# Patient Record
Sex: FEMALE | Race: WHITE | NOT HISPANIC OR LATINO | Employment: FULL TIME | URBAN - METROPOLITAN AREA
[De-identification: names, ages, dates, MRNs, and addresses within clinical notes are randomized per-mention and may not be internally consistent; named-entity substitution may affect disease eponyms.]

---

## 2017-01-03 ENCOUNTER — PATIENT MESSAGE (OUTPATIENT)
Dept: FAMILY MEDICINE | Facility: CLINIC | Age: 32
End: 2017-01-03

## 2017-01-03 DIAGNOSIS — G89.29 CHRONIC NECK PAIN: Primary | ICD-10-CM

## 2017-01-03 DIAGNOSIS — M54.2 CHRONIC NECK PAIN: Primary | ICD-10-CM

## 2017-01-10 ENCOUNTER — OFFICE VISIT (OUTPATIENT)
Dept: PAIN MEDICINE | Facility: CLINIC | Age: 32
End: 2017-01-10
Payer: COMMERCIAL

## 2017-01-10 VITALS
WEIGHT: 154.75 LBS | DIASTOLIC BLOOD PRESSURE: 78 MMHG | HEART RATE: 62 BPM | SYSTOLIC BLOOD PRESSURE: 104 MMHG | BODY MASS INDEX: 23.53 KG/M2

## 2017-01-10 DIAGNOSIS — M54.12 CERVICAL RADICULOPATHY: Primary | ICD-10-CM

## 2017-01-10 DIAGNOSIS — M79.18 MYOFASCIAL MUSCLE PAIN: ICD-10-CM

## 2017-01-10 PROCEDURE — 99999 PR PBB SHADOW E&M-EST. PATIENT-LVL III: CPT | Mod: PBBFAC,,, | Performed by: ANESTHESIOLOGY

## 2017-01-10 PROCEDURE — 1159F MED LIST DOCD IN RCRD: CPT | Mod: S$GLB,,, | Performed by: ANESTHESIOLOGY

## 2017-01-10 PROCEDURE — 99204 OFFICE O/P NEW MOD 45 MIN: CPT | Mod: S$GLB,,, | Performed by: ANESTHESIOLOGY

## 2017-01-10 RX ORDER — IBUPROFEN 200 MG
200 TABLET ORAL EVERY 6 HOURS PRN
COMMUNITY
End: 2017-04-07

## 2017-01-10 NOTE — LETTER
January 10, 2017      Aristeo Perez MD  [address]  [address]  [facility] [address]           [facility]  [address]  [facility] [address]  Phone: [phone]          Patient: April Wendt Schamberger   MR Number: [id number]   YOB: 1985   Date of Visit: 1/10/2017       Dear Dr. Aristeo Perez:    Thank you for referring April Schamberger to me for evaluation. Attached you will find relevant portions of my assessment and plan of care.    If you have questions, please do not hesitate to call me. I look forward to following April Schamberger along with you.    Sincerely,    Hunter Jimenez MD    Enclosure  CC:  No Recipients    If you would like to receive this communication electronically, please contact [email or web address] or [phone] to request more information on [employer] Link access.    For providers and/or their staff who would like to refer a patient to [facility], please contact us through our one-stop-shop provider referral line, [facility], at [phone].    If you feel you have received this communication in error or would no longer like to receive these types of communications, please e-mail [email or web address]

## 2017-01-10 NOTE — PROGRESS NOTES
Chronic Pain - New Consult    Referring Physician: Aristeo Perez MD    Chief Complaint:   Chief Complaint   Patient presents with    Neck Pain     referred by Dr. Perez    Shoulder Pain    Hand Pain     finger numbness        SUBJECTIVE:    April Wendt Schamberger presents to the clinic for the evaluation of neck pain. The pain started a few years ago and symptoms have been worsening.The pain is located in the neck area and radiates to the shoulder and right hand.  The pain is described as aching, burning, numbing, pulsating, shooting, stabbing, throbbing and tingling and is rated as 5/10. The pain is rated with a score of  0/10 on the BEST day and a score of 8/10 on the WORST day.  Symptoms interfere with daily activity, sleeping and work. The pain is exacerbated by Bending and Flexing.  The pain is mitigated by medications. She reports spending 4 hours per day reclining. The patient reports 3 hours of uninterrupted sleep per night.    Patient states onset of neck pain was 6 months, pain started in neck with radicular symptoms to shoulders, now radicular symptoms are in the right arm. Radicular pain can be shooting in the am, then turns to numbness, tingling in the day.  Patient taking Motrin every 4 hours that seems to help, activity and ADLs tend to increase her pain. Timing of pain is constant, patient states pain will take an Ambien to help sleep.Severity of pain is 7/10.    Patient denies night fever/night sweats, urinary incontinence, bowel incontinence, significant weight loss, significant motor weakness and loss of sensations.    Physical Therapy/Home Exercise: no      Pain Disability Index Review:  Last 3 PDI Scores 1/10/2017   Pain Disability Index (PDI) 0       Pain Medications:    - Opioids: None  - Adjuvant Medications: Advil,Motrin ( Ibuprofen)  - Anti-Coagulants: None  - Others: see medications list     report:  Reviewed and consistent with medication use as prescribed.    Pain Procedures:  none    Imaging: X-Ray Cervical Spine AP Lat with Flexion  Extension 12/15/16  Narrative     Cervical spine radiographs     comparison: None    Results: AP, lateral, flexion and extension views  .  The alignment is normal, vertebral body height and disk spaces are well-maintained.    Flexion and extension views demonstrate no translational abnormalities.  Very small anterior osteophyte noted at C5-C6.  No fracture or osseous lesion seen.Prevertebral soft tissues appear normal.   Impression    No significant abnormality seen      Electronically signed by: JOSE A JENKINS MD  Date: 12/15/16  Time: 10:52          Past Medical History   Diagnosis Date    IBS (irritable bowel syndrome)      Past Surgical History   Procedure Laterality Date    Tonsillectomy, adenoidectomy      Refractive surgery  2005     Social History     Social History    Marital status:      Spouse name: N/A    Number of children: 1    Years of education: N/A     Occupational History    Not on file.     Social History Main Topics    Smoking status: Former Smoker    Smokeless tobacco: Not on file    Alcohol use 0.0 oz/week     0 Standard drinks or equivalent per week      Comment: rarely    Drug use: No    Sexual activity: Yes     Partners: Male     Other Topics Concern    Not on file     Social History Narrative     Family History   Problem Relation Age of Onset    Breast cancer Mother     Prostate cancer Father     Pancreatic cancer Maternal Grandfather     Breast cancer Paternal Grandmother     Diabetes type II Paternal Grandfather        Review of patient's allergies indicates:   Allergen Reactions    Azithromycin Nausea And Vomiting       Current Outpatient Prescriptions   Medication Sig    ibuprofen (ADVIL,MOTRIN) 200 MG tablet Take 200 mg by mouth every 6 (six) hours as needed for Pain.    LOPERAMIDE HCL (IMODIUM A-D ORAL) Take 6 tablets by mouth every morning.     zolpidem (AMBIEN) 10 mg Tab Take 1 tablet (10 mg  total) by mouth nightly as needed.     No current facility-administered medications for this visit.        REVIEW OF SYSTEMS:    GENERAL:  No weight loss, malaise or fevers.  HEENT:  Negative for frequent or significant headaches. + HAs 3-4 days out of the week.  NECK:  Negative for lumps, goiter, pain and significant neck swelling.  RESPIRATORY:  Negative for cough, wheezing or shortness of breath.  CARDIOVASCULAR:  Negative for chest pain, leg swelling or palpitations.  GI:  Negative for abdominal discomfort, blood in stools or black stools or change in bowel habits. +IBS  MUSCULOSKELETAL:  See HPI.  SKIN:  Negative for lesions, rash, and itching.  PSYCH:  + for sleep disturbance, mood disorder and recent psychosocial stressors.  HEMATOLOGY/LYMPHOLOGY:  Negative for prolonged bleeding, bruising easily or swollen nodes.   NEURO:   No history of headaches, syncope, paralysis, seizures or tremors.  All other reviewed and negative other than HPI.    OBJECTIVE:    Visit Vitals    /78    Pulse 62    Wt 70.2 kg (154 lb 12.2 oz)    BMI 23.53 kg/m2       PHYSICAL EXAMINATION:    General appearance: Well appearing, in no acute distress, alert and oriented x3.  Psych:  Mood and affect appropriate.  Skin: Skin color, texture, turgor normal, no rashes or lesions, in both upper and lower body.  Head/face:  Normocephalic, atraumatic. No palpable lymph nodes.  Neck: Mild TTP over the thoracic paraspinal muscles   . Spurling Negative. No pain with neck flexion, extension, or lateral flexion.   Cor: RRR  Pulm: CTA  Back: Straight leg raising in the sitting and supine positions is negative to radicular pain. No pain to palpation over the spine or costovertebral angles. Normal range of motion without pain reproduction.  Extremities: Peripheral joint ROM is full and pain free without obvious instability or laxity in all four extremities. No deformities, edema, or skin discoloration. Good capillary refill.  Musculoskeletal:  Shoulder, hip, sacroiliac and knee provocative maneuvers are negative. Bilateral upper and lower extremity strength is normal and symmetric.  No atrophy or tone abnormalities are noted.  Neuro: Bilateral upper and lower extremity coordination and muscle stretch reflexes are physiologic and symmetric.  Plantar response are downgoing. No loss of sensation is noted.  Gait: normal.    ASSESSMENT: 31 y.o. year old female with burning neck pain radiating to the shoulder blade  with occasional numbness and tingling of the fingers consistent with      1. Cervical radiculopathy    2. Myofascial muscle pain            PLAN:     - I have stressed the importance of physical activity and a home exercise plan to help with pain and improve health.  -Rx Compounding cream to apply to affected areas  -Refer  to PT  -She can continue Motrin for now  -Order MRI of the C spine   - RTC in 4 weeks   - Counseled patient regarding the importance of physical therapy.    The above plan and management options were discussed at length with patient. Patient is in agreement with the above and verbalized understanding. It will be communicated with the referring physician via electronic record, fax, or mail.    Hunter Jimenez  01/10/2017

## 2017-01-20 ENCOUNTER — TELEPHONE (OUTPATIENT)
Dept: PAIN MEDICINE | Facility: CLINIC | Age: 32
End: 2017-01-20

## 2017-01-20 ENCOUNTER — PATIENT MESSAGE (OUTPATIENT)
Dept: PAIN MEDICINE | Facility: CLINIC | Age: 32
End: 2017-01-20

## 2017-01-20 DIAGNOSIS — M54.12 CERVICAL RADICULOPATHY: Primary | ICD-10-CM

## 2017-01-20 DIAGNOSIS — M79.18 MYOFASCIAL MUSCLE PAIN: ICD-10-CM

## 2017-01-20 DIAGNOSIS — M47.812 FACET ARTHROPATHY, CERVICAL: ICD-10-CM

## 2017-01-20 NOTE — TELEPHONE ENCOUNTER
Per JOCY Marie, after speaking to pt, scheduled cervical facet MBB, C3,4,5, pending MRI review.  He has made pt aware of this as well.  Case entered and pended to Emelyn HARTMAN.

## 2017-01-23 ENCOUNTER — PATIENT MESSAGE (OUTPATIENT)
Dept: PAIN MEDICINE | Facility: CLINIC | Age: 32
End: 2017-01-23

## 2017-01-23 NOTE — DISCHARGE INSTRUCTIONS
Home Care Instructions Pain Management:    1.  DIET:    You may resume your normal diet today.    2.  BATHING:    You may shower with luke warm water.    3.  DRESSING:    You may remove your bandage today.    4.  ACTIVITY LEVEL:      You may resume your normal activities 24 hours after your procedure.    5.  MEDICATIONS:    You may resume your normal medications today.    6.  SPECIAL INSTRUCTIONS:    No heat to the injection site for 24 hours including bath or shower, heating pad, moist heat or hot tubs.    Use an ice pack to the injection site for any pain or discomfort.  Apply ice packs for 20 minute intervals as needed.    If you have received any sedatives by mouth today, you can not drive for 12 hours.    If you have received sedation through an IV, you can not drive for 24 hours.    PLEASE CALL YOUR DOCTOR FOR THE FOLLOWIN.  Redness or swelling around the injection site.  2.  Fever of 101 degrees.  3.  Drainage (pus) from the injection site.  4.  For any continuous bleeding (some dried blood over the incision is normal.)    FOR EMERGENCIES:    If any unusual problems or difficulties occur during clinic hours, call [phone] or dial [phone].    Follow up with with your physician in 2-3 weeks.

## 2017-01-24 ENCOUNTER — TELEPHONE (OUTPATIENT)
Dept: PAIN MEDICINE | Facility: CLINIC | Age: 32
End: 2017-01-24

## 2017-01-24 NOTE — TELEPHONE ENCOUNTER
Spoke with patient regarding time of arrival for procedure that is scheduled on 1/25/17. Patient verbalized understanding and confirmed procedure date and time of arrival on 1/25/17 at 1230 PM.

## 2017-01-25 ENCOUNTER — SURGERY (OUTPATIENT)
Age: 32
End: 2017-01-25

## 2017-01-25 ENCOUNTER — HOSPITAL ENCOUNTER (OUTPATIENT)
Facility: HOSPITAL | Age: 32
Discharge: HOME | End: 2017-01-25
Attending: ANESTHESIOLOGY | Admitting: ANESTHESIOLOGY
Payer: COMMERCIAL

## 2017-01-25 VITALS
OXYGEN SATURATION: 100 % | DIASTOLIC BLOOD PRESSURE: 83 MMHG | HEIGHT: 68 IN | SYSTOLIC BLOOD PRESSURE: 131 MMHG | BODY MASS INDEX: 23.19 KG/M2 | HEART RATE: 78 BPM | RESPIRATION RATE: 15 BRPM | WEIGHT: 153 LBS | TEMPERATURE: 99 F

## 2017-01-25 PROCEDURE — 25000003 PHARM REV CODE 250: Performed by: ANESTHESIOLOGY

## 2017-01-25 PROCEDURE — 63600175 PHARM REV CODE 636 W HCPCS: Performed by: ANESTHESIOLOGY

## 2017-01-25 PROCEDURE — 64492 INJ PARAVERT F JNT C/T 3 LEV: CPT | Mod: 50,,, | Performed by: ANESTHESIOLOGY

## 2017-01-25 PROCEDURE — 64492 INJ PARAVERT F JNT C/T 3 LEV: CPT | Mod: 50 | Performed by: ANESTHESIOLOGY

## 2017-01-25 PROCEDURE — 64490 INJ PARAVERT F JNT C/T 1 LEV: CPT | Mod: 50,,, | Performed by: ANESTHESIOLOGY

## 2017-01-25 PROCEDURE — 64491 INJ PARAVERT F JNT C/T 2 LEV: CPT | Mod: 50 | Performed by: ANESTHESIOLOGY

## 2017-01-25 PROCEDURE — 99152 MOD SED SAME PHYS/QHP 5/>YRS: CPT | Mod: ,,, | Performed by: ANESTHESIOLOGY

## 2017-01-25 PROCEDURE — 64490 INJ PARAVERT F JNT C/T 1 LEV: CPT | Mod: 50 | Performed by: ANESTHESIOLOGY

## 2017-01-25 PROCEDURE — 64491 INJ PARAVERT F JNT C/T 2 LEV: CPT | Mod: 50,,, | Performed by: ANESTHESIOLOGY

## 2017-01-25 RX ORDER — MIDAZOLAM HYDROCHLORIDE 1 MG/ML
INJECTION, SOLUTION INTRAMUSCULAR; INTRAVENOUS
Status: DISCONTINUED | OUTPATIENT
Start: 2017-01-25 | End: 2017-01-25 | Stop reason: HOSPADM

## 2017-01-25 RX ORDER — SODIUM CHLORIDE, SODIUM LACTATE, POTASSIUM CHLORIDE, CALCIUM CHLORIDE 600; 310; 30; 20 MG/100ML; MG/100ML; MG/100ML; MG/100ML
INJECTION, SOLUTION INTRAVENOUS CONTINUOUS
Status: DISCONTINUED | OUTPATIENT
Start: 2017-01-25 | End: 2017-01-25 | Stop reason: HOSPADM

## 2017-01-25 RX ORDER — BUPIVACAINE HYDROCHLORIDE 2.5 MG/ML
INJECTION, SOLUTION INFILTRATION; PERINEURAL
Status: DISCONTINUED | OUTPATIENT
Start: 2017-01-25 | End: 2017-01-25 | Stop reason: HOSPADM

## 2017-01-25 RX ORDER — FENTANYL CITRATE 50 UG/ML
INJECTION, SOLUTION INTRAMUSCULAR; INTRAVENOUS
Status: DISCONTINUED | OUTPATIENT
Start: 2017-01-25 | End: 2017-01-25 | Stop reason: HOSPADM

## 2017-01-25 RX ORDER — LIDOCAINE HYDROCHLORIDE 10 MG/ML
INJECTION INFILTRATION; PERINEURAL
Status: DISCONTINUED | OUTPATIENT
Start: 2017-01-25 | End: 2017-01-25 | Stop reason: HOSPADM

## 2017-01-25 RX ORDER — TRIAMCINOLONE ACETONIDE 40 MG/ML
INJECTION, SUSPENSION INTRA-ARTICULAR; INTRAMUSCULAR
Status: DISCONTINUED | OUTPATIENT
Start: 2017-01-25 | End: 2017-01-25 | Stop reason: HOSPADM

## 2017-01-25 RX ADMIN — FENTANYL CITRATE 50 MCG: 50 INJECTION, SOLUTION INTRAMUSCULAR; INTRAVENOUS at 01:01

## 2017-01-25 RX ADMIN — LIDOCAINE HYDROCHLORIDE 5 ML: 10 INJECTION, SOLUTION INFILTRATION; PERINEURAL at 01:01

## 2017-01-25 RX ADMIN — MIDAZOLAM 2 MG: 1 INJECTION INTRAMUSCULAR; INTRAVENOUS at 01:01

## 2017-01-25 RX ADMIN — MIDAZOLAM 1 MG: 1 INJECTION INTRAMUSCULAR; INTRAVENOUS at 01:01

## 2017-01-25 RX ADMIN — BUPIVACAINE HYDROCHLORIDE 5 ML: 2.5 INJECTION, SOLUTION INFILTRATION; PERINEURAL at 01:01

## 2017-01-25 RX ADMIN — TRIAMCINOLONE ACETONIDE 40 MG: 40 INJECTION, SUSPENSION INTRA-ARTICULAR; INTRAMUSCULAR at 01:01

## 2017-01-25 NOTE — IP AVS SNAPSHOT
[facility]  [address]  [place] [address]  Phone: [phone]           Patient Discharge Instructions     Our goal is to set you up for success. This packet includes information on your condition, medications, and your home care. It will help you to care for yourself so you don't get sicker and need to go back to the hospital.     Please ask your nurse if you have any questions.        There are many details to remember when preparing to leave the hospital. Here is what you will need to do:    1. Take your medicine. If you are prescribed medications, review your Medication List in the following pages. You may have new medications to  at the pharmacy and others that you'll need to stop taking. Review the instructions for how and when to take your medications. Talk with your doctor or nurses if you are unsure of what to do.     2. Go to your follow-up appointments. Specific follow-up information is listed in the following pages. Your may be contacted by a transition nurse or clinical provider about future appointments. Be sure we have all of the phone numbers to reach you, if needed. Please contact your provider's office if you are unable to make an appointment.     3. Watch for warning signs. Your doctor or nurse will give you detailed warning signs to watch for and when to call for assistance. These instructions may also include educational information about your condition. If you experience any of warning signs to your health, call your doctor.               [facility]  Unless otherwise directed by your provider, please contact [facility], our nurse care line that is available for 24/7 assistance.     [phone] (toll-free)    Registered nurses in the [facility] provide clinical advisement, health education, appointment booking, and other advisory services.                    ** Verify the list of medication(s) below is accurate and up to date. Carry this  with you in case of emergency. If your medications have changed, please notify your healthcare provider.             Medication List      Notice     Cannot display discharge medications because the patient has not yet been admitted.               Please bring to all follow up appointments:    1. A copy of your discharge instructions.  2. All medicines you are currently taking in their original bottles.  3. Identification and insurance card.    Please arrive 15 minutes ahead of scheduled appointment time.    Please call 24 hours in advance if you must reschedule your appointment and/or time.        Your Scheduled Appointments     Jan 26, 2017 11:30 AM CST   Mri C Spine Non Cont with [facility] MRI1   [facility]    [address]  [place] [address]   [phone]            Feb 08, 2017  3:00 PM CST   New Physical Therapy Patient with Rosario Aguilar, SHAR   [facility]    [address]  [place] [address]   [phone]            Feb 09, 2017  1:15 PM CST   Established Patient Visit with [facility] ([place]    [address]  [place] [address]   [phone]              Your Future Surgeries/Procedures     Jan 25, 2017   Surgery with Hunter Jimenez MD   [facility]    [address]  [place] [address]   [phone]                  Discharge Instructions       Home Care Instructions Pain Management:    1.  DIET:    You may resume your normal diet today.    2.  BATHING:    You may shower with luke warm water.    3.  DRESSING:    You may remove your bandage today.    4.  ACTIVITY LEVEL:      You may resume your normal activities 24 hours after your procedure.    5.  MEDICATIONS:    You may resume your normal medications today.    6.  SPECIAL INSTRUCTIONS:    No heat to the injection site for 24 hours including bath or shower, heating  pad, moist heat or hot tubs.    Use an ice pack to the injection site for any pain or discomfort.  Apply ice packs for 20 minute intervals as needed.    If you have received any sedatives by mouth today, you can not drive for 12 hours.    If you have received sedation through an IV, you can not drive for 24 hours.    PLEASE CALL YOUR DOCTOR FOR THE FOLLOWIN.  Redness or swelling around the injection site.  2.  Fever of 101 degrees.  3.  Drainage (pus) from the injection site.  4.  For any continuous bleeding (some dried blood over the incision is normal.)    FOR EMERGENCIES:    If any unusual problems or difficulties occur during clinic hours, call [phone] or dial 911.    Follow up with with your physician in 2-3 weeks.         Admission Information     Date & Time Provider Department CSN    2017  3:30 PM Hunter Jimenez MD [facility] [id number]      Care Providers     Provider Role Specialty Primary office phone    Hunter Jimenez MD Attending Provider Pain Medicine [phone]      Recent Lab Values     No lab values to display.      Allergies as of 2017        Reactions    Azithromycin Nausea And Vomiting      Advance Directives     An advance directive is a document which, in the event you are no longer able to make decisions for yourself, tells your healthcare team what kind of treatment you do or do not want to receive, or who you would like to make those decisions for you.  If you do not currently have an advance directive, [facility] encourages you to create one.  For more information call:  [phone]), [phone] ([phone]),  or log on to [email or web address].        Smoking Cessation     If you would like to quit smoking:   You may be eligible for free services if you are a [place] resident and started smoking cigarettes before 1988.  Call the [employer]oking [employer]essation [employer] ([employer]) toll free at (507) 599-2132 or (589) 611-7680.   Call  1-800-QUIT-NOW if you do not meet the above criteria.            Language Assistance Services     ATTENTION: Language assistance services are available, free of charge. Please call [phone].      ATENCIÓN: Si habgordo austin, tiene a hightower disposición servicios gratuitos de asistencia lingüística. Llame al [phone].     CHÚ Ý: N?u b?n nói Ti?ng Vi?t, có các d?ch v? h? tr? ngôn ng? mi?n phí dành cho b?n. G?i s? [phone].         [facility] complies with applicable Federal civil rights laws and does not discriminate on the basis of race, color, national origin, age, disability, or sex.

## 2017-01-25 NOTE — INTERVAL H&P NOTE
The patient has been examined and the H&P has been reviewed:    I concur with the findings and no changes have occurred since H&P was written.     Will proceed with bilateral C3,4,5 facet MBB    Anesthesia/Surgery risks, benefits and alternative options discussed and understood by patient/family.          There are no hospital problems to display for this patient.

## 2017-01-25 NOTE — OP NOTE
"Patient Name: April Wendt Schamberger  MRN: [id number]    INFORMED CONSENT: The procedure, risks, benefits and options were discussed with patient. There are no contraindications to the procedure. The patient expressed understanding and agreed to proceed. The personnel performing the procedure was discussed. I verify that I personally obtained April's consent prior to the start of the procedure and the signed consent can be found on the patient's chart.    PROCEDURE: bilateral C4,5,6  CERVICAL FACET MEDIAL BRANCH NERVE BLOCK (Prone) ( after xray correlation with pain level, decsion was made to proceed at C4,5,6 levels, patient agrees to proceed    Procedure Date: 1/25/2017  Anesthesia:  systemic  Pre Procedure diagnosis: cervical facet arthropathy  Post-Procedure diagnosis: Same    Sedation: Yes - Fentanyl 100 mcg and Midazolam 3 mg    DESCRIPTION OF PROCEDURE: The patient was brought to the procedure room.  IV access was obtained prior to the procedure.  The patient was positioned prone on the fluoroscopy table.  Continuous hemodynamic monitoring was initiated including blood pressure, EKG, and pulse oximetry.  The skin overlying the cervical spine was prepped with chlorhexidine three times and draped into a sterile field.  Fluoroscopy was used to identify the location of the   C4 to C6 medial branch nerves.  Skin anesthesia was achieved using 5 cc of Lidocaine 1% over the injection sites. A 22 gauge, 3 1/2" spinal needle was slowly inserted at each level using AP, lateral and oblique fluoroscopic imaging. Final needle position was at the midpoint of the waist of the articular pillars. Negative aspiration for blood or CSF was confirmed. A combination of 5ml bupivacaine 0.25%and 40 mg of Kenalog was injected.  The needles were removed and bleeding was nil. A sterile dressing was applied.No specimens collected. The patient was brought to recovery in stable condition. April was taken back to the recovery room for further " observation.     Blood Loss: Nill  Specimen: None    Pre Procedure Pain Level: 2/10  Post-Procedure Pain Level: 0/10        Discharge Diagnosis: Same as Pre and Post Procedure  Condition on Discharge: Stable.  Diet on Discharge: Same as before.  Activity: as per instruction sheet.  Discharge to: Home with a responsible adult.  Follow up: as per Discharge instructions

## 2017-01-26 ENCOUNTER — HOSPITAL ENCOUNTER (OUTPATIENT)
Dept: RADIOLOGY | Facility: HOSPITAL | Age: 32
Discharge: HOME | End: 2017-01-26
Attending: NURSE PRACTITIONER
Payer: COMMERCIAL

## 2017-01-26 DIAGNOSIS — M54.12 CERVICAL RADICULOPATHY: ICD-10-CM

## 2017-01-26 PROCEDURE — 72141 MRI NECK SPINE W/O DYE: CPT | Mod: TC

## 2017-01-26 PROCEDURE — 72141 MRI NECK SPINE W/O DYE: CPT | Mod: 26,,, | Performed by: RADIOLOGY

## 2017-01-30 ENCOUNTER — PATIENT MESSAGE (OUTPATIENT)
Dept: PAIN MEDICINE | Facility: CLINIC | Age: 32
End: 2017-01-30

## 2017-02-08 ENCOUNTER — CLINICAL SUPPORT (OUTPATIENT)
Dept: SPEECH THERAPY | Facility: HOSPITAL | Age: 32
End: 2017-02-08
Attending: NURSE PRACTITIONER
Payer: COMMERCIAL

## 2017-02-08 DIAGNOSIS — R26.89 DECREASED FUNCTIONAL MOBILITY: ICD-10-CM

## 2017-02-08 DIAGNOSIS — M25.60 DECREASED RANGE OF MOTION: ICD-10-CM

## 2017-02-08 DIAGNOSIS — R53.1 DECREASED STRENGTH: ICD-10-CM

## 2017-02-08 DIAGNOSIS — M54.2 NECK PAIN: ICD-10-CM

## 2017-02-08 PROCEDURE — 97110 THERAPEUTIC EXERCISES: CPT

## 2017-02-08 PROCEDURE — 97161 PT EVAL LOW COMPLEX 20 MIN: CPT

## 2017-02-08 NOTE — PLAN OF CARE
"  TIME RECORD    Date: 02/08/2017    Start Time:  1300  Stop Time:  1345    PROCEDURES:    TIMED  Procedure Min.   TE 8   MT 2                 UNTIMED  Procedure Min.   Initial evaluation 35         Total Timed Minutes:  10  Total Timed Units:  1  Total Untimed Units:  1  Charges Billed/# of units:  2 (LCE-1, TE-1)    OUTPATIENT PHYSICAL THERAPY   PATIENT EVALUATION  Onset Date: 6 mos - 1 year  Primary Diagnosis:   1. Decreased range of motion     2. Neck pain     3. Decreased functional mobility     4. Decreased strength       Treatment Diagnosis: neck pain, decreased cervical ROM, decreases cervical and UE strength   Past Medical History   Diagnosis Date    IBS (irritable bowel syndrome)      Precautions: Standard  Prior Therapy: No  Medications: April Wendt Schamberger has a current medication list which includes the following prescription(s): ibuprofen, ibuprofen-famotidine, loperamide hcl, and zolpidem.  Nutrition:  Normal  History of Present Illness: Chronic neck pain > 6 mos  Prior Level of Function: Independent  Social History: research nurse  Functional Deficits Leading to Referral/Nature of Injury: difficulty moving head, lifting  Patient Therapy Goals: decrease pain, "less tense muscles"    Subjective     April Wendt Schamberger states her neck "has been hurting for a while." Saw an accupuncturist. Had an xray and was told that she had a "straight neck." Has had massages to help with pain, but they have not helped. Her R 3rd-5th digits "go numb and tingle." Denies trouble swallowing. Unable to carry bag on shoulder because of pain. Radiating pain into R UE.    Pain:  Location: neck   Description: Aching, Tingling, Numb, Sharp and Hot  Activities Which Increase Pain: unable to pin point what makes her hurt  Activities Which Decrease Pain: ibuprofen  Pain Scale: 3/10 at best 3/10 now  6/10 at worst    Objective     Posture: sitting: slumped with forward head and rounded shoulders, decreased cervical " "lordosis  Palpation: +TTP R thoracic and cervical paraspinals, R suboccipitals; pain and hypomobility with R&L sidegliding of C5&C6; pain and hypomobility with R& L upgliding C3-C6; increased tissue tension B UT and cervical paraspinals  Sensation: B UE intact to light touch    Range of Motion/Strength:     Cervical AROM Pain/Dysfunction with movement   Flexion 40    Extension 48    R Side Bending 40 Pain in L neck   L Side Bending 40 Pain in R neck   R rotation 67    L rotation 60      Shoulder  Right   Left  Pain/Dysfunction with Movement    AROM PROM MMT AROM PROM MMT    flexion WFL NT 5/5 WFL NT 4+/5    abduction WFL NT 5/5 WFL NT 5/5 3rd-5th digits tingling   Internal rotation WFL NT 4+/5 WFL NT 4+/5    ER  WFL NT 4+/5 WFL NT 4+/5      Elbow  Right   Left  Pain/Dysfunction with Movement    AROM PROM MMT AROM PROM MMT    flexion WFL NT 4+/5 WFL NT 4+/5    extension WFL NT 5/5 WFL NT 5/5      Special Tests: Max Compression: neg B; Spurling's: neg B    Treatment: Treatment to consist of manual therapy including STM/MFR to B thoracic/cervical paraspinals, UT, suboccipitals, joint mobs of cervical spine; therapeutic exercise including cervical/UE strengthening/stretching, postural education, and modalities prn. Gave pt HEP including supine chin tucks, UT and levator scap stretches. Pt demo understanding by performing exercises x 8'. Pt was instructed to stop HEP if she experiences pain. Pt verbalized understanding. Kinesiotapin-"Y" strip applied to cervical paraspinals for postural awareness and pain relief.Kinesiotapin-"I" strips applied to B UT for inhibition. Patient instructed on purpose, wear, care, precautions to monitor and removal of KT. Patient verbalized understanding of all instructions provided x 2'.      Assessment     History  Co-morbidities and personal factors that may impact the plan of care Examination  Body Structures and Functions, activity limitations and participation restrictions that " "may impact the plan of care Clinical Presentation   Decision Making/ Complexity Score   Co-morbidities:         Personal Factors:       **LOW** Body Regions: head, neck, trunk    Body Systems: musculoskeletal - posture, ROM, strength; neuromuscular - sensation    Activity limitations: head movement       Participation Restrictions:     **HIGH**     Stable and uncomplicated: pt pain has remained constant, all head movement causes pain    **LOW**   FOTO Neck Survey: 37% limitation  -pt reported "Moderate difficulty" with Carrying objects on your shoulders, like a small  child, light backpack  -pt reported "A little bit of difficulty" with Placing a 25 lb. box on a shelf overhead; Turning to look behind you; Performing recreational activities in which you take  some force or impact (e.g., golf, hammering, tennis,  Etc.); Looking up to see a bird    **LOW**        Initial Assessment (Pertinent finding, problem list and factors affecting outcome): Pt is a 30 yo female presenting to PT with pain, decreased cervical ROM and strength, decreased UE strength, poor posture, and functional deficits with moving head and lifting. Pt would benefit from continued skilled PT consisting of manual therapy including STM/MFR to B thoracic/cervical paraspinals, UT, suboccipitals, joint mobs of cervical spine; therapeutic exercise including cervical/UE strengthening/stretching, postural education, and modalities prn to address limitations and increase functional mobility.  Rehab Potiential: good    Short Term Goals = Long Term Goals (8 Weeks):   1. Pt will be independent with HEP.  2. Pt will improve UE strength to grossly 5/5  3. Pt will improve cervical ROM to 75% WNL to increase functional mobility.  4. Pt will perform cervical AROM with </= 2/10 pain  5. Pt will carry a 15# backpack with </= 2/10 pain as evidence of improved function.  6. Pt will report 28% on the FOTO Neck Survey placing the patient in the 20%<40% impaired, limited, " or restricted category indicating increased functional mobility.    Plan     Certification Period: 2/8/17 to 4/8/17  Recommended Treatment Plan: 1-2 times per week for 8 weeks: Electrical Stimulation IFC, Group Therapy, Manual Therapy, Moist Heat/ Ice, Neuromuscular Re-ed, Patient Education, Therapeutic Activites and Therapeutic Exercise  Other Recommendations: modalities prn, ASTYM prn, kinesiotape prn, Functional Dry Needling prn       Therapist: Rosario Aguilar, PT    I CERTIFY THE NEED FOR THESE SERVICES FURNISHED UNDER THIS PLAN OF TREATMENT AND WHILE UNDER MY CARE    Physician's comments: ________________________________________________________________________________________________________________________________________________      Physician's Name: ___________________________________

## 2017-02-09 ENCOUNTER — OFFICE VISIT (OUTPATIENT)
Dept: PAIN MEDICINE | Facility: CLINIC | Age: 32
End: 2017-02-09
Payer: COMMERCIAL

## 2017-02-09 VITALS
HEART RATE: 87 BPM | SYSTOLIC BLOOD PRESSURE: 144 MMHG | BODY MASS INDEX: 23.36 KG/M2 | WEIGHT: 153.69 LBS | DIASTOLIC BLOOD PRESSURE: 100 MMHG

## 2017-02-09 DIAGNOSIS — M54.12 CERVICAL RADICULOPATHY: Primary | ICD-10-CM

## 2017-02-09 DIAGNOSIS — M47.812 FACET ARTHROPATHY, CERVICAL: ICD-10-CM

## 2017-02-09 DIAGNOSIS — M62.838 MUSCLE SPASM: ICD-10-CM

## 2017-02-09 DIAGNOSIS — M54.2 CERVICAL PAIN (NECK): Primary | ICD-10-CM

## 2017-02-09 DIAGNOSIS — M54.12 CERVICAL RADICULOPATHY: ICD-10-CM

## 2017-02-09 DIAGNOSIS — M79.18 MYOFASCIAL MUSCLE PAIN: ICD-10-CM

## 2017-02-09 DIAGNOSIS — M54.2 NECK PAIN: Primary | ICD-10-CM

## 2017-02-09 PROBLEM — R53.1 DECREASED STRENGTH: Status: ACTIVE | Noted: 2017-02-09

## 2017-02-09 PROBLEM — M25.60 DECREASED RANGE OF MOTION: Status: ACTIVE | Noted: 2017-02-09

## 2017-02-09 PROBLEM — R26.89 DECREASED FUNCTIONAL MOBILITY: Status: ACTIVE | Noted: 2017-02-09

## 2017-02-09 PROCEDURE — 20553 NJX 1/MLT TRIGGER POINTS 3/>: CPT | Mod: S$GLB,,, | Performed by: NURSE PRACTITIONER

## 2017-02-09 PROCEDURE — 99999 PR PBB SHADOW E&M-EST. PATIENT-LVL III: CPT | Mod: PBBFAC,,, | Performed by: NURSE PRACTITIONER

## 2017-02-09 PROCEDURE — 99214 OFFICE O/P EST MOD 30 MIN: CPT | Mod: 25,S$GLB,, | Performed by: NURSE PRACTITIONER

## 2017-02-09 RX ORDER — TRIAMCINOLONE ACETONIDE 40 MG/ML
40 INJECTION, SUSPENSION INTRA-ARTICULAR; INTRAMUSCULAR
Status: COMPLETED | OUTPATIENT
Start: 2017-02-09 | End: 2017-02-09

## 2017-02-09 RX ORDER — TIZANIDINE 4 MG/1
4 TABLET ORAL NIGHTLY PRN
Qty: 30 TABLET | Refills: 2 | Status: SHIPPED | OUTPATIENT
Start: 2017-02-09 | End: 2020-01-21 | Stop reason: SDUPTHER

## 2017-02-09 RX ORDER — FAMOTIDINE 20 MG/1
20 TABLET, FILM COATED ORAL 3 TIMES DAILY
Status: DISCONTINUED | OUTPATIENT
Start: 2017-02-09 | End: 2017-05-03

## 2017-02-09 RX ORDER — IBUPROFEN AND FAMOTIDINE 26.6; 8 MG/1; MG/1
1 TABLET ORAL 3 TIMES DAILY
Qty: 90 TABLET | Refills: 1 | Status: SHIPPED | OUTPATIENT
Start: 2017-02-09 | End: 2017-02-09

## 2017-02-09 RX ORDER — IBUPROFEN 800 MG/1
800 TABLET ORAL 3 TIMES DAILY
Qty: 90 TABLET | Refills: 2 | Status: SHIPPED | OUTPATIENT
Start: 2017-02-09 | End: 2017-04-07

## 2017-02-09 RX ADMIN — TRIAMCINOLONE ACETONIDE 40 MG: 40 INJECTION, SUSPENSION INTRA-ARTICULAR; INTRAMUSCULAR at 10:02

## 2017-02-09 NOTE — PROGRESS NOTES
Chronic patient Established Note (Follow up visit)      SUBJECTIVE:    April Wendt Schamberger presents to the clinic for a 2 wk follow-up appointment for neck pain. She is S/P  bilateral C4,5,6 CERVICAL FACET MEDIAL BRANCH NERVE BLOCK (Prone) on 17 with 0% relief. Patient state she continues to have   Pain that is located  is located in the neck area and radiates to the shoulder and right hand. The pain is described as aching, burning, numbing, pulsating, shooting, stabbing, throbbing and tingling and is rated as 5/10. Discussed that we will perform Cervical TPs today, we also s/f a C7-T1 LAZ with cath, patient agreed that if she felt better over the weekend then she would cx LAZ.  Discussed that I would Rx ibuprofen and famotidine TID to help with neck pain, patient agreed and understood.  Since the last visit, April Wendt Schamberger states the pain has been persistant. Current pain intensity is 4/10.    Pain Disability Index Review:  Last 3 PDI Scores 2017 1/10/2017   Pain Disability Index (PDI) 0 0       Pain Medications:     - Opioids: None  - Adjuvant Medications: Advil,Motrin ( Ibuprofen)  - Anti-Coagulants: None  - Others: see medications list    Opioid Contract: no     report:  Reviewed and consistent with medication use as prescribed.    Pain Procedures: 17 bilateral C4,5,6 CERVICAL FACET MEDIAL BRANCH NERVE BLOCK (Prone) ( after xray correlation with pain level, decsion was made to proceed at C4,5,6 levels, patient agrees to proceed     Physical Therapy/Home Exercise: yes    Imagin17 MRI Cervical Spine Without Contrast  Narrative   Technique: Multiplanar, multisequence MR imaging of the cervical spine obtained without the use of IV contrast.     Comparison: Cervical spine radiographs 12/15/2016.     Results:    There is straightening with mild reversal of the normal cervical lordosis. Vertebral body heights are maintained with no evidence of fracture. Mild intervertebral disc  space narrowing and desiccation are visualized at C5-6 and C6-7. No marrow signal abnormality suspicious for an infiltrative process.      The cervical cord is normal in caliber and signal characteristics.  The craniocervical junction and visualized intracranial contents are unremarkable.  The adjacent soft tissue structures show no significant abnormalities.      C2-C3:  No significant spinal canal or neural foraminal narrowing.    C3-C4: No significant spinal canal or neural foraminal narrowing.    C4-C5:  No significant spinal canal or neural foraminal narrowing.    C5-C6:  Disc bulge with right uncovertebral spurring. Findings result in mild spinal canal stenosis and mild right neural foraminal narrowing.    C6-C7:  Mild disc bulge with thickening of the ligamentum flavum results in mild spinal canal stenosis.No significant neuroforaminal narrowing.    C7-T1:   No significant central spinal or neural foraminal narrowing.   Impression      Mild disc bulge and spinal canal stenosis at the C5-6 and C6-7 levels.      Electronically signed by: UMER MARTIN MD  Date: 01/26/17  Time: 12:56     Encounter   View Encounter      Reviewed By   Hunter Jimenez MD on 1/27/2017  2:18 PM   Exam Details   Performed Procedure Technologist Supporting Staff Performing Physician   MRI Cervical Spine Without Contrast Andre Hinds, RT     Appointment Date/Status Modality Department      1/26/2017     Completed [facility] MRI1 [facility] MRI    Begin Exam End Exam Begin Exam Questionnaires End Exam Questionnaires   1/26/2017 11:22 AM 1/26/2017 11:59 AM MRI TECH NAVIGATOR QUESTIONS IMAGING END ALL     RIS PREGNANCY TECH NAVIGATOR      X-Ray Cervical Spine AP Lat with Flexion  Extension 12/15/16  Narrative     Cervical spine radiographs     comparison: None    Results: AP, lateral, flexion and extension views  .  The alignment is normal, vertebral body height and disk spaces are well-maintained.    Flexion and extension views demonstrate no  translational abnormalities.  Very small anterior osteophyte noted at C5-C6.  No fracture or osseous lesion seen.Prevertebral soft tissues appear normal.   Impression    No significant abnormality seen      Electronically signed by: JOSE A JENKINS MD  Date: 12/15/16  Time: 10:52            Allergies:   Review of patient's allergies indicates:   Allergen Reactions    Azithromycin Nausea And Vomiting       Current Medications:   Current Outpatient Prescriptions   Medication Sig Dispense Refill    ibuprofen (ADVIL,MOTRIN) 200 MG tablet Take 200 mg by mouth every 6 (six) hours as needed for Pain.      LOPERAMIDE HCL (IMODIUM A-D ORAL) Take 6 tablets by mouth every morning.       zolpidem (AMBIEN) 10 mg Tab Take 1 tablet (10 mg total) by mouth nightly as needed. 30 tablet 2     No current facility-administered medications for this visit.        REVIEW OF SYSTEMS:    GENERAL: No weight loss, malaise or fevers.  HEENT: Negative for frequent or significant headaches. + HAs 3-4 days out of the week.  NECK: Negative for lumps, goiter, pain and significant neck swelling.  RESPIRATORY: Negative for cough, wheezing or shortness of breath.  CARDIOVASCULAR: Negative for chest pain, leg swelling or palpitations.  GI: Negative for abdominal discomfort, blood in stools or black stools or change in bowel habits. +IBS  MUSCULOSKELETAL: See HPI.  SKIN: Negative for lesions, rash, and itching.  PSYCH: + for sleep disturbance, mood disorder and recent psychosocial stressors.  HEMATOLOGY/LYMPHOLOGY: Negative for prolonged bleeding, bruising easily or swollen nodes.   NEURO: No history of headaches, syncope, paralysis, seizures or tremors.  All other reviewed and negative other than HPI.    Past Medical History:  Past Medical History   Diagnosis Date    IBS (irritable bowel syndrome)        Past Surgical History:  Past Surgical History   Procedure Laterality Date    Tonsillectomy, adenoidectomy      Refractive surgery  2005        Family History:  Family History   Problem Relation Age of Onset    Breast cancer Mother     Prostate cancer Father     Pancreatic cancer Maternal Grandfather     Breast cancer Paternal Grandmother     Diabetes type II Paternal Grandfather        Social History:  Social History     Social History    Marital status:      Spouse name: N/A    Number of children: 1    Years of education: N/A     Social History Main Topics    Smoking status: Former Smoker    Smokeless tobacco: None    Alcohol use 0.0 oz/week     0 Standard drinks or equivalent per week      Comment: rarely    Drug use: No    Sexual activity: Yes     Partners: Male     Other Topics Concern    None     Social History Narrative       OBJECTIVE:    Visit Vitals    BP (!) 144/100    Pulse 87    Wt 69.7 kg (153 lb 10.6 oz)    BMI 23.36 kg/m2       PHYSICAL EXAMINATION:    General appearance: Well appearing, in no acute distress, alert and oriented x3.  Psych: Mood and affect appropriate.  Skin: Skin color, texture, turgor normal, no rashes or lesions, in both upper and lower body.  Head/face: Normocephalic, atraumatic. No palpable lymph nodes.  Neck: Mild TTP over the thoracic paraspinal muscles  . Spurling Negative. No pain with neck flexion, extension, or lateral flexion.   Cor: RRR  Pulm: CTA  Back: Straight leg raising in the sitting and supine positions is negative to radicular pain. pain to palpation over the  spine or costovertebral angles. Normal range of motion without pain reproduction.  Extremities: Peripheral joint ROM is full and pain free without obvious instability or laxity in all four extremities. No deformities, edema, or skin discoloration. Good capillary refill.  Musculoskeletal: Shoulder, hip, sacroiliac and knee provocative maneuvers are negative. Bilateral upper and lower extremity strength is normal and symmetric. No atrophy or tone abnormalities are noted.  Neuro: Bilateral upper and lower extremity  coordination and muscle stretch reflexes are physiologic and symmetric. Plantar response are downgoing. No loss of sensation is noted.  Gait: normal.      ASSESSMENT: 31 y.o. year old female with burning neck pain radiating to the shoulder blade with occasional numbness and tingling of the fingers consistent with      Diagnosis:    1. Cervical pain (neck)  ibuprofen (ADVIL,MOTRIN) 800 MG tablet    famotidine tablet 20 mg    DISCONTINUED: ibuprofen-famotidine (DUEXIS) 800-26.6 mg Tab   2. Muscle spasm  tizanidine (ZANAFLEX) 4 MG tablet   3. Cervical radiculopathy     4. Facet arthropathy, cervical     5. Myofascial muscle pain           PLAN:     - I have stressed the importance of physical activity and a home exercise plan to help with pain and improve health.  - Patient can continue with medications for now since they are providing benefits, using them appropriately, and without side effects.  - Counseled patient regarding the importance of activity modification, constant sleeping habits and physical therapy.  -Rx of Ibuprofen 800 mg and Famotidine 20 mg to help with neck pain TID  -TP injections today for cervical and muscle pain.  - Schedule for a Cervical Epidural Injection with catheter to C7-T1 to help with pain and progress with a Home exercise program.  - I have explained the risks, benefits, and alternatives of the procedure in detail. The patient voices understanding and all questions have been answered. The patient agrees to proceed as planned. Written Consent obtained.   -RTC as needed for returning or new pain  -The above plan and management options were discussed at length with patient. Patient is in agreement with the above and verbalized understanding. The physician  was consulted on this patient  and agrees with this plan.     JOCY Marie    02/09/2017     Procedures Patient Name: Schamberger, April Wendt  MRN: [id number]     INFORMED CONSENT: The procedure, risks, benefits and options were  discussed with patient. There are no contraindications to the procedure. The patient expressed understanding and agreed to proceed. The personnel performing the procedure was discussed. I verify that I personally obtained Mrs Schamberger's consent prior to the start of the procedure and the signed consent can be found on the patient's chart.     Procedure Date: 02/09/17     Anesthesia: Topical     Pre Procedure diagnosis:     1. Myositis, unspecified myositis type, unspecified site    2. Myalgia          Post-Procedure diagnosis: same        Sedation: None     PROCEDURE: TRIGGER POINT INJECTION  The patient was placed in a seated position. The site of pain and procedure were confirmed with the patient prior to starting the procedure. After performing time out. The patient's trigger points were identified and marked. The skin was prepped with chlorhexidine three times. After performing time out A 25-gauge 1.5 inch needle was advanced through the skin and subcutaneous tissues. Aspiration for blood, air and CSF was negative. A total of 10 ml of Bupivacaine 0.25% and 40 mg Kenalog was injected at all trigger point. No complications were evident. No specimens collected.     Blood Loss: Nill  Specimen: None

## 2017-02-09 NOTE — MR AVS SNAPSHOT
[facility]  [address]  [place] [address]  Phone: [phone]                  April Wendt Schamberger   2017 1:15 PM   Office Visit    Description:  Female : 1985   Provider:  LIDIA Miller   Department:  [facility]           Reason for Visit     Neck Pain           Diagnoses this Visit        Comments    Cervical pain (neck)    -  Primary            To Do List           Future Appointments        Provider Department Dept Phone    2017 1[facility] [phone]    2/15/2017 3:00 PM Zahira Kelly, PTA [facility] [phone]    2017 3:00 PM Yeison Leroy, PTA [facility] [phone]    2017 3:30 PM Rosario Aguilar, PT [facility] [phone]    2017 3:00 PM Thien Cole, PTA [facility] [phone]      Goals (5 Years of Data)     None       These Medications        Disp Refills Start End    ibuprofen-famotidine (DUEXIS) 800-26.6 mg Tab 90 tablet 1 2017 3/11/2017    Take 1 tablet by mouth 3 (three) times daily. - Oral    Pharmacy: [facility] - [address] [place] Ph #: [phone]         [facility]     [facility] Nurse Care [facility] -  Assistance  Registered nurses in the [facility] provide clinical advisement, health education, appointment booking, and other advisory services.  Call for this free service at [phone].             Medications           Message regarding Medications     Verify the changes and/or additions to your medication regime listed below are the same as discussed with your clinician today.  If any of these changes or additions are incorrect, please notify your healthcare provider.        START taking these NEW medications        Refills    ibuprofen-famotidine (DUEXIS) 800-26.6 mg Tab 1    Sig: Take 1  tablet by mouth 3 (three) times daily.    Class: Normal    Route: Oral           Verify that the below list of medications is an accurate representation of the medications you are currently taking.  If none reported, the list may be blank. If incorrect, please contact your healthcare provider. Carry this list with you in case of emergency.           Current Medications     ibuprofen (ADVIL,MOTRIN) 200 MG tablet Take 200 mg by mouth every 6 (six) hours as needed for Pain.    LOPERAMIDE HCL (IMODIUM A-D ORAL) Take 6 tablets by mouth every morning.     zolpidem (AMBIEN) 10 mg Tab Take 1 tablet (10 mg total) by mouth nightly as needed.    ibuprofen-famotidine (DUEXIS) 800-26.6 mg Tab Take 1 tablet by mouth 3 (three) times daily.           Clinical Reference Information           Your Vitals Were     BP Pulse Weight BMI       144/100 87 69.7 kg (153 lb 10.6 oz) 23.36 kg/m2       Blood Pressure          Most Recent Value    BP  (!)  144/100      Allergies as of 2/9/2017     Azithromycin      Immunizations Administered on Date of Encounter - 2/9/2017     None      Language Assistance Services     ATTENTION: Language assistance services are available, free of charge. Please call [phone].      ATENCIÓN: Si habla norman, tiene a hightower disposición servicios gratuitos de asistencia lingüística. Llame al [phone].     GE Ý: N?u b?n nói Ti?ng Vi?t, có các d?ch v? h? tr? ngôn ng? mi?n phí dành cho b?n. G?i s? [phone].         [facility] [facility] complies with applicable Federal civil rights laws and does not discriminate on the basis of race, color, national origin, age, disability, or sex.

## 2017-02-13 ENCOUNTER — PATIENT MESSAGE (OUTPATIENT)
Dept: PAIN MEDICINE | Facility: CLINIC | Age: 32
End: 2017-02-13

## 2017-02-15 ENCOUNTER — TELEPHONE (OUTPATIENT)
Dept: PAIN MEDICINE | Facility: CLINIC | Age: 32
End: 2017-02-15

## 2017-02-15 ENCOUNTER — CLINICAL SUPPORT (OUTPATIENT)
Dept: REHABILITATION | Facility: HOSPITAL | Age: 32
End: 2017-02-15
Attending: NURSE PRACTITIONER
Payer: COMMERCIAL

## 2017-02-15 DIAGNOSIS — M25.60 DECREASED RANGE OF MOTION: ICD-10-CM

## 2017-02-15 DIAGNOSIS — M54.2 NECK PAIN: ICD-10-CM

## 2017-02-15 DIAGNOSIS — R26.89 DECREASED FUNCTIONAL MOBILITY: ICD-10-CM

## 2017-02-15 DIAGNOSIS — R53.1 DECREASED STRENGTH: ICD-10-CM

## 2017-02-15 PROCEDURE — 97140 MANUAL THERAPY 1/> REGIONS: CPT | Mod: PN

## 2017-02-15 PROCEDURE — 97110 THERAPEUTIC EXERCISES: CPT | Mod: PN

## 2017-02-15 NOTE — PROGRESS NOTES
"TIME RECORD    Date:  02/15/2017    Start Time:  300  Stop Time:  345    PROCEDURES:    TIMED  Procedure Min.   Manual therapy 15   Therex 25                 U  Total Timed Minutes:  40  Total Timed Units:  3  Total Untimed Units:  0  Charges Billed/# of units:  3  MTx1, TEx2      Progress/Current Status    Subjective:     Patient ID: April Wendt Schamberger is a 31 y.o. female.  Diagnosis:   1. Decreased range of motion     2. Neck pain     3. Decreased functional mobility     4. Decreased strength       Pain: 3 /10  Patient reports "neck/shoulder especially the right tight/tense."  Also less frequent tingling into right fingers, but aches area from prox and distal to elbow.  Reports she has had some relief since having injections last week.      Objective:     Manual therapy provided x 15 including STM with elongation to B Cervical paraspinals, gentle sub occipital release, manual stretch to B UT with STM/MFR to same, B upper thoracic release. Patient instructed in and performed therex as per log with 1:1 by PTA x 25 minutes total time, including Kiniesiotaping to B UT with "I" strip and "Y" strip to B cervical paraspinals for inhibition.  Modified self stretching to perform without UE assist for increased stretch, but to assist return to neutral.      Date 2/15/17   Visit 2   MT 15'   UT Str. MT/self   LV Str. MT/self   Pec Str.    Chin Tuck 5"x10   DNF --   Cervical Trevon 5"x5 sb/rot   scap retract 5"x10   Lats --   Rows --   Horizontal Abd --   Scaption --   Wall Slides --   Initials DH 1/6         Assessment:     Significant muscle tension noted B cervical paraspinals and right UT areas with manual therapy.  Able to tolerate same and performed all therex as per log without complaint of pain or difficulty.      Patient Education/Response:     Reviewed log roll technique for Supine<>sit, and all stretching for best technique.  Performed same after and demonstrated understanding.    Plans and Goals:     Continue " PT per POC, progress as able.  Trial FDN as available.    Short Term Goals = Long Term Goals (8 Weeks):   1. Pt will be independent with HEP.  2. Pt will improve UE strength to grossly 5/5  3. Pt will improve cervical ROM to 75% WNL to increase functional mobility.  4. Pt will perform cervical AROM with </= 2/10 pain  5. Pt will carry a 15# backpack with </= 2/10 pain as evidence of improved function.  6. Pt will report 28% on the FOTO Neck Survey placing the patient in the 20%<40% impaired, limited, or restricted category indicating increased functional mobility.

## 2017-02-15 NOTE — TELEPHONE ENCOUNTER
Spoke with patient regarding time of arrival for procedure that is scheduled on 2/16/17.  Patient verbalized that she would like to cancel because she had the office injections and she messaged the doctor to see what he wants to do as far as rescheduling goes. Patient was also informed that her f/u appt would be canceled as well. Patient verbalized understanding.

## 2017-02-17 ENCOUNTER — CLINICAL SUPPORT (OUTPATIENT)
Dept: REHABILITATION | Facility: HOSPITAL | Age: 32
End: 2017-02-17
Attending: NURSE PRACTITIONER
Payer: COMMERCIAL

## 2017-02-17 DIAGNOSIS — R53.1 DECREASED STRENGTH: ICD-10-CM

## 2017-02-17 DIAGNOSIS — R26.89 DECREASED FUNCTIONAL MOBILITY: ICD-10-CM

## 2017-02-17 DIAGNOSIS — M54.2 NECK PAIN: ICD-10-CM

## 2017-02-17 DIAGNOSIS — M25.60 DECREASED RANGE OF MOTION: ICD-10-CM

## 2017-02-17 PROCEDURE — 97140 MANUAL THERAPY 1/> REGIONS: CPT | Mod: PN

## 2017-02-17 PROCEDURE — 97110 THERAPEUTIC EXERCISES: CPT | Mod: PN

## 2017-02-17 NOTE — PROGRESS NOTES
"TIME RECORD    Date:  02/17/2017    Start Time:  8:00  Stop Time:  9:00    PROCEDURES:    TIMED  Procedure Min.   MT 25   TE 25                 UNTIMED  Procedure Min.             Total Timed Minutes:  50  Total Timed Units:  3  Total Untimed Units:  0  Charges Billed/# of units:  2MT, 1 TE      Progress/Current Status    Subjective:     Patient ID: April Wendt Schamberger is a 31 y.o. female.  Diagnosis:   1. Decreased range of motion     2. Neck pain     3. Decreased functional mobility     4. Decreased strength       Pain: Pt reports has B UT/ neck pain today described as "soreness". She states use of kenisiotape has helped with pain. She is agreeable to PT session.     Objective:     Pt intiated session Manual therapy in supine provided x 25 including STM with elongation to B Cervical paraspinals, gentle sub occipital release, manual stretch to B UT with STM/MFR to same, B upper thoracic release. Patient instructed in and performed therex as per log with 1:1 by PTA x 25 minutes total time, including Kiniesiotaping performed by Zahira Kelly PTA to B UT with "I" strip and "Y" strip to B cervical paraspinals for inhibition.      Date 2/17/17 2/15/17   Visit 3 2   MT 20 15'   UT Str. MT/self MT/self   LV Str. MT/self MT/self   Pec Str. 30"x3    Chin Tuck 5"x10 5"x10   DNF - --   Cervical Trevon 5"x5 5"x5 sb/rot   scap retract 5"x10 5"x10   Lats - --   Rows - --   Horizontal Abd - --   Scaption - --   Wall Slides - --   Initials JA 2/6 DH 1/6           Assessment:   Pt completed today's session with no reports of increased pain in B UT/ cervical spine. She reports that taping has been assisting her with pain control     Patient Education/Response:     Cont HEP    Plans and Goals:     Cont to progress PT as per POC, will attempt to schedule FDN as available  Short Term Goals = Long Term Goals (8 Weeks):   1. Pt will be independent with HEP.  2. Pt will improve UE strength to grossly 5/5  3. Pt will improve cervical ROM " to 75% WNL to increase functional mobility.  4. Pt will perform cervical AROM with </= 2/10 pain  5. Pt will carry a 15# backpack with </= 2/10 pain as evidence of improved function.  6. Pt will report 28% on the FOTO Neck Survey placing the patient in the 20%<40% impaired, limited, or restricted category indicating increased functional mobility.

## 2017-02-20 ENCOUNTER — CLINICAL SUPPORT (OUTPATIENT)
Dept: REHABILITATION | Facility: HOSPITAL | Age: 32
End: 2017-02-20
Attending: NURSE PRACTITIONER
Payer: COMMERCIAL

## 2017-02-20 DIAGNOSIS — R26.89 DECREASED FUNCTIONAL MOBILITY: ICD-10-CM

## 2017-02-20 DIAGNOSIS — M25.60 DECREASED RANGE OF MOTION: ICD-10-CM

## 2017-02-20 DIAGNOSIS — M54.2 NECK PAIN: ICD-10-CM

## 2017-02-20 DIAGNOSIS — R53.1 DECREASED STRENGTH: ICD-10-CM

## 2017-02-20 PROCEDURE — 97110 THERAPEUTIC EXERCISES: CPT | Mod: PN

## 2017-02-20 PROCEDURE — 97140 MANUAL THERAPY 1/> REGIONS: CPT | Mod: PN

## 2017-02-20 NOTE — PROGRESS NOTES
"TIME RECORD    Date:  02/20/2017    Start Time:  2:00  Stop Time:  2:55    PROCEDURES:    TIMED  Procedure Min.   MT 25   TE 15                 UNTIMED  Procedure Min.             Total Timed Minutes:  40  Total Timed Units:  3  Total Untimed Units:  0  Charges Billed/# of units:  2MT, 1 TE      Progress/Current Status    Subjective:     Patient ID: April Wendt Schamberger is a 31 y.o. female.  Diagnosis:   1. Decreased range of motion     2. Neck pain     3. Decreased functional mobility     4. Decreased strength       Pain: 5 /10  Pt reports she was at the [employer] over weekend and spent some time holding her 2 year old child. "It's sore today", pt referring to there B UT cervical spine.    Objective:     Pt intiated session on moist heat x 5 min. Pt then seen by Mata Oneal PT, DPT for FDN 2:20-2:30. Pt then received Manual therapy in supine provided x 25 including STM with elongation to B Cervical paraspinals, gentle sub occipital release, manual stretch to B UT with STM/MFR to same, B upper thoracic release. Patient instructed in and performed therex as per log with 1:1 by PTA x 15 minutes total time.    Date 2/20/17 2/17/17 2/15/17   Visit 4 3 2   MT 25 20 15'   UT Str. MT/self MT/self MT/self   LV Str. MT/self MT/self MT/self   Pec Str. 30"x3 30"x3    Chin Tuck 5"x10 5"x10 5"x10   DNF - - --   Cervical Trevon 5"x5 5"x5 5"x5 sb/rot   scap retract 5"x10 5"x10 5"x10   Lats - - --   Rows - - --   Horizontal Abd - - --   Scaption - - --   Wall Slides - - --   Initials JA 3/6 JA 2/6 DH 1/6         Assessment:     Pt able to complete todays session with no reports of increased reports of pain in B UT / cervical spine. She tolerated treatment  with no adverse reactions.    Patient Education/Response:     Cont HEP    Plans and Goals:     Cont to progress PT as per POC,   Short Term Goals = Long Term Goals (8 Weeks):   1. Pt will be independent with HEP.  2. Pt will improve UE strength to grossly 5/5  3. Pt will " improve cervical ROM to 75% WNL to increase functional mobility.  4. Pt will perform cervical AROM with </= 2/10 pain  5. Pt will carry a 15# backpack with </= 2/10 pain as evidence of improved function.  6. Pt will report 28% on the FOTO Neck Survey placing the patient in the 20%<40% impaired, limited, or restricted category indicating increased functional mobility.

## 2017-02-20 NOTE — PROGRESS NOTES
TIME RECORD    Date:  02/20/2017    Start Time:  1420  Stop Time:  1430    PROCEDURES:    TIMED  Procedure Min.   MT 10                     UNTIMED  Procedure Min.             Total Timed Minutes:  10  Total Timed Units:  1  Total Untimed Units:  0  Charges Billed/# of units:  1 MT       Progress/Current Status    Subjective:     Patient ID: April Wendt Schamberger is a 31 y.o. female.  Diagnosis:   1. Decreased range of motion     2. Neck pain     3. Decreased functional mobility     4. Decreased strength       Reports B upper trap pain. Agreeable to FDN    Objective:     Patient educated on FDN. patient provided written and verbal consent to FDN. MT x 10 consisting of FDN to B upper traps with estim in prone    Assessment:     Patient demonstrated appropriate response to FDN.     Patient Education/Response:     Issued written handout on response to FDN. Patient verbalized understanding.     Plans and Goals:     Monitor response to FDN. Continue with FDN in POC as tolerated.

## 2017-02-23 ENCOUNTER — CLINICAL SUPPORT (OUTPATIENT)
Dept: REHABILITATION | Facility: HOSPITAL | Age: 32
End: 2017-02-23
Attending: NURSE PRACTITIONER
Payer: COMMERCIAL

## 2017-02-23 DIAGNOSIS — M54.2 NECK PAIN: ICD-10-CM

## 2017-02-23 DIAGNOSIS — R26.89 DECREASED FUNCTIONAL MOBILITY: ICD-10-CM

## 2017-02-23 DIAGNOSIS — M25.60 DECREASED RANGE OF MOTION: ICD-10-CM

## 2017-02-23 DIAGNOSIS — R53.1 DECREASED STRENGTH: ICD-10-CM

## 2017-02-23 PROCEDURE — 97110 THERAPEUTIC EXERCISES: CPT | Mod: PN

## 2017-02-23 PROCEDURE — 97140 MANUAL THERAPY 1/> REGIONS: CPT | Mod: PN

## 2017-02-23 NOTE — PROGRESS NOTES
"TIME RECORD    Date:  02/23/2017    Start Time:  2: 00  Stop Time:  3:00    PROCEDURES:    TIMED  Procedure Min.   MT 30   TE 15   MH 10 NC             UNTIMED  Procedure Min.             Total Timed Minutes:  45  Total Timed Units:  3  Total Untimed Units:  0  Charges Billed/# of units:  2MT, 1 TE      Progress/Current Status    Subjective:     Patient ID: April Wendt Schamberger is a 31 y.o. female.  Diagnosis:   1. Decreased range of motion     2. Neck pain     3. Decreased functional mobility     4. Decreased strength       Pain: 3 /10; 1/10 following interventions  Pt reports is feeling a little better and that the pain is centered high in the cervical spine near the occiput.     Objective:     Pt intiated session on moist heat x 10 min Pt then received Manual therapy in supine provided x 30 including  inhibitive distraction B Cervical paraspinals, STM/MFR to  B UT, SCM, Scalene, Levator, Pec major and pec minor in Supine and SL positions  Patient  performed therex as per log with 1:1 by PTA x 15 minutes total time.    Date 2/23/17 2/20/17 2/17/17 2/15/17   Visit 5 4 3 2   MT 30 25 20 15'   UT Str. Self  MT/self MT/self MT/self   LV Str. Self MT/self MT/self MT/self   Pec Str. Self 30"x3 30"x3    Chin Tuck 5" x10 5"x10 5"x10 5"x10   DNF  - - --   Cervical Trevon 5"x5 5"x5 5"x5 5"x5 sb/rot   scap retract 2 x 10 GTB 5"x10 5"x10 5"x10   Lats  - - --   Rows  - - --   Horizontal Abd  - - --   Scaption  - - --   Wall Slides  - - --   Initials MB 4/6 JA 3/6 JA 2/6 DH 1/6         Assessment:     Pt tolerated today's treatment well with good pain relief. Progressing toward goals.     Patient Education/Response:     Cont HEP    Plans and Goals:     Cont to progress PT as per POC,   Short Term Goals = Long Term Goals (8 Weeks):   1. Pt will be independent with HEP.  2. Pt will improve UE strength to grossly 5/5  3. Pt will improve cervical ROM to 75% WNL to increase functional mobility.  4. Pt will perform cervical AROM " with </= 2/10 pain  5. Pt will carry a 15# backpack with </= 2/10 pain as evidence of improved function.  6. Pt will report 28% on the FOTO Neck Survey placing the patient in the 20%<40% impaired, limited, or restricted category indicating increased functional mobility.

## 2017-02-27 ENCOUNTER — CLINICAL SUPPORT (OUTPATIENT)
Dept: REHABILITATION | Facility: HOSPITAL | Age: 32
End: 2017-02-27
Attending: NURSE PRACTITIONER
Payer: COMMERCIAL

## 2017-02-27 DIAGNOSIS — M25.60 DECREASED RANGE OF MOTION: ICD-10-CM

## 2017-02-27 DIAGNOSIS — R26.89 DECREASED FUNCTIONAL MOBILITY: ICD-10-CM

## 2017-02-27 DIAGNOSIS — R53.1 DECREASED STRENGTH: ICD-10-CM

## 2017-02-27 DIAGNOSIS — M54.2 NECK PAIN: ICD-10-CM

## 2017-02-27 PROCEDURE — 97140 MANUAL THERAPY 1/> REGIONS: CPT | Mod: PN

## 2017-02-27 NOTE — PROGRESS NOTES
"TIME RECORD    Date:  02/27/2017    Start Time:  1:00  Stop Time:  1:50    PROCEDURES:    TIMED  Procedure Min.   MT 25                     UNTIMED  Procedure Min.             Total Timed Minutes:  25  Total Timed Units:  2  Total Untimed Units:  0  Charges Billed/# of units:  2MT      Progress/Current Status    Subjective:     Patient ID: April Wendt Schamberger is a 31 y.o. female.  Diagnosis:   1. Decreased range of motion     2. Neck pain     3. Decreased functional mobility     4. Decreased strength       Pain: pt reports she attended [employer] last night and carried her  18 mo child for a long period of time. She arrived today with B UT / neck pain and headache. She is agreeable to PT session.     Objective:     Pt intiated session with Manual therapy in supine provided x 25 including STM to B Cervical paraspinals, STM/MFR to B UT, SCM, Scalene, Levator mm.  Patient then performed supervised therex as per log x 15 minutes total time.    Date 2/27/17 2/23/17 2/20/17 2/17/17 2/15/17   Visit 6 5 4 3 2   MT 25 30 25 20 15'   UT Str. self Self  MT/self MT/self MT/self   LV Str. self Self MT/self MT/self MT/self   Pec Str. self Self 30"x3 30"x3    Chin Tuck 5"x10 5" x10 5"x10 5"x10 5"x10   DNF   - - --   Cervical Trevon 5"x5 5"x5 5"x5 5"x5 5"x5 sb/rot   scap retract 2x10 GTB 2 x 10 GTB 5"x10 5"x10 5"x10   Lats RTB 2x10   - - --   Rows RTB 2x10  - - --   Horizontal Abd   - - --   Scaption   - - --   Wall Slides   - - --   Initials JA 5/6 MB 4/6 JA 3/6 JA 2/6 DH 1/6       Assessment:     Pt completed today's session with no reports of increased pain in B ut and neck musculature.    Patient Education/Response:     Cont HEP    Plans and Goals:     Short Term Goals = Long Term Goals (8 Weeks):   1. Pt will be independent with HEP.  2. Pt will improve UE strength to grossly 5/5  3. Pt will improve cervical ROM to 75% WNL to increase functional mobility.  4. Pt will perform cervical AROM with </= 2/10 pain  5. Pt will carry a " 15# backpack with </= 2/10 pain as evidence of improved function.  6. Pt will report 28% on the FOTO Neck Survey placing the patient in the 20%<40% impaired, limited, or restricted category indicating increased functional mobility.

## 2017-03-02 ENCOUNTER — CLINICAL SUPPORT (OUTPATIENT)
Dept: REHABILITATION | Facility: HOSPITAL | Age: 32
End: 2017-03-02
Attending: NURSE PRACTITIONER
Payer: COMMERCIAL

## 2017-03-02 DIAGNOSIS — R53.1 DECREASED STRENGTH: ICD-10-CM

## 2017-03-02 DIAGNOSIS — R26.89 DECREASED FUNCTIONAL MOBILITY: ICD-10-CM

## 2017-03-02 DIAGNOSIS — G47.00 INSOMNIA, UNSPECIFIED TYPE: ICD-10-CM

## 2017-03-02 DIAGNOSIS — M54.2 NECK PAIN: ICD-10-CM

## 2017-03-02 DIAGNOSIS — M25.60 DECREASED RANGE OF MOTION: ICD-10-CM

## 2017-03-02 PROCEDURE — 97140 MANUAL THERAPY 1/> REGIONS: CPT | Mod: PN

## 2017-03-02 PROCEDURE — 97110 THERAPEUTIC EXERCISES: CPT | Mod: PN

## 2017-03-02 RX ORDER — ZOLPIDEM TARTRATE 10 MG/1
10 TABLET ORAL NIGHTLY PRN
Qty: 30 TABLET | Refills: 2 | Status: SHIPPED | OUTPATIENT
Start: 2017-03-02 | End: 2017-05-25 | Stop reason: SDUPTHER

## 2017-03-02 NOTE — PROGRESS NOTES
"TIME RECORD    Date:  03/02/2017    Start Time:  1505  Stop Time:  1553    PROCEDURES:    TIMED  Procedure Min.   MT 10   TE 13   FDN 15 RS             UNTIMED  Procedure Min.             Total Timed Minutes:  23  Total Timed Units:  2  Total Untimed Units:  0  Charges Billed/# of units:  2 (MT-1, TE-1)      Progress/Current Status    Subjective:     Patient ID: April Wendt Schamberger is a 31 y.o. female.  Diagnosis:   1. Decreased range of motion     2. Neck pain     3. Decreased functional mobility     4. Decreased strength       Pain: Pt reports she has a headache and is having neck pain.     Objective:     Pt intiated session with FDN x 15' by Reginald Oneal (see note) f/b TE 1:1 with PT per log x 13' f/b MT in supine provided x 10' including STM to B Cervical paraspinals, STM/MFR to B UT, SCM, Scalene, Levator mm.     Date 3/3/2017 2/27/17 2/23/17 2/20/17 2/17/17 2/15/17   Visit 7 6 5 4 3 2   MT 10' 25 30 25 20 15'   UT Str. 3x30" B self Self  MT/self MT/self MT/self   LV Str. 3x30" B self Self MT/self MT/self MT/self   Pec Str. 3x30" self Self 30"x3 30"x3    Chin Tuck 10x5" 5"x10 5" x10 5"x10 5"x10 5"x10   DNF --   - - --   Cervical Trevon 5"x5 5"x5 5"x5 5"x5 5"x5 5"x5 sb/rot   scap retract 10x5" 2x10 GTB 2 x 10 GTB 5"x10 5"x10 5"x10   Lats RTB 2x10  RTB 2x10   - - --   Rows RTB 2x10  RTB 2x10  - - --   Horizontal Abd --   - - --   Scaption --   - - --   Wall Slides --   - - --   Initials KV JA 5/6 MB 4/6 JA 3/6 JA 2/6 DH 1/6       Assessment:     Pt tolerated treatment fairly well with reports of pain with suboccipital release that eased with cessation of suboccipital release. Tissue tension noted in B UT R>L and B cervical paraspinals and suboccipitals.    Patient Education/Response:     Cont HEP    Plans and Goals:     Short Term Goals = Long Term Goals (8 Weeks):   1. Pt will be independent with HEP.  2. Pt will improve UE strength to grossly 5/5  3. Pt will improve cervical ROM to 75% WNL to increase " functional mobility.  4. Pt will perform cervical AROM with </= 2/10 pain  5. Pt will carry a 15# backpack with </= 2/10 pain as evidence of improved function.  6. Pt will report 28% on the FOTO Neck Survey placing the patient in the 20%<40% impaired, limited, or restricted category indicating increased functional mobility.

## 2017-03-02 NOTE — TELEPHONE ENCOUNTER
April Wendt Schamberger would like a refill of the following medications:   zolpidem (AMBIEN) 10 mg Tab [Aristeo Perez MD]     Preferred pharmacy: [facility] [place]  [place] - [address]     Comment:   Would you please refill this medication and send it to the [facility].     Thanks

## 2017-03-03 NOTE — PROGRESS NOTES
TIME RECORD    Date:  03/03/2017    Start Time:  1505  Stop Time:  1520    PROCEDURES:    TIMED  Procedure Min.   MT 15                     UNTIMED  Procedure Min.             Total Timed Minutes:  15  Total Timed Units:  1  Total Untimed Units:  0  Charges Billed/# of units:  1 MT       Progress/Current Status    Subjective:     Patient ID: April Wendt Schamberger is a 31 y.o. female.  Diagnosis:   1. Decreased range of motion     2. Neck pain     3. Decreased functional mobility     4. Decreased strength       Reports B upper trap and shoulder blade pain, along with suboccipital pain. The needling helped last time. . Agreeable to FDN    Objective:     patient provided verbal consent to FDN. MT x 15 consisting of FDN to B upper traps with estim in prone, B levator scaps with estim in prone    Assessment:     Patient demonstrated appropriate response to FDN.     Patient Education/Response:   .     Plans and Goals:     Monitor response to FDN. Continue with FDN in POC as tolerated.

## 2017-03-09 ENCOUNTER — CLINICAL SUPPORT (OUTPATIENT)
Dept: REHABILITATION | Facility: HOSPITAL | Age: 32
End: 2017-03-09
Attending: NURSE PRACTITIONER
Payer: COMMERCIAL

## 2017-03-09 DIAGNOSIS — R26.89 DECREASED FUNCTIONAL MOBILITY: ICD-10-CM

## 2017-03-09 DIAGNOSIS — R53.1 DECREASED STRENGTH: ICD-10-CM

## 2017-03-09 DIAGNOSIS — M54.2 NECK PAIN: ICD-10-CM

## 2017-03-09 DIAGNOSIS — M25.60 DECREASED RANGE OF MOTION: ICD-10-CM

## 2017-03-09 PROCEDURE — 97110 THERAPEUTIC EXERCISES: CPT | Mod: PN

## 2017-03-09 PROCEDURE — 97140 MANUAL THERAPY 1/> REGIONS: CPT | Mod: PN

## 2017-03-09 NOTE — PROGRESS NOTES
"TIME RECORD    Date:  03/09/2017    Start Time:  1500  Stop Time:  1600    PROCEDURES:    TIMED  Procedure Min.   MT 40   TE 20                 UNTIMED  Procedure Min.             Total Timed Minutes:  60  Total Timed Units:  4  Total Untimed Units:  0  Charges Billed/# of units:  2 (MT-3, TE-1)      Progress/Current Status    Subjective:     Patient ID: April Wendt Schamberger is a 31 y.o. female.  Diagnosis:   1. Decreased range of motion     2. Neck pain     3. Decreased functional mobility     4. Decreased strength       Pain: Pt reports pain at base of occiput. No change in pain following other than feeling much more flexible.    Objective:     Pt intiated session with TE 1:1 with PTA STM/MFR in supine provided x 10' including  B Cervical paraspinals,  B UT, SCM, Scalene, Levator,x MT in supine and SL positions.  Date 3/9/17 3/3/2017 2/27/17 2/23/17 2/20/17 2/17/17 2/15/17   Visit 8 7 6 5 4 3 2   MT 40 10' 25 30 25 20 15'   UT Str. 3x30" B 3x30" B self Self  MT/self MT/self MT/self   LV Str. 3 x 30" B 3x30" B self Self MT/self MT/self MT/self   Pec Str. 3x30" B 3x30" self Self 30"x3 30"x3    Chin Tuck 10x 5" 10x5" 5"x10 5" x10 5"x10 5"x10 5"x10   DNF  --   - - --   Cervical Trevon -- 5"x5 5"x5 5"x5 5"x5 5"x5 5"x5 sb/rot   scap retract -- 10x5" 2x10 GTB 2 x 10 GTB 5"x10 5"x10 5"x10   Lats GTB 2x10 RTB 2x10  RTB 2x10   - - --   Rows BTB 2x10 RTB 2x10  RTB 2x10  - - --   Horizontal Abd  --   - - --   Scaption  --   - - --   Wall Slides  --   - - --   Initials MB 1/6 KV JA 5/6 MB 4/6 JA 3/6 JA 2/6 DH 1/6       Assessment:     Pt tolerated treatment well.Tissue tension noted in B UT R>L and B  C 2-3 suboccipitals.    Patient Education/Response:     Cont HEP    Plans and Goals:     Short Term Goals = Long Term Goals (8 Weeks):   1. Pt will be independent with HEP.  2. Pt will improve UE strength to grossly 5/5  3. Pt will improve cervical ROM to 75% WNL to increase functional mobility.  4. Pt will perform cervical AROM " with </= 2/10 pain  5. Pt will carry a 15# backpack with </= 2/10 pain as evidence of improved function.  6. Pt will report 28% on the FOTO Neck Survey placing the patient in the 20%<40% impaired, limited, or restricted category indicating increased functional mobility.

## 2017-03-16 ENCOUNTER — CLINICAL SUPPORT (OUTPATIENT)
Dept: REHABILITATION | Facility: HOSPITAL | Age: 32
End: 2017-03-16
Attending: NURSE PRACTITIONER
Payer: COMMERCIAL

## 2017-03-16 DIAGNOSIS — M25.60 DECREASED RANGE OF MOTION: ICD-10-CM

## 2017-03-16 DIAGNOSIS — M54.2 NECK PAIN: ICD-10-CM

## 2017-03-16 DIAGNOSIS — R53.1 DECREASED STRENGTH: ICD-10-CM

## 2017-03-16 DIAGNOSIS — R26.89 DECREASED FUNCTIONAL MOBILITY: ICD-10-CM

## 2017-03-16 PROCEDURE — 97140 MANUAL THERAPY 1/> REGIONS: CPT | Mod: PN

## 2017-03-16 PROCEDURE — 97110 THERAPEUTIC EXERCISES: CPT | Mod: PN

## 2017-03-16 NOTE — PROGRESS NOTES
"TIME RECORD    Date:  03/16/2017    Start Time:  1500  Stop Time:  1600    PROCEDURES:    TIMED  Procedure Min.   MT 30   TE 30                 UNTIMED  Procedure Min.             Total Timed Minutes:  60  Total Timed Units:  4  Total Untimed Units:  0  Charges Billed/# of units:  4 (MT-2, TE-2)      Progress/Current Status    Subjective:     Patient ID: April Wendt Schamberger is a 31 y.o. female.  Diagnosis:   1. Decreased range of motion     2. Neck pain     3. Decreased functional mobility     4. Decreased strength       Pain: Pt reports pain at base of occiput. No change in pain following other than feeling much more flexible.    Objective:     Pt intiated session with UBE warm up 5 minutes then There ex TE 1:1 with PTA per log.  STM/MFR in supine provided x 10' including  B Cervical paraspinals,  B UT, SCM, Scalene, Levator,x MT in supine and SL positions.  Date 3/16/17 3/9/17 3/3/2017 2/27/17 2/23/17 2/20/17 2/17/17 2/15/17   Visit 9 8 7 6 5 4 3 2   UBE 2.5fwd/2.5bkwd          MT 30' 40 10' 25 30 25 20 15'   UT Str. 3x30" B 3x30" B 3x30" B self Self  MT/self MT/self MT/self   LV Str. 3x30" B 3 x 30" B 3x30" B self Self MT/self MT/self MT/self   Pec Str. 3x30" B 3x30" B 3x30" self Self 30"x3 30"x3    Chin Tuck 10x5" 10x 5" 10x5" 5"x10 5" x10 5"x10 5"x10 5"x10   DNF   --   - - --   Cervical Trevon  -- 5"x5 5"x5 5"x5 5"x5 5"x5 5"x5 sb/rot   Side Laying:           ER 3# 2x10          Abd 2# 2x10          Horiz abd 2# 2x10          Prone:           Rows 2# 2x10          Sh Ext 2# 2x10          scap retract  -- 10x5" 2x10 GTB 2 x 10 GTB 5"x10 5"x10 5"x10   Lats GTB 2x10 GTB 2x10 RTB 2x10  RTB 2x10   - - --   Rows BTB 2x10 BTB 2x10 RTB 2x10  RTB 2x10  - - --   Horizontal Abd [id number]  --   - - --   Scaption   --   - - --   Wall Slides   --   - - --   Initials MB 2/6 MB 1/6 KV JA 5/6 MB 4/6 JA 3/6 JA 2/6 DH 1/6       Assessment:     Pt tolerated treatment well. Continued UT tightness L>R. Progressed with UE " strengthening and tolerated well.     Patient Education/Response:     Cont HEP    Plans and Goals:     Short Term Goals = Long Term Goals (8 Weeks):   1. Pt will be independent with HEP.  2. Pt will improve UE strength to grossly 5/5  3. Pt will improve cervical ROM to 75% WNL to increase functional mobility.  4. Pt will perform cervical AROM with </= 2/10 pain  5. Pt will carry a 15# backpack with </= 2/10 pain as evidence of improved function.  6. Pt will report 28% on the FOTO Neck Survey placing the patient in the 20%<40% impaired, limited, or restricted category indicating increased functional mobility.

## 2017-04-07 ENCOUNTER — OFFICE VISIT (OUTPATIENT)
Dept: GASTROENTEROLOGY | Facility: CLINIC | Age: 32
End: 2017-04-07
Payer: COMMERCIAL

## 2017-04-07 ENCOUNTER — LAB VISIT (OUTPATIENT)
Dept: LAB | Facility: HOSPITAL | Age: 32
End: 2017-04-07
Attending: NURSE PRACTITIONER
Payer: COMMERCIAL

## 2017-04-07 VITALS
DIASTOLIC BLOOD PRESSURE: 94 MMHG | HEIGHT: 68 IN | BODY MASS INDEX: 22.45 KG/M2 | SYSTOLIC BLOOD PRESSURE: 142 MMHG | WEIGHT: 148.13 LBS | HEART RATE: 73 BPM

## 2017-04-07 DIAGNOSIS — R10.32 ABDOMINAL PAIN, BILATERAL LOWER QUADRANT: ICD-10-CM

## 2017-04-07 DIAGNOSIS — R10.13 EPIGASTRIC BURNING SENSATION: ICD-10-CM

## 2017-04-07 DIAGNOSIS — K58.0 IRRITABLE BOWEL SYNDROME WITH DIARRHEA: ICD-10-CM

## 2017-04-07 DIAGNOSIS — R19.7 DIARRHEA, UNSPECIFIED TYPE: ICD-10-CM

## 2017-04-07 DIAGNOSIS — R19.7 DIARRHEA, UNSPECIFIED TYPE: Primary | ICD-10-CM

## 2017-04-07 DIAGNOSIS — R11.0 NAUSEA: ICD-10-CM

## 2017-04-07 DIAGNOSIS — R10.31 ABDOMINAL PAIN, BILATERAL LOWER QUADRANT: ICD-10-CM

## 2017-04-07 LAB
ALBUMIN SERPL BCP-MCNC: 4.4 G/DL
ALP SERPL-CCNC: 38 U/L
ALT SERPL W/O P-5'-P-CCNC: 8 U/L
ANION GAP SERPL CALC-SCNC: 7 MMOL/L
AST SERPL-CCNC: 11 U/L
BASOPHILS # BLD AUTO: 0.03 K/UL
BASOPHILS NFR BLD: 0.4 %
BILIRUB SERPL-MCNC: 0.5 MG/DL
BUN SERPL-MCNC: 8 MG/DL
CALCIUM SERPL-MCNC: 9.4 MG/DL
CHLORIDE SERPL-SCNC: 105 MMOL/L
CO2 SERPL-SCNC: 27 MMOL/L
CREAT SERPL-MCNC: 0.7 MG/DL
CRP SERPL-MCNC: 0.9 MG/L
DIFFERENTIAL METHOD: NORMAL
EOSINOPHIL # BLD AUTO: 0 K/UL
EOSINOPHIL NFR BLD: 0.5 %
ERYTHROCYTE [DISTWIDTH] IN BLOOD BY AUTOMATED COUNT: 13.1 %
ERYTHROCYTE [SEDIMENTATION RATE] IN BLOOD BY WESTERGREN METHOD: 3 MM/HR
EST. GFR  (AFRICAN AMERICAN): >60 ML/MIN/1.73 M^2
EST. GFR  (NON AFRICAN AMERICAN): >60 ML/MIN/1.73 M^2
GLUCOSE SERPL-MCNC: 90 MG/DL
HCT VFR BLD AUTO: 39 %
HGB BLD-MCNC: 12.7 G/DL
LIPASE SERPL-CCNC: 15 U/L
LYMPHOCYTES # BLD AUTO: 2.6 K/UL
LYMPHOCYTES NFR BLD: 32.3 %
MCH RBC QN AUTO: 30 PG
MCHC RBC AUTO-ENTMCNC: 32.6 %
MCV RBC AUTO: 92 FL
MONOCYTES # BLD AUTO: 0.4 K/UL
MONOCYTES NFR BLD: 4.5 %
NEUTROPHILS # BLD AUTO: 5 K/UL
NEUTROPHILS NFR BLD: 62.2 %
PLATELET # BLD AUTO: 239 K/UL
PMV BLD AUTO: 11.3 FL
POTASSIUM SERPL-SCNC: 4.2 MMOL/L
PROT SERPL-MCNC: 7.2 G/DL
RBC # BLD AUTO: 4.24 M/UL
SODIUM SERPL-SCNC: 139 MMOL/L
WBC # BLD AUTO: 8.05 K/UL

## 2017-04-07 PROCEDURE — 85652 RBC SED RATE AUTOMATED: CPT

## 2017-04-07 PROCEDURE — 86140 C-REACTIVE PROTEIN: CPT

## 2017-04-07 PROCEDURE — 83690 ASSAY OF LIPASE: CPT

## 2017-04-07 PROCEDURE — 99203 OFFICE O/P NEW LOW 30 MIN: CPT | Mod: S$GLB,,, | Performed by: NURSE PRACTITIONER

## 2017-04-07 PROCEDURE — 99999 PR PBB SHADOW E&M-EST. PATIENT-LVL II: CPT | Mod: PBBFAC,,, | Performed by: NURSE PRACTITIONER

## 2017-04-07 PROCEDURE — 80053 COMPREHEN METABOLIC PANEL: CPT

## 2017-04-07 PROCEDURE — 85025 COMPLETE CBC W/AUTO DIFF WBC: CPT

## 2017-04-07 PROCEDURE — 36415 COLL VENOUS BLD VENIPUNCTURE: CPT

## 2017-04-07 RX ORDER — FAMOTIDINE 20 MG/1
20 TABLET, FILM COATED ORAL 2 TIMES DAILY
COMMUNITY
End: 2017-05-03

## 2017-04-07 NOTE — PROGRESS NOTES
"Subjective:       Patient ID: April Wendt Schamberger is a 31 y.o. female.    Chief Complaint: Abdominal Pain and Bloated    HPI  Reports diagnosis of IBS-D at age 15.  She has had multiple colonoscopies with concern in the past for IBD, but reports no endoscopi findings to suggest IBD.  She has had elevated inflammatory markers in the past and abnormal CTs suggesting inflammation in the ileum/cecum.  She reports that she has used 6-8 imodium daily chronically.  She reports over the last 2-3 weeks she is having to use more imodium through the day.  In the past, she would have 7-10 days of "normal" bowel movements which are still multiple bowel movements but soft and not watery and then a bad day with diarrhea.  She reports now, that her complaints are more persistent.  She has episodic RLQ abdominal pain.  She has hemorrhoids and reports bleeding during her flare ups.  She has known lactose intolerance.  She has history of GI bleeding (2008) related to NSAID use.  She has recently had return of epigastric burning and burning into the chest.  She has chronic nausea.  She is currently using PPI, zantac and pepcid with continued complaints.  She has used bentyl and lomotil in the past.    Review of Systems   Constitutional: Negative.  Negative for activity change, appetite change, fatigue, fever and unexpected weight change.   HENT: Negative.    Respiratory: Negative.  Negative for cough, choking and shortness of breath.    Cardiovascular: Negative.  Negative for chest pain.   Gastrointestinal: Positive for abdominal distention, abdominal pain, anal bleeding, diarrhea and nausea.   Genitourinary: Negative.  Negative for difficulty urinating, dysuria and hematuria.   Musculoskeletal: Negative.  Negative for neck pain and neck stiffness.   Skin: Negative.    Neurological: Negative.  Negative for dizziness, syncope, weakness and light-headedness.   Psychiatric/Behavioral: Negative.        Objective:      Physical Exam "   Constitutional: She is oriented to person, place, and time. She appears well-developed and well-nourished. No distress.   HENT:   Head: Normocephalic.   Eyes: No scleral icterus.   Cardiovascular: Normal rate.    Pulmonary/Chest: Effort normal. No respiratory distress.   Abdominal: Soft. Bowel sounds are normal. She exhibits no distension and no mass. There is tenderness in the right lower quadrant and left lower quadrant.   Neurological: She is alert and oriented to person, place, and time.   Skin: Skin is warm and dry. She is not diaphoretic.   Psychiatric: She has a normal mood and affect. Her behavior is normal. Judgment and thought content normal.   Vitals reviewed.      Assessment:       1. Diarrhea, unspecified type    2. Epigastric burning sensation    3. Nausea    4. Abdominal pain, bilateral lower quadrant    5. Irritable bowel syndrome with diarrhea        Plan:         April was seen today for abdominal pain and bloated.    Diagnoses and all orders for this visit:    Diarrhea, unspecified type  -     CBC auto differential; Future  -     Comprehensive metabolic panel; Future  -     Lipase; Future  -     Sedimentation rate, manual; Future  -     C-reactive protein; Future  -     Case request GI: ESOPHAGOGASTRODUODENOSCOPY (EGD), COLONOSCOPY    Epigastric burning sensation  -     Case request GI: ESOPHAGOGASTRODUODENOSCOPY (EGD), COLONOSCOPY    Nausea  -     Case request GI: ESOPHAGOGASTRODUODENOSCOPY (EGD), COLONOSCOPY    Abdominal pain, bilateral lower quadrant  -     Case request GI: ESOPHAGOGASTRODUODENOSCOPY (EGD), COLONOSCOPY    Irritable bowel syndrome with diarrhea    Other orders  -     rifAXIMin (XIFAXAN) 550 mg Tab; Take 1 tablet (550 mg total) by mouth 3 (three) times daily.    Labs today.    EGD and colonoscopy.    Trial of xifaxan.  Could consider IBgard.  May be a candidate for viberzi.    Avoid NSAIDs.  PPI BID.  Can add carafate if needed.

## 2017-04-10 ENCOUNTER — TELEPHONE (OUTPATIENT)
Dept: GASTROENTEROLOGY | Facility: CLINIC | Age: 32
End: 2017-04-10

## 2017-04-10 NOTE — TELEPHONE ENCOUNTER
Spoke with Pt to let her know that her Rx is ready for pickup. Verbal Understanding.      ----- Message from Naveed Aguirre sent at 4/10/2017 10:38 AM CDT -----  The patient's prior authorization has been approved! The prescription is no charge, and she's picking it up today.

## 2017-04-27 ENCOUNTER — ANESTHESIA EVENT (OUTPATIENT)
Dept: ENDOSCOPY | Facility: HOSPITAL | Age: 32
End: 2017-04-27
Payer: COMMERCIAL

## 2017-04-27 NOTE — ANESTHESIA PREPROCEDURE EVALUATION
04/27/2017 April Wendt Schamberger is a 32 y.o., female w epigastric & abd pain for EGD & colonoscopy.    PRIOR ANES   none in Epic 2017-04-27    ANES-RELATED MED/SURG  Cervical radiculopathy  Myofascial pain    Past Surgical History:   Procedure Laterality Date    REFRACTIVE SURGERY  2005    TONSILLECTOMY, ADENOIDECTOMY       ALLERGIES 2017-04-07  Review of patient's allergies indicates:   Allergen Reactions    Azithromycin Nausea And Vomiting     ANES-RELATED HOME Rx  famotidine    Anesthesia Evaluation         Review of Systems  Anesthesia Hx:  History of prior surgery of interest to airway management or planning: Denies Family Hx of Anesthesia complications. Personal Hx of Anesthesia complications (fights anesthesia; versed not enough)   Social:  Non-Smoker, Social Alcohol Use    Hematology/Oncology:  Hematology Normal   Oncology Normal     EENT/Dental:  EENT/Dental Normal Posterior crowns  No nosebleeds   Cardiovascular:  Cardiovascular Normal     Pulmonary:  Pulmonary Normal    Renal/:  Renal/ Normal     Hepatic/GI:   GERD Irritable bowel   Musculoskeletal:  Spine Disorders: cervical    OB/GYN/PEDS:  2017-04-28   - Not pregnant   Neurological:  Neurology Normal    Endocrine:  Endocrine Normal      Wt Readings from Last 3 Encounters:   04/07/17 67.2 kg (148 lb 2.4 oz)   02/09/17 69.7 kg (153 lb 10.6 oz)   01/25/17 69.4 kg (153 lb)     Temp Readings from Last 3 Encounters:   01/25/17 37 °C (98.6 °F) (Oral)   10/11/16 37.2 °C (99 °F)   06/24/16 36.8 °C (98.3 °F) (Oral)     BP Readings from Last 3 Encounters:   04/07/17 (!) 142/94   02/09/17 (!) 144/100   01/25/17 131/83     Pulse Readings from Last 3 Encounters:   04/07/17 73   02/09/17 87   01/25/17 78       Physical Exam   Airway/Jaw/Neck:  AIRWAY FINDINGS: Normal Jaw/Neck Findings:    Eyes/Ears/Nose:  EYES/EARS/NOSE FINDINGS: Normal    Dental:  Dental Findings: molar caps   Chest/Lungs:  Chest/Lungs Clear    Heart/Vascular:  Heart Findings: Normal Heart murmur: negative       Mental Status:  Mental Status Findings: Normal       Lab Results   Component Value Date    WBC 8.05 04/07/2017    HGB 12.7 04/07/2017    HCT 39.0 04/07/2017    MCV 92 04/07/2017     04/07/2017       Chemistry        Component Value Date/Time     04/07/2017 1046    K 4.2 04/07/2017 1046     04/07/2017 1046    CO2 27 04/07/2017 1046    BUN 8 04/07/2017 1046    CREATININE 0.7 04/07/2017 1046    GLU 90 04/07/2017 1046        Component Value Date/Time    CALCIUM 9.4 04/07/2017 1046    ALKPHOS 38 (L) 04/07/2017 1046    AST 11 04/07/2017 1046    ALT 8 (L) 04/07/2017 1046    BILITOT 0.5 04/07/2017 1046        Lab Results   Component Value Date    ALBUMIN 4.4 04/07/2017     Lab Results   Component Value Date    TSH 1.251 06/30/2016       Anesthesia Plan  Type of Anesthesia, risks & benefits discussed:  Anesthesia Type:  MAC  Patient's Preference:   Intra-op Monitoring Plan:   Intra-op Monitoring Plan Comments:   Post Op Pain Control Plan:   Post Op Pain Control Plan Comments:   Induction:    Beta Blocker:  Patient is not currently on a Beta-Blocker (No further documentation required).       Informed Consent: Patient understands risks and agrees with Anesthesia plan.  Questions answered. Anesthesia consent signed with patient.  ASA Score: 2     Day of Surgery Review of History & Physical:        Anesthesia Plan Notes: 2017-04-28   - resistant to versed   - UPT neg        Ready For Surgery From Anesthesia Perspective.

## 2017-04-28 ENCOUNTER — HOSPITAL ENCOUNTER (OUTPATIENT)
Facility: HOSPITAL | Age: 32
Discharge: HOME | End: 2017-04-28
Attending: INTERNAL MEDICINE | Admitting: INTERNAL MEDICINE
Payer: COMMERCIAL

## 2017-04-28 ENCOUNTER — ANESTHESIA (OUTPATIENT)
Dept: ENDOSCOPY | Facility: HOSPITAL | Age: 32
End: 2017-04-28
Payer: COMMERCIAL

## 2017-04-28 ENCOUNTER — SURGERY (OUTPATIENT)
Age: 32
End: 2017-04-28

## 2017-04-28 VITALS
HEART RATE: 83 BPM | SYSTOLIC BLOOD PRESSURE: 121 MMHG | TEMPERATURE: 98 F | DIASTOLIC BLOOD PRESSURE: 76 MMHG | WEIGHT: 152 LBS | RESPIRATION RATE: 16 BRPM | HEIGHT: 68 IN | OXYGEN SATURATION: 98 % | BODY MASS INDEX: 23.04 KG/M2

## 2017-04-28 DIAGNOSIS — K58.0 IRRITABLE BOWEL SYNDROME WITH DIARRHEA: ICD-10-CM

## 2017-04-28 DIAGNOSIS — R19.4 CHANGE IN BOWEL HABIT: Primary | ICD-10-CM

## 2017-04-28 LAB
B-HCG UR QL: NEGATIVE
CTP QC/QA: YES

## 2017-04-28 PROCEDURE — 81025 URINE PREGNANCY TEST: CPT | Performed by: INTERNAL MEDICINE

## 2017-04-28 PROCEDURE — 88305 TISSUE EXAM BY PATHOLOGIST: CPT | Performed by: PATHOLOGY

## 2017-04-28 PROCEDURE — 43239 EGD BIOPSY SINGLE/MULTIPLE: CPT | Mod: 51,,, | Performed by: INTERNAL MEDICINE

## 2017-04-28 PROCEDURE — 45380 COLONOSCOPY AND BIOPSY: CPT | Mod: ,,, | Performed by: INTERNAL MEDICINE

## 2017-04-28 PROCEDURE — 88305 TISSUE EXAM BY PATHOLOGIST: CPT | Mod: 26,,, | Performed by: PATHOLOGY

## 2017-04-28 PROCEDURE — 63600175 PHARM REV CODE 636 W HCPCS: Performed by: NURSE ANESTHETIST, CERTIFIED REGISTERED

## 2017-04-28 PROCEDURE — 27201012 HC FORCEPS, HOT/COLD, DISP: Performed by: INTERNAL MEDICINE

## 2017-04-28 PROCEDURE — 43239 EGD BIOPSY SINGLE/MULTIPLE: CPT | Performed by: INTERNAL MEDICINE

## 2017-04-28 PROCEDURE — 25000003 PHARM REV CODE 250: Performed by: NURSE ANESTHETIST, CERTIFIED REGISTERED

## 2017-04-28 PROCEDURE — 25000003 PHARM REV CODE 250: Performed by: INTERNAL MEDICINE

## 2017-04-28 PROCEDURE — 45380 COLONOSCOPY AND BIOPSY: CPT | Performed by: INTERNAL MEDICINE

## 2017-04-28 PROCEDURE — 37000009 HC ANESTHESIA EA ADD 15 MINS: Performed by: INTERNAL MEDICINE

## 2017-04-28 PROCEDURE — 37000008 HC ANESTHESIA 1ST 15 MINUTES: Performed by: INTERNAL MEDICINE

## 2017-04-28 RX ORDER — PROPOFOL 10 MG/ML
VIAL (ML) INTRAVENOUS CONTINUOUS PRN
Status: DISCONTINUED | OUTPATIENT
Start: 2017-04-28 | End: 2017-04-28

## 2017-04-28 RX ORDER — LIDOCAINE HCL/PF 100 MG/5ML
SYRINGE (ML) INTRAVENOUS
Status: DISCONTINUED | OUTPATIENT
Start: 2017-04-28 | End: 2017-04-28

## 2017-04-28 RX ORDER — PROPOFOL 10 MG/ML
VIAL (ML) INTRAVENOUS
Status: DISCONTINUED | OUTPATIENT
Start: 2017-04-28 | End: 2017-04-28

## 2017-04-28 RX ORDER — SODIUM CHLORIDE 9 MG/ML
INJECTION, SOLUTION INTRAVENOUS CONTINUOUS
Status: DISCONTINUED | OUTPATIENT
Start: 2017-04-28 | End: 2017-04-28 | Stop reason: HOSPADM

## 2017-04-28 RX ADMIN — PROPOFOL 90 MG: 10 INJECTION, EMULSION INTRAVENOUS at 08:04

## 2017-04-28 RX ADMIN — PROPOFOL 200 MCG/KG/MIN: 10 INJECTION, EMULSION INTRAVENOUS at 08:04

## 2017-04-28 RX ADMIN — SODIUM CHLORIDE: 0.9 INJECTION, SOLUTION INTRAVENOUS at 07:04

## 2017-04-28 RX ADMIN — LIDOCAINE HYDROCHLORIDE 75 MG: 20 INJECTION, SOLUTION INTRAVENOUS at 08:04

## 2017-04-28 NOTE — ANESTHESIA POSTPROCEDURE EVALUATION
"Anesthesia Post Evaluation    Patient: April Wendt Schamberger    Procedure(s) Performed: Procedure(s) (LRB):  ESOPHAGOGASTRODUODENOSCOPY (EGD) (N/A)  COLONOSCOPY (N/A)    Final Anesthesia Type: MAC  Patient location during evaluation: GI PACU  Patient participation: Yes- Able to Participate  Level of consciousness: awake and alert  Post-procedure vital signs: reviewed and stable  Pain management: adequate  Airway patency: patent  PONV status at discharge: No PONV  Anesthetic complications: no      Cardiovascular status: blood pressure returned to baseline  Respiratory status: unassisted, spontaneous ventilation and room air  Hydration status: euvolemic  Follow-up not needed.        Visit Vitals    BP (!) 129/56 (BP Location: Right arm, Patient Position: Lying, BP Method: Automatic)    Pulse 90    Temp 36.6 °C (97.8 °F)    Resp 16    Ht 5' 8" (1.727 m)    Wt 68.9 kg (152 lb)    LMP 03/21/2017 (Exact Date)    SpO2 97%    Breastfeeding No    BMI 23.11 kg/m2       Pain/Valeria Score: Pain Assessment Performed: Yes (4/28/2017  8:35 AM)  Presence of Pain: denies (4/28/2017  8:35 AM)  Valeria Score: 10 (4/28/2017  8:35 AM)      "

## 2017-04-28 NOTE — PLAN OF CARE
Past Medical History:   Diagnosis Date    IBS (irritable bowel syndrome)     Upper GI bleed      Accompanied by spouse, ok for MD to speak to him re results of exam.

## 2017-04-28 NOTE — IP AVS SNAPSHOT
[facility]  [address]  [place] [address]  Phone: [phone]           Patient Discharge Instructions   Our goal is to set you up for success. This packet includes information on your condition, medications, and your home care.  It will help you care for yourself to prevent having to return to the hospital.     Please ask your nurse if you have any questions.      There are many details to remember when preparing to leave the hospital. Here is what you will need to do:    1. Take your medicine. If you are prescribed medications, review your Medication List on the following pages. You may have new medications to  at the pharmacy and others that you'll need to stop taking. Review the instructions for how and when to take your medications. Talk with your doctor or nurses if you are unsure of what to do.     2. Go to your follow-up appointments. Specific follow-up information is listed in the following pages. Your may be contacted by a nurse or clinical provider about future appointments. Be sure we have all of the phone numbers to reach you. Please contact your provider's office if you are unable to make an appointment.     3. Watch for warning signs. Your doctor or nurse will give you detailed warning signs to watch for and when to call for assistance. These instructions may also include educational information about your condition. If you experience any of warning signs to your health, call your doctor.               ** Verify the list of medication(s) below is accurate and up to date. Carry this with you in case of emergency. If your medications have changed, please notify your healthcare provider.             Medication List      CONTINUE taking these medications        Additional Info                      famotidine 20 MG tablet   Commonly known as:  PEPCID   Refills:  0   Dose:  20 mg    Instructions:  Take 20 mg by mouth 2 (two) times daily.     Begin Date    AM    Noon    PM     Bedtime       IMODIUM A-D ORAL   Refills:  0   Dose:  6 tablet    Instructions:  Take 6 tablets by mouth every morning.     Begin Date    AM    Noon    PM    Bedtime       zolpidem 10 mg Tab   Commonly known as:  AMBIEN   Quantity:  30 tablet   Refills:  2   Dose:  10 mg    Instructions:  Take 1 tablet (10 mg total) by mouth nightly as needed.     Begin Date    AM    Noon    PM    Bedtime                  Please bring to all follow up appointments:    1. A copy of your discharge instructions.  2. All medicines you are currently taking in their original bottles.  3. Identification and insurance card.    Please arrive 15 minutes ahead of scheduled appointment time.    Please call 24 hours in advance if you must reschedule your appointment and/or time.        Your Scheduled Appointments     May 03, 2017  9:15 AM CDT   NP with Risa Alvarez MD   [facility] ([facility])    [address] [facility] [address]   [phone]                Discharge Instructions     Future Orders    Activity as tolerated     Diet general     Questions:    Total calories:      Fat restriction, if any:      Protein restriction, if any:      Na restriction, if any:      Fluid restriction:      Additional restrictions:          Discharge Instructions       Discharge Summary/Instructions for EGD and Colonoscopy  April Wendt Schamberger  4/28/2017  Bren Larkin MD    Restrictions on Activity:    - Do not drive car or operate machinery until the day after the procedure.  - The following day: return to full activity including work.  - For 3 days: No heavy lifting, straining or running.  - Diet: Eat and drink normally unless instructed otherwise.    Treatment for Common Side Effects:  - Mild abdominal pain and bloating or excessive gas: rest, eat lightly and use a heating pad.   -Sore Throat - treat with throat lozenges, gargle with warm salt water.    Symptoms to watch for and report to  "your physician:  1. Severe abdominal pain.  2. Fever within 24 hours after a procedure.  3. A large amount of rectal bleeding. (A small amount of blood from the rectum is not serious, especially if hemorrhoids are present.)  4. Because air was put into your colon during the procedure, expelling large amount of air from your rectum is normal.  5. You may not have a bowel movement for 1-3 days because of the colonoscopy prep. This is normal.  6. Go directly to the emergency room if you notice any of the following:     Chills and/orfever over 101   Persistent vomiting   Severe abdominal pain, other than gas cramps   Severe chest pain   Black, tarry stools   Any bleeding - exceeding one tablespoon    If you have any questions or problems, please call your Physician:    Bren Larkin MD    Lab Results: Contact Physician's Office    If a complication or emergency situation arises and you are unable to reach your Physician - GO TO THE EMERGENCY ROOM.          Admission Information     Date & Time Provider Department CSN    4/28/2017  6:57 AM Bren Larkin MD [facility] [id number]      Care Providers     Provider Role Specialty Primary office phone    Bren Larkin MD Attending Provider Gastroenterology [phone]    Bren Larkin MD Surgeon  Gastroenterology [phone]      Your Vitals Were     BP Pulse Temp Resp Height Weight    121/76 (BP Location: Left arm, Patient Position: Lying, BP Method: Automatic) 83 97.8 °F (36.6 °C) 16 5' 8" (1.727 m) 68.9 kg (152 lb)    Last Period SpO2 BMI          03/21/2017 (Exact Date) 98% 23.11 kg/m2        Recent Lab Values     No lab values to display.      Pending Labs     Order Current Status    Specimen to Pathology - Surgery Collected (04/28/17 0831)      Allergies as of 4/28/2017        Reactions    Azithromycin Nausea And Vomiting      [facility]     [facility] Nurse Care Line - 24/7 Assistance  Unless otherwise directed by your provider, " please contact [facility], our nurse care line that is available for 24/7 assistance.     Registered nurses in the [facility] provide clinical advisement, health education, appointment booking, and other advisory services.  Call for this free service at [phone].        Advance Directives     An advance directive is a document which, in the event you are no longer able to make decisions for yourself, tells your healthcare team what kind of treatment you do or do not want to receive, or who you would like to make those decisions for you.  If you do not currently have an advance directive, [facility] encourages you to create one.  For more information call:  [phone]), [phone] ([phone]),  or log on to [email or web address].        Smoking Cessation     If you would like to quit smoking:   You may be eligible for free services if you are a [place] resident and started smoking cigarettes before September 1, 1988.  Call the Smoking Cessation Trust (SCT) toll free at (757) 222-6819 or (761) 438-6579.   Call 6-685-QUIT-NOW if you do not meet the above criteria.   Contact us via email: [email or web address]   View our website for more information: [email or web address]        Language Assistance Services     ATTENTION: Language assistance services are available, free of charge. Please call [phone].      ATENCIÓN: Si habla español, tiene a hightower disposición servicios gratuitos de asistencia lingüística. Llame al [phone].     [facility] Ý: N?u b?n nói Ti?ng Vi?t, có các d?ch v? h? tr? ngôn ng? mi?n phí dành cho b?n. G?i s? [phone].         [facility] complies with applicable Federal civil rights laws and does not discriminate on the basis of race, color, national origin, age, disability, or sex.

## 2017-04-28 NOTE — DISCHARGE INSTRUCTIONS
Discharge Summary/Instructions for EGD and Colonoscopy  April Wendt Schamberger  4/28/2017  Bren Larkin MD    Restrictions on Activity:    - Do not drive car or operate machinery until the day after the procedure.  - The following day: return to full activity including work.  - For 3 days: No heavy lifting, straining or running.  - Diet: Eat and drink normally unless instructed otherwise.    Treatment for Common Side Effects:  - Mild abdominal pain and bloating or excessive gas: rest, eat lightly and use a heating pad.   -Sore Throat - treat with throat lozenges, gargle with warm salt water.    Symptoms to watch for and report to your physician:  1. Severe abdominal pain.  2. Fever within 24 hours after a procedure.  3. A large amount of rectal bleeding. (A small amount of blood from the rectum is not serious, especially if hemorrhoids are present.)  4. Because air was put into your colon during the procedure, expelling large amount of air from your rectum is normal.  5. You may not have a bowel movement for 1-3 days because of the colonoscopy prep. This is normal.  6. Go directly to the emergency room if you notice any of the following:     Chills and/orfever over 101   Persistent vomiting   Severe abdominal pain, other than gas cramps   Severe chest pain   Black, tarry stools   Any bleeding - exceeding one tablespoon    If you have any questions or problems, please call your Physician:    Bren Larkin MD    Lab Results: Contact Physician's Office    If a complication or emergency situation arises and you are unable to reach your Physician - GO TO THE EMERGENCY ROOM.

## 2017-04-28 NOTE — TRANSFER OF CARE
"Anesthesia Transfer of Care Note    Patient: April Wendt Schamberger    Procedure(s) Performed: Procedure(s) (LRB):  ESOPHAGOGASTRODUODENOSCOPY (EGD) (N/A)  COLONOSCOPY (N/A)    Patient location: GI    Anesthesia Type: MAC    Transport from OR: Transported from OR on room air with adequate spontaneous ventilation    Post pain: adequate analgesia    Post assessment: no apparent anesthetic complications    Post vital signs: stable    Level of consciousness: awake    Nausea/Vomiting: no nausea/vomiting    Complications: none          Last vitals:   Visit Vitals    BP (!) 129/56 (BP Location: Right arm, Patient Position: Lying, BP Method: Automatic)    Pulse 90    Temp 36.6 °C (97.8 °F)    Resp 16    Ht 5' 8" (1.727 m)    Wt 68.9 kg (152 lb)    LMP 03/21/2017 (Exact Date)    SpO2 97%    Breastfeeding No    BMI 23.11 kg/m2     "

## 2017-05-01 ENCOUNTER — TELEPHONE (OUTPATIENT)
Dept: ENDOSCOPY | Facility: HOSPITAL | Age: 32
End: 2017-05-01

## 2017-05-01 ENCOUNTER — PATIENT MESSAGE (OUTPATIENT)
Dept: GASTROENTEROLOGY | Facility: CLINIC | Age: 32
End: 2017-05-01

## 2017-05-02 ENCOUNTER — LAB VISIT (OUTPATIENT)
Dept: LAB | Facility: HOSPITAL | Age: 32
End: 2017-05-02
Attending: NURSE PRACTITIONER
Payer: COMMERCIAL

## 2017-05-02 DIAGNOSIS — K63.3 ULCER OF ILEUM: ICD-10-CM

## 2017-05-02 DIAGNOSIS — K63.3 ULCER OF ILEUM: Primary | ICD-10-CM

## 2017-05-02 PROCEDURE — 86140 C-REACTIVE PROTEIN: CPT

## 2017-05-02 PROCEDURE — 81374 HLA I TYPING 1 ANTIGEN LR: CPT

## 2017-05-03 ENCOUNTER — OFFICE VISIT (OUTPATIENT)
Dept: OBSTETRICS AND GYNECOLOGY | Facility: CLINIC | Age: 32
End: 2017-05-03
Payer: COMMERCIAL

## 2017-05-03 VITALS
BODY MASS INDEX: 28.45 KG/M2 | WEIGHT: 150.69 LBS | HEIGHT: 61 IN | DIASTOLIC BLOOD PRESSURE: 76 MMHG | SYSTOLIC BLOOD PRESSURE: 108 MMHG

## 2017-05-03 DIAGNOSIS — Z12.4 SCREENING FOR CERVICAL CANCER: ICD-10-CM

## 2017-05-03 DIAGNOSIS — Z11.51 SCREENING FOR HUMAN PAPILLOMAVIRUS (HPV): ICD-10-CM

## 2017-05-03 DIAGNOSIS — Z01.419 ENCOUNTER FOR GYNECOLOGICAL EXAMINATION: ICD-10-CM

## 2017-05-03 DIAGNOSIS — Z12.4 SCREENING FOR MALIGNANT NEOPLASM OF THE CERVIX: Primary | ICD-10-CM

## 2017-05-03 DIAGNOSIS — N89.8 VAGINAL DISCHARGE: ICD-10-CM

## 2017-05-03 DIAGNOSIS — Z11.51 SCREENING FOR HPV (HUMAN PAPILLOMAVIRUS): ICD-10-CM

## 2017-05-03 PROCEDURE — 87624 HPV HI-RISK TYP POOLED RSLT: CPT

## 2017-05-03 PROCEDURE — 88141 CYTOPATH C/V INTERPRET: CPT | Mod: ,,, | Performed by: PATHOLOGY

## 2017-05-03 PROCEDURE — 99999 PR PBB SHADOW E&M-EST. PATIENT-LVL III: CPT | Mod: PBBFAC,,, | Performed by: OBSTETRICS & GYNECOLOGY

## 2017-05-03 PROCEDURE — 87480 CANDIDA DNA DIR PROBE: CPT

## 2017-05-03 PROCEDURE — 99395 PREV VISIT EST AGE 18-39: CPT | Mod: S$GLB,,, | Performed by: OBSTETRICS & GYNECOLOGY

## 2017-05-03 PROCEDURE — 88175 CYTOPATH C/V AUTO FLUID REDO: CPT | Performed by: PATHOLOGY

## 2017-05-03 RX ORDER — OMEPRAZOLE 20 MG/1
40 CAPSULE, DELAYED RELEASE ORAL DAILY
COMMUNITY
End: 2017-11-14

## 2017-05-03 NOTE — PROGRESS NOTES
Subjective:       Patient ID: April Wendt Schamberger is a 32 y.o. female.    Chief Complaint:  Well Woman (Annual Exam, c/o Vaginal discharge, odor and itching  --  Last pap 14, Negative, Hpv Neg   --  MMG 16, Normal (Epic))      History of Present Illness.  April Wendt Schamberger is a 32 y.o. female.  She has no breast or urinary symptoms.  She has no menorrhagia, oligomenorrhea, postcoital bleeding, pelvic pain, dyspareunia, vaginal dryness, or sexual complaints.  She complains of a vaginal discharge and has been treated multiple times in the past for BV and yeast.  Had EGD and colonoscopy that showed ulcers in the stomach and ileum, so they are still evaluating and she may have Crohn's.  She has periods every 28 days and had spotting once between period last month.  She has mild dysmenorrhea that responds to Advil.  She has not been on contraception x 9 years.    GYN & OB History  Patient's last menstrual period was 2017 (exact date).   Date of Last Pap:  Normal HPV negative    OB History    Para Term  AB SAB TAB Ectopic Multiple Living   1 1 1       1      # Outcome Date GA Lbr Eldon/2nd Weight Sex Delivery Anes PTL Lv   1 Term 14 37w0d  3.657 kg (8 lb 1 oz) M Vag-Spont EPI  Y          Past Medical History:   Diagnosis Date    Abnormal Pap smear of cervix     Cryo Done    ADD (attention deficit disorder)     Breast disorder     Breast Cysts    Esophageal reflux     Fibroids     IBS (irritable bowel syndrome)     Insomnia     Kidney stones     Mastocytosis     Upper GI bleed      Past Surgical History:   Procedure Laterality Date    COLONOSCOPY N/A 2017    Procedure: COLONOSCOPY;  Surgeon: Bren Larkin MD;  [facility]: [facility];  Service: Endoscopy;  Laterality: N/A;    ESOPHAGOGASTRODUODENOSCOPY  2012    LASIK Bilateral 2005    TONSILLECTOMY, ADENOIDECTOMY       Family History   Problem Relation Age of Onset    Breast cancer Mother      with  Metastasis    Cancer Mother      Breast    Hypertension Mother     Prostate cancer Father     Hypertension Father     Cancer Father      Prostate Ca    Diabetes Father     Deep vein thrombosis Father     Pancreatic cancer Maternal Grandfather     Breast cancer Paternal Grandmother     Cancer Paternal Grandmother     Diabetes type II Paternal Grandfather     Heart attack Maternal Grandmother     Colon cancer Cousin     Cancer Cousin 31    Ovarian cancer Neg Hx      Social History   Substance Use Topics    Smoking status: Former Smoker    Smokeless tobacco: None    Alcohol use 0.0 oz/week     0 Standard drinks or equivalent per week      Comment: Social       Current Outpatient Prescriptions:     LOPERAMIDE HCL (IMODIUM A-D ORAL), Take 6 tablets by mouth every morning. , Disp: , Rfl:     omeprazole (PRILOSEC) 20 MG capsule, Take 20 mg by mouth once daily., Disp: , Rfl:     zolpidem (AMBIEN) 10 mg Tab, Take 1 tablet (10 mg total) by mouth nightly as needed., Disp: 30 tablet, Rfl: 2  No current facility-administered medications for this visit.     Review of patient's allergies indicates:   Allergen Reactions    Azithromycin Nausea And Vomiting       Review of Systems  Review of Systems   Constitutional: Negative for fatigue.   HENT: Negative for trouble swallowing.    Eyes: Negative for visual disturbance.   Respiratory: Negative for cough and shortness of breath.    Cardiovascular: Negative for chest pain.   Gastrointestinal: Negative for abdominal distention, abdominal pain, blood in stool, nausea and vomiting.   Genitourinary: Negative for difficulty urinating, dyspareunia, dysuria, flank pain, frequency, hematuria, pelvic pain, urgency, vaginal bleeding, vaginal discharge and vaginal pain.   Musculoskeletal: Negative for arthralgias.   Skin: Negative for rash.   Neurological: Negative for dizziness and headaches.   Psychiatric/Behavioral: Negative for sleep disturbance. The patient is not  "nervous/anxious.         Objective:     Vitals:    05/03/17 0851   BP: 108/76   Weight: 68.3 kg (150 lb 11 oz)   Height: 5' 0.75" (1.543 m)   PainSc: 0-No pain     Body mass index is 28.71 kg/(m^2).      Physical Exam:   Constitutional: She is oriented to person, place, and time. Vital signs are normal. She appears well-developed and well-nourished.    HENT:   Head: Normocephalic.     Neck: Normal range of motion. No thyromegaly present.     Pulmonary/Chest: Right breast exhibits no mass, no nipple discharge, no skin change, no tenderness and no swelling. Left breast exhibits no mass, no nipple discharge, no skin change, no tenderness and no swelling. Breasts are symmetrical.        Abdominal: Soft. Normal appearance and bowel sounds are normal. She exhibits no distension. There is no tenderness.     Genitourinary: Vagina normal and uterus normal. Pelvic exam was performed with patient supine. There is no rash, tenderness, lesion or injury on the right labia. There is no rash, tenderness, lesion or injury on the left labia. Cervix is normal. Right adnexum displays no mass, no tenderness and no fullness. Left adnexum displays no mass, no tenderness and no fullness. No erythema in the vagina. No vaginal discharge found. Cervix exhibits no motion tenderness and no discharge.           Musculoskeletal: Normal range of motion.      Lymphadenopathy:        Right: No inguinal and no supraclavicular adenopathy present.        Left: No inguinal and no supraclavicular adenopathy present.    Neurological: She is alert and oriented to person, place, and time.    Skin: Skin is warm and dry.    Psychiatric: She has a normal mood and affect.        Assessment/ Plan:     Screening for malignant neoplasm of the cervix  -     Liquid-based pap smear, screening    Screening for human papillomavirus (HPV)  -     HPV High Risk Genotypes, PCR    Vaginal discharge  -     Vaginosis Screen by DNA Probe    Encounter for gynecological " examination    Screening for HPV (human papillomavirus)  -     HPV DNA probe, amplified    Screening for cervical cancer  -     Liquid-based pap smear, screening        Routine pap smears.  Self breast exam  Contraception reviewed/discussed.  Follow-up with me in 1 year

## 2017-05-03 NOTE — MR AVS SNAPSHOT
[address]  [place] [address]  Phone: [phone]  Fax: [phone]                  April Wendt Schamberger   5/3/2017 9:15 AM   Office Visit    Description:  Female : 1985   Provider:  Risa Alvarez MD   Department:  [facility]           Reason for Visit     Well Woman           Diagnoses this Visit        Comments    Screening for malignant neoplasm of the cervix    -  Primary     Screening for human papillomavirus (HPV)         Vaginal discharge         Encounter for gynecological examination         Screening for HPV (human papillomavirus)         Screening for cervical cancer                To Do List           Goals (5 Years of Data)     None      Follow-Up and Disposition     Return in about 1 year (around 5/3/2018).    Follow-up and Disposition History      [facility]     [facility] On Call Nurse Care Line -  Assistance  Unless otherwise directed by your provider, please contact [facility], our nurse care line that is available for  assistance.     Registered nurses in the [facility] On Call Center provide: appointment scheduling, clinical advisement, health education, and other advisory services.  Call: [phone] (toll free)               Medications           Message regarding Medications     Verify the changes and/or additions to your medication regime listed below are the same as discussed with your clinician today.  If any of these changes or additions are incorrect, please notify your healthcare provider.        STOP taking these medications     famotidine (PEPCID) 20 MG tablet Take 20 mg by mouth 2 (two) times daily.    famotidine tablet 20 mg            Verify that the below list of medications is an accurate representation of the medications you are currently taking.  If none reported, the list may be blank. If incorrect, please contact your healthcare provider. Carry this list with you in case of  "emergency.           Current Medications     LOPERAMIDE HCL (IMODIUM A-D ORAL) Take 6 tablets by mouth every morning.     omeprazole (PRILOSEC) 20 MG capsule Take 20 mg by mouth once daily.    zolpidem (AMBIEN) 10 mg Tab Take 1 tablet (10 mg total) by mouth nightly as needed.           Clinical Reference Information           Your Vitals Were     BP Height Weight Last Period BMI    108/76 5' 0.75" (1.543 m) 68.3 kg (150 lb 11 oz) 04/18/2017 (Exact Date) 28.71 kg/m2      Blood Pressure          Most Recent Value    BP  108/76      Allergies as of 5/3/2017     Azithromycin      Immunizations Administered on Date of Encounter - 5/3/2017     None      Orders Placed During Today's Visit      Normal Orders This Visit    HPV High Risk Genotypes, PCR     Liquid-based pap smear, screening     Vaginosis Screen by DNA Probe       Language Assistance Services     ATTENTION: Language assistance services are available, free of charge. Please call [phone].      ATENCIÓN: Si habla norman, tiene a hightower disposición servicios gratuitos de asistencia lingüística. Llame al [phone].     [facility] Ý: N?u b?n nói Ti?ng Vi?t, có các d?ch v? h? tr? ngôn ng? mi?n phí juliannah cho b?n. G?i s? [phone].         [facility] complies with applicable Federal civil rights laws and does not discriminate on the basis of race, color, national origin, age, disability, or sex.        "

## 2017-05-04 ENCOUNTER — PATIENT MESSAGE (OUTPATIENT)
Dept: PAIN MEDICINE | Facility: CLINIC | Age: 32
End: 2017-05-04

## 2017-05-04 ENCOUNTER — TELEPHONE (OUTPATIENT)
Dept: PAIN MEDICINE | Facility: CLINIC | Age: 32
End: 2017-05-04

## 2017-05-04 LAB
CANDIDA RRNA VAG QL PROBE: NEGATIVE
G VAGINALIS RRNA GENITAL QL PROBE: NEGATIVE
T VAGINALIS RRNA GENITAL QL PROBE: NEGATIVE

## 2017-05-04 NOTE — TELEPHONE ENCOUNTER
Spoke with patient regarding scheduling an appt. Patient would like to have injections. Patient was informed that she will need to come in to be evaluated. Patient verbalized and confirmed appt date and time of arrival on 5/8/17 at 130 PM.

## 2017-05-08 ENCOUNTER — OFFICE VISIT (OUTPATIENT)
Dept: PAIN MEDICINE | Facility: CLINIC | Age: 32
End: 2017-05-08
Payer: COMMERCIAL

## 2017-05-08 VITALS
WEIGHT: 151.25 LBS | BODY MASS INDEX: 28.81 KG/M2 | SYSTOLIC BLOOD PRESSURE: 136 MMHG | DIASTOLIC BLOOD PRESSURE: 87 MMHG | HEART RATE: 86 BPM

## 2017-05-08 DIAGNOSIS — M54.2 NECK PAIN: Primary | ICD-10-CM

## 2017-05-08 DIAGNOSIS — M54.2 NECK PAIN: ICD-10-CM

## 2017-05-08 DIAGNOSIS — M62.838 MUSCLE SPASM: ICD-10-CM

## 2017-05-08 DIAGNOSIS — M79.18 MYOFASCIAL MUSCLE PAIN: Primary | ICD-10-CM

## 2017-05-08 PROCEDURE — 99999 PR PBB SHADOW E&M-EST. PATIENT-LVL III: CPT | Mod: PBBFAC,,, | Performed by: NURSE PRACTITIONER

## 2017-05-08 PROCEDURE — 99214 OFFICE O/P EST MOD 30 MIN: CPT | Mod: 25,S$GLB,, | Performed by: NURSE PRACTITIONER

## 2017-05-08 PROCEDURE — 20553 NJX 1/MLT TRIGGER POINTS 3/>: CPT | Mod: S$GLB,,, | Performed by: NURSE PRACTITIONER

## 2017-05-08 RX ORDER — TIZANIDINE 4 MG/1
4 TABLET ORAL 3 TIMES DAILY PRN
Qty: 90 TABLET | Refills: 3 | Status: SHIPPED | OUTPATIENT
Start: 2017-05-08 | End: 2017-11-14

## 2017-05-08 RX ORDER — TIZANIDINE 4 MG/1
4 TABLET ORAL EVERY 6 HOURS PRN
COMMUNITY
End: 2017-05-08 | Stop reason: SDUPTHER

## 2017-05-08 RX ORDER — TRIAMCINOLONE ACETONIDE 40 MG/ML
40 INJECTION, SUSPENSION INTRA-ARTICULAR; INTRAMUSCULAR
Status: COMPLETED | OUTPATIENT
Start: 2017-05-08 | End: 2017-05-08

## 2017-05-08 RX ADMIN — TRIAMCINOLONE ACETONIDE 40 MG: 40 INJECTION, SUSPENSION INTRA-ARTICULAR; INTRAMUSCULAR at 02:05

## 2017-05-08 NOTE — PROGRESS NOTES
Chronic patient Established Note (Follow up visit)      SUBJECTIVE:    April Wendt Schamberger presents to the clinic for a follow-up appointment for neck pain. Discussed that we will repeat cervical TP injections to help with myofacial pain. Will also refill Zanaflex 4 mg TID PRN # 90.  Since the last visit, April Wendt Schamberger states the pain has been improving. Current pain intensity is 2/10.    Pain Disability Index Review:  Last 3 PDI Scores 2017 2017 1/10/2017   Pain Disability Index (PDI) 0 0 0       Pain Medications:      - Opioids: None  - Adjuvant Medications: Advil,Motrin ( Ibuprofen)  - Anti-Coagulants: None  - Others: see medications list    Opioid Contract: no     report:  Reviewed and consistent with medication use as prescribed.    Pain Procedures: 17 TRIGGER POINT INJECTION  17 bilateral C4,5,6 CERVICAL FACET MEDIAL BRANCH NERVE BLOCK (Prone) ( after xray correlation with pain level, decsion was made to proceed at C4,5,6 levels, patient agrees to proceed     Physical Therapy/Home Exercise: yes    Imagin17 MRI Cervical Spine Without Contrast  Narrative   Technique: Multiplanar, multisequence MR imaging of the cervical spine obtained without the use of IV contrast.     Comparison: Cervical spine radiographs 12/15/2016.     Results:    There is straightening with mild reversal of the normal cervical lordosis. Vertebral body heights are maintained with no evidence of fracture. Mild intervertebral disc space narrowing and desiccation are visualized at C5-6 and C6-7. No marrow signal abnormality suspicious for an infiltrative process.      The cervical cord is normal in caliber and signal characteristics.  The craniocervical junction and visualized intracranial contents are unremarkable.  The adjacent soft tissue structures show no significant abnormalities.      C2-C3:  No significant spinal canal or neural foraminal narrowing.    C3-C4: No significant spinal canal or  neural foraminal narrowing.    C4-C5:  No significant spinal canal or neural foraminal narrowing.    C5-C6:  Disc bulge with right uncovertebral spurring. Findings result in mild spinal canal stenosis and mild right neural foraminal narrowing.    C6-C7:  Mild disc bulge with thickening of the ligamentum flavum results in mild spinal canal stenosis.No significant neuroforaminal narrowing.    C7-T1:   No significant central spinal or neural foraminal narrowing.   Impression      Mild disc bulge and spinal canal stenosis at the C5-6 and C6-7 levels.      Electronically signed by: UMER MARTIN MD  Date: 01/26/17  Time: 12:56     Encounter   View Encounter      Reviewed By   Hunter Jimenez MD on 1/27/2017  2:18 PM   Exam Details   Performed Procedure Technologist Supporting Staff Performing Physician   MRI Cervical Spine Without Contrast Andre Hinds, RT       Appointment Date/Status Modality Department      1/26/2017     Completed [facility] MRI1 [facility] MRI     Begin Exam End Exam Begin Exam Questionnaires End Exam Questionnaires   1/26/2017 11:22 AM 1/26/2017 11:59 AM MRI TECH NAVIGATOR QUESTIONS IMAGING END ALL       RIS PREGNANCY TECH NAVIGATOR        X-Ray Cervical Spine AP Lat with Flexion  Extension 12/15/16  Narrative     Cervical spine radiographs     comparison: None    Results: AP, lateral, flexion and extension views  .  The alignment is normal, vertebral body height and disk spaces are well-maintained.    Flexion and extension views demonstrate no translational abnormalities.  Very small anterior osteophyte noted at C5-C6.  No fracture or osseous lesion seen.Prevertebral soft tissues appear normal.   Impression    No significant abnormality seen      Electronically signed by: JOSE A JENKINS MD  Date: 12/15/16  Time: 10:52               Allergies:   Review of patient's allergies indicates:   Allergen Reactions    Azithromycin Nausea And Vomiting       Current Medications:   Current Outpatient  Prescriptions   Medication Sig Dispense Refill    LOPERAMIDE HCL (IMODIUM A-D ORAL) Take 6 tablets by mouth every morning.       omeprazole (PRILOSEC) 20 MG capsule Take 20 mg by mouth once daily.      tizanidine (ZANAFLEX) 4 MG tablet Take 4 mg by mouth every 6 (six) hours as needed.      zolpidem (AMBIEN) 10 mg Tab Take 1 tablet (10 mg total) by mouth nightly as needed. 30 tablet 2     No current facility-administered medications for this visit.        REVIEW OF SYSTEMS:    GENERAL: No weight loss, malaise or fevers.  HEENT: Negative for frequent or significant headaches. + HAs 3-4 days out of the week.  NECK: Negative for lumps, goiter, pain and significant neck swelling.  RESPIRATORY: Negative for cough, wheezing or shortness of breath.  CARDIOVASCULAR: Negative for chest pain, leg swelling or palpitations.  GI: Negative for abdominal discomfort, blood in stools or black stools or change in bowel habits. +IBS  MUSCULOSKELETAL: See HPI.  SKIN: Negative for lesions, rash, and itching.  PSYCH: + for sleep disturbance, mood disorder and recent psychosocial stressors.  HEMATOLOGY/LYMPHOLOGY: Negative for prolonged bleeding, bruising easily or swollen nodes.   NEURO: No history of headaches, syncope, paralysis, seizures or tremors.  All other reviewed and negative other than HPI.      Past Medical History:  Past Medical History:   Diagnosis Date    Abnormal Pap smear of cervix 2000    Cryo Done    ADD (attention deficit disorder)     Breast disorder     Breast Cysts    Esophageal reflux     Fibroids     IBS (irritable bowel syndrome)     Insomnia     Kidney stones     Mastocytosis     Upper GI bleed        Past Surgical History:  Past Surgical History:   Procedure Laterality Date    COLONOSCOPY N/A 4/28/2017    Procedure: COLONOSCOPY;  Surgeon: Bren Larkin MD;  Location: [facility];  Service: Endoscopy;  Laterality: N/A;    ESOPHAGOGASTRODUODENOSCOPY  2012    LASIK Bilateral 2005     TONSILLECTOMY, ADENOIDECTOMY  1988       Family History:  Family History   Problem Relation Age of Onset    Breast cancer Mother      with Metastasis    Cancer Mother      Breast    Hypertension Mother     Prostate cancer Father     Hypertension Father     Cancer Father      Prostate Ca    Diabetes Father     Deep vein thrombosis Father     Pancreatic cancer Maternal Grandfather     Breast cancer Paternal Grandmother     Cancer Paternal Grandmother     Diabetes type II Paternal Grandfather     Heart attack Maternal Grandmother     Colon cancer Cousin     Cancer Cousin 31    Ovarian cancer Neg Hx        Social History:  Social History     Social History    Marital status:      Spouse name: N/A    Number of children: 1    Years of education: N/A     Social History Main Topics    Smoking status: Former Smoker    Smokeless tobacco: None    Alcohol use 0.0 oz/week     0 Standard drinks or equivalent per week      Comment: Social    Drug use: No    Sexual activity: Yes     Partners: Male     Birth control/ protection: Coitus interruptus, Rhythm      Comment: : Boris     Other Topics Concern    None     Social History Narrative       OBJECTIVE:    /87  Pulse 86  Wt 68.6 kg (151 lb 3.8 oz)  LMP 04/18/2017 (Exact Date)  BMI 28.81 kg/m2    PHYSICAL EXAMINATION:    General appearance: Well appearing, in no acute distress, alert and oriented x3.  Psych:  Mood and affect appropriate.  Skin: Skin color, texture, turgor normal, no rashes or lesions, in both upper and lower body.  Head/face:  Atraumatic, normocephalic. No palpable lymph nodes  Neck: + pain to palpation over the cervical paraspinous muscles. Spurling Negative. + pain with neck flexion, extension, or lateral flexion. .  Cor: RRR  Pulm: CTA  GI: Abdomen soft and non-tender.  Back: Straight leg raising in the sitting and supine positions is negative to radicular pain. No pain to palpation over the spine or  costovertebral angles. Normal range of motion without pain reproduction.  Extremities: Peripheral joint ROM is full and pain free without obvious instability or laxity in all four extremities. No deformities, edema, or skin discoloration. Good capillary refill.  Musculoskeletal: Shoulder, hip, sacroiliac and knee provocative maneuvers are negative. Bilateral upper and lower extremity strength is normal and symmetric.  No atrophy or tone abnormalities are noted.  Neuro: Bilateral upper and lower extremity coordination and muscle stretch reflexes are physiologic and symmetric.  Plantar response are downgoing. No loss of sensation is noted.  Gait: Normal.    ASSESSMENT: 32 y.o. year old female with neck  pain, consistent with         Diagnosis:    1. Myofascial muscle pain  tizanidine (ZANAFLEX) 4 MG tablet   2. Neck pain  tizanidine (ZANAFLEX) 4 MG tablet   3. Muscle spasm  tizanidine (ZANAFLEX) 4 MG tablet         PLAN:     - I have stressed the importance of physical activity and a home exercise plan to help with pain and improve health.  - Patient can continue with medications for now since they are providing benefits, using them appropriately, and without side effects.  - Counseled patient regarding the importance of activity modification, constant sleeping habits and physical therapy.  -Trigger Point injections  -I have explained the risks, benefits, and alternatives of the procedure in detail. The patient voices understanding and all questions have been answered. The patient agrees to proceed as planned. Written Consent obtained.   RTC as needed for returning or new pain  -The above plan and management options were discussed at length with patient. Patient is in agreement with the above and verbalized understanding. The physician  was consulted on this patient  and agrees with this plan.    JOCY Marie    05/08/2017               Procedures Patient Name: Schamberger, April Wendt  MRN: [id number]     INFORMED  CONSENT: The procedure, risks, benefits and options were discussed with patient. There are no contraindications to the procedure. The patient expressed understanding and agreed to proceed. The personnel performing the procedure was discussed. I verify that I personally obtained  consent prior to the start of the procedure and the signed consent can be found on the patient's chart.     Procedure Date: 05/08/2017     Anesthesia: Topical     Pre Procedure diagnosis:   1. Myositis, unspecified myositis type, unspecified site    2. Myalgia          Post-Procedure diagnosis: same        Sedation: None     PROCEDURE: TRIGGER POINT INJECTION  The patient was placed in a seated position. The site of pain and procedure were confirmed with the patient prior to starting the procedure. After performing time out. The patient's trigger points were identified and marked. The skin was prepped with chlorhexidine three times. After performing time out A 25-gauge 1.5 inch needle was advanced through the skin and subcutaneous tissues. Aspiration for blood, air and CSF was negative. A total of 10 ml of Bupivacaine 0.25% and 40 mg Kenalog was injected at all trigger point. No complications were evident. No specimens collected.     Blood Loss: Nill  Specimen: None

## 2017-05-09 LAB
HPV16 DNA SPEC QL NAA+PROBE: NEGATIVE
HPV16+18+H RISK 12 DNA CVX-IMP: NEGATIVE
HPV18 DNA SPEC QL NAA+PROBE: NEGATIVE

## 2017-05-10 ENCOUNTER — TELEPHONE (OUTPATIENT)
Dept: GASTROENTEROLOGY | Facility: CLINIC | Age: 32
End: 2017-05-10

## 2017-05-10 LAB — IBD SEROLOGY 7: NORMAL

## 2017-05-10 RX ORDER — BUDESONIDE 3 MG/1
9 CAPSULE, COATED PELLETS ORAL DAILY
Qty: 90 CAPSULE | Refills: 6 | Status: SHIPPED | OUTPATIENT
Start: 2017-05-10 | End: 2017-06-20

## 2017-05-10 NOTE — TELEPHONE ENCOUNTER
Reviewed results of IBD [facility] labs with Dr. Larkin.  ( Consistent with Crohn's)    Rec entocort.    Have discussed with patient and rx sent to pharmacy.

## 2017-05-15 LAB
HLA-B27 RELATED AG QL: NEGATIVE
HLA-B27 RELATED AG QL: NORMAL

## 2017-05-25 ENCOUNTER — PATIENT MESSAGE (OUTPATIENT)
Dept: FAMILY MEDICINE | Facility: CLINIC | Age: 32
End: 2017-05-25

## 2017-05-25 DIAGNOSIS — G47.00 INSOMNIA, UNSPECIFIED TYPE: ICD-10-CM

## 2017-05-25 RX ORDER — ZOLPIDEM TARTRATE 10 MG/1
10 TABLET ORAL NIGHTLY PRN
Qty: 30 TABLET | Refills: 2 | Status: SHIPPED | OUTPATIENT
Start: 2017-05-25 | End: 2017-08-28 | Stop reason: SDUPTHER

## 2017-05-31 ENCOUNTER — HOSPITAL ENCOUNTER (OUTPATIENT)
Dept: RADIOLOGY | Facility: HOSPITAL | Age: 32
Discharge: HOME | End: 2017-05-31
Attending: NURSE PRACTITIONER
Payer: COMMERCIAL

## 2017-05-31 ENCOUNTER — OFFICE VISIT (OUTPATIENT)
Dept: GASTROENTEROLOGY | Facility: CLINIC | Age: 32
End: 2017-05-31
Payer: COMMERCIAL

## 2017-05-31 VITALS
WEIGHT: 146.38 LBS | HEART RATE: 80 BPM | HEIGHT: 61 IN | BODY MASS INDEX: 27.64 KG/M2 | DIASTOLIC BLOOD PRESSURE: 91 MMHG | SYSTOLIC BLOOD PRESSURE: 131 MMHG

## 2017-05-31 DIAGNOSIS — R11.0 NAUSEA: Primary | ICD-10-CM

## 2017-05-31 DIAGNOSIS — K50.919 CROHN'S DISEASE WITH COMPLICATION, UNSPECIFIED GASTROINTESTINAL TRACT LOCATION: ICD-10-CM

## 2017-05-31 DIAGNOSIS — R11.0 NAUSEA: ICD-10-CM

## 2017-05-31 DIAGNOSIS — R19.7 DIARRHEA, UNSPECIFIED TYPE: ICD-10-CM

## 2017-05-31 DIAGNOSIS — R10.84 ABDOMINAL PAIN, GENERALIZED: ICD-10-CM

## 2017-05-31 PROCEDURE — 99214 OFFICE O/P EST MOD 30 MIN: CPT | Mod: S$GLB,,, | Performed by: NURSE PRACTITIONER

## 2017-05-31 PROCEDURE — 76700 US EXAM ABDOM COMPLETE: CPT | Mod: 26,,, | Performed by: RADIOLOGY

## 2017-05-31 PROCEDURE — 99999 PR PBB SHADOW E&M-EST. PATIENT-LVL III: CPT | Mod: PBBFAC,,, | Performed by: NURSE PRACTITIONER

## 2017-05-31 PROCEDURE — 76700 US EXAM ABDOM COMPLETE: CPT | Mod: TC

## 2017-05-31 RX ORDER — ONDANSETRON 4 MG/1
4 TABLET, ORALLY DISINTEGRATING ORAL EVERY 6 HOURS PRN
Qty: 40 TABLET | Refills: 1 | Status: SHIPPED | OUTPATIENT
Start: 2017-05-31 | End: 2017-08-28

## 2017-05-31 NOTE — PROGRESS NOTES
Subjective:       Patient ID: April Wendt Schamberger is a 32 y.o. female.    Chief Complaint: Nausea    HPI  Reports continued complaints of diarrhea, abdominal pain and nausea.  Has had no improvement with entocort added after recent endoscopic workup.  EGD:    - Normal esophagus.                        - Non-bleeding erosive gastropathy. Biopsied.                        - Erythematous duodenopathy. Biopsied.  Colonoscopy:  - The entire examined colon is normal. Biopsied.                        - Multiple ulcers in the terminal ileum. Biopsied.  Pathology:  FINAL PATHOLOGIC DIAGNOSIS  1. Small bowel (biopsy):  -Duodenal mucosa with no significant histopathologic change  -Normal villus architecture  -No increase in intraepithelial lymphocytes or expansion of villi with plasma cells  -Negative for dysplasia or malignancy  2. Gastric biopsy:  -Mild chronic inflammation in background of reactive change  -No Helicobacter organisms identified on H&E  -Negative for intestinal metaplasia, dysplasia or malignancy  3. Biopsy terminal ileum:  -Ileitis with detached ulcer debris  -Prominent lymphoid aggregates  -Negative for dysplasia or malignancy  -See note  4. Random colon biopsies:  -Benign colonic mucosa with patchy lymphoid aggregates  -No acute or microscopic colitis  -Negative for dysplasia or malignancy    [facility] IBD panel consistent with Crohn's    She continues to use 8 imodium and 9mg entocort with no improvement.  She uses phenergan for nausea but this makes her sleepy.  She is hesitant to eat due to complaints.  She denies fever.    Long history and previous workup well documented in previous note.    Review of Systems   Constitutional: Positive for appetite change and fatigue.   Respiratory: Negative for shortness of breath.    Cardiovascular: Negative for chest pain.   Gastrointestinal: Positive for abdominal distention, abdominal pain, diarrhea and nausea. Negative for vomiting.   Genitourinary: Negative.     Neurological: Negative.    Psychiatric/Behavioral: Negative.        Objective:      Physical Exam   Constitutional: She is oriented to person, place, and time. She appears well-developed and well-nourished. No distress.   HENT:   Head: Normocephalic.   Eyes: No scleral icterus.   Cardiovascular: Normal rate.    Pulmonary/Chest: Effort normal. No respiratory distress.   Abdominal: Soft.   Musculoskeletal: Normal range of motion.   Neurological: She is alert and oriented to person, place, and time.   Skin: Skin is warm and dry. She is not diaphoretic.   Psychiatric: She has a normal mood and affect. Her behavior is normal. Judgment and thought content normal.   Vitals reviewed.      Assessment:       1. Nausea    2. Abdominal pain, generalized    3. Crohn's disease with complication, unspecified gastrointestinal tract location    4. Diarrhea, unspecified type        Plan:         April was seen today for nausea.    Diagnoses and all orders for this visit:    Nausea  -     US Abdomen Complete; Future  -     MRI Abdomen W WO Contrast; Future  -     MRI Pelvis W WO Contrast; Future  -     CBC auto differential; Future  -     C-reactive protein; Future  -     Sedimentation rate, manual; Future  -     Pregnancy, urine rapid; Future    Abdominal pain, generalized  -     US Abdomen Complete; Future  -     MRI Abdomen W WO Contrast; Future  -     MRI Pelvis W WO Contrast; Future  -     CBC auto differential; Future  -     C-reactive protein; Future  -     Sedimentation rate, manual; Future  -     Pregnancy, urine rapid; Future    Crohn's disease with complication, unspecified gastrointestinal tract location  -     MRI Abdomen W WO Contrast; Future  -     MRI Pelvis W WO Contrast; Future  -     CBC auto differential; Future  -     C-reactive protein; Future  -     Sedimentation rate, manual; Future  -     Pregnancy, urine rapid; Future    Diarrhea, unspecified type  -     MRI Abdomen W WO Contrast; Future  -     MRI Pelvis  W WO Contrast; Future  -     CBC auto differential; Future  -     C-reactive protein; Future  -     Sedimentation rate, manual; Future  -     Pregnancy, urine rapid; Future    Other orders  -     ondansetron (ZOFRAN-ODT) 4 MG TbDL; Take 1 tablet (4 mg total) by mouth every 6 (six) hours as needed.    Have discussed with Dr. Larkin.  May need to consider prednsone taper depending upon findings.  She wants to avoid this if possible.

## 2017-06-01 ENCOUNTER — TELEPHONE (OUTPATIENT)
Dept: GASTROENTEROLOGY | Facility: CLINIC | Age: 32
End: 2017-06-01

## 2017-06-01 NOTE — TELEPHONE ENCOUNTER
----- Message from Gricel Valdivia MA sent at 6/1/2017  7:41 AM CDT -----  Hello,  Can we get this pt scheduled for an OV. 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 Pt has continued symptoms. Referred by LIDIA Dick.       ----- Message -----  From: LIDIA Ko  Sent: 5/31/2017   4:16 PM  To: Callum JENNINGS Staff    Reviewed labs and symptoms with Dr. Larkin.  Rec appt with Dr. Yousif at [facility] for Crohn's disease not responding to treatment with continued symptoms.

## 2017-06-01 NOTE — TELEPHONE ENCOUNTER
Called and spoke with pt  The way she made it sound she is recently DX. They think she has Crohn's  I did a new pt screen and explained our process  I told her if she needed anything before we can get her in to call NP Terri Nolen.  She expressed understanding

## 2017-06-01 NOTE — TELEPHONE ENCOUNTER
Please schedule patient for appt with me.  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  I would expect that the majority of the records are in [facility].  Let me know if only in epic and we can help out to get records together.

## 2017-06-05 ENCOUNTER — HOSPITAL ENCOUNTER (OUTPATIENT)
Dept: RADIOLOGY | Facility: HOSPITAL | Age: 32
Discharge: HOME | End: 2017-06-05
Attending: INTERNAL MEDICINE
Payer: COMMERCIAL

## 2017-06-05 ENCOUNTER — OFFICE VISIT (OUTPATIENT)
Dept: GASTROENTEROLOGY | Facility: CLINIC | Age: 32
End: 2017-06-05
Payer: COMMERCIAL

## 2017-06-05 VITALS
WEIGHT: 135.38 LBS | SYSTOLIC BLOOD PRESSURE: 126 MMHG | DIASTOLIC BLOOD PRESSURE: 92 MMHG | HEART RATE: 110 BPM | BODY MASS INDEX: 26.58 KG/M2 | HEIGHT: 60 IN

## 2017-06-05 DIAGNOSIS — K50.019: ICD-10-CM

## 2017-06-05 DIAGNOSIS — R19.4 CHANGE IN BOWEL HABIT: ICD-10-CM

## 2017-06-05 DIAGNOSIS — K50.019: Primary | ICD-10-CM

## 2017-06-05 PROCEDURE — A9585 GADOBUTROL INJECTION: HCPCS | Performed by: INTERNAL MEDICINE

## 2017-06-05 PROCEDURE — 74183 MRI ABD W/O CNTR FLWD CNTR: CPT | Mod: TC

## 2017-06-05 PROCEDURE — 72197 MRI PELVIS W/O & W/DYE: CPT | Mod: TC

## 2017-06-05 PROCEDURE — 99999 PR PBB SHADOW E&M-EST. PATIENT-LVL III: CPT | Mod: PBBFAC,,, | Performed by: INTERNAL MEDICINE

## 2017-06-05 PROCEDURE — 72197 MRI PELVIS W/O & W/DYE: CPT | Mod: 26,,, | Performed by: RADIOLOGY

## 2017-06-05 PROCEDURE — 25500020 PHARM REV CODE 255: Performed by: INTERNAL MEDICINE

## 2017-06-05 PROCEDURE — 74183 MRI ABD W/O CNTR FLWD CNTR: CPT | Mod: 26,,, | Performed by: RADIOLOGY

## 2017-06-05 PROCEDURE — 99214 OFFICE O/P EST MOD 30 MIN: CPT | Mod: S$GLB,,, | Performed by: INTERNAL MEDICINE

## 2017-06-05 RX ORDER — GADOBUTROL 604.72 MG/ML
10 INJECTION INTRAVENOUS
Status: COMPLETED | OUTPATIENT
Start: 2017-06-05 | End: 2017-06-05

## 2017-06-05 RX ADMIN — GADOBUTROL 10 ML: 604.72 INJECTION INTRAVENOUS at 10:06

## 2017-06-05 NOTE — PROGRESS NOTES
Subjective:       Patient ID: April Wendt Schamberger is a 32 y.o. female.    Chief Complaint: Abdominal pain    This is a 32-year-old female who presents for a follow-up visit.  She has had chronic diarrhea and abdominal discomfort since about the age of 15.  Initially she was seen by outside gastroenterologist and underwent numerous endoscopic workups after imaging showed possible ileitis.  Over the years she has attempted to manage her symptoms mostly with Imodium, however, her symptoms have worsened more recently.  He performed an upper endoscopy which noted erosions and superficial ulcerations in the stomach without evidence of gastric Crohn's and with normal duodenal biopsies.  Colonoscopy to the terminal ileum revealed normal colonic mucosa with normal random colon biopsies, scattered ulcerations in the terminal ileum.  Biopsies did not show granulomas.  She is reluctant to use systemic steroids and was given a trial of budesonide.  [facility] panel was consistent with Crohn's disease.  CRP is normal.  Her symptoms have worsened more recently with 10 pounds of unintentional weight loss over the past week, unchanged daily diarrhea despite 8 tablets of Imodium.  Her stools liquid, mostly in the morning occurring during the day.  No nocturnal symptoms or particular dietary relation.  No overt GI bleeding.  It is, good by arthralgias mostly in her neck.  Some vomiting occurred on Wednesday which is mostly bilious with mild amounts of food debris occurring about 10 hours after eating.  No fevers.  Plus fatigue and lethargy.  Urine pregnancy was negative.    The following portions of the patient's history were reviewed and updated as appropriate: allergies, current medications, past family history, past medical history, past social history, past surgical history and problem list.    (Portions of this note were dictated using voice recognition software and may contain dictation related errors in  spelling/grammar/syntax not found on text review)    HPI  Review of Systems   Constitutional: Positive for appetite change and unexpected weight change. Negative for chills and fever.   HENT: Negative for postnasal drip and trouble swallowing.    Eyes: Negative for pain and redness.   Respiratory: Negative for chest tightness and shortness of breath.    Cardiovascular: Negative for chest pain and leg swelling.   Gastrointestinal: Positive for abdominal distention, abdominal pain, diarrhea, nausea and vomiting. Negative for anal bleeding, blood in stool and constipation.   Endocrine: Negative for cold intolerance and heat intolerance.   Genitourinary: Negative for difficulty urinating and hematuria.   Musculoskeletal: Positive for arthralgias and neck pain.   Skin: Negative for color change and pallor.   Allergic/Immunologic: Negative for environmental allergies and food allergies.   Neurological: Negative for dizziness and light-headedness.   Hematological: Negative for adenopathy. Does not bruise/bleed easily.   Psychiatric/Behavioral: Negative for agitation and behavioral problems.       Objective:      Physical Exam   Constitutional: She is oriented to person, place, and time. She appears well-developed and well-nourished. No distress.   HENT:   Head: Normocephalic and atraumatic.   Eyes: Conjunctivae are normal. No scleral icterus.   Neck: Normal range of motion. Neck supple. No tracheal deviation present.   Cardiovascular: Normal rate and regular rhythm.  Exam reveals no gallop and no friction rub.    No murmur heard.  Pulmonary/Chest: Effort normal and breath sounds normal. No respiratory distress. She has no wheezes.   Abdominal: Soft. There is tenderness.   Hyperactive bs   Musculoskeletal: Normal range of motion. She exhibits no edema.        Right wrist: She exhibits normal range of motion and no tenderness.        Left wrist: She exhibits normal range of motion and no tenderness.   Lymphadenopathy:         Head (right side): No submental and no submandibular adenopathy present.        Head (left side): No submental and no submandibular adenopathy present.   Neurological: She is alert and oriented to person, place, and time.   Skin: Skin is warm and dry. No rash noted. She is not diaphoretic. No erythema.   Psychiatric: She has a normal mood and affect. Her behavior is normal.   Nursing note and vitals reviewed.      Labs; reviewed, crp normal  Assessment:       1. Ileitis, terminal, unspecified complication    2. Change in bowel habit        Plan:   1. MR enterography  2. Pt reluctant for oral steroids or consideration of biologics  3. No response to budesonide  4. CRP normal

## 2017-06-06 DIAGNOSIS — R19.7 DIARRHEA, UNSPECIFIED TYPE: Primary | ICD-10-CM

## 2017-06-06 RX ORDER — PREDNISONE 10 MG/1
10 TABLET ORAL SEE ADMIN INSTRUCTIONS
Qty: 70 TABLET | Refills: 0 | Status: SHIPPED | OUTPATIENT
Start: 2017-06-06 | End: 2017-06-16

## 2017-06-07 ENCOUNTER — TELEPHONE (OUTPATIENT)
Dept: GASTROENTEROLOGY | Facility: CLINIC | Age: 32
End: 2017-06-07

## 2017-06-07 ENCOUNTER — LAB VISIT (OUTPATIENT)
Dept: LAB | Facility: HOSPITAL | Age: 32
End: 2017-06-07
Attending: NURSE PRACTITIONER
Payer: COMMERCIAL

## 2017-06-07 DIAGNOSIS — R19.7 DIARRHEA, UNSPECIFIED TYPE: Primary | ICD-10-CM

## 2017-06-07 DIAGNOSIS — R19.7 DIARRHEA, UNSPECIFIED TYPE: ICD-10-CM

## 2017-06-07 LAB
C DIFF GDH STL QL: NEGATIVE
C DIFF TOX A+B STL QL IA: NEGATIVE
CRYPTOSP AG STL QL IA: NEGATIVE
G LAMBLIA AG STL QL IA: NEGATIVE
WBC #/AREA STL HPF: NORMAL /[HPF]

## 2017-06-07 PROCEDURE — 87045 FECES CULTURE AEROBIC BACT: CPT

## 2017-06-07 PROCEDURE — 87046 STOOL CULTR AEROBIC BACT EA: CPT

## 2017-06-07 PROCEDURE — 87209 SMEAR COMPLEX STAIN: CPT

## 2017-06-07 PROCEDURE — 89125 SPECIMEN FAT STAIN: CPT

## 2017-06-07 PROCEDURE — 89055 LEUKOCYTE ASSESSMENT FECAL: CPT

## 2017-06-07 PROCEDURE — 87329 GIARDIA AG IA: CPT

## 2017-06-07 PROCEDURE — 87427 SHIGA-LIKE TOXIN AG IA: CPT

## 2017-06-07 PROCEDURE — 87449 NOS EACH ORGANISM AG IA: CPT

## 2017-06-07 NOTE — TELEPHONE ENCOUNTER
Called Lab to link orders    ----- Message from LIDIA Ko sent at 6/7/2017 12:48 PM CDT -----  Regarding: stool test  I ordered another stool test.  She dropped off stool this morning.  Please call lab and let them know that I have added a test to be completed on the specimen that she dropped off this morning that is currently in process.

## 2017-06-07 NOTE — PROGRESS NOTES
I added another stool test.  She dropped off stool this morning.  Please call lab and alert them to this additional test to be completed on specimen that they have that was dropped off this morning.

## 2017-06-08 ENCOUNTER — TELEPHONE (OUTPATIENT)
Dept: GASTROENTEROLOGY | Facility: CLINIC | Age: 32
End: 2017-06-08

## 2017-06-08 DIAGNOSIS — R19.7 DIARRHEA, UNSPECIFIED TYPE: Primary | ICD-10-CM

## 2017-06-08 LAB
E COLI SXT1 STL QL IA: NEGATIVE
E COLI SXT2 STL QL IA: NEGATIVE
FAT STL SUDAN IV STN: NORMAL
O+P STL TRI STN: NORMAL

## 2017-06-08 NOTE — TELEPHONE ENCOUNTER
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 How close are we to getting her schedule. I see you did purple sheet on her on 6/1.      Please update me, thanks  SS

## 2017-06-10 LAB — BACTERIA STL CULT: NORMAL

## 2017-06-13 ENCOUNTER — TELEPHONE (OUTPATIENT)
Dept: GASTROENTEROLOGY | Facility: CLINIC | Age: 32
End: 2017-06-13

## 2017-06-13 NOTE — TELEPHONE ENCOUNTER
----- Message from Edi Yousif MD sent at 6/12/2017  2:07 PM CDT -----  Katie  Any updates on this one?    SS

## 2017-06-14 NOTE — TELEPHONE ENCOUNTER
Called and spoke with pt  I got her scheduled for 06/20 @ 2:20  Check In 1:50  No appoint reminder mailed

## 2017-06-20 ENCOUNTER — OFFICE VISIT (OUTPATIENT)
Dept: GASTROENTEROLOGY | Facility: CLINIC | Age: 32
End: 2017-06-20
Payer: COMMERCIAL

## 2017-06-20 ENCOUNTER — TELEPHONE (OUTPATIENT)
Dept: ENDOSCOPY | Facility: HOSPITAL | Age: 32
End: 2017-06-20

## 2017-06-20 VITALS
BODY MASS INDEX: 21.68 KG/M2 | WEIGHT: 143.06 LBS | DIASTOLIC BLOOD PRESSURE: 91 MMHG | HEIGHT: 68 IN | TEMPERATURE: 98 F | SYSTOLIC BLOOD PRESSURE: 132 MMHG | HEART RATE: 69 BPM | RESPIRATION RATE: 18 BRPM

## 2017-06-20 DIAGNOSIS — K52.9 ILEITIS: Primary | ICD-10-CM

## 2017-06-20 DIAGNOSIS — K58.0 IRRITABLE BOWEL SYNDROME WITH DIARRHEA: ICD-10-CM

## 2017-06-20 DIAGNOSIS — M54.12 CERVICAL RADICULOPATHY: ICD-10-CM

## 2017-06-20 DIAGNOSIS — F51.01 PRIMARY INSOMNIA: ICD-10-CM

## 2017-06-20 DIAGNOSIS — K21.9 GASTROESOPHAGEAL REFLUX DISEASE, ESOPHAGITIS PRESENCE NOT SPECIFIED: ICD-10-CM

## 2017-06-20 DIAGNOSIS — R11.0 NAUSEA: ICD-10-CM

## 2017-06-20 DIAGNOSIS — R10.9 ABDOMINAL CRAMPING: ICD-10-CM

## 2017-06-20 PROCEDURE — 99215 OFFICE O/P EST HI 40 MIN: CPT | Mod: S$GLB,,, | Performed by: NURSE PRACTITIONER

## 2017-06-20 PROCEDURE — 99999 PR PBB SHADOW E&M-EST. PATIENT-LVL IV: CPT | Mod: PBBFAC,,, | Performed by: NURSE PRACTITIONER

## 2017-06-20 RX ORDER — PROMETHAZINE HYDROCHLORIDE 25 MG/1
25 TABLET ORAL NIGHTLY PRN
COMMUNITY
End: 2017-11-14

## 2017-06-20 NOTE — PROGRESS NOTES
"     [facility]        Inflammatory Bowel Disease New Patient Consultation Note            Edi Yousif MD & JOCY Christine     TODAY'S VISIT DATE:  6/20/2017    Reason for Consult:    Chief Complaint   Patient presents with    Inflammatory Bowel Disease     PCP: Aristeo Perez  [address] / [place] [id number]    Referring MD:   Terri Nolen    History of Present Illness:    Dear. Terri Nolen    Thank you for requesting a consultation on your patient April Wendt Schamberger who is a 32 y.o. female seen today at the [facility] on 06/20/2017 for inflammatory bowel disease- nonspecific ileitis.  Pertinent past medical history includes GERD, ADD, kidney stones, and insomnia.      As you know, April Wendt Schamberger was doing well until 2000 when she became lactose intolerant.  In 2002 she began with severe RLQ abdominal pain.  She reports being seen in the ED and had a CT scan that showed a "slit in her colon" with possible Crohn's disease but patient reports a negative colonoscopy (no records of this).  After this ED visit, she continued with diarrhea and abdominal cramping/bloating and was diagnosed with IBS-D.  She reports having cycles of these symptoms every 2 weeks where her colon would "empty" itself.  She was initially treated with daily Imodium at varying doses.  We have an EGD from 7/19/2007 that showed H. Pylori negative "hemorrhagic gastritis" with a normal esophagus and duodenum.  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  A clinic note from 5/7/2012 reports that patient has had continued nausea, vomiting, and diarrhea that was being treated with Imodium and phenergan PRN for functional diarrhea/IBS-D.   There was also mention of possible ABX treatment for SIBO but patient reports never taking this.  She had a CT on 3/18/2013 that showed no significant findings in the small intestine, small " "hemangioma in the right lobe of the liver, uterine fibroids, and dense right breast tissue.  Patient reports colonoscopies during these years that were non-specific and inconclusive for IBD but we do not have these records.  She delivered a healthy term baby boy via vaginal delivery in 12/31/2015 and continued with diarrhea and nausea during pregnancy and postpartum.  She had a negative fructose breath test in 10/2015 and refused the lactose breath test so she was instructed to follow a low FODMAP diet and lactose free diet.  She established care with [facility] on 4/7/2017 due to ongoing worsening symptoms.  She was having diarrhea, abdominal cramping/bloating, nausea with "colon emptying" every 3-4 days instead of 2 weeks.  At that time she reported 7-10 watery-loose BMs/day and was using 8-10 Imodium tablets/day.  She continued with GERD and chronic nausea that was not relieved with a PPI, zantac, or pepcid though she does not take any of these regularly.  She continued phenergan PRN which helped her nausea.  She had a colonoscopy on 4/28/2017 that showed a normal colon and multiple 5 mm ulcers in the TI with biopsies consistent with ileitis, prominent lymphoid aggregates, and nonspecific changes.  EGD on 4/28/2017 showed non-bleeding erosive gastropathy that was H. Pylori negative with normal esophagus and erythematous duodenopathy.  [facility] IBD Diagnostic testing on 5/4/2017 was consistent with Crohns disease.  She also had an US on 5/31/2017 that showed mild hepatomegaly.  MRE on 6/5/2017 showed no active inflammatory Crohn's disease noting mild degradation related to peristaltic motion and incomplete distention of bowel loops, no definite wall thickening.  Incidental findings of renal, hepatic, and ovarian cysts.  She was started on entocort 9 mg PO daily and instructed to continue Imodium until she was seen by Dr. Yousif in clinic for a second opinion.  Patient reports increase in bloating " and feeling worse so she stopped entocort on 2017 (took for approximately 3 weeks), though she reports an improvement in symptoms since the entocort and is unsure if the entocort worked.      Currently patient is having 3 loose-formed BMs/day with no blood and 1 nocturnal BM.  She is taking 4 Imodium tablets every am and if she does not take imodium, her stools are watery.  She has neck and shoulder pain from a bulging disc that is relieved with advil 2 tabs 5 days/week.  Continued nausea and her abdomen is tender to touch 1-2/10 and is described as bloated/pressure feeling but no pain.  She had a 10 lb weight loss in the past month though it is now stable.  She has some chronic insomnia.  Patient has no other gastrointestinal/constitutional complaints including no fevers/chills, dysphagia.  Also patient does not have any extraintestinal manifestations of their inflammatory bowel disease including no eye pain/redness, skin lesions/rashes, oral ulcers, dysuria/hematuria.    Pertinent Endoscopy/Imagin2007 EGD: normal esophagus, friability and erythema of the mucosa with contact bleeding in the antrum and incisura of the stomach hemorrhagic gastritis (path: reactive gastropathic changes in a background of chronic h. pylori negative gastritis), normal duodenum  3/18/2013 CT abd/pelvis: no significant findings involving the small intestine, small hemangioma involving the right lobe of the liver, fibroids of the uterine fundus, dense appearance of the right breast  probably related to parenchymal tissue rather than mass  2017 Colonoscopy: normal colon (path: normal); multiple 5 mm ulcers in the TI (path: ileitis with detached ulcer debris, prominent lymphoid aggregates, nonspecific changes)  2017 EGD: normal esophagus, non-bleeding erosive gastropathy (path: mild chronic H. pylori negative inflammation), erythematous duodenopathy (path: normal)  2017 US: mild hepatomegaly  2017 MRE: no  Active Inflammatory Crohn's Disease, noting mild degradation related to peristaltic motion and incomplete distention of bowel loops; incidental renal, hepatic, and ovarian cysts; No definite bowel wall thickening, noting mildly suboptimal distention of bowel loops with oral contrast; no abnormal signal or enhancement    Pertinent Labs:  10/26/2015: Fructose Breath Test  10/26/2015: Gastrin level < 15, WBC 6.3, RBC 4.45, Hgb 13.4, Hct 40.1, MCV 90.2, platelet 220, BUN 7, creatinine 0.6, ALT 11, AST 13, Alk phos 58, Total bili 0.5, albumin 4.3, TSH 1.17, CRP (cardio) 3.8 (0.0-1.0)  5/4/2017: [facility] IBD Diagnostic--Pattern consistent with Crohns disease, ASCA IgA < 3.1, ASCA IgG 18.7, AutoAntibody KATHLEEN 14.0, IFA Perinuclear Pattern Not Detected, DNAse Sensitivity Not Detected  Lab Results   Component Value Date    STOOLCULTURE  06/07/2017     No Salmonella,Shigella,Vibrio,Campylobacter,Yersinia isolated.    [id number] Negative 06/07/2017    IRVBKMSCAW5B Negative 06/07/2017    CDIFFICILEAN Negative 06/07/2017    CDIFFTOX Negative 06/07/2017     Lab Results   Component Value Date    CRP 0.9 05/31/2017     Lab Results   Component Value Date    [id number] 35 06/30/2016     No results found for: HEPBIGM, HEPBCAB, HEPBSURFABQU  No results found for: VARICELLAZOS, VARICELLAINT  No results found for: NIL, TBAG, TBAGNIL, MITOGENNIL, TBGOLD  No results found for: TPMTRESULT  No results found for: ANSADAINIT, INFLIXIMAB, INFLIXINTERP    Prior IBD Therapies:  Entocort 9 mg PO daily, stopped 6/4/2017    Current IBD Therapies:  Immodium 4 tabs every morning    Vaccinations:  Flu shot: 10/2016  Chickenpox status/Varicella vaccine: had chickenpox as a child, positive titers  No results found for: VARICELLAZOS, VARICELLAINT  Tetanus: up to date  Pneumonia 13 (PCV13) vaccine: never had  Pneumonia 23 (PPSV23) vaccine: never had  Hepatitis B: had  No results found for: HEPBSAB  Hepatitis A: NA  HPV: 5/1/2009  Meningococcal: NA      NSAID use/indication:  2 Advil tabs 5 days/week    Narcotic use:  No    Alternative/Complementary Meds for IBD:  No    Review of Systems   Constitutional: Positive for weight loss. Negative for chills and fever.   HENT:        No oral ulcers, dysphagia, oral thrush   Eyes: Negative for blurred vision, pain and redness.   Respiratory: Negative for cough and shortness of breath.    Cardiovascular: Negative for chest pain.   Gastrointestinal: Positive for abdominal pain, heartburn and nausea. Negative for vomiting.   Genitourinary: Negative for dysuria and hematuria.   Musculoskeletal: Positive for joint pain. Negative for back pain.   Skin: Negative for rash.   Psychiatric/Behavioral: Negative for depression. The patient has insomnia. The patient is not nervous/anxious.      Medical/Surgical History:    has a past medical history of Abnormal Pap smear of cervix (2000); ADD (attention deficit disorder); Breast disorder; Esophageal reflux; Fibroids; IBS (irritable bowel syndrome); Insomnia; Kidney stones; Mastocytosis; and Upper GI bleed.   has a past surgical history that includes TONSILLECTOMY, ADENOIDECTOMY (1988); Esophagogastroduodenoscopy (2012); Colonoscopy (N/A, 4/28/2017); and LASIK (Bilateral, 2005).    Family History: family history includes Breast cancer in her mother and paternal grandmother; Cancer in her father, mother, and paternal grandmother; Cancer (age of onset: 31) in her cousin; Colon cancer in her cousin; Deep vein thrombosis in her father; Diabetes in her father; Diabetes type II in her paternal grandfather; Heart attack in her maternal grandmother; Hypertension in her father and mother; Pancreatic cancer in her maternal grandfather; Prostate cancer in her father..     Social History:  reports that she has quit smoking. She does not have any smokeless tobacco history on file. She reports that she drinks alcohol. She reports that she does not use drugs.    Review of patient's allergies  "indicates:   Allergen Reactions    Azithromycin Nausea And Vomiting     Outpatient Prescriptions Marked as Taking for the 6/20/17 encounter (Office Visit) with LIDIA Romero   Medication Sig Dispense Refill    LOPERAMIDE HCL (IMODIUM A-D ORAL) Take 4 tablets by mouth every morning.       omeprazole (PRILOSEC) 20 MG capsule Take 40 mg by mouth once daily.       promethazine (PHENERGAN) 25 MG tablet Take 25 mg by mouth nightly as needed for Nausea.      tizanidine (ZANAFLEX) 4 MG tablet Take 1 tablet (4 mg total) by mouth 3 (three) times daily as needed. 90 tablet 3    zolpidem (AMBIEN) 10 mg Tab Take 1 tablet (10 mg total) by mouth nightly as needed. 30 tablet 2     Vital Signs:  Vitals:    06/20/17 1427   BP: (!) 132/91   Pulse: 69   Resp: 18   Temp: 98.3 °F (36.8 °C)   Weight: 64.9 kg (143 lb 1.3 oz)   Height: 5' 8" (1.727 m)   PainSc: 0-No pain     Physical Exam   Constitutional: She is oriented to person, place, and time. She appears well-developed.   HENT:   Mouth/Throat: Oropharynx is clear and moist. No oral lesions.   No oral ulcers or thrush   Eyes: Conjunctivae are normal. Pupils are equal, round, and reactive to light.   Cardiovascular: Normal rate and regular rhythm.    Pulmonary/Chest: Effort normal and breath sounds normal.   Abdominal: Soft. Bowel sounds are normal. There is generalized tenderness. There is no rebound and no guarding.   Musculoskeletal:        Right lower leg: She exhibits no swelling.        Left lower leg: She exhibits no swelling.   Neurological: She is alert and oriented to person, place, and time.   Skin: No rash noted.   Psychiatric: She has a normal mood and affect.   Nursing note and vitals reviewed.    Labs:  Lab Results   Component Value Date    STOOLCULTURE  06/07/2017     No Salmonella,Shigella,Vibrio,Campylobacter,Yersinia isolated.    [id number] Negative 06/07/2017    MXGIWIRPEO1M Negative 06/07/2017    CDIFFICILEAN Negative 06/07/2017    CDIFFTOX Negative " 06/07/2017     Lab Results   Component Value Date    WBC 9.28 05/31/2017    HGB 13.0 05/31/2017    HCT 40.1 05/31/2017    MCV 93 05/31/2017     05/31/2017    ALT 11 05/31/2017    AST 13 05/31/2017    ALKPHOS 44 (L) 05/31/2017    BILITOT 0.5 05/31/2017    TSH 1.251 06/30/2016    SEDRATE 3 05/31/2017    CRP 0.9 05/31/2017    [id number] 35 06/30/2016     No results found for: HEPBIGM, HEPBCAB, HEPBSAB, HEPBSURFABQU  No results found for: VARICELLAZOS, VARICELLAINT  No results found for: NIL, TBAG, TBAGNIL, MITOGENNIL, TBGOLD  No results found for: TPMTRESULT  No results found for: ANSADAINIT, INFLIXIMAB, INFLIXINTERP    Assessment/Plan:  April Wendt Schamberger is a 32 y.o. female with nonspecific ileitis.  She has a past medical history of GERD, ADD, kidney stones, and insomnia.  She initially became lactose intolerant in 2000 and reports a sudden onset of severe RLQ abdominal pain in 2002.  She was initially worked up in the ED and had a CT that showed possible Crohn's disease but a normal colonoscopy (we do not have these records).  She continued with abdominal cramping/bloating with diarrhea and was diagnosed with IBS-D in 2002 that was treated with Imodium.  She has suffered from these symptoms for years and has always been treated with Imodium daily at varying doses for functional diarrhea/IBS-D.  She reports multiple colonoscopies during this time that were not consistent with IBD but we do not have these records.  She recently established care with [facility] department in 4/2017 due to worsening symptoms.  She underwent an EGD on 4/28/2017 that showed non-bleeding erosive gastropathy that was H. Pylori negative with normal esophagus and erythematous duodenopathy and a colonoscopy that showed normal colon and multiple 5 mm ulcers in the TI with biopsies consistent with ileitis, prominent lymphoid aggregates, and nonspecific changes.  [facility] IBD Diagnostic testing on 5/4/2017 was consistent with  "Crohns disease.  She also had an US that showed mild hepatomegaly and an MRE that showed no active inflammatory Crohn's disease noting mild degradation related to peristaltic motion and incomplete distention of bowel loops, no definite wall thickening with incidental findings of renal, hepatic, and ovarian cysts.  She was told to continue Imodium and was started on Entocort 9 mg PO daily.  Her BMs improved and she was able to taper down on the Imodium but she discontinued the Entocort on 6/4/2017 (took for approximately 3 weeks) due to increased bloating and "feeling worse".  Currently patient is having 3 loose-formed BMs/day with no blood and 1 nocturnal BM.  She is taking 4 Imodium tablets every am and if she does not take imodium, her stools are watery.      Overall I have a high suspicion for Crohn's disease given ileitis positive IBD serology for Crohn's disease. Though patient reports she had worse symptoms with entocort she took it for 3 weeks and I wonder if this did improve her symptoms or if they improved spontaneously. Patient is still reluctant in accepting this diagnosis and given she uses advil regularly and there is no chronicity in the ileal biopsies I do think its reasonable to repeat colonoscopy and also will do VCE (MRE normal) after she discontinued all NSAIDs for 6 weeks to assess and determine future treatments.      # Ileitis: Crohn's (high suspicion) vs NSAIDs  - stop Advil and any other NSAIDs immediately  - 5/4/2017 [facility] IBD Diagnostic testing consistent with CD  - basic labs: reviewed from 5/31/2017  - repeat colonoscopy 6 weeks after stopping Advil  - VCE same day as colonoscopy--[facility]: MRE on 6/5/2017, no active small bowel CD    # Diarrhea, abdominal cramping/bloating with nausea  - now some symptoms resolved though possible component of IBS though needs to be dx of exclusion once pt assessed and treated for possible Crohn's disease  - uses prn phenergan  - past stool " studies neg for infection  - on immodium 4 tabs daily     # GERD  - EGD 4/27/2017: non-bleeding erosive gastropathy that was H. Pylori negative  - prilosec 40 mg PO daily, does not take regularly    # Neck pain/Shoulder pain  - H/O bulging disk  - Zanaflex 4 mg PO TID PRN  - takes Advil 2 tabs 5 days/week  - discontinue advil, Tylenol    # IBD specific health maintenance:  Colorectal cancer risk:    Risks factors: none-average risk  - colonoscopy:  per age, may change if IBD diagnosis    Pap smear recommended yearly   Opthamologic exam recommended yearly    Dermatologic exam recommended yearly     Bone health:  Risk of osteopenia/osteoporosis:  Risks factors: none  Vitamin D:   Lab Results   Component Value Date    [id number] 35 06/30/2016     Vaccine counseling:  - Tetanus every 10 years recommended  - Flu shot yearly recommended, due fall 2017  - will revisit vaccinations if starting immunosuppressive medications    Follow up: with Dr. Yousif 3 weeks after colonoscopy and VCE    Thank you again for sending April Wendt Schamberger to see Dr. Edi Yousif & JOCY Christine today at the [facility]. Please don't hesitate to contact Dr. Yousif if there are any questions regarding this evaluation, or if you have any other patients with inflammatory bowel disease for whom you would like a consultation. You can reach Dr. Yousif at [phone] or by email at [email or web address]    JOCY Romero  Inflammatory Bowel Disease Program  Department of Gastroenterology    I have personally performed a face to face diagnostic evaluation on this patient. I have reviewed and agree with today's findings and the care plan outlined by BARBARA Parham MD   Department of Gastroenterology  Medical Director, Inflammatory Bowel Disease

## 2017-06-20 NOTE — LETTER
June 22, 2017      LIDIA Ko  [address] [place] [address]           [facility] - Gastroenterology  [address]  [place] [address]  Phone: [phone]  Fax: [phone]          Patient: April Wendt Schamberger   MR Number: [id number]   YOB: 1985   Date of Visit: 6/20/2017       Dear Terri Nolen:    Thank you for referring April Schamberger to me for evaluation. Attached you will find relevant portions of my assessment and plan of care.    If you have questions, please do not hesitate to call me. I look forward to following April Schamberger along with you.    Sincerely,    Machelle Lopez, LIDIA    Enclosure  CC:  No Recipients    If you would like to receive this communication electronically, please contact [email or web address] or [phone] to request more information on [employer] Link access.    For providers and/or their staff who would like to refer a patient to [facility], please contact us through our one-stop-shop provider referral line, [facility], at [phone].    If you feel you have received this communication in error or would no longer like to receive these types of communications, please e-mail [email or web address]

## 2017-06-20 NOTE — PATIENT INSTRUCTIONS
Instructions:  - stop Advil for 6 weeks prior to colonoscopy--miralax prep  - colonoscopy in the next 6 weeks-8 AM  - Video Capsule Endoscopy on same day as colonoscopy  - Avoid all NSAIDs (Advil, Ibuprofen, Motrin, Aspirin, Naprosyn, Aleve)  - Follow up with Dr. Yousif 3 weeks after colonoscopy

## 2017-06-20 NOTE — LETTER
June 22, 2017        Terri Nolen, [facility]  [address] [place] [address]             [facility] - Gastroenterology  [address]  [place] [address]  Phone: [phone]  Fax: [phone]   Patient: April Wendt Schamberger   MR Number: [id number]   YOB: 1985   Date of Visit: 6/20/2017       Dear Dr. Nolen:    Thank you for referring April Schamberger to me for evaluation. Attached you will find relevant portions of my assessment and plan of care.    If you have questions, please do not hesitate to call me. I look forward to following April Schamberger along with you.    Sincerely,      Edi Yousif MD            CC  Bren Larkin MD    Enclosure

## 2017-07-12 ENCOUNTER — TELEPHONE (OUTPATIENT)
Dept: GASTROENTEROLOGY | Facility: CLINIC | Age: 32
End: 2017-07-12

## 2017-07-12 ENCOUNTER — PATIENT MESSAGE (OUTPATIENT)
Dept: ENDOSCOPY | Facility: HOSPITAL | Age: 32
End: 2017-07-12

## 2017-07-12 DIAGNOSIS — M54.12 CERVICAL RADICULOPATHY: ICD-10-CM

## 2017-07-12 DIAGNOSIS — M79.18 MYOFASCIAL MUSCLE PAIN: Primary | ICD-10-CM

## 2017-07-12 RX ORDER — TRAMADOL HYDROCHLORIDE 50 MG/1
50 TABLET ORAL EVERY 6 HOURS PRN
Qty: 60 TABLET | Refills: 0 | Status: SHIPPED | OUTPATIENT
Start: 2017-07-12 | End: 2017-08-28

## 2017-07-12 NOTE — TELEPHONE ENCOUNTER
Spoke to patient. Informed her that she is to abstain from NSAIDS for 6 weeks prior to Colonoscopy and Capsule study, to take Tramadol for pain.    Patient's Colon & Capsule study currently scheduled for 8/10/2017. Patient states that she will call the Endo schedulers tomorrow, and reschedule these procedures for 6 weeks in the future.

## 2017-07-19 ENCOUNTER — PATIENT MESSAGE (OUTPATIENT)
Dept: ENDOSCOPY | Facility: HOSPITAL | Age: 32
End: 2017-07-19

## 2017-07-20 ENCOUNTER — OFFICE VISIT (OUTPATIENT)
Dept: PAIN MEDICINE | Facility: CLINIC | Age: 32
End: 2017-07-20
Payer: COMMERCIAL

## 2017-07-20 VITALS
SYSTOLIC BLOOD PRESSURE: 129 MMHG | DIASTOLIC BLOOD PRESSURE: 90 MMHG | WEIGHT: 144.19 LBS | HEART RATE: 89 BPM | BODY MASS INDEX: 21.92 KG/M2

## 2017-07-20 DIAGNOSIS — M54.12 CERVICAL RADICULOPATHY: Primary | ICD-10-CM

## 2017-07-20 DIAGNOSIS — M79.18 MYOFASCIAL MUSCLE PAIN: ICD-10-CM

## 2017-07-20 DIAGNOSIS — M54.2 CERVICAL PAIN (NECK): ICD-10-CM

## 2017-07-20 DIAGNOSIS — M47.812 FACET ARTHROPATHY, CERVICAL: ICD-10-CM

## 2017-07-20 PROCEDURE — 99213 OFFICE O/P EST LOW 20 MIN: CPT | Mod: S$GLB,,, | Performed by: NURSE PRACTITIONER

## 2017-07-20 PROCEDURE — 99999 PR PBB SHADOW E&M-EST. PATIENT-LVL III: CPT | Mod: PBBFAC,,, | Performed by: NURSE PRACTITIONER

## 2017-07-20 NOTE — PROGRESS NOTES
Chronic patient Established Note (Follow up visit)      SUBJECTIVE:    April Wendt Schamberger presents to the clinic for a follow-up appointment for neck pain.  Pt present with continued neck and back pain, she explains that her GI physician Edi Yousif MD would like her to stay off of all NSAIDS for pain so that she can perform a colonoscopy and video capsule endoscopy on 8/10. She states Dr Yousif would like to determine if she has Crohn's vs NSAID ulcers. Previous note by Dr. Yousif was noted and reviewed, discussed that we will prescribe Nucynta ER  50 mg BID # 60 for 1 month for neck and back pain.    Since the last visit, April Wendt Schamberger states the pain has been worsening. Current pain intensity is 5/10.    Pain Disability Index Review:  Last 3 PDI Scores 2017   Pain Disability Index (PDI) 10 0 0       Pain Medications:      - Opioids: None  - Adjuvant Medications: Advil,Motrin ( Ibuprofen)  - Anti-Coagulants: None  - Others: see medications list    Opioid Contract: no     report:  Reviewed and consistent with medication use as prescribed.    Pain Procedures: 17 TRIGGER POINT INJECTION  17 TRIGGER POINT INJECTION  17 bilateral C4,5,6 CERVICAL FACET MEDIAL BRANCH NERVE BLOCK (Prone) ( after xray correlation with pain level, decsion was made to proceed at C4,5,6 levels, patient agrees to proceed     Physical Therapy/Home Exercise: yes    Imagin17 MRI Cervical Spine Without Contrast  Narrative   Technique: Multiplanar, multisequence MR imaging of the cervical spine obtained without the use of IV contrast.     Comparison: Cervical spine radiographs 12/15/2016.     Results:    There is straightening with mild reversal of the normal cervical lordosis. Vertebral body heights are maintained with no evidence of fracture. Mild intervertebral disc space narrowing and desiccation are visualized at C5-6 and C6-7. No marrow signal abnormality suspicious for an  infiltrative process.      The cervical cord is normal in caliber and signal characteristics.  The craniocervical junction and visualized intracranial contents are unremarkable.  The adjacent soft tissue structures show no significant abnormalities.      C2-C3:  No significant spinal canal or neural foraminal narrowing.    C3-C4: No significant spinal canal or neural foraminal narrowing.    C4-C5:  No significant spinal canal or neural foraminal narrowing.    C5-C6:  Disc bulge with right uncovertebral spurring. Findings result in mild spinal canal stenosis and mild right neural foraminal narrowing.    C6-C7:  Mild disc bulge with thickening of the ligamentum flavum results in mild spinal canal stenosis.No significant neuroforaminal narrowing.    C7-T1:   No significant central spinal or neural foraminal narrowing.   Impression      Mild disc bulge and spinal canal stenosis at the C5-6 and C6-7 levels.      Electronically signed by: UMER MARTIN MD  Date: 01/26/17  Time: 12:56     Encounter   View Encounter      Reviewed By   Hunter Jimenez MD on 1/27/2017  2:18 PM   Exam Details   Performed Procedure Technologist Supporting Staff Performing Physician   MRI Cervical Spine Without Contrast Andre Hinds, RT       Appointment Date/Status Modality Department      1/26/2017     Completed [facility] MRI1 [facility] MRI     Begin Exam End Exam Begin Exam Questionnaires End Exam Questionnaires   1/26/2017 11:22 AM 1/26/2017 11:59 AM MRI TECH NAVIGATOR QUESTIONS IMAGING END ALL       RIS PREGNANCY TECH NAVIGATOR        X-Ray Cervical Spine AP Lat with Flexion  Extension 12/15/16  Narrative     Cervical spine radiographs     comparison: None    Results: AP, lateral, flexion and extension views  .  The alignment is normal, vertebral body height and disk spaces are well-maintained.    Flexion and extension views demonstrate no translational abnormalities.  Very small anterior osteophyte noted at C5-C6.  No fracture or osseous  lesion seen.Prevertebral soft tissues appear normal.   Impression    No significant abnormality seen      Electronically signed by: JOSE A JENKINS MD  Date: 12/15/16  Time: 10:52            Allergies:   Review of patient's allergies indicates:   Allergen Reactions    Azithromycin Nausea And Vomiting       Current Medications:   Current Outpatient Prescriptions   Medication Sig Dispense Refill    LOPERAMIDE HCL (IMODIUM A-D ORAL) Take 4 tablets by mouth every morning.       omeprazole (PRILOSEC) 20 MG capsule Take 40 mg by mouth once daily.       promethazine (PHENERGAN) 25 MG tablet Take 25 mg by mouth nightly as needed for Nausea.      tizanidine (ZANAFLEX) 4 MG tablet Take 1 tablet (4 mg total) by mouth 3 (three) times daily as needed. 90 tablet 3    tramadol (ULTRAM) 50 mg tablet Take 1 tablet (50 mg total) by mouth every 6 (six) hours as needed for Pain. Take 25 to 50 mg daily every 6 hours prn pain 60 tablet 0    zolpidem (AMBIEN) 10 mg Tab Take 1 tablet (10 mg total) by mouth nightly as needed. 30 tablet 2    ondansetron (ZOFRAN-ODT) 4 MG TbDL Take 1 tablet (4 mg total) by mouth every 6 (six) hours as needed. 40 tablet 1     No current facility-administered medications for this visit.        REVIEW OF SYSTEMS:    GENERAL: No weight loss, malaise or fevers.  HEENT: Negative for frequent or significant headaches. + HAs 3-4 days out of the week.  NECK: Negative for lumps, goiter, pain and significant neck swelling.  RESPIRATORY: Negative for cough, wheezing or shortness of breath.  CARDIOVASCULAR: Negative for chest pain, leg swelling or palpitations.  GI: Negative for abdominal discomfort, blood in stools or black stools or change in bowel habits. +IBS  MUSCULOSKELETAL: See HPI.  SKIN: Negative for lesions, rash, and itching.  PSYCH: + for sleep disturbance, mood disorder and recent psychosocial stressors.  HEMATOLOGY/LYMPHOLOGY: Negative for prolonged bleeding, bruising easily or swollen nodes.    NEURO: No history of headaches, syncope, paralysis, seizures or tremors.  All other reviewed and negative other than HPI.    Past Medical History:  Past Medical History:   Diagnosis Date    Abnormal Pap smear of cervix 2000    Cryo Done    ADD (attention deficit disorder)     Breast disorder     Breast Cysts    Esophageal reflux     Fibroids     IBS (irritable bowel syndrome)     Insomnia     Kidney stones     Mastocytosis     Upper GI bleed        Past Surgical History:  Past Surgical History:   Procedure Laterality Date    COLONOSCOPY N/A 4/28/2017    Procedure: COLONOSCOPY;  Surgeon: Bren Larkin MD;  Location: [facility];  Service: Endoscopy;  Laterality: N/A;    ESOPHAGOGASTRODUODENOSCOPY  2012    LASIK Bilateral 2005    TONSILLECTOMY, ADENOIDECTOMY  1988       Family History:  Family History   Problem Relation Age of Onset    Breast cancer Mother      with Metastasis    Cancer Mother      Breast    Hypertension Mother     Prostate cancer Father     Hypertension Father     Cancer Father      Prostate Ca    Diabetes Father     Deep vein thrombosis Father     Pancreatic cancer Maternal Grandfather     Breast cancer Paternal Grandmother     Cancer Paternal Grandmother     Diabetes type II Paternal Grandfather     Heart attack Maternal Grandmother     Colon cancer Cousin     Cancer Cousin 31    Ovarian cancer Neg Hx     Lymphoma Neg Hx     Tuberculosis Neg Hx     Celiac disease Neg Hx     Cirrhosis Neg Hx     Colon polyps Neg Hx     Crohn's disease Neg Hx     Cystic fibrosis Neg Hx     Esophageal cancer Neg Hx     Hemochromatosis Neg Hx     Inflammatory bowel disease Neg Hx     Irritable bowel syndrome Neg Hx     Liver cancer Neg Hx     Liver disease Neg Hx     Rectal cancer Neg Hx     Stomach cancer Neg Hx     Ulcerative colitis Neg Hx     Donavon's disease Neg Hx        Social History:  Social History     Social History    Marital status:      Spouse  name: N/A    Number of children: 1    Years of education: N/A     Social History Main Topics    Smoking status: Former Smoker    Smokeless tobacco: None    Alcohol use 0.0 oz/week      Comment: Social    Drug use: No    Sexual activity: Yes     Partners: Male     Birth control/ protection: Coitus interruptus, Rhythm      Comment: : Boris     Other Topics Concern    None     Social History Narrative    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  Psych:  Mood and affect appropriate.  Skin: Skin color, texture, turgor normal, no rashes or lesions, in both upper and lower body.  Head/face:  Atraumatic, normocephalic. No palpable lymph nodes  Neck: + pain to palpation over the cervical paraspinous muscles. Spurling Negative. + pain with neck flexion, extension, or lateral flexion. .  Cor: RRR  Pulm: CTA  GI: Abdomen soft and non-tender.  Back: Straight leg raising in the sitting and supine positions is negative to radicular pain. No pain to palpation over the spine or costovertebral angles. Normal range of motion without pain reproduction.  Extremities: Peripheral joint ROM is full and pain free without obvious instability or laxity in all four extremities. No deformities, edema, or skin discoloration. Good capillary refill.  Musculoskeletal: Shoulder, hip, sacroiliac and knee provocative maneuvers are negative. Bilateral upper and lower extremity strength is normal and symmetric.  No atrophy or tone abnormalities are noted.  Neuro: Bilateral upper and lower extremity coordination and muscle stretch reflexes are physiologic and symmetric.  Plantar response are downgoing. No loss of sensation is noted.  Gait: Normal.      ASSESSMENT: 32 y.o. year old female with  neck  pain, consistent with       1. Cervical radiculopathy  tapentadol 50 mg Tb12   2. Cervical pain (neck)  tapentadol 50 mg Tb12   3. Myofascial muscle pain  tapentadol 50 mg Tb12   4. Facet arthropathy, cervical  tapentadol 50 mg Tb12         PLAN:     - I have stressed the importance of physical activity and a home exercise plan to help with pain and improve health.  - Patient can continue with medications for now since they are providing benefits, using them appropriately, and without side effects.  - Counseled patient regarding the importance of activity modification, constant sleeping habits and physical therapy.  -Rx of Nucynta ER  50 mg BID # 60 for 1 month  -RTC in 1 month  -The above plan and management options were discussed at length with patient. Patient is in agreement with the above and verbalized understanding. Dr. Jimenez   was consulted on this patient  and agrees with this plan.        JOCY Marie    07/20/2017

## 2017-07-20 NOTE — TELEPHONE ENCOUNTER
Spoke to patient and she reports she is unable to stay off of NSAIDs for her neck and back pain for 4 weeks in order to do the colonoscopy and video capsule endoscopy on 8/10.  She has tried tylenol and tramadol without effect. She will speak to pain management MD for alternatives to NSAIDs for this pain so we have a more accurate test off of NSAIDs. Ideally I would like her to be off of NSAIDs for at least 4 weeks.      Patient will f/u with pain management and keep in touch with us regarding plan of care

## 2017-07-20 NOTE — TELEPHONE ENCOUNTER
Cancelled VCE and follow up per Dr Yousif and I sent message to Endo schedulers to get scope scheduled

## 2017-08-08 ENCOUNTER — PATIENT MESSAGE (OUTPATIENT)
Dept: GASTROENTEROLOGY | Facility: CLINIC | Age: 32
End: 2017-08-08

## 2017-08-16 ENCOUNTER — PATIENT MESSAGE (OUTPATIENT)
Dept: ADMINISTRATIVE | Facility: OTHER | Age: 32
End: 2017-08-16

## 2017-08-18 ENCOUNTER — PATIENT MESSAGE (OUTPATIENT)
Dept: ADMINISTRATIVE | Facility: OTHER | Age: 32
End: 2017-08-18

## 2017-08-28 ENCOUNTER — OFFICE VISIT (OUTPATIENT)
Dept: FAMILY MEDICINE | Facility: CLINIC | Age: 32
End: 2017-08-28
Attending: FAMILY MEDICINE
Payer: COMMERCIAL

## 2017-08-28 VITALS
BODY MASS INDEX: 21.72 KG/M2 | HEART RATE: 91 BPM | WEIGHT: 143.31 LBS | SYSTOLIC BLOOD PRESSURE: 128 MMHG | OXYGEN SATURATION: 99 % | DIASTOLIC BLOOD PRESSURE: 89 MMHG | HEIGHT: 68 IN

## 2017-08-28 DIAGNOSIS — Z00.00 ROUTINE GENERAL MEDICAL EXAMINATION AT A HEALTH CARE FACILITY: Primary | ICD-10-CM

## 2017-08-28 DIAGNOSIS — I10 BENIGN ESSENTIAL HYPERTENSION: ICD-10-CM

## 2017-08-28 DIAGNOSIS — G47.00 INSOMNIA, UNSPECIFIED TYPE: ICD-10-CM

## 2017-08-28 PROCEDURE — 99395 PREV VISIT EST AGE 18-39: CPT | Mod: S$GLB,,, | Performed by: FAMILY MEDICINE

## 2017-08-28 PROCEDURE — 99999 PR PBB SHADOW E&M-EST. PATIENT-LVL III: CPT | Mod: PBBFAC,,, | Performed by: FAMILY MEDICINE

## 2017-08-28 RX ORDER — LABETALOL 100 MG/1
50 TABLET, FILM COATED ORAL 2 TIMES DAILY
Qty: 30 TABLET | Refills: 2 | Status: SHIPPED | OUTPATIENT
Start: 2017-08-28 | End: 2017-09-13

## 2017-08-28 RX ORDER — ZOLPIDEM TARTRATE 10 MG/1
10 TABLET ORAL NIGHTLY PRN
Qty: 30 TABLET | Refills: 2 | Status: SHIPPED | OUTPATIENT
Start: 2017-08-28 | End: 2017-11-14

## 2017-08-28 NOTE — PROGRESS NOTES
Subjective:       Patient ID: April Wendt Schamberger is a 32 y.o. female.    Chief Complaint: No chief complaint on file.    32 yr old pleasant white female with breast nodule/abnormal mammogram, HTN, IBS, and no other significant medial history presents today for her annual wellness check, lab work and BP.      1. Insomnia - improved - on Ambien and muscle relaxant - she has good sleep hygiene, no anxiety/depression.     2. HTN - not at goal today - was on labetalol and it was stopped after pregnancy - diastolic staying high - no symptoms otherwise          History as below - reviewed         Hypertension   This is a chronic problem. The current episode started more than 1 month ago. The problem has been gradually improving since onset. The problem is controlled. Pertinent negatives include no blurred vision, chest pain, headaches, neck pain, palpitations, peripheral edema, PND, shortness of breath or sweats. There are no associated agents to hypertension. There are no known risk factors for coronary artery disease. Past treatments include nothing. There are no compliance problems.  There is no history of angina, CAD/MI, CVA, left ventricular hypertrophy, PVD, renovascular disease or a thyroid problem. There is no history of chronic renal disease, hyperaldosteronism, hyperparathyroidism or a hypertension causing med.     Review of Systems   Constitutional: Negative.  Negative for activity change, diaphoresis and unexpected weight change.   HENT: Positive for rhinorrhea. Negative for congestion, ear discharge, hearing loss, sore throat, trouble swallowing and voice change.    Eyes: Negative.  Negative for blurred vision, pain, discharge and visual disturbance.   Respiratory: Negative.  Negative for chest tightness, shortness of breath and wheezing.    Cardiovascular: Negative.  Negative for chest pain, palpitations and PND.   Gastrointestinal: Positive for diarrhea. Negative for abdominal distention, anal bleeding,  blood in stool, constipation, nausea and vomiting.   Endocrine: Negative.  Negative for cold intolerance, polydipsia and polyuria.   Genitourinary: Negative.  Negative for decreased urine volume, difficulty urinating, dysuria, frequency, hematuria, menstrual problem and vaginal pain.   Musculoskeletal: Negative.  Negative for arthralgias, gait problem, joint swelling, myalgias and neck pain.   Skin: Negative.  Negative for color change, pallor and wound.   Allergic/Immunologic: Negative.  Negative for environmental allergies and immunocompromised state.   Neurological: Negative.  Negative for dizziness, tremors, seizures, speech difficulty, weakness and headaches.   Hematological: Negative.  Negative for adenopathy. Does not bruise/bleed easily.   Psychiatric/Behavioral: Negative.  Negative for agitation, confusion, decreased concentration, dysphoric mood, hallucinations, self-injury and suicidal ideas. The patient is not nervous/anxious.        PMH/PSH/FH/SH/MED/ALLERGY reviewed    Objective:       Vitals:    08/28/17 1315   BP: (!) 128/91   Pulse: 91       Physical Exam   Constitutional: She is oriented to person, place, and time. She appears well-developed and well-nourished. No distress.   HENT:   Head: Normocephalic and atraumatic.   Right Ear: External ear normal.   Left Ear: External ear normal.   Nose: Nose normal.   Mouth/Throat: Oropharynx is clear and moist. No oropharyngeal exudate.   Eyes: Conjunctivae and EOM are normal. Pupils are equal, round, and reactive to light. Right eye exhibits no discharge. Left eye exhibits no discharge. No scleral icterus.   Neck: Normal range of motion. Neck supple. No JVD present. No tracheal deviation present. No thyromegaly present.   Cardiovascular: Normal rate, regular rhythm, normal heart sounds and intact distal pulses.  Exam reveals no gallop and no friction rub.    No murmur heard.  Pulmonary/Chest: Effort normal and breath sounds normal. No stridor. She has no  wheezes. She has no rales. She exhibits no tenderness.   Abdominal: Soft. Bowel sounds are normal. She exhibits no distension and no mass. There is no tenderness. There is no rebound and no guarding. No hernia.   Musculoskeletal: Normal range of motion. She exhibits no edema or tenderness.   Lymphadenopathy:     She has no cervical adenopathy.   Neurological: She is alert and oriented to person, place, and time. She has normal reflexes. She displays normal reflexes. No cranial nerve deficit. She exhibits normal muscle tone. Coordination normal.   Skin: Skin is warm and dry. No rash noted. She is not diaphoretic. No erythema. No pallor.   Psychiatric: She has a normal mood and affect. Her behavior is normal. Judgment and thought content normal.       Assessment:       1. Routine general medical examination at a health care facility    2. Insomnia, unspecified type    3. Benign essential hypertension        Plan:       Diagnoses and all orders for this visit:    Routine general medical examination at a health care facility  -     Lipid panel; Future  -     TSH; Future  -     Vitamin D; Future    Insomnia, unspecified type  -     zolpidem (AMBIEN) 10 mg Tab; Take 1 tablet (10 mg total) by mouth nightly as needed.  -     TSH; Future    Benign essential hypertension  -     labetalol (NORMODYNE) 100 MG tablet; Take 0.5 tablets (50 mg total) by mouth 2 (two) times daily.      Wellness check  -normal exam  -labs    HTN  -not at goal  -restart labetalol    Insomnia  -controlled    Spent adequate time in obtaining history and explaining differentials    40 minutes spent during this visit of which greater than 50% devoted to face-face counseling and coordination of care regarding diagnosis and management plan    Return in about 6 months (around 2/28/2018), or if symptoms worsen or fail to improve.

## 2017-08-30 ENCOUNTER — PATIENT MESSAGE (OUTPATIENT)
Dept: FAMILY MEDICINE | Facility: CLINIC | Age: 32
End: 2017-08-30

## 2017-09-06 ENCOUNTER — LAB VISIT (OUTPATIENT)
Dept: LAB | Facility: HOSPITAL | Age: 32
End: 2017-09-06
Attending: FAMILY MEDICINE
Payer: COMMERCIAL

## 2017-09-06 DIAGNOSIS — Z00.00 ROUTINE GENERAL MEDICAL EXAMINATION AT A HEALTH CARE FACILITY: ICD-10-CM

## 2017-09-06 DIAGNOSIS — G47.00 INSOMNIA, UNSPECIFIED TYPE: ICD-10-CM

## 2017-09-06 LAB
25(OH)D3+25(OH)D2 SERPL-MCNC: 28 NG/ML
CHOLEST SERPL-MCNC: 145 MG/DL
CHOLEST/HDLC SERPL: 3.2 {RATIO}
HDLC SERPL-MCNC: 46 MG/DL
HDLC SERPL: 31.7 %
LDLC SERPL CALC-MCNC: 88 MG/DL
NONHDLC SERPL-MCNC: 99 MG/DL
TRIGL SERPL-MCNC: 55 MG/DL
TSH SERPL DL<=0.005 MIU/L-ACNC: 0.94 UIU/ML

## 2017-09-06 PROCEDURE — 36415 COLL VENOUS BLD VENIPUNCTURE: CPT

## 2017-09-06 PROCEDURE — 80061 LIPID PANEL: CPT

## 2017-09-06 PROCEDURE — 82306 VITAMIN D 25 HYDROXY: CPT

## 2017-09-06 PROCEDURE — 84443 ASSAY THYROID STIM HORMONE: CPT

## 2017-09-13 ENCOUNTER — OFFICE VISIT (OUTPATIENT)
Dept: OBSTETRICS AND GYNECOLOGY | Facility: CLINIC | Age: 32
End: 2017-09-13
Payer: COMMERCIAL

## 2017-09-13 VITALS
HEIGHT: 68 IN | DIASTOLIC BLOOD PRESSURE: 82 MMHG | BODY MASS INDEX: 21.74 KG/M2 | SYSTOLIC BLOOD PRESSURE: 118 MMHG | WEIGHT: 143.44 LBS

## 2017-09-13 DIAGNOSIS — R87.615 UNSATISFACTORY CERVICAL PAPANICOLAOU SMEAR: Primary | ICD-10-CM

## 2017-09-13 PROCEDURE — 88175 CYTOPATH C/V AUTO FLUID REDO: CPT

## 2017-09-13 PROCEDURE — 99499 UNLISTED E&M SERVICE: CPT | Mod: S$GLB,,, | Performed by: OBSTETRICS & GYNECOLOGY

## 2017-09-13 PROCEDURE — 99999 PR PBB SHADOW E&M-EST. PATIENT-LVL III: CPT | Mod: PBBFAC,,, | Performed by: OBSTETRICS & GYNECOLOGY

## 2017-09-13 NOTE — PROGRESS NOTES
Subjective:       April Wendt Schamberger is a 32 y.o. woman who comes in today for a pap smear only. Her most recent annual exam was on May 2017. Her most recent Pap smear was on 2017 and was unsatisfactory but HPV negative. Previous abnormal Pap smears: yes - she had cryotherapy years ago. Contraception: none Patient's last menstrual period was 2017 (exact date).    OB History    Para Term  AB Living   1 1 1     1   SAB TAB Ectopic Multiple Live Births           1      # Outcome Date GA Lbr Eldon/2nd Weight Sex Delivery Anes PTL Lv   1 Term 14 37w0d  3.657 kg (8 lb 1 oz) M Vag-Spont EPI  SALOME        Past Medical History:   Diagnosis Date    Abnormal Pap smear of cervix     Cryo Done    ADD (attention deficit disorder)     Breast disorder     Breast Cysts    Esophageal reflux     Fibroids     IBS (irritable bowel syndrome)     Insomnia     Kidney stones     Mastocytosis     Upper GI bleed      Past Surgical History:   Procedure Laterality Date    COLONOSCOPY N/A 2017    Procedure: COLONOSCOPY;  Surgeon: Bren Larkin MD;  Location: [facility];  Service: Endoscopy;  Laterality: N/A;    ESOPHAGOGASTRODUODENOSCOPY  2012    LASIK Bilateral 2005    TONSILLECTOMY, ADENOIDECTOMY       Family History   Problem Relation Age of Onset    Breast cancer Mother      with Metastasis    Cancer Mother      Breast    Hypertension Mother     Prostate cancer Father     Hypertension Father     Cancer Father      Prostate Ca    Diabetes Father     Deep vein thrombosis Father     Pancreatic cancer Maternal Grandfather     Breast cancer Paternal Grandmother     Cancer Paternal Grandmother     Diabetes type II Paternal Grandfather     Heart attack Maternal Grandmother     Colon cancer Cousin     Cancer Cousin 31    Ovarian cancer Neg Hx     Lymphoma Neg Hx     Tuberculosis Neg Hx     Celiac disease Neg Hx     Cirrhosis Neg Hx     Colon polyps Neg Hx     Crohn's  disease Neg Hx     Cystic fibrosis Neg Hx     Esophageal cancer Neg Hx     Hemochromatosis Neg Hx     Inflammatory bowel disease Neg Hx     Irritable bowel syndrome Neg Hx     Liver cancer Neg Hx     Liver disease Neg Hx     Rectal cancer Neg Hx     Stomach cancer Neg Hx     Ulcerative colitis Neg Hx     Donavon's disease Neg Hx      Social History     Social History    Marital status:      Spouse name: N/A    Number of children: 1    Years of education: N/A     Social History Main Topics    Smoking status: Former Smoker    Smokeless tobacco: Never Used    Alcohol use 0.0 oz/week      Comment: Social    Drug use: No    Sexual activity: Yes     Partners: Male     Birth control/ protection: Coitus interruptus, Rhythm      Comment: : Boris     Other Topics Concern    None     Social History Narrative    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       omeprazole (PRILOSEC) 20 MG capsule Take 40 mg by mouth once daily.       promethazine (PHENERGAN) 25 MG tablet Take 25 mg by mouth nightly as needed for Nausea.      tizanidine (ZANAFLEX) 4 MG tablet Take 1 tablet (4 mg total) by mouth 3 (three) times daily as needed. 90 tablet 3    zolpidem (AMBIEN) 10 mg Tab Take 1 tablet (10 mg total) by mouth nightly as needed. 30 tablet 2    [DISCONTINUED] labetalol (NORMODYNE) 100 MG tablet Take 0.5 tablets (50 mg total) by mouth 2 (two) times daily. 30 tablet 2     No current facility-administered medications on file prior to visit.        Review of Systems  GENERAL: No fever, chills, fatigability or weight loss.  VULVA: No pain, no lesions and no itching.  VAGINA: No relaxation, no itching, no discharge, no abnormal bleeding and no  "lesions.  ABDOMEN: No abdominal pain. Denies nausea. Denies vomiting. No diarrhea. No constipation  BREAST: Denies pain. No lumps. No discharge.  URINARY: No incontinence, no nocturia, no frequency and no dysuria.  CARDIOVASCULAR: No chest pain. No shortness of breath. No leg cramps.  NEUROLOGICAL: no headaches. No vision changes.        Objective:   Vitals:  Vitals:    09/13/17 0815   BP: 118/82   Weight: 65.1 kg (143 lb 6.6 oz)   Height: 5' 8" (1.727 m)   PainSc: 0-No pain     Body mass index is 21.81 kg/m².    Physical Exam:  General:  Well developed, well nourished, and in no apparent distress.  HEENT:  Normal sclera.  No thyromegaly.  External genitalia:  No lesions, erythema, rash, or other abnormalities.  Urethra:  No lesions or discharge.  Vagina:  No lesions, discharge, or tenderness  Cervix:  No  lesions, discharge, or cervical motion tenderness.   Uterus:  Normal in size and shape.  Mobile and nontender.  Adnexa:  No adnexal masses or tenderness palpated.      Assessment:   Unsatisfactory cervical Papanicolaou smear  -     Liquid-based pap smear, screening    F/U 6 months  "

## 2017-09-18 ENCOUNTER — PATIENT MESSAGE (OUTPATIENT)
Dept: OBSTETRICS AND GYNECOLOGY | Facility: CLINIC | Age: 32
End: 2017-09-18

## 2017-09-18 ENCOUNTER — OFFICE VISIT (OUTPATIENT)
Dept: PAIN MEDICINE | Facility: CLINIC | Age: 32
End: 2017-09-18
Payer: COMMERCIAL

## 2017-09-18 VITALS
DIASTOLIC BLOOD PRESSURE: 84 MMHG | WEIGHT: 143.81 LBS | HEART RATE: 69 BPM | SYSTOLIC BLOOD PRESSURE: 127 MMHG | BODY MASS INDEX: 21.87 KG/M2

## 2017-09-18 DIAGNOSIS — M79.18 MYOFASCIAL MUSCLE PAIN: Primary | ICD-10-CM

## 2017-09-18 DIAGNOSIS — M54.2 CERVICAL PAIN (NECK): ICD-10-CM

## 2017-09-18 DIAGNOSIS — M47.812 FACET ARTHROPATHY, CERVICAL: ICD-10-CM

## 2017-09-18 DIAGNOSIS — M54.2 NECK PAIN: ICD-10-CM

## 2017-09-18 DIAGNOSIS — M62.838 MUSCLE SPASM: ICD-10-CM

## 2017-09-18 PROCEDURE — 99999 PR PBB SHADOW E&M-EST. PATIENT-LVL III: CPT | Mod: PBBFAC,,, | Performed by: NURSE PRACTITIONER

## 2017-09-18 PROCEDURE — 3008F BODY MASS INDEX DOCD: CPT | Mod: S$GLB,,, | Performed by: NURSE PRACTITIONER

## 2017-09-18 PROCEDURE — 3079F DIAST BP 80-89 MM HG: CPT | Mod: S$GLB,,, | Performed by: NURSE PRACTITIONER

## 2017-09-18 PROCEDURE — 3074F SYST BP LT 130 MM HG: CPT | Mod: S$GLB,,, | Performed by: NURSE PRACTITIONER

## 2017-09-18 PROCEDURE — 99213 OFFICE O/P EST LOW 20 MIN: CPT | Mod: S$GLB,,, | Performed by: NURSE PRACTITIONER

## 2017-09-19 ENCOUNTER — PATIENT MESSAGE (OUTPATIENT)
Dept: OBSTETRICS AND GYNECOLOGY | Facility: CLINIC | Age: 32
End: 2017-09-19

## 2017-09-19 DIAGNOSIS — M54.2 CERVICAL PAIN (NECK): Primary | ICD-10-CM

## 2017-09-19 RX ORDER — TRIAMCINOLONE ACETONIDE 40 MG/ML
40 INJECTION, SUSPENSION INTRA-ARTICULAR; INTRAMUSCULAR
Status: COMPLETED | OUTPATIENT
Start: 2017-09-18 | End: 2017-09-19

## 2017-09-19 RX ADMIN — TRIAMCINOLONE ACETONIDE 40 MG: 40 INJECTION, SUSPENSION INTRA-ARTICULAR; INTRAMUSCULAR at 01:09

## 2017-09-27 ENCOUNTER — CLINICAL SUPPORT (OUTPATIENT)
Dept: REHABILITATION | Facility: HOSPITAL | Age: 32
End: 2017-09-27
Attending: NURSE PRACTITIONER
Payer: COMMERCIAL

## 2017-09-27 DIAGNOSIS — R26.89 DECREASED FUNCTIONAL MOBILITY: ICD-10-CM

## 2017-09-27 DIAGNOSIS — M54.2 NECK PAIN: ICD-10-CM

## 2017-09-27 DIAGNOSIS — M25.60 DECREASED RANGE OF MOTION: Primary | ICD-10-CM

## 2017-09-27 PROCEDURE — 97140 MANUAL THERAPY 1/> REGIONS: CPT | Mod: PN

## 2017-09-27 PROCEDURE — 97161 PT EVAL LOW COMPLEX 20 MIN: CPT | Mod: PN

## 2017-09-27 PROCEDURE — 97110 THERAPEUTIC EXERCISES: CPT | Mod: PN

## 2017-09-27 NOTE — PLAN OF CARE
"  TIME RECORD    Date: 09/27/2017    Start Time:  1303  Stop Time:  1342    PROCEDURES:    TIMED  Procedure Min.   MT 8   TE 14                 UNTIMED  Procedure Min.   IE 20         Total Timed Minutes:  22  Total Timed Units:  2  Total Untimed Units:  0  Charges Billed/# of units:  3 (LCE-1, TE-1, MT-1)    OUTPATIENT PHYSICAL THERAPY   PATIENT EVALUATION  Onset Date: August 2017  Primary Diagnosis:   1. Decreased range of motion     2. Neck pain     3. Decreased functional mobility       Treatment Diagnosis: cervical pain, decreased cervical ROM, decreased functional mobility  Past Medical History:   Diagnosis Date    Abnormal Pap smear of cervix 2000    Cryo Done    ADD (attention deficit disorder)     Breast disorder     Breast Cysts    Esophageal reflux     Fibroids     IBS (irritable bowel syndrome)     Insomnia     Kidney stones     Mastocytosis     Upper GI bleed      Precautions: Standard  Prior Therapy: Yes  Medications: April Wendt Schamberger has a current medication list which includes the following prescription(s): loperamide hcl, omeprazole, promethazine, tizanidine, and zolpidem.  Nutrition:  Normal  History of Present Illness: Pt reports neck pain approximately 3 weeks  Prior Level of Function: Independent  Social History: research nurse  Functional Deficits Leading to Referral/Nature of Injury: difficulty moving head, holding child, carrying work bag  Patient Therapy Goals: decrease pain    Subjective     April Wendt Schamberger states she's had at least 5/10 neck pain for the past 3 weeks. States she stopped coming to therapy earlier this year because her neck was not hurting, however she did not continue with her HEP and neck pain has returned. Pt states she would like to "re-learn what to do so my neck doesn't hurt." States she has B neck pain R>L, especially when holding son > 5 minutes. Has stopped taking advil because she is trying to get pregnant. Had "a riduclous amount of " "steriod" injected into B UT. "It feels like my head weighs a lot sometimes."    Pain:  Location: neck   Description: Aching and Sharp  Activities Which Increase Pain: holding son, carrying work bag  Activities Which Decrease Pain: tylenol  Pain Scale: 5/10 at best 5/10 now  7-8/10 at worst    Objective     Posture: sitting: slumped with forward head and rounded shoulders, decreased cervical lordosis  Palpation: +TTP thoracic and cervical paraspinals, B suboccipitals; pain and hypomobility; pain and hypomobility with L upgliding into rotation C3-C6; increased tissue tension B UT and cervical paraspinals  Sensation: B UE intact to light touch     Range of Motion/Strength:      Cervical AROM Pain/Dysfunction with movement   Flexion 40* Pain in R neck   Extension 40*  Pain in R neck < flexion   R Side Bending 45    L Side Bending 40    R rotation 65     L rotation 65     *denotes pain     Shoulder   Right     Left   Pain/Dysfunction with Movement     AROM PROM MMT AROM PROM MMT     flexion WFL NT 5/5 WFL NT 5/5     abduction WFL NT 5/5 WFL NT 5/5    Internal rotation WFL NT 5/5 WFL NT 5/5     ER  WFL NT 5/5 WFL NT 5/5        Elbow   Right     Left   Pain/Dysfunction with Movement     AROM PROM MMT AROM PROM MMT     flexion WFL NT 5/5 WFL NT 5/5     extension WFL NT 5/5 WFL NT 5/5          Treatment: Treatment to consist of manual therapy including STM/MFR cervical/thoracic paraspinals, suboccipitals, UT, scalenes, SCM, FDN; therapeutic exercise including UE/cervical strengthening/stretching, postural education, and modalities prn. Gave pt HEP consisting of UT, levator scap, and pec corner stretch, chin tucks, rows and lats, wall slides, shoulder ABd. Pt demo/verbalized by performing exercises x 14'. MT x 8' consisting of Kinesiotapin-"y" strip applied to cervical paraspinals for postural awareness and pain relief, and 2-"I" strips applied to B UT for inhibition. Patient instructed on purpose, wear, care, precautions " "to monitor and removal of KT. Patient verbalized understanding of all instructions provided. POC discussed with pt and pt verbalized agreement.      DATE 9/27/17   VISIT 2   POC exp  11/27/17    FOTO 2/10       Stretches:    UT 3x30" B   Levator scap 3x30" B   Pec corner 3x30" B       MT 8'       Supine:    Chin tuck 20x5"       Sidelying:    Shld ER    Shld ABd    Shld Flex    Gator        Prone:    Rows    ER c ext rot        Standing:    Rows 2x10 GTC   Lats 2x10 GTC   Shld ABd 2x10 GTB   Wall slides 2x10   Wall angels    Is, Ts, Ys on PB        INITIALS KV         Assessment     History  Co-morbidities and personal factors that may impact the plan of care Examination  Body Structures and Functions, activity limitations and participation restrictions that may impact the plan of care Clinical Presentation   Decision Making/ Complexity Score   Co-morbidities:   - Back pain, Gastrointestinal Disease, Headaches, High Blood Pressure        Personal Factors:     high Body Regions: head, neck, UE, trunk, back, LE    Body Systems: musculoskeletal - posture, ROM, strength; neuromuscular - sensation      Activity limitations: holding son, carrying work bag      Participation Restrictions:     high     FOTO Neck Survey: 41% limitation    Stable and uncomplicated    low   low       Initial Assessment (Pertinent finding, problem list and factors affecting outcome): Pt is a 31 yo female presenting to PT with pain, decreased cervical ROM, decreased strength, poor posture, impaired balance and gait, and functional deficits with holding son, carrying work bag. Pt would benefit from skilled PT consisting of manual therapy including STM/MFR cervical/thoracic paraspinals, suboccipitals, UT, scalenes, SCM; therapeutic exercise including UE/cervical strengthening/stretching, postural education, and modalities prn to address limitations and increase functional mobility.    Rehab Potiential: good    Short Term Goals = Long Term Goals (8 " Weeks): 11/27/17  1. Pt will be independent with HEP.  2. Pt will improve cervical ROM to 75% WNL to increase functional mobility.  3. Pt will perform cervical AROM with </= 2/10 pain  4. Pt will carry a 15# backpack with </= 2/10 pain as evidence of improved function.  5. Pt will hold son > 5 minutes without neck pain as evidence of improved function.  6. Pt will report </= 32% on the FOTO Neck Survey placing the patient in the 20%<40% impaired, limited, or restricted category indicating increased functional mobility.    Plan     Certification Period: 9/27/17 to 11/27/17  Recommended Treatment Plan: 2 times per week for 8 weeks: Group Therapy, Manual Therapy, Moist Heat/ Ice, Neuromuscular Re-ed, Patient Education, Therapeutic Activites and Therapeutic Exercise  Other Recommendations: modalities prn, ASTYM prn, kinesiotape prn, Functional Dry Needling prn       Therapist: Rosario Aguilar, PT    I CERTIFY THE NEED FOR THESE SERVICES FURNISHED UNDER THIS PLAN OF TREATMENT AND WHILE UNDER MY CARE    Physician's comments: ________________________________________________________________________________________________________________________________________________      Physician's Name: ___________________________________

## 2017-10-03 ENCOUNTER — CLINICAL SUPPORT (OUTPATIENT)
Dept: REHABILITATION | Facility: HOSPITAL | Age: 32
End: 2017-10-03
Attending: NURSE PRACTITIONER
Payer: COMMERCIAL

## 2017-10-03 DIAGNOSIS — R26.89 DECREASED FUNCTIONAL MOBILITY: ICD-10-CM

## 2017-10-03 DIAGNOSIS — M54.2 NECK PAIN: ICD-10-CM

## 2017-10-03 DIAGNOSIS — M25.60 DECREASED RANGE OF MOTION: ICD-10-CM

## 2017-10-03 PROCEDURE — 97110 THERAPEUTIC EXERCISES: CPT | Mod: PN

## 2017-10-03 PROCEDURE — 97140 MANUAL THERAPY 1/> REGIONS: CPT | Mod: PN

## 2017-10-03 NOTE — PROGRESS NOTES
"TIME RECORD    Date:  10/03/2017    Start Time:  2:04 pm   Stop Time:  2:50 pm     PROCEDURES:    TIMED  Procedure Min.   TE supervised 20' NC   TE 11'   MT 15'             UNTIMED  Procedure Min.             Total Timed Minutes:  26'  Total Timed Units:  2  Total Untimed Units:  0  Charges Billed/# of units:  2 ( 1 TE, 1 MT)       Progress/Current Status    Subjective:     Patient ID: April Wendt Schamberger is a 32 y.o. female.  Diagnosis: No diagnosis found.  Pain: 5/10 prior to therapy session   Pt stated that she feels the same as last week.     Objective:   Pt received MT consisting STM with elongation to B Cervical paraspinals, gentle sub occipital release, STM to (B) UT region x 15'. Pt participated in therex per log below 1:1 with PT x 11' and supervised therex x 20'.       DATE 10/3/17 9/27/17   VISIT 3 2   POC exp  11/27/17      FOTO 3/10 2/10          Stretches:      UT 3x30"  3x30" B   Levator scap 3x30" B 3x30" B   Pec corner 3x30" B 3x30" B          MT 15'  8'          Supine:      Chin tuck 20x5" 20x5"          Sidelying:      Shld ER -     Shld ABd -     Shld Flex 2x10 1# B     Gator 2x10 1# B            Prone:      Rows      ER c ext rot             Standing:      Rows 2x12 GTB 2x10 GTC   Lats 2x12 GTB 2x10 GTC   Shld ABd 2x12 GTB 2x10 GTB   Wall slides 2x10 2x10   Wall angels 2x10     Is, Ts, Ys on PB -       -     INITIALS CK KV       Assessment:     Pt reported feeling "looser" in neck/UT region after therapy session. Pt tolerated therapy session without any c/o increased pain.     Patient Education/Response:     Continue with HEP    Plans and Goals:   Continue per POC, progress pt as tolerated    Short Term Goals = Long Term Goals (8 Weeks): 11/27/17  1. Pt will be independent with HEP.  2. Pt will improve cervical ROM to 75% WNL to increase functional mobility.  3. Pt will perform cervical AROM with </= 2/10 pain  4. Pt will carry a 15# backpack with </= 2/10 pain as evidence of improved " function.  5. Pt will hold son > 5 minutes without neck pain as evidence of improved function.  6. Pt will report </= 32% on the FOTO Neck Survey placing the patient in the 20%<40% impaired, limited, or restricted category indicating increased functional mobility.

## 2017-10-05 ENCOUNTER — CLINICAL SUPPORT (OUTPATIENT)
Dept: REHABILITATION | Facility: HOSPITAL | Age: 32
End: 2017-10-05
Attending: NURSE PRACTITIONER
Payer: COMMERCIAL

## 2017-10-05 DIAGNOSIS — M54.2 NECK PAIN: ICD-10-CM

## 2017-10-05 PROCEDURE — 97110 THERAPEUTIC EXERCISES: CPT | Mod: PN

## 2017-10-05 PROCEDURE — 97140 MANUAL THERAPY 1/> REGIONS: CPT | Mod: PN

## 2017-10-05 NOTE — PROGRESS NOTES
TIME RECORD    Date:  10/05/2017    Start Time:  1245  Stop Time:  1255    PROCEDURES:    TIMED  Procedure Min.   MT 10                     UNTIMED  Procedure Min.             Total Timed Minutes:  10  Total Timed Units:  1  Total Untimed Units:  0  Charges Billed/# of units:  1 MT       Progress/Current Status    Subjective:     Patient ID: April Wendt Schamberger is a 32 y.o. female.  Diagnosis:   1. Neck pain       Agreeable to FDN, patient verbalized she is not pregnant. Reports B neck pain and the needling helped during the last round of therapy    Objective:     Patient TTP with increased tissue tension B UTs and levators. patient provided written and verbal consent to FDN. MT x  10 consisting of FDN to B uppr traps with estim in prone, B levators with estim in prone.     Assessment:     Patient demonstrated appropriate response to FDN.     Patient Education/Response:         Plans and Goals:     Monitor response to FDN. Continue with FDN in POC as tolerated.

## 2017-10-05 NOTE — PROGRESS NOTES
"TIME RECORD    Date:  10/05/2017    Start Time:  12:00 pm   Stop Time:  12:45 pm     PROCEDURES:    TIMED  Procedure Min.       TE 30'   MT 15'   MT (Dry Needling)  see note         UNTIMED  Procedure Min.             Total Timed Minutes:  45'  Total Timed Units:  3  Total Untimed Units:  0  Charges Billed/# of units:  3 ( 2 TE, 1 MT)       Progress/Current Status    Subjective:     Patient ID: April Wendt Schamberger is a 32 y.o. female.  Diagnosis:   1. Neck pain       Pain: 5/10 prior to therapy session; 3/10 following interventions    Pt stated that she is doing her HEP and feels she is progressing.     Objective:   Pt received MT consisting STM with elongation to B Cervical paraspinals, gentle sub occipital release, STM to (B) UT region x 15'. Pt participated in therex per log below 1:1 with PTA x 30' . Also seen by Mata Roberto PT for FDN (see note)      DATE 10/5.17 10/3/17 9/27/17   VISIT 4 3 2   POC exp  11/27/17       FOTO 4/10 3/10 2/10           Stretches:       UT 3x30" B 3x30"  3x30" B   Levator scap 3x30" B 3x30" B 3x30" B   Pec corner 3x30" B 3x30" B 3x30" B           MT 15' 15'  8'           Supine:       rratus            Chin tuck 5" x 20 20x5" 20x5"   Shoulder flexion Wand 2# 2x10      Chest press  Wand 2# 2x10      Sidelying:       Shld ER  -     Shld ABd  -     Shld Flex 2x10 1# B 2x10 1# B     Gator 2x10 1# B 2x10 1# B             Prone:       Rows       ER c ext rot               Standing:       Rows 2x12 GTB 2x12 GTB 2x10 GTC   Lats 2x15 GTB 2x12 GTB 2x10 GTC   Shld ABd 2x15 GTB 2x12 GTB 2x10 GTB   Wall slides -- 2x10 2x10   Wall angels -- 2x10     Is, Ts, Ys on PB  -        -     INITIALS MB 1/6 CK KV       Assessment:     April tolerated treatment well. Increased flexibility and decreased pain following interventions.    Patient Education/Response:     Continue with HEP    Plans and Goals:   Continue per POC, progress pt as tolerated    Short Term Goals = Long Term Goals (8 Weeks): " 11/27/17  1. Pt will be independent with HEP.  2. Pt will improve cervical ROM to 75% WNL to increase functional mobility.  3. Pt will perform cervical AROM with </= 2/10 pain  4. Pt will carry a 15# backpack with </= 2/10 pain as evidence of improved function.  5. Pt will hold son > 5 minutes without neck pain as evidence of improved function.  6. Pt will report </= 32% on the FOTO Neck Survey placing the patient in the 20%<40% impaired, limited, or restricted category indicating increased functional mobility.

## 2017-10-11 ENCOUNTER — CLINICAL SUPPORT (OUTPATIENT)
Dept: REHABILITATION | Facility: HOSPITAL | Age: 32
End: 2017-10-11
Attending: NURSE PRACTITIONER
Payer: COMMERCIAL

## 2017-10-11 DIAGNOSIS — M54.2 NECK PAIN: ICD-10-CM

## 2017-10-11 PROCEDURE — 97110 THERAPEUTIC EXERCISES: CPT | Mod: PN

## 2017-10-11 PROCEDURE — 97140 MANUAL THERAPY 1/> REGIONS: CPT | Mod: PN

## 2017-10-11 NOTE — PROGRESS NOTES
"TIME RECORD    Date:  10/11/2017    Start Time:  125  Stop Time:  200    PROCEDURES:    TIMED  Procedure Min.   Manual therapy 15   Therex supervised 15   Therex  15           Total Timed Minutes:  30  Total Timed Units:  2  Total Untimed Units:  0  Charges Billed/# of units:  2 MTx1, TEx1      Progress/Current Status    Subjective:     Patient ID: April Wendt Schamberger is a 32 y.o. female.  Diagnosis:   1. Neck pain           Objective:     FDN provided by Mata Roberto PT, see attached note for details. Manual therapy provided consisting STM with elongation to B Cervical paraspinals, gentle sub occipital release, upper thoracic release, STM with manual stretch to (B) UT region x 15'. Pt participated in therex per log below 1:1 with PTA x 15', additional 15 with supervision.    COMPLETE FOTO next visit!    DATE 10/11/17 10/5.17 10/3/17 9/27/17   VISIT 5 4 3 2   POC exp  11/27/17        FOTO 5/5 NEXT 4/10 3/10 2/10            Stretches:        UT 30"x3 B 3x30" B 3x30"  3x30" B   Levator scap 30"x3 B 3x30" B 3x30" B 3x30" B   Pec corner 30"x3 B 3x30" B 3x30" B 3x30" B            MT 15' 15' 15'  8'            Supine:        rratus              Chin tuck 5"x20 5" x 20 20x5" 20x5"   Shoulder flexion Wand 2# 2x12 Wand 2# 2x10      Chest press  Wand 2# 2x12 Wand 2# 2x10      Sidelying:        Shld ER   -     Shld ABd   -     Shld Flex 1# 2x12 B 2x10 1# B 2x10 1# B     Gator 1# 2x12 B 2x10 1# B 2x10 1# B              Prone:        Rows --       ER c ext rot --                Standing:        Rows 2x15 GTB 2x12 GTB 2x12 GTB 2x10 GTC   Lats 2x15 GTB 2x15 GTB 2x12 GTB 2x10 GTC   Shld ABd  2x15 GTB 2x12 GTB 2x10 GTB   Wall slides 2x10 -- 2x10 2x10   Wall angels 2x10 -- 2x10     Is, Ts, Ys on PB Next?  -         -     INITIALS DH 2/6 MB 1/6 CK KV       Assessment:     Patient with myofascial restrictions noted B UT with manual therapy.  Able to complete all therex as per log without complaint of pain or difficulty.  " See attached note of Mata Oneal. PT,. DPT for assessment of FDN.    Patient Education/Response:     Patient educated to continue HEP.  Verbalized understanding.      Plans and Goals:     Continue per POC, progress pt as tolerated    Short Term Goals = Long Term Goals (8 Weeks): 11/27/17  1. Pt will be independent with HEP.  2. Pt will improve cervical ROM to 75% WNL to increase functional mobility.  3. Pt will perform cervical AROM with </= 2/10 pain  4. Pt will carry a 15# backpack with </= 2/10 pain as evidence of improved function.  5. Pt will hold son > 5 minutes without neck pain as evidence of improved function.  6. Pt will report </= 32% on the FOTO Neck Survey placing the patient in the 20%<40% impaired, limited, or restricted category indicating increased functional mobility.

## 2017-10-11 NOTE — PROGRESS NOTES
"TIME RECORD    Date:  10/11/2017    Start Time:  1310   Stop Time:  1325    PROCEDURES:    TIMED  Procedure Min.   MT 15                     UNTIMED  Procedure Min.             Total Timed Minutes:  15  Total Timed Units:  1  Total Untimed Units:  0  Charges Billed/# of units:  1 MT       Progress/Current Status    Subjective:     Patient ID: April Wendt Schamberger is a 32 y.o. female.  Diagnosis:   1. Neck pain       Agreeable to FDN. Reports L sided  neck pain that feels like it is "sticking"     Objective:     Patient TTP with increased tissue tension B UTs and levators. patient provided verbal consent to FDN. MT x  15 consisting of FDN to B uppr traps with estim in prone, B levators with estim in prone.     Assessment:     Patient demonstrated appropriate response to FDN.     Patient Education/Response:         Plans and Goals:     Monitor response to FDN. Continue with FDN in POC as tolerated.         "

## 2017-10-13 ENCOUNTER — CLINICAL SUPPORT (OUTPATIENT)
Dept: REHABILITATION | Facility: HOSPITAL | Age: 32
End: 2017-10-13
Attending: NURSE PRACTITIONER
Payer: COMMERCIAL

## 2017-10-13 DIAGNOSIS — M54.2 NECK PAIN: ICD-10-CM

## 2017-10-13 PROCEDURE — 97140 MANUAL THERAPY 1/> REGIONS: CPT | Mod: PN

## 2017-10-13 PROCEDURE — 97110 THERAPEUTIC EXERCISES: CPT | Mod: PN

## 2017-10-13 NOTE — PROGRESS NOTES
"TIME RECORD    Date:  10/13/2017    Start Time:  200  Stop Time:  245    PROCEDURES:    TIMED  Procedure Min.   Manual therapy 185   Therex 30         Total Timed Minutes:  45  Total Timed Units:  3  Total Untimed Units:  0  Charges Billed/# of units:  3  MTx1, TEx2      Progress/Current Status    Subjective:     Patient ID: April Wendt Schamberger is a 32 y.o. female.  Diagnosis:   1. Neck pain       Pain: 7 /10  Patient reports some pain after last visit, but woke yesterday in severe pain.  "I didn't even do my stretching.  It's a little better today, but driving is no fun."  States difficulty limited motion tilting, turning up and down.    Objective:     Manual therapy provided consisting STM with elongation to B Cervical paraspinals, gentle sub occipital release, upper thoracic release, STM with manual stretch to (B) UT region x 15'. Pt participated in therex per log below 1:1 with PTA x 15', additional 15 with supervision.    COMPLETE FOTO next visit!    DATE 10/13/17 10/11/17 10/5.17 10/3/17 9/27/17   VISIT 6 5 4 3 2   POC exp  11/27/17         FOTO 6/10 5/5 NEXT 4/10 3/10 2/10             Stretches:         UT 30"x3 B 30"x3 B 3x30" B 3x30"  3x30" B   Levator scap 30"x3 B 30"x3 B 3x30" B 3x30" B 3x30" B   Pec corner 30"x3  30"x3 B 3x30" B 3x30" B 3x30" B             MT 15' 15' 15' 15'  8'             Supine:         rratus                Chin tuck 5"x20 5"x20 5" x 20 20x5" 20x5"   Shoulder flexion Wand 2# 2x12 Wand 2# 2x12 Wand 2# 2x10      Chest press  Wand 2# 2x12 Wand 2# 2x12 Wand 2# 2x10      Sidelying:         Shld ER    -     Shld ABd    -     Shld Flex 1# 2x12 B 1# 2x12 B 2x10 1# B 2x10 1# B     Gator 1# 2x12 B 1# 2x12 B 2x10 1# B 2x10 1# B               Prone:         Rows -- --       ER c ext rot -- --                 Standing:         Rows 2x15 GTB 2x15 GTB 2x12 GTB 2x12 GTB 2x10 GTC   Lats 2x15 GTB 2x15 GTB 2x15 GTB 2x12 GTB 2x10 GTC   Shld ABd   2x15 GTB 2x12 GTB 2x10 GTB   Wall slides 2x10 2x10 -- " "2x10 2x10   Wall angels  2x10 -- 2x10     Is, Ts, Ys on PB Held - Next Next?  -          -     INITIALS DH 3/6 DH 2/6 MB 1/6 CK KV       Assessment:     Held all reps/weight same due to patient reports of pain since last two days.  Able to complete without reports increased pain.  States R side "about the same - but I don't feel any pain on the left" after treatment.    Patient Education/Response:     Patient educated to continue HEP.  Verbalized understanding.      Plans and Goals:     Continue per POC, progress pt as tolerated    Short Term Goals = Long Term Goals (8 Weeks): 11/27/17  1. Pt will be independent with HEP.  2. Pt will improve cervical ROM to 75% WNL to increase functional mobility.  3. Pt will perform cervical AROM with </= 2/10 pain  4. Pt will carry a 15# backpack with </= 2/10 pain as evidence of improved function.  5. Pt will hold son > 5 minutes without neck pain as evidence of improved function.  6. Pt will report </= 32% on the FOTO Neck Survey placing the patient in the 20%<40% impaired, limited, or restricted category indicating increased functional mobility.          "

## 2017-10-19 ENCOUNTER — CLINICAL SUPPORT (OUTPATIENT)
Dept: REHABILITATION | Facility: HOSPITAL | Age: 32
End: 2017-10-19
Attending: NURSE PRACTITIONER
Payer: COMMERCIAL

## 2017-10-19 DIAGNOSIS — M54.2 NECK PAIN: ICD-10-CM

## 2017-10-19 PROCEDURE — 97140 MANUAL THERAPY 1/> REGIONS: CPT | Mod: PN

## 2017-10-19 NOTE — PROGRESS NOTES
"TIME RECORD    Date:  10/19/2017    Start Time:  1000  Stop Time:  1050    PROCEDURES:    TIMED  Procedure Min.   MT 20   TE 5   TE Supervised 25     Total Timed Minutes:  25  Total Timed Units:  1  Total Untimed Units:  0  Charges Billed/# of units:  MT-1      Progress/Current Status    Subjective:     Patient ID: April Wendt Schamberger is a 32 y.o. female.  Diagnosis:   1. Neck pain       Pain: 3/10  Pt reports she feels "much better" than at last treatment session. States, "I can move my head more with less pain." Pt stated she will be changing jobs and next visit will be her last.    Objective:     MT x 20' consisting STM with elongation to B Cervical paraspinals, gentle sub occipital release, upper thoracic release, STM with manual stretch to (B) UT and grade IV mobilization of lower C/upper T spine f/b TE 1:1 with PT per log x 5' f/b supervised TE x 25'      DATE 10/19/17 10/13/17 10/11/17 10/5.17 10/3/17 9/27/17   VISIT 7 6 5 4 3 2   POC exp  11/27/17          FOTO 7/10 6/10 5/5 NEXT 4/10 3/10 2/10              Stretches:          UT 30"x3 B 30"x3 B 30"x3 B 3x30" B 3x30"  3x30" B   Levator scap 30"x3 B 30"x3 B 30"x3 B 3x30" B 3x30" B 3x30" B   Pec corner 30"x3  30"x3  30"x3 B 3x30" B 3x30" B 3x30" B              MT 20' 15' 15' 15' 15'  8'              Supine:          rratus                  Chin tuck 5"x20 5"x20 5"x20 5" x 20 20x5" 20x5"   Shoulder flexion Wand 2# 2x12 Wand 2# 2x12 Wand 2# 2x12 Wand 2# 2x10      Chest press  Wand 2# 2x12 Wand 2# 2x12 Wand 2# 2x12 Wand 2# 2x10      Sidelying:          Shld ER     -     Shld ABd     -     Shld Flex 1# 2x12 B 1# 2x12 B 1# 2x12 B 2x10 1# B 2x10 1# B     Gator 1# 2x12 B 1# 2x12 B 1# 2x12 B 2x10 1# B 2x10 1# B                Prone:          Rows  -- --       ER c ext rot  -- --                  Standing:          Rows 2x15 GTB 2x15 GTB 2x15 GTB 2x12 GTB 2x12 GTB 2x10 GTC   Lats 2x15 GTB 2x15 GTB 2x15 GTB 2x15 GTB 2x12 GTB 2x10 GTC   Shld ABd    2x15 GTB 2x12 GTB " "2x10 GTB   Wall slides 2x10 2x10 2x10 -- 2x10 2x10   Wall angels   2x10 -- 2x10     Is, Ts, Ys on PB 2x10 Held - Next Next?  -           -     INITIALS KV DH 3/6 DH 2/6 MB 1/6 CK KV       Assessment:     Pt tolerated treatment well with reports of feeling "worked" after Is,Ts,Ys on PB exercise. D/c next visit.    Patient Education/Response:     Cont HEP.    Plans and Goals:     Continue per POC, progress pt as tolerated    Short Term Goals = Long Term Goals (8 Weeks): 11/27/17  1. Pt will be independent with HEP.  2. Pt will improve cervical ROM to 75% WNL to increase functional mobility.  3. Pt will perform cervical AROM with </= 2/10 pain  4. Pt will carry a 15# backpack with </= 2/10 pain as evidence of improved function.  5. Pt will hold son > 5 minutes without neck pain as evidence of improved function.  6. Pt will report </= 32% on the FOTO Neck Survey placing the patient in the 20%<40% impaired, limited, or restricted category indicating increased functional mobility.          "

## 2017-10-23 ENCOUNTER — CLINICAL SUPPORT (OUTPATIENT)
Dept: REHABILITATION | Facility: HOSPITAL | Age: 32
End: 2017-10-23
Attending: NURSE PRACTITIONER
Payer: COMMERCIAL

## 2017-10-23 DIAGNOSIS — M54.2 NECK PAIN: ICD-10-CM

## 2017-10-23 PROCEDURE — 97140 MANUAL THERAPY 1/> REGIONS: CPT | Mod: PN

## 2017-10-23 PROCEDURE — 97110 THERAPEUTIC EXERCISES: CPT | Mod: PN

## 2017-10-23 NOTE — PROGRESS NOTES
"TIME RECORD    Date:  10/23/2017    Start Time:  1355  Stop Time:  1450    PROCEDURES:    TIMED  Procedure Min.   MT 20   TE 10   TE Supervised 25     Total Timed Minutes:  30  Total Timed Units:  2  Total Untimed Units:  0  Charges Billed/# of units:  MT-1, TE-1      Progress/Current Status    Subjective:     Patient ID: April Wendt Schamberger is a 32 y.o. female.  Diagnosis:   1. Neck pain       Pain: 3/10  Pt states that today will be her last visit because she is changing job locations.    Objective:     MT x 20' consisting STM with elongation to B Cervical paraspinals, gentle sub occipital release, upper thoracic release, STM with manual stretch to (B) UT f/b TE 1:1 with PT per log x 10' f/b supervised TE x 25'    DATE 10/23/17 10/19/17 10/13/17 10/11/17 10/5.17 10/3/17 9/27/17   VISIT 8 7 6 5 4 3 2   POC exp  11/27/17           FOTO 8/10 FOTO D/C 7/10 6/10 5/5 NEXT 4/10 3/10 2/10               Stretches:           UT 30"x3 B 30"x3 B 30"x3 B 30"x3 B 3x30" B 3x30"  3x30" B   Levator scap 30"x3 B 30"x3 B 30"x3 B 30"x3 B 3x30" B 3x30" B 3x30" B   Pec corner 30"x3  30"x3  30"x3  30"x3 B 3x30" B 3x30" B 3x30" B               MT 20' 20' 15' 15' 15' 15'  8'               Supine:           rratus                    Chin tuck 5"x20 5"x20 5"x20 5"x20 5" x 20 20x5" 20x5"   Shoulder flexion Wand 2# 2x15 Wand 2# 2x12 Wand 2# 2x12 Wand 2# 2x12 Wand 2# 2x10      Chest press  Wand 2# 2x15  Wand 2# 2x12 Wand 2# 2x12 Wand 2# 2x12 Wand 2# 2x10      Sidelying:           Shld ER      -     Shld ABd      -     Shld Flex 1# 2x12 B 1# 2x12 B 1# 2x12 B 1# 2x12 B 2x10 1# B 2x10 1# B     Gator 1# 2x12 B 1# 2x12 B 1# 2x12 B 1# 2x12 B 2x10 1# B 2x10 1# B                 Prone:           Rows   -- --       ER c ext rot   -- --                   Standing:           Rows 2x15 BTB 2x15 GTB 2x15 GTB 2x15 GTB 2x12 GTB 2x12 GTB 2x10 GTC   Lats 2x15 BTB 2x15 GTB 2x15 GTB 2x15 GTB 2x15 GTB 2x12 GTB 2x10 GTC   Shld ABd     2x15 GTB 2x12 GTB 2x10 " GTB   Wall slides 2x10  2x10 2x10 2x10 -- 2x10 2x10   Wall angels    2x10 -- 2x10     Is, Ts, Ys on PB 2x10  2x10 Held - Next Next?  -            -     INITIALS RB KV DH 3/6 DH 2/6 MB 1/6 CK KV       Assessment:     Since beginning PT, pt has been seen 8 times since initial evaluation on 9/27/17. Overall, she has made good, steady progress with her PT treatments and has worked hard towards all of her PT goals as evidenced by subjective and objective improvements. Since attending PT she has reached most of her PT goals. She will be discharged with an updated HEP with BTB and was instructed to contact us with any other questions or concerns. Pt agreeable to d/c.      Patient Education/Response:     Cont HEP.    Plans and Goals:     Discharged pt from PT.    Short Term Goals = Long Term Goals (8 Weeks): 11/27/17  1. Pt will be independent with HEP.  2. Pt will improve cervical ROM to 75% WNL to increase functional mobility. MET 10/23/17  3. Pt will perform cervical AROM with </= 2/10 pain Ongoing 10/23/17  4. Pt will carry a 15# backpack with </= 2/10 pain as evidence of improved function. Ongoing 10/23/17  5. Pt will hold son > 5 minutes without neck pain as evidence of improved function.  MET 10/23/17  6. Pt will report </= 32% on the FOTO Neck Survey placing the patient in the 20%<40% impaired, limited, or restricted category indicating increased functional mobility. Partially met (34% limited) 10/23/17

## 2017-10-27 ENCOUNTER — DOCUMENTATION ONLY (OUTPATIENT)
Dept: REHABILITATION | Facility: HOSPITAL | Age: 32
End: 2017-10-27

## 2017-10-27 DIAGNOSIS — R26.89 DECREASED FUNCTIONAL MOBILITY: ICD-10-CM

## 2017-10-27 DIAGNOSIS — R53.1 DECREASED STRENGTH: ICD-10-CM

## 2017-10-27 DIAGNOSIS — M25.60 DECREASED RANGE OF MOTION: ICD-10-CM

## 2017-10-27 NOTE — PROGRESS NOTES
Face to Face PTA Conference performed with Madyson Oneal PTA, Zahira Kelly PTA, Yeison Leroy PTA Thien Cole PTA regarding patient's current status, overall progress, and plan of care    Face to face PT Conference performed with Sheryl Mendoza PT , Rosario Aguilar PT , Laura Mueller PT , Megan Molina PT  regarding patient progress, current status, and plan of care.     Thien Cole, PTA    Face to face meeting completed with Rosario Aguilar, PT regarding current status and progress of   April Wendt Schamberger .   Zahira Kelly, PTA

## 2017-10-30 ENCOUNTER — PATIENT MESSAGE (OUTPATIENT)
Dept: OBSTETRICS AND GYNECOLOGY | Facility: CLINIC | Age: 32
End: 2017-10-30

## 2017-11-08 ENCOUNTER — PATIENT MESSAGE (OUTPATIENT)
Dept: GASTROENTEROLOGY | Facility: CLINIC | Age: 32
End: 2017-11-08

## 2017-11-09 ENCOUNTER — PATIENT MESSAGE (OUTPATIENT)
Dept: GASTROENTEROLOGY | Facility: CLINIC | Age: 32
End: 2017-11-09

## 2017-11-14 ENCOUNTER — OFFICE VISIT (OUTPATIENT)
Dept: OBSTETRICS AND GYNECOLOGY | Facility: CLINIC | Age: 32
End: 2017-11-14
Payer: COMMERCIAL

## 2017-11-14 ENCOUNTER — LAB VISIT (OUTPATIENT)
Dept: LAB | Facility: HOSPITAL | Age: 32
End: 2017-11-14
Attending: NURSE PRACTITIONER
Payer: COMMERCIAL

## 2017-11-14 VITALS
DIASTOLIC BLOOD PRESSURE: 76 MMHG | HEIGHT: 68 IN | SYSTOLIC BLOOD PRESSURE: 120 MMHG | WEIGHT: 143.94 LBS | BODY MASS INDEX: 21.81 KG/M2

## 2017-11-14 DIAGNOSIS — N92.6 MISSED MENSES: ICD-10-CM

## 2017-11-14 DIAGNOSIS — N92.6 MISSED MENSES: Primary | ICD-10-CM

## 2017-11-14 DIAGNOSIS — Z32.01 POSITIVE URINE PREGNANCY TEST: ICD-10-CM

## 2017-11-14 LAB
HCG INTACT+B SERPL-ACNC: NORMAL MIU/ML
PROGEST SERPL-MCNC: 31.8 NG/ML

## 2017-11-14 PROCEDURE — 99999 PR PBB SHADOW E&M-EST. PATIENT-LVL III: CPT | Mod: PBBFAC,,, | Performed by: NURSE PRACTITIONER

## 2017-11-14 PROCEDURE — 84702 CHORIONIC GONADOTROPIN TEST: CPT

## 2017-11-14 PROCEDURE — 84144 ASSAY OF PROGESTERONE: CPT

## 2017-11-14 PROCEDURE — 99214 OFFICE O/P EST MOD 30 MIN: CPT | Mod: S$GLB,,, | Performed by: NURSE PRACTITIONER

## 2017-11-14 NOTE — PROGRESS NOTES
CC: Absence of menses    (Dr. Alvarez patient)  Patient's last menstrual period was 10/05/2017 (exact date).,   EDC: 2018,   GA:  5w5d      April Wendt Schamberger is a 32 y.o. female  presents with complaint of absence of menstruation.  She denies nausea/vomiting/abdominal pain/bleeding.  UPT is positive.   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    Last Pap:  2017 (nml); HPV neg 2017.    Past Medical History:   Diagnosis Date    Abnormal Pap smear of cervix     Cryo Done    ADD (attention deficit disorder)     Breast disorder     Breast Cysts    Esophageal reflux     Fibroids     IBS (irritable bowel syndrome)     Insomnia     Kidney stones     Mastocytosis     Upper GI bleed      Past Surgical History:   Procedure Laterality Date    COLONOSCOPY N/A 2017    Procedure: COLONOSCOPY;  Surgeon: Bren Larkin MD;  Location: [facility];  Service: Endoscopy;  Laterality: N/A;    ESOPHAGOGASTRODUODENOSCOPY      LASIK Bilateral     TONSILLECTOMY, ADENOIDECTOMY       Social History   Substance Use Topics    Smoking status: Former Smoker    Smokeless tobacco: Never Used    Alcohol use 0.0 oz/week      Comment: Social     Family History   Problem Relation Age of Onset    Breast cancer Mother      with Metastasis    Cancer Mother      Breast    Hypertension Mother     Prostate cancer Father     Hypertension Father     Cancer Father      Prostate Ca    Diabetes Father     Deep vein thrombosis Father     Pancreatic cancer Maternal Grandfather     Breast cancer Paternal Grandmother     Cancer Paternal Grandmother     Diabetes type II Paternal Grandfather     Heart attack Maternal Grandmother     Colon cancer Cousin     Cancer Cousin 31    Ovarian cancer Neg Hx     Lymphoma Neg Hx     Tuberculosis Neg Hx     Celiac disease Neg Hx     Cirrhosis Neg Hx     Colon polyps Neg Hx     Crohn's disease Neg Hx     Cystic fibrosis Neg  "Hx     Esophageal cancer Neg Hx     Hemochromatosis Neg Hx     Inflammatory bowel disease Neg Hx     Irritable bowel syndrome Neg Hx     Liver cancer Neg Hx     Liver disease Neg Hx     Rectal cancer Neg Hx     Stomach cancer Neg Hx     Ulcerative colitis Neg Hx     Donavon's disease Neg Hx      OB History    Para Term  AB Living   1 1 1     1   SAB TAB Ectopic Multiple Live Births           1      # Outcome Date GA Lbr Eldon/2nd Weight Sex Delivery Anes PTL Lv   1 Term 14 37w0d  3.657 kg (8 lb 1 oz) M Vag-Spont EPI  SALOME          /76   Ht 5' 8" (1.727 m)   Wt 65.3 kg (143 lb 15.4 oz)   LMP 10/05/2017 (Exact Date)   BMI 21.89 kg/m²     ROS:  GENERAL: Denies weight gain or weight loss. Feeling well overall.   SKIN: Denies rash or lesions.   HEAD: Denies head injury or headache.   NODES: Denies enlarged lymph nodes.   CHEST: Denies chest pain or shortness of breath.   CARDIOVASCULAR: Denies palpitations or left sided chest pain.   ABDOMEN: Reports intermittent RLQ abdominal pain; denies constipation, diarrhea, nausea, vomiting or rectal bleeding.   URINARY: No frequency, dysuria, hematuria, or burning on urination.  REPRODUCTIVE: See HPI.   BREASTS: The patient performs breast self-examination and denies pain, lumps, or nipple discharge.   HEMATOLOGIC: No easy bruisability or excessive bleeding.   MUSCULOSKELETAL: Denies joint pain or swelling.   NEUROLOGIC: Denies syncope or weakness.   PSYCHIATRIC: Denies depression, anxiety or mood swings.    PE:   APPEARANCE: Well nourished, well developed, in no acute distress.  AFFECT: WNL, alert and oriented x 3.  NECK: Neck symmetric without masses or thyromegaly.   CHEST: Good respiratory effort.   No acute abdomen noted today.    ASSESSMENT and PLAN:    ICD-10-CM ICD-9-CM    1. Missed menses N92.6 626.4 POCT urine pregnancy      US OB/GYN Procedure ([facility]) - Extended List      hCG, quantitative      Progesterone   2. Positive urine " pregnancy test Z32.01 V72.42      * D/W Dr. Alvarez - will schedule pt for u/s due to RLQ pain, and will obtain labs in the meantime.      * New OB packet reviewed at length and copy given to patient.  Zika precautions given as well.  Patient was counseled today on proper weight gain based on the [place] [employer] [facility]'s recommendations based on her pre-pregnancy weight. Discussed foods to avoid in pregnancy (i.e. sushi, fish that are high in mercury, deli meat, and unpasteurized cheeses). Discussed prenatal vitamin options (i.e. stool softener, DHA). Optional genetic testing discussed.    Return in about 2 weeks (around 11/28/2017) for F/U with physician for Initial OB visit & U/S.    ~25 minutes spent with pt Face to Face with >50% of visit spent on education/counseling.

## 2017-11-15 ENCOUNTER — TELEPHONE (OUTPATIENT)
Dept: OBSTETRICS AND GYNECOLOGY | Facility: CLINIC | Age: 32
End: 2017-11-15

## 2017-11-15 ENCOUNTER — PATIENT MESSAGE (OUTPATIENT)
Dept: OBSTETRICS AND GYNECOLOGY | Facility: CLINIC | Age: 32
End: 2017-11-15

## 2017-11-15 NOTE — TELEPHONE ENCOUNTER
----- Message from Marilee Luther, NP sent at 11/15/2017  4:08 PM CST -----  Hi April,    Your hcg and progesterone levels both look really good!  We will see you at your next appointment on 11/28/17.  Have a great weekend!    Sincerely,   Marilee Luther

## 2017-11-15 NOTE — PROGRESS NOTES
Hi April,    Your hcg and progesterone levels both look really good!  We will see you at your next appointment on 11/28/17.  Have a great weekend!    Sincerely,   Marilee Luther

## 2017-11-21 ENCOUNTER — PATIENT MESSAGE (OUTPATIENT)
Dept: GASTROENTEROLOGY | Facility: CLINIC | Age: 32
End: 2017-11-21

## 2017-11-22 ENCOUNTER — PATIENT MESSAGE (OUTPATIENT)
Dept: OBSTETRICS AND GYNECOLOGY | Facility: CLINIC | Age: 32
End: 2017-11-22

## 2017-11-22 ENCOUNTER — PATIENT MESSAGE (OUTPATIENT)
Dept: GASTROENTEROLOGY | Facility: CLINIC | Age: 32
End: 2017-11-22

## 2017-11-27 ENCOUNTER — PATIENT MESSAGE (OUTPATIENT)
Dept: GASTROENTEROLOGY | Facility: CLINIC | Age: 32
End: 2017-11-27

## 2017-11-27 ENCOUNTER — INITIAL PRENATAL (OUTPATIENT)
Dept: OBSTETRICS AND GYNECOLOGY | Facility: CLINIC | Age: 32
End: 2017-11-27
Payer: COMMERCIAL

## 2017-11-27 ENCOUNTER — TELEPHONE (OUTPATIENT)
Dept: OBSTETRICS AND GYNECOLOGY | Facility: CLINIC | Age: 32
End: 2017-11-27

## 2017-11-27 VITALS
BODY MASS INDEX: 21.94 KG/M2 | SYSTOLIC BLOOD PRESSURE: 130 MMHG | DIASTOLIC BLOOD PRESSURE: 86 MMHG | WEIGHT: 144.31 LBS

## 2017-11-27 DIAGNOSIS — J22 ACUTE RESPIRATORY INFECTION: ICD-10-CM

## 2017-11-27 DIAGNOSIS — Z3A.01 7 WEEKS GESTATION OF PREGNANCY: ICD-10-CM

## 2017-11-27 DIAGNOSIS — O21.9 NAUSEA/VOMITING IN PREGNANCY: Primary | ICD-10-CM

## 2017-11-27 DIAGNOSIS — R05.8 COUGH WITH EXPECTORATION: ICD-10-CM

## 2017-11-27 PROCEDURE — 0500F INITIAL PRENATAL CARE VISIT: CPT | Mod: S$GLB,,, | Performed by: NURSE PRACTITIONER

## 2017-11-27 PROCEDURE — 99999 PR PBB SHADOW E&M-EST. PATIENT-LVL II: CPT | Mod: PBBFAC,,, | Performed by: NURSE PRACTITIONER

## 2017-11-27 RX ORDER — AMOXICILLIN 875 MG/1
875 TABLET, FILM COATED ORAL EVERY 12 HOURS
Qty: 20 TABLET | Refills: 0 | Status: SHIPPED | OUTPATIENT
Start: 2017-11-27 | End: 2017-12-07

## 2017-11-27 RX ORDER — DOXYLAMINE SUCCINATE AND PYRIDOXINE HYDROCHLORIDE 10; 10 MG/1; MG/1
TABLET, DELAYED RELEASE ORAL
Qty: 100 TABLET | Refills: 1 | Status: SHIPPED | OUTPATIENT
Start: 2017-11-27 | End: 2018-05-23

## 2017-11-27 RX ORDER — GUAIFENESIN 600 MG/1
1200 TABLET, EXTENDED RELEASE ORAL 2 TIMES DAILY
COMMUNITY
End: 2017-12-26

## 2017-11-27 NOTE — TELEPHONE ENCOUNTER
Dr Alvarez pt, ob 7wks has a bad cough with green mucus wants to know if she can get something for this.Pt # [phone]

## 2017-11-27 NOTE — TELEPHONE ENCOUNTER
Pt started with a cough 10 days ago and its now productive with green mucus.  She is requesting an antibiotic.      Allergies and pharmacy UTD pt is allergic to zpaks so I did not pend anything

## 2017-11-27 NOTE — TELEPHONE ENCOUNTER
MD Lizbeth Lopez RN   Caller: Unspecified (Today,  8:10 AM)             She should be seen so we can do a lung exam.  Can you add to NP or my schedule this AM      Scheduled with Marilee today at 1340

## 2017-11-27 NOTE — PROGRESS NOTES
Pt presents today at 7w4d GA with c/o persistent cough for >10 days, with thick, green sputum (mostly in evenings/night).  States all of her coworkers have been sick with acute respiratory infections.  Cough has begun to prevent her from getting adequate sleep.  She denies fever, chills.  She also c/o nausea related to pregnancy, and has had no relief from natural nausea remedies - requesting medication.  Denies dyspnea. (+) persistent cough.    Denies vaginal bleeding or abdominal pain/cramps.      Exam:  Left lower lung bases (+) crackles; lung sounds clear to upper left and to right side.  No wheezing noted; no dyspnea noted; no use of accessory muscles noted.  Heart RRR.  (+) Cough    Assessment:  1. 7w4d GA pregnancy, first trimester  2. Acute Respiratory Infection  3. Cough  4. Nausea/vomiting.    Plan:  1. Keep appt tomorrow as scheduled with  for Initial OB w ultrasound  2. Amoxicillin 875 Q12h x 10 days.  3. Flonase - use as directed once in am  4. May use Robitussin DM for cough prn; may take Mucinex prn.  5. If symptoms worsen or persist after completion of antibiotics, pt should notify office.  6. Maintain good PO hydration.

## 2017-11-28 ENCOUNTER — ROUTINE PRENATAL (OUTPATIENT)
Dept: OBSTETRICS AND GYNECOLOGY | Facility: CLINIC | Age: 32
End: 2017-11-28
Payer: COMMERCIAL

## 2017-11-28 ENCOUNTER — PROCEDURE VISIT (OUTPATIENT)
Dept: OBSTETRICS AND GYNECOLOGY | Facility: CLINIC | Age: 32
End: 2017-11-28
Payer: COMMERCIAL

## 2017-11-28 VITALS
WEIGHT: 142.88 LBS | BODY MASS INDEX: 21.72 KG/M2 | SYSTOLIC BLOOD PRESSURE: 126 MMHG | DIASTOLIC BLOOD PRESSURE: 74 MMHG

## 2017-11-28 DIAGNOSIS — N92.6 MISSED MENSES: ICD-10-CM

## 2017-11-28 DIAGNOSIS — Z36.89 ESTABLISH GESTATIONAL AGE, ULTRASOUND: ICD-10-CM

## 2017-11-28 DIAGNOSIS — Z3A.01 7 WEEKS GESTATION OF PREGNANCY: Primary | ICD-10-CM

## 2017-11-28 PROCEDURE — 0502F SUBSEQUENT PRENATAL CARE: CPT | Mod: S$GLB,,, | Performed by: OBSTETRICS & GYNECOLOGY

## 2017-11-28 PROCEDURE — 87086 URINE CULTURE/COLONY COUNT: CPT

## 2017-11-28 PROCEDURE — 99999 PR PBB SHADOW E&M-EST. PATIENT-LVL III: CPT | Mod: PBBFAC,,, | Performed by: OBSTETRICS & GYNECOLOGY

## 2017-11-28 PROCEDURE — 76817 TRANSVAGINAL US OBSTETRIC: CPT | Mod: S$GLB,,, | Performed by: OBSTETRICS & GYNECOLOGY

## 2017-11-28 PROCEDURE — 87591 N.GONORRHOEAE DNA AMP PROB: CPT

## 2017-11-28 RX ORDER — FLUTICASONE PROPIONATE 50 MCG
1 SPRAY, SUSPENSION (ML) NASAL DAILY
COMMUNITY
End: 2017-12-26

## 2017-11-28 RX ORDER — GUAIFENESIN 100 MG/5ML
200 SOLUTION ORAL 3 TIMES DAILY PRN
COMMUNITY
End: 2017-12-26

## 2017-11-28 NOTE — PROGRESS NOTES
She has no fever or SOB.  Will finish ABX.  If symptoms worsen, CXR.  She will call [employer] and we will order all labs at next visit

## 2017-11-29 LAB
C TRACH DNA SPEC QL NAA+PROBE: NOT DETECTED
N GONORRHOEA DNA SPEC QL NAA+PROBE: NOT DETECTED

## 2017-11-30 LAB — BACTERIA UR CULT: NORMAL

## 2017-12-26 ENCOUNTER — LAB VISIT (OUTPATIENT)
Dept: LAB | Facility: HOSPITAL | Age: 32
End: 2017-12-26
Attending: OBSTETRICS & GYNECOLOGY
Payer: COMMERCIAL

## 2017-12-26 ENCOUNTER — ROUTINE PRENATAL (OUTPATIENT)
Dept: OBSTETRICS AND GYNECOLOGY | Facility: CLINIC | Age: 32
End: 2017-12-26
Payer: COMMERCIAL

## 2017-12-26 VITALS
DIASTOLIC BLOOD PRESSURE: 70 MMHG | BODY MASS INDEX: 22.53 KG/M2 | WEIGHT: 148.13 LBS | SYSTOLIC BLOOD PRESSURE: 122 MMHG

## 2017-12-26 DIAGNOSIS — Z3A.11 11 WEEKS GESTATION OF PREGNANCY: Primary | ICD-10-CM

## 2017-12-26 DIAGNOSIS — O16.1 HYPERTENSION AFFECTING PREGNANCY IN FIRST TRIMESTER: ICD-10-CM

## 2017-12-26 DIAGNOSIS — Z3A.11 11 WEEKS GESTATION OF PREGNANCY: ICD-10-CM

## 2017-12-26 DIAGNOSIS — Z34.81 ENCOUNTER FOR SUPERVISION OF OTHER NORMAL PREGNANCY, FIRST TRIMESTER: ICD-10-CM

## 2017-12-26 LAB
ABO + RH BLD: NORMAL
BLD GP AB SCN CELLS X3 SERPL QL: NORMAL
CREAT UR-MCNC: 60 MG/DL
ERYTHROCYTE [DISTWIDTH] IN BLOOD BY AUTOMATED COUNT: 12.8 %
GLUCOSE SERPL-MCNC: 83 MG/DL
HCT VFR BLD AUTO: 36.3 %
HGB BLD-MCNC: 12.4 G/DL
MCH RBC QN AUTO: 32 PG
MCHC RBC AUTO-ENTMCNC: 34.2 G/DL
MCV RBC AUTO: 94 FL
PLATELET # BLD AUTO: 240 K/UL
PMV BLD AUTO: 11.6 FL
PROT UR-MCNC: <7 MG/DL
PROT/CREAT RATIO, UR: NORMAL
RBC # BLD AUTO: 3.88 M/UL
TSH SERPL DL<=0.005 MIU/L-ACNC: 0.66 UIU/ML
WBC # BLD AUTO: 7.1 K/UL

## 2017-12-26 PROCEDURE — 86762 RUBELLA ANTIBODY: CPT

## 2017-12-26 PROCEDURE — 82947 ASSAY GLUCOSE BLOOD QUANT: CPT

## 2017-12-26 PROCEDURE — 99999 PR PBB SHADOW E&M-EST. PATIENT-LVL II: CPT | Mod: PBBFAC,,, | Performed by: OBSTETRICS & GYNECOLOGY

## 2017-12-26 PROCEDURE — 86900 BLOOD TYPING SEROLOGIC ABO: CPT

## 2017-12-26 PROCEDURE — 86592 SYPHILIS TEST NON-TREP QUAL: CPT

## 2017-12-26 PROCEDURE — 85027 COMPLETE CBC AUTOMATED: CPT

## 2017-12-26 PROCEDURE — 0502F SUBSEQUENT PRENATAL CARE: CPT | Mod: S$GLB,,, | Performed by: OBSTETRICS & GYNECOLOGY

## 2017-12-26 PROCEDURE — 86901 BLOOD TYPING SEROLOGIC RH(D): CPT

## 2017-12-26 PROCEDURE — 84156 ASSAY OF PROTEIN URINE: CPT

## 2017-12-26 PROCEDURE — 84443 ASSAY THYROID STIM HORMONE: CPT

## 2017-12-26 PROCEDURE — 86703 HIV-1/HIV-2 1 RESULT ANTBDY: CPT

## 2017-12-26 PROCEDURE — 87086 URINE CULTURE/COLONY COUNT: CPT

## 2017-12-26 NOTE — PROGRESS NOTES
Last pregnancy we stopped hypertension meds and she has not taken any since then.  PCP wanted to start labetalol in May because systolic was >90.  We will watch for now.  EKG and urine studies ordered.  She will do Mater[facility] 21

## 2017-12-27 ENCOUNTER — HOSPITAL ENCOUNTER (OUTPATIENT)
Dept: CARDIOLOGY | Facility: CLINIC | Age: 32
Discharge: HOME | End: 2017-12-27
Payer: COMMERCIAL

## 2017-12-27 DIAGNOSIS — O16.1 HYPERTENSION AFFECTING PREGNANCY IN FIRST TRIMESTER: ICD-10-CM

## 2017-12-27 LAB
BACTERIA UR CULT: NORMAL
HIV 1+2 AB+HIV1 P24 AG SERPL QL IA: NEGATIVE
RPR SER QL: NORMAL
RUBV IGG SER-ACNC: 77.7 IU/ML
RUBV IGG SER-IMP: REACTIVE

## 2017-12-27 PROCEDURE — 93000 ELECTROCARDIOGRAM COMPLETE: CPT | Mod: S$GLB,,, | Performed by: INTERNAL MEDICINE

## 2017-12-28 ENCOUNTER — DOCUMENTATION ONLY (OUTPATIENT)
Dept: REHABILITATION | Facility: HOSPITAL | Age: 32
End: 2017-12-28

## 2017-12-28 PROBLEM — R26.89 DECREASED FUNCTIONAL MOBILITY: Status: RESOLVED | Noted: 2017-02-09 | Resolved: 2017-12-28

## 2017-12-28 PROBLEM — R53.1 DECREASED STRENGTH: Status: RESOLVED | Noted: 2017-02-09 | Resolved: 2017-12-28

## 2017-12-28 PROBLEM — M25.60 DECREASED RANGE OF MOTION: Status: RESOLVED | Noted: 2017-02-09 | Resolved: 2017-12-28

## 2017-12-28 NOTE — PROGRESS NOTES
Pt was evaluated on 9/27/17 and was seen 8 times for PT. Pt has not attended PT since 10/23/17. Pt was given HEP. Current status is unknown. Pt to be d/c'd at this time.

## 2018-01-02 ENCOUNTER — TELEPHONE (OUTPATIENT)
Dept: OBSTETRICS AND GYNECOLOGY | Facility: CLINIC | Age: 33
End: 2018-01-02

## 2018-01-02 ENCOUNTER — LAB VISIT (OUTPATIENT)
Dept: LAB | Facility: HOSPITAL | Age: 33
End: 2018-01-02
Attending: OBSTETRICS & GYNECOLOGY
Payer: COMMERCIAL

## 2018-01-02 DIAGNOSIS — O16.1 HYPERTENSION AFFECTING PREGNANCY IN FIRST TRIMESTER: Primary | ICD-10-CM

## 2018-01-02 DIAGNOSIS — Z3A.11 11 WEEKS GESTATION OF PREGNANCY: ICD-10-CM

## 2018-01-02 DIAGNOSIS — O16.1 HYPERTENSION AFFECTING PREGNANCY IN FIRST TRIMESTER: ICD-10-CM

## 2018-01-02 LAB
CREAT CL/1.73 SQ M 12H UR+SERPL-ARVRAT: 56 ML/MIN
CREAT SERPL-MCNC: 0.7 MG/DL
CREAT UR-MCNC: 65 MG/DL
CREATININE, URINE (MG/SPEC): 568.8 MG/SPEC
PROT 24H UR-MRATE: NORMAL MG/SPEC
PROT UR-MCNC: <7 MG/DL
URINE COLLECTION DURATION: 24 HR
URINE COLLECTION DURATION: 24 HR
URINE VOLUME: 875 ML
URINE VOLUME: 875 ML

## 2018-01-02 PROCEDURE — 84156 ASSAY OF PROTEIN URINE: CPT

## 2018-01-02 PROCEDURE — 82575 CREATININE CLEARANCE TEST: CPT

## 2018-01-11 ENCOUNTER — PATIENT MESSAGE (OUTPATIENT)
Dept: OBSTETRICS AND GYNECOLOGY | Facility: CLINIC | Age: 33
End: 2018-01-11

## 2018-01-12 ENCOUNTER — TELEPHONE (OUTPATIENT)
Dept: OBSTETRICS AND GYNECOLOGY | Facility: CLINIC | Age: 33
End: 2018-01-12

## 2018-01-12 NOTE — TELEPHONE ENCOUNTER
Informed pt of normal [id number] results. Pt requested to know sex of baby over the phone. Informed her that she is having a boy. Pt verbalized understanding.

## 2018-01-23 ENCOUNTER — ROUTINE PRENATAL (OUTPATIENT)
Dept: OBSTETRICS AND GYNECOLOGY | Facility: CLINIC | Age: 33
End: 2018-01-23
Payer: COMMERCIAL

## 2018-01-23 VITALS — SYSTOLIC BLOOD PRESSURE: 118 MMHG | BODY MASS INDEX: 23.3 KG/M2 | DIASTOLIC BLOOD PRESSURE: 74 MMHG | WEIGHT: 153.25 LBS

## 2018-01-23 DIAGNOSIS — Z34.82 ENCOUNTER FOR SUPERVISION OF OTHER NORMAL PREGNANCY, SECOND TRIMESTER: ICD-10-CM

## 2018-01-23 DIAGNOSIS — J01.00 ACUTE MAXILLARY SINUSITIS, RECURRENCE NOT SPECIFIED: ICD-10-CM

## 2018-01-23 DIAGNOSIS — Z20.828 EXPOSURE TO THE FLU: ICD-10-CM

## 2018-01-23 DIAGNOSIS — Z3A.15 15 WEEKS GESTATION OF PREGNANCY: Primary | ICD-10-CM

## 2018-01-23 DIAGNOSIS — Z36.89 ENCOUNTER FOR FETAL ANATOMIC SURVEY: ICD-10-CM

## 2018-01-23 PROCEDURE — 99999 PR PBB SHADOW E&M-EST. PATIENT-LVL II: CPT | Mod: PBBFAC,,, | Performed by: OBSTETRICS & GYNECOLOGY

## 2018-01-23 PROCEDURE — 0502F SUBSEQUENT PRENATAL CARE: CPT | Mod: S$GLB,,, | Performed by: OBSTETRICS & GYNECOLOGY

## 2018-01-23 RX ORDER — AMOXICILLIN 875 MG/1
875 TABLET, FILM COATED ORAL EVERY 12 HOURS
Qty: 14 TABLET | Refills: 0 | Status: SHIPPED | OUTPATIENT
Start: 2018-01-23 | End: 2018-01-30

## 2018-01-23 RX ORDER — OSELTAMIVIR PHOSPHATE 75 MG/1
75 CAPSULE ORAL DAILY
Qty: 10 CAPSULE | Refills: 0 | Status: SHIPPED | OUTPATIENT
Start: 2018-01-23 | End: 2018-02-02

## 2018-01-23 NOTE — PROGRESS NOTES
She complains of green discharge from nose.  She has been around a lot of people with the flu at work.  Amoxil 875 BID x 7 days  Tamiflu 75 mg x 10 days.  Precautions given.  Lungs:  CTA

## 2018-01-25 ENCOUNTER — PATIENT MESSAGE (OUTPATIENT)
Dept: ADMINISTRATIVE | Facility: OTHER | Age: 33
End: 2018-01-25

## 2018-01-26 ENCOUNTER — PATIENT OUTREACH (OUTPATIENT)
Dept: OTHER | Facility: OTHER | Age: 33
End: 2018-01-26

## 2018-01-26 NOTE — TELEPHONE ENCOUNTER
Initial introduction with Mrs. April Wendt Schamberger completed and the role of the health coaches for [facility] was explained.     Reviewed the importance of taking weights and blood pressure readings, using the health  as a resource for physical activity and dietary habits, and that [facility] messages will be sent for weeks 20, 30, and 37 home urine tests.  Reviewed that the patient should contact her OB team with any specific questions regarding her pregnancy, and to contact [facility] or 911 in the case of a medical emergency.

## 2018-02-05 ENCOUNTER — OFFICE VISIT (OUTPATIENT)
Dept: URGENT CARE | Facility: CLINIC | Age: 33
End: 2018-02-05
Payer: COMMERCIAL

## 2018-02-05 ENCOUNTER — TELEPHONE (OUTPATIENT)
Dept: OBSTETRICS AND GYNECOLOGY | Facility: CLINIC | Age: 33
End: 2018-02-05

## 2018-02-05 VITALS
SYSTOLIC BLOOD PRESSURE: 121 MMHG | TEMPERATURE: 98 F | HEIGHT: 68 IN | WEIGHT: 153 LBS | RESPIRATION RATE: 18 BRPM | HEART RATE: 106 BPM | OXYGEN SATURATION: 98 % | BODY MASS INDEX: 23.19 KG/M2 | DIASTOLIC BLOOD PRESSURE: 78 MMHG

## 2018-02-05 DIAGNOSIS — J40 BRONCHITIS: ICD-10-CM

## 2018-02-05 DIAGNOSIS — R50.9 FEVER, UNSPECIFIED FEVER CAUSE: ICD-10-CM

## 2018-02-05 DIAGNOSIS — J32.9 SINUSITIS, UNSPECIFIED CHRONICITY, UNSPECIFIED LOCATION: Primary | ICD-10-CM

## 2018-02-05 LAB
CTP QC/QA: YES
CTP QC/QA: YES
FLUAV AG NPH QL: NEGATIVE
FLUBV AG NPH QL: NEGATIVE
S PYO RRNA THROAT QL PROBE: NEGATIVE

## 2018-02-05 PROCEDURE — 87880 STREP A ASSAY W/OPTIC: CPT | Mod: QW,S$GLB,, | Performed by: FAMILY MEDICINE

## 2018-02-05 PROCEDURE — 3008F BODY MASS INDEX DOCD: CPT | Mod: S$GLB,,, | Performed by: FAMILY MEDICINE

## 2018-02-05 PROCEDURE — 87804 INFLUENZA ASSAY W/OPTIC: CPT | Mod: QW,S$GLB,, | Performed by: FAMILY MEDICINE

## 2018-02-05 PROCEDURE — 99214 OFFICE O/P EST MOD 30 MIN: CPT | Mod: S$GLB,,, | Performed by: FAMILY MEDICINE

## 2018-02-05 RX ORDER — CLINDAMYCIN HYDROCHLORIDE 300 MG/1
300 CAPSULE ORAL EVERY 8 HOURS
Qty: 15 CAPSULE | Refills: 0 | Status: SHIPPED | OUTPATIENT
Start: 2018-02-05 | End: 2018-02-10

## 2018-02-05 NOTE — TELEPHONE ENCOUNTER
17 4/7 week OB finished Amoxicillin for a possible sinus infection.  Took Tamiflu to prevent the flu.  Friday started with a cough, congestion, and fever.  She has been taking Mucinex and Robitussin. Recommended she go to Urgent Care of PCP.

## 2018-02-05 NOTE — TELEPHONE ENCOUNTER
Spoke with Dr. Benson.  Sinus infection and bronchitis.  Prescribed Clindamycin.  Reassured her its safe in pregnancy and advised she can take a probiotic.

## 2018-02-05 NOTE — TELEPHONE ENCOUNTER
Pt was calling back to let you know what urgent care prescribed her and also like to know if she can take probiotic with this antibiotic while pregnant.

## 2018-02-05 NOTE — PATIENT INSTRUCTIONS
Bronchitis, Antibiotic Treatment (Adult)    Bronchitis is an infection of the air passages (bronchial tubes) in your lungs. It often occurs when you have a cold. This illness is contagious during the first few days and is spread through the air by coughing and sneezing, or by direct contact (touching the sick person and then touching your own eyes, nose, or mouth).  Symptoms of bronchitis include cough with mucus (phlegm) and low-grade fever. Bronchitis usually lasts 7 to 14 days. Mild cases can be treated with simple home remedies. More severe infection is treated with an antibiotic.  Home care  Follow these guidelines when caring for yourself at home:  · If your symptoms are severe, rest at home for the first 2 to 3 days. When you go back to your usual activities, don't let yourself get too tired.  · Do not smoke. Also avoid being exposed to secondhand smoke.  · You may use over-the-counter medicines to control fever or pain, unless another medicine was prescribed. (Note: If you have chronic liver or kidney disease or have ever had a stomach ulcer or gastrointestinal bleeding, talk with your healthcare provider before using these medicines. Also talk to your provider if you are taking medicine to prevent blood clots.) Aspirin should never be given to anyone younger than 18 years of age who is ill with a viral infection or fever. It may cause severe liver or brain damage.  · Your appetite may be poor, so a light diet is fine. Avoid dehydration by drinking 6 to 8 glasses of fluids per day (such as water, soft drinks, sports drinks, juices, tea, or soup). Extra fluids will help loosen secretions in the nose and lungs.  · Over-the-counter cough, cold, and sore-throat medicines will not shorten the length of the illness, but they may be helpful to reduce symptoms. (Note: Do not use decongestants if you have high blood pressure.)  · Finish all antibiotic medicine. Do this even if you are feeling better after only a  few days.  Follow-up care  Follow up with your healthcare provider, or as advised. If you had an X-ray or ECG (electrocardiogram), a specialist will review it. You will be notified of any new findings that may affect your care.  Note: If you are age 65 or older, or if you have a chronic lung disease or condition that affects your immune system, or you smoke, talk to your healthcare provider about having pneumococcal vaccinations and a yearly influenza vaccination (flu shot).  When to seek medical advice  Call your healthcare provider right away if any of these occur:  · Fever of 100.4°F (38°C) or higher  · Coughing up increased amounts of colored sputum  · Weakness, drowsiness, headache, facial pain, ear pain, or a stiff neck  Call 911, or get immediate medical care  Contact emergency services right away if any of these occur.  · Coughing up blood  · Worsening weakness, drowsiness, headache, or stiff neck  · Trouble breathing, wheezing, or pain with breathing  Date Last Reviewed: 9/13/2015 © 2000-2017 [employer]. [address] [place] [address]. All rights reserved. This information is not intended as a substitute for professional medical care. Always follow your healthcare professional's instructions.        Sinusitis (Antibiotic Treatment)    The sinuses are air-filled spaces within the bones of the face. They connect to the inside of the nose. Sinusitis is an inflammation of the tissue lining the sinus cavity. Sinus inflammation can occur during a cold. It can also be due to allergies to pollens and other particles in the air. Sinusitis can cause symptoms of sinus congestion and fullness. A sinus infection causes fever, headache and facial pain. There is often green or yellow drainage from the nose or into the back of the throat (post-nasal drip). You have been given antibiotics to treat this condition.  Home care:  · Take the full course of antibiotics as instructed. Do not stop taking  them, even if you feel better.  · Drink plenty of water, hot tea, and other liquids. This may help thin mucus. It also may promote sinus drainage.  · Heat may help soothe painful areas of the face. Use a towel soaked in hot water. Or,  the shower and direct the hot spray onto your face. Using a vaporizer along with a menthol rub at night may also help.   · An expectorant containing guaifenesin may help thin the mucus and promote drainage from the sinuses.  · Over-the-counter decongestants may be used unless a similar medicine was prescribed. Nasal sprays work the fastest. Use one that contains phenylephrine or oxymetazoline. First blow the nose gently. Then use the spray. Do not use these medicines more often than directed on the label or symptoms may get worse. You may also use tablets containing pseudoephedrine. Avoid products that combine ingredients, because side effects may be increased. Read labels. You can also ask the pharmacist for help. (NOTE: Persons with high blood pressure should not use decongestants. They can raise blood pressure.)  · Over-the-counter antihistamines may help if allergies contributed to your sinusitis.    · Do not use nasal rinses or irrigation during an acute sinus infection, unless told to by your health care provider. Rinsing may spread the infection to other sinuses.  · Use acetaminophen or ibuprofen to control pain, unless another pain medicine was prescribed. (If you have chronic liver or kidney disease or ever had a stomach ulcer, talk with your doctor before using these medicines. Aspirin should never be used in anyone under 18 years of age who is ill with a fever. It may cause severe liver damage.)  · Don't smoke. This can worsen symptoms.  Follow-up care  Follow up with your healthcare provider or our staff if you are not improving within the next week.  When to seek medical advice  Call your healthcare provider if any of these occur:  · Facial pain or headache  becoming more severe  · Stiff neck  · Unusual drowsiness or confusion  · Swelling of the forehead or eyelids  · Vision problems, including blurred or double vision  · Fever of 100.4ºF (38ºC) or higher, or as directed by your healthcare provider  · Seizure  · Breathing problems  · Symptoms not resolving within 10 days  Date Last Reviewed: 4/13/2015  © 7821-4345 [employer]. [address] [place] [address], [place] [address]. All rights reserved. This information is not intended as a substitute for professional medical care. Always follow your healthcare professional's instructions.      CONTINUE TO USE OVER THE COUNTER MEDICATIONS THAT YOUR OB HAS DESIGNATED AS SAFE AND FOLLOW UP WITH YOUR OB AS DIRECTED.

## 2018-02-05 NOTE — TELEPHONE ENCOUNTER
Swing pt calling, ob 17wks Pt just finished the Tamiflu and amoxicillin ans started with a fever last night she wants to know what she should do.# [phone]

## 2018-02-05 NOTE — PROGRESS NOTES
"Subjective:       Patient ID: April Wendt Schamberger is a 32 y.o. female.    Vitals:  height is 5' 8" (1.727 m) and weight is 69.4 kg (153 lb). Her tympanic temperature is 97.6 °F (36.4 °C). Her blood pressure is 121/78 and her pulse is 106. Her respiration is 18 and oxygen saturation is 98%.     Chief Complaint: Fever    Patient states she call her OB this morning because she started running a fever last night. Patient states her OB gave her tamiflu and amoxicillin. Patient states she finished amoxicillin on Wednesday and tamiflu on Thursday. Patient states her OB told her to come get tested for the flu and strep.      Fever    This is a new problem. The current episode started yesterday. The problem occurs constantly. The problem has been unchanged. The maximum temperature noted was 101 to 101.9 F. Temperature source: temporal. Associated symptoms include congestion, coughing, diarrhea, ear pain, headaches, muscle aches, nausea and a sore throat. Pertinent negatives include no abdominal pain, chest pain or wheezing. She has tried acetaminophen for the symptoms. The treatment provided mild relief.     Review of Systems   Constitution: Positive for chills, fever and malaise/fatigue.   HENT: Positive for congestion, ear pain and sore throat. Negative for hoarse voice.    Eyes: Negative for discharge and redness.   Cardiovascular: Negative for chest pain, dyspnea on exertion and leg swelling.   Respiratory: Positive for cough and sputum production. Negative for shortness of breath and wheezing.    Musculoskeletal: Negative for myalgias.   Gastrointestinal: Positive for diarrhea and nausea. Negative for abdominal pain.   Neurological: Positive for headaches.       Objective:      Physical Exam   Constitutional: She is oriented to person, place, and time. She appears well-developed and well-nourished.   HENT:   Head: Normocephalic and atraumatic. Head is without abrasion, without contusion and without laceration. "   Right Ear: External ear normal.   Left Ear: External ear normal.   Nose: Right sinus exhibits no maxillary sinus tenderness and no frontal sinus tenderness. Left sinus exhibits maxillary sinus tenderness. Left sinus exhibits no frontal sinus tenderness.   Mouth/Throat: Posterior oropharyngeal erythema present.   Eyes: Conjunctivae and lids are normal. Pupils are equal, round, and reactive to light.   Neck: Trachea normal, full passive range of motion without pain and phonation normal. Neck supple.   Cardiovascular: Normal rate, regular rhythm and normal heart sounds.    Pulmonary/Chest: Effort normal. No stridor. No respiratory distress. She has no decreased breath sounds. She has no wheezes. She has rhonchi in the right lower field and the left lower field. She has no rales.   Patient has a few rhonchi in low lung fields that clear wilth cough.     Musculoskeletal: Normal range of motion.   Lymphadenopathy:        Head (right side): No submental and no submandibular adenopathy present.        Head (left side): No submental and no submandibular adenopathy present.     She has cervical adenopathy.   Neurological: She is alert and oriented to person, place, and time.   Skin: Skin is warm, dry and intact. Capillary refill takes less than 2 seconds. No abrasion, no bruising, no burn, no ecchymosis, no laceration, no lesion and no rash noted. No erythema.   Psychiatric: She has a normal mood and affect. Her speech is normal and behavior is normal. Judgment and thought content normal. Cognition and memory are normal.   Nursing note and vitals reviewed.      Assessment:       1. Sinusitis, unspecified chronicity, unspecified location    2. Fever, unspecified fever cause    3. Bronchitis        Plan:         Sinusitis, unspecified chronicity, unspecified location  -     clindamycin (CLEOCIN) 300 MG capsule; Take 1 capsule (300 mg total) by mouth every 8 (eight) hours.  Dispense: 15 capsule; Refill: 0    Fever, unspecified  fever cause  -     POCT Influenza A/B  -     POCT rapid strep A    Bronchitis  -     clindamycin (CLEOCIN) 300 MG capsule; Take 1 capsule (300 mg total) by mouth every 8 (eight) hours.  Dispense: 15 capsule; Refill: 0      Follow Up Comments   Make sure that you follow up with your primary care doctor in the next 2-5 days if needed .  Return to the Urgent Care if signs or symptoms change and certainly if you have worsening symptoms go to the nearest emergency department for further evaluation.

## 2018-02-15 ENCOUNTER — OFFICE VISIT (OUTPATIENT)
Dept: MATERNAL FETAL MEDICINE | Facility: CLINIC | Age: 33
End: 2018-02-15
Attending: OBSTETRICS & GYNECOLOGY
Payer: COMMERCIAL

## 2018-02-15 DIAGNOSIS — O09.522 ADVANCED MATERNAL AGE IN MULTIGRAVIDA, SECOND TRIMESTER: ICD-10-CM

## 2018-02-15 DIAGNOSIS — O09.523 ELDERLY MULTIGRAVIDA IN THIRD TRIMESTER: ICD-10-CM

## 2018-02-15 DIAGNOSIS — Z36.89 ENCOUNTER FOR FETAL ANATOMIC SURVEY: ICD-10-CM

## 2018-02-15 DIAGNOSIS — Z36.89 ENCOUNTER FOR ULTRASOUND TO CHECK FETAL GROWTH: Primary | ICD-10-CM

## 2018-02-15 PROCEDURE — 76811 OB US DETAILED SNGL FETUS: CPT | Mod: S$GLB,,, | Performed by: OBSTETRICS & GYNECOLOGY

## 2018-02-15 PROCEDURE — 99499 UNLISTED E&M SERVICE: CPT | Mod: S$GLB,,, | Performed by: OBSTETRICS & GYNECOLOGY

## 2018-02-22 ENCOUNTER — PATIENT MESSAGE (OUTPATIENT)
Dept: ADMINISTRATIVE | Facility: OTHER | Age: 33
End: 2018-02-22

## 2018-02-23 ENCOUNTER — PATIENT MESSAGE (OUTPATIENT)
Dept: OBSTETRICS AND GYNECOLOGY | Facility: CLINIC | Age: 33
End: 2018-02-23

## 2018-03-13 ENCOUNTER — PATIENT MESSAGE (OUTPATIENT)
Dept: OBSTETRICS AND GYNECOLOGY | Facility: CLINIC | Age: 33
End: 2018-03-13

## 2018-03-14 ENCOUNTER — ROUTINE PRENATAL (OUTPATIENT)
Dept: OBSTETRICS AND GYNECOLOGY | Facility: CLINIC | Age: 33
End: 2018-03-14
Payer: COMMERCIAL

## 2018-03-14 VITALS — BODY MASS INDEX: 24.55 KG/M2 | SYSTOLIC BLOOD PRESSURE: 104 MMHG | WEIGHT: 161.5 LBS | DIASTOLIC BLOOD PRESSURE: 60 MMHG

## 2018-03-14 DIAGNOSIS — O26.892 VAGINAL DISCHARGE DURING PREGNANCY IN SECOND TRIMESTER: ICD-10-CM

## 2018-03-14 DIAGNOSIS — Z3A.22 22 WEEKS GESTATION OF PREGNANCY: Primary | ICD-10-CM

## 2018-03-14 DIAGNOSIS — N89.8 VAGINAL DISCHARGE DURING PREGNANCY IN SECOND TRIMESTER: ICD-10-CM

## 2018-03-14 PROCEDURE — 87480 CANDIDA DNA DIR PROBE: CPT

## 2018-03-14 PROCEDURE — 99999 PR PBB SHADOW E&M-EST. PATIENT-LVL III: CPT | Mod: PBBFAC,,, | Performed by: NURSE PRACTITIONER

## 2018-03-14 PROCEDURE — 0502F SUBSEQUENT PRENATAL CARE: CPT | Mod: S$GLB,,, | Performed by: NURSE PRACTITIONER

## 2018-03-14 NOTE — PROGRESS NOTES
Here today for check-up; c/o feeling vaginal pressure when she's standing or walking.  States that this pressure resolves and is gone when she is seated or lying down.  Denies cramps or vaginal bleeding.  Feels more like the baby is just lying low.  Denies abnormal vag d/c or odor or irritation.  Exam:  Gravid abdomen soft to palpation.  Cervix visualized with speculum & palpated bimanually: closed/long/high.  Moderate amount white creamy d/c noted (Affirm swab collected); no erythema, bleeding, or lesions noted.  No LOF noted with Valsalva.  Reassured pt that she does not appear to be in labor, and cervix is appropriate (C/T/H).  Labor/bleeding prec given.  Advised pt that d/c may be normal, but will r/o infection.  U/A done in office today all normal, and pt denies any urinary s/s.

## 2018-03-16 ENCOUNTER — OFFICE VISIT (OUTPATIENT)
Dept: MATERNAL FETAL MEDICINE | Facility: CLINIC | Age: 33
End: 2018-03-16
Payer: COMMERCIAL

## 2018-03-16 ENCOUNTER — TELEPHONE (OUTPATIENT)
Dept: OBSTETRICS AND GYNECOLOGY | Facility: CLINIC | Age: 33
End: 2018-03-16

## 2018-03-16 DIAGNOSIS — Z36.89 ENCOUNTER FOR ULTRASOUND TO CHECK FETAL GROWTH: ICD-10-CM

## 2018-03-16 DIAGNOSIS — O09.523 ELDERLY MULTIGRAVIDA IN THIRD TRIMESTER: ICD-10-CM

## 2018-03-16 PROCEDURE — 76816 OB US FOLLOW-UP PER FETUS: CPT | Mod: S$GLB,,, | Performed by: OBSTETRICS & GYNECOLOGY

## 2018-03-16 PROCEDURE — 99499 UNLISTED E&M SERVICE: CPT | Mod: S$GLB,,, | Performed by: OBSTETRICS & GYNECOLOGY

## 2018-03-16 NOTE — PROGRESS NOTES
Your vaginal swab from the office came back negative.  This was a test for a yeast infection or a bacterial infection.  Your swab was normal.  Please call the office if you have any other questions.    Sincerely,   TATYANA Desai

## 2018-03-16 NOTE — TELEPHONE ENCOUNTER
----- Message from Marilee Luther NP sent at 3/16/2018  1:42 PM CDT -----  Your vaginal swab from the office came back negative.  This was a test for a yeast infection or a bacterial infection.  Your swab was normal.  Please call the office if you have any other questions.    Sincerely,   TATYANA Desai

## 2018-03-16 NOTE — LETTER
March 16, 2018      Risa Alvarez MD  [address]  [address]  [facility] [address]           [facility]  [address]  [facility] [address]  Phone: [phone]          Patient: April Wendt Schamberger   MR Number: [id number]   YOB: 1985   Date of Visit: 3/16/2018       Dear Dr. Risa Alvarez:    Thank you for referring April Schamberger to me for evaluation. Attached you will find relevant portions of my assessment and plan of care.    If you have questions, please do not hesitate to call me. I look forward to following April Schamberger along with you.    Sincerely,    Saige Ellis MD    Enclosure  CC:  No Recipients    If you would like to receive this communication electronically, please contact [email or web address] or [phone] to request more information on [employer] Link access.    For providers and/or their staff who would like to refer a patient to [facility], please contact us through our one-stop-shop provider referral line, [facility], at [phone].    If you feel you have received this communication in error or would no longer like to receive these types of communications, please e-mail [email or web address]

## 2018-03-22 ENCOUNTER — ROUTINE PRENATAL (OUTPATIENT)
Dept: OBSTETRICS AND GYNECOLOGY | Facility: CLINIC | Age: 33
End: 2018-03-22
Payer: COMMERCIAL

## 2018-03-22 VITALS
SYSTOLIC BLOOD PRESSURE: 110 MMHG | WEIGHT: 160.81 LBS | BODY MASS INDEX: 24.45 KG/M2 | DIASTOLIC BLOOD PRESSURE: 62 MMHG

## 2018-03-22 DIAGNOSIS — Z3A.24 24 WEEKS GESTATION OF PREGNANCY: Primary | ICD-10-CM

## 2018-03-22 DIAGNOSIS — Z34.82 ENCOUNTER FOR SUPERVISION OF OTHER NORMAL PREGNANCY, SECOND TRIMESTER: ICD-10-CM

## 2018-03-22 PROCEDURE — 0502F SUBSEQUENT PRENATAL CARE: CPT | Mod: S$GLB,,, | Performed by: OBSTETRICS & GYNECOLOGY

## 2018-03-22 PROCEDURE — 99999 PR PBB SHADOW E&M-EST. PATIENT-LVL II: CPT | Mod: PBBFAC,,, | Performed by: OBSTETRICS & GYNECOLOGY

## 2018-04-06 ENCOUNTER — PATIENT MESSAGE (OUTPATIENT)
Dept: OBSTETRICS AND GYNECOLOGY | Facility: CLINIC | Age: 33
End: 2018-04-06

## 2018-04-18 ENCOUNTER — LAB VISIT (OUTPATIENT)
Dept: LAB | Facility: HOSPITAL | Age: 33
End: 2018-04-18
Attending: OBSTETRICS & GYNECOLOGY
Payer: COMMERCIAL

## 2018-04-18 ENCOUNTER — ROUTINE PRENATAL (OUTPATIENT)
Dept: OBSTETRICS AND GYNECOLOGY | Facility: CLINIC | Age: 33
End: 2018-04-18
Payer: COMMERCIAL

## 2018-04-18 VITALS
WEIGHT: 170.31 LBS | BODY MASS INDEX: 25.89 KG/M2 | SYSTOLIC BLOOD PRESSURE: 122 MMHG | DIASTOLIC BLOOD PRESSURE: 74 MMHG

## 2018-04-18 DIAGNOSIS — Z34.82 ENCOUNTER FOR SUPERVISION OF OTHER NORMAL PREGNANCY, SECOND TRIMESTER: ICD-10-CM

## 2018-04-18 DIAGNOSIS — Z3A.27 27 WEEKS GESTATION OF PREGNANCY: ICD-10-CM

## 2018-04-18 DIAGNOSIS — Z34.83 ENCOUNTER FOR SUPERVISION OF OTHER NORMAL PREGNANCY, THIRD TRIMESTER: ICD-10-CM

## 2018-04-18 LAB
BASOPHILS # BLD AUTO: 0.01 K/UL
BASOPHILS NFR BLD: 0.1 %
DIFFERENTIAL METHOD: ABNORMAL
EOSINOPHIL # BLD AUTO: 0.1 K/UL
EOSINOPHIL NFR BLD: 0.6 %
ERYTHROCYTE [DISTWIDTH] IN BLOOD BY AUTOMATED COUNT: 13.4 %
GLUCOSE SERPL-MCNC: 102 MG/DL
HCT VFR BLD AUTO: 37.9 %
HGB BLD-MCNC: 12.1 G/DL
IMM GRANULOCYTES # BLD AUTO: 0.1 K/UL
IMM GRANULOCYTES NFR BLD AUTO: 1 %
LYMPHOCYTES # BLD AUTO: 1.7 K/UL
LYMPHOCYTES NFR BLD: 17.3 %
MCH RBC QN AUTO: 30.7 PG
MCHC RBC AUTO-ENTMCNC: 31.9 G/DL
MCV RBC AUTO: 96 FL
MONOCYTES # BLD AUTO: 0.5 K/UL
MONOCYTES NFR BLD: 5.3 %
NEUTROPHILS # BLD AUTO: 7.4 K/UL
NEUTROPHILS NFR BLD: 75.7 %
NRBC BLD-RTO: 0 /100 WBC
PLATELET # BLD AUTO: 226 K/UL
PMV BLD AUTO: 11.7 FL
RBC # BLD AUTO: 3.94 M/UL
WBC # BLD AUTO: 9.78 K/UL

## 2018-04-18 PROCEDURE — 85025 COMPLETE CBC W/AUTO DIFF WBC: CPT

## 2018-04-18 PROCEDURE — 0502F SUBSEQUENT PRENATAL CARE: CPT | Mod: S$GLB,,, | Performed by: OBSTETRICS & GYNECOLOGY

## 2018-04-18 PROCEDURE — 99999 PR PBB SHADOW E&M-EST. PATIENT-LVL II: CPT | Mod: PBBFAC,,, | Performed by: OBSTETRICS & GYNECOLOGY

## 2018-04-18 PROCEDURE — 82950 GLUCOSE TEST: CPT

## 2018-04-23 ENCOUNTER — PATIENT MESSAGE (OUTPATIENT)
Dept: OBSTETRICS AND GYNECOLOGY | Facility: CLINIC | Age: 33
End: 2018-04-23

## 2018-05-02 ENCOUNTER — CLINICAL SUPPORT (OUTPATIENT)
Dept: OBSTETRICS AND GYNECOLOGY | Facility: CLINIC | Age: 33
End: 2018-05-02
Payer: COMMERCIAL

## 2018-05-02 ENCOUNTER — ROUTINE PRENATAL (OUTPATIENT)
Dept: OBSTETRICS AND GYNECOLOGY | Facility: CLINIC | Age: 33
End: 2018-05-02
Payer: COMMERCIAL

## 2018-05-02 VITALS
SYSTOLIC BLOOD PRESSURE: 136 MMHG | BODY MASS INDEX: 26.28 KG/M2 | WEIGHT: 172.81 LBS | DIASTOLIC BLOOD PRESSURE: 80 MMHG

## 2018-05-02 DIAGNOSIS — N89.8 VAGINAL DISCHARGE: ICD-10-CM

## 2018-05-02 DIAGNOSIS — J06.9 UPPER RESPIRATORY INFECTION, ACUTE: ICD-10-CM

## 2018-05-02 DIAGNOSIS — Z28.21 REFUSES TETANUS, DIPHTHERIA, AND ACELLULAR PERTUSSIS (TDAP) VACCINATION: Primary | ICD-10-CM

## 2018-05-02 DIAGNOSIS — Z23 NEED FOR TDAP VACCINATION: ICD-10-CM

## 2018-05-02 DIAGNOSIS — Z3A.29 29 WEEKS GESTATION OF PREGNANCY: Primary | ICD-10-CM

## 2018-05-02 DIAGNOSIS — Z34.83 ENCOUNTER FOR SUPERVISION OF OTHER NORMAL PREGNANCY, THIRD TRIMESTER: ICD-10-CM

## 2018-05-02 PROBLEM — R50.9 FEVER: Status: RESOLVED | Noted: 2018-02-05 | Resolved: 2018-05-02

## 2018-05-02 PROCEDURE — 87480 CANDIDA DNA DIR PROBE: CPT

## 2018-05-02 PROCEDURE — 0502F SUBSEQUENT PRENATAL CARE: CPT | Mod: S$GLB,,, | Performed by: OBSTETRICS & GYNECOLOGY

## 2018-05-02 PROCEDURE — 87510 GARDNER VAG DNA DIR PROBE: CPT

## 2018-05-02 PROCEDURE — 99999 PR PBB SHADOW E&M-EST. PATIENT-LVL I: CPT | Mod: PBBFAC,,,

## 2018-05-02 PROCEDURE — 99999 PR PBB SHADOW E&M-EST. PATIENT-LVL III: CPT | Mod: PBBFAC,,, | Performed by: OBSTETRICS & GYNECOLOGY

## 2018-05-02 RX ORDER — CEPHALEXIN 500 MG/1
500 CAPSULE ORAL 4 TIMES DAILY
Qty: 28 CAPSULE | Refills: 0 | Status: SHIPPED | OUTPATIENT
Start: 2018-05-02 | End: 2018-05-09

## 2018-05-02 NOTE — PROGRESS NOTES
Tdap next visit  She has a mild vaginal discharge.  AFFIRM done.  Minimal white discharge seen.  CVX closed  She has a soar throat and has yellow discharge from nose. Treated x 2 for sinusitis this pregnancy.  No sinus tenderness. Lungs:  CTA  Keflex 500 mg QID x 7 days.

## 2018-05-09 ENCOUNTER — PATIENT MESSAGE (OUTPATIENT)
Dept: ADMINISTRATIVE | Facility: OTHER | Age: 33
End: 2018-05-09

## 2018-05-16 ENCOUNTER — CLINICAL SUPPORT (OUTPATIENT)
Dept: OBSTETRICS AND GYNECOLOGY | Facility: CLINIC | Age: 33
End: 2018-05-16
Payer: COMMERCIAL

## 2018-05-16 ENCOUNTER — OFFICE VISIT (OUTPATIENT)
Dept: MATERNAL FETAL MEDICINE | Facility: CLINIC | Age: 33
End: 2018-05-16
Attending: OBSTETRICS & GYNECOLOGY
Payer: COMMERCIAL

## 2018-05-16 ENCOUNTER — ROUTINE PRENATAL (OUTPATIENT)
Dept: OBSTETRICS AND GYNECOLOGY | Facility: CLINIC | Age: 33
End: 2018-05-16
Payer: COMMERCIAL

## 2018-05-16 VITALS
SYSTOLIC BLOOD PRESSURE: 120 MMHG | BODY MASS INDEX: 26.58 KG/M2 | DIASTOLIC BLOOD PRESSURE: 70 MMHG | WEIGHT: 174.81 LBS

## 2018-05-16 DIAGNOSIS — Z23 NEED FOR TDAP VACCINATION: Primary | ICD-10-CM

## 2018-05-16 DIAGNOSIS — Z34.83 ENCOUNTER FOR SUPERVISION OF OTHER NORMAL PREGNANCY, THIRD TRIMESTER: ICD-10-CM

## 2018-05-16 DIAGNOSIS — O10.919 CHRONIC HYPERTENSION AFFECTING PREGNANCY: ICD-10-CM

## 2018-05-16 DIAGNOSIS — O10.919 CHRONIC HYPERTENSION IN PREGNANCY: ICD-10-CM

## 2018-05-16 DIAGNOSIS — O09.523 ELDERLY MULTIGRAVIDA IN THIRD TRIMESTER: ICD-10-CM

## 2018-05-16 DIAGNOSIS — Z3A.31 31 WEEKS GESTATION OF PREGNANCY: ICD-10-CM

## 2018-05-16 DIAGNOSIS — Z36.89 ENCOUNTER FOR ULTRASOUND TO CHECK FETAL GROWTH: ICD-10-CM

## 2018-05-16 PROCEDURE — 90471 IMMUNIZATION ADMIN: CPT | Mod: S$GLB,,, | Performed by: OBSTETRICS & GYNECOLOGY

## 2018-05-16 PROCEDURE — 90715 TDAP VACCINE 7 YRS/> IM: CPT | Mod: S$GLB,,, | Performed by: OBSTETRICS & GYNECOLOGY

## 2018-05-16 PROCEDURE — 99999 PR PBB SHADOW E&M-EST. PATIENT-LVL III: CPT | Mod: PBBFAC,,, | Performed by: OBSTETRICS & GYNECOLOGY

## 2018-05-16 PROCEDURE — 99499 UNLISTED E&M SERVICE: CPT | Mod: S$GLB,,, | Performed by: OBSTETRICS & GYNECOLOGY

## 2018-05-16 PROCEDURE — 76816 OB US FOLLOW-UP PER FETUS: CPT | Mod: S$GLB,,, | Performed by: OBSTETRICS & GYNECOLOGY

## 2018-05-16 PROCEDURE — 0502F SUBSEQUENT PRENATAL CARE: CPT | Mod: S$GLB,,, | Performed by: OBSTETRICS & GYNECOLOGY

## 2018-05-16 NOTE — PROGRESS NOTES
Ordering Provider: Dr. Alvarez    During visit today patient received an injection of Tdap to left deltoid.  Tolerated well.  Instructed pt to remain 15 minutes after injection to monitor for reactions.      Pre pain scale: none    Post pain scale: none

## 2018-05-16 NOTE — PROGRESS NOTES
OB Ultrasound Report (see PDF version under imaging tab)    Indication  ========    Follow-up evaluation for fetal growth.    History  ======    General History  Other: IGLESIA CAMARGO,  Previous Outcomes   2  Para 1  Risk Factors  Details: AMA    Pregnancy History  ===============    Maternal Lab Tests  Test: Cell Free DNA Testing  Result: Materni T21- negative  Wants to know gender: yes    Method  ======    Transabdominal ultrasound examination, [place]on E10. View: Good view.    Pregnancy  =========    [place] pregnancy. Number of fetuses: 1.    Dating  ======    Cycle: regular cycle  Ultrasound examination on: 2018  GA by U/S based upon: AC, BPD, Femur, HC  GA by U/S 34 w + 2 d  VARGAS by U/S: 2018  Assigned: Dating performed on 2017, based on the LMP  Assigned GA 31 w + 6 d  Assigned VARGAS: 2018    General Evaluation  ===============    Cardiac activity: present.  bpm.  Fetal movements: visualized.  Presentation: cephalic.  Placenta:  Placental site: Fundal.  Umbilical cord: Cord vessels: 3 vessel cord.  Amniotic fluid: Amount of AF: normal amount. MVP 7.3 cm. MARTINEZ 15.8 cm. Q1 2.8 cm, Q2 6.6 cm, Q3 5.1 cm, Q4 1.3 cm.    Fetal Biometry  ===========    Fetal Biometry  BPD 86.3 mm 34w 6d Hadlock  .8 mm 35w 4d Nasrin  .2 mm 34w 2d Hadlock  .6 mm 34w 4d Hadlock  Femur 64.9 mm 33w 3d Hadlock  EFW 2,380 g 61% Oscar  Calculated by: Hadlock (BPD-HC-AC-FL)  EFW (lb) 5 lb  EFW (oz) 4 oz  Cephalic index 0.80  HC / AC 1.01  FL / BPD 0.75  FL / AC 0.21  MVP 7.3 cm  MARTINEZ 15.8 cm   bpm    Fetal Anatomy  ===========    Cranium: normal  4-chamber view: normal  Ductal arch: normal  Stomach: normal  Kidneys: normal  Bladder: normal  Wants to know gender: yes    Maternal Structures  ===============    Ovaries / Tubes / Adnexa  Rt ovary: Suboptimal  Lt ovary: Not visualized    Impression  =========    Fetal size is AGA with the EFW at the 61st percentile.  Normal repeat  limited fetal anatomic survey. AFV is normal. Follow-up ultrasound as clinically indicated.

## 2018-05-16 NOTE — LETTER
May 16, 2018      Risa Alvarez MD  [address]  [address]  [facility] [address]           [facility]  [address]  [facility] [address]  Phone: [phone]          Patient: April Wendt Schamberger   MR Number: [id number]   YOB: 1985   Date of Visit: 5/16/2018       Dear Dr. Risa Alvarez:    Thank you for referring April Schamberger to me for evaluation. Attached you will find relevant portions of my assessment and plan of care.    If you have questions, please do not hesitate to call me. I look forward to following April Schamberger along with you.    Sincerely,    Jessica Mcclelland MD    Enclosure  CC:  No Recipients    If you would like to receive this communication electronically, please contact [email or web address] or [phone] to request more information on [employer] Link access.    For providers and/or their staff who would like to refer a patient to [facility], please contact us through our one-stop-shop provider referral line, [facility], at [phone].    If you feel you have received this communication in error or would no longer like to receive these types of communications, please e-mail [email or web address]

## 2018-05-23 ENCOUNTER — ROUTINE PRENATAL (OUTPATIENT)
Dept: OBSTETRICS AND GYNECOLOGY | Facility: CLINIC | Age: 33
End: 2018-05-23
Payer: COMMERCIAL

## 2018-05-23 ENCOUNTER — PATIENT MESSAGE (OUTPATIENT)
Dept: OBSTETRICS AND GYNECOLOGY | Facility: CLINIC | Age: 33
End: 2018-05-23

## 2018-05-23 VITALS
DIASTOLIC BLOOD PRESSURE: 70 MMHG | WEIGHT: 180.75 LBS | SYSTOLIC BLOOD PRESSURE: 114 MMHG | BODY MASS INDEX: 27.49 KG/M2

## 2018-05-23 DIAGNOSIS — Z3A.32 32 WEEKS GESTATION OF PREGNANCY: Primary | ICD-10-CM

## 2018-05-23 DIAGNOSIS — O10.919 CHRONIC HYPERTENSION IN PREGNANCY: ICD-10-CM

## 2018-05-23 DIAGNOSIS — Z34.83 ENCOUNTER FOR SUPERVISION OF OTHER NORMAL PREGNANCY, THIRD TRIMESTER: ICD-10-CM

## 2018-05-23 DIAGNOSIS — Z36.89 NST (NON-STRESS TEST) REACTIVE: ICD-10-CM

## 2018-05-23 PROCEDURE — 0502F SUBSEQUENT PRENATAL CARE: CPT | Mod: S$GLB,,, | Performed by: NURSE PRACTITIONER

## 2018-05-23 PROCEDURE — 99999 PR PBB SHADOW E&M-EST. PATIENT-LVL III: CPT | Mod: PBBFAC,,, | Performed by: NURSE PRACTITIONER

## 2018-05-23 PROCEDURE — 59025 FETAL NON-STRESS TEST: CPT | Mod: S$GLB,,, | Performed by: NURSE PRACTITIONER

## 2018-05-23 NOTE — PROGRESS NOTES
NST  REPORT:    RE: April Wendt Schamberger  AGE:  33 y.o.          at 32w6d gestational age here today for a NST.         EDC:   2018, by Last Menstrual Period    Date:  2018  PRIMARY PHYSICIAN: Swing    MEDICATIONS AT TIME OF TEST:  Current Outpatient Prescriptions   Medication Sig Dispense Refill    doxylamine succinate (UNISOM) tablet Take 12.5 mg by mouth.      LOPERAMIDE HCL (IMODIUM A-D ORAL) Take 4 tablets by mouth every morning.       PNV NO.95/FERROUS FUM/FOLIC AC (PRENATAL ORAL) Take by mouth.       No current facility-administered medications for this visit.        NST Indication:  H/O chronic hypertension    Interpretation:  125 BPM baseline,   Variability Moderate, Accelerations Reactive,   Decelerations none.   TOCO: none    Assessment::  reactive    Plan: discussed fetal movement, NST reactive      Labor/bleeding/decreased Fm prec given

## 2018-05-25 NOTE — TELEPHONE ENCOUNTER
Pt was notified about FMLA papers being sent back to be corrected. Pt will call them to let them know it needs to be corrected, per Dr Alvarez

## 2018-05-29 ENCOUNTER — PATIENT MESSAGE (OUTPATIENT)
Dept: OBSTETRICS AND GYNECOLOGY | Facility: CLINIC | Age: 33
End: 2018-05-29

## 2018-05-30 ENCOUNTER — ROUTINE PRENATAL (OUTPATIENT)
Dept: OBSTETRICS AND GYNECOLOGY | Facility: CLINIC | Age: 33
End: 2018-05-30
Payer: COMMERCIAL

## 2018-05-30 ENCOUNTER — CLINICAL SUPPORT (OUTPATIENT)
Dept: OBSTETRICS AND GYNECOLOGY | Facility: CLINIC | Age: 33
End: 2018-05-30
Payer: COMMERCIAL

## 2018-05-30 VITALS
WEIGHT: 180.44 LBS | BODY MASS INDEX: 27.44 KG/M2 | DIASTOLIC BLOOD PRESSURE: 74 MMHG | SYSTOLIC BLOOD PRESSURE: 116 MMHG

## 2018-05-30 DIAGNOSIS — Z3A.33 33 WEEKS GESTATION OF PREGNANCY: Primary | ICD-10-CM

## 2018-05-30 DIAGNOSIS — Z34.83 ENCOUNTER FOR SUPERVISION OF OTHER NORMAL PREGNANCY, THIRD TRIMESTER: ICD-10-CM

## 2018-05-30 DIAGNOSIS — Z3A.33 33 WEEKS GESTATION OF PREGNANCY: ICD-10-CM

## 2018-05-30 DIAGNOSIS — I10 BENIGN ESSENTIAL HYPERTENSION: Primary | ICD-10-CM

## 2018-05-30 DIAGNOSIS — O10.919 CHRONIC HYPERTENSION IN PREGNANCY: ICD-10-CM

## 2018-05-30 PROCEDURE — 59025 FETAL NON-STRESS TEST: CPT | Mod: S$GLB,,, | Performed by: OBSTETRICS & GYNECOLOGY

## 2018-05-30 PROCEDURE — 99999 PR PBB SHADOW E&M-EST. PATIENT-LVL II: CPT | Mod: PBBFAC,,, | Performed by: OBSTETRICS & GYNECOLOGY

## 2018-05-30 PROCEDURE — 0502F SUBSEQUENT PRENATAL CARE: CPT | Mod: S$GLB,,, | Performed by: OBSTETRICS & GYNECOLOGY

## 2018-05-30 NOTE — PROGRESS NOTES
NST reactive.  See note  She has been having episodes of menstrual cramping.  No bleeding.  PTL precautions

## 2018-05-30 NOTE — PROCEDURES
Procedures    FETAL ASSESSMENT REPORT    RE: April Wendt Schamberger  MRN:  [id number]  :  1985  AGE:  33 y.o.    Date:  2018    Physician:  Dr. Risa Alvarez    Allergies: Azithromycin    April is a 33 y.o.  at 33w6d gestational age here today for a NST.    2018, by Last Menstrual Period    MEDICATIONS AT TIME OF TEST:  Current Outpatient Prescriptions   Medication Sig Dispense Refill    doxylamine succinate (UNISOM) tablet Take 12.5 mg by mouth.      LOPERAMIDE HCL (IMODIUM A-D ORAL) Take 4 tablets by mouth every morning.       PNV NO.95/FERROUS FUM/FOLIC AC (PRENATAL ORAL) Take by mouth.       No current facility-administered medications for this visit.        Indication: chronic hypertension    Interpretation:  130 BPM baseline    Variability:  Good {> 6 bpm)    Accelerations:  Reactive    Decelerations:  none    Assessment: reactive    Plan:  NST scheduled      Risa Alvarez MD  2018; 2:09 PM

## 2018-06-06 ENCOUNTER — CLINICAL SUPPORT (OUTPATIENT)
Dept: OBSTETRICS AND GYNECOLOGY | Facility: CLINIC | Age: 33
End: 2018-06-06
Payer: COMMERCIAL

## 2018-06-06 ENCOUNTER — ROUTINE PRENATAL (OUTPATIENT)
Dept: OBSTETRICS AND GYNECOLOGY | Facility: CLINIC | Age: 33
End: 2018-06-06
Payer: COMMERCIAL

## 2018-06-06 ENCOUNTER — TELEPHONE (OUTPATIENT)
Dept: OBSTETRICS AND GYNECOLOGY | Facility: CLINIC | Age: 33
End: 2018-06-06

## 2018-06-06 VITALS
DIASTOLIC BLOOD PRESSURE: 76 MMHG | WEIGHT: 182.75 LBS | SYSTOLIC BLOOD PRESSURE: 118 MMHG | BODY MASS INDEX: 27.79 KG/M2

## 2018-06-06 DIAGNOSIS — Z3A.34 34 WEEKS GESTATION OF PREGNANCY: ICD-10-CM

## 2018-06-06 DIAGNOSIS — I10 BENIGN ESSENTIAL HYPERTENSION: Primary | ICD-10-CM

## 2018-06-06 DIAGNOSIS — O10.919 CHRONIC HYPERTENSION IN PREGNANCY: ICD-10-CM

## 2018-06-06 DIAGNOSIS — Z34.83 ENCOUNTER FOR SUPERVISION OF OTHER NORMAL PREGNANCY, THIRD TRIMESTER: ICD-10-CM

## 2018-06-06 DIAGNOSIS — Z3A.34 34 WEEKS GESTATION OF PREGNANCY: Primary | ICD-10-CM

## 2018-06-06 PROBLEM — J32.9 SINUSITIS: Status: RESOLVED | Noted: 2018-02-05 | Resolved: 2018-06-06

## 2018-06-06 PROBLEM — J40 BRONCHITIS: Status: RESOLVED | Noted: 2018-02-05 | Resolved: 2018-06-06

## 2018-06-06 PROBLEM — J06.9 UPPER RESPIRATORY INFECTION, ACUTE: Status: RESOLVED | Noted: 2018-05-02 | Resolved: 2018-06-06

## 2018-06-06 PROCEDURE — 99999 PR PBB SHADOW E&M-EST. PATIENT-LVL II: CPT | Mod: PBBFAC,,, | Performed by: OBSTETRICS & GYNECOLOGY

## 2018-06-06 PROCEDURE — 0502F SUBSEQUENT PRENATAL CARE: CPT | Mod: S$GLB,,, | Performed by: OBSTETRICS & GYNECOLOGY

## 2018-06-06 NOTE — TELEPHONE ENCOUNTER
----- Message from Risa Alvarez MD sent at 2018  2:52 PM CDT -----  NGUYỄN AND DEDE:  PLEASE SEND DATE AND TIME TO [facility] TO PUT ON [employer] AND [employer] AND TO LYLA TO PUT ON OB LIST  DON'T FORGET TO CALL PT AND LET HER KNOW ALSO#####          Procedure: answer Y where needed       Induction     Pitocin_____     Cytotec____     Balloon____     Other______         Primary____      Repeat____    BTL _____________    Cerclage _________       Indication (elective/other)term pregnancy, chronic hypertension, AMA    Date desired   Time desired 8:30 PM    Due Date     Cervical exam- (inductions only)   Dilation:0   Effacement:0   Station:-5   Consistency: firm   Position: mid      JEWELL SCORE____________

## 2018-06-06 NOTE — PROCEDURES
Procedures    FETAL ASSESSMENT REPORT    RE: April Wendt Schamberger  MRN:  [id number]  :  1985  AGE:  33 y.o.    Date:  2018    Physician:  Dr. Risa Alvarez    Allergies: Azithromycin    April is a 33 y.o.  at 34w6d gestational age here today for a NST.    2018, by Last Menstrual Period    MEDICATIONS AT TIME OF TEST:  Current Outpatient Prescriptions   Medication Sig Dispense Refill    doxylamine succinate (UNISOM) tablet Take 12.5 mg by mouth.      LOPERAMIDE HCL (IMODIUM A-D ORAL) Take 4 tablets by mouth every morning.       PNV NO.95/FERROUS FUM/FOLIC AC (PRENATAL ORAL) Take by mouth.       No current facility-administered medications for this visit.        Indication: chronic hypertension, AMA    Interpretation:  125 BPM baseline    Variability:  Good {> 6 bpm)    Accelerations:  Reactive    Decelerations:  none    Assessment: reactive    Plan:  discussed      Risa Alvarez MD  2018; 2:41 PM

## 2018-06-07 RX ORDER — OXYTOCIN/RINGER'S LACTATE 20/1000 ML
41.65 PLASTIC BAG, INJECTION (ML) INTRAVENOUS CONTINUOUS
Status: CANCELLED | OUTPATIENT
Start: 2018-06-07 | End: 2018-06-07

## 2018-06-07 RX ORDER — MISOPROSTOL 200 UG/1
200 TABLET ORAL
Status: CANCELLED | OUTPATIENT
Start: 2018-06-07

## 2018-06-07 RX ORDER — CARBOPROST TROMETHAMINE 250 UG/ML
250 INJECTION, SOLUTION INTRAMUSCULAR
Status: CANCELLED | OUTPATIENT
Start: 2018-06-07

## 2018-06-07 RX ORDER — METHYLERGONOVINE MALEATE 0.2 MG/ML
200 INJECTION INTRAVENOUS
Status: CANCELLED | OUTPATIENT
Start: 2018-06-07

## 2018-06-07 RX ORDER — BUTORPHANOL TARTRATE 1 MG/ML
2 INJECTION INTRAMUSCULAR; INTRAVENOUS
Status: CANCELLED | OUTPATIENT
Start: 2018-06-07

## 2018-06-07 NOTE — H&P
History and Physical                                            Obstetrics          Subjective:       April Wendt Schamberger is a 33 y.o.  female with IUP at 39 3/7 weeks gestation on 18 admitted for an induction due to term pregnancy and chronic hypertension which is well-controlled.    PMHx:   Past Medical History:   Diagnosis Date    Abnormal Pap smear of cervix     Cryo Done    ADD (attention deficit disorder)     Breast disorder     Breast Cysts    Esophageal reflux     Fibroids     IBS (irritable bowel syndrome)     Insomnia     Kidney stones     Mastocytosis     Upper GI bleed        PSHx:   Past Surgical History:   Procedure Laterality Date    COLONOSCOPY N/A 2017    Procedure: COLONOSCOPY;  Surgeon: Bren Larkin MD;  Location: [facility];  Service: Endoscopy;  Laterality: N/A;    ESOPHAGOGASTRODUODENOSCOPY  2012    LASIK Bilateral 2005    TONSILLECTOMY, ADENOIDECTOMY         All:   Review of patient's allergies indicates:   Allergen Reactions    Azithromycin Nausea And Vomiting       Meds:   No prescriptions prior to admission.       SH:   Social History     Social History    Marital status:      Spouse name: N/A    Number of children: 1    Years of education: N/A     Occupational History    Not on file.     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       FH:   Family History   Problem Relation Age of Onset    Breast cancer Mother         with Metastasis    Cancer Mother         Breast    Hypertension Mother     Prostate cancer Father     Hypertension Father     Cancer Father         Prostate Ca    Diabetes Father      Deep vein thrombosis Father     Pancreatic cancer Maternal Grandfather     Breast cancer Paternal Grandmother     Cancer Paternal Grandmother     Diabetes type II Paternal Grandfather     Heart attack Maternal Grandmother     Colon cancer Cousin     Cancer Cousin 31    Ovarian cancer Neg Hx     Lymphoma Neg Hx     Tuberculosis Neg Hx     Celiac disease Neg Hx     Cirrhosis Neg Hx     Colon polyps Neg Hx     Crohn's disease Neg Hx     Cystic fibrosis Neg Hx     Esophageal cancer Neg Hx     Hemochromatosis Neg Hx     Inflammatory bowel disease Neg Hx     Irritable bowel syndrome Neg Hx     Liver cancer Neg Hx     Liver disease Neg Hx     Rectal cancer Neg Hx     Stomach cancer Neg Hx     Ulcerative colitis Neg Hx     Donavon's disease Neg Hx        OBHx:   Obstetric History       T1      L1     SAB0   TAB0   Ectopic0   Multiple0   Live Births1       # Outcome Date GA Lbr Eldon/2nd Weight Sex Delivery Anes PTL Lv   2 Current            1 Term 14 37w0d  3.657 kg (8 lb 1 oz) M Vag-Spont EPI  SALOME      Name: Paul Smith          Objective:       LMP 10/05/2017 (Exact Date)     General:   alert, appears stated age and cooperative   Lungs:   clear to auscultation bilaterally   Heart:   regular rate and rhythm, S1, S2 normal, no murmur, click, rub or gallop   Abdomen:  soft, non-tender; bowel sounds normal; no masses,  no organomegaly   Extremities negative edema, negative erythema     Cervical exam: 0/0/-4 Vertex     Fetal Heart Tones: 150  1st Trimester (0-14 wks)     Test Value Date Time   ABORH - Soft  A POS  17 1445   ABSC  NEG  17 1445   HGB  12.4 g/dL 17 1445   HCT  36.3 % (L) 17 1445   MCV  94 fL 17 1445   Platelet Count  240 K/uL 17 1445   Rubella Immune Status  Reactive  17 1445   RPR  Non-reactive  17 1445   HBsAg      HIV 1/2 Ab - Soft  Negative  17 1445   HIV 1/2 Ab      Chlamydia, Amplified  Not Detected  17 0558    GC, Amplified  Not Detected  17 1537   PAP  (See Report)  17 0901   Hemoglobin Bands      Urine Culture  No significant growth  17 1438   Cystic Fibrosis Mutation      Sequential Screen I      Integrated Screen Part 1      [id number] (External - must enter manually)      Glucose - Random  83 mg/dL 17 1445   TSH  0.655 uIU/mL 17 1445   2nd Trimester (14-28 wks)     Test Value Date Time   ABSC      HGB  12.1 g/dL 18 1425   HCT  37.9 % 18 1425   MCV  96 fL 18 1425   Platelet Count  226 K/uL 18 1425   OB Glucose Screen  102 mg/dL 18 1425    3rd Trimester (28-40 wks)     Test Value Date Time   HGB      HCT      MCV      Platelet Count      GBS      RPR      HIV 1/2 Ab - Soft      HIV 1/2 Ab      Chlamydia      GC           GBS pending    Assessment:     39 3/7d weeks gestation on 18 admitted for induction  Chronic hypertension - well-controlled      Plan:   - Admit to Labor and Delivery unit.  - Consents for delivery including vaginal or  section and blood transfusion signed and to chart  - Risks, benefits, alternatives and possible complications have been discussed in detail with the patient.   - Prophylaxis for GBS needed:  Results pending  - Induction orders placed.  Risa Alvarez MD

## 2018-06-13 ENCOUNTER — ROUTINE PRENATAL (OUTPATIENT)
Dept: OBSTETRICS AND GYNECOLOGY | Facility: CLINIC | Age: 33
End: 2018-06-13
Payer: COMMERCIAL

## 2018-06-13 ENCOUNTER — PROCEDURE VISIT (OUTPATIENT)
Dept: OBSTETRICS AND GYNECOLOGY | Facility: CLINIC | Age: 33
End: 2018-06-13
Payer: COMMERCIAL

## 2018-06-13 VITALS
WEIGHT: 180.13 LBS | BODY MASS INDEX: 27.39 KG/M2 | DIASTOLIC BLOOD PRESSURE: 74 MMHG | SYSTOLIC BLOOD PRESSURE: 116 MMHG

## 2018-06-13 DIAGNOSIS — Z34.83 ENCOUNTER FOR SUPERVISION OF OTHER NORMAL PREGNANCY, THIRD TRIMESTER: ICD-10-CM

## 2018-06-13 DIAGNOSIS — Z36.85 ANTENATAL SCREENING FOR STREPTOCOCCUS B: ICD-10-CM

## 2018-06-13 DIAGNOSIS — O09.523 ELDERLY MULTIGRAVIDA IN THIRD TRIMESTER: ICD-10-CM

## 2018-06-13 DIAGNOSIS — Z3A.35 35 WEEKS GESTATION OF PREGNANCY: Primary | ICD-10-CM

## 2018-06-13 DIAGNOSIS — O10.919 CHRONIC HYPERTENSION IN PREGNANCY: ICD-10-CM

## 2018-06-13 PROBLEM — R11.0 NAUSEA: Status: RESOLVED | Noted: 2017-06-20 | Resolved: 2018-06-13

## 2018-06-13 PROCEDURE — 0502F SUBSEQUENT PRENATAL CARE: CPT | Mod: S$GLB,,, | Performed by: OBSTETRICS & GYNECOLOGY

## 2018-06-13 PROCEDURE — 76816 OB US FOLLOW-UP PER FETUS: CPT | Mod: S$GLB,,, | Performed by: OBSTETRICS & GYNECOLOGY

## 2018-06-13 PROCEDURE — 99999 PR PBB SHADOW E&M-EST. PATIENT-LVL II: CPT | Mod: PBBFAC,,, | Performed by: OBSTETRICS & GYNECOLOGY

## 2018-06-13 PROCEDURE — 87081 CULTURE SCREEN ONLY: CPT

## 2018-06-16 LAB — BACTERIA SPEC AEROBE CULT: NORMAL

## 2018-06-20 ENCOUNTER — ROUTINE PRENATAL (OUTPATIENT)
Dept: OBSTETRICS AND GYNECOLOGY | Facility: CLINIC | Age: 33
End: 2018-06-20
Payer: COMMERCIAL

## 2018-06-20 ENCOUNTER — CLINICAL SUPPORT (OUTPATIENT)
Dept: OBSTETRICS AND GYNECOLOGY | Facility: CLINIC | Age: 33
End: 2018-06-20
Payer: COMMERCIAL

## 2018-06-20 VITALS
SYSTOLIC BLOOD PRESSURE: 112 MMHG | BODY MASS INDEX: 27.65 KG/M2 | WEIGHT: 181.88 LBS | DIASTOLIC BLOOD PRESSURE: 64 MMHG

## 2018-06-20 DIAGNOSIS — Z3A.36 36 WEEKS GESTATION OF PREGNANCY: Primary | ICD-10-CM

## 2018-06-20 DIAGNOSIS — Z34.83 ENCOUNTER FOR SUPERVISION OF OTHER NORMAL PREGNANCY, THIRD TRIMESTER: ICD-10-CM

## 2018-06-20 DIAGNOSIS — O10.919 CHRONIC HYPERTENSION IN PREGNANCY: ICD-10-CM

## 2018-06-20 PROCEDURE — 59025 FETAL NON-STRESS TEST: CPT | Mod: S$GLB,,, | Performed by: OBSTETRICS & GYNECOLOGY

## 2018-06-20 PROCEDURE — 99999 PR PBB SHADOW E&M-EST. PATIENT-LVL II: CPT | Mod: PBBFAC,,, | Performed by: OBSTETRICS & GYNECOLOGY

## 2018-06-20 PROCEDURE — 0502F SUBSEQUENT PRENATAL CARE: CPT | Mod: S$GLB,,, | Performed by: OBSTETRICS & GYNECOLOGY

## 2018-06-20 NOTE — PROCEDURES
Procedures    FETAL ASSESSMENT REPORT    RE: April Wendt Schamberger  MRN:  [id number]  :  1985  AGE:  33 y.o.    Date:  2018    Physician:  Dr. Risa Alvarez    Allergies: Azithromycin    April is a 33 y.o.  at 36w6d gestational age here today for a NST.    2018, by Last Menstrual Period    MEDICATIONS AT TIME OF TEST:  Current Outpatient Prescriptions   Medication Sig Dispense Refill    LOPERAMIDE HCL (IMODIUM A-D ORAL) Take 4 tablets by mouth every morning.       PNV NO.95/FERROUS FUM/FOLIC AC (PRENATAL ORAL) Take by mouth.       No current facility-administered medications for this visit.        Indication: chronic hypertension    Interpretation:  130 BPM baseline    Variability:  Good {> 6 bpm)    Accelerations:  Reactive    Decelerations:  none    Assessment: reactive    Plan:  NST reactive      Risa Alvarez MD  2018; 2:21 PM

## 2018-06-28 ENCOUNTER — CLINICAL SUPPORT (OUTPATIENT)
Dept: OBSTETRICS AND GYNECOLOGY | Facility: CLINIC | Age: 33
End: 2018-06-28
Payer: COMMERCIAL

## 2018-06-28 ENCOUNTER — LAB VISIT (OUTPATIENT)
Dept: LAB | Facility: HOSPITAL | Age: 33
End: 2018-06-28
Attending: OBSTETRICS & GYNECOLOGY
Payer: COMMERCIAL

## 2018-06-28 ENCOUNTER — ROUTINE PRENATAL (OUTPATIENT)
Dept: OBSTETRICS AND GYNECOLOGY | Facility: CLINIC | Age: 33
End: 2018-06-28
Payer: COMMERCIAL

## 2018-06-28 VITALS
DIASTOLIC BLOOD PRESSURE: 78 MMHG | SYSTOLIC BLOOD PRESSURE: 128 MMHG | WEIGHT: 183.19 LBS | BODY MASS INDEX: 27.86 KG/M2

## 2018-06-28 DIAGNOSIS — O10.919 CHRONIC HYPERTENSION IN PREGNANCY: ICD-10-CM

## 2018-06-28 DIAGNOSIS — Z3A.38 38 WEEKS GESTATION OF PREGNANCY: ICD-10-CM

## 2018-06-28 DIAGNOSIS — I10 BENIGN ESSENTIAL HYPERTENSION: ICD-10-CM

## 2018-06-28 DIAGNOSIS — I10 BENIGN ESSENTIAL HYPERTENSION: Primary | ICD-10-CM

## 2018-06-28 LAB
ALBUMIN SERPL BCP-MCNC: 3 G/DL
ALP SERPL-CCNC: 112 U/L
ALT SERPL W/O P-5'-P-CCNC: 8 U/L
ANION GAP SERPL CALC-SCNC: 10 MMOL/L
AST SERPL-CCNC: 13 U/L
BASOPHILS # BLD AUTO: 0.01 K/UL
BASOPHILS NFR BLD: 0.1 %
BILIRUB SERPL-MCNC: 0.4 MG/DL
BUN SERPL-MCNC: 5 MG/DL
CALCIUM SERPL-MCNC: 8.9 MG/DL
CHLORIDE SERPL-SCNC: 108 MMOL/L
CO2 SERPL-SCNC: 19 MMOL/L
CREAT SERPL-MCNC: 0.6 MG/DL
CREAT UR-MCNC: 49 MG/DL
DIFFERENTIAL METHOD: ABNORMAL
EOSINOPHIL # BLD AUTO: 0 K/UL
EOSINOPHIL NFR BLD: 0.4 %
ERYTHROCYTE [DISTWIDTH] IN BLOOD BY AUTOMATED COUNT: 14.3 %
EST. GFR  (AFRICAN AMERICAN): >60 ML/MIN/1.73 M^2
EST. GFR  (NON AFRICAN AMERICAN): >60 ML/MIN/1.73 M^2
GLUCOSE SERPL-MCNC: 92 MG/DL
HCT VFR BLD AUTO: 37.5 %
HGB BLD-MCNC: 12.5 G/DL
LYMPHOCYTES # BLD AUTO: 1.8 K/UL
LYMPHOCYTES NFR BLD: 16.1 %
MCH RBC QN AUTO: 30.8 PG
MCHC RBC AUTO-ENTMCNC: 33.3 G/DL
MCV RBC AUTO: 92 FL
MONOCYTES # BLD AUTO: 0.7 K/UL
MONOCYTES NFR BLD: 6.4 %
NEUTROPHILS # BLD AUTO: 8.7 K/UL
NEUTROPHILS NFR BLD: 77 %
PLATELET # BLD AUTO: 215 K/UL
PMV BLD AUTO: 11.5 FL
POTASSIUM SERPL-SCNC: 3.8 MMOL/L
PROT SERPL-MCNC: 6.4 G/DL
PROT UR-MCNC: <7 MG/DL
PROT/CREAT RATIO, UR: NORMAL
RBC # BLD AUTO: 4.06 M/UL
SODIUM SERPL-SCNC: 137 MMOL/L
WBC # BLD AUTO: 11.32 K/UL

## 2018-06-28 PROCEDURE — 85025 COMPLETE CBC W/AUTO DIFF WBC: CPT

## 2018-06-28 PROCEDURE — 99213 OFFICE O/P EST LOW 20 MIN: CPT | Mod: 25,S$GLB,, | Performed by: OBSTETRICS & GYNECOLOGY

## 2018-06-28 PROCEDURE — 99999 PR PBB SHADOW E&M-EST. PATIENT-LVL II: CPT | Mod: PBBFAC,,, | Performed by: OBSTETRICS & GYNECOLOGY

## 2018-06-28 PROCEDURE — 80053 COMPREHEN METABOLIC PANEL: CPT

## 2018-06-28 PROCEDURE — 82570 ASSAY OF URINE CREATININE: CPT

## 2018-06-28 PROCEDURE — 59025 FETAL NON-STRESS TEST: CPT | Mod: S$GLB,,, | Performed by: OBSTETRICS & GYNECOLOGY

## 2018-06-28 NOTE — PROGRESS NOTES
She has no RUQ pain, SOB, or visual changes.  She did have HAs yesterday and has had waves of nausea  Will do labs  NST today reassuring  Labor precautions  Take BP at home BID.  If BP >140/90 x 2 , call me

## 2018-06-28 NOTE — PROCEDURES
Procedures    FETAL ASSESSMENT REPORT    RE: April Wendt Schamberger  MRN:  [id number]  :  1985  AGE:  33 y.o.    Date:  2018    Physician:  Dr. Risa Alvarez    Allergies: Azithromycin    April is a 33 y.o.  at 38w0d gestational age here today for a NST.    2018, by Last Menstrual Period    MEDICATIONS AT TIME OF TEST:  Current Outpatient Prescriptions   Medication Sig Dispense Refill    LOPERAMIDE HCL (IMODIUM A-D ORAL) Take 4 tablets by mouth every morning.       PNV NO.95/FERROUS FUM/FOLIC AC (PRENATAL ORAL) Take by mouth.       No current facility-administered medications for this visit.        Indication: chronic hypertension    Interpretation:  130 BPM baseline    Variability:  Good {> 6 bpm)    Accelerations:  Reactive    Decelerations:  none    Assessment: reactive    Plan:  NST reactive      Risa Alvarez MD  2018; 2:27 PM

## 2018-06-29 ENCOUNTER — PATIENT MESSAGE (OUTPATIENT)
Dept: OBSTETRICS AND GYNECOLOGY | Facility: CLINIC | Age: 33
End: 2018-06-29

## 2018-07-02 ENCOUNTER — ROUTINE PRENATAL (OUTPATIENT)
Dept: OBSTETRICS AND GYNECOLOGY | Facility: CLINIC | Age: 33
End: 2018-07-02
Payer: COMMERCIAL

## 2018-07-02 ENCOUNTER — LAB VISIT (OUTPATIENT)
Dept: LAB | Facility: HOSPITAL | Age: 33
End: 2018-07-02
Attending: NURSE PRACTITIONER
Payer: COMMERCIAL

## 2018-07-02 VITALS
WEIGHT: 186.75 LBS | DIASTOLIC BLOOD PRESSURE: 68 MMHG | SYSTOLIC BLOOD PRESSURE: 112 MMHG | BODY MASS INDEX: 28.39 KG/M2

## 2018-07-02 DIAGNOSIS — Z11.3 SCREEN FOR STD (SEXUALLY TRANSMITTED DISEASE): ICD-10-CM

## 2018-07-02 DIAGNOSIS — O10.919 CHRONIC HYPERTENSION IN PREGNANCY: ICD-10-CM

## 2018-07-02 DIAGNOSIS — Z3A.38 38 WEEKS GESTATION OF PREGNANCY: Primary | ICD-10-CM

## 2018-07-02 DIAGNOSIS — O09.523 ADVANCED MATERNAL AGE IN MULTIGRAVIDA, THIRD TRIMESTER: ICD-10-CM

## 2018-07-02 PROCEDURE — 86592 SYPHILIS TEST NON-TREP QUAL: CPT

## 2018-07-02 PROCEDURE — 99999 PR PBB SHADOW E&M-EST. PATIENT-LVL II: CPT | Mod: PBBFAC,,, | Performed by: NURSE PRACTITIONER

## 2018-07-02 PROCEDURE — 0502F SUBSEQUENT PRENATAL CARE: CPT | Mod: S$GLB,,, | Performed by: NURSE PRACTITIONER

## 2018-07-02 PROCEDURE — 86703 HIV-1/HIV-2 1 RESULT ANTBDY: CPT

## 2018-07-02 NOTE — PROGRESS NOTES
Doing well; no complaints.  Labor/bleeding/decreased fetal movement precautions given.  HIV./RPR labs today    NST  REPORT:    RE: April Wendt Schamberger  AGE:  33 y.o.          at 38w4d gestational age here today for a NST.         EDC:   2018, by Last Menstrual Period    Date:  2018  PRIMARY PHYSICIAN: Swing    MEDICATIONS AT TIME OF TEST:  Current Outpatient Prescriptions   Medication Sig Dispense Refill    LOPERAMIDE HCL (IMODIUM A-D ORAL) Take 4 tablets by mouth every morning.       PNV NO.95/FERROUS FUM/FOLIC AC (PRENATAL ORAL) Take by mouth.       No current facility-administered medications for this visit.        NST Indication: chronic hypertension    Interpretation:  125 BPM baseline,   Variability Moderate, Accelerations Reactive,   Decelerations none.   TOCO: none    Assessment::  reactive    Plan: discussed fetal surveillance and induction, discussed fetal movement, NST reactive

## 2018-07-03 LAB
HIV 1+2 AB+HIV1 P24 AG SERPL QL IA: NEGATIVE
RPR SER QL: NORMAL

## 2018-07-08 ENCOUNTER — ANESTHESIA (OUTPATIENT)
Dept: OBSTETRICS AND GYNECOLOGY | Facility: OTHER | Age: 33
End: 2018-07-08
Payer: COMMERCIAL

## 2018-07-08 ENCOUNTER — HOSPITAL ENCOUNTER (INPATIENT)
Facility: OTHER | Age: 33
LOS: 2 days | Discharge: HOME | End: 2018-07-10
Attending: OBSTETRICS & GYNECOLOGY | Admitting: OBSTETRICS & GYNECOLOGY
Payer: COMMERCIAL

## 2018-07-08 ENCOUNTER — ANESTHESIA EVENT (OUTPATIENT)
Dept: OBSTETRICS AND GYNECOLOGY | Facility: OTHER | Age: 33
End: 2018-07-08
Payer: COMMERCIAL

## 2018-07-08 DIAGNOSIS — Z34.90 TERM PREGNANCY: ICD-10-CM

## 2018-07-08 DIAGNOSIS — Z37.9 NORMAL LABOR: ICD-10-CM

## 2018-07-08 PROBLEM — O10.919 CHRONIC HYPERTENSION IN PREGNANCY: Status: ACTIVE | Noted: 2018-07-08

## 2018-07-08 PROBLEM — Z3A.39 39 WEEKS GESTATION OF PREGNANCY: Status: ACTIVE | Noted: 2018-07-08

## 2018-07-08 PROBLEM — O09.523 ADVANCED MATERNAL AGE IN MULTIGRAVIDA, THIRD TRIMESTER: Status: ACTIVE | Noted: 2018-07-08

## 2018-07-08 LAB
ABO + RH BLD: NORMAL
BASOPHILS # BLD AUTO: 0.02 K/UL
BASOPHILS NFR BLD: 0.2 %
BLD GP AB SCN CELLS X3 SERPL QL: NORMAL
DIFFERENTIAL METHOD: ABNORMAL
EOSINOPHIL # BLD AUTO: 0.1 K/UL
EOSINOPHIL NFR BLD: 0.4 %
ERYTHROCYTE [DISTWIDTH] IN BLOOD BY AUTOMATED COUNT: 14.1 %
HCT VFR BLD AUTO: 39.6 %
HGB BLD-MCNC: 13 G/DL
LYMPHOCYTES # BLD AUTO: 2.2 K/UL
LYMPHOCYTES NFR BLD: 18.9 %
MCH RBC QN AUTO: 29.8 PG
MCHC RBC AUTO-ENTMCNC: 32.8 G/DL
MCV RBC AUTO: 91 FL
MONOCYTES # BLD AUTO: 0.5 K/UL
MONOCYTES NFR BLD: 4.5 %
NEUTROPHILS # BLD AUTO: 8.9 K/UL
NEUTROPHILS NFR BLD: 75.2 %
PLATELET # BLD AUTO: 234 K/UL
PMV BLD AUTO: 11.2 FL
RBC # BLD AUTO: 4.36 M/UL
WBC # BLD AUTO: 11.76 K/UL

## 2018-07-08 PROCEDURE — 11000001 HC ACUTE MED/SURG PRIVATE ROOM

## 2018-07-08 PROCEDURE — 25000003 PHARM REV CODE 250: Performed by: ANESTHESIOLOGY

## 2018-07-08 PROCEDURE — 87340 HEPATITIS B SURFACE AG IA: CPT

## 2018-07-08 PROCEDURE — 25000003 PHARM REV CODE 250: Performed by: STUDENT IN AN ORGANIZED HEALTH CARE EDUCATION/TRAINING PROGRAM

## 2018-07-08 PROCEDURE — 59400 OBSTETRICAL CARE: CPT | Mod: QY,,, | Performed by: ANESTHESIOLOGY

## 2018-07-08 PROCEDURE — 72100002 HC LABOR CARE, 1ST 8 HOURS

## 2018-07-08 PROCEDURE — 27800517 HC TRAY,EPIDURAL-CONTINUOUS: Performed by: ANESTHESIOLOGY

## 2018-07-08 PROCEDURE — 86592 SYPHILIS TEST NON-TREP QUAL: CPT

## 2018-07-08 PROCEDURE — 63600175 PHARM REV CODE 636 W HCPCS: Performed by: STUDENT IN AN ORGANIZED HEALTH CARE EDUCATION/TRAINING PROGRAM

## 2018-07-08 PROCEDURE — 85025 COMPLETE CBC W/AUTO DIFF WBC: CPT

## 2018-07-08 PROCEDURE — 62326 NJX INTERLAMINAR LMBR/SAC: CPT | Performed by: ANESTHESIOLOGY

## 2018-07-08 PROCEDURE — 51702 INSERT TEMP BLADDER CATH: CPT

## 2018-07-08 PROCEDURE — 25000003 PHARM REV CODE 250: Performed by: OBSTETRICS & GYNECOLOGY

## 2018-07-08 PROCEDURE — 27200710 HC EPIDURAL INFUSION PUMP SET: Performed by: ANESTHESIOLOGY

## 2018-07-08 PROCEDURE — 86850 RBC ANTIBODY SCREEN: CPT

## 2018-07-08 RX ORDER — SODIUM CITRATE AND CITRIC ACID MONOHYDRATE 334; 500 MG/5ML; MG/5ML
30 SOLUTION ORAL ONCE
Status: DISCONTINUED | OUTPATIENT
Start: 2018-07-08 | End: 2018-07-09

## 2018-07-08 RX ORDER — FENTANYL CITRATE 50 UG/ML
INJECTION, SOLUTION INTRAMUSCULAR; INTRAVENOUS
Status: COMPLETED
Start: 2018-07-08 | End: 2018-07-08

## 2018-07-08 RX ORDER — LIDOCAINE HYDROCHLORIDE AND EPINEPHRINE 15; 5 MG/ML; UG/ML
INJECTION, SOLUTION EPIDURAL
Status: DISCONTINUED | OUTPATIENT
Start: 2018-07-08 | End: 2018-07-09

## 2018-07-08 RX ORDER — BUPIVACAINE HYDROCHLORIDE 2.5 MG/ML
INJECTION, SOLUTION EPIDURAL; INFILTRATION; INTRACAUDAL
Status: DISPENSED
Start: 2018-07-08 | End: 2018-07-09

## 2018-07-08 RX ORDER — FENTANYL/BUPIVACAINE/NS/PF 2MCG/ML-.1
PLASTIC BAG, INJECTION (ML) INJECTION
Status: DISPENSED
Start: 2018-07-08 | End: 2018-07-09

## 2018-07-08 RX ORDER — MISOPROSTOL 100 MCG
25 TABLET ORAL EVERY 4 HOURS
Status: DISCONTINUED | OUTPATIENT
Start: 2018-07-08 | End: 2018-07-09

## 2018-07-08 RX ORDER — FENTANYL CITRATE 50 UG/ML
INJECTION, SOLUTION INTRAMUSCULAR; INTRAVENOUS
Status: DISCONTINUED | OUTPATIENT
Start: 2018-07-08 | End: 2018-07-09

## 2018-07-08 RX ORDER — ONDANSETRON 8 MG/1
8 TABLET, ORALLY DISINTEGRATING ORAL EVERY 8 HOURS PRN
Status: DISCONTINUED | OUTPATIENT
Start: 2018-07-08 | End: 2018-07-09

## 2018-07-08 RX ORDER — FENTANYL/BUPIVACAINE/NS/PF 2MCG/ML-.1
PLASTIC BAG, INJECTION (ML) INJECTION CONTINUOUS
Status: DISCONTINUED | OUTPATIENT
Start: 2018-07-08 | End: 2018-07-09

## 2018-07-08 RX ORDER — SODIUM CHLORIDE 9 MG/ML
INJECTION, SOLUTION INTRAVENOUS
Status: DISCONTINUED | OUTPATIENT
Start: 2018-07-08 | End: 2018-07-09

## 2018-07-08 RX ORDER — FENTANYL/BUPIVACAINE/NS/PF 2MCG/ML-.1
PLASTIC BAG, INJECTION (ML) INJECTION CONTINUOUS PRN
Status: DISCONTINUED | OUTPATIENT
Start: 2018-07-08 | End: 2018-07-09

## 2018-07-08 RX ORDER — BUTORPHANOL TARTRATE 1 MG/ML
1 INJECTION INTRAMUSCULAR; INTRAVENOUS
Status: DISCONTINUED | OUTPATIENT
Start: 2018-07-08 | End: 2018-07-09

## 2018-07-08 RX ORDER — IBUPROFEN 400 MG/1
800 TABLET ORAL EVERY 8 HOURS PRN
Status: DISCONTINUED | OUTPATIENT
Start: 2018-07-08 | End: 2018-07-09

## 2018-07-08 RX ORDER — SODIUM CHLORIDE, SODIUM LACTATE, POTASSIUM CHLORIDE, CALCIUM CHLORIDE 600; 310; 30; 20 MG/100ML; MG/100ML; MG/100ML; MG/100ML
INJECTION, SOLUTION INTRAVENOUS CONTINUOUS
Status: DISCONTINUED | OUTPATIENT
Start: 2018-07-08 | End: 2018-07-09

## 2018-07-08 RX ORDER — TERBUTALINE SULFATE 1 MG/ML
0.25 INJECTION SUBCUTANEOUS
Status: DISCONTINUED | OUTPATIENT
Start: 2018-07-08 | End: 2018-07-09

## 2018-07-08 RX ADMIN — Medication 10 ML/HR: at 09:07

## 2018-07-08 RX ADMIN — SODIUM CHLORIDE, SODIUM LACTATE, POTASSIUM CHLORIDE, AND CALCIUM CHLORIDE: .6; .31; .03; .02 INJECTION, SOLUTION INTRAVENOUS at 09:07

## 2018-07-08 RX ADMIN — FENTANYL CITRATE 100 MCG: 50 INJECTION, SOLUTION INTRAMUSCULAR; INTRAVENOUS at 11:07

## 2018-07-08 RX ADMIN — Medication 8 ML: at 11:07

## 2018-07-08 RX ADMIN — LIDOCAINE HYDROCHLORIDE,EPINEPHRINE BITARTRATE 3 ML: 15; .005 INJECTION, SOLUTION EPIDURAL; INFILTRATION; INTRACAUDAL; PERINEURAL at 09:07

## 2018-07-08 RX ADMIN — Medication 10 ML: at 10:07

## 2018-07-08 RX ADMIN — SODIUM CHLORIDE, SODIUM LACTATE, POTASSIUM CHLORIDE, AND CALCIUM CHLORIDE 1000 ML: .6; .31; .03; .02 INJECTION, SOLUTION INTRAVENOUS at 09:07

## 2018-07-09 PROBLEM — Z37.9 NORMAL LABOR: Status: RESOLVED | Noted: 2018-07-08 | Resolved: 2018-07-09

## 2018-07-09 PROBLEM — Z3A.39 39 WEEKS GESTATION OF PREGNANCY: Status: RESOLVED | Noted: 2018-07-08 | Resolved: 2018-07-09

## 2018-07-09 PROBLEM — O09.523 ADVANCED MATERNAL AGE IN MULTIGRAVIDA, THIRD TRIMESTER: Status: RESOLVED | Noted: 2018-07-08 | Resolved: 2018-07-09

## 2018-07-09 PROBLEM — Z34.90 TERM PREGNANCY: Status: RESOLVED | Noted: 2018-07-08 | Resolved: 2018-07-09

## 2018-07-09 LAB
BASOPHILS # BLD AUTO: 0.01 K/UL
BASOPHILS NFR BLD: 0.1 %
DIFFERENTIAL METHOD: ABNORMAL
EOSINOPHIL # BLD AUTO: 0 K/UL
EOSINOPHIL NFR BLD: 0.2 %
ERYTHROCYTE [DISTWIDTH] IN BLOOD BY AUTOMATED COUNT: 14.3 %
HBV SURFACE AG SERPL QL IA: NEGATIVE
HCT VFR BLD AUTO: 32.3 %
HGB BLD-MCNC: 10.5 G/DL
LYMPHOCYTES # BLD AUTO: 1.4 K/UL
LYMPHOCYTES NFR BLD: 11.2 %
MCH RBC QN AUTO: 29.8 PG
MCHC RBC AUTO-ENTMCNC: 32.5 G/DL
MCV RBC AUTO: 92 FL
MONOCYTES # BLD AUTO: 0.7 K/UL
MONOCYTES NFR BLD: 5.3 %
NEUTROPHILS # BLD AUTO: 10.5 K/UL
NEUTROPHILS NFR BLD: 82.8 %
PLATELET # BLD AUTO: 220 K/UL
PMV BLD AUTO: 11.6 FL
RBC # BLD AUTO: 3.52 M/UL
RPR SER QL: NORMAL
WBC # BLD AUTO: 12.74 K/UL

## 2018-07-09 PROCEDURE — 0KQM0ZZ REPAIR PERINEUM MUSCLE, OPEN APPROACH: ICD-10-PCS | Performed by: OBSTETRICS & GYNECOLOGY

## 2018-07-09 PROCEDURE — 0502F SUBSEQUENT PRENATAL CARE: CPT | Mod: ,,, | Performed by: OBSTETRICS & GYNECOLOGY

## 2018-07-09 PROCEDURE — 99024 POSTOP FOLLOW-UP VISIT: CPT | Mod: ,,, | Performed by: OBSTETRICS & GYNECOLOGY

## 2018-07-09 PROCEDURE — 59400 OBSTETRICAL CARE: CPT | Mod: GB,,, | Performed by: OBSTETRICS & GYNECOLOGY

## 2018-07-09 PROCEDURE — 85025 COMPLETE CBC W/AUTO DIFF WBC: CPT

## 2018-07-09 PROCEDURE — 11000001 HC ACUTE MED/SURG PRIVATE ROOM

## 2018-07-09 PROCEDURE — 72200004 HC VAGINAL DELIVERY LEVEL I

## 2018-07-09 PROCEDURE — 25000003 PHARM REV CODE 250: Performed by: OBSTETRICS & GYNECOLOGY

## 2018-07-09 PROCEDURE — 36415 COLL VENOUS BLD VENIPUNCTURE: CPT

## 2018-07-09 RX ORDER — CARBOPROST TROMETHAMINE 250 UG/ML
INJECTION, SOLUTION INTRAMUSCULAR
Status: DISCONTINUED
Start: 2018-07-09 | End: 2018-07-09 | Stop reason: WASHOUT

## 2018-07-09 RX ORDER — ACETAMINOPHEN 325 MG/1
650 TABLET ORAL EVERY 6 HOURS PRN
Status: DISCONTINUED | OUTPATIENT
Start: 2018-07-09 | End: 2018-07-10 | Stop reason: HOSPADM

## 2018-07-09 RX ORDER — OXYTOCIN/RINGER'S LACTATE 20/1000 ML
20 PLASTIC BAG, INJECTION (ML) INTRAVENOUS ONCE
Status: DISCONTINUED | OUTPATIENT
Start: 2018-07-09 | End: 2018-07-10 | Stop reason: HOSPADM

## 2018-07-09 RX ORDER — OXYTOCIN/RINGER'S LACTATE 20/1000 ML
41.65 PLASTIC BAG, INJECTION (ML) INTRAVENOUS CONTINUOUS
Status: ACTIVE | OUTPATIENT
Start: 2018-07-09 | End: 2018-07-09

## 2018-07-09 RX ORDER — METHYLERGONOVINE MALEATE 0.2 MG/ML
INJECTION INTRAVENOUS
Status: DISCONTINUED
Start: 2018-07-09 | End: 2018-07-09 | Stop reason: WASHOUT

## 2018-07-09 RX ORDER — DIPHENHYDRAMINE HYDROCHLORIDE 50 MG/ML
25 INJECTION INTRAMUSCULAR; INTRAVENOUS EVERY 4 HOURS PRN
Status: DISCONTINUED | OUTPATIENT
Start: 2018-07-09 | End: 2018-07-10 | Stop reason: HOSPADM

## 2018-07-09 RX ORDER — DOCUSATE SODIUM 100 MG/1
200 CAPSULE, LIQUID FILLED ORAL 2 TIMES DAILY PRN
Refills: 0 | COMMUNITY
Start: 2018-07-09 | End: 2018-07-26

## 2018-07-09 RX ORDER — HYDROCODONE BITARTRATE AND ACETAMINOPHEN 5; 325 MG/1; MG/1
1 TABLET ORAL EVERY 4 HOURS PRN
Qty: 10 TABLET | Refills: 0 | Status: SHIPPED | OUTPATIENT
Start: 2018-07-09 | End: 2018-07-16 | Stop reason: SDUPTHER

## 2018-07-09 RX ORDER — DIPHENHYDRAMINE HCL 25 MG
25 CAPSULE ORAL EVERY 4 HOURS PRN
Status: DISCONTINUED | OUTPATIENT
Start: 2018-07-09 | End: 2018-07-10 | Stop reason: HOSPADM

## 2018-07-09 RX ORDER — OXYTOCIN/RINGER'S LACTATE 20/1000 ML
PLASTIC BAG, INJECTION (ML) INTRAVENOUS
Status: COMPLETED
Start: 2018-07-09 | End: 2018-07-09

## 2018-07-09 RX ORDER — IBUPROFEN 600 MG/1
600 TABLET ORAL EVERY 6 HOURS
Status: DISCONTINUED | OUTPATIENT
Start: 2018-07-09 | End: 2018-07-10 | Stop reason: HOSPADM

## 2018-07-09 RX ORDER — HYDROCODONE BITARTRATE AND ACETAMINOPHEN 10; 325 MG/1; MG/1
1 TABLET ORAL EVERY 4 HOURS PRN
Status: DISCONTINUED | OUTPATIENT
Start: 2018-07-09 | End: 2018-07-10 | Stop reason: HOSPADM

## 2018-07-09 RX ORDER — HYDROCODONE BITARTRATE AND ACETAMINOPHEN 5; 325 MG/1; MG/1
1 TABLET ORAL EVERY 4 HOURS PRN
Status: DISCONTINUED | OUTPATIENT
Start: 2018-07-09 | End: 2018-07-10 | Stop reason: HOSPADM

## 2018-07-09 RX ORDER — IBUPROFEN 600 MG/1
600 TABLET ORAL EVERY 6 HOURS
Qty: 45 TABLET | Refills: 1 | Status: SHIPPED | OUTPATIENT
Start: 2018-07-09 | End: 2018-07-26

## 2018-07-09 RX ORDER — MISOPROSTOL 200 UG/1
TABLET ORAL
Status: DISPENSED
Start: 2018-07-09 | End: 2018-07-09

## 2018-07-09 RX ORDER — DOCUSATE SODIUM 100 MG/1
200 CAPSULE, LIQUID FILLED ORAL 2 TIMES DAILY PRN
Status: DISCONTINUED | OUTPATIENT
Start: 2018-07-09 | End: 2018-07-10 | Stop reason: HOSPADM

## 2018-07-09 RX ORDER — ONDANSETRON 8 MG/1
8 TABLET, ORALLY DISINTEGRATING ORAL EVERY 8 HOURS PRN
Status: DISCONTINUED | OUTPATIENT
Start: 2018-07-09 | End: 2018-07-10 | Stop reason: HOSPADM

## 2018-07-09 RX ORDER — HYDROCORTISONE 25 MG/G
CREAM TOPICAL 2 TIMES DAILY PRN
Status: DISCONTINUED | OUTPATIENT
Start: 2018-07-09 | End: 2018-07-10 | Stop reason: HOSPADM

## 2018-07-09 RX ADMIN — IBUPROFEN 600 MG: 600 TABLET ORAL at 12:07

## 2018-07-09 RX ADMIN — Medication 10 UNITS: at 12:07

## 2018-07-09 RX ADMIN — HYDROCODONE BITARTRATE AND ACETAMINOPHEN 1 TABLET: 5; 325 TABLET ORAL at 09:07

## 2018-07-09 RX ADMIN — IBUPROFEN 600 MG: 600 TABLET ORAL at 05:07

## 2018-07-09 RX ADMIN — HYDROCODONE BITARTRATE AND ACETAMINOPHEN 1 TABLET: 10; 325 TABLET ORAL at 05:07

## 2018-07-09 RX ADMIN — IBUPROFEN 600 MG: 600 TABLET ORAL at 06:07

## 2018-07-09 NOTE — ASSESSMENT & PLAN NOTE
Follow blood pressure closely, no indication for treatment at this time. Patient denies s/s pre-eclampsia.

## 2018-07-09 NOTE — PROGRESS NOTES
Informed patient that motrin is scheduled for 0600. RN asked patient if she can have motrin or other NSAIDS with history of IBS and upper GI bleed. Patient states she can take motrin.

## 2018-07-09 NOTE — HOSPITAL COURSE
Patient had an  without complication.  PPD0: Patient is without complaint, nursing well. R/B/A of circumcision discussed with both parents , consent signed after questions answered.  PPD1: patient is without complaints, desires discharge if possible today.

## 2018-07-09 NOTE — L&D DELIVERY NOTE
[facility]  Vaginal Delivery   Operative Note    SUMMARY   April was scheduled for an induction of labor at 39 3/7 wga due to maternal CHTN not requiring medications & advanced maternal age. She presented in active labor however and progressed spontaneously.   Normal spontaneous vaginal delivery of live infant after ~ 10 minutes of pushing, was placed on mothers abdomen for skin to skin and bulb suctioning performed.  Infant delivered position SEBASTIAN over intact perineum. RIght shoulder delivered anteriorly without difficulty & the left shoulder and remainder of infant without difficulty.  Nuchal cord: No.  After ~ 1 minute wait, cord was clamped x 2 and cut by Dad.    Spontaneous delivery of placenta and IV pitocin given noting good uterine tone.  2nd degree laceration noted and repaired in normal fashion with 2.0 & 3.0 vicryl. Inspection of vagina, cervix, vulva & perineum revealed no other lacerations.   Patient tolerated delivery well. Sponge needle and lap counted correctly x2.    Indications: Normal vaginal delivery  Pregnancy complicated by:   Patient Active Problem List   Diagnosis    Primary insomnia    Myofascial muscle pain    Cervical radiculopathy    Change in bowel habit    Gastroesophageal reflux disease    Benign essential hypertension    Uterine leiomyoma    Abdominal cramping    Ileitis    Term pregnancy    Advanced maternal age in multigravida, third trimester    39 weeks gestation of pregnancy    Chronic hypertension in pregnancy    Normal vaginal delivery    Vaginal delivery     Admitting GA: 39w4d    Delivery Information for  Boy April Schamberger    Birth information:  YOB: 2020   Time of birth: 12:12 AM   Sex: male   Head Delivery Date/Time: 7/9/2018 12:11 AM   Delivery type: Vaginal, Spontaneous Delivery   Gestational Age: 39w4d    Delivery Providers    Delivering clinician:  Dhara Evans MD   Provider Role    ST Lashon  Surgical Tech    Elizabeth Abadie, SYLWIA Registered Nurse    Caitlin Whitman RN Registered Nurse    Kami Yen, RN Registered Nurse                 Assessment    Apgars:     1 Minute:   5 Minute:   10 Minute:   15 Minute:   20 Minute:     Skin Color:   0  1       Heart Rate:   2  2       Reflex Irritability:   2  2       Muscle Tone:   2  2       Respiratory Effort:   2  2       Total:   8  9               Apgars Assigned By:  E. ABADIE,RN         Assisted Delivery Details:    Forceps attempted?:  No  Vacuum extractor attempted?:  No         Shoulder Dystocia    Shoulder dystocia present?:  No           Presentation and Position    Presentation:  Vertex  Position:  Middle Anterior           Interventions/Resuscitation    Method:  Bulb Suctioning, Tactile Stimulation       Cord    Vessels:  3 vessels  Complications:  None  Delayed Cord Clamping?:  No  Cord Blood Disposition:  Sent with Baby  Gases Sent?:  No  Stem Cell Collection (by MD):  No       Placenta    Date and time:  2018 12:17 AM  Removal:  Spontaneous  Appearance:  Intact  Placenta disposition:  discarded           Labor Events:       labor: No     Labor Onset Date/Time:         Dilation Complete Date/Time: 2018 23:46     Start Pushing Date/Time: 2018 00:07     Rupture Date/Time:              Rupture type:           Fluid Amount:        Fluid Color:        Fluid Odor:        Membrane Status (PeriCalm):        Rupture Date/Time (PeriCalm):        Fluid Amount (PeriCalm):        Fluid Color (PeriCalm):         steroids: None     Antibiotics given for GBS: No     Induction: none     Indications for induction:  Elective     Augmentation:       Indications for augmentation:       Labor complications: None     Additional complications:          Cervical ripening:                     Delivery:      Episiotomy: None     Indication for Episiotomy:       Perineal Lacerations: 2nd Repaired:  Yes   Periurethral Laceration: none  Repaired:     Labial Laceration: none Repaired:     Sulcus Laceration: none Repaired:     Vaginal Laceration: No Repaired:     Cervical Laceration: No Repaired:     Repair suture:       Repair # of packets:       Vaginal delivery QBL (mL): 300      QBL (mL): 0     Combined Blood Loss (mL): 300     Vaginal Sweep Performed: No     Surgicount Correct: Yes       Other providers:       Anesthesia    Method:  Epidural          Details (if applicable):  Trial of Labor      Categorization:      Priority:     Indications for :     Incision Type:       Additional  information:  Forceps:    Vacuum:    Breech:    Observed anomalies    Other (Comments):

## 2018-07-09 NOTE — ANESTHESIA PROCEDURE NOTES
Epidural    Patient location during procedure: OB   Reason for block: primary anesthetic   Diagnosis: iup   Start time: 7/8/2018 9:30 PM  Timeout: 7/8/2018 9:28 PM  End time: 7/8/2018 9:37 PM  Surgery related to: Vaginal Delivery  Staffing  Anesthesiologist: TRAV ENCINAS  Resident/CRNA: JULIO CÉSAR PAUL  Performed: resident/CRNA   Preanesthetic Checklist  Completed: patient identified, surgical consent, pre-op evaluation, timeout performed, IV checked, risks and benefits discussed, monitors and equipment checked, anesthesia consent given, hand hygiene performed and patient being monitored  Preparation  Patient position: sitting  Prep: ChloraPrep  Patient monitoring: Pulse Ox  Epidural  Skin Anesthetic: lidocaine 1%  Skin Wheal: 3 mL  Administration type: continuous  Approach: midline  Interspace: L3-4  Injection technique: DAYANA saline  Needle and Epidural Catheter  Needle type: Tuohy   Needle gauge: 17  Needle length: 3.5 inches  Needle insertion depth: 5 cm  Catheter type: springwound and multi-orifice  Catheter size: 19 G  Catheter at skin depth: 10 cm  Test dose: 3 mL of lidocaine 1.5% with Epi 1-to-200,000  Additional Documentation: incremental injection, negative aspiration for heme and CSF, no paresthesia on injection, no signs/symptoms of IV or SA injection, no significant pain on injection and no significant complaints from patient  Needle localization: anatomical landmarks  Medications:  Bolus administered: 10 mL of 0.1% bupivacaine  Opioid administered: 20 mcg of   fentanyl  Volume per aspiration: 5 mL  Time between aspirations: 3 minutes  Assessment  Ease of block: easy  Patient's tolerance of the procedure: comfortable throughout block and no complaints  Additional Notes  Dural puncture with spinal needle

## 2018-07-09 NOTE — ANESTHESIA PREPROCEDURE EVALUATION
2018     April Wendt Schamberger is a 33 y.o., female with no significant PMH who presents to L&D for scheduled induction. Endorses epidural with previous pregnancy without any complications. She denies bleeding or spine disorder.       OB History    Para Term  AB Living   2 1 1     1   SAB TAB Ectopic Multiple Live Births           1      # Outcome Date GA Lbr Eldon/2nd Weight Sex Delivery Anes PTL Lv   2 Current            1 Term 14 37w0d  3.657 kg (8 lb 1 oz) M Vag-Spont EPI  SALOME          Wt Readings from Last 1 Encounters:   18 1418 84.7 kg (186 lb 11.7 oz)       BP Readings from Last 3 Encounters:   18 112/68   18 128/78   18 112/64       Patient Active Problem List   Diagnosis    Primary insomnia    Myofascial muscle pain    Cervical radiculopathy    Change in bowel habit    Gastroesophageal reflux disease    Benign essential hypertension    Uterine leiomyoma    Abdominal cramping    Ileitis    Term pregnancy       Past Surgical History:   Procedure Laterality Date    COLONOSCOPY N/A 2017    Procedure: COLONOSCOPY;  Surgeon: Bren Larkin MD;  Location: [facility];  Service: Endoscopy;  Laterality: N/A;    ESOPHAGOGASTRODUODENOSCOPY  2012    LASIK Bilateral 2005    TONSILLECTOMY, ADENOIDECTOMY         Social History     Social History    Marital status:      Spouse name: N/A    Number of children: 1    Years of education: N/A     Occupational History    Not on file.     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         Chemistry        Component Value Date/Time     06/28/2018 1549    K 3.8 06/28/2018 1549     06/28/2018 1549    CO2 19 (L) 06/28/2018 1549    BUN 5 (L) 06/28/2018 1549    CREATININE 0.6 06/28/2018 1549    GLU 92 06/28/2018 1549        Component Value Date/Time    CALCIUM 8.9 06/28/2018 1549    ALKPHOS 112 06/28/2018 1549    AST 13 06/28/2018 1549    ALT 8 (L) 06/28/2018 1549    BILITOT 0.4 06/28/2018 1549    ESTGFRAFRICA >60 06/28/2018 1549    EGFRNONAA >60 06/28/2018 1549            Lab Results   Component Value Date    WBC 11.32 06/28/2018    HGB 12.5 06/28/2018    HCT 37.5 06/28/2018    MCV 92 06/28/2018     06/28/2018           Anesthesia Evaluation    I have reviewed the Patient Summary Reports.     I have reviewed the Medications.     Review of Systems  Anesthesia Hx:   Denies Personal Hx of Anesthesia complications.   Cardiovascular:   Hypertension    Pulmonary:   Denies COPD.  Denies Asthma.    Renal/:   Denies Chronic Renal Disease.     Hepatic/GI:   Denies GERD.    Psych:   Psychiatric History          Physical Exam  General:  Well nourished    Airway/Jaw/Neck:  Airway Findings: Mouth Opening: Normal General Airway Assessment: Adult  Mallampati: II         Dental:  DENTAL FINDINGS: Normal   Chest/Lungs:  Chest/Lungs Clear    Heart/Vascular:  Heart Findings: Normal Heart murmur: negative       Mental Status:  Mental Status Findings: Normal        Anesthesia Plan  Type of Anesthesia, risks & benefits discussed:  Anesthesia Type:  general, epidural, CSE, spinal  Patient's Preference: epi  Intra-op Monitoring Plan:   Intra-op Monitoring Plan Comments:   Post Op Pain Control Plan: multimodal analgesia  Post Op Pain Control Plan Comments:   Induction:   IV  Beta Blocker:  Patient is not currently on a Beta-Blocker (No further documentation required).       Informed Consent: Patient understands risks and agrees with Anesthesia plan.  Questions answered. Anesthesia  consent signed with patient.  ASA Score: 2     Day of Surgery Review of History & Physical:    H&P update referred to the surgeon.         Ready For Surgery From Anesthesia Perspective.

## 2018-07-09 NOTE — H&P
HISTORY AND PHYSICAL                                                OBSTETRICS          Subjective:       April Wendt Schamberger is a 33 y.o.  female with IUP at 39w3d weeks gestation who presents to L&D for a scheduled induction of labor; however she has been having contractions for the past 4 hours and now every 5 minutes, painfully. Pertinent medical history for this pregnancy include Chronic HTN on no medications; advanced maternal age.  Patient reports contractions, denies vaginal bleeding, denies LOF.   Fetal Movement: normal.     ROS:10 systems reviewed and negative other than HPI.    PMHx:   Past Medical History:   Diagnosis Date    Abnormal Pap smear of cervix     Cryo Done    ADD (attention deficit disorder)     Breast disorder     Breast Cysts    Esophageal reflux     Fibroids     IBS (irritable bowel syndrome)     Insomnia     Kidney stones     Mastocytosis     Upper GI bleed        PSHx:   Past Surgical History:   Procedure Laterality Date    COLONOSCOPY N/A 2017    Procedure: COLONOSCOPY;  Surgeon: Bren Larkin MD;  Location: [facility];  Service: Endoscopy;  Laterality: N/A;    ESOPHAGOGASTRODUODENOSCOPY      LASIK Bilateral     TONSILLECTOMY, ADENOIDECTOMY         All:   Review of patient's allergies indicates:   Allergen Reactions    Azithromycin Nausea And Vomiting       Meds:   Prescriptions Prior to Admission   Medication Sig Dispense Refill Last Dose    LOPERAMIDE HCL (IMODIUM A-D ORAL) Take 4 tablets by mouth every morning.    Taking    PNV NO.95/FERROUS FUM/FOLIC AC (PRENATAL ORAL) Take by mouth.   Taking       SH:   Social History     Social History    Marital status:      Spouse name: N/A    Number of children: 1    Years of education: N/A     Occupational History    Not on file.     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:   Family History   Problem Relation Age of Onset    Breast cancer Mother         with Metastasis    Cancer Mother         Breast    Hypertension Mother     Prostate cancer Father     Hypertension Father     Cancer Father         Prostate Ca    Diabetes Father     Deep vein thrombosis Father     Pancreatic cancer Maternal Grandfather     Breast cancer Paternal Grandmother     Cancer Paternal Grandmother     Diabetes type II Paternal Grandfather     Heart attack Maternal Grandmother     Colon cancer Cousin     Cancer Cousin 31    Ovarian cancer Neg Hx     Lymphoma Neg Hx     Tuberculosis Neg Hx     Celiac disease Neg Hx     Cirrhosis Neg Hx     Colon polyps Neg Hx     Crohn's disease Neg Hx     Cystic fibrosis Neg Hx     Esophageal cancer Neg Hx     Hemochromatosis Neg Hx     Inflammatory bowel disease Neg Hx     Irritable bowel syndrome Neg Hx     Liver cancer Neg Hx     Liver disease Neg Hx     Rectal cancer Neg Hx     Stomach cancer Neg Hx     Ulcerative colitis Neg Hx     Donavon's disease Neg Hx        OBHx:   Obstetric History       T1      L1     SAB0   TAB0   Ectopic0   Multiple0   Live Births1       # Outcome Date GA Lbr Eldon/2nd Weight Sex Delivery Anes PTL Lv   2 Current            1 Term 14 37w0d  3.657 kg (8 lb 1 oz) M Vag-Spont EPI  SALOME      Name: Paul Smith          Objective:       LMP 10/05/2017 (Exact Date)     There were no vitals filed for this visit.    General:   alert and cooperative   Lungs:   clear to auscultation bilaterally   Heart:   regular rate and rhythm, S1, S2 normal   Abdomen:  soft, non-tender; bowel sounds normal; gravid, EFW: 8 lbs   Extremities negative edema, negative erythema   FHT: 130's Cat 1  (reassuring)                 TOCO: q4 contractions       Cervix:     Dilation: 6    Effacement: 100%    Station:  0    Consistency: soft    Position: Anterior; cephalic by sutures, membranes bbow        Assessment:       39w3d weeks gestation.  In active labor    Active Hospital Problems    Diagnosis  POA    *Normal labor [O80, Z37.9]  Not Applicable    Term pregnancy [Z34.80]  Not Applicable    Advanced maternal age in multigravida, third trimester [O09.523]  Yes    39 weeks gestation of pregnancy [Z3A.39]  Not Applicable    Chronic hypertension in pregnancy [O10.919]  Yes     Chronic hypertension; no medications; normotensive  During pregnancy.        Resolved Hospital Problems    Diagnosis Date Resolved POA   No resolved problems to display.     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     Plan:      Risks, benefits, alternatives and possible complications have been discussed in detail with the patient.   - Consents signed previously with Dr. Alvarez, questions answered and to chart  - Admit to Labor and Delivery unit  - Draw Hep B  - Anticipate   - patient may have epidural

## 2018-07-09 NOTE — PROGRESS NOTES
[facility]  Obstetrics  Postpartum Progress Note    Patient Name: April Wendt Schamberger  MRN: [id number]  Admission Date: 2018  Hospital Length of Stay: 1 days  Attending Physician: Risa Alvarez MD  Primary Care Provider: Aristeo Perez MD    Subjective:     Principal Problem:Normal vaginal delivery    Hospital course: Patient had an  without complication.  PPD0: Patient is without complaint, nursing well. R/B/A of circumcision discussed with both parents , consent signed after questions answered.    Interval History:     She is doing well this morning. She is tolerating a regular diet without nausea or vomiting. She is voiding spontaneously. She is ambulating. She has passed flatus, and has not a BM. Vaginal bleeding is mild. She denies fever or chills. Abdominal pain is mild and controlled with oral medications. She is breastfeeding. She desires circumcision for her male baby: yes.    Objective:     Vital Signs (Most Recent):  Temp: 98.6 °F (37 °C) (18)  Pulse: 96 (18)  Resp: 18 (18)  BP: 110/64 (18)  SpO2: 97 % (18) Vital Signs (24h Range):  Temp:  [97.4 °F (36.3 °C)-98.6 °F (37 °C)] 98.6 °F (37 °C)  Pulse:  [68-96] 96  Resp:  [18] 18  SpO2:  [97 %-99 %] 97 %  BP: (108-139)/(63-85) 110/64     Weight: 83 kg (183 lb)  Body mass index is 27.83 kg/m².      Intake/Output Summary (Last 24 hours) at 18 0943  Last data filed at 18 0300   Gross per 24 hour   Intake          2002.08 ml   Output             1125 ml   Net           877.08 ml       Significant Labs:  Lab Results   Component Value Date    GROUPTRH A POS 2018    STREPBCULT No Group B Streptococcus isolated 2018       Recent Labs  Lab 18  2050   HGB 13.0   HCT 39.6       I have personallly reviewed all pertinent lab results from the last 24 hours.    Physical Exam:   Constitutional: She is oriented to person, place, and time. She appears  well-developed and well-nourished.       Cardiovascular: Normal rate.     Pulmonary/Chest: Effort normal.        Abdominal: Soft. She exhibits no abdominal incision. There is no tenderness (No fundal tenderness).             Musculoskeletal: She exhibits edema (1+ edema bilaterally). She exhibits no tenderness.       Neurological: She is alert and oriented to person, place, and time.     Psychiatric: She has a normal mood and affect.       Assessment/Plan:     33 y.o. female  for:    * Normal vaginal delivery    Continue postpartum care, anticipate discharge in am.        Chronic hypertension in pregnancy    Follow blood pressure closely, no indication for treatment at this time. Patient denies s/s pre-eclampsia.            Disposition: As patient meets milestones, will plan to discharge in am.    Corina Finnegan MD  Obstetrics  [facility]

## 2018-07-09 NOTE — ANESTHESIA POSTPROCEDURE EVALUATION
"Anesthesia Post Evaluation    Patient: April Wendt Schamberger    Procedure(s) Performed: * No procedures listed *    Final Anesthesia Type: epidural  Patient location during evaluation: labor & delivery  Patient participation: Yes- Able to Participate  Level of consciousness: awake and alert  Post-procedure vital signs: reviewed and stable  Pain management: adequate  Airway patency: patent  PONV status at discharge: No PONV  Anesthetic complications: no      Cardiovascular status: hemodynamically stable  Respiratory status: unassisted, spontaneous ventilation and room air  Hydration status: euvolemic  Follow-up not needed.        Visit Vitals  /64 (BP Location: Right arm, Patient Position: Sitting)   Pulse 96   Temp 37 °C (98.6 °F) (Oral)   Resp 18   Ht 5' 8" (1.727 m)   Wt 83 kg (183 lb)   LMP 10/05/2017 (Exact Date)   SpO2 97%   Breastfeeding? Unknown   BMI 27.83 kg/m²       Pain/Valeria Score: Pain Rating Prior to Med Admin: 1 (7/9/2018  6:05 AM)      "

## 2018-07-10 VITALS
HEIGHT: 68 IN | BODY MASS INDEX: 27.74 KG/M2 | OXYGEN SATURATION: 97 % | TEMPERATURE: 99 F | SYSTOLIC BLOOD PRESSURE: 118 MMHG | WEIGHT: 183 LBS | DIASTOLIC BLOOD PRESSURE: 64 MMHG | HEART RATE: 91 BPM | RESPIRATION RATE: 18 BRPM

## 2018-07-10 PROCEDURE — 99024 POSTOP FOLLOW-UP VISIT: CPT | Mod: ,,, | Performed by: OBSTETRICS & GYNECOLOGY

## 2018-07-10 PROCEDURE — 25000003 PHARM REV CODE 250: Performed by: OBSTETRICS & GYNECOLOGY

## 2018-07-10 RX ORDER — PRENATAL WITH FERROUS FUM AND FOLIC ACID 3080; 920; 120; 400; 22; 1.84; 3; 20; 10; 1; 12; 200; 27; 25; 2 [IU]/1; [IU]/1; MG/1; [IU]/1; MG/1; MG/1; MG/1; MG/1; MG/1; MG/1; UG/1; MG/1; MG/1; MG/1; MG/1
1 TABLET ORAL DAILY
Status: DISCONTINUED | OUTPATIENT
Start: 2018-07-10 | End: 2018-07-10 | Stop reason: HOSPADM

## 2018-07-10 RX ORDER — LOPERAMIDE HYDROCHLORIDE 2 MG/1
2 CAPSULE ORAL ONCE
Status: COMPLETED | OUTPATIENT
Start: 2018-07-10 | End: 2018-07-10

## 2018-07-10 RX ADMIN — HYDROCODONE BITARTRATE AND ACETAMINOPHEN 1 TABLET: 10; 325 TABLET ORAL at 12:07

## 2018-07-10 RX ADMIN — IBUPROFEN 600 MG: 600 TABLET ORAL at 12:07

## 2018-07-10 RX ADMIN — LOPERAMIDE HYDROCHLORIDE 2 MG: 2 CAPSULE ORAL at 05:07

## 2018-07-10 RX ADMIN — HYDROCODONE BITARTRATE AND ACETAMINOPHEN 1 TABLET: 10; 325 TABLET ORAL at 05:07

## 2018-07-10 RX ADMIN — PRENATAL VIT W/ FE FUMARATE-FA TAB 27-0.8 MG 1 TABLET: 27-0.8 TAB at 08:07

## 2018-07-10 RX ADMIN — IBUPROFEN 600 MG: 600 TABLET ORAL at 05:07

## 2018-07-10 NOTE — PROGRESS NOTES
[facility]  Obstetrics  Postpartum Progress Note    Patient Name: April Wendt Schamberger  MRN: [id number]  Admission Date: 2018  Hospital Length of Stay: 2 days  Attending Physician: Risa Alvarez MD  Primary Care Provider: Aristeo Perez MD    Subjective:     Principal Problem:Normal vaginal delivery    Hospital course: Patient had an  without complication.  PPD0: Patient is without complaint, nursing well. R/B/A of circumcision discussed with both parents , consent signed after questions answered.  PPD1: patient is without complaints, desires discharge if possible today.       She is doing well this morning. She is tolerating a regular diet without nausea or vomiting. She is voiding spontaneously. She is ambulating. She has passed flatus, and has not a BM. Vaginal bleeding is mild. She denies fever or chills. Abdominal pain is mild and controlled with oral medications. She is breastfeeding. She desires circumcision for her male baby: yes.    Objective:     Vital Signs (Most Recent):  Temp: 97.6 °F (36.4 °C) (07/10/18 0800)  Pulse: 85 (07/10/18 0800)  Resp: 18 (07/10/18 0800)  BP: 110/70 (07/10/18 0800)  SpO2: 98 % (07/10/18 0800) Vital Signs (24h Range):  Temp:  [97.6 °F (36.4 °C)-99.8 °F (37.7 °C)] 97.6 °F (36.4 °C)  Pulse:  [77-98] 85  Resp:  [16-18] 18  SpO2:  [97 %-99 %] 98 %  BP: ()/(57-77) 110/70     Weight: 83 kg (183 lb)  Body mass index is 27.83 kg/m².    No intake or output data in the 24 hours ending 07/10/18 1047    Significant Labs:  Lab Results   Component Value Date    GROUPTRH A POS 2018    HEPBSAG Negative 2018    STREPBCULT No Group B Streptococcus isolated 2018       Recent Labs  Lab 18  1141   HGB 10.5*   HCT 32.3*       I have personallly reviewed all pertinent lab results from the last 24 hours.    Physical Exam:   Constitutional: She is oriented to person, place, and time. She appears well-developed and well-nourished. No distress.        Cardiovascular: Normal rate.     Pulmonary/Chest: No respiratory distress.        Abdominal: Soft. She exhibits no distension. There is no tenderness. There is no rebound and no guarding.     Genitourinary:   Genitourinary Comments: Fundus firm, NT at umbilicus           Musculoskeletal: She exhibits no edema or tenderness.       Neurological: She is alert and oriented to person, place, and time.    Skin: Skin is warm and dry.    Psychiatric: She has a normal mood and affect.       Assessment/Plan:     33 y.o. female  for:    * Normal vaginal delivery    Continue postpartum care, will discharge home later today or tomorrow        Chronic hypertension in pregnancy    Follow blood pressure closely, no indication for treatment at this time. Patient denies s/s pre-eclampsia.            Disposition: As patient meets milestones, will plan to discharge today or tomorrow.    Tamiko Piña MD  Obstetrics  [facility]

## 2018-07-10 NOTE — PLAN OF CARE
Problem: Patient Care Overview  Goal: Plan of Care Review  Outcome: Outcome(s) achieved Date Met: 07/10/18  VSS. Pt states good pain control with oral medications. Pt ambulating and voiding independently. Mother/Baby Care Guide reviewed. All questions answered. Pt verbalized understanding. Pt discharged from [facility] per MD order. Transport requested.

## 2018-07-10 NOTE — DISCHARGE SUMMARY
[facility]  Obstetrics  Discharge Summary      Patient Name: April Wendt Schamberger  MRN: [id number]  Admission Date: 2018  Hospital Length of Stay: 2 days  Discharge Date and Time:  07/10/2018 2:38 PM  Attending Physician: Risa Alvarez MD   Discharging Provider: Tamiko Piña MD  Primary Care Provider: Aristeo Perez MD    HPI: 34 yo  at 39.4 weeks, h/o chronic HTN not on medication presents in active labor.    * No surgery found *     Hospital Course:   Patient had an  without complication.  PPD0: Patient is without complaint, nursing well. R/B/A of circumcision discussed with both parents , consent signed after questions answered.  PPD1: patient is without complaints, desires discharge if possible today.     Consults         Status Ordering Provider     Consult to Lactation  Use PRN     Provider:  (Not yet assigned)    ZENOBIA Skinner          Final Active Diagnoses:    Diagnosis Date Noted POA    PRINCIPAL PROBLEM:  Normal vaginal delivery [O80] 2018 Not Applicable    Chronic hypertension in pregnancy [O10.919] 2018 Yes      Problems Resolved During this Admission:    Diagnosis Date Noted Date Resolved POA    Vaginal delivery [O80] 2018 Not Applicable    Term pregnancy [Z34.80] 2018 Not Applicable    Normal labor [O80, Z37.9] 2018 Not Applicable    Advanced maternal age in multigravida, third trimester [O09.523] 2018 Yes    39 weeks gestation of pregnancy [Z3A.39] 2018 Not Applicable        Labs:   CBC   Recent Labs  Lab 18  1141   WBC 11.76 12.74*   HGB 13.0 10.5*   HCT 39.6 32.3*    220       Feeding Method: breast    Immunizations     Date Immunization Status Dose Route/Site Given by    07/10/18 143 MMR Deleted 0.5 mL Subcutaneous/Left deltoid     07/10/18 1437 Tdap Deleted 0.5 mL Intramuscular/Left deltoid            Delivery:    Episiotomy: None   Lacerations: 2nd   Repair suture:     Repair # of packets:     Blood loss (ml): 300     Birth information:  YOB: 2020   Time of birth: 12:12 AM   Sex: male   Delivery type: Vaginal, Spontaneous Delivery   Gestational Age: 39w4d    Delivery Clinician:      Other providers:       Additional  information:  Forceps:    Vacuum:    Breech:    Observed anomalies      Living?:           APGARS  One minute Five minutes Ten minutes   Skin color:         Heart rate:         Grimace:         Muscle tone:         Breathing:         Totals: 8  9        Placenta: Delivered:       appearance    Pending Diagnostic Studies:     None          Discharged Condition: good    Disposition: Home or Self Care    Follow Up:  Follow-up Information     Risa Alvarez MD In 2 weeks.    Specialties:  Obstetrics, Obstetrics and Gynecology  Why:  Blood Pressure Check  Contact information:  [address]  [address]  [facility] [address] [phone]             Risa Alvarez MD In 6 weeks.    Specialties:  Obstetrics, Obstetrics and Gynecology  Why:  Postpartum  Contact information:  [address]  [address]  [facility] [address] [phone]                 Patient Instructions:     Call MD for:  temperature >100.4     Call MD for:  persistent nausea and vomiting or diarrhea     Call MD for:  severe uncontrolled pain     Call MD for:  redness, tenderness, or signs of infection (pain, swelling, redness, odor or green/yellow discharge around incision site)     No dressing needed       Medications:  Current Discharge Medication List      START taking these medications    Details   docusate sodium (COLACE) 100 MG capsule Take 2 capsules (200 mg total) by mouth 2 (two) times daily as needed for Constipation.  Refills: 0      HYDROcodone-acetaminophen (NORCO) 5-325 mg per tablet Take 1 tablet by mouth every 4 (four) hours as needed.  Qty: 10 tablet, Refills: 0      ibuprofen  (ADVIL,MOTRIN) 600 MG tablet Take 1 tablet (600 mg total) by mouth every 6 (six) hours.  Qty: 45 tablet, Refills: 1         CONTINUE these medications which have NOT CHANGED    Details   PNV NO.95/FERROUS FUM/FOLIC AC (PRENATAL ORAL) Take by mouth.         STOP taking these medications       LOPERAMIDE HCL (IMODIUM A-D ORAL) Comments:   Reason for Stopping:               Tamiko Piña MD  Obstetrics  [facility]

## 2018-07-10 NOTE — SUBJECTIVE & OBJECTIVE
Hospital course: Patient had an  without complication.  PPD0: Patient is without complaint, nursing well. R/B/A of circumcision discussed with both parents , consent signed after questions answered.  PPD1: patient is without complaints, desires discharge if possible today.       She is doing well this morning. She is tolerating a regular diet without nausea or vomiting. She is voiding spontaneously. She is ambulating. She has passed flatus, and has not a BM. Vaginal bleeding is mild. She denies fever or chills. Abdominal pain is mild and controlled with oral medications. She is breastfeeding. She desires circumcision for her male baby: yes.    Objective:     Vital Signs (Most Recent):  Temp: 97.6 °F (36.4 °C) (07/10/18 0800)  Pulse: 85 (07/10/18 0800)  Resp: 18 (07/10/18 0800)  BP: 110/70 (07/10/18 0800)  SpO2: 98 % (07/10/18 0800) Vital Signs (24h Range):  Temp:  [97.6 °F (36.4 °C)-99.8 °F (37.7 °C)] 97.6 °F (36.4 °C)  Pulse:  [77-98] 85  Resp:  [16-18] 18  SpO2:  [97 %-99 %] 98 %  BP: ()/(57-77) 110/70     Weight: 83 kg (183 lb)  Body mass index is 27.83 kg/m².    No intake or output data in the 24 hours ending 07/10/18 1047    Significant Labs:  Lab Results   Component Value Date    GROUPTRH A POS 2018    HEPBSAG Negative 2018    STREPBCULT No Group B Streptococcus isolated 2018       Recent Labs  Lab 18  1141   HGB 10.5*   HCT 32.3*       I have personallly reviewed all pertinent lab results from the last 24 hours.    Physical Exam:   Constitutional: She is oriented to person, place, and time. She appears well-developed and well-nourished. No distress.       Cardiovascular: Normal rate.     Pulmonary/Chest: No respiratory distress.        Abdominal: Soft. She exhibits no distension. There is no tenderness. There is no rebound and no guarding.     Genitourinary:   Genitourinary Comments: Fundus firm, NT at umbilicus           Musculoskeletal: She exhibits no edema or tenderness.        Neurological: She is alert and oriented to person, place, and time.    Skin: Skin is warm and dry.    Psychiatric: She has a normal mood and affect.

## 2018-07-16 ENCOUNTER — POSTPARTUM VISIT (OUTPATIENT)
Dept: OBSTETRICS AND GYNECOLOGY | Facility: CLINIC | Age: 33
End: 2018-07-16
Attending: STUDENT IN AN ORGANIZED HEALTH CARE EDUCATION/TRAINING PROGRAM
Payer: COMMERCIAL

## 2018-07-16 ENCOUNTER — PATIENT MESSAGE (OUTPATIENT)
Dept: OBSTETRICS AND GYNECOLOGY | Facility: CLINIC | Age: 33
End: 2018-07-16

## 2018-07-16 VITALS — TEMPERATURE: 98 F | DIASTOLIC BLOOD PRESSURE: 82 MMHG | HEIGHT: 68 IN | SYSTOLIC BLOOD PRESSURE: 132 MMHG

## 2018-07-16 DIAGNOSIS — R52 PAIN RELATED TO VAGINAL DELIVERY: Primary | ICD-10-CM

## 2018-07-16 LAB
CANDIDA RRNA VAG QL PROBE: NEGATIVE
G VAGINALIS RRNA GENITAL QL PROBE: POSITIVE
T VAGINALIS RRNA GENITAL QL PROBE: NEGATIVE

## 2018-07-16 PROCEDURE — 87660 TRICHOMONAS VAGIN DIR PROBE: CPT

## 2018-07-16 PROCEDURE — 99999 PR PBB SHADOW E&M-EST. PATIENT-LVL III: CPT | Mod: PBBFAC,,, | Performed by: STUDENT IN AN ORGANIZED HEALTH CARE EDUCATION/TRAINING PROGRAM

## 2018-07-16 PROCEDURE — 99213 OFFICE O/P EST LOW 20 MIN: CPT | Mod: S$GLB,,, | Performed by: STUDENT IN AN ORGANIZED HEALTH CARE EDUCATION/TRAINING PROGRAM

## 2018-07-16 RX ORDER — HYDROCODONE BITARTRATE AND ACETAMINOPHEN 5; 325 MG/1; MG/1
1 TABLET ORAL EVERY 4 HOURS PRN
Qty: 10 TABLET | Refills: 0 | Status: SHIPPED | OUTPATIENT
Start: 2018-07-16 | End: 2018-07-26

## 2018-07-16 NOTE — PROGRESS NOTES
Subjective:      April Wendt Schamberger is a 33 y.o.  who presents for a postpartum visit.  She is status post  uncomplicated vaginal delivery 1 weeks ago.  She had a 2nd degree laceration that was repaired without complication.  She reports that her ibuprofen has been tearing up her intestines and stomach.  She can tolerate the percocet and usually just takes half.  She reports diarrhea and takes imodium for her suspected IBS.  She denies any other issues or concerns.  Breastfeeding.   Mood stable and has support at home.      Past Medical History:   Diagnosis Date    Abnormal Pap smear of cervix     Cryo Done    ADD (attention deficit disorder)     Breast disorder     Breast Cysts    Esophageal reflux     Fibroids     Hypertension     IBS (irritable bowel syndrome)     Insomnia     Kidney stones     Mastocytosis     Upper GI bleed        Current Outpatient Prescriptions:     HYDROcodone-acetaminophen (NORCO) 5-325 mg per tablet, Take 1 tablet by mouth every 4 (four) hours as needed., Disp: 10 tablet, Rfl: 0    PNV NO.95/FERROUS FUM/FOLIC AC (PRENATAL ORAL), Take by mouth., Disp: , Rfl:     docusate sodium (COLACE) 100 MG capsule, Take 2 capsules (200 mg total) by mouth 2 (two) times daily as needed for Constipation., Disp: , Rfl: 0    ibuprofen (ADVIL,MOTRIN) 600 MG tablet, Take 1 tablet (600 mg total) by mouth every 6 (six) hours., Disp: 45 tablet, Rfl: 1      Objective:     Vitals:    18 1349   BP: 132/82   Temp: 98.3 °F (36.8 °C)       APPEARANCE: Well nourished, well developed, in no acute distress.  PSYCH: Appropriate mood and affect.  NECK:  Supple, no thyromegaly.  BREASTS:  No masses, no nipple discharge.  CARDIOVASCULAR:  Regular rate and rhythm.  LUNGS:  Clear to auscultation bilaterally.  ABDOMEN:  Soft, nontender.  GENITOURINARY:  External genitalia normal in appearance, normal perineal body, normal urethral meatus - 2nd degree laceration with sutures in place,  healing well, no signs of infection.   EXTREMITIES: No edema.      Assessment:     Pain related to vaginal delivery  -     Vaginosis Screen by DNA Probe    Other orders  -     HYDROcodone-acetaminophen (NORCO) 5-325 mg per tablet; Take 1 tablet by mouth every 4 (four) hours as needed.  Dispense: 10 tablet; Refill: 0        Plan:     1. Postpartum course reviewed and discussed with patient.  Will refill percocet x 1.  Vaginal swabs collected.  All questions answered.  Keep scheduled follow up.

## 2018-07-17 ENCOUNTER — PATIENT MESSAGE (OUTPATIENT)
Dept: OBSTETRICS AND GYNECOLOGY | Facility: CLINIC | Age: 33
End: 2018-07-17

## 2018-07-17 RX ORDER — METRONIDAZOLE 500 MG/1
500 TABLET ORAL 2 TIMES DAILY
Qty: 14 TABLET | Refills: 0 | Status: SHIPPED | OUTPATIENT
Start: 2018-07-17 | End: 2018-07-24

## 2018-07-24 NOTE — TELEPHONE ENCOUNTER
Spoke with pt and her 24 wk ob visit scheduled for 3-20-18 @ 1:15 in [place] [facility]. Pt understood    Tissue Cultured Epidermal Autograft Text: The defect edges were debeveled with a #15 scalpel blade.  Given the location of the defect, shape of the defect and the proximity to free margins a tissue cultured epidermal autograft was deemed most appropriate.  The graft was then trimmed to fit the size of the defect.  The graft was then placed in the primary defect and oriented appropriately.

## 2018-07-26 ENCOUNTER — POSTPARTUM VISIT (OUTPATIENT)
Dept: OBSTETRICS AND GYNECOLOGY | Facility: CLINIC | Age: 33
End: 2018-07-26
Payer: COMMERCIAL

## 2018-07-26 VITALS
HEIGHT: 68 IN | WEIGHT: 163.13 LBS | DIASTOLIC BLOOD PRESSURE: 80 MMHG | SYSTOLIC BLOOD PRESSURE: 118 MMHG | BODY MASS INDEX: 24.72 KG/M2

## 2018-07-26 PROCEDURE — 99999 PR PBB SHADOW E&M-EST. PATIENT-LVL III: CPT | Mod: PBBFAC,,, | Performed by: OBSTETRICS & GYNECOLOGY

## 2018-07-26 PROCEDURE — 3008F BODY MASS INDEX DOCD: CPT | Mod: CPTII,S$GLB,, | Performed by: OBSTETRICS & GYNECOLOGY

## 2018-07-26 PROCEDURE — 99213 OFFICE O/P EST LOW 20 MIN: CPT | Mod: 24,S$GLB,, | Performed by: OBSTETRICS & GYNECOLOGY

## 2018-07-26 RX ORDER — LOPERAMIDE HCL 1MG/7.5ML
0.1 LIQUID (ML) ORAL EVERY 12 HOURS
COMMUNITY

## 2018-07-26 RX ORDER — IBUPROFEN 200 MG
400 TABLET ORAL EVERY 6 HOURS PRN
COMMUNITY
End: 2018-08-20

## 2018-07-26 NOTE — PROGRESS NOTES
"April Wendt Schamberger is a 33 y.o. female  presents for a postpartum blood pressure check.  She is status post  2 weeks ago.  Her hospitalization was not complicated.  She is breastfeeding.  She desires no method for contraception.  She denies postpartum depression.      Current Outpatient Prescriptions:     ibuprofen (MOTRIN IB) 200 MG tablet, Take 400 mg by mouth every 6 (six) hours as needed for Pain., Disp: , Rfl:     loperamide (IMODIUM A-D) 1 mg/7.5 mL Liqd, Take 0.1 mg/kg by mouth every 12 (twelve) hours., Disp: , Rfl:     PNV NO.95/FERROUS FUM/FOLIC AC (PRENATAL ORAL), Take by mouth., Disp: , Rfl:     Vitals:    18 1132   BP: 118/80   Weight: 74 kg (163 lb 2.3 oz)   Height: 5' 8" (1.727 m)   PainSc:   2   PainLoc: Breast     Body mass index is 24.81 kg/m².    Assessment:  Normal postpartum BP    Plan:    There are no diagnoses linked to this encounter. status post spontaneous vaginal delivery.  Pelvic rest and light activity  Follow up in 4 weeks for PP visit  "

## 2018-07-30 ENCOUNTER — TELEPHONE (OUTPATIENT)
Dept: FAMILY MEDICINE | Facility: CLINIC | Age: 33
End: 2018-07-30

## 2018-07-30 RX ORDER — IBUPROFEN 600 MG/1
600 TABLET ORAL EVERY 6 HOURS
Qty: 45 TABLET | Refills: 1 | Status: SHIPPED | OUTPATIENT
Start: 2018-07-30 | End: 2020-01-16

## 2018-07-30 NOTE — TELEPHONE ENCOUNTER
Spoke to pt and states that she was taking the Ibuprofen 600mg every 6 hrs PRN for post delivery. Pt states that she now takes the Ibuprofen OTC. Doesn't need a refill.

## 2018-07-30 NOTE — TELEPHONE ENCOUNTER
Accidentally refilled this Ibuprofen for patient for Dr Perez who is out-- looks like she may be currently pregnant-- can you please advise that absolutely NO NSAIDS in last trimester of pregnancy, thanks

## 2018-07-30 NOTE — TELEPHONE ENCOUNTER
----- Message from Rosario Raza sent at 7/30/2018  3:41 PM CDT -----  Patient needs refills on her ibuprofen please

## 2018-08-05 ENCOUNTER — PATIENT MESSAGE (OUTPATIENT)
Dept: OBSTETRICS AND GYNECOLOGY | Facility: CLINIC | Age: 33
End: 2018-08-05

## 2018-08-06 RX ORDER — METRONIDAZOLE 500 MG/1
500 TABLET ORAL 2 TIMES DAILY
Qty: 14 TABLET | Refills: 0 | Status: SHIPPED | OUTPATIENT
Start: 2018-08-06 | End: 2018-08-16

## 2018-08-06 NOTE — TELEPHONE ENCOUNTER
Pt notified that Rx has been sent to pharm per request. Reminded not to drink alcohol while taking this medication.

## 2018-08-06 NOTE — TELEPHONE ENCOUNTER
Swing pt- Seen for PP visit on 7/26. Writes on portal that she thinks she still has BV and would like an Rx. Saw Dr. Ardon on 7/16 and was + for BV at that time, treated with Flagyl.     Allergies and Pharm UTD  Flagyl pended

## 2018-08-20 ENCOUNTER — POSTPARTUM VISIT (OUTPATIENT)
Dept: OBSTETRICS AND GYNECOLOGY | Facility: CLINIC | Age: 33
End: 2018-08-20
Attending: OBSTETRICS & GYNECOLOGY
Payer: COMMERCIAL

## 2018-08-20 VITALS
SYSTOLIC BLOOD PRESSURE: 122 MMHG | WEIGHT: 167.69 LBS | HEIGHT: 68 IN | DIASTOLIC BLOOD PRESSURE: 82 MMHG | BODY MASS INDEX: 25.41 KG/M2

## 2018-08-20 DIAGNOSIS — N89.8 VAGINAL IRRITATION: Primary | ICD-10-CM

## 2018-08-20 PROCEDURE — 99999 PR PBB SHADOW E&M-EST. PATIENT-LVL III: CPT | Mod: PBBFAC,,, | Performed by: OBSTETRICS & GYNECOLOGY

## 2018-08-20 PROCEDURE — 0503F POSTPARTUM CARE VISIT: CPT | Mod: S$GLB,,, | Performed by: OBSTETRICS & GYNECOLOGY

## 2018-08-20 PROCEDURE — 87480 CANDIDA DNA DIR PROBE: CPT

## 2018-08-20 PROCEDURE — 87510 GARDNER VAG DNA DIR PROBE: CPT

## 2018-08-20 NOTE — PROGRESS NOTES
"April Wendt Schamberger is a 33 y.o. female  presents for a postpartum visit.  She is status post  6 weeks ago.  Her hospitalization was not complicated.  She is breastfeeding.  She desires no method for contraception.  She denies postpartum depression.  She complains of vaginal irritation and was treated twice recently for BV.    Her last annual exam was 2017      Current Outpatient Medications:     ibuprofen (ADVIL,MOTRIN) 600 MG tablet, Take 1 tablet (600 mg total) by mouth every 6 (six) hours., Disp: 45 tablet, Rfl: 1    loperamide (IMODIUM A-D) 1 mg/7.5 mL Liqd, Take 0.1 mg/kg by mouth every 12 (twelve) hours., Disp: , Rfl:     PNV NO.95/FERROUS FUM/FOLIC AC (PRENATAL ORAL), Take by mouth., Disp: , Rfl:     giovanna barraza ointment, Apply topically after feeding. Do not wash off. This compounded medication expires in 30 days., Disp: 30 g, Rfl: 2    Vitals:    18 1016   BP: 122/82   Weight: 76.1 kg (167 lb 10.6 oz)   Height: 5' 8" (1.727 m)   PainSc: 0-No pain     Body mass index is 25.49 kg/m².    Physical Exam:  General: healthy, alert, no distress  Abdomen: Normal, benign.  External genitalia: normal, well-healed, without lesions or masses  Vagina: normal, well-healed, physiologic discharge, without lesions  Cervix: normal, well-healed, without lesions  Uterus: normal size, well involuted, firm, non-tender  Adnexa: no masses palpable and nontender    Assessment:  Normal postpartum exam    Plan:    Vaginal irritation  -     Vaginosis Screen by DNA Probe     status post spontaneous vaginal delivery.    May resume regular activity.  Follow up in 2 months for annual exam.  "

## 2018-09-05 ENCOUNTER — OFFICE VISIT (OUTPATIENT)
Dept: OBSTETRICS AND GYNECOLOGY | Facility: CLINIC | Age: 33
End: 2018-09-05
Payer: COMMERCIAL

## 2018-09-05 ENCOUNTER — TELEPHONE (OUTPATIENT)
Dept: OBSTETRICS AND GYNECOLOGY | Facility: CLINIC | Age: 33
End: 2018-09-05

## 2018-09-05 VITALS
HEIGHT: 68 IN | SYSTOLIC BLOOD PRESSURE: 122 MMHG | TEMPERATURE: 99 F | WEIGHT: 164.13 LBS | DIASTOLIC BLOOD PRESSURE: 72 MMHG | BODY MASS INDEX: 24.88 KG/M2

## 2018-09-05 DIAGNOSIS — N61.0 MASTITIS, RIGHT, ACUTE: Primary | ICD-10-CM

## 2018-09-05 PROCEDURE — 3008F BODY MASS INDEX DOCD: CPT | Mod: CPTII,S$GLB,, | Performed by: OBSTETRICS & GYNECOLOGY

## 2018-09-05 PROCEDURE — 99213 OFFICE O/P EST LOW 20 MIN: CPT | Mod: S$GLB,,, | Performed by: OBSTETRICS & GYNECOLOGY

## 2018-09-05 PROCEDURE — 99999 PR PBB SHADOW E&M-EST. PATIENT-LVL III: CPT | Mod: PBBFAC,,, | Performed by: OBSTETRICS & GYNECOLOGY

## 2018-09-05 RX ORDER — DICLOXACILLIN SODIUM 500 MG/1
500 CAPSULE ORAL 4 TIMES DAILY
Qty: 40 CAPSULE | Refills: 0 | Status: SHIPPED | OUTPATIENT
Start: 2018-09-05 | End: 2018-09-15

## 2018-09-05 RX ORDER — DICLOXACILLIN SODIUM 500 MG/1
500 CAPSULE ORAL EVERY 6 HOURS
Qty: 28 CAPSULE | Refills: 0 | Status: CANCELLED | OUTPATIENT
Start: 2018-09-05 | End: 2018-09-12

## 2018-09-05 RX ORDER — ACETAMINOPHEN 500 MG
500 TABLET ORAL EVERY 6 HOURS PRN
COMMUNITY
End: 2018-10-24

## 2018-09-05 NOTE — TELEPHONE ENCOUNTER
Swing pp pt - pt is 8 weeks pp and thinks she has mastitis in her right breast. She was up all night running 102.2 degree fever.

## 2018-09-05 NOTE — TELEPHONE ENCOUNTER
PP Pt thinks she has Mastitis.  Reports pain and erythema in right breast.  Fever of 102.2 that reduces to 99 degrees after Motrin 600mg and Tylenol 500mg.  Advised if no improvement in 24 hours to call for an appt.     Dicloxicillin 500mg PO QID x7 days

## 2018-09-05 NOTE — TELEPHONE ENCOUNTER
Is there anyway she can be seen today for a visit. With 102 fever I would prefer to do breast exam and make sure no abscess is felt. If she cannot come in the I will send in antibiotics. Can have appt with anyone. Me or if she prefers [place] then someone there

## 2018-09-05 NOTE — PROGRESS NOTES
"  Chief Complaint: Breast Pain, Tenderness, Redness     HPI:      April Wendt Schamberger is a 33 y.o.  who presents complaining of redness, tenderness of right breast with pain with breastfeeding. Is 5 weeks post partum. Has had fevers up to 102.     ROS:     GENERAL: Denies unintentional weight gain or weight loss. Feeling well overall.   ABDOMEN: Denies abdominal pain, constipation, diarrhea, nausea, change in appetite.   BREAST: See HPI  GYN: Denies abnormal bleeding or discharge.  MUSCULOSKEL: Denies pain in back or extremities.    Physical Exam:      PHYSICAL EXAM:  Visit Vitals    /82    Ht 5' 5" (1.651 m)    Wt 87.9 kg (193 lb 12.6 oz)    BMI 32.25 kg/m2     Body mass index is 32.25 kg/(m^2).     APPEARANCE: Well nourished, well developed, in no acute distress.  BREASTS: normal in size and symmetry, normal contour with no evidence of flattening or dimpling, nipples everted without rashes or discharge, palpation negative for masses or nodules, no palpable axillary lymphadenopathy. Right breast with area of redness and warmth on the underside from the 4 o'clock position to the 7 o'clock position. No fluctuance, no masses.  ABDOMEN: Soft.  No tenderness or masses.      Assessment/Plan:     Mastitis, right, acute  -     dicloxacillin (DYNAPEN) 500 MG capsule; Take 1 capsule (500 mg total) by mouth 4 (four) times daily. for 10 days  Dispense: 40 capsule; Refill: 0              "

## 2018-09-06 ENCOUNTER — PATIENT MESSAGE (OUTPATIENT)
Dept: ADMINISTRATIVE | Facility: OTHER | Age: 33
End: 2018-09-06

## 2018-09-06 ENCOUNTER — OFFICE VISIT (OUTPATIENT)
Dept: FAMILY MEDICINE | Facility: CLINIC | Age: 33
End: 2018-09-06
Attending: FAMILY MEDICINE
Payer: COMMERCIAL

## 2018-09-06 VITALS
WEIGHT: 164.88 LBS | SYSTOLIC BLOOD PRESSURE: 105 MMHG | HEART RATE: 81 BPM | DIASTOLIC BLOOD PRESSURE: 73 MMHG | HEIGHT: 68 IN | BODY MASS INDEX: 24.99 KG/M2 | OXYGEN SATURATION: 99 %

## 2018-09-06 DIAGNOSIS — H01.001 BLEPHARITIS OF RIGHT UPPER EYELID, UNSPECIFIED TYPE: Primary | ICD-10-CM

## 2018-09-06 PROCEDURE — 99214 OFFICE O/P EST MOD 30 MIN: CPT | Mod: S$GLB,,, | Performed by: FAMILY MEDICINE

## 2018-09-06 PROCEDURE — 99999 PR PBB SHADOW E&M-EST. PATIENT-LVL III: CPT | Mod: PBBFAC,,, | Performed by: FAMILY MEDICINE

## 2018-09-06 PROCEDURE — 3008F BODY MASS INDEX DOCD: CPT | Mod: CPTII,S$GLB,, | Performed by: FAMILY MEDICINE

## 2018-09-06 RX ORDER — ERYTHROMYCIN 5 MG/G
OINTMENT OPHTHALMIC 3 TIMES DAILY
Qty: 3.5 G | Refills: 0 | Status: SHIPPED | OUTPATIENT
Start: 2018-09-06 | End: 2018-09-06 | Stop reason: SDUPTHER

## 2018-09-06 RX ORDER — ERYTHROMYCIN 5 MG/G
OINTMENT OPHTHALMIC 3 TIMES DAILY
Qty: 3.5 G | Refills: 0 | Status: SHIPPED | OUTPATIENT
Start: 2018-09-06 | End: 2018-10-24

## 2018-09-06 NOTE — PATIENT INSTRUCTIONS
Blepharitis    Blepharitis is an inflammation of the eyelid. It results in swelling of the eyelids, and it is usually caused by a bacterial infection or a skin condition. Blepharitis is a common eye condition. There are two types. Anterior blepharitis occurs where the eyelashes are attached (outside front edge of the eye). Posterior blepharitis affects the inner edge of the eyelid that touches the eyeball.  In addition to swollen eyelids, symptoms of blepharitis can include thick, yellow, dandruff-like scales that stick to the eyelid. There may be oily patches on the eyelid. The eyelashes may be crusted (with dandruff-like scales) when you wake up from sleeping. The irritated area may itch. The eyelids may be red. The eyes can be red and burn or sting. The eyes may tear a lot, or be dry. You can become sensitive to light or have blurred vision. Symptoms of blepharitis can cause irritability.  Blepharitis is a chronic condition and difficult to cure. Even with successful treatment, recurrences are common. Good hygiene and home treatments (in the Home care section below) can improve your condition.  Causes  Causes of blepharitis may include:  · Problems with the oil glands in the eyelid (meibomian glands)  · Dandruff of the scalp and eyebrows (seborrheic dermatitis)  · Acne rosacea (a skin condition that causes redness of the face, and other symptoms)  · Eyelash mites (tiny organisms in the eyelash follicles)  · Allergic reactions to cosmetics or medicines  Home care  Medicine: The healthcare provider may prescribe an antibiotic eye drops or ointment, artificial tears, and/or steroid eye drops. Follow all instructions for using these medicines. Use all medicines as directed. If you have pain, take medicine as advised by the healthcare provider.  · Wash your hands carefully with soap and warm water before and after caring for your eyes.  · Apply a warm compress or a warm, moist washcloth to the eyelids for 1 minute,  2 to 3 times a day, to loosen the crust. Then, wipe away scales or crust from the eyelids.  · After applying the warm compress, gently scrub the base of the eyelashes for almost 15 seconds per eyelid. To do this, close your eyes and use a moist eyelid cleansing wipe, clean washcloth, or cotton swab. Ask your healthcare provider about products (such as nonirritating baby shampoo) to use to help clean the eyelids.  · You may be instructed to gently massage your eyelids to help unblock the eyelid glands. Follow all instructions given by the healthcare provider.  · Unless told otherwise, on a regular basis, with eyes closed, clean your eyelids as directed by the healthcare provider. Blepharitis can be an ongoing problem.  · Do not wear eye makeup until the inflammation goes away, or as directed by your healthcare provider.  · Unless told otherwise, stop using contact lenses until you complete treatment for the condition.  · Wash your hands regularly to help prevent dirt and bacteria from coming in contact with your eyelid.  Follow-up care  Follow up with your healthcare provider, or as advised. Your healthcare provider may refer you to an eye specialist (an optometrist or ophthalmologist) for further evaluation and treatment.  When to seek medical advice  Call your healthcare provider right away if any of these occur:  · Increase in redness of the white part of the eye  · Increase in swelling, redness, irritation, or pain of the eyelids  · Eye pain  · Change in vision (trouble seeing or blurring)  · Drainage (pus, blood) from the eyelid  · Fever of 100.4ºF (38ºC) or higher, or as directed by your healthcare provider  Date Last Reviewed: 10/9/2015  © 6522-5335 [employer]. [address] [place] [address]. All rights reserved. This information is not intended as a substitute for professional medical care. Always follow your healthcare professional's instructions.        Treating Blepharitis:  Self-Care    To treat the problem, keep your eyelids clean. Warm compresses can reduce redness and swelling, and help clean your eyelids, too. You may also need to wash the area gently with an eyelid scrub when you wake up.  To apply a warm compress:  1. Wash your hands with soap and warm water.  2. Wet a clean washcloth with warm water. Then wring it out.  3. Close your eyes and place the washcloth over your eyelids for 3 to 5 minutes. This helps loosen scales or crusts.  4. Wet the washcloth again as often as needed to keep it warm.  Repeat 2 or more times a day. Use a clean washcloth each time.     To use an eyelid scrub:  1. Wash your hands with soap and warm water.  2. Use a ready-made eyelid scrub. Or mix 3 drops of baby shampoo in 1/4 cup of warm water.  3. Dip a lint-free pad, cotton swab, or clean washcloth in the scrub.  4. Close one eye and gently scrub the base of the eyelid.  5. Rinse the lid in cool water and dry with a clean towel.  6. Repeat on your other eye.  Date Last Reviewed: 6/6/2015  © 2431-8142 [employer]. [address] [place] [address], [place] [address]. All rights reserved. This information is not intended as a substitute for professional medical care. Always follow your healthcare professional's instructions.

## 2018-09-06 NOTE — PROGRESS NOTES
Subjective:       Patient ID: April Wendt Schamberger is a 33 y.o. female.    Chief Complaint: Facial Swelling (Swollen and Redness around Right eye)    33 yr old pleasant white female with hypertension, IBS, and no other significant medical history presents today for evaluation of swelling, redness and pain in right upper eyelid. Onset 2 days ago and gradually worsening since last night. She was seen by GYN yesterday and was given dicloxacillin for mastitis. No blurred vision or redness on conjunctiva. No eye discharge. Details as follows -      History as below - reviewed       Eye Problem    The right eye is affected. This is a new problem. The current episode started yesterday. The problem occurs constantly. The problem has been unchanged. There was no injury mechanism. The pain is at a severity of 4/10. The pain is moderate. There is no known exposure to pink eye. She does not wear contacts. Associated symptoms include eye redness. Pertinent negatives include no blurred vision, eye discharge, fever, itching, nausea, photophobia, vomiting or weakness. She has tried nothing for the symptoms. The treatment provided no relief.     Review of Systems   Constitutional: Negative.  Negative for activity change, diaphoresis, fever and unexpected weight change.   HENT: Negative.  Negative for congestion, ear discharge, hearing loss, rhinorrhea, sore throat, trouble swallowing and voice change.    Eyes: Positive for redness. Negative for blurred vision, photophobia, pain, discharge, itching and visual disturbance.   Respiratory: Negative.  Negative for chest tightness, shortness of breath and wheezing.    Cardiovascular: Negative.  Negative for chest pain and palpitations.   Gastrointestinal: Positive for diarrhea. Negative for abdominal distention, anal bleeding, blood in stool, constipation, nausea and vomiting.   Endocrine: Negative.  Negative for cold intolerance, polydipsia and polyuria.   Genitourinary: Negative.   Negative for decreased urine volume, difficulty urinating, dysuria, frequency, hematuria, menstrual problem and vaginal pain.   Musculoskeletal: Positive for neck pain. Negative for arthralgias, gait problem, joint swelling and myalgias.   Skin: Negative.  Negative for color change, pallor and wound.   Allergic/Immunologic: Negative.  Negative for environmental allergies and immunocompromised state.   Neurological: Positive for headaches. Negative for dizziness, tremors, seizures, speech difficulty and weakness.   Hematological: Negative.  Negative for adenopathy. Does not bruise/bleed easily.   Psychiatric/Behavioral: Negative.  Negative for agitation, confusion, decreased concentration, dysphoric mood, hallucinations, self-injury and suicidal ideas. The patient is not nervous/anxious.        PMH/PSH/FH/SH/MED/ALLERGY reviewed    Objective:       Vitals:    09/06/18 1440   BP: 105/73   Pulse: 81       Physical Exam   Constitutional: She is oriented to person, place, and time. She appears well-developed and well-nourished. No distress.   HENT:   Head: Normocephalic and atraumatic.   Right Ear: External ear normal.   Left Ear: External ear normal.   Nose: Nose normal.   Mouth/Throat: Oropharynx is clear and moist. No oropharyngeal exudate.   Eyes: Conjunctivae and EOM are normal. Pupils are equal, round, and reactive to light. Right eye exhibits chemosis. Right eye exhibits no discharge. Left eye exhibits no discharge. No scleral icterus.       Neck: Normal range of motion. Neck supple. No JVD present. No tracheal deviation present. No thyromegaly present.   Cardiovascular: Normal rate, regular rhythm, normal heart sounds and intact distal pulses. Exam reveals no gallop and no friction rub.   No murmur heard.  Pulmonary/Chest: Effort normal and breath sounds normal. No stridor. She has no wheezes. She has no rales. She exhibits no tenderness.   Abdominal: Soft. Bowel sounds are normal. She exhibits no distension and  no mass. There is no tenderness. There is no rebound and no guarding. No hernia.   Musculoskeletal: Normal range of motion. She exhibits no edema or tenderness.   Lymphadenopathy:     She has no cervical adenopathy.   Neurological: She is alert and oriented to person, place, and time. She has normal reflexes. She displays normal reflexes. No cranial nerve deficit. She exhibits normal muscle tone. Coordination normal.   Skin: Skin is warm and dry. No rash noted. She is not diaphoretic. No erythema. No pallor.   Psychiatric: She has a normal mood and affect. Her behavior is normal. Judgment and thought content normal.       Assessment:       1. Blepharitis of right upper eyelid, unspecified type        Plan:       April was seen today for facial swelling.    Diagnoses and all orders for this visit:    Blepharitis of right upper eyelid, unspecified type  -     Discontinue: erythromycin (ROMYCIN) ophthalmic ointment; Place into the right eye 3 (three) times daily.  -     erythromycin (ROMYCIN) ophthalmic ointment; Place into the right eye 3 (three) times daily.      Acute Blepharitis  -rest, warm compresses  -erythromycin eye ointment - use as directed  -continue dicloxacillin     Spent adequate time in obtaining history and explaining differentials    40 minutes spent during this visit of which greater than 50% devoted to face-face counseling and coordination of care regarding diagnosis and management plan    Follow-up in about 4 weeks (around 10/4/2018), or if symptoms worsen or fail to improve.

## 2018-09-10 ENCOUNTER — PATIENT MESSAGE (OUTPATIENT)
Dept: OBSTETRICS AND GYNECOLOGY | Facility: CLINIC | Age: 33
End: 2018-09-10

## 2018-09-12 ENCOUNTER — OFFICE VISIT (OUTPATIENT)
Dept: OBSTETRICS AND GYNECOLOGY | Facility: CLINIC | Age: 33
End: 2018-09-12
Payer: COMMERCIAL

## 2018-09-12 ENCOUNTER — PATIENT MESSAGE (OUTPATIENT)
Dept: OBSTETRICS AND GYNECOLOGY | Facility: CLINIC | Age: 33
End: 2018-09-12

## 2018-09-12 VITALS
BODY MASS INDEX: 24.85 KG/M2 | HEIGHT: 68 IN | SYSTOLIC BLOOD PRESSURE: 100 MMHG | DIASTOLIC BLOOD PRESSURE: 70 MMHG | WEIGHT: 163.94 LBS

## 2018-09-12 DIAGNOSIS — F41.9 ANXIETY: Primary | ICD-10-CM

## 2018-09-12 PROCEDURE — 99999 PR PBB SHADOW E&M-EST. PATIENT-LVL III: CPT | Mod: PBBFAC,,, | Performed by: NURSE PRACTITIONER

## 2018-09-12 PROCEDURE — 3008F BODY MASS INDEX DOCD: CPT | Mod: CPTII,S$GLB,, | Performed by: NURSE PRACTITIONER

## 2018-09-12 PROCEDURE — 99213 OFFICE O/P EST LOW 20 MIN: CPT | Mod: S$GLB,,, | Performed by: NURSE PRACTITIONER

## 2018-09-12 RX ORDER — ESCITALOPRAM OXALATE 20 MG/1
20 TABLET ORAL DAILY
Qty: 30 TABLET | Refills: 3 | Status: SHIPPED | OUTPATIENT
Start: 2018-09-12 | End: 2019-01-23 | Stop reason: ALTCHOICE

## 2018-09-12 NOTE — TELEPHONE ENCOUNTER
Swing pt- delivered on 7/9/18. States yesterday she was overly stressed, overwhelmed, and actually cried. Reports her  also said she's coello lately-easily frustrated, quick to yell at toddler if he acts up. This has been occurring for about a week. States she is usually even keeled and goes with the flow.   Her 9 week old has been sick this week so she has slept the past two night in a chair with him upright. He is also breast fed and they have tried 6 different bottles over the past two weeks and he refuses. This is causing stress and anxiety because she returns to work on Oct 1st. Reassured pt that she is doing great and this all seems like normal feelings and experiences, but she may need to be on something for a bit to ease her anxiety and help her mood stabilize once more. Pt agrees, would like to come in.    Scheduled with Marilee today

## 2018-09-12 NOTE — PROGRESS NOTES
"Chief Complaint: Problem:     Chief Complaint   Patient presents with    Consult     Dr Alvarez last pap  neg hpv neg         (Dr. Alvarez patient)    Last Pap:  9/15/2017 Normal,  HPV negative      HPI:      April Wendt Schamberger is a 33 y.o.  who presents today for with c/o anxiety.  She is 9 weeks post .  States she has had a few instances in which she cries for no apparent reason; she denies SI/HI, and states she really doesn't feel "depressed".  Over past few weeks, she has been trying to get her baby to eat from a bottle since she will be returning to work in 2 weeks (10/01/18), but he is refusing, so that has also been a stressor for her, as he will go to  once she returns to work.  She is currently being treated for mastitis, and baby was also sick, and diagnosed with colic.  She has also been sleep deprived and having to sleep with baby on her chest in a recliner. States even her  has expressed concerns over her labile moods, and anxiety.    LMP: No LMP recorded. (breastfeeding)    Past Medical History:   Diagnosis Date    Abnormal Pap smear of cervix     Cryo Done    ADD (attention deficit disorder)     Breast disorder     Breast Cysts    Esophageal reflux     Fibroids     Hypertension     IBS (irritable bowel syndrome)     Insomnia     Kidney stones     Mastocytosis     Upper GI bleed      Past Surgical History:   Procedure Laterality Date    BLOCK-NERVE-MEDIAL BRANCH-CERVICAL--C3,4,5 N/A 2017    [facility]    COLONOSCOPY N/A 2017    Procedure: COLONOSCOPY;  Surgeon: Bren Larkin MD;  Location: [facility];  Service: Endoscopy;  Laterality: N/A;    COLONOSCOPY N/A 2017    [facility]    ESOPHAGOGASTRODUODENOSCOPY      ESOPHAGOGASTRODUODENOSCOPY (EGD) N/A 2017    [facility] at [facility]    LASIK Bilateral 2005    TONSILLECTOMY, ADENOIDECTOMY "  1988     Social History     Socioeconomic History    Marital status:      Spouse name: Not on file    Number of children: 1    Years of education: Not on file    Highest education level: Not on file   Social Needs    Financial resource strain: Not on file    Food insecurity - worry: Not on file    Food insecurity - inability: Not on file    Transportation needs - medical: Not on file    Transportation needs - non-medical: Not on file   Occupational History    Not on file   Tobacco Use    Smoking status: Former Smoker    Smokeless tobacco: Never Used   Substance and Sexual Activity    Alcohol use: No     Alcohol/week: 0.0 oz     Comment: breastfeeding     Drug use: No    Sexual activity: Yes     Partners: Male     Birth control/protection: Coitus interruptus, Rhythm     Comment: : Boris   Other Topics Concern    Not on file   Social History Narrative    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     Family History   Problem Relation Age of Onset    Breast cancer Mother         with Metastasis    Cancer Mother         Breast    Hypertension Mother     Prostate cancer Father     Hypertension Father     Cancer Father         Prostate Ca    Diabetes Father     Deep vein thrombosis Father     Pancreatic cancer Maternal Grandfather     Breast cancer Paternal Grandmother     Cancer Paternal Grandmother     Diabetes type II Paternal Grandfather     Heart attack Maternal Grandmother     Colon cancer Cousin     Cancer Cousin 31    Ovarian cancer Neg Hx     Lymphoma Neg Hx     Tuberculosis Neg Hx     Celiac disease Neg Hx     Cirrhosis Neg Hx     Colon polyps Neg Hx     Crohn's disease Neg Hx     Cystic fibrosis Neg Hx     Esophageal cancer Neg Hx     Hemochromatosis Neg Hx     Inflammatory bowel disease Neg Hx     Irritable bowel syndrome Neg Hx     Liver cancer Neg Hx     Liver disease Neg Hx     Rectal cancer Neg Hx     Stomach cancer Neg Hx     Ulcerative colitis Neg Hx  "    Donavon's disease Neg Hx      OB History      Para Term  AB Living    2 2 2     2    SAB TAB Ectopic Multiple Live Births            2          ROS:     GENERAL: Denies unintentional weight gain or weight loss.  Reports feeling anxious, at times tearful.   SKIN: Denies rash or lesions.   HEENT: Denies headaches, or vision changes.   CARDIOVASCULAR: Denies palpitations or chest pain.   RESPIRATORY: Denies shortness of breath or dyspnea on exertion.  BREASTS: Denies pain, lumps, or nipple discharge.   ABDOMEN: Denies abdominal pain, constipation, diarrhea, nausea, vomiting, change in appetite.  URINARY: Denies frequency, dysuria, hematuria.  NEUROLOGIC: Denies syncope or weakness.   PSYCHIATRIC: Reports feeling tearful at times for no reason, reports anxiety and mood swings; not getting adequate sleep.  VULVAR: No pain, no lesions and no itching.  VAGINAL: No relaxation, no itching, no abnormal bleeding and no lesions.    Physical Exam:      PHYSICAL EXAM:  /70   Ht 5' 8" (1.727 m)   Wt 74.3 kg (163 lb 14.6 oz)   Breastfeeding? Yes   BMI 24.92 kg/m²   Body mass index is 24.92 kg/m².     APPEARANCE: Well nourished, well developed, in no acute distress.  PSYCH: Appropriate mood and affect.  SKIN: No acne or hirsutism.  CHEST: Normal respiratory effort.    Assessment/Plan:     Anxiety  -     escitalopram oxalate (LEXAPRO) 20 MG tablet; Take 1 tablet (20 mg total) by mouth once daily.  Dispense: 30 tablet; Refill: 3    (Black box warnings d/w pt, and patient verbalizes to call office if she has any concerns regarding adverse side effects, and the importance of not stopping medication abruptly for any reason.)    Counseling:     Patient was counseled today on current ASCCP pap guidelines, the recommendation for yearly pelvic exams, healthy diet and exercise routines, annual mammograms and breast self awareness. She is to see her PCP for other health maintenance.     Follow-up in about 1 month " (around 10/12/2018) for Follow-Up.

## 2018-09-19 ENCOUNTER — PATIENT MESSAGE (OUTPATIENT)
Dept: ADMINISTRATIVE | Facility: OTHER | Age: 33
End: 2018-09-19

## 2018-09-19 ENCOUNTER — PATIENT MESSAGE (OUTPATIENT)
Dept: OBSTETRICS AND GYNECOLOGY | Facility: CLINIC | Age: 33
End: 2018-09-19

## 2018-09-25 ENCOUNTER — LAB VISIT (OUTPATIENT)
Dept: LAB | Facility: HOSPITAL | Age: 33
End: 2018-09-25
Attending: FAMILY MEDICINE
Payer: COMMERCIAL

## 2018-09-25 ENCOUNTER — OFFICE VISIT (OUTPATIENT)
Dept: FAMILY MEDICINE | Facility: CLINIC | Age: 33
End: 2018-09-25
Attending: FAMILY MEDICINE
Payer: COMMERCIAL

## 2018-09-25 VITALS
SYSTOLIC BLOOD PRESSURE: 116 MMHG | DIASTOLIC BLOOD PRESSURE: 78 MMHG | OXYGEN SATURATION: 98 % | WEIGHT: 166 LBS | HEART RATE: 78 BPM | HEIGHT: 68 IN | BODY MASS INDEX: 25.16 KG/M2

## 2018-09-25 DIAGNOSIS — Z00.00 ROUTINE GENERAL MEDICAL EXAMINATION AT A HEALTH CARE FACILITY: ICD-10-CM

## 2018-09-25 DIAGNOSIS — Z00.00 ROUTINE GENERAL MEDICAL EXAMINATION AT A HEALTH CARE FACILITY: Primary | ICD-10-CM

## 2018-09-25 LAB
25(OH)D3+25(OH)D2 SERPL-MCNC: 34 NG/ML
ALBUMIN SERPL BCP-MCNC: 3.9 G/DL
ALP SERPL-CCNC: 68 U/L
ALT SERPL W/O P-5'-P-CCNC: 42 U/L
ANION GAP SERPL CALC-SCNC: 7 MMOL/L
AST SERPL-CCNC: 28 U/L
BASOPHILS # BLD AUTO: 0.01 K/UL
BASOPHILS NFR BLD: 0.1 %
BILIRUB SERPL-MCNC: 0.6 MG/DL
BUN SERPL-MCNC: 5 MG/DL
CALCIUM SERPL-MCNC: 9.6 MG/DL
CHLORIDE SERPL-SCNC: 107 MMOL/L
CHOLEST SERPL-MCNC: 190 MG/DL
CHOLEST/HDLC SERPL: 2.9 {RATIO}
CO2 SERPL-SCNC: 26 MMOL/L
CREAT SERPL-MCNC: 0.7 MG/DL
DIFFERENTIAL METHOD: NORMAL
EOSINOPHIL # BLD AUTO: 0.3 K/UL
EOSINOPHIL NFR BLD: 3.2 %
ERYTHROCYTE [DISTWIDTH] IN BLOOD BY AUTOMATED COUNT: 13.6 %
EST. GFR  (AFRICAN AMERICAN): >60 ML/MIN/1.73 M^2
EST. GFR  (NON AFRICAN AMERICAN): >60 ML/MIN/1.73 M^2
GLUCOSE SERPL-MCNC: 85 MG/DL
HCT VFR BLD AUTO: 40.6 %
HDLC SERPL-MCNC: 65 MG/DL
HDLC SERPL: 34.2 %
HGB BLD-MCNC: 13.2 G/DL
LDLC SERPL CALC-MCNC: 114.6 MG/DL
LYMPHOCYTES # BLD AUTO: 2.4 K/UL
LYMPHOCYTES NFR BLD: 30.9 %
MCH RBC QN AUTO: 28.8 PG
MCHC RBC AUTO-ENTMCNC: 32.5 G/DL
MCV RBC AUTO: 89 FL
MONOCYTES # BLD AUTO: 0.5 K/UL
MONOCYTES NFR BLD: 5.9 %
NEUTROPHILS # BLD AUTO: 4.6 K/UL
NEUTROPHILS NFR BLD: 59.6 %
NONHDLC SERPL-MCNC: 125 MG/DL
PLATELET # BLD AUTO: 289 K/UL
PMV BLD AUTO: 10.3 FL
POTASSIUM SERPL-SCNC: 4.7 MMOL/L
PROT SERPL-MCNC: 7.2 G/DL
RBC # BLD AUTO: 4.58 M/UL
SODIUM SERPL-SCNC: 140 MMOL/L
TRIGL SERPL-MCNC: 52 MG/DL
TSH SERPL DL<=0.005 MIU/L-ACNC: 0.59 UIU/ML
WBC # BLD AUTO: 7.76 K/UL

## 2018-09-25 PROCEDURE — 85025 COMPLETE CBC W/AUTO DIFF WBC: CPT

## 2018-09-25 PROCEDURE — 99395 PREV VISIT EST AGE 18-39: CPT | Mod: S$GLB,,, | Performed by: FAMILY MEDICINE

## 2018-09-25 PROCEDURE — 99999 PR PBB SHADOW E&M-EST. PATIENT-LVL III: CPT | Mod: PBBFAC,,, | Performed by: FAMILY MEDICINE

## 2018-09-25 PROCEDURE — 82306 VITAMIN D 25 HYDROXY: CPT

## 2018-09-25 PROCEDURE — 80053 COMPREHEN METABOLIC PANEL: CPT

## 2018-09-25 PROCEDURE — 80061 LIPID PANEL: CPT

## 2018-09-25 PROCEDURE — 84443 ASSAY THYROID STIM HORMONE: CPT

## 2018-09-25 PROCEDURE — 36415 COLL VENOUS BLD VENIPUNCTURE: CPT

## 2018-09-25 NOTE — PROGRESS NOTES
Subjective:       Patient ID: April Wendt Schamberger is a 33 y.o. female.    Chief Complaint: Annual Exam    33 yr old pleasant white female with no significant medical history presents today for her annual wellness check, lab work. No new concerns today.      She had her second baby boy few months ago - doing well - she had  - mother and child are healthy - she had anxiety symptom recently and her GYN placed her on lexapro  She is feeling fine since then - sleeping good    She works at [employer]       History as below - reviewed      HM  -labs due  -flu vaccine due       Review of Systems   Constitutional: Negative.  Negative for activity change, diaphoresis and unexpected weight change.   HENT: Negative.  Negative for congestion, ear discharge, hearing loss, rhinorrhea, sore throat, trouble swallowing and voice change.    Eyes: Negative.  Negative for pain, discharge and visual disturbance.   Respiratory: Negative.  Negative for chest tightness, shortness of breath and wheezing.    Cardiovascular: Negative.  Negative for chest pain and palpitations.   Gastrointestinal: Positive for diarrhea. Negative for abdominal distention, anal bleeding, blood in stool, constipation, nausea and vomiting.   Endocrine: Negative.  Negative for cold intolerance, polydipsia and polyuria.   Genitourinary: Negative.  Negative for decreased urine volume, difficulty urinating, dysuria, frequency, hematuria, menstrual problem and vaginal pain.   Musculoskeletal: Positive for neck pain. Negative for arthralgias, gait problem, joint swelling and myalgias.   Skin: Negative.  Negative for color change, pallor and wound.   Allergic/Immunologic: Negative.  Negative for environmental allergies and immunocompromised state.   Neurological: Negative.  Negative for dizziness, tremors, seizures, speech difficulty, weakness and headaches.   Hematological: Negative.  Negative for adenopathy. Does not bruise/bleed easily.   Psychiatric/Behavioral:  Negative.  Negative for agitation, confusion, decreased concentration, dysphoric mood, hallucinations, self-injury and suicidal ideas. The patient is not nervous/anxious.        Active Ambulatory Problems     Diagnosis Date Noted    Primary insomnia 10/11/2016    Myofascial muscle pain 01/10/2017    Cervical radiculopathy 01/10/2017    Change in bowel habit 04/28/2017    Gastroesophageal reflux disease 01/07/2013    Benign essential hypertension 01/07/2013    Uterine leiomyoma 01/30/2014    Abdominal cramping 06/20/2017    Ileitis 06/20/2017    Chronic hypertension in pregnancy 07/08/2018    Normal vaginal delivery 07/09/2018     Resolved Ambulatory Problems     Diagnosis Date Noted    Irritable bowel syndrome with diarrhea 06/23/2016    Decreased range of motion 02/09/2017    Neck pain 02/09/2017    Decreased functional mobility 02/09/2017    Decreased strength 02/09/2017    Nausea 06/20/2017    Sinusitis 02/05/2018    Bronchitis 02/05/2018    Fever 02/05/2018    Upper respiratory infection, acute 05/02/2018    Term pregnancy 07/08/2018    Normal labor 07/08/2018    Advanced maternal age in multigravida, third trimester 07/08/2018    39 weeks gestation of pregnancy 07/08/2018    Vaginal delivery 07/09/2018     Past Medical History:   Diagnosis Date    Abnormal Pap smear of cervix 2000    ADD (attention deficit disorder)     Breast disorder     Esophageal reflux     Fibroids     Hypertension     IBS (irritable bowel syndrome)     Insomnia     Kidney stones     Mastocytosis     Upper GI bleed      Past Surgical History:   Procedure Laterality Date    BLOCK-NERVE-MEDIAL BRANCH-CERVICAL--C3,4,5 N/A 1/25/2017    Performed by Hunter Jimenez [facility] at [facility] PAIN MGT    COLONOSCOPY N/A 4/28/2017    Procedure: COLONOSCOPY;  Surgeon: Bren Larkin MD;  Location: [facility] ENDO;  Service: Endoscopy;  Laterality: N/A;    COLONOSCOPY N/A 4/28/2017    Per[facility] by [facility] at [facility] ENDO     ESOPHAGOGASTRODUODENOSCOPY  2012    ESOPHAGOGASTRODUODENOSCOPY (EGD) N/A 4/28/2017    [facility]    LASIK Bilateral 2005    TONSILLECTOMY, ADENOIDECTOMY  1988     Family History   Problem Relation Age of Onset    Breast cancer Mother         with Metastasis    Cancer Mother         Breast    Hypertension Mother     Prostate cancer Father     Hypertension Father     Cancer Father         Prostate Ca    Diabetes Father     Deep vein thrombosis Father     Pancreatic cancer Maternal Grandfather     Breast cancer Paternal Grandmother     Cancer Paternal Grandmother     Diabetes type II Paternal Grandfather     Heart attack Maternal Grandmother     Colon cancer Cousin     Cancer Cousin 31    Ovarian cancer Neg Hx     Lymphoma Neg Hx     Tuberculosis Neg Hx     Celiac disease Neg Hx     Cirrhosis Neg Hx     Colon polyps Neg Hx     Crohn's disease Neg Hx     Cystic fibrosis Neg Hx     Esophageal cancer Neg Hx     Hemochromatosis Neg Hx     Inflammatory bowel disease Neg Hx     Irritable bowel syndrome Neg Hx     Liver cancer Neg Hx     Liver disease Neg Hx     Rectal cancer Neg Hx     Stomach cancer Neg Hx     Ulcerative colitis Neg Hx     Donavon's disease Neg Hx      Social History     Socioeconomic History    Marital status:      Spouse name: Not on file    Number of children: 1    Years of education: Not on file    Highest education level: Not on file   Social Needs    Financial resource strain: Not on file    Food insecurity - worry: Not on file    Food insecurity - inability: Not on file    Transportation needs - medical: Not on file    Transportation needs - non-medical: Not on file   Occupational History    Not on file   Tobacco Use    Smoking status: Former Smoker    Smokeless tobacco: Never Used   Substance and Sexual Activity    Alcohol use: No     Alcohol/week: 0.0 oz     Comment: breastfeeding     Drug use: No     Sexual activity: Yes     Partners: Male     Birth control/protection: Coitus interruptus, Rhythm     Comment: : Boris   Other Topics Concern    Not on file   Social History Narrative    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      erythromycin (ROMYCIN) ophthalmic ointment Place into the right eye 3 (three) times daily. 3.5 g 0    escitalopram oxalate (LEXAPRO) 20 MG tablet Take 1 tablet (20 mg total) by mouth once daily. 30 tablet 3    ibuprofen (ADVIL,MOTRIN) 600 MG tablet Take 1 tablet (600 mg total) by mouth every 6 (six) hours. 45 tablet 1    jack barraza ointment Apply topically after feeding. Do not wash off. This compounded medication expires in 30 days. 30 g 2    loperamide (IMODIUM A-D) 1 mg/7.5 mL Liqd Take 0.1 mg/kg by mouth every 12 (twelve) hours.      PNV NO.95/FERROUS FUM/FOLIC AC (PRENATAL ORAL) Take by mouth.       No current facility-administered medications on file prior to visit.        Objective:       Vitals:    09/25/18 0811   BP: 116/78   Pulse: 78       Physical Exam   Constitutional: She is oriented to person, place, and time. She appears well-developed and well-nourished. No distress.   HENT:   Head: Normocephalic and atraumatic.   Right Ear: External ear normal.   Left Ear: External ear normal.   Nose: Nose normal.   Mouth/Throat: Oropharynx is clear and moist. No oropharyngeal exudate.   Eyes: Conjunctivae and EOM are normal. Pupils are equal, round, and reactive to light. Right eye exhibits no discharge. Left eye exhibits no discharge. No scleral icterus.   Neck: Normal range of motion. Neck supple. No JVD present. No tracheal deviation present. No thyromegaly present.    Cardiovascular: Normal rate, regular rhythm, normal heart sounds and intact distal pulses. Exam reveals no gallop and no friction rub.   No murmur heard.  Pulmonary/Chest: Effort normal and breath sounds normal. No stridor. She has no wheezes. She has no rales. She exhibits no tenderness.   Abdominal: Soft. Bowel sounds are normal. She exhibits no distension and no mass. There is no tenderness. There is no rebound and no guarding. No hernia.   Musculoskeletal: Normal range of motion. She exhibits no edema or tenderness.   Lymphadenopathy:     She has no cervical adenopathy.   Neurological: She is alert and oriented to person, place, and time. She has normal reflexes. She displays normal reflexes. No cranial nerve deficit. She exhibits normal muscle tone. Coordination normal.   Skin: Skin is warm and dry. No rash noted. She is not diaphoretic. No erythema. No pallor.   Psychiatric: She has a normal mood and affect. Her behavior is normal. Judgment and thought content normal.       Assessment:       1. Routine general medical examination at a health care facility        Plan:       April was seen today for annual exam.    Diagnoses and all orders for this visit:    Routine general medical examination at a health care facility  -     CBC auto differential; Future  -     Comprehensive metabolic panel; Future  -     Lipid panel; Future  -     TSH; Future  -     Vitamin D; Future      Wellness check  -normal exam  -labs    Spent adequate time in obtaining history and detailed exam      40 minutes spent during this visit of which greater than 50% devoted to face-face counseling and coordination of care    Follow-up in about 1 year (around 9/25/2019), or if symptoms worsen or fail to improve.

## 2018-09-28 ENCOUNTER — PATIENT OUTREACH (OUTPATIENT)
Dept: OTHER | Facility: OTHER | Age: 33
End: 2018-09-28

## 2018-09-28 NOTE — LETTER
Nneka De Oliveira, [address]  [address] [place] [address]     Dear April Wendt Schamberger,    Welcome to the [facility]!           My name is Nneka De Oliveira PharmD and I am your dedicated Digital Medicine clinician.  As an expert in medication management, I will help ensure that the medications you are taking continue to provide you with the intended benefits.        I am Nico Vogt and I will be your health  for the duration of the program.  My  job is to help you identify lifestyle changes to improve your blood pressure control.  We will talk about nutrition, exercise, and other ways that you may be able to adjust your current habits to better your health. Together, we will work to improve your overall health and encourage you to meet your goals for a healthier lifestyle.    What we expect from YOU:    You will need to take blood pressure readings multiple times a week and no less than one reading per week.   It is important that you take your measurements at different times during the day, when possible.     What you should expect from your Digital Medicine Care Team:   We will provide you with education about high blood pressure, including lifestyle changes that could help you to control your blood pressure.   We will review your weekly readings and provide you with monthly blood pressure progress reports after you have been in the program for more than 30 days.   We will send monthly progress reports on your blood pressure control to your physician so they can follow along with your progress as well.    You will be able to reach me by phone at [phone] or through your [facility] account by clicking my name under Care Team on the right side of the home screen.    I look forward to working with you to achieve your blood pressure goals!    Sincerely,  Nneka De Oliveira PharmD  Your personal clinician    Please visit  [email or web address] to learn more about high blood pressure and what you can do lower your blood pressure.                                                                                           April Wendt Schamberger  [address] [place] [address]

## 2018-09-28 NOTE — PROGRESS NOTES
Last 5 Patient Entered Readings                                      Current 30 Day Average: 107/73     Recent Readings 9/19/2018 7/3/2018 6/18/2018 6/18/2018 6/13/2018    SBP (mmHg) 107 119 122 126 116    DBP (mmHg) 73 78 71 77 79    Pulse 76 92 80 83 73          Digital Medicine: Health  Introduction    Ms. April Wendt Schamberger is a 33 y.o. female who is newly enrolled in the Digital Medicine Hypertension Program.     Patient prefers a 90 days supply, pt is not on any BP medications right now.      HYPERTENSION:  Explained that we expect patient to obtain several blood pressures per week at random times of day.   Our goal is to get  BP to consistently below 130/80mmHg and make the process convenient so patient can avoid extra trips to the office. Getting your blood pressure below 130/80mmHg (definition of control) will reduce your risk for heart attack, kidney failure, stroke and death (as well as kidney failure, eye disease, & dementia).     Patient is meeting the goal already. Current BP average 107/73 mmHg.  Instructed patient not to allow anyone else to use phone and BP cuff.       Discussed appropriate BP measuring technique:  Before taking your blood pressure, find a quiet place. You will need to listen for your heartbeat.  Roll up the sleeve on your left arm or remove any tight-sleeved clothing, if needed. (It's best to take your blood pressure from your left arm if you are right-handed.You can use the other arm if you have been told by your health care provider to do so.)  Rest in a chair next to a table for 5 to 10 minutes. (Your left arm should rest comfortably at heart level.)  Sit up straight with your back against the chair, legs uncrossed and on the ground.  Rest your forearm on the table with the palm of your hand facing up.  You should not talk, read the newspaper, or watch television during this process.  Take BP medication(s) 1 hour or more before taking BP reading(s).      Lifestyle  "Assessment:    Current Dietary Habits(i.e. low sodium, food labels, dining out): Pt states "she eats fresh fruits and veggies, and tries "to limit carbs and does not have a salt problem".  I advised pt to not limit carbs so much, especially being that she is breast-feeding.  Pt verbally understood.    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   Alcohol/Tobacco: None.  Medication Adherence: has been compliant with the medicaiton regimen  Other goals: Will discuss next call.     Reviewed AHA/AACE recommendations:  Limit sodium intake to <2000mg/day. Pt acknowledged.    Reviewed the importance of self-monitoring, medication adherence, and that the health  can be used as a resource for lifestyle modifications to help reduce or maintain a healthy lifestyle.  Reviewed that the Digital Medicine team is not available for emergencies and instructed the patient to call 911 or [facility] ([phone] or [phone]) if one arises.  Patient and I agreed that she will continue to monitor blood pressure and sodium intake and continue to remain adherent to medications.   I will plan to follow-up with the patient in 4 weeks.     Nico SIMMONS [facility]   [phone]  "

## 2018-10-02 ENCOUNTER — OFFICE VISIT (OUTPATIENT)
Dept: URGENT CARE | Facility: CLINIC | Age: 33
End: 2018-10-02
Payer: COMMERCIAL

## 2018-10-02 VITALS
RESPIRATION RATE: 14 BRPM | HEIGHT: 68 IN | WEIGHT: 166 LBS | OXYGEN SATURATION: 98 % | TEMPERATURE: 97 F | DIASTOLIC BLOOD PRESSURE: 82 MMHG | SYSTOLIC BLOOD PRESSURE: 117 MMHG | HEART RATE: 74 BPM | BODY MASS INDEX: 25.16 KG/M2

## 2018-10-02 DIAGNOSIS — H92.01 RIGHT EAR PAIN: ICD-10-CM

## 2018-10-02 DIAGNOSIS — H65.01 RIGHT ACUTE SEROUS OTITIS MEDIA, RECURRENCE NOT SPECIFIED: Primary | ICD-10-CM

## 2018-10-02 PROCEDURE — 99213 OFFICE O/P EST LOW 20 MIN: CPT | Mod: S$GLB,,, | Performed by: FAMILY MEDICINE

## 2018-10-02 RX ORDER — AMOXICILLIN 500 MG/1
500 CAPSULE ORAL EVERY 8 HOURS
Qty: 30 CAPSULE | Refills: 0 | Status: SHIPPED | OUTPATIENT
Start: 2018-10-02 | End: 2018-10-13

## 2018-10-02 NOTE — PROGRESS NOTES
"Subjective:       Patient ID: April Wendt Schamberger is a 33 y.o. female.    Vitals:  height is 5' 8" (1.727 m) and weight is 75.3 kg (166 lb). Her tympanic temperature is 97.2 °F (36.2 °C). Her blood pressure is 117/82 and her pulse is 74. Her respiration is 14 and oxygen saturation is 98%.     Chief Complaint: Otalgia    C/O RIGHT EAR ACHE SINCE 2 DAYS, NO SORE THROAT, NO FEVER      Otalgia    There is pain in the right ear. This is a new problem. The current episode started today. The problem occurs constantly. The problem has been unchanged. There has been no fever. The pain is at a severity of 4/10. The pain is mild. Pertinent negatives include no abdominal pain, ear discharge or rash. She has tried nothing for the symptoms. There is no history of a chronic ear infection, hearing loss or a tympanostomy tube.     Review of Systems   Constitution: Negative for malaise/fatigue.   HENT: Positive for ear pain. Negative for ear discharge.    Skin: Negative for rash.   Musculoskeletal: Negative for back pain, muscle cramps, muscle weakness and stiffness.   Gastrointestinal: Negative for abdominal pain and bowel incontinence.   Genitourinary: Negative for bladder incontinence, dysuria, hematuria and urgency.   Neurological: Negative for disturbances in coordination and numbness.       Objective:      Physical Exam   Constitutional: She is oriented to person, place, and time. She appears well-developed and well-nourished. She is cooperative.  Non-toxic appearance. She does not appear ill. No distress.   HENT:   Head: Normocephalic and atraumatic.   Right Ear: Hearing, tympanic membrane, external ear and ear canal normal.   Left Ear: Hearing, tympanic membrane, external ear and ear canal normal.   Nose: Nose normal. No mucosal edema, rhinorrhea or nasal deformity. No epistaxis. Right sinus exhibits no maxillary sinus tenderness and no frontal sinus tenderness. Left sinus exhibits no maxillary sinus tenderness and no " frontal sinus tenderness.   Mouth/Throat: Uvula is midline, oropharynx is clear and moist and mucous membranes are normal. No trismus in the jaw. Normal dentition. No uvula swelling. No posterior oropharyngeal erythema.   rIGHT TM WITH FLUID   Eyes: Conjunctivae and lids are normal. Right eye exhibits no discharge. Left eye exhibits no discharge. No scleral icterus.   Sclera clear bilat   Neck: Trachea normal, normal range of motion, full passive range of motion without pain and phonation normal. Neck supple.   Cardiovascular: Normal rate, regular rhythm, normal heart sounds, intact distal pulses and normal pulses. Exam reveals no gallop and no friction rub.   Pulmonary/Chest: Effort normal and breath sounds normal. No stridor.   Abdominal: Soft. Normal appearance and bowel sounds are normal. She exhibits no distension, no pulsatile midline mass and no mass. There is no tenderness.   Musculoskeletal: Normal range of motion. She exhibits no edema or deformity.   Lymphadenopathy:     She has no cervical adenopathy.   Neurological: She is alert and oriented to person, place, and time. She exhibits normal muscle tone. Coordination normal.   Skin: Skin is warm, dry and intact. She is not diaphoretic. No pallor.   Psychiatric: She has a normal mood and affect. Her speech is normal and behavior is normal. Judgment and thought content normal. Cognition and memory are normal.   Nursing note and vitals reviewed.      Assessment:       1. Right acute serous otitis media, recurrence not specified    2. Right ear pain        Plan:         Right acute serous otitis media, recurrence not specified  -     amoxicillin (AMOXIL) 500 MG capsule; Take 1 capsule (500 mg total) by mouth every 8 (eight) hours. for 10 days  Dispense: 30 capsule; Refill: 0    Right ear pain  -     amoxicillin (AMOXIL) 500 MG capsule; Take 1 capsule (500 mg total) by mouth every 8 (eight) hours. for 10 days  Dispense: 30 capsule; Refill: 0        wARM  COMPRESSES  aLLGRA d 12 ONE BID

## 2018-10-05 ENCOUNTER — TELEPHONE (OUTPATIENT)
Dept: URGENT CARE | Facility: CLINIC | Age: 33
End: 2018-10-05

## 2018-10-11 ENCOUNTER — PATIENT MESSAGE (OUTPATIENT)
Dept: ADMINISTRATIVE | Facility: OTHER | Age: 33
End: 2018-10-11

## 2018-10-24 ENCOUNTER — TELEPHONE (OUTPATIENT)
Dept: OBSTETRICS AND GYNECOLOGY | Facility: CLINIC | Age: 33
End: 2018-10-24

## 2018-10-24 ENCOUNTER — OFFICE VISIT (OUTPATIENT)
Dept: OBSTETRICS AND GYNECOLOGY | Facility: CLINIC | Age: 33
End: 2018-10-24
Payer: COMMERCIAL

## 2018-10-24 ENCOUNTER — PATIENT MESSAGE (OUTPATIENT)
Dept: OBSTETRICS AND GYNECOLOGY | Facility: CLINIC | Age: 33
End: 2018-10-24

## 2018-10-24 VITALS
DIASTOLIC BLOOD PRESSURE: 76 MMHG | HEIGHT: 68 IN | TEMPERATURE: 98 F | WEIGHT: 176.5 LBS | SYSTOLIC BLOOD PRESSURE: 116 MMHG | BODY MASS INDEX: 26.75 KG/M2

## 2018-10-24 DIAGNOSIS — B37.2 CANDIDAL SKIN INFECTION: ICD-10-CM

## 2018-10-24 DIAGNOSIS — N61.0 MASTITIS, RIGHT, ACUTE: ICD-10-CM

## 2018-10-24 PROCEDURE — 99213 OFFICE O/P EST LOW 20 MIN: CPT | Mod: S$GLB,,, | Performed by: NURSE PRACTITIONER

## 2018-10-24 PROCEDURE — 3008F BODY MASS INDEX DOCD: CPT | Mod: CPTII,S$GLB,, | Performed by: NURSE PRACTITIONER

## 2018-10-24 PROCEDURE — 3078F DIAST BP <80 MM HG: CPT | Mod: CPTII,S$GLB,, | Performed by: NURSE PRACTITIONER

## 2018-10-24 PROCEDURE — 99999 PR PBB SHADOW E&M-EST. PATIENT-LVL III: CPT | Mod: PBBFAC,,, | Performed by: NURSE PRACTITIONER

## 2018-10-24 PROCEDURE — 3074F SYST BP LT 130 MM HG: CPT | Mod: CPTII,S$GLB,, | Performed by: NURSE PRACTITIONER

## 2018-10-24 RX ORDER — DICLOXACILLIN SODIUM 500 MG/1
500 CAPSULE ORAL 4 TIMES DAILY
Qty: 40 CAPSULE | Refills: 0 | Status: CANCELLED | OUTPATIENT
Start: 2018-10-24 | End: 2018-11-03

## 2018-10-24 RX ORDER — DICLOXACILLIN SODIUM 500 MG/1
500 CAPSULE ORAL EVERY 6 HOURS
Qty: 40 CAPSULE | Refills: 0 | Status: SHIPPED | OUTPATIENT
Start: 2018-10-24 | End: 2018-11-03

## 2018-10-24 RX ORDER — FLUCONAZOLE 100 MG/1
TABLET ORAL
Qty: 12 TABLET | Refills: 0 | Status: SHIPPED | OUTPATIENT
Start: 2018-10-24 | End: 2019-01-23

## 2018-10-24 NOTE — PROGRESS NOTES
"Chief Complaint: Breast : LEFT nipple pain     (Dr. Alvarez patient)    Last Pap: 9/15/2017  Normal,  HPV negative    Last Mammogram/Breast Imagin16 (nml)    HPI:      April Wendt Schamberger is a 33 y.o.  who presents complaining of pain to LEFT nipple.  Started within past couple days with c/o nipple pain, chills, body aches (unsure if she had fever, but has been taking Ibuprofen around the clock for past 24 hours or so) - states this is how she felt when she had mastitis not too long ago to the opposite (right) breast, but has no erythema to either breast. States her baby has been stuffed up with URI, and so not keeping a good latch consistently, and hurting her nipple.  States left nipple and areola both very tender to touch, and she feels sharp, shooting pains throughout breast towards nipple when he's nursing and other times spontaneously.  Left nipple feels slightly raw to touch.  She is pumping every 2 hours while at work, and breastfeeds on demand when home.   Patient does not have regular monthly menses. Patient's last menstrual period was 10/22/2018.       ROS:     GENERAL: Denies unintentional weight gain or weight loss. Feeling well overall.  Has had chills, pain, and body aches.  ABDOMEN: Denies abdominal pain, constipation, diarrhea, nausea, vomiting, change in appetite.   URINARY: Denies frequency, dysuria, hematuria.  BREAST: See HPI  GYN: Denies abnormal bleeding or discharge.    Physical Exam:      PHYSICAL EXAM:  /76   Temp 98.3 °F (36.8 °C) (Oral)   Ht 5' 8" (1.727 m)   Wt 80 kg (176 lb 7.7 oz)   LMP 10/22/2018   Breastfeeding? Yes   BMI 26.83 kg/m²   Body mass index is 26.83 kg/m².      APPEARANCE: Well nourished, well developed, in no acute distress.  Afebrile (but reports sh took Ibuprofen 3 hrs ago).  BREASTS: Breasts symmetric.  No erythema noted to either breast; both mildly warm to touch  No pain or tenderness to right breast, and no lumps or masses palpated.  " Left breast tender to touch, specifically to areola and nipple, but no erythema noted or masses palpated.  Left appears slightly more pale and dry in comparison to right nipple   ABDOMEN: Soft.  No tenderness or masses.    EXTREMITIES: No edema.     Assessment/Plan:   Mastitis, postpartum  -     dicloxacillin (DYNAPEN) 500 MG capsule; Take 1 capsule (500 mg total) by mouth every 6 (six) hours. for 10 days  Dispense: 40 capsule; Refill: 0    Candidal skin infection  -     fluconazole (DIFLUCAN) 100 MG tablet; Take 2 tablets by mouth today at once, then one by mouth daily x 10 days (Skip Lexapro on 3 days you take Diflucan)  Dispense: 12 tablet; Refill: 0  -     jack barraza ointment; Apply topically 3 (three) times daily. Apply after feeding. Do not wash off. This compounded medication expires in 30 days.  Dispense: 30 g; Refill: 1    Plan:  * Discussed comfort measures related to breast engorgement:                        * Recommended warm compresses or warm shower to soften breasts for a few minutes, followed by manual expression of milk for 2-3 min.                             Do this 2-3 x daily for next 48-72 hours to relieve some pressure if engorged.                        * Ice packs (use diapers with water in them & freeze) with cabbage leaves for comfort.                        * Also encouraged pt to continue taking Ibuprofen on schedule every 6 hours for next 2-3 days as needed for fever/pain, and to use her                          Percocet (1-2) prn before pain becomes intolerable.                        * Mastitis prec given                        * RTC in 1 week if symptoms persist/worsen      Follow-up in about 1 week (around 10/31/2018), or if symptoms worsen or fail to improve.

## 2018-10-24 NOTE — TELEPHONE ENCOUNTER
Swing pt, thinks she has mastitis in Left breast and would like antibiotic called in to [facility].

## 2018-10-24 NOTE — TELEPHONE ENCOUNTER
Pt thinks she has mastitis in left breast.  C/o pain, chills, and body aches.  Denies erythema.  Declined appt, requesting rx. Advised if no improvement within 24 hours she should be seen.     abx pended

## 2018-10-26 ENCOUNTER — PATIENT OUTREACH (OUTPATIENT)
Dept: OTHER | Facility: OTHER | Age: 33
End: 2018-10-26

## 2018-10-26 NOTE — PROGRESS NOTES
"Last 5 Patient Entered Readings                                      Current 30 Day Average:      Recent Readings 9/19/2018 7/3/2018 6/18/2018 6/18/2018 6/13/2018    SBP (mmHg) 107 119 122 126 116    DBP (mmHg) 73 78 71 77 79    Pulse 76 92 80 83 73            10/26: I called pt to request that she give us some BP readings (last reading was from 9/19).  Pt states "she has two kids and just started back at work and she just forgets."  I informed pt that in order for us to continue to monitor her within the program, she has to give us at least one BP reading per week.  Pt verbally agreed to start taking some readings. I will follow up to see if she starts to give us some readings.  "

## 2018-11-06 NOTE — PROGRESS NOTES
"Last 5 Patient Entered Readings                                      Current 30 Day Average: 103/70     Recent Readings 10/28/2018 9/19/2018 7/3/2018 6/18/2018 6/18/2018    SBP (mmHg) 103 107 119 122 126    DBP (mmHg) 70 73 78 71 77    Pulse 80 76 92 80 83          11/6: Called pt to follow up about BP readings.  Pt states the she is having problems with her cuff transmitting her readings.  I tried to trouble shoot a few things with Ms. Lau such as making sure she fully charges her machine and making sure she logs into her [employer] lyle.  Pt states she has not charged the cuff in "a month or so".  I encouraged pt to fully charge her cuff then try to submit some readings.  I advised pt that if readings still are not submitting, please call our [employer] department.  I provided Ms. Lau with the phone number.  Will follow up in 1 week.   "

## 2018-11-13 ENCOUNTER — PATIENT OUTREACH (OUTPATIENT)
Dept: OTHER | Facility: OTHER | Age: 33
End: 2018-11-13

## 2018-11-13 NOTE — PROGRESS NOTES
Last 5 Patient Entered Readings                                      Current 30 Day Average: 103/70     Recent Readings 10/28/2018 9/19/2018 7/3/2018 6/18/2018 6/18/2018    SBP (mmHg) 103 107 119 122 126    DBP (mmHg) 70 73 78 71 77    Pulse 80 76 92 80 83          11/13: Patient was in a meeting and request a call back later.  Will follow up and discuss lack of readings.

## 2018-11-14 NOTE — PROGRESS NOTES
Last 5 Patient Entered Readings                                      Current 30 Day Average: 103/70     Recent Readings 10/28/2018 9/19/2018 7/3/2018 6/18/2018 6/18/2018    SBP (mmHg) 103 107 119 122 126    DBP (mmHg) 70 73 78 71 77    Pulse 80 76 92 80 83          11/14: Called patient to address lack of BP readings.  No answer and left VM.

## 2018-11-21 ENCOUNTER — TELEPHONE (OUTPATIENT)
Dept: GASTROENTEROLOGY | Facility: CLINIC | Age: 33
End: 2018-11-21

## 2018-11-21 ENCOUNTER — TELEPHONE (OUTPATIENT)
Dept: OPHTHALMOLOGY | Facility: CLINIC | Age: 33
End: 2018-11-21

## 2018-11-21 ENCOUNTER — PATIENT MESSAGE (OUTPATIENT)
Dept: GASTROENTEROLOGY | Facility: CLINIC | Age: 33
End: 2018-11-21

## 2018-11-21 NOTE — PROGRESS NOTES
Last 5 Patient Entered Readings                                      Current 30 Day Average: 103/70     Recent Readings 10/28/2018 9/19/2018 7/3/2018 6/18/2018 6/18/2018    SBP (mmHg) 103 107 119 122 126    DBP (mmHg) 70 73 78 71 77    Pulse 80 76 92 80 83            Digital Medicine: Health  Follow Up    11:21: Left voicemail to follow up with Ms. Lau Wendt Schamberger. Sending [employer] message.  Current BP average 103/70 mmHg is at goal, <130/80 mmHg.  If no response in 1 week, will send NC letter.

## 2018-11-26 NOTE — PROGRESS NOTES
"Last 5 Patient Entered Readings                                      Current 30 Day Average: 103/70     Recent Readings 10/28/2018 9/19/2018 7/3/2018 6/18/2018 6/18/2018    SBP (mmHg) 103 107 119 122 126    DBP (mmHg) 70 73 78 71 77    Pulse 80 76 92 80 83        11/26: Patient sent [employer] message stating "she wishes to withdraw from the program".  Will notify enrolling provider and remove from digital medicine registry.     "

## 2018-12-03 ENCOUNTER — PATIENT MESSAGE (OUTPATIENT)
Dept: OBSTETRICS AND GYNECOLOGY | Facility: CLINIC | Age: 33
End: 2018-12-03

## 2018-12-11 ENCOUNTER — OFFICE VISIT (OUTPATIENT)
Dept: OPTOMETRY | Facility: CLINIC | Age: 33
End: 2018-12-11
Payer: COMMERCIAL

## 2018-12-11 DIAGNOSIS — Z98.890 S/P LASIK SURGERY OF BOTH EYES: ICD-10-CM

## 2018-12-11 DIAGNOSIS — I10 BENIGN ESSENTIAL HYPERTENSION: ICD-10-CM

## 2018-12-11 DIAGNOSIS — H52.13 MYOPIA, BILATERAL: Primary | ICD-10-CM

## 2018-12-11 PROCEDURE — 92015 DETERMINE REFRACTIVE STATE: CPT | Mod: S$GLB,,, | Performed by: OPTOMETRIST

## 2018-12-11 PROCEDURE — 99999 PR PBB SHADOW E&M-EST. PATIENT-LVL II: CPT | Mod: PBBFAC,,, | Performed by: OPTOMETRIST

## 2018-12-11 PROCEDURE — 92004 COMPRE OPH EXAM NEW PT 1/>: CPT | Mod: S$GLB,,, | Performed by: OPTOMETRIST

## 2018-12-11 NOTE — PROGRESS NOTES
HPI     LDE: 3 years ago @ Dr. Mcneil ([facility])  Patient complains of blurry vision when looking at a distance OD>OS x 4   years. Patient was advised to use Reading glasses to help with vision per   Dr. Mcneil, which pt says made her feel unbalanced. Patient any see   floaters when Migraines occur, which doesn't happen often. Patient had s/p   Lasik-OU in May 2005 with Dr. Mcneil.       Last edited by Rome Cavazos MA on 12/11/2018  8:05 AM. (History)            Assessment /Plan     For exam results, see Encounter Report.    Myopia, bilateral   Rx specs   SV with AR  S/P LASIK surgery of both eyes    Benign essential hypertension   Pt unable to have DFE today    RTC 1 year, sooner PRN

## 2019-01-14 ENCOUNTER — PATIENT MESSAGE (OUTPATIENT)
Dept: OBSTETRICS AND GYNECOLOGY | Facility: CLINIC | Age: 34
End: 2019-01-14

## 2019-01-14 ENCOUNTER — PATIENT MESSAGE (OUTPATIENT)
Dept: PAIN MEDICINE | Facility: CLINIC | Age: 34
End: 2019-01-14

## 2019-01-23 ENCOUNTER — OFFICE VISIT (OUTPATIENT)
Dept: PAIN MEDICINE | Facility: CLINIC | Age: 34
End: 2019-01-23
Payer: COMMERCIAL

## 2019-01-23 VITALS
HEART RATE: 72 BPM | WEIGHT: 175 LBS | DIASTOLIC BLOOD PRESSURE: 85 MMHG | BODY MASS INDEX: 26.61 KG/M2 | SYSTOLIC BLOOD PRESSURE: 130 MMHG

## 2019-01-23 DIAGNOSIS — M79.18 MYOFASCIAL MUSCLE PAIN: ICD-10-CM

## 2019-01-23 DIAGNOSIS — M54.2 CERVICAL PAIN (NECK): Primary | ICD-10-CM

## 2019-01-23 DIAGNOSIS — M62.838 MUSCLE SPASM: ICD-10-CM

## 2019-01-23 PROCEDURE — 20553 PR INJECT TRIGGER POINTS, > 3: ICD-10-PCS | Mod: S$GLB,,, | Performed by: NURSE PRACTITIONER

## 2019-01-23 PROCEDURE — 99999 PR PBB SHADOW E&M-EST. PATIENT-LVL III: CPT | Mod: PBBFAC,,, | Performed by: NURSE PRACTITIONER

## 2019-01-23 PROCEDURE — 20553 NJX 1/MLT TRIGGER POINTS 3/>: CPT | Mod: S$GLB,,, | Performed by: NURSE PRACTITIONER

## 2019-01-23 PROCEDURE — 99214 PR OFFICE/OUTPT VISIT, EST, LEVL IV, 30-39 MIN: ICD-10-PCS | Mod: S$GLB,,, | Performed by: NURSE PRACTITIONER

## 2019-01-23 PROCEDURE — 3075F PR MOST RECENT SYSTOLIC BLOOD PRESS GE 130-139MM HG: ICD-10-PCS | Mod: CPTII,S$GLB,, | Performed by: NURSE PRACTITIONER

## 2019-01-23 PROCEDURE — 99214 OFFICE O/P EST MOD 30 MIN: CPT | Mod: S$GLB,,, | Performed by: NURSE PRACTITIONER

## 2019-01-23 PROCEDURE — 3008F PR BODY MASS INDEX (BMI) DOCUMENTED: ICD-10-PCS | Mod: CPTII,S$GLB,, | Performed by: NURSE PRACTITIONER

## 2019-01-23 PROCEDURE — 99999 PR PBB SHADOW E&M-EST. PATIENT-LVL III: ICD-10-PCS | Mod: PBBFAC,,, | Performed by: NURSE PRACTITIONER

## 2019-01-23 PROCEDURE — 3008F BODY MASS INDEX DOCD: CPT | Mod: CPTII,S$GLB,, | Performed by: NURSE PRACTITIONER

## 2019-01-23 PROCEDURE — 3075F SYST BP GE 130 - 139MM HG: CPT | Mod: CPTII,S$GLB,, | Performed by: NURSE PRACTITIONER

## 2019-01-23 PROCEDURE — 3079F PR MOST RECENT DIASTOLIC BLOOD PRESSURE 80-89 MM HG: ICD-10-PCS | Mod: CPTII,S$GLB,, | Performed by: NURSE PRACTITIONER

## 2019-01-23 PROCEDURE — 3079F DIAST BP 80-89 MM HG: CPT | Mod: CPTII,S$GLB,, | Performed by: NURSE PRACTITIONER

## 2019-01-23 RX ORDER — TRIAMCINOLONE ACETONIDE 40 MG/ML
40 INJECTION, SUSPENSION INTRA-ARTICULAR; INTRAMUSCULAR
Status: COMPLETED | OUTPATIENT
Start: 2019-01-23 | End: 2019-01-23

## 2019-01-23 RX ADMIN — TRIAMCINOLONE ACETONIDE 40 MG: 40 INJECTION, SUSPENSION INTRA-ARTICULAR; INTRAMUSCULAR at 04:01

## 2019-01-23 NOTE — PROGRESS NOTES
Chronic patient Established Note (Follow up visit)      SUBJECTIVE:    April Wendt Schamberger presents to the clinic for a follow-up appointment for neck and shoulder pain. Since the last visit, April Wendt Schamberger states the pain has been persistant. Current pain intensity is 5/10.    Pain Disability Index Review:  Last 3 PDI Scores 2019   Pain Disability Index (PDI) 0 2 10       Pain Medications:      - Opioids: None  - Adjuvant Medications: Advil,Motrin ( Ibuprofen)  - Anti-Coagulants: None  - Others: see medications list.     Opioid Contract: no      report:  Reviewed and consistent with medication use as prescribed.     Pain Procedures: 17 TRIGGER POINT INJECTION  17 TRIGGER POINT INJECTION  17 bilateral C4,5,6 CERVICAL FACET MEDIAL BRANCH NERVE BLOCK (Prone) ( after xray correlation with pain level, decsion was made to proceed at C4,5,6 levels, patient agrees to proceed      Physical Therapy/Home Exercise: no     Imagin17 MRI Cervical Spine Without Contrast  Narrative   Technique: Multiplanar, multisequence MR imaging of the cervical spine obtained without the use of IV contrast.     Comparison: Cervical spine radiographs 12/15/2016.     Results:    There is straightening with mild reversal of the normal cervical lordosis. Vertebral body heights are maintained with no evidence of fracture. Mild intervertebral disc space narrowing and desiccation are visualized at C5-6 and C6-7. No marrow signal abnormality suspicious for an infiltrative process.      The cervical cord is normal in caliber and signal characteristics.  The craniocervical junction and visualized intracranial contents are unremarkable.  The adjacent soft tissue structures show no significant abnormalities.      C2-C3:  No significant spinal canal or neural foraminal narrowing.    C3-C4: No significant spinal canal or neural foraminal narrowing.    C4-C5:  No significant spinal canal or neural  foraminal narrowing.    C5-C6:  Disc bulge with right uncovertebral spurring. Findings result in mild spinal canal stenosis and mild right neural foraminal narrowing.    C6-C7:  Mild disc bulge with thickening of the ligamentum flavum results in mild spinal canal stenosis.No significant neuroforaminal narrowing.    C7-T1:   No significant central spinal or neural foraminal narrowing.   Impression      Mild disc bulge and spinal canal stenosis at the C5-6 and C6-7 levels.      Electronically signed by: UMER MARTIN MD  Date: 01/26/17  Time: 12:56     Encounter   View Encounter      Reviewed By   Hunter Jimenez MD on 1/27/2017  2:18 PM   Exam Details                     Performed Procedure Technologist Supporting Staff Performing Physician   MRI Cervical Spine Without Contrast Andre Hinds, RT         Appointment Date/Status Modality Department        1/26/2017     Completed [facility] MRI1 [facility] MRI       Begin Exam End Exam Begin Exam Questionnaires End Exam Questionnaires     1/26/2017 11:22 AM 1/26/2017 11:59 AM MRI TECH NAVIGATOR QUESTIONS IMAGING END ALL         RIS PREGNANCY TECH NAVIGATOR          X-Ray Cervical Spine AP Lat with Flexion  Extension 12/15/16  Narrative     Cervical spine radiographs     comparison: None    Results: AP, lateral, flexion and extension views  .  The alignment is normal, vertebral body height and disk spaces are well-maintained.    Flexion and extension views demonstrate no translational abnormalities.  Very small anterior osteophyte noted at C5-C6.  No fracture or osseous lesion seen.Prevertebral soft tissues appear normal.   Impression    No significant abnormality seen      Electronically signed by: JOSE A JENKINS MD  Date: 12/15/16  Time: 10:52                 Allergies:   Review of patient's allergies indicates:   Allergen Reactions    Lactose     Azithromycin Nausea And Vomiting       Current Medications:   Current Outpatient Medications   Medication Sig Dispense  Refill    dicloxacillin (DYNAPEN) 500 MG capsule take 1 capsule by mouth every 6 hours for 14 days 56 capsule 0    loperamide (IMODIUM A-D) 1 mg/7.5 mL Liqd Take 0.1 mg/kg by mouth every 12 (twelve) hours.      predniSONE (DELTASONE) 10 MG tablet Take 1 tablet by mouth daily for 5 days 5 tablet 0    fluticasone (FLONASE) 50 mcg/actuation nasal spray Instill 1 spray into each nostril every day until directed to stop 50 mL 0    ibuprofen (ADVIL,MOTRIN) 600 MG tablet Take 1 tablet (600 mg total) by mouth every 6 (six) hours. 45 tablet 1    giovanna barraza ointment Apply topically 3 (three) times daily. Apply after feeding. Do not wash off. This compounded medication expires in 30 days. 30 g 1    PNV NO.95/FERROUS FUM/FOLIC AC (PRENATAL ORAL) Take by mouth.       No current facility-administered medications for this visit.        REVIEW OF SYSTEMS:    GENERAL: No weight loss, malaise or fevers.  HEENT: Negative for frequent or significant headaches. + HAs 3-4 days out of the week.  NECK: Negative for lumps, goiter, pain and significant neck swelling.  RESPIRATORY: Negative for cough, wheezing or shortness of breath.  CARDIOVASCULAR: Negative for chest pain, leg swelling or palpitations.  GI: Negative for abdominal discomfort, blood in stools or black stools or change in bowel habits. +IBS  MUSCULOSKELETAL: See HPI.  SKIN: Negative for lesions, rash, and itching.  PSYCH: + for sleep disturbance, mood disorder and recent psychosocial stressors.  HEMATOLOGY/LYMPHOLOGY: Negative for prolonged bleeding, bruising easily or swollen nodes.   NEURO: No history of headaches, syncope, paralysis, seizures or tremors.  All other reviewed and negative other than HPI.    Past Medical History:  Past Medical History:   Diagnosis Date    Abnormal Pap smear of cervix 2000    Cryo Done    ADD (attention deficit disorder)     Breast disorder     Breast Cysts    Esophageal reflux     Fibroids     Hypertension     IBS (irritable  bowel syndrome)     Insomnia     Kidney stones     Mastocytosis     Upper GI bleed        Past Surgical History:  Past Surgical History:   Procedure Laterality Date    BLOCK-NERVE-MEDIAL BRANCH-CERVICAL--C3,4,5 N/A 1/25/2017    [facility] at [facility] PAIN MGT    COLONOSCOPY N/A 4/28/2017    [facility] by [facility]in [facility] at [facility] ENDO    ESOPHAGOGASTRODUODENOSCOPY  2012    ESOPHAGOGASTRODUODENOSCOPY (EGD) N/A 4/28/2017    [facility] at [facility] ENDO    LASIK Bilateral 2005    REFRACTIVE SURGERY      TONSILLECTOMY, ADENOIDECTOMY  1988       Family History:  Family History   Problem Relation Age of Onset    Breast cancer Mother         with Metastasis    Cancer Mother         Breast    Hypertension Mother     Prostate cancer Father     Hypertension Father     Cancer Father         Prostate Ca    Diabetes Father     Deep vein thrombosis Father     Pancreatic cancer Maternal Grandfather     Breast cancer Paternal Grandmother     Cancer Paternal Grandmother     Diabetes type II Paternal Grandfather     Heart attack Maternal Grandmother     Colon cancer Cousin     Cancer Cousin 31    Ovarian cancer Neg Hx     Lymphoma Neg Hx     Tuberculosis Neg Hx     Celiac disease Neg Hx     Cirrhosis Neg Hx     Colon polyps Neg Hx     Crohn's disease Neg Hx     Cystic fibrosis Neg Hx     Esophageal cancer Neg Hx     Hemochromatosis Neg Hx     Inflammatory bowel disease Neg Hx     Irritable bowel syndrome Neg Hx     Liver cancer Neg Hx     Liver disease Neg Hx     Rectal cancer Neg Hx     Stomach cancer Neg Hx     Ulcerative colitis Neg Hx     Donavon's disease Neg Hx     Amblyopia Neg Hx     Blindness Neg Hx     Cataracts Neg Hx     Glaucoma Neg Hx     Macular degeneration Neg Hx     Retinal detachment Neg Hx     Strabismus Neg Hx        Social History:  Social History     Socioeconomic History    Marital status:      Spouse name: None     Number of children: 1    Years of education: None    Highest education level: None   Social Needs    Financial resource strain: None    Food insecurity - worry: None    Food insecurity - inability: None    Transportation needs - medical: None    Transportation needs - non-medical: None   Occupational History    None   Tobacco Use    Smoking status: Former Smoker    Smokeless tobacco: Never Used   Substance and Sexual Activity    Alcohol use: No     Alcohol/week: 0.0 oz     Comment: breastfeeding     Drug use: No    Sexual activity: Yes     Partners: Male     Birth control/protection: Coitus interruptus, Rhythm     Comment: : Boris   Other Topics Concern    None   Social History Narrative    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  Psych:  Mood and affect appropriate.  Skin: Skin color, texture, turgor normal, no rashes or lesions, in both upper and lower body.  Head/face:  Atraumatic, normocephalic. No palpable lymph nodes  Neck: + pain to palpation over the cervical paraspinous muscles. Spurling Negative. + pain with neck flexion, extension, or lateral flexion. .  Cor: RRR  Pulm: CTA  GI: Abdomen soft and non-tender.  Back: Straight leg raising in the sitting and supine positions is negative to radicular pain. No pain to palpation over the spine or costovertebral angles. Normal range of motion without pain reproduction.   Extremities: Peripheral joint ROM is full and pain free without obvious instability or laxity in all four extremities. No deformities, edema, or skin discoloration. Good capillary refill.  Musculoskeletal: Shoulder, hip, sacroiliac and knee provocative maneuvers are negative. Bilateral upper and lower extremity strength is normal and symmetric.  No atrophy or tone abnormalities are  noted.  Neuro: Bilateral upper and lower extremity coordination and muscle stretch reflexes are physiologic and symmetric.  Plantar response are downgoing. No loss of sensation is noted.  Gait: Normal.    ASSESSMENT: 33 y.o. year old female with neck  pain, consistent with      Diagnosis:    1. Cervical pain (neck)     2. Myofascial muscle pain     3. Muscle spasm           PLAN:     - I have stressed the importance of physical activity and a home exercise plan to help with pain and improve health.  - Patient can continue with medications for now since they are providing benefits, using them appropriately, and without side effects.  - Counseled patient regarding the importance of activity modification, constant sleeping habits and physical therapy.  -s/f TP injections today  -I have explained the risks, benefits, and alternatives of the procedure in detail. The patient voices understanding and all questions have been answered. The patient agrees to proceed as planned. Written Consent obtained.   -RTC as needed for returning or new pain  -The above plan and management options were discussed at length with patient. Patient is in agreement with the above and verbalized understanding. Dr. Argueta was consulted on this patient  and agrees with this plan.       MACK MarieC  Interventional Pain Management      01/23/2019     Procedures Patient Name: Schamberger, April W   MRN: [id number]     INFORMED CONSENT: The procedure, risks, benefits and options were discussed with patient. There are no contraindications to the procedure. The patient expressed understanding and agreed to proceed. The personnel performing the procedure was discussed. I verify that I personally obtained  consent prior to the start of the procedure and the signed consent can be found on the patient's chart.     Procedure Date: 1/23/2019     Anesthesia: Topical     Pre Procedure diagnosis:   1. Myositis, unspecified myositis type, unspecified site     2. Myalgia          Post-Procedure diagnosis: same        Sedation: None     PROCEDURE: TRIGGER POINT INJECTION  The patient was placed in a seated position. The site of pain and procedure were confirmed with the patient prior to starting the procedure. After performing time out. The patient's trigger points were identified and marked. The skin was prepped with chlorhexidine three times. After performing time out A 25-gauge 1.5 inch needle was advanced through the skin and subcutaneous tissues. Aspiration for blood, air and CSF was negative. A total of 10 ml of Bupivacaine 0.25% and 40 mg Kenalog was injected at all trigger point. No complications were evident. No specimens collected.     Blood Loss: Nill  Specimen: None

## 2019-03-10 ENCOUNTER — PATIENT MESSAGE (OUTPATIENT)
Dept: OBSTETRICS AND GYNECOLOGY | Facility: CLINIC | Age: 34
End: 2019-03-10

## 2019-03-11 DIAGNOSIS — B37.89 CANDIDIASIS OF BREAST: Primary | ICD-10-CM

## 2019-03-11 RX ORDER — FLUCONAZOLE 150 MG/1
TABLET ORAL
Qty: 2 TABLET | Refills: 1 | Status: SHIPPED | OUTPATIENT
Start: 2019-03-11 | End: 2019-04-11 | Stop reason: SDUPTHER

## 2019-03-14 ENCOUNTER — PATIENT MESSAGE (OUTPATIENT)
Dept: OBSTETRICS AND GYNECOLOGY | Facility: CLINIC | Age: 34
End: 2019-03-14

## 2019-04-11 ENCOUNTER — PATIENT MESSAGE (OUTPATIENT)
Dept: OBSTETRICS AND GYNECOLOGY | Facility: CLINIC | Age: 34
End: 2019-04-11

## 2019-04-11 DIAGNOSIS — B37.89 CANDIDIASIS OF BREAST: ICD-10-CM

## 2019-04-11 RX ORDER — FLUCONAZOLE 150 MG/1
TABLET ORAL
Qty: 3 TABLET | Refills: 2 | Status: SHIPPED | OUTPATIENT
Start: 2019-04-11 | End: 2019-05-02

## 2019-05-02 ENCOUNTER — OFFICE VISIT (OUTPATIENT)
Dept: INTERNAL MEDICINE | Facility: CLINIC | Age: 34
End: 2019-05-02
Payer: COMMERCIAL

## 2019-05-02 VITALS
DIASTOLIC BLOOD PRESSURE: 86 MMHG | TEMPERATURE: 99 F | HEIGHT: 68 IN | WEIGHT: 179.88 LBS | RESPIRATION RATE: 16 BRPM | BODY MASS INDEX: 27.26 KG/M2 | HEART RATE: 86 BPM | SYSTOLIC BLOOD PRESSURE: 125 MMHG

## 2019-05-02 DIAGNOSIS — L03.317 CELLULITIS OF BUTTOCK: Primary | ICD-10-CM

## 2019-05-02 PROCEDURE — 3008F BODY MASS INDEX DOCD: CPT | Mod: CPTII,S$GLB,, | Performed by: INTERNAL MEDICINE

## 2019-05-02 PROCEDURE — 99999 PR PBB SHADOW E&M-EST. PATIENT-LVL III: ICD-10-PCS | Mod: PBBFAC,,, | Performed by: INTERNAL MEDICINE

## 2019-05-02 PROCEDURE — 3008F PR BODY MASS INDEX (BMI) DOCUMENTED: ICD-10-PCS | Mod: CPTII,S$GLB,, | Performed by: INTERNAL MEDICINE

## 2019-05-02 PROCEDURE — 99214 OFFICE O/P EST MOD 30 MIN: CPT | Mod: S$GLB,,, | Performed by: INTERNAL MEDICINE

## 2019-05-02 PROCEDURE — 3074F SYST BP LT 130 MM HG: CPT | Mod: CPTII,S$GLB,, | Performed by: INTERNAL MEDICINE

## 2019-05-02 PROCEDURE — 99214 PR OFFICE/OUTPT VISIT, EST, LEVL IV, 30-39 MIN: ICD-10-PCS | Mod: S$GLB,,, | Performed by: INTERNAL MEDICINE

## 2019-05-02 PROCEDURE — 99999 PR PBB SHADOW E&M-EST. PATIENT-LVL III: CPT | Mod: PBBFAC,,, | Performed by: INTERNAL MEDICINE

## 2019-05-02 PROCEDURE — 3079F DIAST BP 80-89 MM HG: CPT | Mod: CPTII,S$GLB,, | Performed by: INTERNAL MEDICINE

## 2019-05-02 PROCEDURE — 3079F PR MOST RECENT DIASTOLIC BLOOD PRESSURE 80-89 MM HG: ICD-10-PCS | Mod: CPTII,S$GLB,, | Performed by: INTERNAL MEDICINE

## 2019-05-02 PROCEDURE — 3074F PR MOST RECENT SYSTOLIC BLOOD PRESSURE < 130 MM HG: ICD-10-PCS | Mod: CPTII,S$GLB,, | Performed by: INTERNAL MEDICINE

## 2019-05-02 RX ORDER — MUPIROCIN 20 MG/G
OINTMENT TOPICAL 3 TIMES DAILY
Qty: 22 G | Refills: 0 | Status: SHIPPED | OUTPATIENT
Start: 2019-05-02 | End: 2019-05-12

## 2019-05-02 RX ORDER — CLINDAMYCIN HYDROCHLORIDE 300 MG/1
300 CAPSULE ORAL EVERY 6 HOURS
Qty: 40 CAPSULE | Refills: 0 | Status: SHIPPED | OUTPATIENT
Start: 2019-05-02 | End: 2019-05-12

## 2019-05-02 NOTE — PROGRESS NOTES
"Subjective:       Patient ID: April Wendt Schamberger is a 34 y.o. female.    Chief Complaint: Recurrent Skin Infections    HPI    She reports boil started on Tuesday. It has become larger and more painful since then. Also erythema is worsening. H/o MRSA skin infections in the past. No drainage. Of note, she is breastfeeding.     Review of Systems   Constitutional: Negative for fever.   Skin: Positive for color change and wound.       Objective:        Vitals:    05/02/19 0840   BP: 125/86   Pulse: 86   Resp: 16   Temp: 98.9 °F (37.2 °C)   TempSrc: Oral   Weight: 81.6 kg (179 lb 14.3 oz)   Height: 5' 8" (1.727 m)       Body mass index is 27.35 kg/m².    Physical Exam   Constitutional: She is oriented to person, place, and time. She appears well-developed and well-nourished. No distress.   HENT:   Head: Normocephalic and atraumatic.   Right Ear: External ear normal.   Left Ear: External ear normal.   Eyes: Conjunctivae and EOM are normal.   Neck: Normal range of motion.   Cardiovascular: Normal rate and intact distal pulses.   Pulmonary/Chest: Effort normal.   Musculoskeletal: Normal range of motion.   Neurological: She is alert and oriented to person, place, and time.   Skin: Skin is warm and dry. No rash noted. There is erythema.   Erythema surround nodule left buttocks w/o palpable fluctuance   Psychiatric: She has a normal mood and affect. Her behavior is normal.       Assessment:     1. Cellulitis of buttock           Plan:         1. Cellulitis of buttock  - no fluctuance palpated or material expressible so hold off on I&D. Continue to apply warm compress. Keep area clean. Take probiotic with abx.   - clindamycin (CLEOCIN) 300 MG capsule; Take 1 capsule (300 mg total) by mouth every 6 (six) hours. for 10 days  Dispense: 40 capsule; Refill: 0  - mupirocin (BACTROBAN) 2 % ointment; Apply topically 3 (three) times daily. for 10 days  Dispense: 22 g; Refill: 0           Patient note was created using MModal " Dictation.  Any errors in syntax or even information may not have been identified and edited on initial review prior to signing this note.

## 2019-05-06 ENCOUNTER — PATIENT MESSAGE (OUTPATIENT)
Dept: GASTROENTEROLOGY | Facility: CLINIC | Age: 34
End: 2019-05-06

## 2019-05-23 ENCOUNTER — OFFICE VISIT (OUTPATIENT)
Dept: GASTROENTEROLOGY | Facility: CLINIC | Age: 34
End: 2019-05-23
Payer: COMMERCIAL

## 2019-05-23 ENCOUNTER — TELEPHONE (OUTPATIENT)
Dept: GASTROENTEROLOGY | Facility: CLINIC | Age: 34
End: 2019-05-23

## 2019-05-23 VITALS
SYSTOLIC BLOOD PRESSURE: 117 MMHG | BODY MASS INDEX: 27.63 KG/M2 | WEIGHT: 182.31 LBS | DIASTOLIC BLOOD PRESSURE: 77 MMHG | HEIGHT: 68 IN

## 2019-05-23 DIAGNOSIS — R19.7 DIARRHEA, UNSPECIFIED TYPE: Primary | ICD-10-CM

## 2019-05-23 DIAGNOSIS — K52.9 ILEITIS: ICD-10-CM

## 2019-05-23 PROCEDURE — 3078F PR MOST RECENT DIASTOLIC BLOOD PRESSURE < 80 MM HG: ICD-10-PCS | Mod: CPTII,S$GLB,, | Performed by: NURSE PRACTITIONER

## 2019-05-23 PROCEDURE — 3074F PR MOST RECENT SYSTOLIC BLOOD PRESSURE < 130 MM HG: ICD-10-PCS | Mod: CPTII,S$GLB,, | Performed by: NURSE PRACTITIONER

## 2019-05-23 PROCEDURE — 3078F DIAST BP <80 MM HG: CPT | Mod: CPTII,S$GLB,, | Performed by: NURSE PRACTITIONER

## 2019-05-23 PROCEDURE — 3008F BODY MASS INDEX DOCD: CPT | Mod: CPTII,S$GLB,, | Performed by: NURSE PRACTITIONER

## 2019-05-23 PROCEDURE — 3008F PR BODY MASS INDEX (BMI) DOCUMENTED: ICD-10-PCS | Mod: CPTII,S$GLB,, | Performed by: NURSE PRACTITIONER

## 2019-05-23 PROCEDURE — 99999 PR PBB SHADOW E&M-EST. PATIENT-LVL III: ICD-10-PCS | Mod: PBBFAC,,, | Performed by: NURSE PRACTITIONER

## 2019-05-23 PROCEDURE — 99999 PR PBB SHADOW E&M-EST. PATIENT-LVL III: CPT | Mod: PBBFAC,,, | Performed by: NURSE PRACTITIONER

## 2019-05-23 PROCEDURE — 99213 OFFICE O/P EST LOW 20 MIN: CPT | Mod: S$GLB,,, | Performed by: NURSE PRACTITIONER

## 2019-05-23 PROCEDURE — 99213 PR OFFICE/OUTPT VISIT, EST, LEVL III, 20-29 MIN: ICD-10-PCS | Mod: S$GLB,,, | Performed by: NURSE PRACTITIONER

## 2019-05-23 PROCEDURE — 3074F SYST BP LT 130 MM HG: CPT | Mod: CPTII,S$GLB,, | Performed by: NURSE PRACTITIONER

## 2019-05-23 NOTE — TELEPHONE ENCOUNTER
See my recent note.    She has been seen by you in the past and colonoscopy and VCE recommended after no NSAIDs for 6 weeks.    She did not schedule this previously.    Will be breastfeeding until beginning of July,  But after that time would be agreeable to scheduling. However, she is still using NSAIDs.    Did not know if you wanted to see her for follow up and then get things scheduled or if she just needs to schedule procedures?

## 2019-05-23 NOTE — PROGRESS NOTES
"Subjective:       Patient ID: April Wendt Schamberger is a 34 y.o. female.    Chief Complaint: Follow-up    HPI  Reports a "flare up" two weeks ago lasting three days with increased diarrhea and needing to use 17 imodium daily for control.  She has had chronic diarrhea and workup most consistent with Crohn's disease.  She was seen two years ago by Dr. Yousif with repeat colonoscopy and VCE recommended after holding all NSAIDs for 6 weeks.  She has not yet had this done.  She had a baby in July 2018.  She is currently using NSAIDs daily.  Continues to have diarrhea that she is controlling currently with 9 imodium daily.  She will typically have a watery stool in the morning and then a loose bowel movement after eating through the day.  No blood with bowel movements or black stools.  No nausea or vomiting.    She has abdominal cramping with her bowel movements and also RLQ pain that comes and goes.  After colonoscopy in 2017, she was treated for a period with entocort which she did not tolerate and discontinued with return to imodium.  She reports she has used imodium daily since her teen years.     She had a colonoscopy on 4/28/2017 that showed a normal colon and multiple 5 mm ulcers in the TI with biopsies consistent with ileitis, prominent lymphoid aggregates, and nonspecific changes.  EGD on 4/28/2017 showed non-bleeding erosive gastropathy that was H. Pylori negative with normal esophagus and erythematous duodenopathy.  Promethues IBD Diagnostic testing on 5/4/2017 was consistent with Crohns disease.  She also had an US on 5/31/2017 that showed mild hepatomegaly.  MRE on 6/5/2017 showed no active inflammatory Crohn's disease noting mild degradation related to peristaltic motion and incomplete distention of bowel loops, no definite wall thickening      Review of Systems   Constitutional: Negative.  Negative for activity change, appetite change, fatigue, fever and unexpected weight change.   Respiratory: Negative " for shortness of breath.    Cardiovascular: Negative for chest pain.   Gastrointestinal: Positive for abdominal pain and diarrhea. Negative for constipation, nausea and vomiting.   Musculoskeletal: Positive for neck pain.   Skin: Negative.    Neurological: Negative.    Psychiatric/Behavioral: Negative.        Objective:      Physical Exam   Constitutional: She is oriented to person, place, and time. She appears well-developed and well-nourished. No distress.   Eyes: No scleral icterus.   Cardiovascular: Normal rate.   Pulmonary/Chest: Effort normal. No respiratory distress.   Abdominal: Soft.   Neurological: She is alert and oriented to person, place, and time.   Skin: Skin is warm and dry. She is not diaphoretic.   Psychiatric: She has a normal mood and affect. Her behavior is normal. Judgment and thought content normal.   Vitals reviewed.      Assessment:       1. Diarrhea, unspecified type    2. Ileitis        Plan:         April was seen today for follow-up.    Diagnoses and all orders for this visit:    Diarrhea, unspecified type    Ileitis    She reports that she will be breastfeeding until some time in July and then would be agreeable to scheduling colonoscopy and VCE as previously recommended by Dr. Yousif.    Further recommendations can be made after this evaluation.    Avoid NSAIDs.

## 2019-05-28 ENCOUNTER — PATIENT MESSAGE (OUTPATIENT)
Dept: GASTROENTEROLOGY | Facility: CLINIC | Age: 34
End: 2019-05-28

## 2019-05-29 DIAGNOSIS — B37.9 YEAST INFECTION: Primary | ICD-10-CM

## 2019-05-29 RX ORDER — FLUCONAZOLE 150 MG/1
TABLET ORAL
Qty: 3 TABLET | Refills: 3 | Status: SHIPPED | OUTPATIENT
Start: 2019-05-29 | End: 2020-01-16

## 2019-08-06 ENCOUNTER — OFFICE VISIT (OUTPATIENT)
Dept: PAIN MEDICINE | Facility: CLINIC | Age: 34
End: 2019-08-06
Payer: COMMERCIAL

## 2019-08-06 VITALS
BODY MASS INDEX: 27.72 KG/M2 | HEART RATE: 78 BPM | WEIGHT: 182.31 LBS | SYSTOLIC BLOOD PRESSURE: 136 MMHG | DIASTOLIC BLOOD PRESSURE: 91 MMHG

## 2019-08-06 DIAGNOSIS — M54.2 CERVICAL PAIN (NECK): Primary | ICD-10-CM

## 2019-08-06 DIAGNOSIS — M79.18 MYOFASCIAL MUSCLE PAIN: ICD-10-CM

## 2019-08-06 DIAGNOSIS — M54.2 NECK PAIN: ICD-10-CM

## 2019-08-06 DIAGNOSIS — M62.838 MUSCLE SPASM: ICD-10-CM

## 2019-08-06 PROCEDURE — 99214 PR OFFICE/OUTPT VISIT, EST, LEVL IV, 30-39 MIN: ICD-10-PCS | Mod: 25,S$GLB,, | Performed by: NURSE PRACTITIONER

## 2019-08-06 PROCEDURE — 3080F DIAST BP >= 90 MM HG: CPT | Mod: CPTII,S$GLB,, | Performed by: NURSE PRACTITIONER

## 2019-08-06 PROCEDURE — 3075F SYST BP GE 130 - 139MM HG: CPT | Mod: CPTII,S$GLB,, | Performed by: NURSE PRACTITIONER

## 2019-08-06 PROCEDURE — 20553 PR INJECT TRIGGER POINTS, > 3: ICD-10-PCS | Mod: S$GLB,,, | Performed by: NURSE PRACTITIONER

## 2019-08-06 PROCEDURE — 99999 PR PBB SHADOW E&M-EST. PATIENT-LVL III: CPT | Mod: PBBFAC,,, | Performed by: NURSE PRACTITIONER

## 2019-08-06 PROCEDURE — 99999 PR PBB SHADOW E&M-EST. PATIENT-LVL III: ICD-10-PCS | Mod: PBBFAC,,, | Performed by: NURSE PRACTITIONER

## 2019-08-06 PROCEDURE — 99214 OFFICE O/P EST MOD 30 MIN: CPT | Mod: 25,S$GLB,, | Performed by: NURSE PRACTITIONER

## 2019-08-06 PROCEDURE — 3008F BODY MASS INDEX DOCD: CPT | Mod: CPTII,S$GLB,, | Performed by: NURSE PRACTITIONER

## 2019-08-06 PROCEDURE — 3080F PR MOST RECENT DIASTOLIC BLOOD PRESSURE >= 90 MM HG: ICD-10-PCS | Mod: CPTII,S$GLB,, | Performed by: NURSE PRACTITIONER

## 2019-08-06 PROCEDURE — 20553 NJX 1/MLT TRIGGER POINTS 3/>: CPT | Mod: S$GLB,,, | Performed by: NURSE PRACTITIONER

## 2019-08-06 PROCEDURE — 3075F PR MOST RECENT SYSTOLIC BLOOD PRESS GE 130-139MM HG: ICD-10-PCS | Mod: CPTII,S$GLB,, | Performed by: NURSE PRACTITIONER

## 2019-08-06 PROCEDURE — 3008F PR BODY MASS INDEX (BMI) DOCUMENTED: ICD-10-PCS | Mod: CPTII,S$GLB,, | Performed by: NURSE PRACTITIONER

## 2019-08-06 RX ORDER — TRIAMCINOLONE ACETONIDE 40 MG/ML
40 INJECTION, SUSPENSION INTRA-ARTICULAR; INTRAMUSCULAR
Status: COMPLETED | OUTPATIENT
Start: 2019-08-06 | End: 2019-08-06

## 2019-08-06 RX ADMIN — TRIAMCINOLONE ACETONIDE 40 MG: 40 INJECTION, SUSPENSION INTRA-ARTICULAR; INTRAMUSCULAR at 05:08

## 2019-08-06 NOTE — PROGRESS NOTES
Chronic patient Established Note (Follow up visit)      SUBJECTIVE:    April Wendt Schamberger presents to the clinic for a follow-up appointment for Cervical TPIs today.   Since the last visit, April Wendt Schamberger states the pain has been persistant. Current pain intensity is 6/10.    Pain Disability Index Review:  Last 3 PDI Scores 2019   Pain Disability Index (PDI) 35 0 2          Pain Medications:      - Opioids: None  - Adjuvant Medications: Advil,Motrin ( Ibuprofen)  - Anti-Coagulants: None  - Others: see medications list.     Opioid Contract: no      report:  Reviewed and consistent with medication use as prescribed.     Pain Procedures:17 TRIGGER POINT INJECTION  17 TRIGGER POINT INJECTION  17 bilateral C4,5,6 CERVICAL FACET MEDIAL BRANCH NERVE BLOCK (Prone) ( after xray correlation with pain level, decsion was made to proceed at C4,5,6 levels, patient agrees to proceed      Physical Therapy/Home Exercise: no     Imagin17 MRI Cervical Spine Without Contrast  Narrative   Technique: Multiplanar, multisequence MR imaging of the cervical spine obtained without the use of IV contrast.     Comparison: Cervical spine radiographs 12/15/2016.     Results:    There is straightening with mild reversal of the normal cervical lordosis. Vertebral body heights are maintained with no evidence of fracture. Mild intervertebral disc space narrowing and desiccation are visualized at C5-6 and C6-7. No marrow signal abnormality suspicious for an infiltrative process.      The cervical cord is normal in caliber and signal characteristics.  The craniocervical junction and visualized intracranial contents are unremarkable.  The adjacent soft tissue structures show no significant abnormalities.      C2-C3:  No significant spinal canal or neural foraminal narrowing.    C3-C4: No significant spinal canal or neural foraminal narrowing.    C4-C5:  No significant spinal canal or neural  foraminal narrowing.    C5-C6:  Disc bulge with right uncovertebral spurring. Findings result in mild spinal canal stenosis and mild right neural foraminal narrowing.    C6-C7:  Mild disc bulge with thickening of the ligamentum flavum results in mild spinal canal stenosis.No significant neuroforaminal narrowing.    C7-T1:   No significant central spinal or neural foraminal narrowing.   Impression      Mild disc bulge and spinal canal stenosis at the C5-6 and C6-7 levels.      Electronically signed by: UMER MARTIN MD  Date: 01/26/17  Time: 12:56     Encounter   View Encounter      Reviewed By   Hunter Jimenez MD on 1/27/2017  2:18 PM   Exam Details                     Performed Procedure Technologist Supporting Staff Performing Physician   MRI Cervical Spine Without Contrast Andre Hinds, RT         Appointment Date/Status Modality Department        1/26/2017     Completed [facility] MRI1 [facility]       Begin Exam End Exam Begin Exam Questionnaires End Exam Questionnaires     1/26/2017 11:22 AM 1/26/2017 11:59 AM MRI TECH NAVIGATOR QUESTIONS IMAGING END ALL         RIS PREGNANCY TECH NAVIGATOR          X-Ray Cervical Spine AP Lat with Flexion  Extension 12/15/16  Narrative     Cervical spine radiographs     comparison: None    Results: AP, lateral, flexion and extension views  .  The alignment is normal, vertebral body height and disk spaces are well-maintained.    Flexion and extension views demonstrate no translational abnormalities.  Very small anterior osteophyte noted at C5-C6.  No fracture or osseous lesion seen.Prevertebral soft tissues appear normal.   Impression    No significant abnormality seen      Electronically signed by: JOSE A JENKINS MD  Date: 12/15/16  Time: 10:52          Allergies:   Review of patient's allergies indicates:   Allergen Reactions    Lactose     Azithromycin Nausea And Vomiting       Current Medications:   Current Outpatient Medications   Medication Sig Dispense Refill     dicloxacillin (DYNAPEN) 500 MG capsule Take 1 capsule by mouth every 6 hours for 10 days 40 capsule 0    fluconazole (DIFLUCAN) 150 MG Tab Take 1 tablet by mouth today and repeat in 3 days if needed 3 tablet 3    ibuprofen (ADVIL,MOTRIN) 600 MG tablet Take 1 tablet (600 mg total) by mouth every 6 (six) hours. 45 tablet 1    loperamide (IMODIUM A-D) 1 mg/7.5 mL Liqd Take 0.1 mg/kg by mouth every 12 (twelve) hours.      PNV NO.95/FERROUS FUM/FOLIC AC (PRENATAL ORAL) Take by mouth.       Current Facility-Administered Medications   Medication Dose Route Frequency Provider Last Rate Last Dose    triamcinolone acetonide injection 40 mg  40 mg Intramuscular 1 time in Clinic/HOD LIDIA Miller           REVIEW OF SYSTEMS:    GENERAL:  No weight loss, malaise or fevers.  HEENT:  Negative for frequent or significant headaches. + HAs   NECK:  Negative for lumps, goiter, pain and significant neck swelling.  RESPIRATORY:  Negative for cough, wheezing or shortness of breath.  CARDIOVASCULAR:  Negative for chest pain, leg swelling or palpitations.  GI:  Negative for abdominal discomfort, blood in stools or black stools or change in bowel habits.  MUSCULOSKELETAL:  See HPI.  SKIN:  Negative for lesions, rash, and itching.  PSYCH:  Positive for sleep disturbance, mood disorder and recent psychosocial stressors.  HEMATOLOGY/LYMPHOLOGY:  Negative for prolonged bleeding, bruising easily or swollen nodes.  NEURO:   No history of headaches, syncope, paralysis, seizures or tremors.  All other reviewed and negative other than HPI.    Past Medical History:  Past Medical History:   Diagnosis Date    Abnormal Pap smear of cervix 2000    Cryo Done    ADD (attention deficit disorder)     Breast disorder     Breast Cysts    Esophageal reflux     Fibroids     Hypertension     IBS (irritable bowel syndrome)     Insomnia     Kidney stones     Mastocytosis     Upper GI bleed        Past Surgical History:  Past Surgical History:    Procedure Laterality Date    BLOCK-NERVE-MEDIAL BRANCH-CERVICAL--C3,4,5 N/A 1/25/2017    Performed by [facility]enez [facility] at [facility] PAIN MGT    COLONOSCOPY N/A 4/28/2017    Performed by [facility] Larkin [facility] at [facility] ENDO    ESOPHAGOGASTRODUODENOSCOPY  2012    ESOPHAGOGASTRODUODENOSCOPY (EGD) N/A 4/28/2017    Performed [facility] at [facility] ENDO    LASIK Bilateral 2005    REFRACTIVE SURGERY      TONSILLECTOMY, ADENOIDECTOMY  1988       Family History:  Family History   Problem Relation Age of Onset    Breast cancer Mother         with Metastasis    Cancer Mother         Breast    Hypertension Mother     Prostate cancer Father     Hypertension Father     Cancer Father         Prostate Ca    Diabetes Father     Deep vein thrombosis Father     Pancreatic cancer Maternal Grandfather     Breast cancer Paternal Grandmother     Cancer Paternal Grandmother     Diabetes type II Paternal Grandfather     Heart attack Maternal Grandmother     Colon cancer Cousin     Cancer Cousin 31    Ovarian cancer Neg Hx     Lymphoma Neg Hx     Tuberculosis Neg Hx     Celiac disease Neg Hx     Cirrhosis Neg Hx     Colon polyps Neg Hx     Crohn's disease Neg Hx     Cystic fibrosis Neg Hx     Esophageal cancer Neg Hx     Hemochromatosis Neg Hx     Inflammatory bowel disease Neg Hx     Irritable bowel syndrome Neg Hx     Liver cancer Neg Hx     Liver disease Neg Hx     Rectal cancer Neg Hx     Stomach cancer Neg Hx     Ulcerative colitis Neg Hx     Donavon's disease Neg Hx     Amblyopia Neg Hx     Blindness Neg Hx     Cataracts Neg Hx     Glaucoma Neg Hx     Macular degeneration Neg Hx     Retinal detachment Neg Hx     Strabismus Neg Hx        Social History:  Social History     Socioeconomic History    Marital status:      Spouse name: Not on file    Number of children: 1    Years of education: Not on file    Highest education level: Not on file   Occupational History    Not on file    Social Needs    Financial resource strain: Not on file    Food insecurity:     Worry: Not on file     Inability: Not on file    Transportation needs:     Medical: Not on file     Non-medical: Not on file   Tobacco Use    Smoking status: Former Smoker    Smokeless tobacco: Never Used   Substance and Sexual Activity    Alcohol use: No     Alcohol/week: 0.0 oz     Comment: breastfeeding     Drug use: No    Sexual activity: Yes     Partners: Male     Birth control/protection: Coitus interruptus, Rhythm     Comment: : Boris   Lifestyle    Physical activity:     Days per week: Not on file     Minutes per session: Not on file    Stress: Not on file   Relationships    Social connections:     Talks on phone: Not on file     Gets together: Not on file     Attends Synagogue service: Not on file     Active member of club or organization: Not on file     Attends meetings of clubs or organizations: Not on file     Relationship status: Not on file   Other Topics Concern    Not on file   Social History Narrative    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  Psych:  Mood and affect appropriate.  Skin: Skin color, texture, turgor normal, no rashes or lesions, in both upper and lower body.  Head/face:  Atraumatic, normocephalic. No palpable lymph nodes  Neck: + pain to palpation over the cervical paraspinous muscles. Spurling Negative. + pain with neck flexion, extension, or lateral flexion. .  Cor: RRR  Pulm: CTA  GI: Abdomen soft and non-tender.  Back: Straight leg raising in the sitting and supine positions is negative to radicular pain. No pain to palpation over the spine or costovertebral angles. Normal range of motion without pain reproduction.  Extremities: Peripheral joint ROM is full and pain free without  obvious instability or laxity in all four extremities. No deformities, edema, or skin discoloration. Good capillary refill.  Musculoskeletal: Shoulder, hip, sacroiliac and knee provocative maneuvers are negative. Bilateral upper and lower extremity strength is normal and symmetric.  No atrophy or tone abnormalities are noted.  Neuro: Bilateral upper and lower extremity coordination and muscle stretch reflexes are physiologic and symmetric.  Plantar response are downgoing. No loss of sensation is noted.  Gait: Normal.    ASSESSMENT: 34 y.o. year old female with  neck and shoulder  pain, consistent with      Diagnoses    1. Cervical pain (neck)     2. Myofascial muscle pain     3. Muscle spasm     4. Neck pain           PLAN:     - I have stressed the importance of physical activity and a home exercise plan to help with pain and improve health.  - Counseled patient regarding the importance of activity modification, constant sleeping habits and physical therapy.  -physical trigger point injections today  -I have explained the risks, benefits, and alternatives of the procedure in detail. The patient voices understanding and all questions have been answered. The patient agrees to proceed as planned. Written Consent obtained.   -RTC as needed for returning or new pain  -The above plan and management options were discussed at length with patient. Patient is in agreement with the above and verbalized understanding. Dr. Argueta was consulted on this patient  and agrees with this plan.        VALERIE Marie  Interventional Pain Management      08/06/2019     Procedures Patient Name: Schamberger, April W.   MRN: [id number]     INFORMED CONSENT: The procedure, risks, benefits and options were discussed with patient. There are no contraindications to the procedure. The patient expressed understanding and agreed to proceed. The personnel performing the procedure was discussed. I verify that I personally obtained  consent prior  to the start of the procedure and the signed consent can be found on the patient's chart.     Procedure Date: 8/6/2019     Anesthesia: Topical     Pre Procedure diagnosis:   1. Myositis, unspecified myositis type, unspecified site    2. Myalgia          Post-Procedure diagnosis: same        Sedation: None     PROCEDURE: TRIGGER POINT INJECTION  The patient was placed in a seated position. The site of pain and procedure were confirmed with the patient prior to starting the procedure. After performing time out. The patient's trigger points were identified and marked. The skin was prepped with chlorhexidine three times. After performing time out A 25-gauge 1.5 inch needle was advanced through the skin and subcutaneous tissues. Aspiration for blood, air and CSF was negative. A total of 10 ml of Bupivacaine 0.25% and 40 mg Kenalog was injected at all trigger point. No complications were evident. No specimens collected.     Blood Loss: Nill  Specimen: None     VALERIE Marie  Interventional Pain Management

## 2019-08-26 ENCOUNTER — OFFICE VISIT (OUTPATIENT)
Dept: INTERNAL MEDICINE | Facility: CLINIC | Age: 34
End: 2019-08-26
Payer: COMMERCIAL

## 2019-08-26 VITALS
HEART RATE: 86 BPM | HEIGHT: 68 IN | BODY MASS INDEX: 27.5 KG/M2 | RESPIRATION RATE: 16 BRPM | TEMPERATURE: 99 F | SYSTOLIC BLOOD PRESSURE: 126 MMHG | WEIGHT: 181.44 LBS | DIASTOLIC BLOOD PRESSURE: 86 MMHG

## 2019-08-26 DIAGNOSIS — F51.01 PRIMARY INSOMNIA: Primary | ICD-10-CM

## 2019-08-26 DIAGNOSIS — F90.9 ATTENTION DEFICIT HYPERACTIVITY DISORDER (ADHD), UNSPECIFIED ADHD TYPE: ICD-10-CM

## 2019-08-26 PROCEDURE — 3008F BODY MASS INDEX DOCD: CPT | Mod: CPTII,S$GLB,, | Performed by: INTERNAL MEDICINE

## 2019-08-26 PROCEDURE — 99214 OFFICE O/P EST MOD 30 MIN: CPT | Mod: S$GLB,,, | Performed by: INTERNAL MEDICINE

## 2019-08-26 PROCEDURE — 3074F PR MOST RECENT SYSTOLIC BLOOD PRESSURE < 130 MM HG: ICD-10-PCS | Mod: CPTII,S$GLB,, | Performed by: INTERNAL MEDICINE

## 2019-08-26 PROCEDURE — 3008F PR BODY MASS INDEX (BMI) DOCUMENTED: ICD-10-PCS | Mod: CPTII,S$GLB,, | Performed by: INTERNAL MEDICINE

## 2019-08-26 PROCEDURE — 3074F SYST BP LT 130 MM HG: CPT | Mod: CPTII,S$GLB,, | Performed by: INTERNAL MEDICINE

## 2019-08-26 PROCEDURE — 3079F DIAST BP 80-89 MM HG: CPT | Mod: CPTII,S$GLB,, | Performed by: INTERNAL MEDICINE

## 2019-08-26 PROCEDURE — 99999 PR PBB SHADOW E&M-EST. PATIENT-LVL IV: CPT | Mod: PBBFAC,,, | Performed by: INTERNAL MEDICINE

## 2019-08-26 PROCEDURE — 3079F PR MOST RECENT DIASTOLIC BLOOD PRESSURE 80-89 MM HG: ICD-10-PCS | Mod: CPTII,S$GLB,, | Performed by: INTERNAL MEDICINE

## 2019-08-26 PROCEDURE — 99214 PR OFFICE/OUTPT VISIT, EST, LEVL IV, 30-39 MIN: ICD-10-PCS | Mod: S$GLB,,, | Performed by: INTERNAL MEDICINE

## 2019-08-26 PROCEDURE — 99999 PR PBB SHADOW E&M-EST. PATIENT-LVL IV: ICD-10-PCS | Mod: PBBFAC,,, | Performed by: INTERNAL MEDICINE

## 2019-08-26 RX ORDER — ZOLPIDEM TARTRATE 5 MG/1
5 TABLET ORAL NIGHTLY PRN
Qty: 30 TABLET | Refills: 5 | Status: SHIPPED | OUTPATIENT
Start: 2019-08-26 | End: 2019-09-12 | Stop reason: DRUGHIGH

## 2019-08-26 NOTE — PROGRESS NOTES
"Subjective:       Patient ID: April Wendt Schamberger is a 34 y.o. female.    Chief Complaint: discuss meds    HPI    She presents for evaluation of insomnia. She has tried melatonin, benadryl, and unisom in the past without improvement and still had morning drowsiness. She has also tried trazodone and restoril in the past, also with morning sedation. She has tolerated ambien in the past without side effects- she had to stop this medication in the past d/t pregnancy and breastfeeding but stopped breastfeeding last month.       She also reports being treated for ADHD in the past, but she stopped the medication once she started working. She continued to remain off medication during pregnancy. Her symptoms are now affecting her work and she would like to resume medications.     Review of Systems   Constitutional: Negative for activity change and unexpected weight change.   HENT: Negative for hearing loss, rhinorrhea and trouble swallowing.    Eyes: Negative for discharge and visual disturbance.   Respiratory: Negative for chest tightness and wheezing.    Cardiovascular: Negative for chest pain and palpitations.   Gastrointestinal: Positive for diarrhea. Negative for blood in stool, constipation and vomiting.   Endocrine: Negative for polydipsia and polyuria.   Genitourinary: Negative for difficulty urinating, dysuria, hematuria and menstrual problem.   Musculoskeletal: Positive for neck pain. Negative for arthralgias and joint swelling.   Neurological: Negative for weakness and headaches.   Psychiatric/Behavioral: Negative for confusion and dysphoric mood.       Objective:        Vitals:    08/26/19 1332   BP: 126/86   Pulse: 86   Resp: 16   Temp: 98.8 °F (37.1 °C)   TempSrc: Oral   Weight: 82.3 kg (181 lb 7 oz)   Height: 5' 8" (1.727 m)       Body mass index is 27.59 kg/m².    Physical Exam   Constitutional: She is oriented to person, place, and time. She appears well-developed and well-nourished. No distress.   HENT: "   Head: Normocephalic and atraumatic.   Right Ear: External ear normal.   Left Ear: External ear normal.   Eyes: Conjunctivae and EOM are normal.   Cardiovascular: Normal rate and intact distal pulses.   Pulmonary/Chest: Effort normal. No respiratory distress.   Neurological: She is alert and oriented to person, place, and time.   Skin: Skin is warm and dry. No rash noted.   Psychiatric: She has a normal mood and affect. Her behavior is normal.       Assessment:     1. Primary insomnia    2. Attention deficit hyperactivity disorder (ADHD), unspecified ADHD type           Plan:         1. Primary insomnia  - zolpidem (AMBIEN) 5 MG Tab; Take 1 tablet (5 mg total) by mouth nightly as needed.  Dispense: 30 tablet; Refill: 5    2. Attention deficit hyperactivity disorder (ADHD), unspecified ADHD type  - Ambulatory Referral to Psychiatry           Patient note was created using MModal Dictation.  Any errors in syntax or even information may not have been identified and edited on initial review prior to signing this note.

## 2019-08-26 NOTE — PATIENT INSTRUCTIONS
Given the sensitive nature of Psychiatry visits, you will need to call to schedule your own appointment as our referral coordinator can not schedule this for you. The number for that department is [phone].

## 2019-08-28 ENCOUNTER — PATIENT MESSAGE (OUTPATIENT)
Dept: INTERNAL MEDICINE | Facility: CLINIC | Age: 34
End: 2019-08-28

## 2019-09-05 ENCOUNTER — PATIENT MESSAGE (OUTPATIENT)
Dept: PAIN MEDICINE | Facility: CLINIC | Age: 34
End: 2019-09-05

## 2019-09-06 DIAGNOSIS — M47.812 FACET ARTHROPATHY, CERVICAL: ICD-10-CM

## 2019-09-06 DIAGNOSIS — M54.2 CERVICAL PAIN (NECK): Primary | ICD-10-CM

## 2019-09-06 DIAGNOSIS — M79.18 MYOFASCIAL MUSCLE PAIN: ICD-10-CM

## 2019-09-06 DIAGNOSIS — M54.2 NECK PAIN: ICD-10-CM

## 2019-09-12 ENCOUNTER — PATIENT MESSAGE (OUTPATIENT)
Dept: INTERNAL MEDICINE | Facility: CLINIC | Age: 34
End: 2019-09-12

## 2019-09-12 DIAGNOSIS — F51.01 PRIMARY INSOMNIA: Primary | ICD-10-CM

## 2019-09-12 RX ORDER — ZOLPIDEM TARTRATE 10 MG/1
10 TABLET ORAL NIGHTLY PRN
Qty: 30 TABLET | Refills: 3 | Status: SHIPPED | OUTPATIENT
Start: 2019-09-12 | End: 2020-01-06 | Stop reason: SDUPTHER

## 2019-09-17 ENCOUNTER — CLINICAL SUPPORT (OUTPATIENT)
Dept: REHABILITATION | Facility: HOSPITAL | Age: 34
End: 2019-09-17
Payer: COMMERCIAL

## 2019-09-17 DIAGNOSIS — M54.2 NECK PAIN: ICD-10-CM

## 2019-09-17 DIAGNOSIS — R29.3 POOR POSTURE: ICD-10-CM

## 2019-09-17 PROCEDURE — 97162 PT EVAL MOD COMPLEX 30 MIN: CPT

## 2019-09-17 PROCEDURE — 97140 MANUAL THERAPY 1/> REGIONS: CPT

## 2019-09-17 NOTE — PLAN OF CARE
[facility]  Physical Therapy Initial Evaluation    Name: Judi Paz Schamberger  Clinic Number: [id number]    Therapy Diagnosis:   Encounter Diagnoses   Name Primary?    Poor posture     Neck pain      Physician: Joseph Moore FNP    Physician Orders: PT Eval and Treat   Medical Diagnosis from Referral:   M54.2 (ICD-10-CM) - Cervical pain (neck)   M79.18 (ICD-10-CM) - Myofascial muscle pain   M54.2 (ICD-10-CM) - Neck pain   M47.812 (ICD-10-CM) - Facet arthropathy, cervical       Evaluation Date: 9/17/2019  Authorization Period Expiration: 12/31/2019  Plan of Care Expiration: 11/12/19  Visit # / Visits authorized: 1/ 20  FOTO: 1/10    Gcode: 1/10  Visit:  110.34  Total: 110.34    Time In: 8:50  Time Out: 9:25  Total Billable Time: 35 minutes     Precautions:Standard     Subjective   Date of onset: 5 years ago   History of current condition - April reports: she has had chronic neck pain but it has gotten worse recently. She has had physical therapy for her neck about two years ago and said that it helped but she has gotten away from her exercises and is now relying on Advil (every four hours) to get through the day without severe pain. Pt also has Crohn's disease which she reports the advil consumption is not good for. Pt has two oung children and has difficulty lifting and carrying them due to neck pain. She also is unable to sleep through the night, reporting she is only able to sleep about 1 hour before waking up in pain and having to change positions. Pt reports temporal headaches as well.        Medical History:   Past Medical History:   Diagnosis Date    Abnormal Pap smear of cervix 2000    Cryo Done    ADD (attention deficit disorder)     Breast disorder     Breast Cysts    Esophageal reflux     Fibroids     Hypertension     IBS (irritable bowel syndrome)     Insomnia     Kidney stones     Mastocytosis     Upper GI bleed        Surgical History:   April Jackson  Schamberger  has a past surgical history that includes TONSILLECTOMY, ADENOIDECTOMY (1988); Esophagogastroduodenoscopy (2012); Colonoscopy (N/A, 4/28/2017); LASIK (Bilateral, 2005); and Refractive surgery.    Medications:   April has a current medication list which includes the following prescription(s): dicloxacillin, fluconazole, ibuprofen, loperamide, pnv no.95/ferrous fum/folic ac, and zolpidem.    Allergies:   Review of patient's allergies indicates:   Allergen Reactions    Lactose     Azithromycin Nausea And Vomiting        Imaging, no recent imaging    Prior Therapy: yes, and it was successful  Social History: Pt lives with their family  Occupation: nuse  Prior Level of Function: Ind  Current Level of Function: Ind    Pain:  Current 5/10, worst 7/10, best 5/10   Location: bilateral neck   Description: Aching and Dull  Aggravating Factors: Night Time and prolonged looking down or up  Easing Factors: Advil    Pts goals: to not have to take Advil to get through the day     Objective     Posture: rounded shoulders  Palpation: bilateral upper trap TTP  Sensation: intact    Range of Motion/Strength:     CERVICAL AROM Pain/Dysfunction with Movement   Flexion 100%    Extension 75% pain   Right side bending 75%    Left side bending 75%    Right rotation 75%    Left rotation 75%        Shoulder Right Left Pain/Dysfunction with Movement   AROM      flexion  WNL  WNL        U/E MMT Right Left Pain/Dysfunction with Movement   Shoulder Flexion 4+/5 4+/5    Shoulder Abduction 4/5 4/5    Elbow Flexion  4+/5 4+/5    Elbow Extension 4+/5 4+/5          Special Tests: DNF endurance test 23 seconds     CMS Impairment/Limitation/Restriction for FOTO Neck Survey    Therapist reviewed FOTO scores for April Wendt Schamberger on 9/17/2019.   FOTO documents entered into EPIC - see Media section.    Limitation Score: 40%  Category: Carrying    Current : CK = at least 40% but < 60% impaired, limited or restricted  Goal: CJ = at least  20% but < 40% impaired, limited or restricted         TREATMENT   Treatment Time In: 9:15  Treatment Time Out: 9:25  Total Treatment time separate from Evaluation: 10 minutes    April received therapeutic exercises to develop endurance, flexibility and posture for 0 minutes including:    Upper trap stretch  scap retractions    April received the following manual therapy techniques: Soft tissue Mobilization were applied to the: Neck for 10 minutes, including:    Suboccipital release  B UT STM        Home Exercises Provided and Patient Education Provided     Education provided:   - posture  - HEP    Written Home Exercises Provided: yes.  Exercises were reviewed and April was able to demonstrate them prior to the end of the session.  April demonstrated good  understanding of the education provided.     See EMR under Patient Instructions for exercises provided 9/17/2019.      Assessment   April is a 34 y.o. female referred to outpatient Physical Therapy with a medical diagnosis of neck pain. Pt presents with decreased upper extremity strength, muscle spasm of bilateral upper traps, decreased range of motion of cervical spine, inability to sleep through the night and constant dull neck pain.     Pt prognosis is Good.   Pt will benefit from skilled outpatient Physical Therapy to address the deficits stated above and in the chart below, provide pt/family education, and to maximize pt's level of independence.     Plan of care discussed with patient: Yes  Pt's spiritual, cultural and educational needs considered and patient is agreeable to the plan of care and goals as stated below:     Anticipated Barriers for therapy: chronicity of pain    Medical Necessity is demonstrated by the following  History  Co-morbidities and personal factors that may impact the plan of care Co-morbidities:   difficulty sleeping, leiomyoma/fibroids and Crohns    Personal Factors:   coping style     moderate   Examination  Body Structures and  Functions, activity limitations and participation restrictions that may impact the plan of care Body Regions:   neck    Body Systems:    ROM  strength  gross coordinated movement  motor control    Participation Restrictions:   none    Activity limitations:     Mobility  lifting and carrying objects    Self care  looking after one's health    Domestic Life  doing house work (cleaning house, washing dishes, laundry)    Interactions/Relationships  family relationships    Life Areas  employment    Community and Social Life  no deficits         moderate   Clinical Presentation evolving clinical presentation with changing clinical characteristics moderate   Decision Making/ Complexity Score: moderate       Goals:    Long Term Goals: 8 weeks   1. Patient will be independent with HEP.  2. Pt will improve cervical ROM all directions to 100% to turn head and look up without pain.  3. Pt will improve BUE MMT to 5/5 for functional tasks.  4. Pt demonstrates improvements in FOTO score to 33% limited to show functional improvement.  5. Pt will improve DNF endurance test to 35 seconds without compensation to improve resistance to fatigue.     Plan   Plan of care Certification: 9/17/2019 to 11/12/19.    Outpatient Physical Therapy 2 times weekly for 8 weeks to include the following interventions: Electrical Stimulation unattended, Manual Therapy, Moist Heat/ Ice, Neuromuscular Re-ed, Patient Education, Therapeutic Activites and Therapeutic Exercise, ASTYM, Kinesiotaping PRN, Functional Dry Needling    Ary Lyon, PT, DPT

## 2019-09-19 ENCOUNTER — PATIENT MESSAGE (OUTPATIENT)
Dept: OBSTETRICS AND GYNECOLOGY | Facility: CLINIC | Age: 34
End: 2019-09-19

## 2019-09-23 ENCOUNTER — HOSPITAL ENCOUNTER (OUTPATIENT)
Dept: RADIOLOGY | Facility: HOSPITAL | Age: 34
Discharge: HOME | End: 2019-09-23
Attending: PODIATRIST
Payer: COMMERCIAL

## 2019-09-23 ENCOUNTER — OFFICE VISIT (OUTPATIENT)
Dept: PODIATRY | Facility: CLINIC | Age: 34
End: 2019-09-23
Payer: COMMERCIAL

## 2019-09-23 VITALS
HEIGHT: 68 IN | BODY MASS INDEX: 27.43 KG/M2 | SYSTOLIC BLOOD PRESSURE: 125 MMHG | WEIGHT: 181 LBS | DIASTOLIC BLOOD PRESSURE: 82 MMHG | HEART RATE: 74 BPM

## 2019-09-23 DIAGNOSIS — M72.2 PLANTAR FASCIITIS, LEFT: Primary | ICD-10-CM

## 2019-09-23 DIAGNOSIS — M79.672 PAIN OF LEFT HEEL: ICD-10-CM

## 2019-09-23 DIAGNOSIS — M62.469 GASTROCNEMIUS EQUINUS, UNSPECIFIED LATERALITY: ICD-10-CM

## 2019-09-23 DIAGNOSIS — G57.92 NEURITIS OF LEFT FOOT: ICD-10-CM

## 2019-09-23 DIAGNOSIS — M72.2 PLANTAR FASCIITIS, LEFT: ICD-10-CM

## 2019-09-23 PROCEDURE — 99203 PR OFFICE/OUTPT VISIT, NEW, LEVL III, 30-44 MIN: ICD-10-PCS | Mod: S$GLB,,, | Performed by: PODIATRIST

## 2019-09-23 PROCEDURE — 3079F DIAST BP 80-89 MM HG: CPT | Mod: CPTII,S$GLB,, | Performed by: PODIATRIST

## 2019-09-23 PROCEDURE — 3008F PR BODY MASS INDEX (BMI) DOCUMENTED: ICD-10-PCS | Mod: CPTII,S$GLB,, | Performed by: PODIATRIST

## 2019-09-23 PROCEDURE — 3074F PR MOST RECENT SYSTOLIC BLOOD PRESSURE < 130 MM HG: ICD-10-PCS | Mod: CPTII,S$GLB,, | Performed by: PODIATRIST

## 2019-09-23 PROCEDURE — 99203 OFFICE O/P NEW LOW 30 MIN: CPT | Mod: S$GLB,,, | Performed by: PODIATRIST

## 2019-09-23 PROCEDURE — 3079F PR MOST RECENT DIASTOLIC BLOOD PRESSURE 80-89 MM HG: ICD-10-PCS | Mod: CPTII,S$GLB,, | Performed by: PODIATRIST

## 2019-09-23 PROCEDURE — 73650 XR CALCANEUS 2 VIEW LEFT: ICD-10-PCS | Mod: 26,LT,, | Performed by: RADIOLOGY

## 2019-09-23 PROCEDURE — 3008F BODY MASS INDEX DOCD: CPT | Mod: CPTII,S$GLB,, | Performed by: PODIATRIST

## 2019-09-23 PROCEDURE — 3074F SYST BP LT 130 MM HG: CPT | Mod: CPTII,S$GLB,, | Performed by: PODIATRIST

## 2019-09-23 PROCEDURE — 99999 PR PBB SHADOW E&M-EST. PATIENT-LVL III: ICD-10-PCS | Mod: PBBFAC,,, | Performed by: PODIATRIST

## 2019-09-23 PROCEDURE — 73650 X-RAY EXAM OF HEEL: CPT | Mod: 26,LT,, | Performed by: RADIOLOGY

## 2019-09-23 PROCEDURE — 73650 X-RAY EXAM OF HEEL: CPT | Mod: TC,PN,LT

## 2019-09-23 PROCEDURE — 99999 PR PBB SHADOW E&M-EST. PATIENT-LVL III: CPT | Mod: PBBFAC,,, | Performed by: PODIATRIST

## 2019-09-23 RX ORDER — METHYLPREDNISOLONE 4 MG/1
TABLET ORAL
Qty: 21 TABLET | Refills: 0 | Status: SHIPPED | OUTPATIENT
Start: 2019-09-23 | End: 2019-10-14

## 2019-09-23 NOTE — PATIENT INSTRUCTIONS
Bent-Knee Calf Stretch    This exercise is designed to stretch and strengthen your feet and ankles. Before beginning the exercise, read through all the instructions. While exercising, breathe normally and dont bounce. If you feel any pain, stop the exercise. If pain persists, inform your healthcare provider:  · Stand an arms length away from a wall. Place the palms of your hands on the wall. Step forward about 12 inches with your ______ foot.  · Keeping toes pointed forward and both heels on the floor, bend both knees and lean forward. Hold for __30____ seconds. Relax.  · Repeat ___3___ times. Do _2-3_____ sets a day.  Date Last Reviewed: 8/16/2015  © 0204-0507 [employer]. [address] [place] [address], [place] [address]. All rights reserved. This information is not intended as a substitute for professional medical care. Always follow your healthcare professional's instructions.

## 2019-09-23 NOTE — PROGRESS NOTES
"Subjective:      Patient ID: April Wendt Schamberger is a 34 y.o. female.    Chief Complaint: Heel Pain (Left)    April is a 34 y.o. female who presents to the clinic complaining of heel pain in the left foot, especially with the first step in the morning. The pain is described as Sharp. The onset of the pain was gradual and has worsened over the past several weeks. April rates the pain as 6/10. She denies a history of trauma. Prior treatments include ibuprofen, buying new supportive shoes and trying to get an insert. Does not want to try a cortisone injection today.    Vitals:    09/23/19 1107   BP: 125/82   Pulse: 74   Weight: 82.1 kg (181 lb)   Height: 5' 8" (1.727 m)   PainSc:   8   PainLoc: Foot      Past Medical History:   Diagnosis Date    Abnormal Pap smear of cervix 2000    Cryo Done    ADD (attention deficit disorder)     Breast disorder     Breast Cysts    Esophageal reflux     Fibroids     Hypertension     IBS (irritable bowel syndrome)     Insomnia     Kidney stones     Mastocytosis     Upper GI bleed        Past Surgical History:   Procedure Laterality Date    BLOCK-NERVE-MEDIAL BRANCH-CERVICAL--C3,4,5 N/A 1/25/2017    Performed by Hunter Jimenez [facility] at [facility] PAIN MGT    COLONOSCOPY N/A 4/28/2017    Performed by Bren Larkin MD at [facility] ENDO    ESOPHAGOGASTRODUODENOSCOPY  2012    ESOPHAGOGASTRODUODENOSCOPY (EGD) N/A 4/28/2017    Performed by Bren [facility] at [facility] ENDO    LASIK Bilateral 2005    REFRACTIVE SURGERY      TONSILLECTOMY, ADENOIDECTOMY  1988       Family History   Problem Relation Age of Onset    Breast cancer Mother         with Metastasis    Cancer Mother         Breast    Hypertension Mother     Prostate cancer Father     Hypertension Father     Cancer Father         Prostate Ca    Diabetes Father     Deep vein thrombosis Father     Pancreatic cancer Maternal Grandfather     Breast cancer Paternal Grandmother     Cancer Paternal Grandmother     " Diabetes type II Paternal Grandfather     Heart attack Maternal Grandmother     Colon cancer Cousin     Cancer Cousin 31    Ovarian cancer Neg Hx     Lymphoma Neg Hx     Tuberculosis Neg Hx     Celiac disease Neg Hx     Cirrhosis Neg Hx     Colon polyps Neg Hx     Crohn's disease Neg Hx     Cystic fibrosis Neg Hx     Esophageal cancer Neg Hx     Hemochromatosis Neg Hx     Inflammatory bowel disease Neg Hx     Irritable bowel syndrome Neg Hx     Liver cancer Neg Hx     Liver disease Neg Hx     Rectal cancer Neg Hx     Stomach cancer Neg Hx     Ulcerative colitis Neg Hx     Donavon's disease Neg Hx     Amblyopia Neg Hx     Blindness Neg Hx     Cataracts Neg Hx     Glaucoma Neg Hx     Macular degeneration Neg Hx     Retinal detachment Neg Hx     Strabismus Neg Hx        Social History     Socioeconomic History    Marital status:      Spouse name: Not on file    Number of children: 1    Years of education: Not on file    Highest education level: Not on file   Occupational History    Not on file   Social Needs    Financial resource strain: Not very hard    Food insecurity:     Worry: Never true     Inability: Never true    Transportation needs:     Medical: No     Non-medical: No   Tobacco Use    Smoking status: Former Smoker    Smokeless tobacco: Never Used   Substance and Sexual Activity    Alcohol use: No     Alcohol/week: 0.0 standard drinks     Frequency: Never     Binge frequency: Never     Comment: breastfeeding     Drug use: No    Sexual activity: Yes     Partners: Male     Birth control/protection: Coitus interruptus, Rhythm     Comment: : Boris   Lifestyle    Physical activity:     Days per week: 7 days     Minutes per session: 30 min    Stress: Very much   Relationships    Social connections:     Talks on phone: More than three times a week     Gets together: More than three times a week     Attends Christianity service: Not on file     Active member of  club or organization: Yes     Attends meetings of clubs or organizations: Never     Relationship status:    Other Topics Concern    Not on file   Social History Narrative    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 45 tablet 1    loperamide (IMODIUM A-D) 1 mg/7.5 mL Liqd Take 0.1 mg/kg by mouth every 12 (twelve) hours.      PNV NO.95/FERROUS FUM/FOLIC AC (PRENATAL ORAL) Take by mouth.      zolpidem (AMBIEN) 10 mg Tab Take 1 tablet (10 mg total) by mouth nightly as needed. 30 tablet 3    methylPREDNISolone (MEDROL DOSEPACK) 4 mg tablet use as directed 21 tablet 0     No current facility-administered medications for this visit.        Review of patient's allergies indicates:   Allergen Reactions    Lactose     Azithromycin Nausea And Vomiting         Review of Systems   Constitution: Negative for chills and night sweats.   HENT: Negative for hearing loss and nosebleeds.    Eyes: Negative for vision loss in left eye and vision loss in right eye.   Cardiovascular: Negative for chest pain and claudication.   Respiratory: Negative for cough and shortness of breath.    Endocrine: Negative for cold intolerance and heat intolerance.   Skin: Negative for color change and rash.   Musculoskeletal: Negative for gout and joint pain.   Gastrointestinal: Negative for abdominal pain and diarrhea.   Genitourinary: Negative for bladder incontinence and frequency.   Neurological: Negative for dizziness, headaches and weakness.   Psychiatric/Behavioral: Negative for altered mental status and depression.   Allergic/Immunologic: Negative for hives and  persistent infections.           Objective:      Physical Exam   Constitutional: She is oriented to person, place, and time. She appears well-developed and well-nourished. No distress.   HENT:   Head: Normocephalic and atraumatic.   Eyes: Conjunctivae are normal. Right eye exhibits no discharge. Left eye exhibits no discharge.   Neck: Normal range of motion. No JVD present. No tracheal deviation present.   Cardiovascular:   Pulses:       Dorsalis pedis pulses are 2+ on the right side, and 2+ on the left side.        Posterior tibial pulses are 2+ on the right side, and 2+ on the left side.   Musculoskeletal: She exhibits no edema, tenderness or deformity.        Right foot: There is decreased range of motion.        Left foot: There is decreased range of motion.   -POP of the medial calcaneal tubercle L>R with some distal traveling of pain to the toes left foot.  -Decreased ankle DF b/l with equinus.  -Pain is worse with initiation of windlass mechanism.  -She has a cavus foot type with reducible digital contractures.   Feet:   Right Foot:   Skin Integrity: Negative for ulcer, blister, skin breakdown, erythema or warmth.   Left Foot:   Skin Integrity: Negative for ulcer, blister, skin breakdown, erythema or warmth.   Neurological: She is alert and oriented to person, place, and time. She has normal strength. She displays no atrophy. She exhibits normal muscle tone.   Skin: Skin is warm, dry and intact. Capillary refill takes less than 2 seconds. No rash noted. She is not diaphoretic. No cyanosis or erythema. Nails show no clubbing.   There is obvious soft tissue edema to the left achilles insertion.    Skin is warm with normal hair growth.   Psychiatric: She has a normal mood and affect. Her behavior is normal. Judgment and thought content normal.             Assessment:       Encounter Diagnoses   Name Primary?    Plantar fasciitis, left Yes    Pain of left heel     Gastrocnemius equinus, unspecified laterality      Neuritis of left foot          Plan:       April was seen today for heel pain.    Diagnoses and all orders for this visit:    Plantar fasciitis, left  -     X-Ray Calcaneus 2 View Left; Future    Pain of left heel  -     X-Ray Calcaneus 2 View Left; Future    Gastrocnemius equinus, unspecified laterality    Neuritis of left foot    Other orders  -     methylPREDNISolone (MEDROL DOSEPACK) 4 mg tablet; use as directed      I counseled the patient on her conditions, their implications and medical management.    - Patient Stretch calf muscles, explained proper stretching technique and given hand out.  This is to increase flexibility in calf muscles and relieve tension exerted on plantar foot.    - Supportive shoes at all times to support arch and decrease ground reactive forces. No flatfoot walking.    - Orthotic, OTC to support arch as she has a cavus foot type.    - We will consider a steroid injection if symptoms worsen    - Rx Medrol dose pack to be taken as instructed    - ICE with frozen water bottle.    - Xray of left foot ordered. Will f/u results.    rtc in 4-6 weeks or sooner if symptoms worsen.    Rakesh Du DPM, PGY-2    I have personally taken the history and examined this patient and agree with the resident's note as stated as above.   Russ Grove DPM, FACFAS            .

## 2019-10-03 ENCOUNTER — CLINICAL SUPPORT (OUTPATIENT)
Dept: REHABILITATION | Facility: HOSPITAL | Age: 34
End: 2019-10-03
Payer: COMMERCIAL

## 2019-10-03 DIAGNOSIS — R29.3 POOR POSTURE: ICD-10-CM

## 2019-10-03 DIAGNOSIS — M54.2 NECK PAIN: ICD-10-CM

## 2019-10-03 PROCEDURE — 97110 THERAPEUTIC EXERCISES: CPT

## 2019-10-03 NOTE — PROGRESS NOTES
"  Physical Therapy Daily Treatment Note     Name: April Wendt Schamberger  Clinic Number: [id number]    Therapy Diagnosis:   Encounter Diagnoses   Name Primary?    Poor posture     Neck pain      Physician: Joseph Moore FNP    Visit Date: 10/3/2019    Physician Orders: PT Eval and Treat   Medical Diagnosis from Referral:   M54.2 (ICD-10-CM) - Cervical pain (neck)   M79.18 (ICD-10-CM) - Myofascial muscle pain   M54.2 (ICD-10-CM) - Neck pain   M47.812 (ICD-10-CM) - Facet arthropathy, cervical         Evaluation Date: 9/17/2019  Authorization Period Expiration: 12/31/2019  Plan of Care Expiration: 11/12/19  Visit # / Visits authorized: 2/ 20  FOTO: 2/10          Time In: 8:00 am   Time Out: 8:40 am   Total Billable Time: 40 minutes    Precautions: Standard    Subjective     Pt reports: that her neck is hurting this morning. Pt stated that she already took two Advil this morning.   She was partially compliant with home exercise program.  Response to previous treatment: last session was initial evaluation  Functional change: none    Pain: 6/10  Location: bilateral neck      Objective     April received the following manual therapy techniques: Soft tissue Mobilization were applied  for 15 minutes, including:  STM to (B) UT region     April received therapeutic exercises to develop strength, ROM, flexibility and posture for 25 minutes including:  (B) UT stretch 3x30"   (B) LS stretch 3x30"   Mid thoracic stretch   scap retractions 2x10 3" hold   Chin tucks 3"2x10  DNF 3"x10   Serratus punches 2x10 1#   Corner pec stretch 3x30"   (B) ER w/YTB 2x10   Horizontal abduction YTB 2x10         Home Exercises Provided and Patient Education Provided     Education provided:   - Continue with HEP     Written Home Exercises Provided: Patient instructed to cont prior HEP.  Exercises were reviewed and April was able to demonstrate them prior to the end of the session.  April demonstrated good  understanding of the education provided. "     See EMR under Patient Instructions for exercises provided prior visit and 10/3/19      Assessment     Pt received VC for proper technique when performing therex. Pt was able to tolerate all therex without any adverse reactions. Pt reported that her neck felt better and less stiff at the end of therapy session.   April is progressing well towards her goals.   Pt prognosis is Good.     Pt will continue to benefit from skilled outpatient physical therapy to address the deficits listed in the problem list box on initial evaluation, provide pt/family education and to maximize pt's level of independence in the home and community environment.     Pt's spiritual, cultural and educational needs considered and pt agreeable to plan of care and goals.    Anticipated barriers to physical therapy: chronicity of pain    Goals:     Long Term Goals: 8 weeks   1. Patient will be independent with HEP.  2. Pt will improve cervical ROM all directions to 100% to turn head and look up without pain.  3. Pt will improve BUE MMT to 5/5 for functional tasks.  4. Pt demonstrates improvements in FOTO score to 33% limited to show functional improvement.  5. Pt will improve DNF endurance test to 35 seconds without compensation to improve resistance to fatigue.     Plan     Continue per POC, progress as tolerated    Sheryl Mendoza, PT

## 2019-10-08 ENCOUNTER — CLINICAL SUPPORT (OUTPATIENT)
Dept: REHABILITATION | Facility: HOSPITAL | Age: 34
End: 2019-10-08
Payer: COMMERCIAL

## 2019-10-08 DIAGNOSIS — R29.3 POOR POSTURE: ICD-10-CM

## 2019-10-08 DIAGNOSIS — M54.2 NECK PAIN: ICD-10-CM

## 2019-10-08 PROCEDURE — 97140 MANUAL THERAPY 1/> REGIONS: CPT

## 2019-10-08 PROCEDURE — 97110 THERAPEUTIC EXERCISES: CPT

## 2019-10-08 NOTE — PROGRESS NOTES
"  Physical Therapy Daily Treatment Note     Name: April Wendt Schamberger  Clinic Number: [id number]    Therapy Diagnosis:   Encounter Diagnoses   Name Primary?    Poor posture     Neck pain      Physician: Joseph Moore FNP    Visit Date: 10/8/2019    Physician Orders: PT Eval and Treat   Medical Diagnosis from Referral:   M54.2 (ICD-10-CM) - Cervical pain (neck)   M79.18 (ICD-10-CM) - Myofascial muscle pain   M54.2 (ICD-10-CM) - Neck pain   M47.812 (ICD-10-CM) - Facet arthropathy, cervical         Evaluation Date: 9/17/2019  Authorization Period Expiration: 12/31/2019  Plan of Care Expiration: 11/12/19  Visit # / Visits authorized: 3/ 20  FOTO: 3/10    Time In: 2:02 pm   Time Out: 2:38 pm   Total Billable Time: 24 minutes    Precautions: Standard    Subjective     Pt reports:that her Advil is wearing off. Pt stated that she always has pain in her neck.   She was compliant with home exercise program.  Response to previous treatment: no adverse effects, pt reports feeling better for 30 mins after her session   Functional change: none    Pain: 7/10  Location: bilateral neck/UT    Objective     April received the following manual therapy techniques: Soft tissue Mobilization were applied  for 10 minutes, including:  STM to (B) UT region     April received therapeutic exercises to develop strength, ROM, flexibility and posture for 26 minutes including:  (B) UT stretch 3x30"   (B) LS stretch 3x30"   Mid thoracic stretch   scap retractions 2x10 3" hold   Chin tucks 3"2x10  DNF 3"x10   Serratus punches 2x10 1#   SL gators 2x10 B  Corner pec stretch 3x30" - not performed  (B) ER w/YTB 2x10   Horizontal abduction YTB 2x10   Y,T on PB 2x10       Home Exercises Provided and Patient Education Provided     Education provided:   - Continue with HEP     Written Home Exercises Provided: Patient instructed to cont prior HEP.  Exercises were reviewed and April was able to demonstrate them prior to the end of the session.  April " demonstrated good  understanding of the education provided.     See EMR under Patient Instructions for exercises provided provided prior visit and 10/3/19.      Assessment     Pt was able to tolerate all therex including additional therex without c/o increased pain while performing. Pt reported experiencing increased neck pain after performing SL gator therex, but then reported that this pain resolved throughout remainder of therapy session. Pt reported that her neck/UT region felt better at the end of session compared to beginning of session.   April is progressing well towards her goals.   Pt prognosis is Good.     Pt will continue to benefit from skilled outpatient physical therapy to address the deficits listed in the problem list box on initial evaluation, provide pt/family education and to maximize pt's level of independence in the home and community environment.     Pt's spiritual, cultural and educational needs considered and pt agreeable to plan of care and goals.    Anticipated barriers to physical therapy: chronicity of pain    Goals:     Long Term Goals: 8 weeks   1. Patient will be independent with HEP.  2. Pt will improve cervical ROM all directions to 100% to turn head and look up without pain.  3. Pt will improve BUE MMT to 5/5 for functional tasks.  4. Pt demonstrates improvements in FOTO score to 33% limited to show functional improvement.  5. Pt will improve DNF endurance test to 35 seconds without compensation to improve resistance to fatigue.       Plan     Continue per POC, progress as tolerated    Sheryl Mendoza, PT

## 2019-10-17 ENCOUNTER — OFFICE VISIT (OUTPATIENT)
Dept: URGENT CARE | Facility: CLINIC | Age: 34
End: 2019-10-17
Payer: COMMERCIAL

## 2019-10-17 VITALS
WEIGHT: 181 LBS | TEMPERATURE: 100 F | SYSTOLIC BLOOD PRESSURE: 150 MMHG | OXYGEN SATURATION: 100 % | HEART RATE: 108 BPM | HEIGHT: 68 IN | DIASTOLIC BLOOD PRESSURE: 92 MMHG | RESPIRATION RATE: 16 BRPM | BODY MASS INDEX: 27.43 KG/M2

## 2019-10-17 DIAGNOSIS — B34.9 VIRAL SYNDROME: ICD-10-CM

## 2019-10-17 DIAGNOSIS — R52 GENERALIZED BODY ACHES: Primary | ICD-10-CM

## 2019-10-17 DIAGNOSIS — J02.9 SORE THROAT: ICD-10-CM

## 2019-10-17 PROCEDURE — 3077F SYST BP >= 140 MM HG: CPT | Mod: CPTII,S$GLB,, | Performed by: PHYSICIAN ASSISTANT

## 2019-10-17 PROCEDURE — 99214 OFFICE O/P EST MOD 30 MIN: CPT | Mod: 25,S$GLB,, | Performed by: PHYSICIAN ASSISTANT

## 2019-10-17 PROCEDURE — 99214 PR OFFICE/OUTPT VISIT, EST, LEVL IV, 30-39 MIN: ICD-10-PCS | Mod: 25,S$GLB,, | Performed by: PHYSICIAN ASSISTANT

## 2019-10-17 PROCEDURE — 87880 STREP A ASSAY W/OPTIC: CPT | Mod: QW,S$GLB,, | Performed by: PHYSICIAN ASSISTANT

## 2019-10-17 PROCEDURE — 3077F PR MOST RECENT SYSTOLIC BLOOD PRESSURE >= 140 MM HG: ICD-10-PCS | Mod: CPTII,S$GLB,, | Performed by: PHYSICIAN ASSISTANT

## 2019-10-17 PROCEDURE — 3080F PR MOST RECENT DIASTOLIC BLOOD PRESSURE >= 90 MM HG: ICD-10-PCS | Mod: CPTII,S$GLB,, | Performed by: PHYSICIAN ASSISTANT

## 2019-10-17 PROCEDURE — 3008F PR BODY MASS INDEX (BMI) DOCUMENTED: ICD-10-PCS | Mod: CPTII,S$GLB,, | Performed by: PHYSICIAN ASSISTANT

## 2019-10-17 PROCEDURE — 3008F BODY MASS INDEX DOCD: CPT | Mod: CPTII,S$GLB,, | Performed by: PHYSICIAN ASSISTANT

## 2019-10-17 PROCEDURE — 3080F DIAST BP >= 90 MM HG: CPT | Mod: CPTII,S$GLB,, | Performed by: PHYSICIAN ASSISTANT

## 2019-10-17 PROCEDURE — 87880 POCT RAPID STREP A: ICD-10-PCS | Mod: QW,S$GLB,, | Performed by: PHYSICIAN ASSISTANT

## 2019-10-17 PROCEDURE — 87804 POCT INFLUENZA A/B: ICD-10-PCS | Mod: QW,S$GLB,, | Performed by: PHYSICIAN ASSISTANT

## 2019-10-17 PROCEDURE — 87804 INFLUENZA ASSAY W/OPTIC: CPT | Mod: QW,S$GLB,, | Performed by: PHYSICIAN ASSISTANT

## 2019-10-17 NOTE — PROGRESS NOTES
"Subjective:       Patient ID: April Wendt Schamberger is a 34 y.o. female.    Vitals:  height is 5' 8" (1.727 m) and weight is 82.1 kg (181 lb). Her oral temperature is 99.9 °F (37.7 °C). Her blood pressure is 150/92 (abnormal) and her pulse is 108. Her respiration is 16 and oxygen saturation is 100%.     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 She denies further symptoms. Martin attempted tx PTA. She received the flu shot 2 weeks ago.    Fever    This is a new problem. The current episode started today. The problem occurs constantly. The problem has been gradually worsening. The maximum temperature noted was 100 to 100.9 F. The temperature was taken using an oral thermometer. Associated symptoms include coughing and a sore throat. Pertinent negatives include no chest pain, congestion, diarrhea, ear pain, headaches, nausea, rash, urinary pain or vomiting. She has tried acetaminophen for the symptoms. The treatment provided mild relief.       Constitution: Positive for fever (subjective) and generalized weakness. Negative for chills, sweating and fatigue.   HENT: Positive for sore throat. Negative for ear pain, ear discharge, congestion, postnasal drip, sinus pain, sinus pressure, trouble swallowing and voice change.    Neck: Negative for neck pain, neck stiffness and painful lymph nodes.   Cardiovascular: Negative for chest pain and leg swelling.   Eyes: Negative for double vision and blurred vision.   Respiratory: Positive for cough. Negative for shortness of breath.    Gastrointestinal: Negative for nausea, vomiting, constipation and diarrhea.   Genitourinary: Negative for dysuria, frequency and urgency.   Musculoskeletal: Positive for muscle ache (generalized body aches). Negative for pain, joint " pain, joint swelling and muscle cramps.   Skin: Negative for rash and bruising.   Allergic/Immunologic: Negative for seasonal allergies.   Neurological: Negative for dizziness, history of vertigo, light-headedness, passing out, headaches and disorientation.   Hematologic/Lymphatic: Negative for swollen lymph nodes.   Psychiatric/Behavioral: Negative for disorientation and confusion.       Objective:      Physical Exam   Constitutional: She is oriented to person, place, and time. Vital signs are normal. She appears well-developed and well-nourished. She is cooperative.  Non-toxic appearance. She does not have a sickly appearance. She does not appear ill. No distress.   HENT:   Head: Normocephalic and atraumatic.   Right Ear: Tympanic membrane, external ear and ear canal normal.   Left Ear: Tympanic membrane, external ear and ear canal normal.   Nose: Mucosal edema present. No rhinorrhea or nasal deformity. No epistaxis. Right sinus exhibits no maxillary sinus tenderness and no frontal sinus tenderness. Left sinus exhibits no maxillary sinus tenderness and no frontal sinus tenderness.   Mouth/Throat: Uvula is midline and mucous membranes are normal. No oral lesions. No trismus in the jaw. Normal dentition. No uvula swelling. No oropharyngeal exudate, posterior oropharyngeal edema, posterior oropharyngeal erythema, tonsillar abscesses or cobblestoning.   + congestion   Eyes: Pupils are equal, round, and reactive to light. Conjunctivae, EOM and lids are normal. No scleral icterus.   Neck: Trachea normal, normal range of motion, full passive range of motion without pain and phonation normal. Neck supple. No neck rigidity. No edema and no erythema present.   Cardiovascular: Normal rate, regular rhythm, normal heart sounds, intact distal pulses and normal pulses.   Pulmonary/Chest: Effort normal and breath sounds normal. No stridor. No respiratory distress. She has no decreased breath sounds. She has no wheezes. She has no  rhonchi. She has no rales.   Abdominal: Soft. Normal appearance and bowel sounds are normal. There is no rigidity, no rebound and no guarding.   Musculoskeletal: Normal range of motion. She exhibits no edema or deformity.   Neurological: She is alert and oriented to person, place, and time. She exhibits normal muscle tone. Coordination normal.   Skin: Skin is warm, dry, intact, not diaphoretic, not pale and no rash. Capillary refill takes less than 2 seconds.   Psychiatric: She has a normal mood and affect. Her speech is normal and behavior is normal. Judgment and thought content normal. Cognition and memory are normal.   Nursing note and vitals reviewed.    Results for orders placed or performed in visit on 10/17/19   POCT Influenza A/B   Result Value Ref Range    Rapid Influenza A Ag Negative Negative    Rapid Influenza B Ag Negative Negative     Acceptable Yes    POCT rapid strep A   Result Value Ref Range    Rapid Strep A Screen Negative Negative     Acceptable Yes             Assessment:       1. Generalized body aches    2. Sore throat    3. Viral syndrome        Plan:         Generalized body aches  -     POCT Influenza A/B  -     POCT rapid strep A    Sore throat  -     POCT rapid strep A    Viral syndrome    Will recommend rest and OTC medications as symptomatic treatment.    I have discussed the diagnosis, treatment plan and recommendations for follow-up with primary care and patient verbalized understanding and is agreeable to the plan.   ED precautions given. AVS printed and given to patient upon discharge with information regarding this visit. All questions were addressed prior to discharge. Work note given.     Francheska Busby PA-C

## 2019-10-17 NOTE — PATIENT INSTRUCTIONS
"Make sure you are getting rest and drinking lots of fluids.    You can treat your symptoms with DayQuil maximum strength, NyQuil, Cepacol and/or cough drops.  You can also take Emergen-C to help boost your immune system.    You can also take Ibuprofen for body aches/fever control.    Follow-up with primary care as needed.    If for some reason your symptoms worsen or for any new or concerning symptoms, please return to this emergency room.        Viral Syndrome (Adult)  A viral illness may cause a number of symptoms. The symptoms depend on the part of the body that the virus affects. If it settles in your nose, throat, and lungs, it may cause cough, sore throat, congestion, and sometimes headache. If it settles in your stomach and intestinal tract, it may cause vomiting and diarrhea. Sometimes it causes vague symptoms like "aching all over," feeling tired, loss of appetite, or fever.  A viral illness usually lasts 1 to 2 weeks, but sometimes it lasts longer. In some cases, a more serious infection can look like a viral syndrome in the first few days of the illness. You may need another exam and additional tests to know the difference. Watch for the warning signs listed below.  Home care  Follow these guidelines for taking care of yourself at home:  · If symptoms are severe, rest at home for the first 2 to 3 days.  · Stay away from cigarette smoke - both your smoke and the smoke from others.  · You may use over-the-counter acetaminophen or ibuprofen for fever, muscle aching, and headache, unless another medicine was prescribed for this. If you have chronic liver or kidney disease or ever had a stomach ulcer or GI bleeding, talk with your doctor before using these medicines. No one who is younger than 18 and ill with a fever should take aspirin. It may cause severe disease or death.  · Your appetite may be poor, so a light diet is fine. Avoid dehydration by drinking 8 to 12 8-ounce glasses of fluids each day. This may " include water; orange juice; lemonade; apple, grape, and cranberry juice; clear fruit drinks; electrolyte replacement and sports drinks; and decaffeinated teas and coffee. If you have been diagnosed with a kidney disease, ask your doctor how much and what types of fluids you should drink to prevent dehydration. If you have kidney disease, drinking too much fluid can cause it build up in the your body and be dangerous to your health.  · Over-the-counter remedies won't shorten the length of the illness but may be helpful for cough, sore throat; and nasal and sinus congestion. Don't use decongestants if you have high blood pressure.  Follow-up care  Follow up with your healthcare provider if you do not improve over the next week.  Call 911  Get emergency medical care if any of the following occur:  · Convulsion  · Feeling weak, dizzy, or like you are going to faint  · Chest pain, shortness of breath, wheezing, or difficulty breathing  When to seek medical advice  Call your healthcare provider right away if any of these occur:  · Cough with lots of colored sputum (mucus) or blood in your sputum  · Chest pain, shortness of breath, wheezing, or difficulty breathing  · Severe headache; face, neck, or ear pain  · Severe, constant pain in the lower right side of your belly (abdominal)  · Continued vomiting (cant keep liquids down)  · Frequent diarrhea (more than 5 times a day); blood (red or black color) or mucus in diarrhea  · Feeling weak, dizzy, or like you are going to faint  · Extreme thirst  · Fever of 100.4°F (38°C) or higher, or as directed by your healthcare provider  Date Last Reviewed: 9/25/2015 © 2000-2017 [employer]. [address] [place] [id number]. All rights reserved. This information is not intended as a substitute for professional medical care. Always follow your healthcare professional's instructions.

## 2019-10-28 NOTE — PROGRESS NOTES
"  Physical Therapy Daily Treatment Note     Name: April Wendt Schamberger  Clinic Number: [id number]    Therapy Diagnosis:   No diagnosis found.  Physician: Joseph Moore FNP    Visit Date: 10/29/2019    Physician Orders: PT Eval and Treat   Medical Diagnosis from Referral:   M54.2 (ICD-10-CM) - Cervical pain (neck)   M79.18 (ICD-10-CM) - Myofascial muscle pain   M54.2 (ICD-10-CM) - Neck pain   M47.812 (ICD-10-CM) - Facet arthropathy, cervical         Evaluation Date: 9/17/2019  Authorization Period Expiration: 12/31/2019  Plan of Care Expiration: 11/12/19  Visit # / Visits authorized: 4/ 20  FOTO: 4/10    Time In: 3:00 pm   Time Out: 4:00 pm   Total Billable Time: 60 minutes    Precautions: Standard    Subjective     Pt reports: she has been busy with her kids recently and has been forgetting to do her neck stretches. She is hurting more since she has been having to hold her sick children more often.   She was compliant with home exercise program.  Response to previous treatment: no adverse effects  Functional change: none    Pain: 7/10  Location: bilateral neck/UT    Objective     April received the following manual therapy techniques: Soft tissue Mobilization were applied  for 10 minutes, including:  STM to (B) UT region - not performed today  Supine cervical traction & suboccipital release     April received therapeutic exercises to develop strength, ROM, flexibility and posture for 50 minutes including:    UBE 2' fwd/2' back     (B) UT stretch 3x30"   (B) LS stretch 3x30"   Mid thoracic stretch 3x15"  scap retractions 2x10 3" hold in sitting   Seated cervical retractions with Ext 2x10   Chin tucks 3"2x10  DNF 3"x10 - not performed today   Serratus punches 2x10 1#   SL gators 2x10 B  Corner pec stretch 3x30" - not performed  (B) ER w/YTB 3x10   Horizontal abduction YTB 3x10  Y,T on PB 2x10       Home Exercises Provided and Patient Education Provided     Education provided:   - Continue with HEP   -heat/ice at " home for pain management     Written Home Exercises Provided: Patient instructed to cont prior HEP.  Exercises were reviewed and April was able to demonstrate them prior to the end of the session.  April demonstrated good  understanding of the education provided.     See EMR under Patient Instructions for exercises provided provided prior visit and 10/3/19.      Assessment     Pt reported increased R cervical pain following LS stretches, reported reduced pain with cervical retractions (chin tucks) in sitting. She reported increased suboccipital pain, not during, but following manual cervical traction. Pt declined heat or ice after session.     April is progressing well towards her goals.   Pt prognosis is Good.     Pt will continue to benefit from skilled outpatient physical therapy to address the deficits listed in the problem list box on initial evaluation, provide pt/family education and to maximize pt's level of independence in the home and community environment.     Pt's spiritual, cultural and educational needs considered and pt agreeable to plan of care and goals.    Anticipated barriers to physical therapy: chronicity of pain    Goals:     Long Term Goals: 8 weeks   1. Patient will be independent with HEP.  2. Pt will improve cervical ROM all directions to 100% to turn head and look up without pain.  3. Pt will improve BUE MMT to 5/5 for functional tasks.  4. Pt demonstrates improvements in FOTO score to 33% limited to show functional improvement.  5. Pt will improve DNF endurance test to 35 seconds without compensation to improve resistance to fatigue.       Plan     Continue per POC, progress as tolerated    Kiara Lindo, PT

## 2019-10-29 ENCOUNTER — CLINICAL SUPPORT (OUTPATIENT)
Dept: REHABILITATION | Facility: HOSPITAL | Age: 34
End: 2019-10-29
Payer: COMMERCIAL

## 2019-10-29 DIAGNOSIS — R29.3 POOR POSTURE: ICD-10-CM

## 2019-10-29 DIAGNOSIS — M54.2 NECK PAIN: ICD-10-CM

## 2019-10-29 PROCEDURE — 97140 MANUAL THERAPY 1/> REGIONS: CPT

## 2019-10-29 PROCEDURE — 97110 THERAPEUTIC EXERCISES: CPT

## 2019-11-26 ENCOUNTER — OFFICE VISIT (OUTPATIENT)
Dept: PSYCHIATRY | Facility: CLINIC | Age: 34
End: 2019-11-26
Payer: COMMERCIAL

## 2019-11-26 VITALS
WEIGHT: 174.25 LBS | HEIGHT: 68 IN | SYSTOLIC BLOOD PRESSURE: 158 MMHG | DIASTOLIC BLOOD PRESSURE: 92 MMHG | BODY MASS INDEX: 26.41 KG/M2 | HEART RATE: 84 BPM

## 2019-11-26 DIAGNOSIS — F41.9 ANXIETY: ICD-10-CM

## 2019-11-26 DIAGNOSIS — F90.2 ATTENTION DEFICIT HYPERACTIVITY DISORDER (ADHD), COMBINED TYPE: ICD-10-CM

## 2019-11-26 PROCEDURE — 3080F PR MOST RECENT DIASTOLIC BLOOD PRESSURE >= 90 MM HG: ICD-10-PCS | Mod: CPTII,S$GLB,, | Performed by: PSYCHIATRY & NEUROLOGY

## 2019-11-26 PROCEDURE — 99204 OFFICE O/P NEW MOD 45 MIN: CPT | Mod: S$GLB,,, | Performed by: PSYCHIATRY & NEUROLOGY

## 2019-11-26 PROCEDURE — 99999 PR PBB SHADOW E&M-EST. PATIENT-LVL II: CPT | Mod: PBBFAC,,, | Performed by: PSYCHIATRY & NEUROLOGY

## 2019-11-26 PROCEDURE — 99999 PR PBB SHADOW E&M-EST. PATIENT-LVL II: ICD-10-PCS | Mod: PBBFAC,,, | Performed by: PSYCHIATRY & NEUROLOGY

## 2019-11-26 PROCEDURE — 3077F SYST BP >= 140 MM HG: CPT | Mod: CPTII,S$GLB,, | Performed by: PSYCHIATRY & NEUROLOGY

## 2019-11-26 PROCEDURE — 99204 PR OFFICE/OUTPT VISIT, NEW, LEVL IV, 45-59 MIN: ICD-10-PCS | Mod: S$GLB,,, | Performed by: PSYCHIATRY & NEUROLOGY

## 2019-11-26 PROCEDURE — 3008F BODY MASS INDEX DOCD: CPT | Mod: CPTII,S$GLB,, | Performed by: PSYCHIATRY & NEUROLOGY

## 2019-11-26 PROCEDURE — 3080F DIAST BP >= 90 MM HG: CPT | Mod: CPTII,S$GLB,, | Performed by: PSYCHIATRY & NEUROLOGY

## 2019-11-26 PROCEDURE — 3008F PR BODY MASS INDEX (BMI) DOCUMENTED: ICD-10-PCS | Mod: CPTII,S$GLB,, | Performed by: PSYCHIATRY & NEUROLOGY

## 2019-11-26 PROCEDURE — 3077F PR MOST RECENT SYSTOLIC BLOOD PRESSURE >= 140 MM HG: ICD-10-PCS | Mod: CPTII,S$GLB,, | Performed by: PSYCHIATRY & NEUROLOGY

## 2019-11-26 RX ORDER — DEXTROAMPHETAMINE SACCHARATE, AMPHETAMINE ASPARTATE MONOHYDRATE, DEXTROAMPHETAMINE SULFATE AND AMPHETAMINE SULFATE 6.25; 6.25; 6.25; 6.25 MG/1; MG/1; MG/1; MG/1
25 CAPSULE, EXTENDED RELEASE ORAL EVERY MORNING
Qty: 30 CAPSULE | Refills: 0 | Status: SHIPPED | OUTPATIENT
Start: 2019-11-26 | End: 2019-12-17 | Stop reason: SDUPTHER

## 2019-11-26 RX ORDER — AMITRIPTYLINE HYDROCHLORIDE 10 MG/1
10 TABLET, FILM COATED ORAL NIGHTLY
Qty: 30 TABLET | Refills: 0 | Status: SHIPPED | OUTPATIENT
Start: 2019-11-26 | End: 2019-12-17 | Stop reason: SDUPTHER

## 2019-11-26 NOTE — PROGRESS NOTES
"Outpatient Psychiatry Initial Visit (MD/NP)    11/26/2019 April Wendt Schamberger, a 34 y.o. female, presenting for initial evaluation visit. Met with patient.    Reason for Encounter: Referral from PCP for ADHD evaluation. Patient complains of No chief complaint on file.  .    History of Present Illness:    Reviewed  prior to assessment. No srtimulants prescribed in the last 2 years.  OD risk 210; narc 120, sed 201, stim 0. Pt has been receiving zolpidem from PCP since 8/2019. Viewed  sinc\e 2015 to present, no stimulants prescribed. 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 She can't sit and read protocols which has been problematic. She has been doing research for 3.5 yrs. She used to help with reseatch w/ [employer], but it was "super easy", alda blood, processed blood.  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  She moved to [place] for research, moved in mid September and that is when it really started to affect her work. ADHD ROS as below.Pt states she was on adderall XR 25 mg in the past, she was dx in the late teens. Has not been on anything else. Last took in 2008. In terms of SE, says that it decreased her appetite a little, but denies jitteriness, SOB, CP, palpitations. Unsure if it made her sleep worse when she took it.     Pt describes her mood as"happy and energetic, I don't do sad" and presents with a " "bright affect. Denies feeling depressed. Pt reports that she has taken ambien for sleep intermittently since highschool, took breaks for breast feeding and while pregnant.Says she gets 5-6 hours of sleep but does not feel well rested in the morning when she takes it; when she does not take it she only sleeps 2-3 hours a night.Denies SE of ambien but says she wants to discuss with her PCP re: changes. Denies anhedonia (enjoys spending time with kids), energy is "good" but "I get really tired in the evening". Denies guilt or hopelessness.Poor concentration which she attributes to ADHD. Denies issues with appetite. Denies SI/HI. Denies AVH. Denies paranoia/IOR/TI/TW/TB. Luly screen negative.   Pt states that she "worries about everything", endorses rumination prior to falling asleep, +concentration difficulties, denies a/w fatigue, +restlesesness, +irritability, +muscle tension. She does not appear objectively manic or psychotic but is easily distractible throughout.      Pt states that she took lexapro for a few months for "bad hormonal moigraines", "it had bad Side effects for sex drive". She voices her concern re: SSRIs and does not want to be on this class of medication.    Pt states that she has IBS with diarrhea and takes multiple immodium every morning.. Says she took amitriptyline in the past but it was many yeard ago, recalls it being helpful for sleep. Does not recall dose-"it was a blue pill"     ADHD Adult:  Have difficulty sustaining attention in tasks or fun activities?  yes   Don't follow through on instructions and fail to finish work?  yes   Have difficulty organizing tasks and activities?  yes -  Avoid, dislike, or are reluctant to engage in work thar requires sustained mental effort?  no  Easily distracted?  Yes, evident during interview  Forgetful in daily activities?  yes   Fidget with hands or feet, or squirm in seat?  yes   Have difficulty engaging in leisure activities or doing fun things " "quietly?  No, yes, "I don't do anything quietly"  Feel "on the go" or "driven by a motor"?  yes   Blurt out answers before questions have been completed?  yes , "I cut people people off a lot"  Have difficulty waiting your turn, are impatient?  yes   Interrupt or intrude on others?  Yes, "but in a good way, I am nice about it!"    Psych History  Previous Medication Trials: lexapro, adderall XR  Previous Psychiatric Hospitalizations: denies  Previous Suicide Attempts: No  History of Violence: Denies  Outpatient Psychiatrist: none, was getting adderall prescribed by PCP    Social History:  Marital Status:   Children: 2 boys- 3 yo, 16 months  Employment Status/Info: nurse  Education: nursing school  Special Ed: no  : no  Adventist: "my  I joined [employer]"  Housing Status:  and 2 kids  Hobbies/Leisure time: as above  History of phys/sexual abuse: denies  Access to gun: "I think we have one in my house that my dad gave us, but my husbad has it". Says she does not want to know where it is until she takes a class on how to use firearms safely  H/o head trauma: denies     Family Psychiatric History:   sisteer-depression and anxiety; "I think she takes prozac"     Substance Abuse History:  Recreational Drugs: denies  Use of Alcohol: rarely  Rehab History: denies  Tobacco Use: denies, quit before becoming pregnany  Use of Caffeine: a small iced coffee and occasionally a coke  Use of OTC: immodium  Legal consequences of chemical use:denies     Legal History:  Past Charges/Incarcerations: denies  Pending charges: denies      Review Of Systems:     GENERAL:  No weight gain or loss  SKIN:  + rash on left wrist; treated for ring worm   HEAD:   Occasional hormonal headaches  EYES:  Denies vision changes  EARS:  No dizziness, hearing loss  MOUTH & THROAT:  No dyskinetic movements or obvious goiter  CHEST:  No shortness of breath, hyperventilation or cough  CARDIOVASCULAR:  No tachycardia or chest " "pain  ABDOMEN:  No nausea, vomiting, pain, constipation . +diarrhea (helped with daily immodium)  URINARY:  No frequency, dysuria  MUSCULOSKELETAL:  No pain or stiffness of the joints  NEUROLOGIC:  No , memory loss, tremor     Current Evaluation:     Nutritional Screening: Considering the patient's height and weight, medications, medical history and preferences, should a referral be made to the dietitian? no    Constitutional  Vitals:  Most recent vital signs, dated less than 90 days prior to this appointment, were reviewed.    Vitals:    11/26/19 0753   BP: (!) 158/92   Pulse: 84   Weight: 79.1 kg (174 lb 4.4 oz)   Height: 5' 8" (1.727 m)        General:  unremarkable, age appropriate     Musculoskeletal  Muscle Strength/Tone:  no rigidity, no tremor   Gait & Station:  non-ataxic, gait steady     Psychiatric  Speech:  no latency; no press   Mood & Affect:  happy  congruent and appropriate   Thought Process:  normal and logical   Associations:  intact   Thought Content:  normal, no suicidality, no homicidality, delusions, or paranoia   Insight:  intact   Judgement: behavior is adequate to circumstances   Orientation:  grossly intact, person, place, situation, month of year, year, president   Memory: intact for content of interview, memory >3 objects at five mins   Language: grossly intact   Attention Span & Concentration:  she is able to spell WORLD forwards and backwards but objectively appears distracted throughout assessment . she also repeated 3 words to test memory out loud within time span to not forget   Fund of Knowledge:  intact and appropriate to age and level of education, 3/3 most recent presidents in order         Relevant Elements of Neurological Exam: normal gait    Functioning in Relationships:  Spouse/partner: yes, supportive  and parents     Laboratory Data  No visits with results within 1 Month(s) from this visit.   Latest known visit with results is:   Office Visit on 10/17/2019 "   Component Date Value Ref Range Status    Rapid Influenza A Ag 10/17/2019 Negative  Negative Final    Rapid Influenza B Ag 10/17/2019 Negative  Negative Final     Acceptable 10/17/2019 Yes   Final    Rapid Strep A Screen 10/17/2019 Negative  Negative Final     Acceptable 10/17/2019 Yes   Final         Medications  Outpatient Encounter Medications as of 11/26/2019   Medication Sig Dispense Refill    dicloxacillin (DYNAPEN) 500 MG capsule Take 1 capsule by mouth every 6 hours for 10 days 40 capsule 0    fluconazole (DIFLUCAN) 150 MG Tab Take 1 tablet by mouth today and repeat in 3 days if needed 3 tablet 3    ibuprofen (ADVIL,MOTRIN) 600 MG tablet Take 1 tablet (600 mg total) by mouth every 6 (six) hours. 45 tablet 1    ketoconazole (NIZORAL) 2 % cream apply topically as directed  2 times a day for 2 weeks 30 g 0    loperamide (IMODIUM A-D) 1 mg/7.5 mL Liqd Take 0.1 mg/kg by mouth every 12 (twelve) hours.      PNV NO.95/FERROUS FUM/FOLIC AC (PRENATAL ORAL) Take by mouth.      zolpidem (AMBIEN) 10 mg Tab Take 1 tablet (10 mg total) by mouth nightly as needed. 30 tablet 3     No facility-administered encounter medications on file as of 11/26/2019.            Assessment - Diagnosis - Goals:     Impression:   ADHD   NIRAV  IBS with diarrhea  Insomnia    35 yo F with h/o ADD and IBS with diarrhea who presents for ADHD evaluation. Pt meets criteria for ADHD but also meets criteria for NIRAV and objectively presents as easily distractable. Anxiety sx can present similar to ADHD sx of focus/concentration/attention which may be contributing to her presentation today. Pt also reports h/o IBS-D and long standing insomnia. Pt is agreeable to restart previous dose of adderall XR and trial of low dose amitriptyline 10 mg. Will schedule close follow up and reassess @ 1 month. May need to adjust stimulant dose and/or consider additional tx for anxiety sx.     Strengths and Liabilities: Strength:  Patient accepts guidance/feedback, Strength: Patient is expressive/articulate., Strength: Patient is intelligent., Strength: Patient is motivated for change., Strength: Patient is physically healthy., Strength: Patient has positive support network., Strength: Patient has reasonable judgment.    Treatment Goals:  Specify outcomes written in observable, behavioral terms:   Anxiety: reducing physical symptoms of anxiety and reducing time spent worrying (<30 minutes/day)    Treatment Plan/Recommendations:   · Medication Management: The risks and benefits of medication were discussed with the patient.  · AA/NA/CA/ACOA/Abstinence  · The treatment plan and follow up plan were reviewed with the patient.   · Start adderall XR 25 mg daily as her sx were previously controlled at this dose- provided paper rx for 1 month, will reevaluate at next appointment  · Consider dose adjustments as appropriate, may need to decrease dose  · Start amitriptyline 25 mg qhs for anxiety, sleep, and IBS with diarrhea-provided rx for 1 month, will re-evaluate at next appointment  · Consider increasing amitriptyline if tolerates well       Return to Clinic: 1 month- December 17    Counseling time: 17 minutes  Total time: 50    Discussed with attending Psychiatrist, Dr. Ameena Tobias MD  [facility] PGY3

## 2019-12-16 NOTE — PROGRESS NOTES
Outpatient Psychiatry Follow-Up Visit (MD/NP)    12/17/2019    Clinical Status of Patient:  Outpatient (Ambulatory)    Chief Complaint:  April Wendt Schamberger is a 34 y.o. female who presents today for follow-up of attention problems.  Met with patient.   Patient last seen 11/26/2019 for initial evaluation, was started on adderall XR 25 mg as previously did well on this dose and started elavil 10 mg qhs to aid with IBS sx and sleep. Reviewed , no concern for inappropriate use of stimulants. Last filled 11/26/2019.    Interval History and Content of Current Session:  Interim Events/Subjective Report/Content of Current Session:   Pt states that he has been doing well since last appointment. States that the adderall XR has been helpful for focus, concentration, and attention. Her supervisor has noticed an improvement in these areas. She states that the medication lasts about 5-6 hours until it wears off. Once it wears off she states that she will drink coffee in an effort to try and improve her focus. Overall she has noticed a positive impact of the medication. Denies SE of stimulant medication including jitteriness, palpitations, SOB, CP, tremors, sleep difficulty, decreased appetite. She endorses occasional HA but states that intermittent HA are not abnormal for her. Since starting adderall she has not noticed any difference in intensity, severity, or frequency of HA.  She states there her mood as been good and denies feeling depressed.  She states that the elavil has been helpful for IBS-D, she states that she is taking half the immodium she used to (has been taking 5, prior to elavil was taking 10-13) and only going to the bathroom to 2 times a day. No issues with sleep. Has found elavil to be helpful for this as well. She continues to take ambien Rx'd by other provider. She endorses recent weight loss but attributes this to working out during her lunch breaks and cutting out pancakes before bed. Denies  "SI/HI/AVH. Does not appear objectively psychotic or  Manic.      Review of Systems   MEDICAL REVIEW OF SYSTEMS  History obtained from the patient   General : NO chills or fever   Eyes: NO  visual changes   ENT: NOnasal discharge or sore throat   Endocrine: NO + weight changes    Dermatological: NO rashes   Respiratory: NO cough, shortness of breath   Cardiovascular: NO chest pain, palpitations or racing heart   Gastrointestinal: NO nausea, vomiting, + diarrhea (improved)   Musculoskeletal: NO muscle pain or stiffness   Neurological: NO confusion, dizziness, headaches or tremors   Psychiatric: please see HPI        Past Medical, Family and Social History: The patient's past medical, family and social history have been reviewed and updated as appropriate within the electronic medical record - see encounter notes.    Compliance: yes    Side effects: None    Risk Parameters:  Patient reports no suicidal ideation  Patient reports no homicidal ideation  Patient reports no self-injurious behavior  Patient reports no violent behavior    Exam (detailed: at least 9 elements; comprehensive: all 15 elements)   Constitutional  Vitals:  Most recent vital signs, dated less than 90 days prior to this appointment, were reviewed.   Vitals:    12/17/19 1033   BP: (!) 136/93   Pulse: 87   Weight: 76.1 kg (167 lb 12.3 oz)   Height: 5' 8" (1.727 m)        General:  unremarkable, age appropriate     Musculoskeletal  Muscle Strength/Tone:  no spasicity, no rigidity, no dyskinesia, no tremor   Gait & Station:  non-ataxic     Psychiatric  Speech:  no latency; no press   Mood & Affect:  "ok"  congruent and appropriate   Thought Process:  normal and logical   Associations:  intact   Thought Content:  normal, no suicidality, no homicidality, delusions, or paranoia   Insight:  has awareness of illness   Judgement: behavior is adequate to circumstances   Orientation:  grossly intact   Memory: intact for content of interview   Language: " grossly intact   Attention Span & Concentration:  able to focus   Fund of Knowledge:  intact and appropriate to age and level of education     Assessment and Diagnosis   Status/Progress: Based on the examination today, the patient's problem(s) is/are improved.  New problems have not been presented today.   Co-morbidities, Diagnostic uncertainty and Lack of compliance are not complicating management of the primary condition.  There are no active rule-out diagnoses for this patient at this time.     General Impression:     ADHD  Anxiety, r/o NIRAV  IBS with diarrhea  Insomnia     35 yo F with h/o ADHD, NIRAV and IBS-D. ADHD Sx improvement on 25 mg adderall XR but not lasting throughout the day,  Will increase to 30 mg XR and monitor for sx imrpovement. IBS-D and anxiety are well controlled at this time on 10 mg elavil.  Anxiety sx can present similar to ADHD sx -Less NIRAV sx reported today, possible that overlapping NIRAV-ADHD sx improved with stimulant treatment and pt does not truly meet criteria for NIRAV.      Strengths and Liabilities: Strength: Patient accepts guidance/feedback, Strength: Patient is expressive/articulate., Strength: Patient is intelligent., Strength: Patient is motivated for change., Strength: Patient is physically healthy., Strength: Patient has positive support network., Strength: Patient has reasonable judgment.     Treatment Goals:  Specify outcomes written in observable, behavioral terms:   Anxiety: reducing physical symptoms of anxiety and reducing time spent worrying (<30 minutes/day)     Treatment Plan/Recommendations:   · Medication Management: The risks and benefits of medication were discussed with the patient.  · AA/NA/CA/ACOA/Abstinence  · The treatment plan and follow up plan were reviewed with the patient.   · Increase  adderall XR 30 mg daily as sx improved but not adequately controlled  ? Consider dose adjustments as appropriate, may need to add IR dose at lunch time  · Continue   amitriptyline 10 mg qhs for anxiety, sleep, and IBS with diarrhea-sx well controlled currently  ? Consider increasing amitriptyline in sx become problematic      RTC 3 months     Risa Tobias MD  [employer] [facility] PGY3

## 2019-12-17 ENCOUNTER — OFFICE VISIT (OUTPATIENT)
Dept: PSYCHIATRY | Facility: CLINIC | Age: 34
End: 2019-12-17
Payer: COMMERCIAL

## 2019-12-17 VITALS
SYSTOLIC BLOOD PRESSURE: 136 MMHG | WEIGHT: 167.75 LBS | HEART RATE: 87 BPM | BODY MASS INDEX: 25.42 KG/M2 | DIASTOLIC BLOOD PRESSURE: 93 MMHG | HEIGHT: 68 IN

## 2019-12-17 DIAGNOSIS — K58.0 IRRITABLE BOWEL SYNDROME WITH DIARRHEA: ICD-10-CM

## 2019-12-17 DIAGNOSIS — F90.2 ATTENTION DEFICIT HYPERACTIVITY DISORDER (ADHD), COMBINED TYPE: Primary | ICD-10-CM

## 2019-12-17 PROCEDURE — 99212 PR OFFICE/OUTPT VISIT, EST, LEVL II, 10-19 MIN: ICD-10-PCS | Mod: S$GLB,,, | Performed by: PSYCHIATRY & NEUROLOGY

## 2019-12-17 PROCEDURE — 99999 PR PBB SHADOW E&M-EST. PATIENT-LVL II: ICD-10-PCS | Mod: PBBFAC,,, | Performed by: PSYCHIATRY & NEUROLOGY

## 2019-12-17 PROCEDURE — 3080F DIAST BP >= 90 MM HG: CPT | Mod: CPTII,S$GLB,, | Performed by: PSYCHIATRY & NEUROLOGY

## 2019-12-17 PROCEDURE — 99212 OFFICE O/P EST SF 10 MIN: CPT | Mod: S$GLB,,, | Performed by: PSYCHIATRY & NEUROLOGY

## 2019-12-17 PROCEDURE — 3075F SYST BP GE 130 - 139MM HG: CPT | Mod: CPTII,S$GLB,, | Performed by: PSYCHIATRY & NEUROLOGY

## 2019-12-17 PROCEDURE — 3080F PR MOST RECENT DIASTOLIC BLOOD PRESSURE >= 90 MM HG: ICD-10-PCS | Mod: CPTII,S$GLB,, | Performed by: PSYCHIATRY & NEUROLOGY

## 2019-12-17 PROCEDURE — 3008F PR BODY MASS INDEX (BMI) DOCUMENTED: ICD-10-PCS | Mod: CPTII,S$GLB,, | Performed by: PSYCHIATRY & NEUROLOGY

## 2019-12-17 PROCEDURE — 3075F PR MOST RECENT SYSTOLIC BLOOD PRESS GE 130-139MM HG: ICD-10-PCS | Mod: CPTII,S$GLB,, | Performed by: PSYCHIATRY & NEUROLOGY

## 2019-12-17 PROCEDURE — 99999 PR PBB SHADOW E&M-EST. PATIENT-LVL II: CPT | Mod: PBBFAC,,, | Performed by: PSYCHIATRY & NEUROLOGY

## 2019-12-17 PROCEDURE — 3008F BODY MASS INDEX DOCD: CPT | Mod: CPTII,S$GLB,, | Performed by: PSYCHIATRY & NEUROLOGY

## 2019-12-17 RX ORDER — DEXTROAMPHETAMINE SACCHARATE, AMPHETAMINE ASPARTATE MONOHYDRATE, DEXTROAMPHETAMINE SULFATE AND AMPHETAMINE SULFATE 7.5; 7.5; 7.5; 7.5 MG/1; MG/1; MG/1; MG/1
30 CAPSULE, EXTENDED RELEASE ORAL EVERY MORNING
Qty: 30 CAPSULE | Refills: 0 | Status: SHIPPED | OUTPATIENT
Start: 2019-12-26 | End: 2020-01-25

## 2019-12-17 RX ORDER — DEXTROAMPHETAMINE SACCHARATE, AMPHETAMINE ASPARTATE MONOHYDRATE, DEXTROAMPHETAMINE SULFATE AND AMPHETAMINE SULFATE 7.5; 7.5; 7.5; 7.5 MG/1; MG/1; MG/1; MG/1
30 CAPSULE, EXTENDED RELEASE ORAL EVERY MORNING
Qty: 30 CAPSULE | Refills: 0 | Status: SHIPPED | OUTPATIENT
Start: 2020-02-26 | End: 2020-03-17 | Stop reason: SDUPTHER

## 2019-12-17 RX ORDER — AMITRIPTYLINE HYDROCHLORIDE 10 MG/1
10 TABLET, FILM COATED ORAL NIGHTLY
Qty: 30 TABLET | Refills: 2 | Status: SHIPPED | OUTPATIENT
Start: 2019-12-17 | End: 2020-03-17 | Stop reason: SDUPTHER

## 2019-12-17 RX ORDER — DEXTROAMPHETAMINE SACCHARATE, AMPHETAMINE ASPARTATE MONOHYDRATE, DEXTROAMPHETAMINE SULFATE AND AMPHETAMINE SULFATE 7.5; 7.5; 7.5; 7.5 MG/1; MG/1; MG/1; MG/1
30 CAPSULE, EXTENDED RELEASE ORAL EVERY MORNING
Qty: 30 CAPSULE | Refills: 0 | Status: SHIPPED | OUTPATIENT
Start: 2020-01-26 | End: 2020-02-28

## 2020-01-06 DIAGNOSIS — F51.01 PRIMARY INSOMNIA: ICD-10-CM

## 2020-01-06 RX ORDER — ZOLPIDEM TARTRATE 10 MG/1
10 TABLET ORAL NIGHTLY PRN
Qty: 30 TABLET | Refills: 1 | Status: SHIPPED | OUTPATIENT
Start: 2020-01-06 | End: 2020-02-21 | Stop reason: ALTCHOICE

## 2020-01-06 RX ORDER — ZOLPIDEM TARTRATE 10 MG/1
10 TABLET ORAL NIGHTLY PRN
Qty: 30 TABLET | Refills: 3 | Status: CANCELLED | OUTPATIENT
Start: 2020-01-06

## 2020-01-06 NOTE — TELEPHONE ENCOUNTER
----- Message from Rosario Raza sent at 1/6/2020 10:10 AM CST -----  Hey patient would like a refill on her ambien 10mg sent to [facility] please

## 2020-01-16 ENCOUNTER — APPOINTMENT (OUTPATIENT)
Dept: RADIOLOGY | Facility: OTHER | Age: 35
End: 2020-01-16
Attending: OBSTETRICS & GYNECOLOGY
Payer: COMMERCIAL

## 2020-01-16 ENCOUNTER — OFFICE VISIT (OUTPATIENT)
Dept: OBSTETRICS AND GYNECOLOGY | Facility: CLINIC | Age: 35
End: 2020-01-16
Payer: COMMERCIAL

## 2020-01-16 VITALS
HEIGHT: 68 IN | WEIGHT: 158.75 LBS | BODY MASS INDEX: 24.06 KG/M2 | SYSTOLIC BLOOD PRESSURE: 118 MMHG | DIASTOLIC BLOOD PRESSURE: 80 MMHG

## 2020-01-16 DIAGNOSIS — Z80.0 FAMILY HISTORY OF COLORECTAL CANCER: ICD-10-CM

## 2020-01-16 DIAGNOSIS — Z80.0 FAMILY HISTORY OF PANCREATIC CANCER: ICD-10-CM

## 2020-01-16 DIAGNOSIS — B37.31 RECURRENT CANDIDIASIS OF VAGINA: ICD-10-CM

## 2020-01-16 DIAGNOSIS — Z12.31 VISIT FOR SCREENING MAMMOGRAM: ICD-10-CM

## 2020-01-16 DIAGNOSIS — Z01.419 ENCOUNTER FOR GYNECOLOGICAL EXAMINATION: Primary | ICD-10-CM

## 2020-01-16 DIAGNOSIS — Z80.3 FAMILY HISTORY OF BREAST CANCER IN MOTHER: ICD-10-CM

## 2020-01-16 DIAGNOSIS — Z80.0 FAMILY HISTORY OF LYNCH SYNDROME: ICD-10-CM

## 2020-01-16 PROBLEM — K58.9 IRRITABLE BOWEL SYNDROME WITHOUT DIARRHEA: Status: ACTIVE | Noted: 2018-12-01

## 2020-01-16 PROBLEM — K50.90 CROHN'S DISEASE, UNSPECIFIED, WITHOUT COMPLICATIONS: Status: ACTIVE | Noted: 2018-12-01

## 2020-01-16 PROBLEM — I10 ESSENTIAL (PRIMARY) HYPERTENSION: Status: ACTIVE | Noted: 2018-12-11

## 2020-01-16 PROBLEM — B35.4 TINEA CORPORIS: Status: ACTIVE | Noted: 2019-11-14

## 2020-01-16 PROCEDURE — 77063 BREAST TOMOSYNTHESIS BI: CPT | Mod: 26,,, | Performed by: RADIOLOGY

## 2020-01-16 PROCEDURE — 77067 SCR MAMMO BI INCL CAD: CPT | Mod: TC,PN

## 2020-01-16 PROCEDURE — 77067 SCR MAMMO BI INCL CAD: CPT | Mod: 26,,, | Performed by: RADIOLOGY

## 2020-01-16 PROCEDURE — 99999 PR PBB SHADOW E&M-EST. PATIENT-LVL III: CPT | Mod: PBBFAC,,, | Performed by: OBSTETRICS & GYNECOLOGY

## 2020-01-16 PROCEDURE — 99395 PREV VISIT EST AGE 18-39: CPT | Mod: S$GLB,,, | Performed by: OBSTETRICS & GYNECOLOGY

## 2020-01-16 PROCEDURE — 3074F PR MOST RECENT SYSTOLIC BLOOD PRESSURE < 130 MM HG: ICD-10-PCS | Mod: CPTII,S$GLB,, | Performed by: OBSTETRICS & GYNECOLOGY

## 2020-01-16 PROCEDURE — 77067 MAMMO DIGITAL SCREENING BILAT WITH TOMOSYNTHESIS_CAD: ICD-10-PCS | Mod: 26,,, | Performed by: RADIOLOGY

## 2020-01-16 PROCEDURE — 99395 PR PREVENTIVE VISIT,EST,18-39: ICD-10-PCS | Mod: S$GLB,,, | Performed by: OBSTETRICS & GYNECOLOGY

## 2020-01-16 PROCEDURE — 77063 MAMMO DIGITAL SCREENING BILAT WITH TOMOSYNTHESIS_CAD: ICD-10-PCS | Mod: 26,,, | Performed by: RADIOLOGY

## 2020-01-16 PROCEDURE — 3074F SYST BP LT 130 MM HG: CPT | Mod: CPTII,S$GLB,, | Performed by: OBSTETRICS & GYNECOLOGY

## 2020-01-16 PROCEDURE — 3079F DIAST BP 80-89 MM HG: CPT | Mod: CPTII,S$GLB,, | Performed by: OBSTETRICS & GYNECOLOGY

## 2020-01-16 PROCEDURE — 99999 PR PBB SHADOW E&M-EST. PATIENT-LVL III: ICD-10-PCS | Mod: PBBFAC,,, | Performed by: OBSTETRICS & GYNECOLOGY

## 2020-01-16 PROCEDURE — 3079F PR MOST RECENT DIASTOLIC BLOOD PRESSURE 80-89 MM HG: ICD-10-PCS | Mod: CPTII,S$GLB,, | Performed by: OBSTETRICS & GYNECOLOGY

## 2020-01-16 RX ORDER — FLUCONAZOLE 150 MG/1
TABLET ORAL
Qty: 20 TABLET | Refills: 3 | Status: SHIPPED | OUTPATIENT
Start: 2020-01-16 | End: 2021-06-22 | Stop reason: SDUPTHER

## 2020-01-16 NOTE — PROGRESS NOTES
Subjective:       Patient ID: April Wendt Schamberger is a 34 y.o. female.    Chief Complaint:  Well Woman (last pap 17, negative /  hpv 5-3-17,negative  )      History of Present Illness.  April Wendt Schamberger is a 34 y.o. female.  She has no breast or urinary symptoms.  She has no menorrhagia, oligomenorrhea, bleeding betweeen menses, postcoital bleeding, dysmenorrhea, pelvic pain, dyspareunia, vaginal dryness, vaginal discharge, or sexual complaints.    GYN & OB History  Patient's last menstrual period was 2020 (exact date).   Last Pap: 9/15/2017 Normal HPV negative  Gardasil:  Yes    OB History    Para Term  AB Living   2 2 2     2   SAB TAB Ectopic Multiple Live Births           2      # Outcome Date GA Lbr Eldon/2nd Weight Sex Delivery Anes PTL Lv   2 Term 18 39w4d / 00:26 3.8 kg (8 lb 6 oz) M Vag-Spont EPI N SALOME   1 Term 14 37w0d  3.657 kg (8 lb 1 oz) M Vag-Spont EPI  SALOME      Obstetric Comments   Menarche ~        Past Medical History:   Diagnosis Date    Abnormal Pap smear of cervix     Cryo Done    ADD (attention deficit disorder)     Breast disorder     Breast Cysts    Esophageal reflux     Fibroids     Hypertension     IBS (irritable bowel syndrome)     Insomnia     Kidney stones     Mastocytosis     Relies on partner vasectomy for contraception     Upper GI bleed      Past Surgical History:   Procedure Laterality Date    COLONOSCOPY N/A 2017    Procedure: COLONOSCOPY;  Surgeon: Bren Larkin MD;  [facility];  Service: Endoscopy;  Laterality: N/A;    ESOPHAGOGASTRODUODENOSCOPY  2012    LASIK Bilateral     REFRACTIVE SURGERY      TONSILLECTOMY, ADENOIDECTOMY      VAGINAL DELIVERY      x 2     Family History   Problem Relation Age of Onset    Breast cancer Mother 47        with Metastasis    Hypertension Mother     Prostate cancer Father     Hypertension Father     Diabetes Father     Deep vein thrombosis Father      Pancreatic cancer Maternal Grandfather     Breast cancer Paternal Grandmother 80    Diabetes type II Paternal Grandfather     Heart attack Maternal Grandmother     Cancer Cousin 31    Cancer Cousin         Thurman Syndrome    Cancer Cousin         Thurman Syndrome    Ovarian cancer Neg Hx     Lymphoma Neg Hx     Tuberculosis Neg Hx     Celiac disease Neg Hx     Cirrhosis Neg Hx     Colon polyps Neg Hx     Crohn's disease Neg Hx     Cystic fibrosis Neg Hx     Esophageal cancer Neg Hx     Hemochromatosis Neg Hx     Inflammatory bowel disease Neg Hx     Irritable bowel syndrome Neg Hx     Liver cancer Neg Hx     Liver disease Neg Hx     Rectal cancer Neg Hx     Stomach cancer Neg Hx     Ulcerative colitis Neg Hx     Donavon's disease Neg Hx     Amblyopia Neg Hx     Blindness Neg Hx     Cataracts Neg Hx     Glaucoma Neg Hx     Macular degeneration Neg Hx     Retinal detachment Neg Hx     Strabismus Neg Hx      Social History     Tobacco Use    Smoking status: Former Smoker    Smokeless tobacco: Never Used   Substance Use Topics    Alcohol use: No     Alcohol/week: 0.0 standard drinks     Frequency: Never     Binge frequency: Never     Comment: breastfeeding     Drug use: No       Current Outpatient Medications:     amitriptyline (ELAVIL) 10 MG tablet, Take 1 tablet (10 mg total) by mouth every evening., Disp: 30 tablet, Rfl: 2    dextroamphetamine-amphetamine (ADDERALL XR) 30 MG 24 hr capsule, Take 1 capsule (30 mg total) by mouth every morning., Disp: 30 capsule, Rfl: 0    loperamide (IMODIUM A-D) 1 mg/7.5 mL Liqd, Take 0.1 mg/kg by mouth every 12 (twelve) hours., Disp: , Rfl:     zolpidem (AMBIEN) 10 mg Tab, Take 1 tablet (10 mg total) by mouth nightly as needed., Disp: 30 tablet, Rfl: 1    [START ON 1/26/2020] dextroamphetamine-amphetamine (ADDERALL XR) 30 MG 24 hr capsule, Take 1 capsule (30 mg total) by mouth every morning. (Patient not taking: Reported on 1/16/2020), Disp:  "30 capsule, Rfl: 0    [START ON 2/26/2020] dextroamphetamine-amphetamine (ADDERALL XR) 30 MG 24 hr capsule, Take 1 capsule (30 mg total) by mouth every morning. (Patient not taking: Reported on 1/16/2020), Disp: 30 capsule, Rfl: 0    fluconazole (DIFLUCAN) 150 MG Tab, Take 1tablet by mouth and repeat in 3 days if still symptomatic, Disp: 20 tablet, Rfl: 3    Review of patient's allergies indicates:   Allergen Reactions    Lactose     Azithromycin Nausea And Vomiting       Review of Systems  Review of Systems   Constitutional: Negative for fatigue.   HENT: Negative for trouble swallowing.    Eyes: Negative for visual disturbance.   Respiratory: Negative for cough and shortness of breath.    Cardiovascular: Negative for chest pain.   Gastrointestinal: Negative for abdominal distention, abdominal pain, blood in stool, nausea and vomiting.   Genitourinary: Negative for difficulty urinating, dyspareunia, dysuria, flank pain, frequency, hematuria, pelvic pain, urgency, vaginal bleeding, vaginal discharge and vaginal pain.   Musculoskeletal: Negative for arthralgias.   Skin: Negative for rash.   Neurological: Negative for dizziness and headaches.   Psychiatric/Behavioral: Negative for sleep disturbance. The patient is not nervous/anxious.         Objective:     Vitals:    01/16/20 1440   BP: 118/80   Weight: 72 kg (158 lb 11.7 oz)   Height: 5' 8" (1.727 m)   PainSc: 0-No pain     Body mass index is 24.14 kg/m².      Physical Exam:   Constitutional: She is oriented to person, place, and time. Vital signs are normal. She appears well-developed and well-nourished.    HENT:   Head: Normocephalic.     Neck: Normal range of motion. No thyromegaly present.     Pulmonary/Chest: Right breast exhibits no mass, no nipple discharge, no skin change, no tenderness and no swelling. Left breast exhibits no mass, no nipple discharge, no skin change, no tenderness and no swelling. Breasts are symmetrical.        Abdominal: Soft. Normal " appearance and bowel sounds are normal. She exhibits no distension. There is no tenderness.     Genitourinary: Vagina normal and uterus normal. Pelvic exam was performed with patient supine. There is no rash, tenderness, lesion or injury on the right labia. There is no rash, tenderness, lesion or injury on the left labia. Cervix is normal. Right adnexum displays no mass, no tenderness and no fullness. Left adnexum displays no mass, no tenderness and no fullness. No erythema in the vagina. No vaginal discharge found. Cervix exhibits no motion tenderness and no discharge.           Musculoskeletal: Normal range of motion.      Lymphadenopathy:        Right: No inguinal and no supraclavicular adenopathy present.        Left: No inguinal and no supraclavicular adenopathy present.    Neurological: She is alert and oriented to person, place, and time.    Skin: Skin is warm and dry.    Psychiatric: She has a normal mood and affect.        Assessment/ Plan:     Encounter for gynecological examination    Recurrent candidiasis of vagina  -     fluconazole (DIFLUCAN) 150 MG Tab; Take 1tablet by mouth and repeat in 3 days if still symptomatic  Dispense: 20 tablet; Refill: 3    Family history of Thurman syndrome  -     [facility]    Family history of breast cancer in mother  -     Ambulatory referral to [facility]    Family history of pancreatic cancer  -     Ambulatory referral to [facility]    Family history of colorectal cancer  -     Ambulatory referral to [facility]    Visit for screening mammogram  -     Mammo Digital Screening Bilat w/ [facility]ian; Future; Expected date: 01/16/2020        Routine pap smears.  Self breast exam  Diet and exercise discussed.  Contraception reviewed/discussed.  Follow-up with me in 1 year.

## 2020-01-20 ENCOUNTER — TELEPHONE (OUTPATIENT)
Dept: OBSTETRICS AND GYNECOLOGY | Facility: CLINIC | Age: 35
End: 2020-01-20

## 2020-01-20 ENCOUNTER — TELEPHONE (OUTPATIENT)
Dept: GYNECOLOGIC ONCOLOGY | Facility: CLINIC | Age: 35
End: 2020-01-20

## 2020-01-21 ENCOUNTER — CLINICAL SUPPORT (OUTPATIENT)
Dept: GYNECOLOGIC ONCOLOGY | Facility: CLINIC | Age: 35
End: 2020-01-21
Payer: COMMERCIAL

## 2020-01-21 ENCOUNTER — LAB VISIT (OUTPATIENT)
Dept: LAB | Facility: OTHER | Age: 35
End: 2020-01-21
Attending: OBSTETRICS & GYNECOLOGY
Payer: COMMERCIAL

## 2020-01-21 ENCOUNTER — OFFICE VISIT (OUTPATIENT)
Dept: PAIN MEDICINE | Facility: CLINIC | Age: 35
End: 2020-01-21
Payer: COMMERCIAL

## 2020-01-21 VITALS — BODY MASS INDEX: 24.14 KG/M2 | WEIGHT: 158.75 LBS

## 2020-01-21 DIAGNOSIS — Z80.3 FAMILY HISTORY OF BREAST CANCER: Primary | ICD-10-CM

## 2020-01-21 DIAGNOSIS — M62.838 MUSCLE SPASM: ICD-10-CM

## 2020-01-21 DIAGNOSIS — M79.18 MYOFASCIAL PAIN SYNDROME, CERVICAL: Primary | ICD-10-CM

## 2020-01-21 DIAGNOSIS — M54.12 CERVICAL RADICULOPATHY: ICD-10-CM

## 2020-01-21 DIAGNOSIS — Z80.3 FAMILY HISTORY OF BREAST CANCER: ICD-10-CM

## 2020-01-21 PROCEDURE — 3008F PR BODY MASS INDEX (BMI) DOCUMENTED: ICD-10-PCS | Mod: CPTII,S$GLB,, | Performed by: NURSE PRACTITIONER

## 2020-01-21 PROCEDURE — 3008F BODY MASS INDEX DOCD: CPT | Mod: CPTII,S$GLB,, | Performed by: NURSE PRACTITIONER

## 2020-01-21 PROCEDURE — 99214 OFFICE O/P EST MOD 30 MIN: CPT | Mod: 25,S$GLB,, | Performed by: NURSE PRACTITIONER

## 2020-01-21 PROCEDURE — 20553 PR INJECT TRIGGER POINTS, > 3: ICD-10-PCS | Mod: S$GLB,,, | Performed by: NURSE PRACTITIONER

## 2020-01-21 PROCEDURE — 36415 COLL VENOUS BLD VENIPUNCTURE: CPT

## 2020-01-21 PROCEDURE — 20553 NJX 1/MLT TRIGGER POINTS 3/>: CPT | Mod: S$GLB,,, | Performed by: NURSE PRACTITIONER

## 2020-01-21 PROCEDURE — 99999 PR PBB SHADOW E&M-EST. PATIENT-LVL III: ICD-10-PCS | Mod: PBBFAC,,, | Performed by: NURSE PRACTITIONER

## 2020-01-21 PROCEDURE — 99214 PR OFFICE/OUTPT VISIT, EST, LEVL IV, 30-39 MIN: ICD-10-PCS | Mod: 25,S$GLB,, | Performed by: NURSE PRACTITIONER

## 2020-01-21 PROCEDURE — 99999 PR PBB SHADOW E&M-EST. PATIENT-LVL III: CPT | Mod: PBBFAC,,, | Performed by: NURSE PRACTITIONER

## 2020-01-21 RX ORDER — BUPIVACAINE HYDROCHLORIDE 2.5 MG/ML
5 INJECTION, SOLUTION EPIDURAL; INFILTRATION; INTRACAUDAL
Status: COMPLETED | OUTPATIENT
Start: 2020-01-21 | End: 2020-01-21

## 2020-01-21 RX ORDER — TRIAMCINOLONE ACETONIDE 40 MG/ML
40 INJECTION, SUSPENSION INTRA-ARTICULAR; INTRAMUSCULAR
Status: COMPLETED | OUTPATIENT
Start: 2020-01-21 | End: 2020-01-21

## 2020-01-21 RX ORDER — TIZANIDINE 4 MG/1
4 TABLET ORAL NIGHTLY PRN
Qty: 30 TABLET | Refills: 2 | Status: SHIPPED | OUTPATIENT
Start: 2020-01-21 | End: 2020-03-23 | Stop reason: SDUPTHER

## 2020-01-21 RX ADMIN — BUPIVACAINE HYDROCHLORIDE 12.5 MG: 2.5 INJECTION, SOLUTION EPIDURAL; INFILTRATION; INTRACAUDAL at 02:01

## 2020-01-21 RX ADMIN — TRIAMCINOLONE ACETONIDE 40 MG: 40 INJECTION, SUSPENSION INTRA-ARTICULAR; INTRAMUSCULAR at 02:01

## 2020-01-21 NOTE — PROGRESS NOTES
Pt arrived unaccompanied for tele-genetics appointment. Pt watched [facility] Education Tool Video necessary to obtain informed consent for genetic testing and agreed to proceed with genetic counseling via telephone. Patient spoke to [facility] genetic counselor and personal/family history obtained. Lab requisition, demographic information, insurance card and family pedigree/hisoty report all printed, packaged in test kit and sent to lab with patient. Pt expressed verbal understanding that our dept would reach out to her when results are received, in approximately 2-3 weeks.?  ?

## 2020-01-21 NOTE — PROGRESS NOTES
Procedures Patient Name: Schamberger, April W.   MRN: [id number]     INFORMED CONSENT: The procedure, risks, benefits and options were discussed with patient. There are no contraindications to the procedure. The patient expressed understanding and agreed to proceed. The personnel performing the procedure was discussed. I verify that I personally obtained  consent prior to the start of the procedure and the signed consent can be found on the patient's chart.     Procedure Date: 01/21/2020     Anesthesia: Topical     Pre Procedure diagnosis:   1. Myofascial syndrome   2. Myalgia          Post-Procedure diagnosis: same        Sedation: None     PROCEDURE: TRIGGER POINT INJECTION  The patient was placed in a seated position. The site of pain and procedure were confirmed with the patient prior to starting the procedure. After performing time out. The patient's trigger points were identified and marked. The skin was prepped with chlorhexidine three times. After performing time out A 25-gauge 1.5 inch needle was advanced through the skin and subcutaneous tissues. Aspiration for blood, air and CSF was negative. A total of 10 ml of Bupivacaine 0.25% and 40 mg Kenalog was injected at all trigger point. No complications were evident. No specimens collected.     Blood Loss: Nill  Specimen: None    Also discussed that I will send in a prescription of Tizanidine 4 mg nightly PRN # 30 R2     VALERIE Marie  Interventional Pain Management    Disclaimer: This note was partly generated using dictation software which may occasionally result in transcription errors.

## 2020-02-03 ENCOUNTER — TELEPHONE (OUTPATIENT)
Dept: GYNECOLOGIC ONCOLOGY | Facility: CLINIC | Age: 35
End: 2020-02-03

## 2020-02-03 NOTE — TELEPHONE ENCOUNTER
Informed pt no clinically significant mutations identified through genetic testing; however reviewed increased Riskscore (42.2%)/Tyrer Cuzick score (34.8%)  Pt agreed to referral to [facility] based on increased scores and informed Mandy from [facility] to reach out for scheduling. [facility] referral placed and  message sent to Mandy for pt referral and to . Results confirmed ok to be sent through mail and placed in the mail.

## 2020-02-04 ENCOUNTER — TELEPHONE (OUTPATIENT)
Dept: SURGERY | Facility: CLINIC | Age: 35
End: 2020-02-04

## 2020-02-04 NOTE — TELEPHONE ENCOUNTER
----- Message from Judi Rice RN sent at 2/3/2020  1:46 PM CST -----  Hi, this patient has an elevated riskscore (42.2%) and Tyrer Rosettack (34.8%). I have place a referral and she is aware you will be reaching out. I can scan results and send to you via email if they are not uploaded before you need them.    Judi

## 2020-02-04 NOTE — TELEPHONE ENCOUNTER
Contacted the patient regarding the message below.  The patient is scheduled to be seen on Tuesday 4/7/2020, 8:30 am with Megan Aguayo PA-C.  The patient voiced understanding of appointment date, time, and location.  Reminder letter mailed to the patient.

## 2020-02-11 LAB — INTEGRATED BRAC ANALYSIS: NORMAL

## 2020-02-21 ENCOUNTER — OFFICE VISIT (OUTPATIENT)
Dept: INTERNAL MEDICINE | Facility: CLINIC | Age: 35
End: 2020-02-21
Payer: COMMERCIAL

## 2020-02-21 VITALS
BODY MASS INDEX: 23.39 KG/M2 | RESPIRATION RATE: 18 BRPM | OXYGEN SATURATION: 99 % | HEART RATE: 88 BPM | SYSTOLIC BLOOD PRESSURE: 112 MMHG | HEIGHT: 68 IN | DIASTOLIC BLOOD PRESSURE: 82 MMHG | WEIGHT: 154.31 LBS | TEMPERATURE: 98 F

## 2020-02-21 DIAGNOSIS — F51.01 PRIMARY INSOMNIA: Primary | ICD-10-CM

## 2020-02-21 PROCEDURE — 3074F SYST BP LT 130 MM HG: CPT | Mod: CPTII,S$GLB,, | Performed by: INTERNAL MEDICINE

## 2020-02-21 PROCEDURE — 3079F DIAST BP 80-89 MM HG: CPT | Mod: CPTII,S$GLB,, | Performed by: INTERNAL MEDICINE

## 2020-02-21 PROCEDURE — 99213 OFFICE O/P EST LOW 20 MIN: CPT | Mod: S$GLB,,, | Performed by: INTERNAL MEDICINE

## 2020-02-21 PROCEDURE — 3079F PR MOST RECENT DIASTOLIC BLOOD PRESSURE 80-89 MM HG: ICD-10-PCS | Mod: CPTII,S$GLB,, | Performed by: INTERNAL MEDICINE

## 2020-02-21 PROCEDURE — 99213 PR OFFICE/OUTPT VISIT, EST, LEVL III, 20-29 MIN: ICD-10-PCS | Mod: S$GLB,,, | Performed by: INTERNAL MEDICINE

## 2020-02-21 PROCEDURE — 99999 PR PBB SHADOW E&M-EST. PATIENT-LVL III: ICD-10-PCS | Mod: PBBFAC,,, | Performed by: INTERNAL MEDICINE

## 2020-02-21 PROCEDURE — 3008F BODY MASS INDEX DOCD: CPT | Mod: CPTII,S$GLB,, | Performed by: INTERNAL MEDICINE

## 2020-02-21 PROCEDURE — 3074F PR MOST RECENT SYSTOLIC BLOOD PRESSURE < 130 MM HG: ICD-10-PCS | Mod: CPTII,S$GLB,, | Performed by: INTERNAL MEDICINE

## 2020-02-21 PROCEDURE — 3008F PR BODY MASS INDEX (BMI) DOCUMENTED: ICD-10-PCS | Mod: CPTII,S$GLB,, | Performed by: INTERNAL MEDICINE

## 2020-02-21 PROCEDURE — 99999 PR PBB SHADOW E&M-EST. PATIENT-LVL III: CPT | Mod: PBBFAC,,, | Performed by: INTERNAL MEDICINE

## 2020-02-21 RX ORDER — ESZOPICLONE 2 MG/1
2 TABLET, FILM COATED ORAL NIGHTLY
Qty: 30 TABLET | Refills: 0 | Status: SHIPPED | OUTPATIENT
Start: 2020-02-21 | End: 2020-03-16 | Stop reason: DRUGHIGH

## 2020-02-21 NOTE — PROGRESS NOTES
"Subjective:       Patient ID: April Wendt Schamberger is a 34 y.o. female.    Chief Complaint: Follow-up (sleep meds)    HPI    34 y.o. female presents for follow up of chronic medical problems:     Insomnia: ambien 10mg prn- still having problems falling and staying asleep. No issues with over-sedation.      Review of Systems   Constitutional: Negative for activity change and unexpected weight change.   HENT: Positive for rhinorrhea. Negative for hearing loss and trouble swallowing.    Eyes: Negative for discharge and visual disturbance.   Respiratory: Negative for chest tightness and wheezing.    Cardiovascular: Negative for chest pain and palpitations.   Gastrointestinal: Positive for diarrhea. Negative for blood in stool, constipation and vomiting.   Endocrine: Negative for polydipsia and polyuria.   Genitourinary: Negative for difficulty urinating, dysuria, hematuria and menstrual problem.   Musculoskeletal: Positive for neck pain. Negative for arthralgias and joint swelling.   Neurological: Negative for weakness and headaches.   Psychiatric/Behavioral: Negative for confusion and dysphoric mood.       Objective:        Vitals:    02/21/20 0742   BP: 112/82   BP Location: Right arm   Patient Position: Sitting   BP Method: Medium (Manual)   Pulse: 88   Resp: 18   Temp: 97.8 °F (36.6 °C)   TempSrc: Oral   SpO2: 99%   Weight: 70 kg (154 lb 5.2 oz)   Height: 5' 8" (1.727 m)       Body mass index is 23.46 kg/m².    Physical Exam   Constitutional: She is oriented to person, place, and time. She appears well-developed and well-nourished. No distress.   HENT:   Head: Normocephalic and atraumatic.   Right Ear: External ear normal.   Left Ear: External ear normal.   Eyes: Conjunctivae and EOM are normal.   Neck: Normal range of motion.   Cardiovascular: Normal rate and intact distal pulses.   Pulmonary/Chest: Effort normal.   Neurological: She is alert and oriented to person, place, and time.   Skin: Skin is warm and dry. " No rash noted.   Psychiatric: She has a normal mood and affect. Her behavior is normal.       Assessment:     1. Primary insomnia           Plan:         1. Primary insomnia  - d/c ambien. Trial lunesta- notify clinic in a month how new med is working. Consider increasing amitriptyline.  - eszopiclone (LUNESTA) 2 MG Tab; Take 1 tablet (2 mg total) by mouth every evening.  Dispense: 30 tablet; Refill: 0      rtc 6 mo or sooner prn     Patient note was created using MModal Dictation.  Any errors in syntax or even information may not have been identified and edited on initial review prior to signing this note.

## 2020-03-05 ENCOUNTER — PATIENT MESSAGE (OUTPATIENT)
Dept: GASTROENTEROLOGY | Facility: CLINIC | Age: 35
End: 2020-03-05

## 2020-03-11 ENCOUNTER — OFFICE VISIT (OUTPATIENT)
Dept: GASTROENTEROLOGY | Facility: CLINIC | Age: 35
End: 2020-03-11
Payer: COMMERCIAL

## 2020-03-11 VITALS
SYSTOLIC BLOOD PRESSURE: 135 MMHG | HEART RATE: 86 BPM | BODY MASS INDEX: 23.06 KG/M2 | DIASTOLIC BLOOD PRESSURE: 95 MMHG | WEIGHT: 151.69 LBS

## 2020-03-11 DIAGNOSIS — K58.0 IRRITABLE BOWEL SYNDROME WITH DIARRHEA: Primary | ICD-10-CM

## 2020-03-11 DIAGNOSIS — K52.9 ILEITIS: ICD-10-CM

## 2020-03-11 PROCEDURE — 99999 PR PBB SHADOW E&M-EST. PATIENT-LVL IV: ICD-10-PCS | Mod: PBBFAC,,, | Performed by: INTERNAL MEDICINE

## 2020-03-11 PROCEDURE — 99999 PR PBB SHADOW E&M-EST. PATIENT-LVL IV: CPT | Mod: PBBFAC,,, | Performed by: INTERNAL MEDICINE

## 2020-03-11 PROCEDURE — 3075F PR MOST RECENT SYSTOLIC BLOOD PRESS GE 130-139MM HG: ICD-10-PCS | Mod: CPTII,S$GLB,, | Performed by: INTERNAL MEDICINE

## 2020-03-11 PROCEDURE — 99213 PR OFFICE/OUTPT VISIT, EST, LEVL III, 20-29 MIN: ICD-10-PCS | Mod: S$GLB,,, | Performed by: INTERNAL MEDICINE

## 2020-03-11 PROCEDURE — 3075F SYST BP GE 130 - 139MM HG: CPT | Mod: CPTII,S$GLB,, | Performed by: INTERNAL MEDICINE

## 2020-03-11 PROCEDURE — 3008F BODY MASS INDEX DOCD: CPT | Mod: CPTII,S$GLB,, | Performed by: INTERNAL MEDICINE

## 2020-03-11 PROCEDURE — 3080F DIAST BP >= 90 MM HG: CPT | Mod: CPTII,S$GLB,, | Performed by: INTERNAL MEDICINE

## 2020-03-11 PROCEDURE — 3008F PR BODY MASS INDEX (BMI) DOCUMENTED: ICD-10-PCS | Mod: CPTII,S$GLB,, | Performed by: INTERNAL MEDICINE

## 2020-03-11 PROCEDURE — 3080F PR MOST RECENT DIASTOLIC BLOOD PRESSURE >= 90 MM HG: ICD-10-PCS | Mod: CPTII,S$GLB,, | Performed by: INTERNAL MEDICINE

## 2020-03-11 PROCEDURE — 99213 OFFICE O/P EST LOW 20 MIN: CPT | Mod: S$GLB,,, | Performed by: INTERNAL MEDICINE

## 2020-03-11 RX ORDER — DIPHENOXYLATE HYDROCHLORIDE AND ATROPINE SULFATE 2.5; .025 MG/1; MG/1
1 TABLET ORAL 4 TIMES DAILY PRN
Qty: 360 TABLET | Refills: 0 | Status: SHIPPED | OUTPATIENT
Start: 2020-03-11 | End: 2020-07-17 | Stop reason: SDUPTHER

## 2020-03-11 NOTE — PROGRESS NOTES
Subjective:       Patient ID: April Wendt Schamberger is a 35 y.o. female.    Chief Complaint: Diarrhea    This is a 34-year-old female here for a follow-up visit.  She has a history of ileitis and has undergone extensive workup over the years.  She has chronic neck/shoulder tension and pain due to a cervical disk issue.  This has resulted in chronic NSAID usage for management of the symptoms.  A colonoscopy did reveal ileal ulcerations, workup negative for Crohn's with the exception of a suggestive lab panel.  She continues to have diarrhea throughout the day, no unintentional weight loss.  She has lost weight after changing her diet from gaining weight during pregnancy.  She denies any fevers, chills.  Overall her symptoms are similar.  No other exacerbating or relieving factors or other associated symptoms.    The following portions of the patient's history were reviewed and updated as appropriate: allergies, current medications, past family history, past medical history, past social history, past surgical history and problem list.    (Portions of this note were dictated using voice recognition software and may contain dictation related errors in spelling/grammar/syntax not found on text review)  HPI  Review of Systems   Constitutional: Negative.  Negative for activity change, appetite change, fatigue and unexpected weight change.   Respiratory: Negative for shortness of breath and stridor.    Cardiovascular: Negative for chest pain.   Gastrointestinal: Positive for abdominal pain and diarrhea. Negative for constipation, nausea and vomiting.   Skin: Negative.    Neurological: Negative.    Psychiatric/Behavioral: Negative.          Objective:      Physical Exam  Vitals signs reviewed.   Constitutional:       General: She is not in acute distress.     Appearance: She is well-developed. She is not diaphoretic.   Eyes:      General: No scleral icterus.  Pulmonary:      Effort: Pulmonary effort is normal. No  respiratory distress.   Abdominal:      General: There is distension.      Palpations: There is no mass.   Skin:     General: Skin is warm and dry.   Neurological:      General: No focal deficit present.      Mental Status: She is alert and oriented to person, place, and time.   Psychiatric:         Behavior: Behavior normal.         Thought Content: Thought content normal.         Judgment: Judgment normal.           Labs/imaging; reviewed  Assessment:       1. Irritable bowel syndrome with diarrhea    2. Ileitis        Plan:   1. Discussed decreasing use of imodium, described toxicities/warnings  2. Trial of lomotil  3. ?alternative agent for myalgias in which we could d/c nsaids and get capsule study

## 2020-03-12 ENCOUNTER — PATIENT MESSAGE (OUTPATIENT)
Dept: INTERNAL MEDICINE | Facility: CLINIC | Age: 35
End: 2020-03-12

## 2020-03-12 DIAGNOSIS — R51.9 NONINTRACTABLE HEADACHE, UNSPECIFIED CHRONICITY PATTERN, UNSPECIFIED HEADACHE TYPE: Primary | ICD-10-CM

## 2020-03-12 RX ORDER — BUTALBITAL, ACETAMINOPHEN AND CAFFEINE 50; 325; 40 MG/1; MG/1; MG/1
1 TABLET ORAL EVERY 6 HOURS PRN
Qty: 30 TABLET | Refills: 1 | Status: SHIPPED | OUTPATIENT
Start: 2020-03-12 | End: 2020-06-25 | Stop reason: SDUPTHER

## 2020-03-16 ENCOUNTER — PATIENT MESSAGE (OUTPATIENT)
Dept: INTERNAL MEDICINE | Facility: CLINIC | Age: 35
End: 2020-03-16

## 2020-03-16 DIAGNOSIS — F51.01 PRIMARY INSOMNIA: Primary | ICD-10-CM

## 2020-03-16 RX ORDER — ESZOPICLONE 3 MG/1
3 TABLET, FILM COATED ORAL NIGHTLY PRN
Qty: 30 TABLET | Refills: 3 | Status: SHIPPED | OUTPATIENT
Start: 2020-03-16 | End: 2020-07-06 | Stop reason: SDUPTHER

## 2020-03-17 ENCOUNTER — OFFICE VISIT (OUTPATIENT)
Dept: PSYCHIATRY | Facility: CLINIC | Age: 35
End: 2020-03-17
Payer: COMMERCIAL

## 2020-03-17 VITALS
WEIGHT: 153 LBS | HEIGHT: 68 IN | HEART RATE: 85 BPM | BODY MASS INDEX: 23.19 KG/M2 | DIASTOLIC BLOOD PRESSURE: 93 MMHG | SYSTOLIC BLOOD PRESSURE: 137 MMHG

## 2020-03-17 DIAGNOSIS — F90.2 ATTENTION DEFICIT HYPERACTIVITY DISORDER (ADHD), COMBINED TYPE: Primary | ICD-10-CM

## 2020-03-17 DIAGNOSIS — K58.0 IRRITABLE BOWEL SYNDROME WITH DIARRHEA: ICD-10-CM

## 2020-03-17 PROCEDURE — 3008F BODY MASS INDEX DOCD: CPT | Mod: CPTII,S$GLB,, | Performed by: PSYCHIATRY & NEUROLOGY

## 2020-03-17 PROCEDURE — 99212 PR OFFICE/OUTPT VISIT, EST, LEVL II, 10-19 MIN: ICD-10-PCS | Mod: S$GLB,,, | Performed by: PSYCHIATRY & NEUROLOGY

## 2020-03-17 PROCEDURE — 3075F PR MOST RECENT SYSTOLIC BLOOD PRESS GE 130-139MM HG: ICD-10-PCS | Mod: CPTII,S$GLB,, | Performed by: PSYCHIATRY & NEUROLOGY

## 2020-03-17 PROCEDURE — 99212 OFFICE O/P EST SF 10 MIN: CPT | Mod: S$GLB,,, | Performed by: PSYCHIATRY & NEUROLOGY

## 2020-03-17 PROCEDURE — 3008F PR BODY MASS INDEX (BMI) DOCUMENTED: ICD-10-PCS | Mod: CPTII,S$GLB,, | Performed by: PSYCHIATRY & NEUROLOGY

## 2020-03-17 PROCEDURE — 99999 PR PBB SHADOW E&M-EST. PATIENT-LVL II: CPT | Mod: PBBFAC,,, | Performed by: PSYCHIATRY & NEUROLOGY

## 2020-03-17 PROCEDURE — 3080F DIAST BP >= 90 MM HG: CPT | Mod: CPTII,S$GLB,, | Performed by: PSYCHIATRY & NEUROLOGY

## 2020-03-17 PROCEDURE — 3075F SYST BP GE 130 - 139MM HG: CPT | Mod: CPTII,S$GLB,, | Performed by: PSYCHIATRY & NEUROLOGY

## 2020-03-17 PROCEDURE — 99999 PR PBB SHADOW E&M-EST. PATIENT-LVL II: ICD-10-PCS | Mod: PBBFAC,,, | Performed by: PSYCHIATRY & NEUROLOGY

## 2020-03-17 PROCEDURE — 3080F PR MOST RECENT DIASTOLIC BLOOD PRESSURE >= 90 MM HG: ICD-10-PCS | Mod: CPTII,S$GLB,, | Performed by: PSYCHIATRY & NEUROLOGY

## 2020-03-17 RX ORDER — DEXTROAMPHETAMINE SACCHARATE, AMPHETAMINE ASPARTATE MONOHYDRATE, DEXTROAMPHETAMINE SULFATE AND AMPHETAMINE SULFATE 7.5; 7.5; 7.5; 7.5 MG/1; MG/1; MG/1; MG/1
30 CAPSULE, EXTENDED RELEASE ORAL EVERY MORNING
Qty: 30 CAPSULE | Refills: 0 | Status: SHIPPED | OUTPATIENT
Start: 2020-03-26 | End: 2020-06-23 | Stop reason: SDUPTHER

## 2020-03-17 RX ORDER — DEXTROAMPHETAMINE SACCHARATE, AMPHETAMINE ASPARTATE MONOHYDRATE, DEXTROAMPHETAMINE SULFATE AND AMPHETAMINE SULFATE 7.5; 7.5; 7.5; 7.5 MG/1; MG/1; MG/1; MG/1
30 CAPSULE, EXTENDED RELEASE ORAL EVERY MORNING
Qty: 30 CAPSULE | Refills: 0 | Status: SHIPPED | OUTPATIENT
Start: 2020-04-26 | End: 2020-05-27

## 2020-03-17 RX ORDER — DEXTROAMPHETAMINE SACCHARATE, AMPHETAMINE ASPARTATE MONOHYDRATE, DEXTROAMPHETAMINE SULFATE AND AMPHETAMINE SULFATE 7.5; 7.5; 7.5; 7.5 MG/1; MG/1; MG/1; MG/1
30 CAPSULE, EXTENDED RELEASE ORAL EVERY MORNING
Qty: 30 CAPSULE | Refills: 0 | Status: SHIPPED | OUTPATIENT
Start: 2020-05-26 | End: 2020-06-23 | Stop reason: SDUPTHER

## 2020-03-17 RX ORDER — AMITRIPTYLINE HYDROCHLORIDE 25 MG/1
25 TABLET, FILM COATED ORAL NIGHTLY
Qty: 30 TABLET | Refills: 2 | Status: SHIPPED | OUTPATIENT
Start: 2020-03-17 | End: 2020-06-23 | Stop reason: SDUPTHER

## 2020-03-17 NOTE — PROGRESS NOTES
"Outpatient Psychiatry Follow-Up Visit (MD/NP)    3/17/2020    Clinical Status of Patient:  Outpatient (Ambulatory)    Chief Complaint:  April Wendt Schamberger is a 34 y.o. female who presents today for follow-up of attention problems.  Met with patient.   Patient last seen 12/17/2019 for initial evaluation, increased to adderall XR 30 mg & continued  elavil 10 mg qhs to aid with IBS sx and sleep. Reviewed , no concern for inappropriate use of stimulants. Last filled 2/26/2020.    Interval History and Content of Current Session:  Interim Events/Subjective Report/Content of Current Session:   Pt states that she has been doing well since last appointment and feels that work is going well. Mood is "ok" and displays bright, full affect.  States that the increased adderall dose has been helpful for concentration, focus, and attention. No difficulty completing tasks for work.  Denies SE of stimulant medication including jitteriness, palpitations, SOB, CP, tremors, sleep difficulty, decreased appetite.  Recently stopped ambien and started lunesta for sleep (rx'd by other provider). Sleeps bw 6-7 hours although describes sleep as continuing to be problematic. Denies issues with appetite.Denies feeling depressed.  Pt states that she has trouble getting immodium and has been using bathroom frequently, was started on lomotil but finds it causes bloating and stomach discomfort at times; states she is using bathroom up to 10x a day currently. Denies Denies SI/HI/AVH. Does not appear objectively psychotic or  Manic.      Review of Systems   MEDICAL REVIEW OF SYSTEMS  History obtained from the patient   General : NO chills or fever   Eyes: NO  visual changes   ENT: NO nasal discharge or sore throat   Endocrine:  NO  weight changes    Dermatological: NO rashes   Respiratory: NO cough, shortness of breath   Cardiovascular: NO chest pain, palpitations or racing heart   Gastrointestinal: NO nausea, vomiting, + diarrhea " "(improved)   Musculoskeletal: NO muscle pain or stiffness   Neurological: NO confusion, dizziness, headaches or tremors   Psychiatric: please see HPI        Past Medical, Family and Social History: The patient's past medical, family and social history have been reviewed and updated as appropriate within the electronic medical record - see encounter notes.    Compliance: yes    Side effects: None    Risk Parameters:  Patient reports no suicidal ideation  Patient reports no homicidal ideation  Patient reports no self-injurious behavior  Patient reports no violent behavior    Exam (detailed: at least 9 elements; comprehensive: all 15 elements)   Constitutional  Vitals:  Most recent vital signs, dated less than 90 days prior to this appointment, were reviewed.   Vitals:    03/17/20 1025   BP: (!) 137/93   Pulse: 85   Weight: 69.4 kg (153 lb)   Height: 5' 8" (1.727 m)        General:  unremarkable, age appropriate     Musculoskeletal  Muscle Strength/Tone:  no spasicity, no rigidity, no dyskinesia, no tremor   Gait & Station:  non-ataxic     Psychiatric  Speech:  no latency; no press   Mood & Affect:  "ok"  congruent and appropriate   Thought Process:  normal and logical   Associations:  intact   Thought Content:  normal, no suicidality, no homicidality, delusions, or paranoia   Insight:  has awareness of illness   Judgement: behavior is adequate to circumstances   Orientation:  grossly intact   Memory: intact for content of interview   Language: grossly intact   Attention Span & Concentration:  able to focus   Fund of Knowledge:  intact and appropriate to age and level of education     Assessment and Diagnosis   Status/Progress: Based on the examination today, the patient's problem(s) is/are improved.  New problems have not been presented today.   Co-morbidities, Diagnostic uncertainty and Lack of compliance are not complicating management of the primary condition.  There are no active rule-out diagnoses for this " patient at this time.     General Impression:     ADHD  Anxiety, r/o NIRAV  IBS with diarrhea  Insomnia     33 yo F with h/o ADHD, NIRAV and IBS-D. ADHD Sx improvement on 30 mg adderall XR but not lasting throughout the day and concentration, attention and focus have significantly improved.  IBS-D is not well controlled and continues to report some anxiety re: work. Agreeable to increase of elavil today     Strengths and Liabilities: Strength: Patient accepts guidance/feedback, Strength: Patient is expressive/articulate., Strength: Patient is intelligent., Strength: Patient is motivated for change., Strength: Patient is physically healthy., Strength: Patient has positive support network., Strength: Patient has reasonable judgment.     Treatment Goals:  Specify outcomes written in observable, behavioral terms:   Anxiety: reducing physical symptoms of anxiety and reducing time spent worrying (<30 minutes/day)     Treatment Plan/Recommendations:   · Medication Management: The risks and benefits of medication were discussed with the patient.  · AA/NA/CA/ACOA/Abstinence  · The treatment plan and follow up plan were reviewed with the patient.   · Increase  adderall XR 30 mg daily as sx improved & is functioning well at work- escribed to [facility] with fill dates-3/26, 4/26, 5/26  ? Consider dose adjustments as appropriate, may need to add IR dose at lunch time  · Increase amitriptyline 25 mg qhs for anxiety, sleep, and IBS with diarrhea-  ? Consider increasing amitriptyline in sx become problematic      RTC 3 months     Risa Tobias MD  [employer] [facility] PGY3

## 2020-03-22 ENCOUNTER — PATIENT MESSAGE (OUTPATIENT)
Dept: PAIN MEDICINE | Facility: CLINIC | Age: 35
End: 2020-03-22

## 2020-03-23 DIAGNOSIS — M62.838 MUSCLE SPASM: ICD-10-CM

## 2020-03-23 RX ORDER — TIZANIDINE 4 MG/1
4 TABLET ORAL NIGHTLY PRN
Qty: 30 TABLET | Refills: 2 | Status: SHIPPED | OUTPATIENT
Start: 2020-03-23 | End: 2020-06-30 | Stop reason: SDUPTHER

## 2020-03-25 ENCOUNTER — DOCUMENTATION ONLY (OUTPATIENT)
Dept: REHABILITATION | Facility: HOSPITAL | Age: 35
End: 2020-03-25

## 2020-03-25 NOTE — PROGRESS NOTES
Outpatient Therapy Discharge Summary     Name: Judi Paz Schamberger Clinic Number: [id number]    Therapy Diagnosis:   No diagnosis found.  Physician: Joseph Moore FNP  Physician Orders: PT Eval and Treat   Medical Diagnosis from Referral:   M54.2 (ICD-10-CM) - Cervical pain (neck)   M79.18 (ICD-10-CM) - Myofascial muscle pain   M54.2 (ICD-10-CM) - Neck pain   M47.812 (ICD-10-CM) - Facet arthropathy, cervical         Evaluation Date: 9/17/2019        Date of Last visit: 10/29/2019   Total Visits Received: 4      Assessment    Goals: goals not reassessed due to pt only coming for 4 visits    Discharge reason: Other:  Pt did not return for more visits.    Plan   This patient is discharged from Physical Therapy

## 2020-04-03 ENCOUNTER — PATIENT MESSAGE (OUTPATIENT)
Dept: PSYCHIATRY | Facility: CLINIC | Age: 35
End: 2020-04-03

## 2020-04-06 RX ORDER — DEXTROAMPHETAMINE SACCHARATE, AMPHETAMINE ASPARTATE, DEXTROAMPHETAMINE SULFATE AND AMPHETAMINE SULFATE 1.25; 1.25; 1.25; 1.25 MG/1; MG/1; MG/1; MG/1
5 TABLET ORAL DAILY
Qty: 30 TABLET | Refills: 0 | Status: SHIPPED | OUTPATIENT
Start: 2020-04-06 | End: 2020-06-23

## 2020-04-21 DIAGNOSIS — Z01.84 ANTIBODY RESPONSE EXAMINATION: ICD-10-CM

## 2020-05-08 ENCOUNTER — PATIENT MESSAGE (OUTPATIENT)
Dept: PAIN MEDICINE | Facility: CLINIC | Age: 35
End: 2020-05-08

## 2020-05-21 DIAGNOSIS — Z01.84 ANTIBODY RESPONSE EXAMINATION: ICD-10-CM

## 2020-06-20 DIAGNOSIS — Z01.84 ANTIBODY RESPONSE EXAMINATION: ICD-10-CM

## 2020-06-23 ENCOUNTER — OFFICE VISIT (OUTPATIENT)
Dept: PSYCHIATRY | Facility: CLINIC | Age: 35
End: 2020-06-23
Payer: COMMERCIAL

## 2020-06-23 DIAGNOSIS — F90.2 ATTENTION DEFICIT HYPERACTIVITY DISORDER (ADHD), COMBINED TYPE: Primary | ICD-10-CM

## 2020-06-23 PROCEDURE — 99212 PR OFFICE/OUTPT VISIT, EST, LEVL II, 10-19 MIN: ICD-10-PCS | Mod: 95,,, | Performed by: PSYCHIATRY & NEUROLOGY

## 2020-06-23 PROCEDURE — 99212 OFFICE O/P EST SF 10 MIN: CPT | Mod: 95,,, | Performed by: PSYCHIATRY & NEUROLOGY

## 2020-06-23 RX ORDER — DEXTROAMPHETAMINE SACCHARATE, AMPHETAMINE ASPARTATE MONOHYDRATE, DEXTROAMPHETAMINE SULFATE AND AMPHETAMINE SULFATE 7.5; 7.5; 7.5; 7.5 MG/1; MG/1; MG/1; MG/1
30 CAPSULE, EXTENDED RELEASE ORAL EVERY MORNING
Qty: 30 CAPSULE | Refills: 0 | Status: SHIPPED | OUTPATIENT
Start: 2020-07-26 | End: 2020-09-03 | Stop reason: SDUPTHER

## 2020-06-23 RX ORDER — DEXTROAMPHETAMINE SACCHARATE, AMPHETAMINE ASPARTATE, DEXTROAMPHETAMINE SULFATE AND AMPHETAMINE SULFATE 2.5; 2.5; 2.5; 2.5 MG/1; MG/1; MG/1; MG/1
10 TABLET ORAL
Qty: 30 TABLET | Refills: 0 | Status: SHIPPED | OUTPATIENT
Start: 2020-07-26 | End: 2020-08-26

## 2020-06-23 RX ORDER — DEXTROAMPHETAMINE SACCHARATE, AMPHETAMINE ASPARTATE MONOHYDRATE, DEXTROAMPHETAMINE SULFATE AND AMPHETAMINE SULFATE 7.5; 7.5; 7.5; 7.5 MG/1; MG/1; MG/1; MG/1
30 CAPSULE, EXTENDED RELEASE ORAL EVERY MORNING
Qty: 30 CAPSULE | Refills: 0 | Status: SHIPPED | OUTPATIENT
Start: 2020-08-26 | End: 2020-09-03 | Stop reason: SDUPTHER

## 2020-06-23 RX ORDER — DEXTROAMPHETAMINE SACCHARATE, AMPHETAMINE ASPARTATE, DEXTROAMPHETAMINE SULFATE AND AMPHETAMINE SULFATE 2.5; 2.5; 2.5; 2.5 MG/1; MG/1; MG/1; MG/1
10 TABLET ORAL
Qty: 30 TABLET | Refills: 0 | Status: SHIPPED | OUTPATIENT
Start: 2020-08-26 | End: 2020-09-03 | Stop reason: SDUPTHER

## 2020-06-23 RX ORDER — AMITRIPTYLINE HYDROCHLORIDE 25 MG/1
25 TABLET, FILM COATED ORAL NIGHTLY
Qty: 30 TABLET | Refills: 2 | Status: SHIPPED | OUTPATIENT
Start: 2020-06-23 | End: 2020-09-03 | Stop reason: SDUPTHER

## 2020-06-23 RX ORDER — DEXTROAMPHETAMINE SACCHARATE, AMPHETAMINE ASPARTATE, DEXTROAMPHETAMINE SULFATE AND AMPHETAMINE SULFATE 2.5; 2.5; 2.5; 2.5 MG/1; MG/1; MG/1; MG/1
10 TABLET ORAL
Qty: 30 TABLET | Refills: 0 | Status: SHIPPED | OUTPATIENT
Start: 2020-06-26 | End: 2020-07-26

## 2020-06-23 RX ORDER — DEXTROAMPHETAMINE SACCHARATE, AMPHETAMINE ASPARTATE MONOHYDRATE, DEXTROAMPHETAMINE SULFATE AND AMPHETAMINE SULFATE 7.5; 7.5; 7.5; 7.5 MG/1; MG/1; MG/1; MG/1
30 CAPSULE, EXTENDED RELEASE ORAL EVERY MORNING
Qty: 30 CAPSULE | Refills: 0 | Status: SHIPPED | OUTPATIENT
Start: 2020-06-26 | End: 2020-09-03 | Stop reason: SDUPTHER

## 2020-06-23 NOTE — PROGRESS NOTES
"Outpatient Psychiatry Follow-Up Visit (MD/NP)    6/23/2020      TELE PSYCHIATRY   Disclaimer   *The patient was informed despite using HIPPA compliant technology there may be risks including security breach, technological failure, inability to perform a comprehensive physical exam which could delay or prevent an accurate diagnosis, and potential complications from treatment decisions rendered over a telemedical platform. The patient understands and consented to the use of tele-health service as being a safe measure to mitigate during COVID Crisis.  The patient was also informed of the relationship between the physician and patient and the respective role of any other health care provider with respect to management of the patient; and notified that the pt may decline to receive medical services by telemedicine and may withdraw from such care at any time.     Patient's Current location, exact physical address:  [address] [place] [address] [address]  In Case of Emergency pts next of kin  Name: () rj  Phone number:  [phone]  Visit type: Virtual visit with synchronous audio and video  Total time spent with patient: 10 minutes        Clinical Status of Patient:  Outpatient (Ambulatory)    Chief Complaint:  April Wendt Schamberger is a 35 y.o. female who presents today for follow-up of attention problems.  Met with patient.   Patient last seen 3/27/2020 for follow up, continued to adderall XR 30 mg & inreased elavil 25 mg qhs to aid with IBS sx and sleep. In the interim, also called in 5 mg IR to take in the afternoon. Reviewed , no concern for inappropriate use of stimulants. Last filled 5/26/2020.     Interval History and Content of Current Session:  Interim Events/Subjective Report/Content of Current Session:   Pt states that she is doing well since her last visit. She has been working from home and reports mood as "good" and displays bright reactive affect as usual, denies anxiety. Takes XR " adderall at 8/9 am and then will take IR after lunch. Takes 7.5mg-10 mg as needed, does not take every day. Adderall  has been helpful for concentration, focus, and attention. No difficulty completing tasks for work. Denies SE of stimulant medication including jitteriness, palpitations, SOB, CP, tremors, sleep difficulty, decreased appetite. Takes half of the elavil only at night as 25 mg was too sedating. IBS sx are unchanged. Sleeps 6 hours/night, no issues with energy. Increased appetite and reports 10 lb weight gain since last appointment which she attributes to spending more time at home 2/2 covid and diet. Denies SI/HI/AVH. Does not appear objectively psychotic or  Manic.      Review of Systems   MEDICAL REVIEW OF SYSTEMS  History obtained from the patient   General : NO chills or fever   Endocrine:  + weight gain   Respiratory: NO cough, shortness of breath   Cardiovascular: NO chest pain, palpitations or racing heart   Gastrointestinal: , + diarrhea (improved)   Neurological: NO  dizziness, headaches or tremors   Psychiatric: please see HPI        Past Medical, Family and Social History: The patient's past medical, family and social history have been reviewed and updated as appropriate within the electronic medical record - see encounter notes.    Compliance: yes    Side effects: None    Risk Parameters:  Patient reports no suicidal ideation  Patient reports no homicidal ideation  Patient reports no self-injurious behavior  Patient reports no violent behavior    Exam (detailed: at least 9 elements; comprehensive: all 15 elements)   Constitutional  Vitals:  Most recent vital signs, dated greater than 90 days prior to this appointment, were reviewed.   There were no vitals filed for this visit.     General:  unremarkable, age appropriate     Musculoskeletal  Muscle Strength/Tone:  no spasicity, no rigidity, no dyskinesia, no tremor   Gait & Station:  non-ataxic     Psychiatric  Speech:  no latency; no  "press   Mood & Affect:  "good"  congruent and appropriate, bright   Thought Process:  normal and logical   Associations:  intact   Thought Content:  normal, no suicidality, no homicidality, delusions, or paranoia   Insight:  has awareness of illness   Judgement: behavior is adequate to circumstances   Orientation:  grossly intact   Memory: intact for content of interview   Language: grossly intact   Attention Span & Concentration:  able to focus   Fund of Knowledge:  intact and appropriate to age and level of education     Assessment and Diagnosis   Status/Progress: Based on the examination today, the patient's problem(s) is/are improved.  New problems have not been presented today.   Co-morbidities, Diagnostic uncertainty and Lack of compliance are not complicating management of the primary condition.  There are no active rule-out diagnoses for this patient at this time.     General Impression:     ADHD  Anxiety, r/o NIRAV  IBS with diarrhea  Insomnia     35 yo F with h/o ADHD, NIRAV and IBS-D. ADHD Sx improvement on 30 mg adderall XR but not lasting throughout the day and concentration, attention and focus have significantly improved.  IBS-D is not well controlled and continues to report some anxiety re: work. Agreeable to increase of elavil today     Strengths and Liabilities: Strength: Patient accepts guidance/feedback, Strength: Patient is expressive/articulate., Strength: Patient is intelligent., Strength: Patient is motivated for change., Strength: Patient is physically healthy., Strength: Patient has positive support network., Strength: Patient has reasonable judgment.     Treatment Goals:  Specify outcomes written in observable, behavioral terms:   Anxiety: reducing physical symptoms of anxiety and reducing time spent worrying (<30 minutes/day)     Treatment Plan/Recommendations:   · Medication Management: The risks and benefits of medication were discussed with the patient.  · AA/NA/CA/ACOA/Abstinence  · The " treatment plan and follow up plan were reviewed with the patient.   · cotiue  adderall XR 30 mg daily as sx improved & is functioning well at work- escribed to [facility] with fill dates-6/26, 7/26, 8/26  · Increase IR adderall from 5 to 10 mg prn in the afternoon- 6/26, 7/26, 8/26  · Continue amitriptyline 25 mg qhs for anxiety, sleep, and IBS with diarrhea-  ? Consider increasing amitriptyline in sx become problematic      RTC 3 months     Risa Tobias MD  [employer] [facility] PGY3

## 2020-06-25 DIAGNOSIS — R51.9 NONINTRACTABLE HEADACHE, UNSPECIFIED CHRONICITY PATTERN, UNSPECIFIED HEADACHE TYPE: ICD-10-CM

## 2020-06-26 RX ORDER — BUTALBITAL, ACETAMINOPHEN AND CAFFEINE 50; 325; 40 MG/1; MG/1; MG/1
1 TABLET ORAL EVERY 6 HOURS PRN
Qty: 30 TABLET | Refills: 0 | Status: SHIPPED | OUTPATIENT
Start: 2020-06-26 | End: 2020-08-04 | Stop reason: SDUPTHER

## 2020-06-30 ENCOUNTER — OFFICE VISIT (OUTPATIENT)
Dept: PAIN MEDICINE | Facility: CLINIC | Age: 35
End: 2020-06-30
Payer: COMMERCIAL

## 2020-06-30 VITALS — BODY MASS INDEX: 23.26 KG/M2 | WEIGHT: 153 LBS

## 2020-06-30 DIAGNOSIS — M79.18 MYOFASCIAL PAIN SYNDROME, CERVICAL: ICD-10-CM

## 2020-06-30 DIAGNOSIS — M62.838 MUSCLE SPASM: ICD-10-CM

## 2020-06-30 DIAGNOSIS — M79.18 MYOFASCIAL MUSCLE PAIN: ICD-10-CM

## 2020-06-30 DIAGNOSIS — M54.2 CERVICAL PAIN (NECK): Primary | ICD-10-CM

## 2020-06-30 PROCEDURE — 3008F PR BODY MASS INDEX (BMI) DOCUMENTED: ICD-10-PCS | Mod: CPTII,S$GLB,, | Performed by: NURSE PRACTITIONER

## 2020-06-30 PROCEDURE — 20553 NJX 1/MLT TRIGGER POINTS 3/>: CPT | Mod: S$GLB,,, | Performed by: NURSE PRACTITIONER

## 2020-06-30 PROCEDURE — 3008F BODY MASS INDEX DOCD: CPT | Mod: CPTII,S$GLB,, | Performed by: NURSE PRACTITIONER

## 2020-06-30 PROCEDURE — 99214 OFFICE O/P EST MOD 30 MIN: CPT | Mod: 25,S$GLB,, | Performed by: NURSE PRACTITIONER

## 2020-06-30 PROCEDURE — 20553 PR INJECT TRIGGER POINTS, > 3: ICD-10-PCS | Mod: S$GLB,,, | Performed by: NURSE PRACTITIONER

## 2020-06-30 PROCEDURE — 99999 PR PBB SHADOW E&M-EST. PATIENT-LVL III: ICD-10-PCS | Mod: PBBFAC,,, | Performed by: NURSE PRACTITIONER

## 2020-06-30 PROCEDURE — 99999 PR PBB SHADOW E&M-EST. PATIENT-LVL III: CPT | Mod: PBBFAC,,, | Performed by: NURSE PRACTITIONER

## 2020-06-30 PROCEDURE — 99214 PR OFFICE/OUTPT VISIT, EST, LEVL IV, 30-39 MIN: ICD-10-PCS | Mod: 25,S$GLB,, | Performed by: NURSE PRACTITIONER

## 2020-06-30 RX ORDER — TRIAMCINOLONE ACETONIDE 40 MG/ML
40 INJECTION, SUSPENSION INTRA-ARTICULAR; INTRAMUSCULAR
Status: COMPLETED | OUTPATIENT
Start: 2020-06-30 | End: 2020-06-30

## 2020-06-30 RX ORDER — TIZANIDINE 4 MG/1
4 TABLET ORAL 3 TIMES DAILY PRN
Qty: 90 TABLET | Refills: 2 | Status: SHIPPED | OUTPATIENT
Start: 2020-06-30 | End: 2020-10-26 | Stop reason: SDUPTHER

## 2020-06-30 RX ADMIN — TRIAMCINOLONE ACETONIDE 40 MG: 40 INJECTION, SUSPENSION INTRA-ARTICULAR; INTRAMUSCULAR at 12:06

## 2020-06-30 NOTE — PROGRESS NOTES
Chronic Pain  patient Established Note (Follow up visit)        SUBJECTIVE:    Interval Update:    20 Pt returns for bilateral chronic neck pain, patient is established our office she has been given cervical paraspinal trigger point injection in the past which has helped alleviate her pain for greater than 6 months.  She is requesting repeat trigger point injections today.    April Wendt Schamberger presents to the clinic for a follow-up appointment for Cervical TPIs today.   Since the last visit, April Wendt Schamberger states the pain has been persistant. Current pain intensity is 6/10.    Pain Disability Index Review:  Last 3 PDI Scores 2020   Pain Disability Index (PDI) 49 [phone]        Pain Medications:      - Opioids: None  - Adjuvant Medications: Advil,Motrin ( Ibuprofen)  - Anti-Coagulants: None  - Others: see medications list.     Opioid Contract: no      report:  Reviewed and consistent with medication use as prescribed.     Pain Procedures:17 TRIGGER POINT INJECTION  17 TRIGGER POINT INJECTION  17 bilateral C4,5,6 CERVICAL FACET MEDIAL BRANCH NERVE BLOCK (Prone) ( after xray correlation with pain level, decsion was made to proceed at C4,5,6 levels, patient agrees to proceed      Physical Therapy/Home Exercise: no     Imagin17 MRI Cervical Spine Without Contrast  Narrative   Technique: Multiplanar, multisequence MR imaging of the cervical spine obtained without the use of IV contrast.     Comparison: Cervical spine radiographs 12/15/2016.     Results:    There is straightening with mild reversal of the normal cervical lordosis. Vertebral body heights are maintained with no evidence of fracture. Mild intervertebral disc space narrowing and desiccation are visualized at C5-6 and C6-7. No marrow signal abnormality suspicious for an infiltrative process.       The cervical cord is normal in caliber and signal characteristics.  The craniocervical junction and visualized intracranial contents are unremarkable.  The adjacent soft tissue structures show no significant abnormalities.      C2-C3:  No significant spinal canal or neural foraminal narrowing.    C3-C4: No significant spinal canal or neural foraminal narrowing.    C4-C5:  No significant spinal canal or neural foraminal narrowing.    C5-C6:  Disc bulge with right uncovertebral spurring. Findings result in mild spinal canal stenosis and mild right neural foraminal narrowing.    C6-C7:  Mild disc bulge with thickening of the ligamentum flavum results in mild spinal canal stenosis.No significant neuroforaminal narrowing.    C7-T1:   No significant central spinal or neural foraminal narrowing.   Impression      Mild disc bulge and spinal canal stenosis at the C5-6 and C6-7 levels.      Electronically signed by: UMER MARTIN MD  Date: 01/26/17  Time: 12:56     Encounter   View Encounter      Reviewed By   Hunter Jimenez MD on 1/27/2017  2:18 PM   Exam Details                     Performed Procedure Technologist Supporting Staff Performing Physician   MRI Cervical Spine Without Contrast Andre Hinds, RT         Appointment Date/Status Modality Department        1/26/2017     Completed [facility] MRI1 [facility] MRI       Begin Exam End Exam Begin Exam Questionnaires End Exam Questionnaires     1/26/2017 11:22 AM 1/26/2017 11:59 AM MRI TECH NAVIGATOR QUESTIONS IMAGING END ALL         RIS PREGNANCY TECH NAVIGATOR          X-Ray Cervical Spine AP Lat with Flexion  Extension 12/15/16  Narrative     Cervical spine radiographs     comparison: None    Results: AP, lateral, flexion and extension views  .  The alignment is normal, vertebral body height and disk spaces are well-maintained.    Flexion and extension views demonstrate no translational abnormalities.  Very small anterior osteophyte noted at C5-C6.  No fracture or  osseous lesion seen.Prevertebral soft tissues appear normal.   Impression    No significant abnormality seen      Electronically signed by: JOSE A JENKINS MD  Date: 12/15/16  Time: 10:52          Allergies:   Review of patient's allergies indicates:   Allergen Reactions    Lactose     Azithromycin Nausea And Vomiting       Current Medications:   Current Outpatient Medications   Medication Sig Dispense Refill    amitriptyline (ELAVIL) 25 MG tablet Take 1 tablet (25 mg total) by mouth every evening. 30 tablet 2    butalbital-acetaminophen-caffeine -40 mg (FIORICET, ESGIC) -40 mg per tablet Take 1 tablet by mouth every 6 (six) hours as needed for Headaches. 30 tablet 0    dextroamphetamine-amphetamine (ADDERALL XR) 30 MG 24 hr capsule Take 1 capsule (30 mg total) by mouth every morning. 30 capsule 0    [START ON 7/26/2020] dextroamphetamine-amphetamine (ADDERALL XR) 30 MG 24 hr capsule Take 1 capsule (30 mg total) by mouth every morning. 30 capsule 0    [START ON 8/26/2020] dextroamphetamine-amphetamine (ADDERALL XR) 30 MG 24 hr capsule Take 1 capsule (30 mg total) by mouth every morning. 30 capsule 0    dextroamphetamine-amphetamine 10 mg Tab Take 1 tablet (10 mg total) by mouth after lunch. 30 tablet 0    [START ON 7/26/2020] dextroamphetamine-amphetamine 10 mg Tab Take 1 tablet (10 mg total) by mouth after lunch. 30 tablet 0    [START ON 8/26/2020] dextroamphetamine-amphetamine 10 mg Tab Take 1 tablet (10 mg total) by mouth after lunch. 30 tablet 0    eszopiclone (LUNESTA) 3 mg Tab Take 1 tablet (3 mg total) by mouth nightly as needed (insomnia). 30 tablet 3    fluconazole (DIFLUCAN) 150 MG Tab Take 1 tablet by mouth now and repeat in 3 days if still symptomatic 20 tablet 3    loperamide (IMODIUM A-D) 1 mg/7.5 mL Liqd Take 0.1 mg/kg by mouth every 12 (twelve) hours.       No current facility-administered medications for this visit.        REVIEW OF SYSTEMS:    GENERAL:  No weight loss,  malaise or fevers.  HEENT:  Negative for frequent or significant headaches. + HAs   NECK:  Negative for lumps, goiter, pain and significant neck swelling.  RESPIRATORY:  Negative for cough, wheezing or shortness of breath.  CARDIOVASCULAR:  Negative for chest pain, leg swelling or palpitations.  GI:  Negative for abdominal discomfort, blood in stools or black stools or change in bowel habits.  MUSCULOSKELETAL:  See HPI.  SKIN:  Negative for lesions, rash, and itching.  PSYCH:  Positive for sleep disturbance, mood disorder and recent psychosocial stressors.  HEMATOLOGY/LYMPHOLOGY:  Negative for prolonged bleeding, bruising easily or swollen nodes.  NEURO:   No history of headaches, syncope, paralysis, seizures or tremors.  All other reviewed and negative other than HPI.    Past Medical History:  Past Medical History:   Diagnosis Date    Abnormal Pap smear of cervix 2000    Cryo Done    ADD (attention deficit disorder)     Breast disorder     Breast Cysts    Esophageal reflux     Fibroids     Hypertension     IBS (irritable bowel syndrome)     Insomnia     Kidney stones     Mastocytosis     Relies on partner vasectomy for contraception     Upper GI bleed        Past Surgical History:  Past Surgical History:   Procedure Laterality Date    COLONOSCOPY N/A 4/28/2017    Procedure: COLONOSCOPY;  Surgeon: Bren Larkin MD;  [facility];  Service: Endoscopy;  Laterality: N/A;    ESOPHAGOGASTRODUODENOSCOPY  2012    LASIK Bilateral 2005    REFRACTIVE SURGERY      TONSILLECTOMY, ADENOIDECTOMY  1988    VAGINAL DELIVERY      x 2       Family History:  Family History   Problem Relation Age of Onset    Breast cancer Mother 47        with Metastasis    Hypertension Mother     Prostate cancer Father     Hypertension Father     Diabetes Father     Deep vein thrombosis Father     Pancreatic cancer Maternal Grandfather     Breast cancer Paternal Grandmother 80    Diabetes type II Paternal  Grandfather     Heart attack Maternal Grandmother     Cancer Cousin 31    Cancer Cousin         Thurman Syndrome    Cancer Cousin         Thurman Syndrome    Ovarian cancer Maternal Aunt     Lymphoma Neg Hx     Tuberculosis Neg Hx     Celiac disease Neg Hx     Cirrhosis Neg Hx     Colon polyps Neg Hx     Crohn's disease Neg Hx     Cystic fibrosis Neg Hx     Esophageal cancer Neg Hx     Hemochromatosis Neg Hx     Inflammatory bowel disease Neg Hx     Irritable bowel syndrome Neg Hx     Liver cancer Neg Hx     Liver disease Neg Hx     Rectal cancer Neg Hx     Stomach cancer Neg Hx     Ulcerative colitis Neg Hx     Donavon's disease Neg Hx     Amblyopia Neg Hx     Blindness Neg Hx     Cataracts Neg Hx     Glaucoma Neg Hx     Macular degeneration Neg Hx     Retinal detachment Neg Hx     Strabismus Neg Hx        Social History:  Social History     Socioeconomic History    Marital status:      Spouse name: Not on file    Number of children: 1    Years of education: Not on file    Highest education level: Not on file   Occupational History    Not on file   Social Needs    Financial resource strain: Not very hard    Food insecurity     Worry: Never true     Inability: Never true    Transportation needs     Medical: No     Non-medical: No   Tobacco Use    Smoking status: Former Smoker    Smokeless tobacco: Never Used   Substance and Sexual Activity    Alcohol use: No     Alcohol/week: 0.0 standard drinks     Frequency: Never     Binge frequency: Never     Comment: breastfeeding     Drug use: No    Sexual activity: Yes     Partners: Male     Birth control/protection: Partner-Vasectomy     Comment: : Boris   Lifestyle    Physical activity     Days per week: 7 days     Minutes per session: 30 min    Stress: Very much   Relationships    Social connections     Talks on phone: More than three times a week     Gets together: More than three times a week     Attends [employer]  service: Not on file     Active member of club or organization: Yes     Attends meetings of clubs or organizations: Never     Relationship status:    Other Topics Concern    Not on file   Social History Narrative    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  Psych:  Mood and affect appropriate.  Skin: Skin color, texture, turgor normal, no rashes or lesions, in both upper and lower body.  Head/face:  Atraumatic, normocephalic. No palpable lymph nodes  Neck: + pain to palpation over the cervical paraspinous muscles. Spurling Negative. + pain with neck flexion, extension, or lateral flexion. .  Cor: RRR  Pulm: CTA  GI: Abdomen soft and non-tender.  Back: Straight leg raising in the sitting and supine positions is negative to radicular pain. No pain to palpation over the spine or costovertebral angles. Normal range of motion without pain reproduction.  Extremities: Peripheral joint ROM is full and pain free without obvious instability or laxity in all four extremities. No deformities, edema, or skin discoloration. Good capillary refill.  Musculoskeletal: Shoulder, hip, sacroiliac and knee provocative maneuvers are negative. Bilateral upper and lower extremity strength is normal and symmetric.  No atrophy or tone abnormalities are noted.  Neuro: Bilateral upper and lower extremity coordination and muscle stretch reflexes are physiologic and symmetric.  Plantar response are downgoing. No loss of sensation is noted.  Gait: Normal.    ASSESSMENT: 35 y.o. year old female with  neck and shoulder  pain, consistent with      Diagnoses    No diagnosis found.      PLAN:     - I have stressed the importance of physical activity and a home exercise plan to help with pain and improve health.  - Counseled patient regarding the importance of activity modification,  constant sleeping habits and physical therapy.  -cervical paraspinal trigger point injections today  -I have explained the risks, benefits, and alternatives of the procedure in detail. The patient voices understanding and all questions have been answered. The patient agrees to proceed as planned. Written Consent obtained.   -RTC as needed for returning or new pain  -The above plan and management options were discussed at length with patient. Patient is in agreement with the above and verbalized understanding. Dr. Argueta was consulted on this patient  and agrees with this plan.    VALERIE Marie  Interventional Pain Management      06/30/2020     Procedures Patient Name: Schamberger, April WJoanne   MRN: [id number]     INFORMED CONSENT: The procedure, risks, benefits and options were discussed with patient. There are no contraindications to the procedure. The patient expressed understanding and agreed to proceed. The personnel performing the procedure was discussed. I verify that I personally obtained  consent prior to the start of the procedure and the signed consent can be found on the patient's chart.     Procedure Date:  06/30/2020     Anesthesia: Topical     Pre Procedure diagnosis:   1. Myositis, unspecified myositis type, unspecified site    2. Myalgia          Post-Procedure diagnosis: same        Sedation: None     PROCEDURE: TRIGGER POINT INJECTION  The patient was placed in a seated position. The site of pain and procedure were confirmed with the patient prior to starting the procedure. After performing time out. The patient's trigger points were identified and marked. The skin was prepped with chlorhexidine three times. After performing time out A 25-gauge 1.5 inch needle was advanced through the skin and subcutaneous tissues. Aspiration for blood, air and CSF was negative. A total of 10 ml of Bupivacaine 0.25% and 40 mg Kenalog was injected at all trigger point. No complications were evident. No specimens  collected.     Blood Loss: Nill  Specimen: None     Joseph Moore NP-C  Interventional Pain Management    Disclaimer: This note was partly generated using dictation software which may occasionally result in transcription errors.

## 2020-07-09 ENCOUNTER — OFFICE VISIT (OUTPATIENT)
Dept: FAMILY MEDICINE | Facility: CLINIC | Age: 35
End: 2020-07-09
Payer: COMMERCIAL

## 2020-07-09 VITALS
DIASTOLIC BLOOD PRESSURE: 90 MMHG | BODY MASS INDEX: 24.89 KG/M2 | HEART RATE: 84 BPM | WEIGHT: 164.25 LBS | OXYGEN SATURATION: 100 % | TEMPERATURE: 98 F | SYSTOLIC BLOOD PRESSURE: 134 MMHG | HEIGHT: 68 IN

## 2020-07-09 DIAGNOSIS — I10 HYPERTENSION, UNSPECIFIED TYPE: ICD-10-CM

## 2020-07-09 DIAGNOSIS — G43.009 MIGRAINE WITHOUT AURA AND WITHOUT STATUS MIGRAINOSUS, NOT INTRACTABLE: Primary | ICD-10-CM

## 2020-07-09 PROCEDURE — 3075F PR MOST RECENT SYSTOLIC BLOOD PRESS GE 130-139MM HG: ICD-10-PCS | Mod: CPTII,S$GLB,, | Performed by: FAMILY MEDICINE

## 2020-07-09 PROCEDURE — 3075F SYST BP GE 130 - 139MM HG: CPT | Mod: CPTII,S$GLB,, | Performed by: FAMILY MEDICINE

## 2020-07-09 PROCEDURE — 99214 PR OFFICE/OUTPT VISIT, EST, LEVL IV, 30-39 MIN: ICD-10-PCS | Mod: S$GLB,,, | Performed by: FAMILY MEDICINE

## 2020-07-09 PROCEDURE — 99999 PR PBB SHADOW E&M-EST. PATIENT-LVL V: CPT | Mod: PBBFAC,,, | Performed by: FAMILY MEDICINE

## 2020-07-09 PROCEDURE — 99214 OFFICE O/P EST MOD 30 MIN: CPT | Mod: S$GLB,,, | Performed by: FAMILY MEDICINE

## 2020-07-09 PROCEDURE — 3008F BODY MASS INDEX DOCD: CPT | Mod: CPTII,S$GLB,, | Performed by: FAMILY MEDICINE

## 2020-07-09 PROCEDURE — 3008F PR BODY MASS INDEX (BMI) DOCUMENTED: ICD-10-PCS | Mod: CPTII,S$GLB,, | Performed by: FAMILY MEDICINE

## 2020-07-09 PROCEDURE — 99999 PR PBB SHADOW E&M-EST. PATIENT-LVL V: ICD-10-PCS | Mod: PBBFAC,,, | Performed by: FAMILY MEDICINE

## 2020-07-09 PROCEDURE — 3080F PR MOST RECENT DIASTOLIC BLOOD PRESSURE >= 90 MM HG: ICD-10-PCS | Mod: CPTII,S$GLB,, | Performed by: FAMILY MEDICINE

## 2020-07-09 PROCEDURE — 3080F DIAST BP >= 90 MM HG: CPT | Mod: CPTII,S$GLB,, | Performed by: FAMILY MEDICINE

## 2020-07-09 RX ORDER — DIPHENOXYLATE HCL/ATROPINE 2.5-.025/5
5 LIQUID (ML) ORAL 4 TIMES DAILY PRN
COMMUNITY
End: 2020-07-15 | Stop reason: SDUPTHER

## 2020-07-09 RX ORDER — RIZATRIPTAN BENZOATE 10 MG/1
TABLET, ORALLY DISINTEGRATING ORAL
Qty: 9 TABLET | Refills: 0 | Status: SHIPPED | OUTPATIENT
Start: 2020-07-09 | End: 2020-11-04 | Stop reason: SDUPTHER

## 2020-07-09 NOTE — PATIENT INSTRUCTIONS
"Over the counter pain therapies (creams/patch):    1. Lidocaine patch    2. aspercreme    3. "2 old goats"    4. "blue emu"    5. salonpas     7. biofreeze      Preventing Migraine Headaches: Triggers     Red wine is a common migraine trigger.     The first step in preventing migraines is to learn what triggers them. You may then be able to control your triggers to avoid or reduce the severity of your migraines.  Know your triggers  Be aware that you may have more than one trigger, and that some triggers may work together. Common migraine triggers include:  · Food and nutrition. Skipping meals or not drinking enough water can trigger headaches. So can certain foods, such as caffeine, monosodium glutamate (MSG), aged cheese, or sausage.  · Alcohol. Red wine and other alcoholic beverages are common migraine triggers.  · Chemicals. Scents, cleaning products, gasoline, glue, perfume, and paint can be triggers. So can tobacco smoke, including secondhand smoke.  · Emotions. Stress can trigger headaches or make them worse once they begin.  · Sleep disruption. Staying up late, sleeping late, and traveling across time zones can disrupt your sleep cycle, triggering headaches.  · Hormones. Many women notice that migraines tend to happen at a certain point in their menstrual cycle. Birth control pills or hormone replacement therapy may also trigger migraines.  · Environment and weather. Air travel, changes in altitude, air pressure changes, hot sun, or bright or flashing lights can be triggers.  Control your triggers  These are some of the things you can do to try to control triggers:  · Avoid triggers if you can. For example, stay clear of alcohol and foods that trigger your headaches. Use unscented household products. Keep regular sleep habits. Manage stress to help control emotional triggers.  · Change your behavior at times when triggers can't be avoided. For example, make sure to get enough rest and drink plenty of water " while you're traveling. Make sure to carry a hat, sunglasses, and your medicines. Be alert for migraine symptoms, so you can treat a migraine early if it happens.  Date Last Reviewed: 10/9/2015  © 2412-0075 [employer]. [address] [place] [address], [place] [address]. All rights reserved. This information is not intended as a substitute for professional medical care. Always follow your healthcare professional's instructions.        Preventing Migraine Headaches: Medicines and Lifestyle Changes     Going to bed and getting up at the same time each day, including weekends, may help prevent migraines.     A migraine is a type of severe headache. Having a migraine can be very painful. But there are steps you can take to help prevent migraines.  Medicines to help prevent migraines  · Your healthcare provider may prescribe certain medicines to help prevent migraines. These medicines may need to be taken daily. Or they may only need to be taken at times when youre likely to have a migraine.  · Common medicines used to help prevent migraines include:  ¨ Triptans (serotonin receptor agonists)  ¨ Nonsteroidal anti-inflammatory drugs (available over-the-counter)  ¨ Beta-blockers  ¨ Anticonvulsants  ¨ Tricyclic antidepressants  ¨ Calcium channel blockers  ¨ Certain vitamins, minerals, and plant extracts  ¨ Botulinum toxin injection (Botox) for certain chronic migraines   ¨ CGRP (calcitonin gene-related peptide) agnonists are being reviewed by the Food and Drug Administration (FDA)  Lifestyle changes for long-term prevention  Here are some suggestions:  · Exercise. Regular exercise can help prevent migraines and improve your health. (If exercise triggers your migraines, talk to your healthcare provider.)  · Keep regular habits. Dont skip or delay meals. Drink plenty of water. And go to bed and get up at about the same time each day. This includes weekends.  · Try alternative treatments. These are treatments that do not  involve the use of medicines or surgery. They may help relieve symptoms and prevent migraines. Some treatment options include biofeedback and acupuncture. Ask your healthcare provider to tell you more about these treatments if you have questions.  · Limit caffeine. You may find that caffeine helps relieve pain during an attack. But too much caffeine can also trigger migraines. So, limit the amount of caffeine you consume.  Date Last Reviewed: 10/11/2015  © 5008-6813 [employer] [address] [place] [address], [place] [address]. All rights reserved. This information is not intended as a substitute for professional medical care. Always follow your healthcare professional's instructions.

## 2020-07-09 NOTE — PROGRESS NOTES
(Portions of this note were dictated using voice recognition software and may contain dictation related errors in spelling/grammar/syntax not found on text review)    CC:   Chief Complaint   Patient presents with    Headache       HPI: 35 y.o. female pt of Rose Marie Reed MD  Here for urgent care visit for headache.    She has to get menstrual migraines a lot until she had her kids, last menstrual migraine was a couple years ago.  Over last few weeks she noticed of recurrence of headaches, bi temporal, associated with photophobia and phonophobia and some nausea.  She took a Fioricet a couple of days ago and then a BC Powder supplement.  Yesterday headache was not as bad but then today it was bad again.  She denies any other neurological complications from this.  Does chronically have sleeping problems but denies any significant worsening over the last several weeks.  No worsen stress than normal.  Works as a nurse at [employer].  She denies any vomiting, abdominal pain, numbness, weakness, auras (although she did have an aura last year when she had headache).  She does have neck pains and spasms for which she visits with pain management.  This is a fairly ongoing process, does get injections in her neck periodically.  She also takes ibuprofen 600 mg daily chronically.  She takes amitriptyline 12.5 mg nightly for IBS symptoms and also takes tizanidine every night.  Has tried Imitrex in the past for migraines but oral version did not seem to help.  The nasal spray seem to help a bit but would burn in her throat.  .  Also BP high today.  Vital signs reviewed in chart, several readings noted in 90 range diastolic of ADD, on Adderall 30 mg extended release in the morning and 10 mg in the afternoon.  Was on hypertensive medication in the past, 1st amlodipine then later atenolol.  However, when she got pregnant, blood pressures were doing well and she did not resume blood pressure medicines at that time.  She does not smoke.   No significant alcohol use.  Caffeine:  1 ice coffee a day.  Denies any other caffeine sources except for occasional coke but not frequently.  No other energy supplements.  On Adderall regularly for ADD, mainly 30 mg in the morning, occasional 10 mg in the afternoon if long day.  No increase in dose recently.      Past Medical History:   Diagnosis Date    Abnormal Pap smear of cervix 2000    Cryo Done    ADD (attention deficit disorder)     Breast disorder     Breast Cysts    Esophageal reflux     Fibroids     Hypertension     IBS (irritable bowel syndrome)     Insomnia     Kidney stones     Mastocytosis     Relies on partner vasectomy for contraception     Upper GI bleed        Past Surgical History:   Procedure Laterality Date    COLONOSCOPY N/A 4/28/2017    Procedure: COLONOSCOPY;  Surgeon: Bren Larkin MD;  Location: [facility];  Service: Endoscopy;  Laterality: N/A;    ESOPHAGOGASTRODUODENOSCOPY  2012    LASIK Bilateral 2005    REFRACTIVE SURGERY      TONSILLECTOMY, ADENOIDECTOMY  1988    VAGINAL DELIVERY      x 2       Family History   Problem Relation Age of Onset    Breast cancer Mother 47        with Metastasis    Hypertension Mother     Prostate cancer Father     Hypertension Father     Diabetes Father     Deep vein thrombosis Father     Pancreatic cancer Maternal Grandfather     Breast cancer Paternal Grandmother 80    Diabetes type II Paternal Grandfather     Heart attack Maternal Grandmother     Cancer Cousin 31    Cancer Cousin         Thurman Syndrome    Cancer Cousin         Thurman Syndrome    Ovarian cancer Maternal Aunt     Lymphoma Neg Hx     Tuberculosis Neg Hx     Celiac disease Neg Hx     Cirrhosis Neg Hx     Colon polyps Neg Hx     Crohn's disease Neg Hx     Cystic fibrosis Neg Hx     Esophageal cancer Neg Hx     Hemochromatosis Neg Hx     Inflammatory bowel disease Neg Hx     Irritable bowel syndrome Neg Hx     Liver cancer Neg Hx     Liver disease  Neg Hx     Rectal cancer Neg Hx     Stomach cancer Neg Hx     Ulcerative colitis Neg Hx     Donavon's disease Neg Hx     Amblyopia Neg Hx     Blindness Neg Hx     Cataracts Neg Hx     Glaucoma Neg Hx     Macular degeneration Neg Hx     Retinal detachment Neg Hx     Strabismus Neg Hx        Social History     Tobacco Use    Smoking status: Former Smoker    Smokeless tobacco: Never Used   Substance Use Topics    Alcohol use: No     Alcohol/week: 0.0 standard drinks     Frequency: Never     Binge frequency: Never     Comment: breastfeeding     Drug use: No       Lab Results   Component Value Date    WBC 7.76 09/25/2018    HGB 13.2 09/25/2018    HCT 40.6 09/25/2018    MCV 89 09/25/2018     09/25/2018    CHOL 190 09/25/2018    TRIG 52 09/25/2018    HDL 65 09/25/2018    ALT 42 09/25/2018    AST 28 09/25/2018    BILITOT 0.6 09/25/2018    ALKPHOS 68 09/25/2018     09/25/2018    K 4.7 09/25/2018     09/25/2018    CREATININE 0.7 09/25/2018    ESTGFRAFRICA >60 09/25/2018    EGFRNONAA >60 09/25/2018    CALCIUM 9.6 09/25/2018    ALBUMIN 3.9 09/25/2018    BUN 5 (L) 09/25/2018    CO2 26 09/25/2018    TSH 0.588 09/25/2018    LDLCALC 114.6 09/25/2018    GLU 85 09/25/2018                 ROS:  GENERAL: No fever, chills, fatigability or weight loss.  SKIN: No rashes, no itching.  HEAD:  Above  EYES: No visual changes  EARS: No ear pain or changes in hearing.  NOSE: No congestion or rhinorrhea.  MOUTH & THROAT: No hoarseness, change in voice, or sore throat.  NODES: Denies swollen glands.  CHEST: Denies CARY, cyanosis, wheezing, cough and sputum production.  CARDIOVASCULAR: Denies chest pain, PND, orthopnea.  ABDOMEN:  Above  URINARY: No flank pain, dysuria or hematuria.  PERIPHERAL VASCULAR: No claudication or cyanosis.  MUSCULOSKELETAL:  Above  NEUROLOGIC: No weakness or numbness.    Vital signs reviewed  PE:   APPEARANCE: Well nourished, well developed, in no acute distress.    HEAD: Normocephalic,  atraumatic.  EYES: PERRL. EOMI.   Conjunctivae noninjected.  EARS: TM's intact. Light reflex normal. No retraction or perforation  NECK: Supple with no cervical lymphadenopathy.    CHEST: Good inspiratory effort. Lungs clear to auscultation with no wheezes or crackles.  CARDIOVASCULAR: Normal S1, S2. No rubs, murmurs, or gallops.  ABDOMEN: Bowel sounds normal. Not distended. Soft. No tenderness or masses. No organomegaly.  EXTREMITIES: No edema.  Normal DTR biceps, brachioradialis, patellar, ankles bilaterally  PSYCHIATRIC:  Alert and oriented, normal affect, appropriate responses to questions    IMPRESSION  1. Migraine without aura and without status migrainosus, not intractable    2. Hypertension, unspecified type            PLAN  Trial of Maxalt 5 mg for abortive treatment, repeat in 2 hr if headache persists or returns    Hypertension:  Not sure if her elevated blood pressure currently is a function of her headache pain or vice versa.  Advised close monitoring of blood pressure over the next several days, and if persistently elevated may need to restart antihypertensive therapy especially in light of her headache symptoms.  Could consider beta-blocker for treatment of both    We discussed risk factor control, try to minimize caffeine, also may need to consider medication rebound effect given her chronic ibuprofen use.  Discussed other dietary modifications such as staying away from artificial sweeteners, msg.

## 2020-07-14 ENCOUNTER — PATIENT MESSAGE (OUTPATIENT)
Dept: FAMILY MEDICINE | Facility: CLINIC | Age: 35
End: 2020-07-14

## 2020-07-14 RX ORDER — AMLODIPINE BESYLATE 5 MG/1
5 TABLET ORAL DAILY
Qty: 30 TABLET | Refills: 11 | Status: SHIPPED | OUTPATIENT
Start: 2020-07-14 | End: 2020-08-04

## 2020-07-14 NOTE — TELEPHONE ENCOUNTER
Needs 1 mo f/u for bp and headaches, either with her PCP or myself        Thank you for the readings, I do see that they are fairly elevated.  Perhaps we can start up some medicine backup for this that might help with this and your headaches.  I would like to start medicine called amlodipine 5 mg once a day, and we can continue to track your blood pressure.  Would like to keep the goal below 140/90.  Watching your diet like salt intake, eating more fruits and vegetables and fiber, getting exercise as best as you can will also help reduce blood pressure alongside the medication.  I would like to get you back in a month just to reassess the blood pressure and make sure it is coming down well and assess if we need to do any further medication adjustments for this or your headache     I can send the medication to your pharmacy and heavy start taking it once a day     Please let me know if have any other questions     Rolando Brandt MD

## 2020-07-15 ENCOUNTER — TELEPHONE (OUTPATIENT)
Dept: INTERNAL MEDICINE | Facility: CLINIC | Age: 35
End: 2020-07-15

## 2020-07-15 ENCOUNTER — PATIENT MESSAGE (OUTPATIENT)
Dept: GASTROENTEROLOGY | Facility: CLINIC | Age: 35
End: 2020-07-15

## 2020-07-15 DIAGNOSIS — Z00.00 WELL ADULT EXAM: Primary | ICD-10-CM

## 2020-07-16 RX ORDER — DIPHENOXYLATE HCL/ATROPINE 2.5-.025/5
5 LIQUID (ML) ORAL 4 TIMES DAILY PRN
Qty: 3 BOTTLE | Refills: 1 | Status: SHIPPED | OUTPATIENT
Start: 2020-07-16 | End: 2021-11-03

## 2020-07-17 ENCOUNTER — LAB VISIT (OUTPATIENT)
Dept: FAMILY MEDICINE | Facility: CLINIC | Age: 35
End: 2020-07-17
Payer: COMMERCIAL

## 2020-07-17 DIAGNOSIS — R11.0 NAUSEA: ICD-10-CM

## 2020-07-17 DIAGNOSIS — R05.9 COUGH: ICD-10-CM

## 2020-07-17 DIAGNOSIS — R51.9 HEAD ACHE: ICD-10-CM

## 2020-07-17 DIAGNOSIS — R53.83 FATIGUE: ICD-10-CM

## 2020-07-17 DIAGNOSIS — R09.81 NASAL CONGESTION: ICD-10-CM

## 2020-07-17 DIAGNOSIS — M79.10 MUSCLE PAIN: ICD-10-CM

## 2020-07-17 PROCEDURE — U0003 INFECTIOUS AGENT DETECTION BY NUCLEIC ACID (DNA OR RNA); SEVERE ACUTE RESPIRATORY SYNDROME CORONAVIRUS 2 (SARS-COV-2) (CORONAVIRUS DISEASE [COVID-19]), AMPLIFIED PROBE TECHNIQUE, MAKING USE OF HIGH THROUGHPUT TECHNOLOGIES AS DESCRIBED BY CMS-2020-01-R: HCPCS

## 2020-07-17 RX ORDER — DIPHENOXYLATE HYDROCHLORIDE AND ATROPINE SULFATE 2.5; .025 MG/1; MG/1
1 TABLET ORAL 4 TIMES DAILY PRN
Qty: 360 TABLET | Refills: 0 | Status: SHIPPED | OUTPATIENT
Start: 2020-07-20 | End: 2020-07-17 | Stop reason: SDUPTHER

## 2020-07-18 LAB — SARS-COV-2 RNA RESP QL NAA+PROBE: NOT DETECTED

## 2020-07-20 ENCOUNTER — TELEPHONE (OUTPATIENT)
Dept: SURGERY | Facility: CLINIC | Age: 35
End: 2020-07-20

## 2020-07-20 ENCOUNTER — OFFICE VISIT (OUTPATIENT)
Dept: SURGERY | Facility: CLINIC | Age: 35
End: 2020-07-20
Payer: COMMERCIAL

## 2020-07-20 ENCOUNTER — TELEPHONE (OUTPATIENT)
Dept: PRIMARY CARE CLINIC | Facility: CLINIC | Age: 35
End: 2020-07-20

## 2020-07-20 VITALS
SYSTOLIC BLOOD PRESSURE: 167 MMHG | BODY MASS INDEX: 25.18 KG/M2 | HEART RATE: 83 BPM | WEIGHT: 166.13 LBS | DIASTOLIC BLOOD PRESSURE: 91 MMHG | HEIGHT: 68 IN

## 2020-07-20 DIAGNOSIS — Z91.89 AT HIGH RISK FOR BREAST CANCER: Primary | ICD-10-CM

## 2020-07-20 DIAGNOSIS — Z01.84 ANTIBODY RESPONSE EXAMINATION: ICD-10-CM

## 2020-07-20 DIAGNOSIS — Z12.31 BREAST CANCER SCREENING BY MAMMOGRAM: ICD-10-CM

## 2020-07-20 PROCEDURE — 3008F PR BODY MASS INDEX (BMI) DOCUMENTED: ICD-10-PCS | Mod: CPTII,S$GLB,, | Performed by: PHYSICIAN ASSISTANT

## 2020-07-20 PROCEDURE — 99999 PR PBB SHADOW E&M-EST. PATIENT-LVL IV: ICD-10-PCS | Mod: PBBFAC,,, | Performed by: PHYSICIAN ASSISTANT

## 2020-07-20 PROCEDURE — 3008F BODY MASS INDEX DOCD: CPT | Mod: CPTII,S$GLB,, | Performed by: PHYSICIAN ASSISTANT

## 2020-07-20 PROCEDURE — 3080F DIAST BP >= 90 MM HG: CPT | Mod: CPTII,S$GLB,, | Performed by: PHYSICIAN ASSISTANT

## 2020-07-20 PROCEDURE — 3077F PR MOST RECENT SYSTOLIC BLOOD PRESSURE >= 140 MM HG: ICD-10-PCS | Mod: CPTII,S$GLB,, | Performed by: PHYSICIAN ASSISTANT

## 2020-07-20 PROCEDURE — 3077F SYST BP >= 140 MM HG: CPT | Mod: CPTII,S$GLB,, | Performed by: PHYSICIAN ASSISTANT

## 2020-07-20 PROCEDURE — 99203 PR OFFICE/OUTPT VISIT, NEW, LEVL III, 30-44 MIN: ICD-10-PCS | Mod: S$GLB,,, | Performed by: PHYSICIAN ASSISTANT

## 2020-07-20 PROCEDURE — 99999 PR PBB SHADOW E&M-EST. PATIENT-LVL IV: CPT | Mod: PBBFAC,,, | Performed by: PHYSICIAN ASSISTANT

## 2020-07-20 PROCEDURE — 3080F PR MOST RECENT DIASTOLIC BLOOD PRESSURE >= 90 MM HG: ICD-10-PCS | Mod: CPTII,S$GLB,, | Performed by: PHYSICIAN ASSISTANT

## 2020-07-20 PROCEDURE — 99203 OFFICE O/P NEW LOW 30 MIN: CPT | Mod: S$GLB,,, | Performed by: PHYSICIAN ASSISTANT

## 2020-07-20 RX ORDER — DIPHENOXYLATE HYDROCHLORIDE AND ATROPINE SULFATE 2.5; .025 MG/1; MG/1
1 TABLET ORAL 4 TIMES DAILY PRN
Qty: 360 TABLET | Refills: 0 | Status: SHIPPED | OUTPATIENT
Start: 2020-07-20 | End: 2020-09-28 | Stop reason: SDUPTHER

## 2020-07-20 NOTE — LETTER
July 20, 2020      Thuan Chambers MD  [address]  [facility] [address]           [facility]  [address], [address]  [facility] [address]  Phone: [phone]  Fax: [phone]          Patient: April Wendt Schamberger   MR Number: [id number]   YOB: 1985   Date of Visit: 7/20/2020       Dear Dr. Thuan Chambers:    Thank you for referring April Schamberger to me for evaluation. Attached you will find relevant portions of my assessment and plan of care.    If you have questions, please do not hesitate to call me. I look forward to following April Schamberger along with you.    Sincerely,    MINNIE Farrar    Enclosure  CC:  No Recipients    If you would like to receive this communication electronically, please contact [email or web address] or [phone] to request more information on [employer] access.    For providers and/or their staff who would like to refer a patient to [facility], please contact us through our one-stop-shop provider referral line, [facility], at [phone].    If you feel you have received this communication in error or would no longer like to receive these types of communications, please e-mail [email or web address]

## 2020-07-20 NOTE — TELEPHONE ENCOUNTER
Patient returned call regarding scheduling breast MRI.  Patient is scheduled to be seen on Wednesday 7/29/2020, 9:15 am for breast MRI at [facility].  Patient voiced understanding of appointment date, time, and location.  Reminder letter mailed to the patient.

## 2020-07-20 NOTE — TELEPHONE ENCOUNTER
Contacted the patient regarding scheduling Breast MRI per Megan Aguayo PA-C.  The patient did not answer, message left with my name and direct number for patient to contact back to schedule appointment.

## 2020-07-21 NOTE — PROGRESS NOTES
BREAST HIGH RISK CLINIC  [facility]  Breast Surgery      Date of Visit:  2020    Referring provider:  Thuan Chambers MD  [address] [place] [address]    PCP:  Rose Marie Reed MD    HIGH RISK    April Wendt Schamberger is a 35 y.o. premenopausal female referred for evaluation of an increased risk of breast cancer based on her Tyrer-Cuzick score.  She is here today to discuss options of high risk screening and risk reduction.    Other breast cancer risk factors include family hx on father's side, mom with breast CA, family hx of ovarian CA, family hx of Prostate CA and family hx of Pancreatic CA.     She denies any history of breast biopsies.  She denies any nipple discharge or palpating any breast masses bilaterally.      Personal history of cancer: no  Prior chest wall radiation at ages 10-30: no  Genetic testing: negative via [place] on 20. [place] RISKSCORE was 42.2%.   Ashkenazi inheritance: no  OB/Gyn history:    Number of pregnancies & age at first gestation:  (29)   Age of menarche & menopause:11 or 12; N/A   Last menstrual period: 1 week ago   Body mass index is 25.26 kg/m².   Contraceptive Use/ HRT: Denies HRT    Tyrer-Cuzick Score:  27.9% (high risk; re-calculated in clinic today).     Family History: Mother had breast cancer at 47 and  at 59. Father had prostate cancer at 50. Maternal grandfather had pancreatic cancer at 75. Cousin had costello syndrome. Maternal aunt had ovarian cancer. Paternal grandmother had breast cancer at 85 and  in her late 80's. She also had colon cancer.     Past Medical History:   Diagnosis Date    Abnormal Pap smear of cervix     Cryo Done    ADD (attention deficit disorder)     Breast disorder     Breast Cysts    Esophageal reflux     Fibroids     Hypertension     IBS (irritable bowel syndrome)     Insomnia     Kidney stones     Mastocytosis     Relies on partner vasectomy for contraception     Upper GI bleed      Social  History     Socioeconomic History    Marital status:      Spouse name: Not on file    Number of children: 1    Years of education: Not on file    Highest education level: Not on file   Occupational History    Not on file   Social Needs    Financial resource strain: Not very hard    Food insecurity     Worry: Never true     Inability: Never true    Transportation needs     Medical: No     Non-medical: No   Tobacco Use    Smoking status: Former Smoker    Smokeless tobacco: Never Used   Substance and Sexual Activity    Alcohol use: No     Alcohol/week: 0.0 standard drinks     Frequency: Never     Binge frequency: Never     Comment: breastfeeding     Drug use: No    Sexual activity: Yes     Partners: Male     Birth control/protection: Partner-Vasectomy     Comment: : Boris   Lifestyle    Physical activity     Days per week: 7 days     Minutes per session: 30 min    Stress: Very much   Relationships    Social connections     Talks on phone: More than three times a week     Gets together: More than three times a week     Attends Mu-ism service: Not on file     Active member of club or organization: Yes     Attends meetings of clubs or organizations: Never     Relationship status:    Other Topics Concern    Not on file   Social History Narrative    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  Denies any chest pain or shortness of breath.       Objective:     Vitals:    07/20/20 1304   BP: (!) 167/91   Pulse: 83      Physical Exam:  HEENT: Normocephalic, atraumatic.    Gen: alert and oriented; no acute distress.  Breast: No masses or tenderness bilaterally.  No nipple discharge, nipple inversion, or skin changes bilaterally.  Lymph: No adjacent axillary lymphadenopathy bilaterally.    Imaging, 1/16/20 Bilateral Screening Mammogram: BIRADS 1, negative.    Assessment:     This is a 35 y.o. female with an increased risk of breast cancer based on  Cristóbal Marcelo score of 27.9%.      Plan:   The patient's estimated lifetime risk of Breast Cancer (to age 85) based on the Tyrer-Cuzick 7 risk assessment model is 27.9 %.  However, since her [place] RISKSCORE was 42.2%, her risk is probably in her 30's.   According to the American Cancer Society, patients with a lifetime breast cancer risk of 20% or higher might benefit from supplemental screening tests.    We discussed that she would benefit from annual MRI's in addition to yearly mammograms, alternating imaging every 6 months until age 75.  The pros and cons of MRI screening were presented.  I also recommended two physical exams per year, one can be with her OB/GYN.  Risk reduction options with chemoprevention such as Tamoxifen or Raloxifene were discussed.  These have been shown to lower the risk of breast cancer incidence, however there is no survival benefit in patients who don't have breast cancer.  I explained the most common side effects and risks including hot flashes, thromboembolism, stroke, endometrial cancer, weight changes, and pain.   We also discussed modifiable measures that affect breast cancer risk including diet, exercise, avoiding obesity, and limiting alcohol intake. I recommended at least 30 minutes of cardiovascular exercise 4-5 times per week.  Exercise can lower the relative risk of breast cancer by ~18-20%.  We also discussed that obesity is linked to a higher risk of breast cancer; therefore, exercise is very important.  I recommended proper nutrition with fresh fruits and vegetables and lean meats and avoidance of processed foods.  Non-modifiable risk factors were also discussed such as age at menarche, age at menopause, family history, and age at first pregnancy.  We did discuss hormone replacement therapy as well.  In patients at increased risk, I usually do not recommend HRT for long periods of time.      She will need an MRI of the breast soon and a bilateral screening mammogram in  January of 2021. She can follow up with me in one year.    Megan Aguayo PA-C

## 2020-07-27 RX ORDER — DIPHENOXYLATE HYDROCHLORIDE AND ATROPINE SULFATE 2.5; .025 MG/1; MG/1
1 TABLET ORAL 4 TIMES DAILY PRN
Qty: 360 TABLET | Refills: 0 | Status: CANCELLED | OUTPATIENT
Start: 2020-07-27 | End: 2020-10-25

## 2020-07-30 ENCOUNTER — LAB VISIT (OUTPATIENT)
Dept: LAB | Facility: HOSPITAL | Age: 35
End: 2020-07-30
Attending: FAMILY MEDICINE
Payer: COMMERCIAL

## 2020-07-30 DIAGNOSIS — Z00.00 WELL ADULT EXAM: ICD-10-CM

## 2020-07-30 LAB
BILIRUB UR QL STRIP: NEGATIVE
CLARITY UR: CLEAR
COLOR UR: YELLOW
GLUCOSE UR QL STRIP: NEGATIVE
HGB UR QL STRIP: NEGATIVE
KETONES UR QL STRIP: NEGATIVE
LEUKOCYTE ESTERASE UR QL STRIP: NEGATIVE
NITRITE UR QL STRIP: NEGATIVE
PH UR STRIP: 7 [PH] (ref 5–8)
PROT UR QL STRIP: NEGATIVE
SP GR UR STRIP: <=1.005 (ref 1–1.03)
URN SPEC COLLECT METH UR: ABNORMAL
UROBILINOGEN UR STRIP-ACNC: NEGATIVE EU/DL

## 2020-07-30 PROCEDURE — 81003 URINALYSIS AUTO W/O SCOPE: CPT

## 2020-08-04 ENCOUNTER — OFFICE VISIT (OUTPATIENT)
Dept: INTERNAL MEDICINE | Facility: CLINIC | Age: 35
End: 2020-08-04
Payer: COMMERCIAL

## 2020-08-04 VITALS
SYSTOLIC BLOOD PRESSURE: 130 MMHG | WEIGHT: 166.69 LBS | RESPIRATION RATE: 18 BRPM | DIASTOLIC BLOOD PRESSURE: 94 MMHG | HEIGHT: 68 IN | TEMPERATURE: 97 F | BODY MASS INDEX: 25.26 KG/M2 | HEART RATE: 121 BPM | OXYGEN SATURATION: 99 %

## 2020-08-04 DIAGNOSIS — F90.2 ATTENTION DEFICIT HYPERACTIVITY DISORDER (ADHD), COMBINED TYPE: ICD-10-CM

## 2020-08-04 DIAGNOSIS — Z00.00 WELL ADULT EXAM: Primary | ICD-10-CM

## 2020-08-04 DIAGNOSIS — F51.01 PRIMARY INSOMNIA: ICD-10-CM

## 2020-08-04 DIAGNOSIS — R51.9 NONINTRACTABLE HEADACHE, UNSPECIFIED CHRONICITY PATTERN, UNSPECIFIED HEADACHE TYPE: ICD-10-CM

## 2020-08-04 DIAGNOSIS — F41.9 ANXIETY: ICD-10-CM

## 2020-08-04 DIAGNOSIS — R00.0 TACHYCARDIA: ICD-10-CM

## 2020-08-04 DIAGNOSIS — K58.9 IRRITABLE BOWEL SYNDROME WITHOUT DIARRHEA: ICD-10-CM

## 2020-08-04 DIAGNOSIS — I10 ESSENTIAL (PRIMARY) HYPERTENSION: ICD-10-CM

## 2020-08-04 PROCEDURE — 3080F PR MOST RECENT DIASTOLIC BLOOD PRESSURE >= 90 MM HG: ICD-10-PCS | Mod: CPTII,S$GLB,, | Performed by: FAMILY MEDICINE

## 2020-08-04 PROCEDURE — 99999 PR PBB SHADOW E&M-EST. PATIENT-LVL V: CPT | Mod: PBBFAC,,, | Performed by: FAMILY MEDICINE

## 2020-08-04 PROCEDURE — 99395 PR PREVENTIVE VISIT,EST,18-39: ICD-10-PCS | Mod: S$GLB,,, | Performed by: FAMILY MEDICINE

## 2020-08-04 PROCEDURE — 99999 PR PBB SHADOW E&M-EST. PATIENT-LVL V: ICD-10-PCS | Mod: PBBFAC,,, | Performed by: FAMILY MEDICINE

## 2020-08-04 PROCEDURE — 3075F SYST BP GE 130 - 139MM HG: CPT | Mod: CPTII,S$GLB,, | Performed by: FAMILY MEDICINE

## 2020-08-04 PROCEDURE — 3080F DIAST BP >= 90 MM HG: CPT | Mod: CPTII,S$GLB,, | Performed by: FAMILY MEDICINE

## 2020-08-04 PROCEDURE — 3075F PR MOST RECENT SYSTOLIC BLOOD PRESS GE 130-139MM HG: ICD-10-PCS | Mod: CPTII,S$GLB,, | Performed by: FAMILY MEDICINE

## 2020-08-04 PROCEDURE — 99395 PREV VISIT EST AGE 18-39: CPT | Mod: S$GLB,,, | Performed by: FAMILY MEDICINE

## 2020-08-04 RX ORDER — BUTALBITAL, ACETAMINOPHEN AND CAFFEINE 50; 325; 40 MG/1; MG/1; MG/1
1 TABLET ORAL EVERY 6 HOURS PRN
Qty: 30 TABLET | Refills: 0 | Status: SHIPPED | OUTPATIENT
Start: 2020-08-04 | End: 2020-10-21 | Stop reason: SDUPTHER

## 2020-08-04 RX ORDER — ESZOPICLONE 3 MG/1
3 TABLET, FILM COATED ORAL NIGHTLY PRN
Qty: 30 TABLET | Refills: 0 | Status: SHIPPED | OUTPATIENT
Start: 2020-08-04 | End: 2020-09-02 | Stop reason: SDUPTHER

## 2020-08-04 RX ORDER — METOPROLOL SUCCINATE 25 MG/1
25 TABLET, EXTENDED RELEASE ORAL DAILY
Qty: 30 TABLET | Refills: 11 | Status: SHIPPED | OUTPATIENT
Start: 2020-08-04 | End: 2020-11-24 | Stop reason: SDUPTHER

## 2020-08-04 NOTE — PROGRESS NOTES
Subjective:       Patient ID: April Wendt Schamberger is a 35 y.o. female.    Chief Complaint: Annual Exam    HPI 35-year-old white female patient of Dr. Reed presents to clinic today for annual physical exam.  She continues to be followed by Psychiatry for treatment of anxiety and ADHD which remains stable on Adderall.  She has been treated for hypertension in the past with atenolol but medication was discontinued during pregnancy and blood pressure stabilized.  Recently, blood pressure has increased and the patient was restarted on amlodipine but continues to have uncontrolled hypertension and tachycardia.  Treatment has been discussed and the patient wishes to be placed back on a beta-blocker to assist with blood pressure and tachycardia.  She continues to have stable irritable bowel disease.  She has occasional migraine headaches which are well controlled with use of Fioricet.  She uses Lunesta as needed for insomnia.  She has a past surgical history of tonsil/adenoidectomy, EGD, and LASIK.  She has a family history of diabetes, hypertension, breast cancer, and prostate cancer.  She is up-to-date with all vaccinations.  Review of Systems   Constitutional: Negative for appetite change, chills, fatigue and fever.   HENT: Negative for nasal congestion, ear pain, hearing loss, postnasal drip, rhinorrhea, sinus pressure/congestion, sore throat and tinnitus.    Eyes: Negative for redness, itching and visual disturbance.   Respiratory: Negative for cough, chest tightness and shortness of breath.    Cardiovascular: Positive for palpitations. Negative for chest pain.   Gastrointestinal: Positive for nausea. Negative for abdominal pain, constipation, diarrhea and vomiting.   Genitourinary: Negative for decreased urine volume, difficulty urinating, dysuria, frequency, hematuria and urgency.   Musculoskeletal: Negative for back pain, myalgias, neck pain and neck stiffness.   Integumentary:  Negative for rash.    Neurological: Positive for headaches. Negative for dizziness and light-headedness.   Psychiatric/Behavioral: Negative.          Objective:      Physical Exam  Vitals signs and nursing note reviewed.   Constitutional:       General: She is not in acute distress.     Appearance: She is well-developed. She is not diaphoretic.   HENT:      Head: Normocephalic and atraumatic.      Right Ear: External ear normal.      Left Ear: External ear normal.      Nose: Nose normal.      Mouth/Throat:      Pharynx: No oropharyngeal exudate.   Eyes:      General: No scleral icterus.        Right eye: No discharge.         Left eye: No discharge.      Conjunctiva/sclera: Conjunctivae normal.      Pupils: Pupils are equal, round, and reactive to light.   Neck:      Musculoskeletal: Normal range of motion and neck supple.      Thyroid: No thyromegaly.      Vascular: No JVD.      Trachea: No tracheal deviation.   Cardiovascular:      Rate and Rhythm: Regular rhythm. Tachycardia present.      Heart sounds: Normal heart sounds. No murmur. No friction rub. No gallop.    Pulmonary:      Effort: Pulmonary effort is normal. No respiratory distress.      Breath sounds: Normal breath sounds. No stridor. No wheezing or rales.   Abdominal:      General: Bowel sounds are normal. There is no distension.      Palpations: Abdomen is soft. There is no mass.      Tenderness: There is no abdominal tenderness. There is no guarding or rebound.   Musculoskeletal: Normal range of motion.         General: No tenderness.   Lymphadenopathy:      Cervical: No cervical adenopathy.   Skin:     General: Skin is warm and dry.      Coloration: Skin is not pale.      Findings: No erythema or rash.   Neurological:      Mental Status: She is alert and oriented to person, place, and time.   Psychiatric:         Behavior: Behavior normal.         Thought Content: Thought content normal.         Judgment: Judgment normal.         Assessment:       1. Well adult exam     2. Essential (primary) hypertension    3. Tachycardia    4. Nonintractable headache, unspecified chronicity pattern, unspecified headache type    5. Primary insomnia    6. Irritable bowel syndrome without diarrhea    7. Anxiety    8. Attention deficit hyperactivity disorder (ADHD), combined type        Plan:       1.  Labs have been reviewed and are within normal limits.  2.  Discontinue amlodipine and start metoprolol 25 mg daily to assist with hypertension and tachycardia.  Encourage daily blood pressure monitoring.  3.  Continue for your set as needed for migraines.  Refill given.  4.   has been checked and there is no suspicious activity.  Continue Lunesta as needed for insomnia.  One refill given.  5.  Continue follow-up with GI as scheduled.  IBS is stable.  6.  Continue follow-up with psychiatry as scheduled.  ADHD is stable.  7.  Return to clinic as needed or in 1 month for hypertension re-evaluation.

## 2020-08-17 ENCOUNTER — HOSPITAL ENCOUNTER (OUTPATIENT)
Dept: RADIOLOGY | Facility: HOSPITAL | Age: 35
Discharge: HOME | End: 2020-08-17
Attending: PHYSICIAN ASSISTANT
Payer: COMMERCIAL

## 2020-08-17 DIAGNOSIS — Z91.89 AT HIGH RISK FOR BREAST CANCER: ICD-10-CM

## 2020-08-17 PROCEDURE — A9577 INJ MULTIHANCE: HCPCS | Performed by: PHYSICIAN ASSISTANT

## 2020-08-17 PROCEDURE — 77049 MRI BREAST C-+ W/CAD BI: CPT | Mod: 26,,, | Performed by: RADIOLOGY

## 2020-08-17 PROCEDURE — 25500020 PHARM REV CODE 255: Performed by: PHYSICIAN ASSISTANT

## 2020-08-17 PROCEDURE — 77049 MRI BREAST W/WO CONTRAST, W/CAD, BILATERAL: ICD-10-PCS | Mod: 26,,, | Performed by: RADIOLOGY

## 2020-08-17 PROCEDURE — 77049 MRI BREAST C-+ W/CAD BI: CPT | Mod: TC

## 2020-08-17 RX ADMIN — GADOBENATE DIMEGLUMINE 16 ML: 529 INJECTION, SOLUTION INTRAVENOUS at 12:08

## 2020-08-19 DIAGNOSIS — Z01.84 ANTIBODY RESPONSE EXAMINATION: ICD-10-CM

## 2020-09-02 ENCOUNTER — OFFICE VISIT (OUTPATIENT)
Dept: INTERNAL MEDICINE | Facility: CLINIC | Age: 35
End: 2020-09-02
Payer: COMMERCIAL

## 2020-09-02 VITALS
OXYGEN SATURATION: 99 % | HEIGHT: 68 IN | WEIGHT: 171.31 LBS | HEART RATE: 96 BPM | RESPIRATION RATE: 18 BRPM | SYSTOLIC BLOOD PRESSURE: 126 MMHG | TEMPERATURE: 97 F | BODY MASS INDEX: 25.96 KG/M2 | DIASTOLIC BLOOD PRESSURE: 88 MMHG

## 2020-09-02 DIAGNOSIS — R00.0 TACHYCARDIA: ICD-10-CM

## 2020-09-02 DIAGNOSIS — F51.01 PRIMARY INSOMNIA: ICD-10-CM

## 2020-09-02 DIAGNOSIS — I10 ESSENTIAL (PRIMARY) HYPERTENSION: Primary | ICD-10-CM

## 2020-09-02 PROCEDURE — 99213 OFFICE O/P EST LOW 20 MIN: CPT | Mod: S$GLB,,, | Performed by: FAMILY MEDICINE

## 2020-09-02 PROCEDURE — 3008F BODY MASS INDEX DOCD: CPT | Mod: CPTII,S$GLB,, | Performed by: FAMILY MEDICINE

## 2020-09-02 PROCEDURE — 3079F DIAST BP 80-89 MM HG: CPT | Mod: CPTII,S$GLB,, | Performed by: FAMILY MEDICINE

## 2020-09-02 PROCEDURE — 99999 PR PBB SHADOW E&M-EST. PATIENT-LVL V: ICD-10-PCS | Mod: PBBFAC,,, | Performed by: FAMILY MEDICINE

## 2020-09-02 PROCEDURE — 3079F PR MOST RECENT DIASTOLIC BLOOD PRESSURE 80-89 MM HG: ICD-10-PCS | Mod: CPTII,S$GLB,, | Performed by: FAMILY MEDICINE

## 2020-09-02 PROCEDURE — 99213 PR OFFICE/OUTPT VISIT, EST, LEVL III, 20-29 MIN: ICD-10-PCS | Mod: S$GLB,,, | Performed by: FAMILY MEDICINE

## 2020-09-02 PROCEDURE — 3074F SYST BP LT 130 MM HG: CPT | Mod: CPTII,S$GLB,, | Performed by: FAMILY MEDICINE

## 2020-09-02 PROCEDURE — 3074F PR MOST RECENT SYSTOLIC BLOOD PRESSURE < 130 MM HG: ICD-10-PCS | Mod: CPTII,S$GLB,, | Performed by: FAMILY MEDICINE

## 2020-09-02 PROCEDURE — 99999 PR PBB SHADOW E&M-EST. PATIENT-LVL V: CPT | Mod: PBBFAC,,, | Performed by: FAMILY MEDICINE

## 2020-09-02 PROCEDURE — 3008F PR BODY MASS INDEX (BMI) DOCUMENTED: ICD-10-PCS | Mod: CPTII,S$GLB,, | Performed by: FAMILY MEDICINE

## 2020-09-02 RX ORDER — ESZOPICLONE 3 MG/1
3 TABLET, FILM COATED ORAL NIGHTLY PRN
Qty: 30 TABLET | Refills: 0 | Status: SHIPPED | OUTPATIENT
Start: 2020-09-02 | End: 2020-09-30 | Stop reason: SDUPTHER

## 2020-09-02 NOTE — PROGRESS NOTES
Subjective:       Patient ID: April Wendt Schamberger is a 35 y.o. female.    Chief Complaint: Follow-up (1 mo for HTN)    HPI 35-year-old white female returns to clinic today for hypertension and tachycardia re-evaluation.  She has previously been treated for hypertension and tachycardia with amlodipine; however, she continued to note persistent tachycardia and continued elevated blood pressure with frequent diastolic readings above 90.  At last visit amlodipine was discontinued and the patient was started on metoprolol 25 mg daily.  At this time she reports improvement in both her heart rate and blood pressure and denies any episodes of headaches, dizziness, or lightheadedness.  She continues to use Lunesta as needed for insomnia requires refill at this time.   has been checked and there is no suspicious activity.  Review of Systems   Constitutional: Negative for appetite change, chills, fatigue and fever.   HENT: Negative for nasal congestion, ear pain, hearing loss, postnasal drip, rhinorrhea, sinus pressure/congestion, sore throat and tinnitus.    Eyes: Negative for redness, itching and visual disturbance.   Respiratory: Negative for cough, chest tightness and shortness of breath.    Cardiovascular: Negative for chest pain and palpitations.   Gastrointestinal: Negative for abdominal pain, constipation, diarrhea, nausea and vomiting.   Genitourinary: Negative for decreased urine volume, difficulty urinating, dysuria, frequency, hematuria and urgency.   Musculoskeletal: Negative for back pain, myalgias, neck pain and neck stiffness.   Integumentary:  Negative for rash.   Neurological: Negative for dizziness, light-headedness and headaches.   Psychiatric/Behavioral: Negative.          Objective:      Physical Exam  Vitals signs and nursing note reviewed.   Constitutional:       General: She is not in acute distress.     Appearance: She is well-developed. She is not diaphoretic.   HENT:      Head: Normocephalic  and atraumatic.      Right Ear: External ear normal.      Left Ear: External ear normal.      Nose: Nose normal.      Mouth/Throat:      Pharynx: No oropharyngeal exudate.   Eyes:      General: No scleral icterus.        Right eye: No discharge.         Left eye: No discharge.      Conjunctiva/sclera: Conjunctivae normal.      Pupils: Pupils are equal, round, and reactive to light.   Neck:      Musculoskeletal: Normal range of motion and neck supple.      Thyroid: No thyromegaly.      Vascular: No JVD.      Trachea: No tracheal deviation.   Cardiovascular:      Rate and Rhythm: Normal rate and regular rhythm.      Heart sounds: Normal heart sounds. No murmur. No friction rub. No gallop.    Pulmonary:      Effort: Pulmonary effort is normal. No respiratory distress.      Breath sounds: Normal breath sounds. No stridor. No wheezing or rales.   Abdominal:      General: Bowel sounds are normal. There is no distension.      Palpations: Abdomen is soft. There is no mass.      Tenderness: There is no abdominal tenderness. There is no guarding or rebound.   Musculoskeletal: Normal range of motion.         General: No tenderness.   Lymphadenopathy:      Cervical: No cervical adenopathy.   Skin:     General: Skin is warm and dry.      Coloration: Skin is not pale.      Findings: No erythema or rash.   Neurological:      Mental Status: She is alert and oriented to person, place, and time.   Psychiatric:         Behavior: Behavior normal.         Thought Content: Thought content normal.         Judgment: Judgment normal.         Assessment:       1. Essential (primary) hypertension    2. Tachycardia    3. Primary insomnia        Plan:       1.  Continue metoprolol 25 mg daily.  Hypertension and tachycardia stable.  2.   has been checked and there is no suspicious activity.  Continue Lunesta as prescribed.  Refill given.  3.  Return to clinic as needed or in 11 months for annual exam.

## 2020-09-03 ENCOUNTER — OFFICE VISIT (OUTPATIENT)
Dept: PSYCHIATRY | Facility: CLINIC | Age: 35
End: 2020-09-03
Payer: COMMERCIAL

## 2020-09-03 VITALS
BODY MASS INDEX: 26.23 KG/M2 | DIASTOLIC BLOOD PRESSURE: 92 MMHG | WEIGHT: 172.5 LBS | SYSTOLIC BLOOD PRESSURE: 138 MMHG | HEART RATE: 89 BPM

## 2020-09-03 DIAGNOSIS — K58.0 IRRITABLE BOWEL SYNDROME WITH DIARRHEA: ICD-10-CM

## 2020-09-03 DIAGNOSIS — F41.9 ANXIETY: ICD-10-CM

## 2020-09-03 DIAGNOSIS — F90.2 ATTENTION DEFICIT HYPERACTIVITY DISORDER (ADHD), COMBINED TYPE: Primary | ICD-10-CM

## 2020-09-03 PROCEDURE — 3008F PR BODY MASS INDEX (BMI) DOCUMENTED: ICD-10-PCS | Mod: CPTII,S$GLB,, | Performed by: STUDENT IN AN ORGANIZED HEALTH CARE EDUCATION/TRAINING PROGRAM

## 2020-09-03 PROCEDURE — 3080F DIAST BP >= 90 MM HG: CPT | Mod: CPTII,S$GLB,, | Performed by: STUDENT IN AN ORGANIZED HEALTH CARE EDUCATION/TRAINING PROGRAM

## 2020-09-03 PROCEDURE — 3075F PR MOST RECENT SYSTOLIC BLOOD PRESS GE 130-139MM HG: ICD-10-PCS | Mod: CPTII,S$GLB,, | Performed by: STUDENT IN AN ORGANIZED HEALTH CARE EDUCATION/TRAINING PROGRAM

## 2020-09-03 PROCEDURE — 3080F PR MOST RECENT DIASTOLIC BLOOD PRESSURE >= 90 MM HG: ICD-10-PCS | Mod: CPTII,S$GLB,, | Performed by: STUDENT IN AN ORGANIZED HEALTH CARE EDUCATION/TRAINING PROGRAM

## 2020-09-03 PROCEDURE — 3075F SYST BP GE 130 - 139MM HG: CPT | Mod: CPTII,S$GLB,, | Performed by: STUDENT IN AN ORGANIZED HEALTH CARE EDUCATION/TRAINING PROGRAM

## 2020-09-03 PROCEDURE — 99213 OFFICE O/P EST LOW 20 MIN: CPT | Mod: S$GLB,,, | Performed by: STUDENT IN AN ORGANIZED HEALTH CARE EDUCATION/TRAINING PROGRAM

## 2020-09-03 PROCEDURE — 99999 PR PBB SHADOW E&M-EST. PATIENT-LVL III: ICD-10-PCS | Mod: PBBFAC,,, | Performed by: STUDENT IN AN ORGANIZED HEALTH CARE EDUCATION/TRAINING PROGRAM

## 2020-09-03 PROCEDURE — 99999 PR PBB SHADOW E&M-EST. PATIENT-LVL III: CPT | Mod: PBBFAC,,, | Performed by: STUDENT IN AN ORGANIZED HEALTH CARE EDUCATION/TRAINING PROGRAM

## 2020-09-03 PROCEDURE — 3008F BODY MASS INDEX DOCD: CPT | Mod: CPTII,S$GLB,, | Performed by: STUDENT IN AN ORGANIZED HEALTH CARE EDUCATION/TRAINING PROGRAM

## 2020-09-03 PROCEDURE — 99213 PR OFFICE/OUTPT VISIT, EST, LEVL III, 20-29 MIN: ICD-10-PCS | Mod: S$GLB,,, | Performed by: STUDENT IN AN ORGANIZED HEALTH CARE EDUCATION/TRAINING PROGRAM

## 2020-09-03 RX ORDER — DEXTROAMPHETAMINE SACCHARATE, AMPHETAMINE ASPARTATE MONOHYDRATE, DEXTROAMPHETAMINE SULFATE AND AMPHETAMINE SULFATE 7.5; 7.5; 7.5; 7.5 MG/1; MG/1; MG/1; MG/1
30 CAPSULE, EXTENDED RELEASE ORAL EVERY MORNING
Qty: 30 CAPSULE | Refills: 0 | Status: SHIPPED | OUTPATIENT
Start: 2020-11-26 | End: 2020-12-03 | Stop reason: SDUPTHER

## 2020-09-03 RX ORDER — AMITRIPTYLINE HYDROCHLORIDE 25 MG/1
25 TABLET, FILM COATED ORAL NIGHTLY
Qty: 30 TABLET | Refills: 2 | Status: SHIPPED | OUTPATIENT
Start: 2020-09-03 | End: 2020-12-03 | Stop reason: SDUPTHER

## 2020-09-03 RX ORDER — DEXTROAMPHETAMINE SACCHARATE, AMPHETAMINE ASPARTATE, DEXTROAMPHETAMINE SULFATE AND AMPHETAMINE SULFATE 2.5; 2.5; 2.5; 2.5 MG/1; MG/1; MG/1; MG/1
10 TABLET ORAL
Qty: 30 TABLET | Refills: 0 | Status: SHIPPED | OUTPATIENT
Start: 2020-10-26 | End: 2020-11-25

## 2020-09-03 RX ORDER — DEXTROAMPHETAMINE SACCHARATE, AMPHETAMINE ASPARTATE MONOHYDRATE, DEXTROAMPHETAMINE SULFATE AND AMPHETAMINE SULFATE 7.5; 7.5; 7.5; 7.5 MG/1; MG/1; MG/1; MG/1
30 CAPSULE, EXTENDED RELEASE ORAL EVERY MORNING
Qty: 30 CAPSULE | Refills: 0 | Status: SHIPPED | OUTPATIENT
Start: 2020-10-26 | End: 2020-12-03 | Stop reason: SDUPTHER

## 2020-09-03 RX ORDER — DEXTROAMPHETAMINE SACCHARATE, AMPHETAMINE ASPARTATE MONOHYDRATE, DEXTROAMPHETAMINE SULFATE AND AMPHETAMINE SULFATE 7.5; 7.5; 7.5; 7.5 MG/1; MG/1; MG/1; MG/1
30 CAPSULE, EXTENDED RELEASE ORAL EVERY MORNING
Qty: 30 CAPSULE | Refills: 0 | Status: SHIPPED | OUTPATIENT
Start: 2020-09-26 | End: 2020-12-03 | Stop reason: SDUPTHER

## 2020-09-03 RX ORDER — DEXTROAMPHETAMINE SACCHARATE, AMPHETAMINE ASPARTATE, DEXTROAMPHETAMINE SULFATE AND AMPHETAMINE SULFATE 2.5; 2.5; 2.5; 2.5 MG/1; MG/1; MG/1; MG/1
10 TABLET ORAL
Qty: 30 TABLET | Refills: 0 | Status: SHIPPED | OUTPATIENT
Start: 2020-09-26 | End: 2020-10-26

## 2020-09-03 RX ORDER — DEXTROAMPHETAMINE SACCHARATE, AMPHETAMINE ASPARTATE, DEXTROAMPHETAMINE SULFATE AND AMPHETAMINE SULFATE 2.5; 2.5; 2.5; 2.5 MG/1; MG/1; MG/1; MG/1
10 TABLET ORAL
Qty: 30 TABLET | Refills: 0 | Status: SHIPPED | OUTPATIENT
Start: 2020-11-26 | End: 2020-12-03 | Stop reason: SDUPTHER

## 2020-09-03 NOTE — PROGRESS NOTES
OUTPATIENT PSYCHIATRY FOLLOW UP VISIT    ENCOUNTER DATE:  9/3/2020  SITE:  [facility]  LENGTH OF SESSION:  35 minutes      CHIEF COMPLAINT:  No chief complaint on file.      HISTORY OF PRESENTING ILLNESS:  April Wendt Schamberger is a 35 y.o. female with history of ADHD, NIRAV, IBS-D who presents for follow up appointment. Pt last seen by Dr. Tobias on 6/23/20.  checked; no concerns for stimulant abuse. Of note, pt recently seen by PCP with dx htn, tachycardia. PCP not concerned with ; continued on metoprolol 25mg with HTN and tachycardia listed as stable; RTC in 1 year. Last EKG 2017.    Plan at last appointment:  - adderall XR 30 mg daily as sx improved & is functioning well at work- escribed to [facility] with fill dates-6/26, 7/26, 8/26  - Increase IR adderall from 5 to 10 mg prn in the afternoon- 6/26, 7/26, 8/26  - Continue amitriptyline 25 mg qhs for anxiety, sleep, and IBS with diarrhea    Interval history as told by Patient - & or family/friend/spouse/caregiver with pts permission    Pt reports she was first diagnosed with ADHD in early 20's by PCP; did eval in his office without neurocognitive testing; has only ever tried adderall. 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 Now she works in cancer research and needs to to be able to focus enough to read protocols. Does not take adderall holidays as she has two small children at home and often works on weekends. No concerns with appetite while on adderall. Has difficulty sleeping but this has always been the case, even in high school, and insomnia runs in the family.    Diagnosed with essential HTN in 2008; managed well on metoprolol. Reports that HTN resolves during pregnancy and two years after. States that she stopped adderall in 2010 and continued to  "have HTN and tachycardia until 2014 when her son was born - 4 years of HTN without stimulant medication. Went back on Adderall after seeing Dr. Tobias last year.    Reports daily diarrhea with EGD showing ulcers. Reports GI doctor "keeps trying to diagnose me with Crohns" and "we've agreed on IBS-D for now" Taking Lomotil dialy; no longer taking immodium daily due to heart attack warning.          PSYCHIATRIC REVIEW OF SYSTEMS:(none/ yes- better/worse/stable/& what symptoms)    Symptoms of Depression: denies depressed mood, anhedonia    Symptoms of Anxiety/ panic attacks: denies panic attacks, difficulty with sleep initiation due to worry since high school, muscle tension - so tight pulled two discs out of alignment, tried acupuncture and physical, fatigued constantly, was getting headaches but better now on HTN meds    Symptoms of Luly/Hypomania: denies    Symptoms of psychosis: denies    Sleep: 6 hours on lunesta provided by PCP; without gets 3-4 hours    Appetite: increased over the pandemic - gained about 20 pounds she says    Other: denies    Alcohol: denies    Illicit Drugs: denies    Psychosocial stressors:  five year old boy having to homeschool with work, covid, terrible two's with the other one    Risk Parameters:  Patient reports no suicidal ideation  Patient reports no homicidal ideation  Patient reports no self-injurious behavior  Patient reports no violent behavior    PSYCHIATRIC MED REVIEW    Current psych meds  - adderall XR 30 mg daily  - adderall IR 10mg, PRN  - amitriptyline 25 mg qhs for anxiety, sleep, and IBS with diarrhea; takes half tab amitriptyline bc full tab was too sleepy the next day; hasn't noticed change in diarrhea    Previous psych meds trials  Benadryl  Melatonin  ambien  OTC natural herbs    PAST PSYCHIATRIC, MEDICAL, AND SOCIAL HISTORY REVIEWED  The patient's past medical, family and social history have been reviewed and updated as appropriate within the electronic medical " record - see encounter notes.      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  Scientology: [employer] on sundays  Housing Status: living with family  Hobbies/Leisure time: playing with kids  History of phys/sexual abuse: no  Access to gun: yes but high in closet; doesn't know which room  H/o head trauma: denies     Family Psychiatric History:   Sister anxiety and depression takes prozac; mom had depression with cancer     Substance Abuse History:  Recreational Drugs: no  Use of Alcohol: no  Rehab History: no  Tobacco Use: smoked pre kids  Use of Caffeine: large coffee  Use of OTC: no  Legal consequences of chemical use:no     Legal History:  Past Charges/Incarcerations: no  Pending charges:no       MEDICAL REVIEW OF SYSTEMS:  Complete review of systems performed covering Constitutional, Musculoskeletal, Neurologic.  All systems negative/ except diarrhea daily but is baseline    ALL MEDICATIONS:    Current Outpatient Medications:     amitriptyline (ELAVIL) 25 MG tablet, Take 1 tablet (25 mg total) by mouth every evening., Disp: 30 tablet, Rfl: 2    butalbital-acetaminophen-caffeine -40 mg (FIORICET, ESGIC) -40 mg per tablet, Take 1 tablet by mouth every 6 (six) hours as needed for Headaches., Disp: 30 tablet, Rfl: 0    dextroamphetamine-amphetamine (ADDERALL XR) 30 MG 24 hr capsule, Take 1 capsule (30 mg total) by mouth every morning., Disp: 30 capsule, Rfl: 0    dextroamphetamine-amphetamine (ADDERALL XR) 30 MG 24 hr capsule, Take 1 capsule (30 mg total) by mouth every morning., Disp: 30 capsule, Rfl: 0    dextroamphetamine-amphetamine (ADDERALL XR) 30 MG 24 hr capsule, Take 1 capsule (30 mg  total) by mouth every morning., Disp: 30 capsule, Rfl: 0    dextroamphetamine-amphetamine 10 mg Tab, Take 1 tablet (10 mg total) by mouth after lunch., Disp: 30 tablet, Rfl: 0    diphenoxylate-atropine 2.5-0.025 mg (LOMOTIL) 2.5-0.025 mg per tablet, Take 1 tablet by mouth 4 (four) times daily as needed for Diarrhea., Disp: 360 tablet, Rfl: 0    diphenoxylate-atropine 2.5-0.025 mg/5 ml (LOMOTIL) 2.5-0.025 mg/5 mL liquid, Take 5 mLs by mouth 4 (four) times daily as needed., Disp: 3 Bottle, Rfl: 1    eszopiclone (LUNESTA) 3 mg Tab, Take 1 tablet (3 mg total) by mouth nightly as needed (insomnia)., Disp: 30 tablet, Rfl: 0    fluconazole (DIFLUCAN) 150 MG Tab, Take 1 tablet by mouth now and repeat in 3 days if still symptomatic, Disp: 20 tablet, Rfl: 3    loperamide (IMODIUM A-D) 1 mg/7.5 mL Liqd, Take 0.1 mg/kg by mouth every 12 (twelve) hours., Disp: , Rfl:     metoprolol succinate (TOPROL-XL) 25 MG 24 hr tablet, Take 1 tablet (25 mg total) by mouth once daily., Disp: 30 tablet, Rfl: 11    rizatriptan (MAXALT-MLT) 10 MG disintegrating tablet, Take 1/2 tablet (5 mg total) by mouth as needed for Migraine (repeat dose once in 2 hours if headache return.)., Disp: 9 tablet, Rfl: 0    tiZANidine (ZANAFLEX) 4 MG tablet, Take 1 tablet (4 mg total) by mouth 3 (three) times daily as needed., Disp: 90 tablet, Rfl: 2    ALLERGIES:  Review of patient's allergies indicates:   Allergen Reactions    Lactose     Azithromycin Nausea And Vomiting       RELEVANT LABS/STUDIES:    Lab Results   Component Value Date    WBC 6.82 07/30/2020    HGB 13.9 07/30/2020    HCT 41.4 07/30/2020    MCV 90 07/30/2020     07/30/2020     BMP  Lab Results   Component Value Date     07/30/2020    K 4.4 07/30/2020     07/30/2020    CO2 29 07/30/2020    BUN 11 07/30/2020    CREATININE 0.7 07/30/2020    CALCIUM 9.2 07/30/2020    ANIONGAP 4 (L) 07/30/2020    ESTGFRAFRICA >60 07/30/2020    EGFRNONAA >60 07/30/2020     Lab Results    Component Value Date    ALT 11 07/30/2020    AST 13 07/30/2020    ALKPHOS 43 (L) 07/30/2020    BILITOT 0.3 07/30/2020     Lab Results   Component Value Date    TSH 0.861 07/30/2020     Lab Results   Component Value Date    HGBA1C 4.8 07/30/2020       VITALS  Vitals:    09/03/20 0919   BP: (!) 138/92   Pulse: 89   Weight: 78.3 kg (172 lb 8.2 oz)       PHYSICAL EXAM  General: well developed, well nourished  Neurologic:   Gait: Normal   Psychomotor signs:  No involuntary movements or tremor  AIMS: none    PSYCHIATRIC EXAM:     Mental Status Exam:  Appearance: unremarkable, age appropriate  Behavior/Cooperation: normal, cooperative  Speech: normal tone, normal rate, normal pitch, normal volume  Language: uses words appropriately; NO aphasia or dysarthria  Mood: happy  Affect: full congruent  Thought Process: normal and logical  Thought Content: normal, no suicidality, no homicidality, delusions, or paranoia  Perception: no AVH  Level of Consciousness: Alert and Oriented x3  Memory:   Intact; Recent:  Intact; able to report recent events; Remote: Intact; Named 3/3 past presidents   Attention/concentration: appropriate for age/education.   Fund of Knowledge: appears adequate  Insight:   Intact  Judgment:  Intact       IMPRESSION:    April Wendt Schamberger is a 35 y.o. female with history of ADHD, NIRAV, IBS-D who presents for follow up appointment.    Status/Progress:  Based on the examination today, the patient's problem(s) is/are well controlled.  New problems have not been presented today.     DIAGNOSES:    ICD-10-CM ICD-9-CM   1. Attention deficit hyperactivity disorder (ADHD), combined type  F90.2 314.01   2. Irritable bowel syndrome with diarrhea  K58.0 564.1   3. Anxiety  F41.9 300.00       PLAN:  Psych Med:  - Continue Adderall XR 30 mg daily as sx improved & is functioning well at work- escribed to [facility] with fill dates-9/26, 10/26, 11/26  - Continue Adderall 10 mg prn in the afternoon- 9/26,  10/26, 11/26  - Continue amitriptyline 25 mg qhs for anxiety, sleep, and IBS with diarrhea; patient taking half tab daily as full tab too sedating; consider increasing if sxs become problematic     Discussed with patient informed consent, risks vs. benefits, alternative treatments, side effect profile and the inherent unpredictability of individual responses to these treatments. Answered any questions patient may have had. The patient expresses understanding of the above and displays the capacity to agree with this current plan     Other: EKG; last EKG 2017    RETURN TO CLINIC:  1 month       Bridget Martin DO  [facility], PGY-3  [facility]  9/3/2020 9:12 AM

## 2020-09-18 DIAGNOSIS — Z01.84 ANTIBODY RESPONSE EXAMINATION: ICD-10-CM

## 2020-09-29 ENCOUNTER — TELEPHONE (OUTPATIENT)
Dept: GASTROENTEROLOGY | Facility: CLINIC | Age: 35
End: 2020-09-29

## 2020-09-30 ENCOUNTER — PATIENT MESSAGE (OUTPATIENT)
Dept: INTERNAL MEDICINE | Facility: CLINIC | Age: 35
End: 2020-09-30

## 2020-09-30 DIAGNOSIS — F51.01 PRIMARY INSOMNIA: ICD-10-CM

## 2020-09-30 RX ORDER — ESZOPICLONE 3 MG/1
3 TABLET, FILM COATED ORAL NIGHTLY PRN
Qty: 30 TABLET | Refills: 0 | OUTPATIENT
Start: 2020-09-30

## 2020-09-30 RX ORDER — ESZOPICLONE 3 MG/1
3 TABLET, FILM COATED ORAL NIGHTLY PRN
Qty: 30 TABLET | Refills: 0 | Status: SHIPPED | OUTPATIENT
Start: 2020-09-30 | End: 2020-10-30 | Stop reason: SDUPTHER

## 2020-10-13 ENCOUNTER — PATIENT OUTREACH (OUTPATIENT)
Dept: OTHER | Facility: OTHER | Age: 35
End: 2020-10-13

## 2020-10-18 DIAGNOSIS — Z01.84 ANTIBODY RESPONSE EXAMINATION: ICD-10-CM

## 2020-10-20 NOTE — PROGRESS NOTES
Digital Medicine: Health  Introduction    Introduced April Schamberger to Digital Medicine. Discussed health  role and recommended lifestyle modifications.    The history is provided by the patient.               HYPERTENSION  Explained hypertension digital medicine goals including BP goal less than or equal to 130/80mmHg, improved convenience of BP management and reduced risk of heart attack, kidney failure, stroke, eye disease, dementia, and death.      Explained non-pharmacologic therapies like low salt diet and physical activity can reduce blood pressure. .      Explained that we expect patient to submit several blood pressure readings per week at random times of the day, but at least 30 minutes after taking blood pressure medications. Instructed patient not to allow anyone else to use their blood pressure monitor and phone as data submitted is directly entered into medical record. Reviewed and confirmed appropriate blood pressure monitoring technique.         Patient's BP goal is less than or equal to 130/80.Patient's BP average is 143/91 mmHg, which is above goal, per 2017 ACC/AHA Hypertension Guidelines.    Explained to the patient that the Digital Medicine team is not available for emergencies. Advised patient call [facility] On Call ([phone] or [phone]) or [phone] if needed.         Reason for review: Blood pressure not at goal        Topics Covered on Call: physical activity, Diet, device use and medication, stress    Additional Follow-up details: Stated that she may have a hard time getting to the 80s.     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 She does get headaches with red ears.     It went away for a few years and came back. Went down during pregnancy and a little after.      got laid off last month.             Diet-Assessed  24 hour dietary recall  Breakfast is typically between. 2 packets of instant grits.  Dinner is typically between. Homemade chili.   Snacks are  typically. [place] granola, grapes, .    Patient reports eating or drinking the following: Eats horribly. Snacks a lot. Has gained weight since quarantine because she eats ice cream and gummy bears.   1 lrg coffee, mostly water, 1 can of coke on weekends    She stated that she knows she eats bad and is working on it, but not quite ready.       Physical Activity-Assessed      She identified the following barriers to physical activity: life        Additional physical activity details: Was going to the gym before shut down and hasn't worked out since summer.       Medication Adherence-Medication adherence was assessed.  Patient continue taking medication as prescribed.          Substance, Sleep, Stress-Assessed  stress-assessed  Details:lots of stress  Intervention(s):    Sleep-assessed  Details:insomnia, takes meds  Intervention(s):    Alcohol -assessed  Details:3-4 beers a year  Intervention(s):    Tobacco-Assessed  Details:none  Intervention(s):          Continue current diet/physical activity routine.  Instructed to charge device.  Reviewed Device Techniques.     Addressed patient questions and patient has my contact information if needed prior to next outreach. Patient verbalizes understanding.      Explained the importance of self-monitoring and medication adherence. Encouraged the patient to communicate with their health  for lifestyle modifications to help improve or maintain a healthy lifestyle.        Sent link to [facility] webpages and my contact information via [employer] for future questions.        Explained to the patient that the Digital Medicine team is not available for emergencies. Advised patient call [facility] On Call ([phone] or [phone]) or [phone] if needed.            There are no preventive care reminders to display for this patient.      Last 5 Patient Entered Readings                                      Current 30 Day Average: 143/91     Recent Readings  10/16/2020 10/13/2020 10/11/2020 10/11/2020 10/10/2020    SBP (mmHg) 144 149 129 118 148    DBP (mmHg) 90 94 87 87 97    Pulse 90 93 83 97 77

## 2020-10-21 DIAGNOSIS — R51.9 NONINTRACTABLE HEADACHE, UNSPECIFIED CHRONICITY PATTERN, UNSPECIFIED HEADACHE TYPE: ICD-10-CM

## 2020-10-21 RX ORDER — BUTALBITAL, ACETAMINOPHEN AND CAFFEINE 50; 325; 40 MG/1; MG/1; MG/1
1 TABLET ORAL EVERY 6 HOURS PRN
Qty: 30 TABLET | Refills: 0 | Status: SHIPPED | OUTPATIENT
Start: 2020-10-21 | End: 2020-12-15 | Stop reason: SDUPTHER

## 2020-10-22 ENCOUNTER — CLINICAL SUPPORT (OUTPATIENT)
Dept: LAB | Facility: HOSPITAL | Age: 35
End: 2020-10-22
Attending: INTERNAL MEDICINE
Payer: COMMERCIAL

## 2020-10-22 DIAGNOSIS — F90.2 ATTENTION DEFICIT HYPERACTIVITY DISORDER (ADHD), COMBINED TYPE: ICD-10-CM

## 2020-10-22 PROCEDURE — 93010 EKG 12-LEAD: ICD-10-PCS | Mod: ,,, | Performed by: INTERNAL MEDICINE

## 2020-10-22 PROCEDURE — 93005 ELECTROCARDIOGRAM TRACING: CPT

## 2020-10-22 PROCEDURE — 93010 ELECTROCARDIOGRAM REPORT: CPT | Mod: ,,, | Performed by: INTERNAL MEDICINE

## 2020-10-23 ENCOUNTER — PATIENT OUTREACH (OUTPATIENT)
Dept: OTHER | Facility: OTHER | Age: 35
End: 2020-10-23

## 2020-10-23 NOTE — PROGRESS NOTES
Digital Medicine: Clinician Introduction    April Wendt Schamberger is a 35 y.o. female who is newly enrolled in the Digital Medicine Clinic.    Takes metoprolol in the AM and checks BP in the evenings. Per chart review, she was previously on amlodipine but it was discontinued and she was started on metoprolol to manage HTN and tachycardia.     When BP reading was 165/103, she had a headache. She is able to tell fairly quickly when her BP is high. HA has since resolved.     She has been checking her BP while laying on the bed - reviewed proper BP technique and she agrees to switch over to kitchen chair.     The history is provided by the patient.      Review of patient's allergies indicates:   -- Lactose    -- Azithromycin -- Nausea And Vomiting  Completed Medication Reconciliation      HYPERTENSION  Explained hypertension digital medicine goals including BP goal less than or equal to 130/80mmHg, improved convenience of BP management and reduced risk of heart attack, kidney failure, stroke, eye disease, dementia, and death.     Explained non-pharmacologic therapies like low salt diet and physical activity can reduce blood pressure.       Explained that we expect patient to submit several blood pressure readings per week at random times of the day, but at least 30 minutes after taking blood pressure medications. Instructed patient not to allow anyone else to use their blood pressure monitor and phone as data submitted is directly entered into medical record. Reviewed and confirmed appropriate blood pressure monitoring technique.         Reviewed signs/symptoms of hypertension (headache, changes in vision, chest pain, shortness of breath)   Reviewed signs/symptoms of hypotension (lightheaded, dizziness, weakness)     Patient's BP goal is less than or equal to 130/80. Patients BP average is 139/92 mmHg, which is above goal, per 2017 ACC/AHA Hypertension Guidelines. Denies any hypotensive s/s.         Last 5 Patient  Entered Readings                                      Current 30 Day Average: 139/92     Recent Readings 10/23/2020 10/23/2020 10/22/2020 10/21/2020 10/21/2020    SBP (mmHg) 120 133 137 141 165    DBP (mmHg) 85 88 91 100 103    Pulse 83 85 88 97 102                Depression Screening  April Wendt Schamberger screened negative on the depression screening.     Sleep Apnea Screening  Patient not previously diagnosed with JOHANA       Medication Affordability Screening  Patient screened negative on the medication affordability questionnaires. Patient is currently not having problems affording medications    Medication Adherence-Medication adherence was assessed.  Patient continue taking medication as prescribed.          Denies any missed doses - not using any formal reminder system      ASSESSMENT(S)  Patients BP average is 139/92 mmHg, which is above goal. Patient's BP goal is less than or equal to 130/80.     Patient is being managed on cardioselective BB.       Hypertension Plan  Continue current therapy.  Continue current diet/physical activity routine.  Instructed to charge device.  Provided patient education.  Plan to follow up in about 1-2 months to assess BP trend.     Addressed patient questions and patient has my contact information if needed prior to next outreach. Patient verbalizes understanding.      Explained the importance of self-monitoring and medication adherence. Encouraged the patient to communicate with their health  for lifestyle modifications to help improve or maintain a healthy lifestyle.        Sent link to [facility] webpages and my contact information via [employer] for future questions.        Explained to the patient that the Digital Medicine team is not available for emergencies. Advised patient call [facility] On Call ([phone] or [phone]) or [phone] if needed.            There are no preventive care reminders to display for this patient.       Current Medication  Regimen:  Hypertension Medications             metoprolol succinate (TOPROL-XL) 25 MG 24 hr tablet Take 1 tablet (25 mg total) by mouth once daily.

## 2020-10-26 DIAGNOSIS — M62.838 MUSCLE SPASM: ICD-10-CM

## 2020-10-26 DIAGNOSIS — F90.2 ATTENTION DEFICIT HYPERACTIVITY DISORDER (ADHD), COMBINED TYPE: ICD-10-CM

## 2020-10-26 RX ORDER — TIZANIDINE 4 MG/1
4 TABLET ORAL 3 TIMES DAILY PRN
Qty: 90 TABLET | Refills: 2 | Status: SHIPPED | OUTPATIENT
Start: 2020-10-26 | End: 2021-01-29

## 2020-10-26 RX ORDER — DEXTROAMPHETAMINE SACCHARATE, AMPHETAMINE ASPARTATE MONOHYDRATE, DEXTROAMPHETAMINE SULFATE AND AMPHETAMINE SULFATE 7.5; 7.5; 7.5; 7.5 MG/1; MG/1; MG/1; MG/1
30 CAPSULE, EXTENDED RELEASE ORAL EVERY MORNING
Qty: 30 CAPSULE | Refills: 0 | Status: CANCELLED | OUTPATIENT
Start: 2020-10-26

## 2020-10-26 RX ORDER — DEXTROAMPHETAMINE SACCHARATE, AMPHETAMINE ASPARTATE, DEXTROAMPHETAMINE SULFATE AND AMPHETAMINE SULFATE 2.5; 2.5; 2.5; 2.5 MG/1; MG/1; MG/1; MG/1
10 TABLET ORAL
Qty: 30 TABLET | Refills: 0 | Status: CANCELLED | OUTPATIENT
Start: 2020-10-26 | End: 2020-11-25

## 2020-10-30 DIAGNOSIS — F51.01 PRIMARY INSOMNIA: ICD-10-CM

## 2020-10-30 RX ORDER — ESZOPICLONE 3 MG/1
3 TABLET, FILM COATED ORAL NIGHTLY PRN
Qty: 30 TABLET | Refills: 0 | Status: SHIPPED | OUTPATIENT
Start: 2020-10-30 | End: 2020-11-27 | Stop reason: SDUPTHER

## 2020-11-04 ENCOUNTER — TELEPHONE (OUTPATIENT)
Dept: INTERNAL MEDICINE | Facility: CLINIC | Age: 35
End: 2020-11-04

## 2020-11-04 RX ORDER — RIZATRIPTAN BENZOATE 10 MG/1
TABLET, ORALLY DISINTEGRATING ORAL
Qty: 9 TABLET | Refills: 0 | Status: SHIPPED | OUTPATIENT
Start: 2020-11-04 | End: 2021-05-11 | Stop reason: SDUPTHER

## 2020-11-04 RX ORDER — RIZATRIPTAN BENZOATE 10 MG/1
TABLET, ORALLY DISINTEGRATING ORAL
Qty: 9 TABLET | Refills: 0 | OUTPATIENT
Start: 2020-11-04 | End: 2020-12-04

## 2020-11-04 NOTE — TELEPHONE ENCOUNTER
Notified via epic secure chat by Qian Bahena that pt requested refill of prn maxalt.   rx sent to pharmacy on file.

## 2020-11-16 ENCOUNTER — OFFICE VISIT (OUTPATIENT)
Dept: PAIN MEDICINE | Facility: CLINIC | Age: 35
End: 2020-11-16
Payer: COMMERCIAL

## 2020-11-16 VITALS
HEART RATE: 72 BPM | DIASTOLIC BLOOD PRESSURE: 94 MMHG | WEIGHT: 172.63 LBS | BODY MASS INDEX: 26.25 KG/M2 | SYSTOLIC BLOOD PRESSURE: 130 MMHG

## 2020-11-16 DIAGNOSIS — M79.18 MYOFASCIAL PAIN SYNDROME, CERVICAL: ICD-10-CM

## 2020-11-16 DIAGNOSIS — M54.2 CERVICAL PAIN (NECK): Primary | ICD-10-CM

## 2020-11-16 DIAGNOSIS — M54.12 CERVICAL RADICULOPATHY: ICD-10-CM

## 2020-11-16 DIAGNOSIS — M54.2 NECK PAIN: ICD-10-CM

## 2020-11-16 PROCEDURE — 3008F PR BODY MASS INDEX (BMI) DOCUMENTED: ICD-10-PCS | Mod: CPTII,S$GLB,, | Performed by: NURSE PRACTITIONER

## 2020-11-16 PROCEDURE — 1125F AMNT PAIN NOTED PAIN PRSNT: CPT | Mod: S$GLB,,, | Performed by: NURSE PRACTITIONER

## 2020-11-16 PROCEDURE — 99214 OFFICE O/P EST MOD 30 MIN: CPT | Mod: 25,S$GLB,, | Performed by: NURSE PRACTITIONER

## 2020-11-16 PROCEDURE — 3080F DIAST BP >= 90 MM HG: CPT | Mod: CPTII,S$GLB,, | Performed by: NURSE PRACTITIONER

## 2020-11-16 PROCEDURE — 99999 PR PBB SHADOW E&M-EST. PATIENT-LVL III: ICD-10-PCS | Mod: PBBFAC,,, | Performed by: NURSE PRACTITIONER

## 2020-11-16 PROCEDURE — 3075F PR MOST RECENT SYSTOLIC BLOOD PRESS GE 130-139MM HG: ICD-10-PCS | Mod: CPTII,S$GLB,, | Performed by: NURSE PRACTITIONER

## 2020-11-16 PROCEDURE — 20553 PR INJECT TRIGGER POINTS, > 3: ICD-10-PCS | Mod: S$GLB,,, | Performed by: NURSE PRACTITIONER

## 2020-11-16 PROCEDURE — 3080F PR MOST RECENT DIASTOLIC BLOOD PRESSURE >= 90 MM HG: ICD-10-PCS | Mod: CPTII,S$GLB,, | Performed by: NURSE PRACTITIONER

## 2020-11-16 PROCEDURE — 20553 NJX 1/MLT TRIGGER POINTS 3/>: CPT | Mod: S$GLB,,, | Performed by: NURSE PRACTITIONER

## 2020-11-16 PROCEDURE — 3075F SYST BP GE 130 - 139MM HG: CPT | Mod: CPTII,S$GLB,, | Performed by: NURSE PRACTITIONER

## 2020-11-16 PROCEDURE — 1125F PR PAIN SEVERITY QUANTIFIED, PAIN PRESENT: ICD-10-PCS | Mod: S$GLB,,, | Performed by: NURSE PRACTITIONER

## 2020-11-16 PROCEDURE — 99214 PR OFFICE/OUTPT VISIT, EST, LEVL IV, 30-39 MIN: ICD-10-PCS | Mod: 25,S$GLB,, | Performed by: NURSE PRACTITIONER

## 2020-11-16 PROCEDURE — 3008F BODY MASS INDEX DOCD: CPT | Mod: CPTII,S$GLB,, | Performed by: NURSE PRACTITIONER

## 2020-11-16 PROCEDURE — 99999 PR PBB SHADOW E&M-EST. PATIENT-LVL III: CPT | Mod: PBBFAC,,, | Performed by: NURSE PRACTITIONER

## 2020-11-16 RX ORDER — ACETAMINOPHEN 500 MG
1000 TABLET ORAL 3 TIMES DAILY PRN
Refills: 0 | COMMUNITY
Start: 2020-11-16

## 2020-11-16 RX ORDER — TRIAMCINOLONE ACETONIDE 40 MG/ML
40 INJECTION, SUSPENSION INTRA-ARTICULAR; INTRAMUSCULAR
Status: CANCELLED | OUTPATIENT
Start: 2020-11-16 | End: 2020-11-16

## 2020-11-16 RX ORDER — PREGABALIN 25 MG/1
25 CAPSULE ORAL 3 TIMES DAILY
Qty: 90 CAPSULE | Refills: 0 | Status: SHIPPED | OUTPATIENT
Start: 2020-11-16 | End: 2020-12-28 | Stop reason: SDUPTHER

## 2020-11-16 RX ORDER — DICLOFENAC SODIUM 50 MG/1
50 TABLET, DELAYED RELEASE ORAL 2 TIMES DAILY PRN
Qty: 60 TABLET | Refills: 0 | Status: SHIPPED | OUTPATIENT
Start: 2020-11-16 | End: 2021-11-03

## 2020-11-16 NOTE — PROGRESS NOTES
Chronic Pain  patient Established Note (Follow up visit)        SUBJECTIVE:    Interval Updates:     11/16/20 - Ms. Schamberger returns to clinic for follow up visit reporting worse neck and arm pain.  Patient is s/p TPI on 6/30/20 with 50% relief.  Pain intensity is currently 7/10.   April presents with returning neck, thoracic and bilateral arm pain right greater than left., pain is described as aching dull throbbing with some numbness in her right arm she reports no shooting pain in either arm she denies any profound weakness and denies dropping things.  She did report numbness in her right arm.  I discussed with patient that I will provide her with trigger point injections today to help with myofascial pain and  I will also start her on a cocktail of medications to help with I think is neuropathic symptoms in her arm, she is currently taking tizanidine 4 mg nightly, so I will at Lyrica 25 mg t.i.d.,  diclofenac 50 mg b.i.d. as needed for pain, and Tylenol 1000 mg t.i.d. in effort to provide her with some relief.  I discussed with her if she had any side effects with these medications that she should discontinue immediately.  I will have them sent to the [facility] pharmacy.    6/30/20 Pt returns for bilateral chronic neck pain, patient is established our office she has been given cervical paraspinal trigger point injection in the past which has helped alleviate her pain for greater than 6 months.  She is requesting repeat trigger point injections today.    April Wendt Schamberger presents to the clinic for a follow-up appointment for Cervical TPIs today.   Since the last visit, April Wendt Schamberger states the pain has been persistant. Current pain intensity is 6/10.    Pain Disability Index Review:  Last 3 PDI Scores 11/16/2020 6/30/2020 1/21/2020   Pain Disability Index (PDI) 44 49 49        Pain  Medications:      - Opioids: None  - Adjuvant Medications: Advil,Motrin ( Ibuprofen)  - Anti-Coagulants: None  - Others: see medications list.     Opioid Contract: no      report:  Reviewed and consistent with medication use as prescribed.     Pain Procedures:17 TRIGGER POINT INJECTION  17 TRIGGER POINT INJECTION  17 bilateral C4,5,6 CERVICAL FACET MEDIAL BRANCH NERVE BLOCK (Prone) ( after xray correlation with pain level, decsion was made to proceed at C4,5,6 levels, patient agrees to proceed      Physical Therapy/Home Exercise: no     Imagin17 MRI Cervical Spine Without Contrast  Narrative   Technique: Multiplanar, multisequence MR imaging of the cervical spine obtained without the use of IV contrast.     Comparison: Cervical spine radiographs 12/15/2016.     Results:    There is straightening with mild reversal of the normal cervical lordosis. Vertebral body heights are maintained with no evidence of fracture. Mild intervertebral disc space narrowing and desiccation are visualized at C5-6 and C6-7. No marrow signal abnormality suspicious for an infiltrative process.      The cervical cord is normal in caliber and signal characteristics.  The craniocervical junction and visualized intracranial contents are unremarkable.  The adjacent soft tissue structures show no significant abnormalities.      C2-C3:  No significant spinal canal or neural foraminal narrowing.    C3-C4: No significant spinal canal or neural foraminal narrowing.    C4-C5:  No significant spinal canal or neural foraminal narrowing.    C5-C6:  Disc bulge with right uncovertebral spurring. Findings result in mild spinal canal stenosis and mild right neural foraminal narrowing.    C6-C7:  Mild disc bulge with thickening of the ligamentum flavum results in mild spinal canal stenosis.No significant neuroforaminal narrowing.    C7-T1:   No significant central spinal or neural foraminal narrowing.   Impression      Mild disc bulge  and spinal canal stenosis at the C5-6 and C6-7 levels.      Electronically signed by: UMER MARTIN MD  Date: 01/26/17  Time: 12:56     Encounter   View Encounter      Reviewed By   Hunter Jimenez MD on 1/27/2017  2:18 PM   Exam Details                     Performed Procedure Technologist Supporting Staff Performing Physician   MRI Cervical Spine Without Contrast Andre Hinds, RT         Appointment Date/Status Modality Department        1/26/2017     Completed [facility] MRI1 [facility]       Begin Exam End Exam Begin Exam Questionnaires End Exam Questionnaires     1/26/2017 11:22 AM 1/26/2017 11:59 AM MRI TECH NAVIGATOR [facility]STIONS IMAGING END ALL         RIS PREGNANCY TECH NAVIGATOR          X-Ray Cervical Spine AP Lat with Flexion  Extension 12/15/16  Narrative     Cervical spine radiographs     comparison: None    Results: AP, lateral, flexion and extension views  .  The alignment is normal, vertebral body height and disk spaces are well-maintained.    Flexion and extension views demonstrate no translational abnormalities.  Very small anterior osteophyte noted at C5-C6.  No fracture or osseous lesion seen.Prevertebral soft tissues appear normal.   Impression    No significant abnormality seen      Electronically signed by: JOSE A JENKINS MD  Date: 12/15/16  Time: 10:52          Allergies:   Review of patient's allergies indicates:   Allergen Reactions    Lactose     Azithromycin Nausea And Vomiting       Current Medications:   Current Outpatient Medications   Medication Sig Dispense Refill    amitriptyline (ELAVIL) 25 MG tablet Take 1 tablet (25 mg total) by mouth every evening. 30 tablet 2    butalbital-acetaminophen-caffeine -40 mg (FIORICET, ESGIC) -40 mg per tablet Take 1 tablet by mouth every 6 (six) hours as needed for Headaches. 30 tablet 0    dextroamphetamine-amphetamine (ADDERALL XR) 30 MG 24 hr capsule Take 1 capsule (30 mg total) by mouth every morning. 30 capsule 0     dextroamphetamine-amphetamine (ADDERALL XR) 30 MG 24 hr capsule Take 1 capsule (30 mg total) by mouth every morning. 30 capsule 0    [START ON 11/26/2020] dextroamphetamine-amphetamine (ADDERALL XR) 30 MG 24 hr capsule Take 1 capsule (30 mg total) by mouth every morning. 30 capsule 0    dextroamphetamine-amphetamine 10 mg Tab Take 1 tablet (10 mg total) by mouth after lunch. 30 tablet 0    [START ON 11/26/2020] dextroamphetamine-amphetamine 10 mg Tab Take 1 tablet (10 mg total) by mouth after lunch. 30 tablet 0    diphenoxylate-atropine 2.5-0.025 mg (LOMOTIL) 2.5-0.025 mg per tablet Take 1 tablet by mouth 4 (four) times daily as needed for Diarrhea. 360 tablet 0    diphenoxylate-atropine 2.5-0.025 mg/5 ml (LOMOTIL) 2.5-0.025 mg/5 mL liquid Take 5 mLs by mouth 4 (four) times daily as needed. 3 Bottle 1    eszopiclone (LUNESTA) 3 mg Tab Take 1 tablet (3 mg total) by mouth nightly as needed (insomnia). 30 tablet 0    fluconazole (DIFLUCAN) 150 MG Tab Take 1 tablet by mouth now and repeat in 3 days if still symptomatic 20 tablet 3    loperamide (IMODIUM A-D) 1 mg/7.5 mL Liqd Take 0.1 mg/kg by mouth every 12 (twelve) hours.      metoprolol succinate (TOPROL-XL) 25 MG 24 hr tablet Take 1 tablet (25 mg total) by mouth once daily. 30 tablet 11    rizatriptan (MAXALT-MLT) 10 MG disintegrating tablet Dissolve 1/2 tablet (5 mg total) by mouth as needed for Migraine (repeat dose once in 2 hours if headache return.). 9 tablet 0    tiZANidine (ZANAFLEX) 4 MG tablet Take 1 tablet (4 mg total) by mouth 3 (three) times daily as needed. 90 tablet 2     No current facility-administered medications for this visit.        REVIEW OF SYSTEMS:    GENERAL:  No weight loss, malaise or fevers.  HEENT:  Negative for frequent or significant headaches. + HAs   NECK:  Negative for lumps, goiter, pain and significant neck swelling.  RESPIRATORY:  Negative for cough, wheezing or shortness of breath.  CARDIOVASCULAR:  Negative for chest  pain, leg swelling or palpitations.  GI:  Negative for abdominal discomfort, blood in stools or black stools or change in bowel habits.  MUSCULOSKELETAL:  See HPI.  SKIN:  Negative for lesions, rash, and itching.  PSYCH:  Positive for sleep disturbance, mood disorder and recent psychosocial stressors.  HEMATOLOGY/LYMPHOLOGY:  Negative for prolonged bleeding, bruising easily or swollen nodes.  NEURO:   No history of headaches, syncope, paralysis, seizures or tremors.  All other reviewed and negative other than HPI.    Past Medical History:  Past Medical History:   Diagnosis Date    Abnormal Pap smear of cervix 2000    Cryo Done    ADD (attention deficit disorder)     Breast disorder     Breast Cysts    Esophageal reflux     Fibroids     Hypertension     IBS (irritable bowel syndrome)     Insomnia     Kidney stones     Mastocytosis     Relies on partner vasectomy for contraception     Upper GI bleed        Past Surgical History:  Past Surgical History:   Procedure Laterality Date    COLONOSCOPY N/A 4/28/2017    Procedure: COLONOSCOPY;  Surgeon: Bren Larkin MD;  Location: [facility];  Service: Endoscopy;  Laterality: N/A;    ESOPHAGOGASTRODUODENOSCOPY  2012    LASIK Bilateral 2005    REFRACTIVE SURGERY      TONSILLECTOMY, ADENOIDECTOMY  1988    VAGINAL DELIVERY      x 2       Family History:  Family History   Problem Relation Age of Onset    Breast cancer Mother 47        with Metastasis    Hypertension Mother     Prostate cancer Father     Hypertension Father     Diabetes Father     Deep vein thrombosis Father     Pancreatic cancer Maternal Grandfather     Breast cancer Paternal Grandmother 80    Diabetes type II Paternal Grandfather     Heart attack Maternal Grandmother     Cancer Cousin 31    Cancer Cousin         Thurman Syndrome    Cancer Cousin         Thurman Syndrome    Ovarian cancer Maternal Aunt     Lymphoma Neg Hx     Tuberculosis Neg Hx     Celiac disease Neg Hx      Cirrhosis Neg Hx     Colon polyps Neg Hx     Crohn's disease Neg Hx     Cystic fibrosis Neg Hx     Esophageal cancer Neg Hx     Hemochromatosis Neg Hx     Inflammatory bowel disease Neg Hx     Irritable bowel syndrome Neg Hx     Liver cancer Neg Hx     Liver disease Neg Hx     Rectal cancer Neg Hx     Stomach cancer Neg Hx     Ulcerative colitis Neg Hx     Donavon's disease Neg Hx     Amblyopia Neg Hx     Blindness Neg Hx     Cataracts Neg Hx     Glaucoma Neg Hx     Macular degeneration Neg Hx     Retinal detachment Neg Hx     Strabismus Neg Hx        Social History:  Social History     Socioeconomic History    Marital status:      Spouse name: Not on file    Number of children: 1    Years of education: Not on file    Highest education level: Not on file   Occupational History    Not on file   Social Needs    Financial resource strain: Not very hard    Food insecurity     Worry: Never true     Inability: Never true    Transportation needs     Medical: No     Non-medical: No   Tobacco Use    Smoking status: Former Smoker     Quit date: 2014     Years since quittin.6    Smokeless tobacco: Never Used   Substance and Sexual Activity    Alcohol use: No     Alcohol/week: 0.0 standard drinks     Frequency: Never     Binge frequency: Never     Comment: breastfeeding     Drug use: No    Sexual activity: Yes     Partners: Male     Birth control/protection: Partner-Vasectomy     Comment: : Boris   Lifestyle    Physical activity     Days per week: 7 days     Minutes per session: 30 min    Stress: Very much   Relationships    Social connections     Talks on phone: More than three times a week     Gets together: More than three times a week     Attends Mandaeism service: More than 4 times per year     Active member of club or organization: Yes     Attends meetings of clubs or organizations: Never     Relationship status:    Other Topics Concern    Not on file    Social History Narrative    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  Psych:  Mood and affect appropriate.  Skin: Skin color, texture, turgor normal, no rashes or lesions, in both upper and lower body.  Head/face:  Atraumatic, normocephalic. No palpable lymph nodes  Neck: + pain to palpation over the cervical paraspinous muscles. Spurling Negative. + pain with neck flexion, extension, or lateral flexion. .  Cor: RRR  Pulm: CTA  GI: Abdomen soft and non-tender.  Back: Straight leg raising in the sitting and supine positions is negative to radicular pain. No pain to palpation over the spine or costovertebral angles. Normal range of motion without pain reproduction.  Extremities: Peripheral joint ROM is full and pain free without obvious instability or laxity in all four extremities. No deformities, edema, or skin discoloration. Good capillary refill.  Musculoskeletal: Shoulder, hip, sacroiliac and knee provocative maneuvers are negative. Bilateral upper and lower extremity strength is normal and symmetric.  No atrophy or tone abnormalities are noted.  Neuro: Bilateral upper and lower extremity coordination and muscle stretch reflexes are physiologic and symmetric.  Plantar response are downgoing. No loss of sensation is noted.  Gait: Normal.    ASSESSMENT: 35 y.o. year old female with  neck and shoulder  pain, consistent with      Diagnoses    1. Cervical pain (neck)  diclofenac (VOLTAREN) 50 MG EC tablet    acetaminophen (TYLENOL) 500 MG tablet   2. Myofascial pain syndrome, cervical  diclofenac (VOLTAREN) 50 MG EC tablet    acetaminophen (TYLENOL) 500 MG tablet   3. Neck pain  diclofenac (VOLTAREN) 50 MG EC tablet    acetaminophen (TYLENOL) 500 MG tablet   4. Cervical radiculopathy           PLAN:     - I have stressed  the importance of physical activity and a home exercise plan to help with pain and improve health.  - Counseled patient regarding the importance of activity modification, constant sleeping habits and physical therapy.  -cervical paraspinal trigger point injections today  - continue tizanidine 4 mg nightly as needed  - continue Tylenol 1000 mg t.i.d. p.r.n.  - start diclofenac 50 mg BID PRN   -Lyrica 25 mg t.i.d. will pend to Dr Argueta for e-sig   -RTC 4 weeks to discuss the effectiveness of medication    -The above plan and management options were discussed at length with patient. Patient is in agreement with the above and verbalized understanding. Dr. Argueta was consulted on this patient  and agrees with this plan.    VALERIE Marie  Interventional Pain Management      11/16/2020                                                                                                                                     Procedure Note- Trigger point injections(Cervical paraspinal, thoracic)                                                      Procedures Patient Name: Schamberger, April W.   MRN: [id number]     INFORMED CONSENT: The procedure, risks, benefits and options were discussed with patient. There are no contraindications to the procedure. The patient expressed understanding and agreed to proceed. The personnel performing the procedure was discussed. I verify that I personally obtained  consent prior to the start of the procedure and the signed consent can be found on the patient's chart.     Procedure Date:  11/16/2020     Anesthesia: Topical     Pre Procedure diagnosis:   1. Myositis, unspecified myositis type, unspecified site    2. Myalgia          Post-Procedure diagnosis: same        Sedation: None     PROCEDURE: TRIGGER POINT INJECTION  The patient was placed in a seated position. The site of pain and procedure were confirmed with the patient prior to starting the procedure. After performing time out. The  patient's trigger points were identified and marked. The skin was prepped with chlorhexidine three times. After performing time out A 25-gauge 1.5 inch needle was advanced through the skin and subcutaneous tissues. Aspiration for blood, air and CSF was negative. A total of 10 ml of Bupivacaine 0.25% and 40 mg Kenalog was injected at all trigger point. No complications were evident. No specimens collected.     Blood Loss: Nill  Specimen: None     Joseph Moore NP-C  Interventional Pain Management    Disclaimer: This note was partly generated using dictation software which may occasionally result in transcription errors.

## 2020-11-16 NOTE — H&P (VIEW-ONLY)
Chronic Pain  patient Established Note (Follow up visit)        SUBJECTIVE:    Interval Updates:     11/16/20 - Ms. Schamberger returns to clinic for follow up visit reporting worse neck and arm pain.  Patient is s/p TPI on 6/30/20 with 50% relief.  Pain intensity is currently 7/10.   April presents with returning neck, thoracic and bilateral arm pain right greater than left., pain is described as aching dull throbbing with some numbness in her right arm she reports no shooting pain in either arm she denies any profound weakness and denies dropping things.  She did report numbness in her right arm.  I discussed with patient that I will provide her with trigger point injections today to help with myofascial pain and  I will also start her on a cocktail of medications to help with I think is neuropathic symptoms in her arm, she is currently taking tizanidine 4 mg nightly, so I will at Lyrica 25 mg t.i.d.,  diclofenac 50 mg b.i.d. as needed for pain, and Tylenol 1000 mg t.i.d. in effort to provide her with some relief.  I discussed with her if she had any side effects with these medications that she should discontinue immediately.  I will have them sent to the [facility] pharmacy.    6/30/20 Pt returns for bilateral chronic neck pain, patient is established our office she has been given cervical paraspinal trigger point injection in the past which has helped alleviate her pain for greater than 6 months.  She is requesting repeat trigger point injections today.    April Wendt Schamberger presents to the clinic for a follow-up appointment for Cervical TPIs today.   Since the last visit, April Wendt Schamberger states the pain has been persistant. Current pain intensity is 6/10.    Pain Disability Index Review:  Last 3 PDI Scores 11/16/2020 6/30/2020 1/21/2020   Pain Disability Index (PDI) 44 49 49        Pain  Medications:      - Opioids: None  - Adjuvant Medications: Advil,Motrin ( Ibuprofen)  - Anti-Coagulants: None  - Others: see medications list.     Opioid Contract: no      report:  Reviewed and consistent with medication use as prescribed.     Pain Procedures:17 TRIGGER POINT INJECTION  17 TRIGGER POINT INJECTION  17 bilateral C4,5,6 CERVICAL FACET MEDIAL BRANCH NERVE BLOCK (Prone) ( after xray correlation with pain level, decsion was made to proceed at C4,5,6 levels, patient agrees to proceed      Physical Therapy/Home Exercise: no     Imagin17 MRI Cervical Spine Without Contrast  Narrative   Technique: Multiplanar, multisequence MR imaging of the cervical spine obtained without the use of IV contrast.     Comparison: Cervical spine radiographs 12/15/2016.     Results:    There is straightening with mild reversal of the normal cervical lordosis. Vertebral body heights are maintained with no evidence of fracture. Mild intervertebral disc space narrowing and desiccation are visualized at C5-6 and C6-7. No marrow signal abnormality suspicious for an infiltrative process.      The cervical cord is normal in caliber and signal characteristics.  The craniocervical junction and visualized intracranial contents are unremarkable.  The adjacent soft tissue structures show no significant abnormalities.      C2-C3:  No significant spinal canal or neural foraminal narrowing.    C3-C4: No significant spinal canal or neural foraminal narrowing.    C4-C5:  No significant spinal canal or neural foraminal narrowing.    C5-C6:  Disc bulge with right uncovertebral spurring. Findings result in mild spinal canal stenosis and mild right neural foraminal narrowing.    C6-C7:  Mild disc bulge with thickening of the ligamentum flavum results in mild spinal canal stenosis.No significant neuroforaminal narrowing.    C7-T1:   No significant central spinal or neural foraminal narrowing.   Impression      Mild disc bulge  and spinal canal stenosis at the C5-6 and C6-7 levels.      Electronically signed by: UMER MARTIN MD  Date: 01/26/17  Time: 12:56     Encounter   View Encounter      Reviewed By   Hunter Jimenez MD on 1/27/2017  2:18 PM   Exam Details                     Performed Procedure Technologist Supporting Staff Performing Physician   MRI Cervical Spine Without Contrast Andre Hinds, RT         Appointment Date/Status Modality Department        1/26/2017     Completed [facility] MRI1 [facility] MRI       Begin Exam End Exam Begin Exam Questionnaires End Exam Questionnaires     1/26/2017 11:22 AM 1/26/2017 11:59 AM MRI TECH NAVIGATOR QUESTIONS IMAGING END ALL         RIS PREGNANCY TECH NAVIGATOR          X-Ray Cervical Spine AP Lat with Flexion  Extension 12/15/16  Narrative     Cervical spine radiographs     comparison: None    Results: AP, lateral, flexion and extension views  .  The alignment is normal, vertebral body height and disk spaces are well-maintained.    Flexion and extension views demonstrate no translational abnormalities.  Very small anterior osteophyte noted at C5-C6.  No fracture or osseous lesion seen.Prevertebral soft tissues appear normal.   Impression    No significant abnormality seen      Electronically signed by: JOSE A JENKINS MD  Date: 12/15/16  Time: 10:52          Allergies:   Review of patient's allergies indicates:   Allergen Reactions    Lactose     Azithromycin Nausea And Vomiting       Current Medications:   Current Outpatient Medications   Medication Sig Dispense Refill    amitriptyline (ELAVIL) 25 MG tablet Take 1 tablet (25 mg total) by mouth every evening. 30 tablet 2    butalbital-acetaminophen-caffeine -40 mg (FIORICET, ESGIC) -40 mg per tablet Take 1 tablet by mouth every 6 (six) hours as needed for Headaches. 30 tablet 0    dextroamphetamine-amphetamine (ADDERALL XR) 30 MG 24 hr capsule Take 1 capsule (30 mg total) by mouth every morning. 30 capsule 0     dextroamphetamine-amphetamine (ADDERALL XR) 30 MG 24 hr capsule Take 1 capsule (30 mg total) by mouth every morning. 30 capsule 0    [START ON 11/26/2020] dextroamphetamine-amphetamine (ADDERALL XR) 30 MG 24 hr capsule Take 1 capsule (30 mg total) by mouth every morning. 30 capsule 0    dextroamphetamine-amphetamine 10 mg Tab Take 1 tablet (10 mg total) by mouth after lunch. 30 tablet 0    [START ON 11/26/2020] dextroamphetamine-amphetamine 10 mg Tab Take 1 tablet (10 mg total) by mouth after lunch. 30 tablet 0    diphenoxylate-atropine 2.5-0.025 mg (LOMOTIL) 2.5-0.025 mg per tablet Take 1 tablet by mouth 4 (four) times daily as needed for Diarrhea. 360 tablet 0    diphenoxylate-atropine 2.5-0.025 mg/5 ml (LOMOTIL) 2.5-0.025 mg/5 mL liquid Take 5 mLs by mouth 4 (four) times daily as needed. 3 Bottle 1    eszopiclone (LUNESTA) 3 mg Tab Take 1 tablet (3 mg total) by mouth nightly as needed (insomnia). 30 tablet 0    fluconazole (DIFLUCAN) 150 MG Tab Take 1 tablet by mouth now and repeat in 3 days if still symptomatic 20 tablet 3    loperamide (IMODIUM A-D) 1 mg/7.5 mL Liqd Take 0.1 mg/kg by mouth every 12 (twelve) hours.      metoprolol succinate (TOPROL-XL) 25 MG 24 hr tablet Take 1 tablet (25 mg total) by mouth once daily. 30 tablet 11    rizatriptan (MAXALT-MLT) 10 MG disintegrating tablet Dissolve 1/2 tablet (5 mg total) by mouth as needed for Migraine (repeat dose once in 2 hours if headache return.). 9 tablet 0    tiZANidine (ZANAFLEX) 4 MG tablet Take 1 tablet (4 mg total) by mouth 3 (three) times daily as needed. 90 tablet 2     No current facility-administered medications for this visit.        REVIEW OF SYSTEMS:    GENERAL:  No weight loss, malaise or fevers.  HEENT:  Negative for frequent or significant headaches. + HAs   NECK:  Negative for lumps, goiter, pain and significant neck swelling.  RESPIRATORY:  Negative for cough, wheezing or shortness of breath.  CARDIOVASCULAR:  Negative for chest  pain, leg swelling or palpitations.  GI:  Negative for abdominal discomfort, blood in stools or black stools or change in bowel habits.  MUSCULOSKELETAL:  See HPI.  SKIN:  Negative for lesions, rash, and itching.  PSYCH:  Positive for sleep disturbance, mood disorder and recent psychosocial stressors.  HEMATOLOGY/LYMPHOLOGY:  Negative for prolonged bleeding, bruising easily or swollen nodes.  NEURO:   No history of headaches, syncope, paralysis, seizures or tremors.  All other reviewed and negative other than HPI.    Past Medical History:  Past Medical History:   Diagnosis Date    Abnormal Pap smear of cervix 2000    Cryo Done    ADD (attention deficit disorder)     Breast disorder     Breast Cysts    Esophageal reflux     Fibroids     Hypertension     IBS (irritable bowel syndrome)     Insomnia     Kidney stones     Mastocytosis     Relies on partner vasectomy for contraception     Upper GI bleed        Past Surgical History:  Past Surgical History:   Procedure Laterality Date    COLONOSCOPY N/A 4/28/2017    Procedure: COLONOSCOPY;  Surgeon: Bren Larkin MD;  [facility];  Service: Endoscopy;  Laterality: N/A;    ESOPHAGOGASTRODUODENOSCOPY  2012    LASIK Bilateral 2005    REFRACTIVE SURGERY      TONSILLECTOMY, ADENOIDECTOMY  1988    VAGINAL DELIVERY      x 2       Family History:  Family History   Problem Relation Age of Onset    Breast cancer Mother 47        with Metastasis    Hypertension Mother     Prostate cancer Father     Hypertension Father     Diabetes Father     Deep vein thrombosis Father     Pancreatic cancer Maternal Grandfather     Breast cancer Paternal Grandmother 80    Diabetes type II Paternal Grandfather     Heart attack Maternal Grandmother     Cancer Cousin 31    Cancer Cousin         Thurman Syndrome    Cancer Cousin         Thurman Syndrome    Ovarian cancer Maternal Aunt     Lymphoma Neg Hx     Tuberculosis Neg Hx     Celiac disease Neg Hx      Cirrhosis Neg Hx     Colon polyps Neg Hx     Crohn's disease Neg Hx     Cystic fibrosis Neg Hx     Esophageal cancer Neg Hx     Hemochromatosis Neg Hx     Inflammatory bowel disease Neg Hx     Irritable bowel syndrome Neg Hx     Liver cancer Neg Hx     Liver disease Neg Hx     Rectal cancer Neg Hx     Stomach cancer Neg Hx     Ulcerative colitis Neg Hx     Donavon's disease Neg Hx     Amblyopia Neg Hx     Blindness Neg Hx     Cataracts Neg Hx     Glaucoma Neg Hx     Macular degeneration Neg Hx     Retinal detachment Neg Hx     Strabismus Neg Hx        Social History:  Social History     Socioeconomic History    Marital status:      Spouse name: Not on file    Number of children: 1    Years of education: Not on file    Highest education level: Not on file   Occupational History    Not on file   Social Needs    Financial resource strain: Not very hard    Food insecurity     Worry: Never true     Inability: Never true    Transportation needs     Medical: No     Non-medical: No   Tobacco Use    Smoking status: Former Smoker     Quit date: 2014     Years since quittin.6    Smokeless tobacco: Never Used   Substance and Sexual Activity    Alcohol use: No     Alcohol/week: 0.0 standard drinks     Frequency: Never     Binge frequency: Never     Comment: breastfeeding     Drug use: No    Sexual activity: Yes     Partners: Male     Birth control/protection: Partner-Vasectomy     Comment: : Boris   Lifestyle    Physical activity     Days per week: 7 days     Minutes per session: 30 min    Stress: Very much   Relationships    Social connections     Talks on phone: More than three times a week     Gets together: More than three times a week     Attends [employer] witness service: More than 4 times per year     Active member of club or organization: Yes     Attends meetings of clubs or organizations: Never     Relationship status:    Other Topics Concern    Not on file    Social History Narrative    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  Psych:  Mood and affect appropriate.  Skin: Skin color, texture, turgor normal, no rashes or lesions, in both upper and lower body.  Head/face:  Atraumatic, normocephalic. No palpable lymph nodes  Neck: + pain to palpation over the cervical paraspinous muscles. Spurling Negative. + pain with neck flexion, extension, or lateral flexion. .  Cor: RRR  Pulm: CTA  GI: Abdomen soft and non-tender.  Back: Straight leg raising in the sitting and supine positions is negative to radicular pain. No pain to palpation over the spine or costovertebral angles. Normal range of motion without pain reproduction.  Extremities: Peripheral joint ROM is full and pain free without obvious instability or laxity in all four extremities. No deformities, edema, or skin discoloration. Good capillary refill.  Musculoskeletal: Shoulder, hip, sacroiliac and knee provocative maneuvers are negative. Bilateral upper and lower extremity strength is normal and symmetric.  No atrophy or tone abnormalities are noted.  Neuro: Bilateral upper and lower extremity coordination and muscle stretch reflexes are physiologic and symmetric.  Plantar response are downgoing. No loss of sensation is noted.  Gait: Normal.    ASSESSMENT: 35 y.o. year old female with  neck and shoulder  pain, consistent with      Diagnoses    1. Cervical pain (neck)  diclofenac (VOLTAREN) 50 MG EC tablet    acetaminophen (TYLENOL) 500 MG tablet   2. Myofascial pain syndrome, cervical  diclofenac (VOLTAREN) 50 MG EC tablet    acetaminophen (TYLENOL) 500 MG tablet   3. Neck pain  diclofenac (VOLTAREN) 50 MG EC tablet    acetaminophen (TYLENOL) 500 MG tablet   4. Cervical radiculopathy           PLAN:     - I have stressed  the importance of physical activity and a home exercise plan to help with pain and improve health.  - Counseled patient regarding the importance of activity modification, constant sleeping habits and physical therapy.  -cervical paraspinal trigger point injections today  - continue tizanidine 4 mg nightly as needed  - continue Tylenol 1000 mg t.i.d. p.r.n.  - start diclofenac 50 mg BID PRN   -Lyrica 25 mg t.i.d. will pend to Dr Argueta for e-sig   -RTC 4 weeks to discuss the effectiveness of medication    -The above plan and management options were discussed at length with patient. Patient is in agreement with the above and verbalized understanding. Dr. Argueta was consulted on this patient  and agrees with this plan.    VALERIE Marie  Interventional Pain Management      11/16/2020                                                                                                                                     Procedure Note- Trigger point injections(Cervical paraspinal, thoracic)                                                      Procedures Patient Name: Schamberger, April W.   MRN: [id number]     INFORMED CONSENT: The procedure, risks, benefits and options were discussed with patient. There are no contraindications to the procedure. The patient expressed understanding and agreed to proceed. The personnel performing the procedure was discussed. I verify that I personally obtained  consent prior to the start of the procedure and the signed consent can be found on the patient's chart.     Procedure Date:  11/16/2020     Anesthesia: Topical     Pre Procedure diagnosis:   1. Myositis, unspecified myositis type, unspecified site    2. Myalgia          Post-Procedure diagnosis: same        Sedation: None     PROCEDURE: TRIGGER POINT INJECTION  The patient was placed in a seated position. The site of pain and procedure were confirmed with the patient prior to starting the procedure. After performing time out. The  patient's trigger points were identified and marked. The skin was prepped with chlorhexidine three times. After performing time out A 25-gauge 1.5 inch needle was advanced through the skin and subcutaneous tissues. Aspiration for blood, air and CSF was negative. A total of 10 ml of Bupivacaine 0.25% and 40 mg Kenalog was injected at all trigger point. No complications were evident. No specimens collected.     Blood Loss: Nill  Specimen: None     Joseph Moore NP-C  Interventional Pain Management    Disclaimer: This note was partly generated using dictation software which may occasionally result in transcription errors.

## 2020-11-16 NOTE — TELEPHONE ENCOUNTER
----- Message from LIDIA Miller sent at 11/16/2020  2:46 PM CST -----  Regarding: medication  Can you pend lyrica 25 tid R0

## 2020-11-17 ENCOUNTER — PATIENT MESSAGE (OUTPATIENT)
Dept: PAIN MEDICINE | Facility: CLINIC | Age: 35
End: 2020-11-17

## 2020-11-17 DIAGNOSIS — Z01.84 ANTIBODY RESPONSE EXAMINATION: ICD-10-CM

## 2020-11-18 ENCOUNTER — TELEPHONE (OUTPATIENT)
Dept: PAIN MEDICINE | Facility: CLINIC | Age: 35
End: 2020-11-18

## 2020-11-18 DIAGNOSIS — M54.12 CERVICAL RADICULOPATHY: Primary | ICD-10-CM

## 2020-11-18 NOTE — TELEPHONE ENCOUNTER
"Patient continues to have what she calls "nerve pain" she has numbness and tingling in the right arm and fingers( all fingers except the thumb) she denies profound weakness in either arm. Her primary source of pain is located is the center between her shoulder blades(lower trapezius) ,  she reports sitting up, and repositioning in any way increased her pain.  She denies any inciting incident or trauma that has occurred she states she just woke up and pain began.  I performed trigger point injections on her yesterday, they have not been effective in helping her pain in the past these injections have helped her.       I discussed that I will order a Cervical MRI to further assess for a soft tissue injury.     Joseph Moore, NP-C  Interventional Pain Management      "

## 2020-11-19 ENCOUNTER — HOSPITAL ENCOUNTER (OUTPATIENT)
Dept: RADIOLOGY | Facility: HOSPITAL | Age: 35
Discharge: HOME | End: 2020-11-19
Attending: NURSE PRACTITIONER
Payer: COMMERCIAL

## 2020-11-19 ENCOUNTER — PATIENT MESSAGE (OUTPATIENT)
Dept: PAIN MEDICINE | Facility: CLINIC | Age: 35
End: 2020-11-19

## 2020-11-19 DIAGNOSIS — M54.2 CERVICAL PAIN (NECK): ICD-10-CM

## 2020-11-19 DIAGNOSIS — M54.6 MIDLINE THORACIC BACK PAIN, UNSPECIFIED CHRONICITY: Primary | ICD-10-CM

## 2020-11-19 DIAGNOSIS — M79.18 MYOFASCIAL PAIN SYNDROME, CERVICAL: ICD-10-CM

## 2020-11-19 DIAGNOSIS — M54.2 NECK PAIN: ICD-10-CM

## 2020-11-19 DIAGNOSIS — M54.6 MIDLINE THORACIC BACK PAIN, UNSPECIFIED CHRONICITY: ICD-10-CM

## 2020-11-19 PROCEDURE — 72070 X-RAY EXAM THORAC SPINE 2VWS: CPT | Mod: 26,,, | Performed by: RADIOLOGY

## 2020-11-19 PROCEDURE — 72070 X-RAY EXAM THORAC SPINE 2VWS: CPT | Mod: TC,FY

## 2020-11-19 PROCEDURE — 72070 XR THORACIC SPINE AP LATERAL: ICD-10-PCS | Mod: 26,,, | Performed by: RADIOLOGY

## 2020-11-19 RX ORDER — TRAMADOL HYDROCHLORIDE 50 MG/1
50 TABLET ORAL DAILY PRN
Qty: 14 TABLET | Refills: 0 | Status: SHIPPED | OUTPATIENT
Start: 2020-11-19 | End: 2020-12-19

## 2020-11-20 ENCOUNTER — PATIENT OUTREACH (OUTPATIENT)
Dept: OTHER | Facility: OTHER | Age: 35
End: 2020-11-20

## 2020-11-20 NOTE — PROGRESS NOTES
Digital Medicine: Health  Follow-Up    The history is provided by the patient.             Reason for review: Blood pressure not at goal        Topics Covered on Call: physical activity and Diet    Additional Follow-up details: Avg BP as of Nov 20, 2020 is 144/93.     Having some nerve pain in the back and numbness in right arm now. Having an MRI done Monday to see what's going on.     Hasn't been able to get out of bed the past few days because of pain, thinks her readings are high due to this.     She also hasn't charged machine so instructed her to charge so that we can see if that helps numbers any.     No diet changes.             Diet-no change to diet    No change to diet.        Physical Activity-Change      Additional physical activity details: Isn't currently able to workout because of severe back pain, has been in bed past few days       Medication Adherence-Medication adherence was assessed.        Substance, Sleep, Stress-change  stress-assessed  Details:pain  Intervention(s):    Sleep-  Details:  Intervention(s):    Alcohol -  Details:  Intervention(s):    Tobacco-  Details:  Intervention(s):          Continue current diet/physical activity routine.  Instructed to charge device.       Addressed patient questions and patient has my contact information if needed prior to next outreach. Patient verbalizes understanding.      Explained the importance of self-monitoring and medication adherence. Encouraged the patient to communicate with their health  for lifestyle modifications to help improve or maintain a healthy lifestyle.               There are no preventive care reminders to display for this patient.      Last 5 Patient Entered Readings                                      Current 30 Day Average: 144/93     Recent Readings 11/19/2020 11/19/2020 11/11/2020 11/11/2020 11/10/2020    SBP (mmHg) 154 171 152 158 151    DBP (mmHg) 99 100 102 96 88    Pulse 79 85 87 90 90

## 2020-11-23 ENCOUNTER — PATIENT MESSAGE (OUTPATIENT)
Dept: PAIN MEDICINE | Facility: CLINIC | Age: 35
End: 2020-11-23

## 2020-11-23 ENCOUNTER — HOSPITAL ENCOUNTER (OUTPATIENT)
Dept: RADIOLOGY | Facility: HOSPITAL | Age: 35
Discharge: HOME | End: 2020-11-23
Attending: NURSE PRACTITIONER
Payer: COMMERCIAL

## 2020-11-23 ENCOUNTER — TELEPHONE (OUTPATIENT)
Dept: PAIN MEDICINE | Facility: CLINIC | Age: 35
End: 2020-11-23

## 2020-11-23 DIAGNOSIS — M54.12 CERVICAL RADICULOPATHY: ICD-10-CM

## 2020-11-23 PROCEDURE — 72141 MRI CERVICAL SPINE WITHOUT CONTRAST: ICD-10-PCS | Mod: 26,,, | Performed by: RADIOLOGY

## 2020-11-23 PROCEDURE — 72141 MRI NECK SPINE W/O DYE: CPT | Mod: 26,,, | Performed by: RADIOLOGY

## 2020-11-23 PROCEDURE — 72141 MRI NECK SPINE W/O DYE: CPT | Mod: TC

## 2020-11-23 NOTE — TELEPHONE ENCOUNTER
Discussed results of Cervical MRI, recommend we s/f a C7/T1 GHASSAN to help with neck and right arm pain. Patient verbalized understanding and agreed.

## 2020-11-24 ENCOUNTER — PATIENT OUTREACH (OUTPATIENT)
Dept: OTHER | Facility: OTHER | Age: 35
End: 2020-11-24

## 2020-11-24 ENCOUNTER — TELEPHONE (OUTPATIENT)
Dept: PAIN MEDICINE | Facility: CLINIC | Age: 35
End: 2020-11-24

## 2020-11-24 DIAGNOSIS — R00.0 TACHYCARDIA: ICD-10-CM

## 2020-11-24 DIAGNOSIS — M54.12 CERVICAL RADICULOPATHY: Primary | ICD-10-CM

## 2020-11-24 DIAGNOSIS — I10 ESSENTIAL (PRIMARY) HYPERTENSION: ICD-10-CM

## 2020-11-24 RX ORDER — METOPROLOL SUCCINATE 25 MG/1
25 TABLET, EXTENDED RELEASE ORAL 2 TIMES DAILY
Qty: 60 TABLET | Refills: 5 | Status: SHIPPED | OUTPATIENT
Start: 2020-11-24 | End: 2021-06-22 | Stop reason: SDUPTHER

## 2020-11-24 NOTE — TELEPHONE ENCOUNTER
Pt scheduled for 12/1/2020 at 3:00pm for GHASSAN. Pt aware to check in at registration desk on the first floor of the hospital for 2:00 pm. Pt denied taking blood thinners and is not diabetic. Pt denied having a pacemaker/ICD.

## 2020-11-24 NOTE — PROGRESS NOTES
Digital Medicine: Clinician Follow-Up    Patient reports she has been experiencing pain that is constant which she will be getting an epidural for and following up with pain management.    She takes metoprolol around 6 am and takes BP readings in the evening. Takes Adderall in the morning so this is why she takes BP in the evening time. Charged BP machine since she last spoke to .     The history is provided by the patient.      Review of patient's allergies indicates:   -- Lactose    -- Azithromycin -- Nausea And Vomiting  Follow-up reason(s): routine follow up.     Hypertension    Readings are trending up due to lifestyle change.    Patient is not experiencing signs/symptoms of hypotension.  Patient is not experiencing signs/symptoms of hypertension.            Last 5 Patient Entered Readings                                      Current 30 Day Average: 147/95     Recent Readings 11/23/2020 11/21/2020 11/21/2020 11/21/2020 11/19/2020    SBP (mmHg) 148 161 168 156 154    DBP (mmHg) 94 103 101 104 99    Pulse 78 90 95 95 79                 Depression Screening  Did not address depression screening.    Sleep Apnea Screening    Did not address sleep apnea screening.     Medication Affordability Screening  Did not address medication affordability screening.     Medication Adherence-Medication adherence was assessed.          ASSESSMENT(S)  Patients BP average is 149/96 mmHg, which is above goal. Patient's BP goal is less than or equal to 130/80.     Avg BP increased since last encounter. High BP readings may also be contributed by pain. She is on cardioselective BB for HTN and tachycardia.       Hypertension Plan  Hypertension Medication Change. Increase metoprolol 25 mg daily to 25 mg BID.  Continue current diet/physical activity routine.  Plan to follow up in 2 weeks to assess tolerance to med change. If BP remains elevated, may consider adding first line BP agent ARB.      Addressed patient questions and patient  has my contact information if needed prior to next outreach. Patient verbalizes understanding.             There are no preventive care reminders to display for this patient.  There are no preventive care reminders to display for this patient.      Hypertension Medications             metoprolol succinate (TOPROL-XL) 25 MG 24 hr tablet Take 1 tablet (25 mg total) by mouth once daily.

## 2020-11-25 ENCOUNTER — PATIENT MESSAGE (OUTPATIENT)
Dept: PAIN MEDICINE | Facility: CLINIC | Age: 35
End: 2020-11-25

## 2020-11-25 NOTE — DISCHARGE INSTRUCTIONS
Home Care Instructions Pain Management:    1.  DIET:    You may resume your normal diet today.    2.  BATHING:    You may shower with luke warm water.    3.  DRESSING:    You may remove your bandage today.    4.  ACTIVITY LEVEL:      You may resume your normal activities 24 hours after your procedure.    5.  MEDICATIONS:    You may resume your normal medications today.    6.  SPECIAL INSTRUCTIONS:    No heat to the injection site for 24 hours including bath or shower, heating pad, moist heat or hot tubs.    Use an ice pack to the injection site for any pain or discomfort.  Apply ice packs for 20 minute intervals as needed.    If you have received any sedatives by mouth today, you can not drive for 12 hours.    If you have received sedation through an IV, you can not drive for 24 hours.    PLEASE CALL YOUR DOCTOR FOR THE FOLLOWIN.  Redness or swelling around the injection site.  2.  Fever of 101 degrees.  3.  Drainage (pus) from the injection site.  4.  For any continuous bleeding (some dried blood over the incision is normal.)    FOR EMERGENCIES:    If any unusual problems or difficulties occur during clinic hours, call [phone] or dial [phone].    Follow up with with your physician in 2-3 weeks.

## 2020-11-27 DIAGNOSIS — F51.01 PRIMARY INSOMNIA: ICD-10-CM

## 2020-11-27 DIAGNOSIS — F90.2 ATTENTION DEFICIT HYPERACTIVITY DISORDER (ADHD), COMBINED TYPE: ICD-10-CM

## 2020-11-27 RX ORDER — DEXTROAMPHETAMINE SACCHARATE, AMPHETAMINE ASPARTATE, DEXTROAMPHETAMINE SULFATE AND AMPHETAMINE SULFATE 2.5; 2.5; 2.5; 2.5 MG/1; MG/1; MG/1; MG/1
10 TABLET ORAL
Qty: 30 TABLET | Refills: 0 | Status: CANCELLED | OUTPATIENT
Start: 2020-11-27 | End: 2020-12-27

## 2020-11-27 RX ORDER — ESZOPICLONE 3 MG/1
3 TABLET, FILM COATED ORAL NIGHTLY PRN
Qty: 30 TABLET | Refills: 0 | Status: SHIPPED | OUTPATIENT
Start: 2020-11-27 | End: 2020-12-28 | Stop reason: SDUPTHER

## 2020-12-01 ENCOUNTER — HOSPITAL ENCOUNTER (OUTPATIENT)
Facility: HOSPITAL | Age: 35
Discharge: HOME | End: 2020-12-01
Attending: PAIN MEDICINE | Admitting: PAIN MEDICINE
Payer: COMMERCIAL

## 2020-12-01 VITALS
HEIGHT: 68 IN | OXYGEN SATURATION: 100 % | SYSTOLIC BLOOD PRESSURE: 136 MMHG | TEMPERATURE: 98 F | DIASTOLIC BLOOD PRESSURE: 78 MMHG | WEIGHT: 172 LBS | BODY MASS INDEX: 26.07 KG/M2 | HEART RATE: 86 BPM | RESPIRATION RATE: 17 BRPM

## 2020-12-01 DIAGNOSIS — M54.12 CERVICAL RADICULOPATHY: Primary | ICD-10-CM

## 2020-12-01 DIAGNOSIS — G89.29 CHRONIC PAIN: ICD-10-CM

## 2020-12-01 PROCEDURE — 62321 NJX INTERLAMINAR CRV/THRC: CPT | Mod: ,,, | Performed by: PAIN MEDICINE

## 2020-12-01 PROCEDURE — 62321 NJX INTERLAMINAR CRV/THRC: CPT | Performed by: PAIN MEDICINE

## 2020-12-01 PROCEDURE — 25000003 PHARM REV CODE 250: Performed by: PAIN MEDICINE

## 2020-12-01 PROCEDURE — 63600175 PHARM REV CODE 636 W HCPCS: Performed by: PAIN MEDICINE

## 2020-12-01 PROCEDURE — 25500020 PHARM REV CODE 255: Performed by: PAIN MEDICINE

## 2020-12-01 PROCEDURE — 62321 PR INJ CERV/THORAC, W/GUIDANCE: ICD-10-PCS | Mod: ,,, | Performed by: PAIN MEDICINE

## 2020-12-01 RX ORDER — LIDOCAINE HYDROCHLORIDE 10 MG/ML
1 INJECTION, SOLUTION EPIDURAL; INFILTRATION; INTRACAUDAL; PERINEURAL ONCE
Status: COMPLETED | OUTPATIENT
Start: 2020-12-01 | End: 2020-12-01

## 2020-12-01 RX ORDER — INDOMETHACIN 25 MG/1
CAPSULE ORAL
Status: DISCONTINUED | OUTPATIENT
Start: 2020-12-01 | End: 2020-12-01 | Stop reason: HOSPADM

## 2020-12-01 RX ORDER — BUPIVACAINE HYDROCHLORIDE 2.5 MG/ML
INJECTION, SOLUTION EPIDURAL; INFILTRATION; INTRACAUDAL
Status: DISCONTINUED | OUTPATIENT
Start: 2020-12-01 | End: 2020-12-01 | Stop reason: HOSPADM

## 2020-12-01 RX ORDER — METHYLPREDNISOLONE ACETATE 40 MG/ML
INJECTION, SUSPENSION INTRA-ARTICULAR; INTRALESIONAL; INTRAMUSCULAR; SOFT TISSUE
Status: DISCONTINUED | OUTPATIENT
Start: 2020-12-01 | End: 2020-12-01 | Stop reason: HOSPADM

## 2020-12-01 RX ORDER — SODIUM CHLORIDE 9 MG/ML
500 INJECTION, SOLUTION INTRAVENOUS CONTINUOUS
Status: ACTIVE | OUTPATIENT
Start: 2020-12-01

## 2020-12-01 NOTE — INTERVAL H&P NOTE
No significant changes were noted from the H&P or last clinic note.    The risks and benefits of this intervention, and alternative therapies were discussed with the patient.  The discussion of risks included infection, bleeding, need for additional procedures or surgery, nerve damage, paralysis, adverse medication reaction(s), stroke, and/or death.  Questions regarding the procedure, risks, expected outcome, and possible side effects were solicited and answered to the patient's satisfaction.  April Schamberger wishes to proceed with the injection or procedure.  Written consent was obtained.    Corky Argueta Jr, MD  Interventional Pain Medicine / Anesthesiology

## 2020-12-01 NOTE — OP NOTE
"Procedure Note    Pre-operative Diagnosis: Cervical Radiculopathy  Post-operative Diagnosis: Cervical Radiculopathy  Procedure Date: 12/01/2020  Procedure:  (1) Cervical Epidural Steroid Injection at C7-T1    (2) Intraoperative fluoroscopy    Indications: To alleviate pain and suffering, and reduce functional impairment associated with cervical radiculopathy.    The patients history and physical exam were reviewed. The risks, benefits and alternatives to the procedure were discussed, and all questions were answered to the patients satisfaction. The patient agreed to proceed, and written informed consent was verified.    Procedure in Detail: The patient was brought into the procedure room and placed in the prone position on the fluoroscopy table. The area of the cervical spine was prepped with Chloraprep and draped in a sterile manner. The C7-T1 interspace was identified and marked under AP fluoroscopy. The skin and subcutaneous tissues overlying the targeted interspace were anesthetized with 3-5 mL of Lidocaine 1% using a 25G 1.5" needle. A 20G, 3.5" Tuohy epidural needle was inserted through the anesthetized skin and directed toward the interspace under fluoroscopic guidance until T1 lamina was contacted.  The fluoroscope was then adjusted to yield a contralateral oblique view of the C7-T1 interspace.  The epidural needle was incrementally walked cephalad off of the lamina until the ligamentum flavum was engaged. From this point, a loss-of-resistance technique to saline in a glass syringe was used to identify entrance of the needle into the epidural space. Once loss of resistance was observed, up to 1 mL of contrast solution was injected. An appropriate epidurogram was noted.    A 3 mL mixture consisting of saline (1 mL), MPF Bupivacaine 0.25% (1 mL), and Depomedrol 40 mg (1 mL) was injected slowly and without resistance.  The needle was removed and a bandage applied to puncture site.    Disposition: The patient " tolerated the procedure well, and there were no apparent complications. Vital signs remained stable throughout the procedure. The patient was taken to the recovery area where written discharge instructions for the procedure were given.     Follow-up: In clinic as scheduled      Corky Argueta Jr, MD  Interventional Pain Medicine / Anesthesiology

## 2020-12-01 NOTE — DISCHARGE SUMMARY
[facility]  Discharge Note  Short Stay     Admit Date: 12/1/2020    Discharge Date: 12/1/2020     Attending Physician: Corky Argueta Jr, MD    Diagnoses:  Active Hospital Problems    Diagnosis  POA    Chronic pain [G89.29]  Yes      Resolved Hospital Problems   No resolved problems to display.     Discharged Condition: Good     Hospital Course: Patient was admitted for an outpatient interventional pain management procedure and tolerated the procedure well with no complications.     Final Diagnoses: Same as principal problem.     Disposition: Home or Self Care     Follow up/Patient Instructions:    Follow-up Information     Go in 2 weeks to follow up.    Why: Post-procedural Follow Up As Scheduled                 Reconciled Medications:     Medication List      CONTINUE taking these medications    acetaminophen 500 MG tablet  Commonly known as: TYLENOL  Take 2 tablets (1,000 mg total) by mouth 3 (three) times daily as needed for Pain.     amitriptyline 25 MG tablet  Commonly known as: ELAVIL  Take 1 tablet (25 mg total) by mouth every evening.     butalbital-acetaminophen-caffeine -40 mg -40 mg per tablet  Commonly known as: FIORICET, ESGIC  Take 1 tablet by mouth every 6 (six) hours as needed for Headaches.     * dextroamphetamine-amphetamine 30 MG 24 hr capsule  Commonly known as: ADDERALL XR  Take 1 capsule (30 mg total) by mouth every morning.     * dextroamphetamine-amphetamine 30 MG 24 hr capsule  Commonly known as: ADDERALL XR  Take 1 capsule (30 mg total) by mouth every morning.     * dextroamphetamine-amphetamine 30 MG 24 hr capsule  Commonly known as: ADDERALL XR  Take 1 capsule (30 mg total) by mouth every morning.     * dextroamphetamine-amphetamine 10 mg Tab  Take 1 tablet (10 mg total) by mouth after lunch.     diclofenac 50 MG EC tablet  Commonly known as: VOLTAREN  Take 1 tablet (50 mg total) by mouth 2 (two) times daily as needed.     * diphenoxylate-atropine 2.5-0.025  mg/5 ml 2.5-0.025 mg/5 mL liquid  Commonly known as: LOMOTIL  Take 5 mLs by mouth 4 (four) times daily as needed.     * diphenoxylate-atropine 2.5-0.025 mg 2.5-0.025 mg per tablet  Commonly known as: LOMOTIL  Take 1 tablet by mouth 4 (four) times daily as needed for Diarrhea.     eszopiclone 3 mg Tab  Commonly known as: LUNESTA  Take 1 tablet (3 mg total) by mouth nightly as needed (insomnia).     fluconazole 150 MG Tab  Commonly known as: DIFLUCAN  Take 1 tablet by mouth now and repeat in 3 days if still symptomatic     IMODIUM A-D 1 mg/7.5 mL solution  Generic drug: loperamide  Take 0.1 mg/kg by mouth every 12 (twelve) hours.     metoprolol succinate 25 MG 24 hr tablet  Commonly known as: TOPROL-XL  Take 1 tablet (25 mg total) by mouth 2 (two) times daily.     pregabalin 25 MG capsule  Commonly known as: LYRICA  Take 1 capsule (25 mg total) by mouth 3 (three) times daily.     rizatriptan 10 MG disintegrating tablet  Commonly known as: MAXALT-MLT  Dissolve 1/2 tablet (5 mg total) by mouth as needed for Migraine (repeat dose once in 2 hours if headache return.).     tiZANidine 4 MG tablet  Commonly known as: ZANAFLEX  Take 1 tablet (4 mg total) by mouth 3 (three) times daily as needed.     traMADoL 50 mg tablet  Commonly known as: ULTRAM  Take 1 tablet (50 mg total) by mouth daily as needed for Pain.         * This list has 6 medication(s) that are the same as other medications prescribed for you. Read the directions carefully, and ask your doctor or other care provider to review them with you.               Discharge Procedure Orders (must include Diet, Follow-up, Activity)   Diet Adult Regular     No driving until:   Order Comments: If you received sedation, no driving for 12 hrs     Notify your health care provider if you experience any of the following:  temperature >100.4     Notify your health care provider if you experience any of the following:  severe uncontrolled pain     Notify your health care provider  if you experience any of the following:  redness, tenderness, or signs of infection (pain, swelling, redness, odor or green/yellow discharge around incision site)     Notify your health care provider if you experience any of the following:  difficulty breathing or increased cough     Notify your health care provider if you experience any of the following:  severe persistent headache     Notify your health care provider if you experience any of the following:  increased confusion or weakness     Remove dressing in 24 hours     Activity as tolerated       Corky Argueta Jr, MD  Interventional Pain Medicine / Anesthesiology

## 2020-12-03 ENCOUNTER — OFFICE VISIT (OUTPATIENT)
Dept: PSYCHIATRY | Facility: CLINIC | Age: 35
End: 2020-12-03
Payer: COMMERCIAL

## 2020-12-03 VITALS
BODY MASS INDEX: 27.72 KG/M2 | DIASTOLIC BLOOD PRESSURE: 83 MMHG | WEIGHT: 182.31 LBS | HEART RATE: 73 BPM | SYSTOLIC BLOOD PRESSURE: 140 MMHG

## 2020-12-03 DIAGNOSIS — K58.0 IRRITABLE BOWEL SYNDROME WITH DIARRHEA: ICD-10-CM

## 2020-12-03 DIAGNOSIS — F41.9 ANXIETY: ICD-10-CM

## 2020-12-03 DIAGNOSIS — F90.2 ATTENTION DEFICIT HYPERACTIVITY DISORDER (ADHD), COMBINED TYPE: Primary | ICD-10-CM

## 2020-12-03 DIAGNOSIS — F32.81 PMDD (PREMENSTRUAL DYSPHORIC DISORDER): ICD-10-CM

## 2020-12-03 PROCEDURE — 99213 PR OFFICE/OUTPT VISIT, EST, LEVL III, 20-29 MIN: ICD-10-PCS | Mod: S$GLB,,, | Performed by: STUDENT IN AN ORGANIZED HEALTH CARE EDUCATION/TRAINING PROGRAM

## 2020-12-03 PROCEDURE — 99213 OFFICE O/P EST LOW 20 MIN: CPT | Mod: S$GLB,,, | Performed by: STUDENT IN AN ORGANIZED HEALTH CARE EDUCATION/TRAINING PROGRAM

## 2020-12-03 PROCEDURE — 3079F PR MOST RECENT DIASTOLIC BLOOD PRESSURE 80-89 MM HG: ICD-10-PCS | Mod: CPTII,S$GLB,, | Performed by: STUDENT IN AN ORGANIZED HEALTH CARE EDUCATION/TRAINING PROGRAM

## 2020-12-03 PROCEDURE — 99999 PR PBB SHADOW E&M-EST. PATIENT-LVL III: CPT | Mod: PBBFAC,,, | Performed by: STUDENT IN AN ORGANIZED HEALTH CARE EDUCATION/TRAINING PROGRAM

## 2020-12-03 PROCEDURE — 99999 PR PBB SHADOW E&M-EST. PATIENT-LVL III: ICD-10-PCS | Mod: PBBFAC,,, | Performed by: STUDENT IN AN ORGANIZED HEALTH CARE EDUCATION/TRAINING PROGRAM

## 2020-12-03 PROCEDURE — 3079F DIAST BP 80-89 MM HG: CPT | Mod: CPTII,S$GLB,, | Performed by: STUDENT IN AN ORGANIZED HEALTH CARE EDUCATION/TRAINING PROGRAM

## 2020-12-03 PROCEDURE — 3077F SYST BP >= 140 MM HG: CPT | Mod: CPTII,S$GLB,, | Performed by: STUDENT IN AN ORGANIZED HEALTH CARE EDUCATION/TRAINING PROGRAM

## 2020-12-03 PROCEDURE — 3077F PR MOST RECENT SYSTOLIC BLOOD PRESSURE >= 140 MM HG: ICD-10-PCS | Mod: CPTII,S$GLB,, | Performed by: STUDENT IN AN ORGANIZED HEALTH CARE EDUCATION/TRAINING PROGRAM

## 2020-12-03 RX ORDER — DEXTROAMPHETAMINE SACCHARATE, AMPHETAMINE ASPARTATE, DEXTROAMPHETAMINE SULFATE AND AMPHETAMINE SULFATE 2.5; 2.5; 2.5; 2.5 MG/1; MG/1; MG/1; MG/1
10 TABLET ORAL
Qty: 30 TABLET | Refills: 0 | Status: SHIPPED | OUTPATIENT
Start: 2021-01-24 | End: 2021-03-19

## 2020-12-03 RX ORDER — DEXTROAMPHETAMINE SACCHARATE, AMPHETAMINE ASPARTATE MONOHYDRATE, DEXTROAMPHETAMINE SULFATE AND AMPHETAMINE SULFATE 7.5; 7.5; 7.5; 7.5 MG/1; MG/1; MG/1; MG/1
30 CAPSULE, EXTENDED RELEASE ORAL EVERY MORNING
Qty: 30 CAPSULE | Refills: 0 | Status: SHIPPED | OUTPATIENT
Start: 2021-02-24 | End: 2021-03-19 | Stop reason: SDUPTHER

## 2020-12-03 RX ORDER — DEXTROAMPHETAMINE SACCHARATE, AMPHETAMINE ASPARTATE MONOHYDRATE, DEXTROAMPHETAMINE SULFATE AND AMPHETAMINE SULFATE 7.5; 7.5; 7.5; 7.5 MG/1; MG/1; MG/1; MG/1
30 CAPSULE, EXTENDED RELEASE ORAL EVERY MORNING
Qty: 30 CAPSULE | Refills: 0 | Status: SHIPPED | OUTPATIENT
Start: 2020-12-24 | End: 2021-03-19

## 2020-12-03 RX ORDER — DEXTROAMPHETAMINE SACCHARATE, AMPHETAMINE ASPARTATE, DEXTROAMPHETAMINE SULFATE AND AMPHETAMINE SULFATE 2.5; 2.5; 2.5; 2.5 MG/1; MG/1; MG/1; MG/1
10 TABLET ORAL
Qty: 30 TABLET | Refills: 0 | Status: SHIPPED | OUTPATIENT
Start: 2020-12-24 | End: 2021-03-19

## 2020-12-03 RX ORDER — DEXTROAMPHETAMINE SACCHARATE, AMPHETAMINE ASPARTATE, DEXTROAMPHETAMINE SULFATE AND AMPHETAMINE SULFATE 2.5; 2.5; 2.5; 2.5 MG/1; MG/1; MG/1; MG/1
10 TABLET ORAL
Qty: 30 TABLET | Refills: 0 | Status: SHIPPED | OUTPATIENT
Start: 2021-02-24 | End: 2021-03-19

## 2020-12-03 RX ORDER — SERTRALINE HYDROCHLORIDE 25 MG/1
25 TABLET, FILM COATED ORAL DAILY
Qty: 7 TABLET | Refills: 3 | Status: SHIPPED | OUTPATIENT
Start: 2020-12-03 | End: 2021-03-18

## 2020-12-03 RX ORDER — DEXTROAMPHETAMINE SACCHARATE, AMPHETAMINE ASPARTATE MONOHYDRATE, DEXTROAMPHETAMINE SULFATE AND AMPHETAMINE SULFATE 7.5; 7.5; 7.5; 7.5 MG/1; MG/1; MG/1; MG/1
30 CAPSULE, EXTENDED RELEASE ORAL EVERY MORNING
Qty: 30 CAPSULE | Refills: 0 | Status: SHIPPED | OUTPATIENT
Start: 2021-01-24 | End: 2021-03-19

## 2020-12-03 RX ORDER — AMITRIPTYLINE HYDROCHLORIDE 25 MG/1
25 TABLET, FILM COATED ORAL NIGHTLY
Qty: 30 TABLET | Refills: 2 | Status: SHIPPED | OUTPATIENT
Start: 2020-12-03 | End: 2021-06-04 | Stop reason: SDUPTHER

## 2020-12-03 NOTE — PROGRESS NOTES
"OUTPATIENT PSYCHIATRY FOLLOW UP VISIT    ENCOUNTER DATE:  12/3/2020  SITE:  [facility]  LENGTH OF SESSION:  20 minutes      CHIEF COMPLAINT:  No chief complaint on file.      HISTORY OF PRESENTING ILLNESS:  April Wendt Schamberger is a 35 y.o. female with history of ADHD, NIRAV, IBS-D who presents for follow up appointment. Pt last seen by me on 9/3/20.  checked; no concerns for stimulant abuse.     Plan at last appointment:  - adderall XR 30 mg daily as sx improved & is functioning well at work  - Increase IR adderall from 5 to 10 mg prn in the afternoon  - Continue amitriptyline 25 mg qhs for anxiety, sleep, and IBS with diarrhea    Interval history as told by Patient - & or family/friend/spouse/caregiver with pts permission    EKG QTc 439    Mood "happy then annoyed at my " Reports she is about to start her period and this is normal. Reports mood swings, irritability, bloating, fatigue, increased appetite 3-4 days prior to onset of menses. States symptoms are noticable enough that her  has commented on it. Feels that she has a shorter temper with her children.  States she has been doing well other than cervical neck pain.  Reports compliance with adderall but not taking medication holidays as she has two small children at home and often works on weekends. No concerns with appetite while on adderall.   Difficulty sleeping is the same as before - insomnia runs in the family.  Reports compliance with amitriptyline; denies side effects.  Still taking Lomotil daily for IBS-D  Managing blood pressure with PCP who is aware she is on adderall; denies recent tachycardia.  Denies SI.    Of note from previous encounter:  Diagnosed with essential HTN in 2008; managed well on metoprolol. Reports that HTN resolves during pregnancy and two years after. States that she stopped adderall in 2010 and continued to have HTN and tachycardia until 2014 when her son was born - 4 years of HTN " without stimulant medication. Went back on Adderall after seeing Dr. Tobias last year.        PSYCHIATRIC REVIEW OF SYSTEMS:(none/ yes- better/worse/stable/& what symptoms)    Symptoms of Depression: denies depressed mood, anhedonia  Symptoms of Anxiety/ panic attacks: denies panic attacks, difficulty with sleep initiation due to worry since high school, muscle tension - so tight pulled two discs out of alignment, tried acupuncture and physical, fatigued constantly, was getting headaches but better now on HTN meds  Symptoms of Luly/Hypomania: denies  Symptoms of psychosis: denies    Sleep: 6 hours on lunesta provided by PCP; without gets 3-4 hours  Appetite: stable    Other: denies  Alcohol: denies  Illicit Drugs: denies      Risk Parameters:  Patient reports no suicidal ideation  Patient reports no homicidal ideation  Patient reports no self-injurious behavior  Patient reports no violent behavior    PSYCHIATRIC MED REVIEW    Current psych meds  - adderall XR 30 mg daily  - adderall IR 10mg, PRN  - amitriptyline 25 mg qhs for anxiety, sleep, and IBS with diarrhea; takes half tab amitriptyline bc full tab was too sleepy the next day; hasn't noticed change in diarrhea    Previous psych meds trials  Benadryl  Melatonin  ambien  OTC natural herbs    PAST PSYCHIATRIC, MEDICAL, AND SOCIAL HISTORY REVIEWED  The patient's past medical, family and social history have been reviewed and updated as appropriate within the electronic medical record - see encounter notes.    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 Denies previous suicide attempts or psych hospitalizations. One gun in the home; no legal history.     Family Psychiatric History:   Sister anxiety and depression takes prozac; mom had depression with cancer        MEDICAL REVIEW OF SYSTEMS:  Complete review of systems performed covering Constitutional, Musculoskeletal, Neurologic.  All systems negative/  except diarrhea daily but is baseline    ALL MEDICATIONS:    Current Outpatient Medications:     acetaminophen (TYLENOL) 500 MG tablet, Take 2 tablets (1,000 mg total) by mouth 3 (three) times daily as needed for Pain., Disp: , Rfl: 0    amitriptyline (ELAVIL) 25 MG tablet, Take 1 tablet (25 mg total) by mouth every evening., Disp: 30 tablet, Rfl: 2    butalbital-acetaminophen-caffeine -40 mg (FIORICET, ESGIC) -40 mg per tablet, Take 1 tablet by mouth every 6 (six) hours as needed for Headaches., Disp: 30 tablet, Rfl: 0    [START ON 2/24/2021] dextroamphetamine-amphetamine (ADDERALL XR) 30 MG 24 hr capsule, Take 1 capsule (30 mg total) by mouth every morning., Disp: 30 capsule, Rfl: 0    [START ON 1/24/2021] dextroamphetamine-amphetamine (ADDERALL XR) 30 MG 24 hr capsule, Take 1 capsule (30 mg total) by mouth every morning., Disp: 30 capsule, Rfl: 0    [START ON 12/24/2020] dextroamphetamine-amphetamine (ADDERALL XR) 30 MG 24 hr capsule, Take 1 capsule (30 mg total) by mouth every morning., Disp: 30 capsule, Rfl: 0    [START ON 12/24/2020] dextroamphetamine-amphetamine 10 mg Tab, Take 1 tablet (10 mg total) by mouth after lunch., Disp: 30 tablet, Rfl: 0    [START ON 1/24/2021] dextroamphetamine-amphetamine 10 mg Tab, Take 1 tablet (10 mg total) by mouth after lunch., Disp: 30 tablet, Rfl: 0    [START ON 2/24/2021] dextroamphetamine-amphetamine 10 mg Tab, Take 1 tablet (10 mg total) by mouth after lunch., Disp: 30 tablet, Rfl: 0    diclofenac (VOLTAREN) 50 MG EC tablet, Take 1 tablet (50 mg total) by mouth 2 (two) times daily as needed., Disp: 60 tablet, Rfl: 0    diphenoxylate-atropine 2.5-0.025 mg (LOMOTIL) 2.5-0.025 mg per tablet, Take 1 tablet by mouth 4 (four) times daily as needed for Diarrhea., Disp: 360 tablet, Rfl: 0    diphenoxylate-atropine 2.5-0.025 mg/5 ml (LOMOTIL) 2.5-0.025 mg/5 mL liquid, Take 5 mLs by mouth 4 (four) times daily as needed., Disp: 3 Bottle, Rfl: 1     eszopiclone (LUNESTA) 3 mg Tab, Take 1 tablet (3 mg total) by mouth nightly as needed (insomnia)., Disp: 30 tablet, Rfl: 0    fluconazole (DIFLUCAN) 150 MG Tab, Take 1 tablet by mouth now and repeat in 3 days if still symptomatic, Disp: 20 tablet, Rfl: 3    loperamide (IMODIUM A-D) 1 mg/7.5 mL Liqd, Take 0.1 mg/kg by mouth every 12 (twelve) hours., Disp: , Rfl:     metoprolol succinate (TOPROL-XL) 25 MG 24 hr tablet, Take 1 tablet (25 mg total) by mouth 2 (two) times daily., Disp: 60 tablet, Rfl: 5    pregabalin (LYRICA) 25 MG capsule, Take 1 capsule (25 mg total) by mouth 3 (three) times daily., Disp: 90 capsule, Rfl: 0    rizatriptan (MAXALT-MLT) 10 MG disintegrating tablet, Dissolve 1/2 tablet (5 mg total) by mouth as needed for Migraine (repeat dose once in 2 hours if headache return.)., Disp: 9 tablet, Rfl: 0    sertraline (ZOLOFT) 25 MG tablet, Take 1 tablet (25 mg total) by mouth once daily., Disp: 7 tablet, Rfl: 3    tiZANidine (ZANAFLEX) 4 MG tablet, Take 1 tablet (4 mg total) by mouth 3 (three) times daily as needed., Disp: 90 tablet, Rfl: 2    traMADoL (ULTRAM) 50 mg tablet, Take 1 tablet (50 mg total) by mouth daily as needed for Pain., Disp: 14 tablet, Rfl: 0  No current facility-administered medications for this visit.     Facility-Administered Medications Ordered in Other Visits:     0.9%  NaCl infusion, 500 mL, Intravenous, Continuous, Corky Argueta Jr., MD    ALLERGIES:  Review of patient's allergies indicates:   Allergen Reactions    Lactose     Azithromycin Nausea And Vomiting       RELEVANT LABS/STUDIES:    Lab Results   Component Value Date    WBC 6.82 07/30/2020    HGB 13.9 07/30/2020    HCT 41.4 07/30/2020    MCV 90 07/30/2020     07/30/2020     BMP  Lab Results   Component Value Date     07/30/2020    K 4.4 07/30/2020     07/30/2020    CO2 29 07/30/2020    BUN 11 07/30/2020    CREATININE 0.7 07/30/2020    CALCIUM 9.2 07/30/2020    ANIONGAP 4 (L) 07/30/2020     ESTGFRAFRICA >60 07/30/2020    EGFRNONAA >60 07/30/2020     Lab Results   Component Value Date    ALT 11 07/30/2020    AST 13 07/30/2020    ALKPHOS 43 (L) 07/30/2020    BILITOT 0.3 07/30/2020     Lab Results   Component Value Date    TSH 0.861 07/30/2020     Lab Results   Component Value Date    HGBA1C 4.8 07/30/2020       VITALS  Vitals:    12/03/20 0919   BP: (!) 140/83   Pulse: 73   Weight: 82.7 kg (182 lb 5.1 oz)       PHYSICAL EXAM  General: well developed, well nourished  Neurologic:   Gait: Normal   Psychomotor signs:  No involuntary movements or tremor  AIMS: none    PSYCHIATRIC EXAM:     Mental Status Exam:  Appearance: unremarkable, age appropriate  Behavior/Cooperation: normal, cooperative  Speech: normal tone, normal rate, normal pitch, normal volume  Language: uses words appropriately; NO aphasia or dysarthria  Mood: annoyed at my   Affect: full congruent  Thought Process: normal and logical  Thought Content: normal, no suicidality, no homicidality, delusions, or paranoia  Perception: no AVH  Level of Consciousness: Alert and Oriented x3  Memory:   Intact; Recent:  Intact; able to report recent events; Remote: Intact  Attention/concentration: appropriate for age/education.   Fund of Knowledge: appears adequate  Abstaction: intact  Insight:   Intact  Judgment:  Intact       IMPRESSION:    April Wendt Schamberger is a 35 y.o. female with history of ADHD, NIRAV, IBS-D who presents for follow up appointment.    Status/Progress:  Based on the examination today, the patient's problem(s) is/are well controlled.  New problems have been presented today - PMDD    DIAGNOSES:    ICD-10-CM ICD-9-CM   1. Attention deficit hyperactivity disorder (ADHD), combined type  F90.2 314.01   2. Irritable bowel syndrome with diarrhea  K58.0 564.1   3. PMDD (premenstrual dysphoric disorder)  F32.81 625.4   4. Anxiety  F41.9 300.00       PLAN:  Psych Med:  - Continue Adderall XR 30 mg daily as sx improved & is functioning  well at work- escribed to [facility] with fill dates-12/24, 1/24, 2/24  - Continue Adderall 10 mg prn in the afternoon- 12/24, 1/24, 2/24  - Continue amitriptyline 25 mg qhs for anxiety, sleep, and IBS with diarrhea; patient taking half tab daily as full tab too sedating; consider increasing if sxs become problematic  - Start sertraline 25mg on onset of PMDD symptoms until 2nd day of menses; discussed risks/benefits including but not limited to GI upset, headache, chepe, serotonin syndrome; patient agreed she will discontinue medication if it increases her IBS-D too much     Discussed with patient informed consent, risks vs. benefits, alternative treatments, side effect profile and the inherent unpredictability of individual responses to these treatments. Answered any questions patient may have had. The patient expresses understanding of the above and displays the capacity to agree with this current plan     Other:     RETURN TO CLINIC:  3 month       DO ALEISHA GonzalezU-[facility], PGY-3  [facility]  12/3/2020 9:12 AM

## 2020-12-08 ENCOUNTER — PATIENT OUTREACH (OUTPATIENT)
Dept: OTHER | Facility: OTHER | Age: 35
End: 2020-12-08

## 2020-12-08 DIAGNOSIS — I10 BENIGN ESSENTIAL HYPERTENSION: Primary | ICD-10-CM

## 2020-12-08 RX ORDER — VALSARTAN 160 MG/1
160 TABLET ORAL DAILY
Qty: 30 TABLET | Refills: 3 | Status: SHIPPED | OUTPATIENT
Start: 2020-12-08 | End: 2021-02-01 | Stop reason: DRUGHIGH

## 2020-12-08 NOTE — PROGRESS NOTES
Digital Medicine: Clinician Follow-Up    Patient reports she started metoprolol 25 mg BID 11/24/20 - tolerating it well with no side effects     Put a comment for me in BP log that she took BP in supine position on 11/29/20 but all other BP readings were not supine position. Reminded her of proper BP technique        The history is provided by the patient.      Review of patient's allergies indicates:   -- Lactose    -- Azithromycin -- Nausea And Vomiting  Follow-up reason(s): medication change follow-up.     Hypertension    Patient's blood pressure is stable.   Patient is not experiencing signs/symptoms of hypotension.  Patient is not experiencing signs/symptoms of hypertension.      Patient did make medication change.    Is patient tolerating med change? yes            Last 5 Patient Entered Readings                                      Current 30 Day Average: 149/96     Recent Readings 12/4/2020 12/1/2020 11/29/2020 11/29/2020 11/28/2020    SBP (mmHg) 149 151 142 154 156    DBP (mmHg) 92 97 89 101 94    Pulse 72 72 75 74 77                 Depression Screening  Did not address depression screening.    Sleep Apnea Screening    Did not address sleep apnea screening.     Medication Affordability Screening  Did not address medication affordability screening.     Medication Adherence-Medication adherence was assessed.          ASSESSMENT(S)  Patients BP average is 144/94 mmHg, which is above goal. Patient's BP goal is less than or equal to 130/80.     Avg BP remains elevated and has not decreased since metoprolol increase. She got a steroid epidural shot 12/01/20 but prior to this BP has been elevated as well. She confirmed she is not currently pregnant and does not plan on becoming pregnant ( got vasectomy). She is on metoprolol for tachycardia and BP management. Recent BMP reviewed - electrolytes and kidney function WNL. Advised to start first line BP agent ARB.        Hypertension Plan  Hypertension  Medication Change. Start valsartan 160 mg daily. Counseled on potetnial side effects, administration time and to potential risk to fetus during pregnancy. Recommended to notify me immediately if she becomes pregnant  Labs ordered. BMP recommended after starting ARB Expected Lab Date: 1/5/2021  Additional monitoring needed. Reminded of proper BP technique  Continue current diet/physical activity routine.  Plan to follow up in 2-3 weeks to assess tolerance to valsartan.        Addressed patient questions and patient has my contact information if needed prior to next outreach. Patient verbalizes understanding.             There are no preventive care reminders to display for this patient.  There are no preventive care reminders to display for this patient.      Hypertension Medications             metoprolol succinate (TOPROL-XL) 25 MG 24 hr tablet Take 1 tablet (25 mg total) by mouth 2 (two) times daily.

## 2020-12-15 ENCOUNTER — OFFICE VISIT (OUTPATIENT)
Dept: PAIN MEDICINE | Facility: CLINIC | Age: 35
End: 2020-12-15
Payer: COMMERCIAL

## 2020-12-15 VITALS
DIASTOLIC BLOOD PRESSURE: 87 MMHG | SYSTOLIC BLOOD PRESSURE: 126 MMHG | BODY MASS INDEX: 27.72 KG/M2 | WEIGHT: 182.31 LBS | HEART RATE: 81 BPM

## 2020-12-15 DIAGNOSIS — M54.12 CERVICAL RADICULOPATHY: Primary | ICD-10-CM

## 2020-12-15 DIAGNOSIS — M54.2 CERVICAL PAIN (NECK): ICD-10-CM

## 2020-12-15 DIAGNOSIS — M79.18 MYOFASCIAL PAIN SYNDROME, CERVICAL: ICD-10-CM

## 2020-12-15 DIAGNOSIS — G89.4 CHRONIC PAIN SYNDROME: ICD-10-CM

## 2020-12-15 DIAGNOSIS — M54.6 MIDLINE THORACIC BACK PAIN, UNSPECIFIED CHRONICITY: ICD-10-CM

## 2020-12-15 DIAGNOSIS — R51.9 NONINTRACTABLE HEADACHE, UNSPECIFIED CHRONICITY PATTERN, UNSPECIFIED HEADACHE TYPE: ICD-10-CM

## 2020-12-15 PROCEDURE — 99213 OFFICE O/P EST LOW 20 MIN: CPT | Mod: S$GLB,,, | Performed by: NURSE PRACTITIONER

## 2020-12-15 PROCEDURE — 99213 PR OFFICE/OUTPT VISIT, EST, LEVL III, 20-29 MIN: ICD-10-PCS | Mod: S$GLB,,, | Performed by: NURSE PRACTITIONER

## 2020-12-15 PROCEDURE — 1125F AMNT PAIN NOTED PAIN PRSNT: CPT | Mod: S$GLB,,, | Performed by: NURSE PRACTITIONER

## 2020-12-15 PROCEDURE — 1125F PR PAIN SEVERITY QUANTIFIED, PAIN PRESENT: ICD-10-PCS | Mod: S$GLB,,, | Performed by: NURSE PRACTITIONER

## 2020-12-15 PROCEDURE — 3008F PR BODY MASS INDEX (BMI) DOCUMENTED: ICD-10-PCS | Mod: CPTII,S$GLB,, | Performed by: NURSE PRACTITIONER

## 2020-12-15 PROCEDURE — 3079F DIAST BP 80-89 MM HG: CPT | Mod: CPTII,S$GLB,, | Performed by: NURSE PRACTITIONER

## 2020-12-15 PROCEDURE — 99999 PR PBB SHADOW E&M-EST. PATIENT-LVL IV: CPT | Mod: PBBFAC,,, | Performed by: NURSE PRACTITIONER

## 2020-12-15 PROCEDURE — 99999 PR PBB SHADOW E&M-EST. PATIENT-LVL IV: ICD-10-PCS | Mod: PBBFAC,,, | Performed by: NURSE PRACTITIONER

## 2020-12-15 PROCEDURE — 3074F SYST BP LT 130 MM HG: CPT | Mod: CPTII,S$GLB,, | Performed by: NURSE PRACTITIONER

## 2020-12-15 PROCEDURE — 3074F PR MOST RECENT SYSTOLIC BLOOD PRESSURE < 130 MM HG: ICD-10-PCS | Mod: CPTII,S$GLB,, | Performed by: NURSE PRACTITIONER

## 2020-12-15 PROCEDURE — 3008F BODY MASS INDEX DOCD: CPT | Mod: CPTII,S$GLB,, | Performed by: NURSE PRACTITIONER

## 2020-12-15 PROCEDURE — 3079F PR MOST RECENT DIASTOLIC BLOOD PRESSURE 80-89 MM HG: ICD-10-PCS | Mod: CPTII,S$GLB,, | Performed by: NURSE PRACTITIONER

## 2020-12-15 RX ORDER — BUTALBITAL, ACETAMINOPHEN AND CAFFEINE 50; 325; 40 MG/1; MG/1; MG/1
1 TABLET ORAL EVERY 6 HOURS PRN
Qty: 30 TABLET | Refills: 0 | Status: SHIPPED | OUTPATIENT
Start: 2020-12-15 | End: 2021-01-25 | Stop reason: SDUPTHER

## 2020-12-15 NOTE — PROGRESS NOTES
Chronic Pain  patient Established Note (Follow up visit)        SUBJECTIVE:    Interval Updates:     12/15/20 - Ms. Schamberger returns to clinic for follow up visit reporting stable neck and arm pain.  Patient is s/p Cervical Epidural Steroid Injection at C7-T1 on 12/1/20 with 40% overall relief.  Pain intensity is currently 5/10.  Patient reports her right arm symptoms have improved states she does get mild tingling on and off that radiates in to the right minimal, pinky and ring finger and sometimes radiating into the elbow.  She states flexion causes her worse pain however, lateral, and  flexion  movements have improved since the injection.  She also mentioned that she only took Lyrica 25 mg b.i.d. instead of t.i.d she reported no adverse SEs while taking this medicine.  She discontinued her Voltaren tablets due to burning in her stomach.  She states she is taking Tylenol 1000 mg b.i.d. as needed for pain which she states is helping.    11/16/20 - Ms. Schamberger returns to clinic for follow up visit reporting worse neck and arm pain.  Patient is s/p TPI on 6/30/20 with 50% relief.  Pain intensity is currently 7/10.   April presents with returning neck, thoracic and bilateral arm pain right greater than left., pain is described as aching dull throbbing with some numbness in her right arm she reports no shooting pain in either arm she denies any profound weakness and denies dropping things.  She did report numbness in her right arm.  I discussed with patient that I will provide her with trigger point injections today to help with myofascial pain and  I will also start her on a cocktail of medications to help with I think is neuropathic symptoms in her arm, she is currently taking tizanidine 4 mg nightly, so I will at Lyrica 25 mg t.i.d.,  diclofenac 50 mg b.i.d. as needed for pain, and Tylenol 1000 mg t.i.d. in  effort to provide her with some relief.  I discussed with her if she had any side effects with these medications that she should discontinue immediately.  I will have them sent to the [facility] pharmacy.    20 Pt returns for bilateral chronic neck pain, patient is established our office she has been given cervical paraspinal trigger point injection in the past which has helped alleviate her pain for greater than 6 months.  She is requesting repeat trigger point injections today.    April Wendt Schamberger presents to the clinic for a follow-up appointment for Cervical TPIs today.   Since the last visit, April Wendt Schamberger states the pain has been persistant. Current pain intensity is 6/10.    Pain Disability Index Review:  Last 3 PDI Scores 12/15/2020 2020 2020   Pain Disability Index (PDI) 26 44 49        Pain Medications:      - Opioids: None  - Adjuvant Medications: Advil,Motrin ( Ibuprofen)  - Anti-Coagulants: None  - Others: see medications list.     Opioid Contract: no      report:  Reviewed and consistent with medication use as prescribed.     Pain Procedures:17 TRIGGER POINT INJECTION  17 TRIGGER POINT INJECTION  17 bilateral C4,5,6 CERVICAL FACET MEDIAL BRANCH NERVE BLOCK (Prone) ( after xray correlation with pain level, decsion was made to proceed at C4,5,6 levels, patient agrees to proceed      Physical Therapy/Home Exercise: no     Imagin17 MRI Cervical Spine Without Contrast  Narrative   Technique: Multiplanar, multisequence MR imaging of the cervical spine obtained without the use of IV contrast.     Comparison: Cervical spine radiographs 12/15/2016.     Results:    There is straightening with mild reversal of the normal cervical lordosis. Vertebral body heights are maintained with no evidence of fracture. Mild intervertebral disc space narrowing and desiccation are visualized at C5-6 and C6-7. No marrow signal abnormality suspicious for an infiltrative  process.      The cervical cord is normal in caliber and signal characteristics.  The craniocervical junction and visualized intracranial contents are unremarkable.  The adjacent soft tissue structures show no significant abnormalities.      C2-C3:  No significant spinal canal or neural foraminal narrowing.    C3-C4: No significant spinal canal or neural foraminal narrowing.    C4-C5:  No significant spinal canal or neural foraminal narrowing.    C5-C6:  Disc bulge with right uncovertebral spurring. Findings result in mild spinal canal stenosis and mild right neural foraminal narrowing.    C6-C7:  Mild disc bulge with thickening of the ligamentum flavum results in mild spinal canal stenosis.No significant neuroforaminal narrowing.    C7-T1:   No significant central spinal or neural foraminal narrowing.   Impression      Mild disc bulge and spinal canal stenosis at the C5-6 and C6-7 levels.      Electronically signed by: UMER MARTIN MD  Date: 01/26/17  Time: 12:56     Encounter   View Encounter      Reviewed By   Hunter Jimenez MD on 1/27/2017  2:18 PM   Exam Details                     Performed Procedure Technologist Supporting Staff Performing Physician   MRI Cervical Spine Without Contrast Andre Hinds, RT         Appointment Date/Status Modality Department        1/26/2017     Completed [facility]1 [facility]       Begin Exam End Exam Begin Exam Questionnaires End Exam Questionnaires     1/26/2017 11:22 AM 1/26/2017 11:59 AM MRI TECH NAVIGATOR QUESTIONS IMAGING END ALL         RIS PREGNANCY TECH NAVIGATOR          X-Ray Cervical Spine AP Lat with Flexion  Extension 12/15/16  Narrative     Cervical spine radiographs     comparison: None    Results: AP, lateral, flexion and extension views  .  The alignment is normal, vertebral body height and disk spaces are well-maintained.    Flexion and extension views demonstrate no translational abnormalities.  Very small anterior osteophyte noted at C5-C6.  No  fracture or osseous lesion seen.Prevertebral soft tissues appear normal.   Impression    No significant abnormality seen      Electronically signed by: JOSE A JENKINS MD  Date: 12/15/16  Time: 10:52          Allergies:   Review of patient's allergies indicates:   Allergen Reactions    Lactose     Azithromycin Nausea And Vomiting       Current Medications:   Current Outpatient Medications   Medication Sig Dispense Refill    acetaminophen (TYLENOL) 500 MG tablet Take 2 tablets (1,000 mg total) by mouth 3 (three) times daily as needed for Pain.  0    amitriptyline (ELAVIL) 25 MG tablet Take 1 tablet (25 mg total) by mouth every evening. 30 tablet 2    butalbital-acetaminophen-caffeine -40 mg (FIORICET, ESGIC) -40 mg per tablet Take 1 tablet by mouth every 6 (six) hours as needed for Headaches. 30 tablet 0    [START ON 2/24/2021] dextroamphetamine-amphetamine (ADDERALL XR) 30 MG 24 hr capsule Take 1 capsule (30 mg total) by mouth every morning. 30 capsule 0    [START ON 1/24/2021] dextroamphetamine-amphetamine (ADDERALL XR) 30 MG 24 hr capsule Take 1 capsule (30 mg total) by mouth every morning. 30 capsule 0    [START ON 12/24/2020] dextroamphetamine-amphetamine (ADDERALL XR) 30 MG 24 hr capsule Take 1 capsule (30 mg total) by mouth every morning. 30 capsule 0    [START ON 12/24/2020] dextroamphetamine-amphetamine 10 mg Tab Take 1 tablet (10 mg total) by mouth after lunch. 30 tablet 0    [START ON 1/24/2021] dextroamphetamine-amphetamine 10 mg Tab Take 1 tablet (10 mg total) by mouth after lunch. 30 tablet 0    [START ON 2/24/2021] dextroamphetamine-amphetamine 10 mg Tab Take 1 tablet (10 mg total) by mouth after lunch. 30 tablet 0    diclofenac (VOLTAREN) 50 MG EC tablet Take 1 tablet (50 mg total) by mouth 2 (two) times daily as needed. 60 tablet 0    diphenoxylate-atropine 2.5-0.025 mg (LOMOTIL) 2.5-0.025 mg per tablet Take 1 tablet by mouth 4 (four) times daily as needed for Diarrhea.  360 tablet 0    diphenoxylate-atropine 2.5-0.025 mg/5 ml (LOMOTIL) 2.5-0.025 mg/5 mL liquid Take 5 mLs by mouth 4 (four) times daily as needed. 3 Bottle 1    eszopiclone (LUNESTA) 3 mg Tab Take 1 tablet (3 mg total) by mouth nightly as needed (insomnia). 30 tablet 0    fluconazole (DIFLUCAN) 150 MG Tab Take 1 tablet by mouth now and repeat in 3 days if still symptomatic 20 tablet 3    loperamide (IMODIUM A-D) 1 mg/7.5 mL Liqd Take 0.1 mg/kg by mouth every 12 (twelve) hours.      metoprolol succinate (TOPROL-XL) 25 MG 24 hr tablet Take 1 tablet (25 mg total) by mouth 2 (two) times daily. 60 tablet 5    pregabalin (LYRICA) 25 MG capsule Take 1 capsule (25 mg total) by mouth 3 (three) times daily. 90 capsule 0    sertraline (ZOLOFT) 25 MG tablet Take 1 tablet (25 mg total) by mouth once daily. 7 tablet 3    tiZANidine (ZANAFLEX) 4 MG tablet Take 1 tablet (4 mg total) by mouth 3 (three) times daily as needed. 90 tablet 2    traMADoL (ULTRAM) 50 mg tablet Take 1 tablet (50 mg total) by mouth daily as needed for Pain. 14 tablet 0    valsartan (DIOVAN) 160 MG tablet Take 1 tablet (160 mg total) by mouth once daily. 30 tablet 3    rizatriptan (MAXALT-MLT) 10 MG disintegrating tablet Dissolve 1/2 tablet (5 mg total) by mouth as needed for Migraine (repeat dose once in 2 hours if headache return.). 9 tablet 0     No current facility-administered medications for this visit.      Facility-Administered Medications Ordered in Other Visits   Medication Dose Route Frequency Provider Last Rate Last Dose    0.9%  NaCl infusion  500 mL Intravenous Continuous Corky Argueta Jr., MD           REVIEW OF SYSTEMS:    GENERAL:  No weight loss, malaise or fevers.  HEENT:  Negative for frequent or significant headaches. + HAs   NECK:  Negative for lumps, goiter, pain and significant neck swelling.  RESPIRATORY:  Negative for cough, wheezing or shortness of breath.  CARDIOVASCULAR:  Negative for chest pain, leg swelling or  palpitations.  GI:  Negative for abdominal discomfort, blood in stools or black stools or change in bowel habits.  MUSCULOSKELETAL:  See HPI.  SKIN:  Negative for lesions, rash, and itching.  PSYCH:  Positive for sleep disturbance, mood disorder and recent psychosocial stressors.  HEMATOLOGY/LYMPHOLOGY:  Negative for prolonged bleeding, bruising easily or swollen nodes.  NEURO:   No history of headaches, syncope, paralysis, seizures or tremors.  All other reviewed and negative other than HPI.    Past Medical History:  Past Medical History:   Diagnosis Date    Abnormal Pap smear of cervix 2000    Cryo Done    ADD (attention deficit disorder)     Breast disorder     Breast Cysts    Esophageal reflux     Fibroids     Hypertension     IBS (irritable bowel syndrome)     Insomnia     Kidney stones     Mastocytosis     Relies on partner vasectomy for contraception     Upper GI bleed        Past Surgical History:  Past Surgical History:   Procedure Laterality Date    COLONOSCOPY N/A 4/28/2017    Procedure: COLONOSCOPY;  Surgeon: Bren Larkin MD;  Location: [facility] ENDO;  Service: Endoscopy;  Laterality: N/A;    EPIDURAL STEROID INJECTION INTO CERVICAL SPINE N/A 12/1/2020    Procedure: Injection-steroid-epidural-cervical--C7-T1;  Surgeon: Corky Argueta Jr., MD;  Location: [facility] PAIN MGT;  Service: Pain Management;  Laterality: N/A;    ESOPHAGOGASTRODUODENOSCOPY  2012    LASIK Bilateral 2005    REFRACTIVE SURGERY      TONSILLECTOMY, ADENOIDECTOMY  1988    VAGINAL DELIVERY      x 2       Family History:  Family History   Problem Relation Age of Onset    Breast cancer Mother 47        with Metastasis    Hypertension Mother     Prostate cancer Father     Hypertension Father     Diabetes Father     Deep vein thrombosis Father     Pancreatic cancer Maternal Grandfather     Breast cancer Paternal Grandmother 80    Diabetes type II Paternal Grandfather     Heart attack Maternal Grandmother      Cancer Cousin 31    Cancer Cousin         Thurman Syndrome    Cancer Cousin         Thurman Syndrome    Ovarian cancer Maternal Aunt     Lymphoma Neg Hx     Tuberculosis Neg Hx     Celiac disease Neg Hx     Cirrhosis Neg Hx     Colon polyps Neg Hx     Crohn's disease Neg Hx     Cystic fibrosis Neg Hx     Esophageal cancer Neg Hx     Hemochromatosis Neg Hx     Inflammatory bowel disease Neg Hx     Irritable bowel syndrome Neg Hx     Liver cancer Neg Hx     Liver disease Neg Hx     Rectal cancer Neg Hx     Stomach cancer Neg Hx     Ulcerative colitis Neg Hx     Donavon's disease Neg Hx     Amblyopia Neg Hx     Blindness Neg Hx     Cataracts Neg Hx     Glaucoma Neg Hx     Macular degeneration Neg Hx     Retinal detachment Neg Hx     Strabismus Neg Hx        Social History:  Social History     Socioeconomic History    Marital status:      Spouse name: Not on file    Number of children: 1    Years of education: Not on file    Highest education level: Not on file   Occupational History    Not on file   Social Needs    Financial resource strain: Not very hard    Food insecurity     Worry: Never true     Inability: Never true    Transportation needs     Medical: No     Non-medical: No   Tobacco Use    Smoking status: Former Smoker     Quit date: 2014     Years since quittin.7    Smokeless tobacco: Never Used   Substance and Sexual Activity    Alcohol use: No     Alcohol/week: 0.0 standard drinks     Frequency: Never     Binge frequency: Never     Comment: breastfeeding     Drug use: No    Sexual activity: Yes     Partners: Male     Birth control/protection: Partner-Vasectomy     Comment: : Boris   Lifestyle    Physical activity     Days per week: 7 days     Minutes per session: 30 min    Stress: Very much   Relationships    Social connections     Talks on phone: More than three times a week     Gets together: More than three times a week     Attends Zoroastrian  service: More than 4 times per year     Active member of club or organization: Yes     Attends meetings of clubs or organizations: Never     Relationship status:    Other Topics Concern    Not on file   Social History Narrative    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  Psych:  Mood and affect appropriate.  Skin: Skin color, texture, turgor normal, no rashes or lesions, in both upper and lower body.  Head/face:  Atraumatic, normocephalic. No palpable lymph nodes  Neck: neg pain to palpation over the cervical paraspinous muscles. Spurling Negative. + for mild pain with extension   Cor: RRR  Pulm: CTA  GI: Abdomen soft and non-tender.  Back: Straight leg raising in the sitting and supine positions is negative to radicular pain. No pain to palpation over the spine or costovertebral angles. Normal range of motion without pain reproduction.  Extremities: Peripheral joint ROM is full and pain free without obvious instability or laxity in all four extremities. No deformities, edema, or skin discoloration. Good capillary refill.  Musculoskeletal: Shoulder, hip, sacroiliac and knee provocative maneuvers are negative. Bilateral upper and lower extremity strength is normal and symmetric.  No atrophy or tone abnormalities are noted.  Neuro: Bilateral upper and lower extremity coordination and muscle stretch reflexes are physiologic and symmetric.  Plantar response are downgoing. No loss of sensation is noted.  Gait: Normal.    ASSESSMENT: 35 y.o. year old female with  neck and shoulder  pain, consistent with patient is status post cervical LAZ targeting C7-T1 with in overall 40% improvement of her pain patient reporting a discernible difference in her pain relief following this injection and the optimization of certain  medications.  Her and I discussed that we could consider repeating the cervical LAZ at the above-mentioned levels but will hold off for now.     Diagnoses    1. Cervical radiculopathy     2. Cervical pain (neck)     3. Myofascial pain syndrome, cervical     4. Midline thoracic back pain, unspecified chronicity     5. Chronic pain syndrome           PLAN:     - I have stressed the importance of physical activity and a home exercise plan to help with pain and improve health.  - Counseled patient regarding the importance of activity modification, constant sleeping habits and physical therapy.  -consider repeating cervical LAZ C7-T1 in the future.  - continue tizanidine 4 mg nightly as needed  - continue Tylenol 1000 mg BID  p.r.n.  - d/c diclofenac 50 mg BID PRN due to adverse SEs(burning in stomach)   -Start Lyrica 25 mg t.i.d to help with continued mild symptoms( she only took BID)   -RTC- notify me in 2 weeks on effectiveness of TID Lyrica 25 mg     -The above plan and management options were discussed at length with patient. Patient is in agreement with the above and verbalized understanding. Dr. Argueta was consulted on this patient  and agrees with this plan.    Joseph Moore NP-C  Interventional Pain Management      12/15/2020

## 2020-12-18 ENCOUNTER — PATIENT OUTREACH (OUTPATIENT)
Dept: OTHER | Facility: OTHER | Age: 35
End: 2020-12-18

## 2020-12-22 ENCOUNTER — IMMUNIZATION (OUTPATIENT)
Dept: INTERNAL MEDICINE | Facility: CLINIC | Age: 35
End: 2020-12-22
Payer: COMMERCIAL

## 2020-12-22 DIAGNOSIS — Z23 NEED FOR VACCINATION: ICD-10-CM

## 2020-12-22 PROCEDURE — 91300 COVID-19, MRNA, LNP-S, PF, 30 MCG/0.3 ML DOSE VACCINE: CPT | Mod: ,,, | Performed by: FAMILY MEDICINE

## 2020-12-22 PROCEDURE — 0001A COVID-19, MRNA, LNP-S, PF, 30 MCG/0.3 ML DOSE VACCINE: ICD-10-PCS | Mod: CV19,,, | Performed by: FAMILY MEDICINE

## 2020-12-22 PROCEDURE — 91300 COVID-19, MRNA, LNP-S, PF, 30 MCG/0.3 ML DOSE VACCINE: ICD-10-PCS | Mod: ,,, | Performed by: FAMILY MEDICINE

## 2020-12-22 PROCEDURE — 0001A COVID-19, MRNA, LNP-S, PF, 30 MCG/0.3 ML DOSE VACCINE: CPT | Mod: CV19,,, | Performed by: FAMILY MEDICINE

## 2020-12-24 ENCOUNTER — PATIENT OUTREACH (OUTPATIENT)
Dept: OTHER | Facility: OTHER | Age: 35
End: 2020-12-24

## 2020-12-24 NOTE — PROGRESS NOTES
Digital Medicine: Clinician Follow-Up    Currently has a sinus infection and has been having headaches - she thinks HA may be related to this but she will continue to monitor to determine if it is related to BP.     She started the valsartan on 12/10/20 and tolerating it well with no side effects    The history is provided by the patient.   Follow-up reason(s): medication change follow-up.     Hypertension    Readings are trending down due to medication adherence.       Patient is not experiencing signs/symptoms of hypotension.  Patient is not experiencing signs/symptoms of hypertension. Denies any chest pain or SOB. HA currently      Patient did make medication change.    Is patient tolerating med change? yes            Last 5 Patient Entered Readings                                      Current 30 Day Average: 142/92     Recent Readings 12/21/2020 12/20/2020 12/19/2020 12/15/2020 12/12/2020    SBP (mmHg) 138 144 150 124 133    DBP (mmHg) 95 99 95 85 88    Pulse 83 99 79 77 76                 Depression Screening  Did not address depression screening.    Sleep Apnea Screening    Did not address sleep apnea screening.     Medication Affordability Screening  Did not address medication affordability screening.     Medication Adherence-Medication adherence was assessed.          ASSESSMENT(S)  Patients BP average is 142/92 mmHg, which is above goal. Patient's BP goal is less than or equal to 130/80.     Avg BP slight trend down since adding valsartan. She is being managed on first line BP agent ARB along with cardioselective BB for tachycardia and BP management.      Hypertension Plan  Continue current therapy. Defer med change. Slight trend down. If BP remains elevated at f/u, will consider inc valsartan to max dose   Continue current diet/physical activity routine.  Plan to follow up in 4-6 weeks. BMP already scheduled.        Addressed patient questions and patient has my contact information if needed prior to next  outreach. Patient verbalizes understanding.             There are no preventive care reminders to display for this patient.  There are no preventive care reminders to display for this patient.      Hypertension Medications             metoprolol succinate (TOPROL-XL) 25 MG 24 hr tablet Take 1 tablet (25 mg total) by mouth 2 (two) times daily.    valsartan (DIOVAN) 160 MG tablet Take 1 tablet (160 mg total) by mouth once daily.

## 2020-12-28 DIAGNOSIS — F51.01 PRIMARY INSOMNIA: ICD-10-CM

## 2020-12-28 RX ORDER — ESZOPICLONE 3 MG/1
3 TABLET, FILM COATED ORAL NIGHTLY PRN
Qty: 30 TABLET | Refills: 0 | Status: SHIPPED | OUTPATIENT
Start: 2020-12-28 | End: 2021-01-25 | Stop reason: SDUPTHER

## 2020-12-28 RX ORDER — PREGABALIN 25 MG/1
25 CAPSULE ORAL 3 TIMES DAILY
Qty: 90 CAPSULE | Refills: 0 | Status: SHIPPED | OUTPATIENT
Start: 2020-12-28 | End: 2021-11-24 | Stop reason: SDUPTHER

## 2021-01-03 ENCOUNTER — PATIENT MESSAGE (OUTPATIENT)
Dept: OTHER | Facility: OTHER | Age: 36
End: 2021-01-03

## 2021-01-05 ENCOUNTER — LAB VISIT (OUTPATIENT)
Dept: LAB | Facility: HOSPITAL | Age: 36
End: 2021-01-05
Attending: INTERNAL MEDICINE
Payer: COMMERCIAL

## 2021-01-05 DIAGNOSIS — I10 BENIGN ESSENTIAL HYPERTENSION: ICD-10-CM

## 2021-01-05 LAB
ANION GAP SERPL CALC-SCNC: 5 MMOL/L (ref 8–16)
BUN SERPL-MCNC: 9 MG/DL (ref 6–20)
CALCIUM SERPL-MCNC: 9.2 MG/DL (ref 8.7–10.5)
CHLORIDE SERPL-SCNC: 105 MMOL/L (ref 95–110)
CO2 SERPL-SCNC: 29 MMOL/L (ref 23–29)
CREAT SERPL-MCNC: 0.7 MG/DL (ref 0.5–1.4)
EST. GFR  (AFRICAN AMERICAN): >60 ML/MIN/1.73 M^2
EST. GFR  (NON AFRICAN AMERICAN): >60 ML/MIN/1.73 M^2
GLUCOSE SERPL-MCNC: 87 MG/DL (ref 70–110)
POTASSIUM SERPL-SCNC: 4.3 MMOL/L (ref 3.5–5.1)
SODIUM SERPL-SCNC: 139 MMOL/L (ref 136–145)

## 2021-01-05 PROCEDURE — 36415 COLL VENOUS BLD VENIPUNCTURE: CPT

## 2021-01-05 PROCEDURE — 80048 BASIC METABOLIC PNL TOTAL CA: CPT

## 2021-01-08 ENCOUNTER — OFFICE VISIT (OUTPATIENT)
Dept: DERMATOLOGY | Facility: CLINIC | Age: 36
End: 2021-01-08
Payer: COMMERCIAL

## 2021-01-08 DIAGNOSIS — L70.0 ACNE VULGARIS: Primary | ICD-10-CM

## 2021-01-08 PROCEDURE — 99204 PR OFFICE/OUTPT VISIT, NEW, LEVL IV, 45-59 MIN: ICD-10-PCS | Mod: 95,,, | Performed by: DERMATOLOGY

## 2021-01-08 PROCEDURE — 99204 OFFICE O/P NEW MOD 45 MIN: CPT | Mod: 95,,, | Performed by: DERMATOLOGY

## 2021-01-08 RX ORDER — CLINDAMYCIN PHOSPHATE 10 MG/G
GEL TOPICAL 2 TIMES DAILY
Qty: 30 G | Refills: 5 | Status: SHIPPED | OUTPATIENT
Start: 2021-01-08 | End: 2022-05-12

## 2021-01-08 RX ORDER — DOXYCYCLINE 100 MG/1
TABLET ORAL
Qty: 30 TABLET | Refills: 2 | Status: SHIPPED | OUTPATIENT
Start: 2021-01-08 | End: 2022-09-08

## 2021-01-08 RX ORDER — TRETINOIN 0.25 MG/G
CREAM TOPICAL NIGHTLY
Qty: 20 G | Refills: 5 | Status: SHIPPED | OUTPATIENT
Start: 2021-01-08

## 2021-01-12 ENCOUNTER — IMMUNIZATION (OUTPATIENT)
Dept: INTERNAL MEDICINE | Facility: CLINIC | Age: 36
End: 2021-01-12
Payer: COMMERCIAL

## 2021-01-12 DIAGNOSIS — Z23 NEED FOR VACCINATION: ICD-10-CM

## 2021-01-12 PROCEDURE — 91300 COVID-19, MRNA, LNP-S, PF, 30 MCG/0.3 ML DOSE VACCINE: CPT | Mod: PBBFAC | Performed by: FAMILY MEDICINE

## 2021-01-12 PROCEDURE — 0002A COVID-19, MRNA, LNP-S, PF, 30 MCG/0.3 ML DOSE VACCINE: CPT | Mod: PBBFAC | Performed by: FAMILY MEDICINE

## 2021-01-21 ENCOUNTER — PATIENT MESSAGE (OUTPATIENT)
Dept: DERMATOLOGY | Facility: CLINIC | Age: 36
End: 2021-01-21

## 2021-01-25 ENCOUNTER — HOSPITAL ENCOUNTER (OUTPATIENT)
Dept: RADIOLOGY | Facility: HOSPITAL | Age: 36
Discharge: HOME | End: 2021-01-25
Attending: PHYSICIAN ASSISTANT
Payer: COMMERCIAL

## 2021-01-25 DIAGNOSIS — F51.01 PRIMARY INSOMNIA: ICD-10-CM

## 2021-01-25 DIAGNOSIS — R51.9 NONINTRACTABLE HEADACHE, UNSPECIFIED CHRONICITY PATTERN, UNSPECIFIED HEADACHE TYPE: ICD-10-CM

## 2021-01-25 DIAGNOSIS — Z12.31 BREAST CANCER SCREENING BY MAMMOGRAM: ICD-10-CM

## 2021-01-25 PROCEDURE — 77067 SCR MAMMO BI INCL CAD: CPT | Mod: 26,,, | Performed by: RADIOLOGY

## 2021-01-25 PROCEDURE — 77063 MAMMO DIGITAL SCREENING BILAT WITH TOMOSYNTHESIS_CAD: ICD-10-PCS | Mod: 26,,, | Performed by: RADIOLOGY

## 2021-01-25 PROCEDURE — 77067 MAMMO DIGITAL SCREENING BILAT WITH TOMOSYNTHESIS_CAD: ICD-10-PCS | Mod: 26,,, | Performed by: RADIOLOGY

## 2021-01-25 PROCEDURE — 77063 BREAST TOMOSYNTHESIS BI: CPT | Mod: 26,,, | Performed by: RADIOLOGY

## 2021-01-25 PROCEDURE — 77067 SCR MAMMO BI INCL CAD: CPT | Mod: TC

## 2021-01-25 RX ORDER — ESZOPICLONE 3 MG/1
3 TABLET, FILM COATED ORAL NIGHTLY PRN
Qty: 30 TABLET | Refills: 0 | Status: SHIPPED | OUTPATIENT
Start: 2021-01-25 | End: 2021-02-22 | Stop reason: SDUPTHER

## 2021-01-25 RX ORDER — BUTALBITAL, ACETAMINOPHEN AND CAFFEINE 50; 325; 40 MG/1; MG/1; MG/1
1 TABLET ORAL EVERY 6 HOURS PRN
Qty: 30 TABLET | Refills: 0 | Status: SHIPPED | OUTPATIENT
Start: 2021-01-25 | End: 2021-02-22 | Stop reason: SDUPTHER

## 2021-01-29 ENCOUNTER — TELEPHONE (OUTPATIENT)
Dept: PHARMACY | Facility: CLINIC | Age: 36
End: 2021-01-29

## 2021-02-01 ENCOUNTER — PATIENT MESSAGE (OUTPATIENT)
Dept: ADMINISTRATIVE | Facility: OTHER | Age: 36
End: 2021-02-01

## 2021-02-02 ENCOUNTER — PATIENT MESSAGE (OUTPATIENT)
Dept: GASTROENTEROLOGY | Facility: CLINIC | Age: 36
End: 2021-02-02

## 2021-02-08 ENCOUNTER — PATIENT MESSAGE (OUTPATIENT)
Dept: PAIN MEDICINE | Facility: CLINIC | Age: 36
End: 2021-02-08

## 2021-02-08 RX ORDER — TIZANIDINE 4 MG/1
4 TABLET ORAL 3 TIMES DAILY
Qty: 90 TABLET | Refills: 1 | Status: SHIPPED | OUTPATIENT
Start: 2021-02-08 | End: 2021-03-10

## 2021-02-17 RX ORDER — DIPHENOXYLATE HYDROCHLORIDE AND ATROPINE SULFATE 2.5; .025 MG/1; MG/1
1 TABLET ORAL 4 TIMES DAILY PRN
Qty: 360 TABLET | Refills: 0 | Status: SHIPPED | OUTPATIENT
Start: 2021-02-17 | End: 2021-05-12 | Stop reason: SDUPTHER

## 2021-02-18 ENCOUNTER — PATIENT MESSAGE (OUTPATIENT)
Dept: INTERNAL MEDICINE | Facility: CLINIC | Age: 36
End: 2021-02-18

## 2021-02-18 DIAGNOSIS — I10 BENIGN ESSENTIAL HYPERTENSION: ICD-10-CM

## 2021-02-18 RX ORDER — VALSARTAN 320 MG/1
320 TABLET ORAL DAILY
Qty: 90 TABLET | Refills: 1 | Status: SHIPPED | OUTPATIENT
Start: 2021-02-18 | End: 2021-08-20 | Stop reason: SDUPTHER

## 2021-02-22 ENCOUNTER — PATIENT MESSAGE (OUTPATIENT)
Dept: INTERNAL MEDICINE | Facility: CLINIC | Age: 36
End: 2021-02-22

## 2021-03-18 ENCOUNTER — OFFICE VISIT (OUTPATIENT)
Dept: INTERNAL MEDICINE | Facility: CLINIC | Age: 36
End: 2021-03-18
Payer: COMMERCIAL

## 2021-03-18 VITALS
HEIGHT: 68 IN | TEMPERATURE: 97 F | SYSTOLIC BLOOD PRESSURE: 112 MMHG | OXYGEN SATURATION: 99 % | HEART RATE: 92 BPM | RESPIRATION RATE: 16 BRPM | WEIGHT: 183.19 LBS | DIASTOLIC BLOOD PRESSURE: 82 MMHG | BODY MASS INDEX: 27.76 KG/M2

## 2021-03-18 DIAGNOSIS — F51.01 PRIMARY INSOMNIA: ICD-10-CM

## 2021-03-18 DIAGNOSIS — R00.0 TACHYCARDIA: ICD-10-CM

## 2021-03-18 DIAGNOSIS — Z09 FOLLOW UP: Primary | ICD-10-CM

## 2021-03-18 DIAGNOSIS — I10 ESSENTIAL (PRIMARY) HYPERTENSION: ICD-10-CM

## 2021-03-18 PROCEDURE — 3079F PR MOST RECENT DIASTOLIC BLOOD PRESSURE 80-89 MM HG: ICD-10-PCS | Mod: CPTII,S$GLB,, | Performed by: FAMILY MEDICINE

## 2021-03-18 PROCEDURE — 3079F DIAST BP 80-89 MM HG: CPT | Mod: CPTII,S$GLB,, | Performed by: FAMILY MEDICINE

## 2021-03-18 PROCEDURE — 99214 PR OFFICE/OUTPT VISIT, EST, LEVL IV, 30-39 MIN: ICD-10-PCS | Mod: S$GLB,,, | Performed by: FAMILY MEDICINE

## 2021-03-18 PROCEDURE — 99999 PR PBB SHADOW E&M-EST. PATIENT-LVL V: CPT | Mod: PBBFAC,,, | Performed by: FAMILY MEDICINE

## 2021-03-18 PROCEDURE — 99999 PR PBB SHADOW E&M-EST. PATIENT-LVL V: ICD-10-PCS | Mod: PBBFAC,,, | Performed by: FAMILY MEDICINE

## 2021-03-18 PROCEDURE — 3008F BODY MASS INDEX DOCD: CPT | Mod: CPTII,S$GLB,, | Performed by: FAMILY MEDICINE

## 2021-03-18 PROCEDURE — 1126F PR PAIN SEVERITY QUANTIFIED, NO PAIN PRESENT: ICD-10-PCS | Mod: S$GLB,,, | Performed by: FAMILY MEDICINE

## 2021-03-18 PROCEDURE — 99214 OFFICE O/P EST MOD 30 MIN: CPT | Mod: S$GLB,,, | Performed by: FAMILY MEDICINE

## 2021-03-18 PROCEDURE — 3008F PR BODY MASS INDEX (BMI) DOCUMENTED: ICD-10-PCS | Mod: CPTII,S$GLB,, | Performed by: FAMILY MEDICINE

## 2021-03-18 PROCEDURE — 3074F SYST BP LT 130 MM HG: CPT | Mod: CPTII,S$GLB,, | Performed by: FAMILY MEDICINE

## 2021-03-18 PROCEDURE — 1126F AMNT PAIN NOTED NONE PRSNT: CPT | Mod: S$GLB,,, | Performed by: FAMILY MEDICINE

## 2021-03-18 PROCEDURE — 3074F PR MOST RECENT SYSTOLIC BLOOD PRESSURE < 130 MM HG: ICD-10-PCS | Mod: CPTII,S$GLB,, | Performed by: FAMILY MEDICINE

## 2021-03-18 RX ORDER — ESZOPICLONE 3 MG/1
3 TABLET, FILM COATED ORAL NIGHTLY PRN
Qty: 30 TABLET | Refills: 0 | Status: SHIPPED | OUTPATIENT
Start: 2021-03-18 | End: 2021-04-15 | Stop reason: SDUPTHER

## 2021-03-19 ENCOUNTER — PROCEDURE VISIT (OUTPATIENT)
Dept: PAIN MEDICINE | Facility: CLINIC | Age: 36
End: 2021-03-19
Payer: COMMERCIAL

## 2021-03-19 ENCOUNTER — OFFICE VISIT (OUTPATIENT)
Dept: PSYCHIATRY | Facility: CLINIC | Age: 36
End: 2021-03-19
Payer: COMMERCIAL

## 2021-03-19 VITALS — HEART RATE: 83 BPM | SYSTOLIC BLOOD PRESSURE: 132 MMHG | DIASTOLIC BLOOD PRESSURE: 87 MMHG

## 2021-03-19 DIAGNOSIS — F90.2 ATTENTION DEFICIT HYPERACTIVITY DISORDER (ADHD), COMBINED TYPE: ICD-10-CM

## 2021-03-19 DIAGNOSIS — F32.81 PMDD (PREMENSTRUAL DYSPHORIC DISORDER): Primary | ICD-10-CM

## 2021-03-19 DIAGNOSIS — M79.18 MYOFASCIAL PAIN SYNDROME, CERVICAL: Primary | ICD-10-CM

## 2021-03-19 DIAGNOSIS — K58.0 IRRITABLE BOWEL SYNDROME WITH DIARRHEA: ICD-10-CM

## 2021-03-19 DIAGNOSIS — F41.9 ANXIETY: ICD-10-CM

## 2021-03-19 DIAGNOSIS — M79.18 MYOFASCIAL MUSCLE PAIN: ICD-10-CM

## 2021-03-19 PROCEDURE — 99213 PR OFFICE/OUTPT VISIT, EST, LEVL III, 20-29 MIN: ICD-10-PCS | Mod: 95,,, | Performed by: STUDENT IN AN ORGANIZED HEALTH CARE EDUCATION/TRAINING PROGRAM

## 2021-03-19 PROCEDURE — 20553 TRIGGER POINT INJECTION: ICD-10-PCS | Mod: S$GLB,,, | Performed by: NURSE PRACTITIONER

## 2021-03-19 PROCEDURE — 20553 NJX 1/MLT TRIGGER POINTS 3/>: CPT | Mod: S$GLB,,, | Performed by: NURSE PRACTITIONER

## 2021-03-19 PROCEDURE — 99213 OFFICE O/P EST LOW 20 MIN: CPT | Mod: 95,,, | Performed by: STUDENT IN AN ORGANIZED HEALTH CARE EDUCATION/TRAINING PROGRAM

## 2021-03-19 PROCEDURE — 3074F SYST BP LT 130 MM HG: CPT | Mod: CPTII,,, | Performed by: STUDENT IN AN ORGANIZED HEALTH CARE EDUCATION/TRAINING PROGRAM

## 2021-03-19 PROCEDURE — 3078F DIAST BP <80 MM HG: CPT | Mod: CPTII,,, | Performed by: STUDENT IN AN ORGANIZED HEALTH CARE EDUCATION/TRAINING PROGRAM

## 2021-03-19 PROCEDURE — 3078F PR MOST RECENT DIASTOLIC BLOOD PRESSURE < 80 MM HG: ICD-10-PCS | Mod: CPTII,,, | Performed by: STUDENT IN AN ORGANIZED HEALTH CARE EDUCATION/TRAINING PROGRAM

## 2021-03-19 PROCEDURE — 3074F PR MOST RECENT SYSTOLIC BLOOD PRESSURE < 130 MM HG: ICD-10-PCS | Mod: CPTII,,, | Performed by: STUDENT IN AN ORGANIZED HEALTH CARE EDUCATION/TRAINING PROGRAM

## 2021-03-19 RX ORDER — DEXTROAMPHETAMINE SACCHARATE, AMPHETAMINE ASPARTATE, DEXTROAMPHETAMINE SULFATE AND AMPHETAMINE SULFATE 2.5; 2.5; 2.5; 2.5 MG/1; MG/1; MG/1; MG/1
10 TABLET ORAL
Qty: 30 TABLET | Refills: 0 | Status: SHIPPED | OUTPATIENT
Start: 2021-04-16 | End: 2021-08-20

## 2021-03-19 RX ORDER — DEXTROAMPHETAMINE SACCHARATE, AMPHETAMINE ASPARTATE, DEXTROAMPHETAMINE SULFATE AND AMPHETAMINE SULFATE 2.5; 2.5; 2.5; 2.5 MG/1; MG/1; MG/1; MG/1
10 TABLET ORAL
Qty: 30 TABLET | Refills: 0 | Status: SHIPPED | OUTPATIENT
Start: 2021-05-14 | End: 2021-08-20

## 2021-03-19 RX ORDER — VENLAFAXINE HYDROCHLORIDE 37.5 MG/1
37.5 CAPSULE, EXTENDED RELEASE ORAL DAILY
Qty: 30 CAPSULE | Refills: 0 | Status: SHIPPED | OUTPATIENT
Start: 2021-03-19 | End: 2021-07-01 | Stop reason: SDUPTHER

## 2021-03-19 RX ORDER — DEXTROAMPHETAMINE SACCHARATE, AMPHETAMINE ASPARTATE MONOHYDRATE, DEXTROAMPHETAMINE SULFATE AND AMPHETAMINE SULFATE 7.5; 7.5; 7.5; 7.5 MG/1; MG/1; MG/1; MG/1
30 CAPSULE, EXTENDED RELEASE ORAL EVERY MORNING
Qty: 30 CAPSULE | Refills: 0 | Status: SHIPPED | OUTPATIENT
Start: 2021-04-16 | End: 2021-08-20

## 2021-03-19 RX ORDER — DEXTROAMPHETAMINE SACCHARATE, AMPHETAMINE ASPARTATE MONOHYDRATE, DEXTROAMPHETAMINE SULFATE AND AMPHETAMINE SULFATE 7.5; 7.5; 7.5; 7.5 MG/1; MG/1; MG/1; MG/1
30 CAPSULE, EXTENDED RELEASE ORAL EVERY MORNING
Qty: 30 CAPSULE | Refills: 0 | Status: SHIPPED | OUTPATIENT
Start: 2021-05-14 | End: 2021-08-20

## 2021-03-19 RX ORDER — DEXTROAMPHETAMINE SACCHARATE, AMPHETAMINE ASPARTATE, DEXTROAMPHETAMINE SULFATE AND AMPHETAMINE SULFATE 2.5; 2.5; 2.5; 2.5 MG/1; MG/1; MG/1; MG/1
10 TABLET ORAL
Qty: 30 TABLET | Refills: 0 | Status: SHIPPED | OUTPATIENT
Start: 2021-03-19 | End: 2021-08-20

## 2021-03-19 RX ORDER — TRIAMCINOLONE ACETONIDE 40 MG/ML
40 INJECTION, SUSPENSION INTRA-ARTICULAR; INTRAMUSCULAR
Status: COMPLETED | OUTPATIENT
Start: 2021-03-19 | End: 2021-03-19

## 2021-03-19 RX ORDER — DEXTROAMPHETAMINE SACCHARATE, AMPHETAMINE ASPARTATE MONOHYDRATE, DEXTROAMPHETAMINE SULFATE AND AMPHETAMINE SULFATE 7.5; 7.5; 7.5; 7.5 MG/1; MG/1; MG/1; MG/1
30 CAPSULE, EXTENDED RELEASE ORAL EVERY MORNING
Qty: 30 CAPSULE | Refills: 0 | Status: SHIPPED | OUTPATIENT
Start: 2021-03-19 | End: 2021-08-20

## 2021-03-19 RX ORDER — TRIAMCINOLONE ACETONIDE 40 MG/ML
40 INJECTION, SUSPENSION INTRA-ARTICULAR; INTRAMUSCULAR
Status: DISCONTINUED | OUTPATIENT
Start: 2021-03-19 | End: 2021-03-19 | Stop reason: HOSPADM

## 2021-03-19 RX ADMIN — TRIAMCINOLONE ACETONIDE 40 MG: 40 INJECTION, SUSPENSION INTRA-ARTICULAR; INTRAMUSCULAR at 03:03

## 2021-03-24 ENCOUNTER — TELEPHONE (OUTPATIENT)
Dept: PAIN MEDICINE | Facility: CLINIC | Age: 36
End: 2021-03-24

## 2021-03-24 ENCOUNTER — PATIENT MESSAGE (OUTPATIENT)
Dept: PAIN MEDICINE | Facility: CLINIC | Age: 36
End: 2021-03-24

## 2021-03-24 RX ORDER — TIZANIDINE 4 MG/1
4 TABLET ORAL 3 TIMES DAILY PRN
Qty: 90 TABLET | Refills: 1 | Status: SHIPPED | OUTPATIENT
Start: 2021-03-24 | End: 2021-04-23

## 2021-04-15 DIAGNOSIS — F51.01 PRIMARY INSOMNIA: ICD-10-CM

## 2021-04-15 RX ORDER — ESZOPICLONE 3 MG/1
3 TABLET, FILM COATED ORAL NIGHTLY PRN
Qty: 30 TABLET | Refills: 0 | Status: CANCELLED | OUTPATIENT
Start: 2021-04-15

## 2021-04-15 RX ORDER — ESZOPICLONE 3 MG/1
3 TABLET, FILM COATED ORAL NIGHTLY PRN
Qty: 30 TABLET | Refills: 0 | OUTPATIENT
Start: 2021-04-15

## 2021-04-15 RX ORDER — ESZOPICLONE 3 MG/1
3 TABLET, FILM COATED ORAL NIGHTLY PRN
Qty: 30 TABLET | Refills: 0 | Status: SHIPPED | OUTPATIENT
Start: 2021-04-15 | End: 2021-05-11 | Stop reason: SDUPTHER

## 2021-04-20 ENCOUNTER — CLINICAL SUPPORT (OUTPATIENT)
Dept: OTHER | Facility: CLINIC | Age: 36
End: 2021-04-20
Payer: COMMERCIAL

## 2021-04-20 DIAGNOSIS — Z00.8 ENCOUNTER FOR OTHER GENERAL EXAMINATION: ICD-10-CM

## 2021-04-21 VITALS — HEIGHT: 68 IN | BODY MASS INDEX: 27.86 KG/M2

## 2021-04-21 LAB
GLUCOSE SERPL-MCNC: 85 MG/DL (ref 60–140)
HDLC SERPL-MCNC: 51 MG/DL
POC CHOLESTEROL, LDL (DOCK): 138 MG/DL
POC CHOLESTEROL, TOTAL: 209 MG/DL
TRIGL SERPL-MCNC: 103 MG/DL

## 2021-05-11 DIAGNOSIS — R51.9 NONINTRACTABLE HEADACHE, UNSPECIFIED CHRONICITY PATTERN, UNSPECIFIED HEADACHE TYPE: ICD-10-CM

## 2021-05-11 DIAGNOSIS — F51.01 PRIMARY INSOMNIA: ICD-10-CM

## 2021-05-11 RX ORDER — DIPHENOXYLATE HYDROCHLORIDE AND ATROPINE SULFATE 2.5; .025 MG/1; MG/1
1 TABLET ORAL 4 TIMES DAILY PRN
Qty: 360 TABLET | Refills: 0 | Status: CANCELLED | OUTPATIENT
Start: 2021-05-11 | End: 2021-08-09

## 2021-05-11 RX ORDER — BUTALBITAL, ACETAMINOPHEN AND CAFFEINE 50; 325; 40 MG/1; MG/1; MG/1
1 TABLET ORAL EVERY 6 HOURS PRN
Qty: 30 TABLET | Refills: 0 | Status: SHIPPED | OUTPATIENT
Start: 2021-05-11 | End: 2021-06-07 | Stop reason: SDUPTHER

## 2021-05-11 RX ORDER — ESZOPICLONE 3 MG/1
3 TABLET, FILM COATED ORAL NIGHTLY PRN
Qty: 30 TABLET | Refills: 0 | Status: SHIPPED | OUTPATIENT
Start: 2021-05-11 | End: 2021-06-07 | Stop reason: SDUPTHER

## 2021-05-12 ENCOUNTER — TELEPHONE (OUTPATIENT)
Dept: GASTROENTEROLOGY | Facility: CLINIC | Age: 36
End: 2021-05-12

## 2021-05-12 RX ORDER — DIPHENOXYLATE HYDROCHLORIDE AND ATROPINE SULFATE 2.5; .025 MG/1; MG/1
1 TABLET ORAL 4 TIMES DAILY PRN
Qty: 360 TABLET | Refills: 0 | OUTPATIENT
Start: 2021-05-12 | End: 2021-09-13 | Stop reason: SDUPTHER

## 2021-05-12 RX ORDER — DIPHENOXYLATE HYDROCHLORIDE AND ATROPINE SULFATE 2.5; .025 MG/1; MG/1
1 TABLET ORAL 4 TIMES DAILY PRN
Qty: 360 TABLET | Refills: 0 | Status: SHIPPED | OUTPATIENT
Start: 2021-05-12 | End: 2021-08-20 | Stop reason: SDUPTHER

## 2021-06-02 RX ORDER — TIZANIDINE 4 MG/1
4 TABLET ORAL 3 TIMES DAILY PRN
Qty: 90 TABLET | Refills: 1 | Status: CANCELLED | OUTPATIENT
Start: 2021-06-02 | End: 2021-07-02

## 2021-06-04 ENCOUNTER — OFFICE VISIT (OUTPATIENT)
Dept: PSYCHIATRY | Facility: CLINIC | Age: 36
End: 2021-06-04
Payer: COMMERCIAL

## 2021-06-04 VITALS
HEART RATE: 87 BPM | DIASTOLIC BLOOD PRESSURE: 95 MMHG | SYSTOLIC BLOOD PRESSURE: 129 MMHG | WEIGHT: 186.94 LBS | BODY MASS INDEX: 28.43 KG/M2

## 2021-06-04 DIAGNOSIS — F90.2 ATTENTION DEFICIT HYPERACTIVITY DISORDER (ADHD), COMBINED TYPE: Primary | ICD-10-CM

## 2021-06-04 DIAGNOSIS — K58.0 IRRITABLE BOWEL SYNDROME WITH DIARRHEA: ICD-10-CM

## 2021-06-04 DIAGNOSIS — F41.9 ANXIETY: ICD-10-CM

## 2021-06-04 PROCEDURE — 99213 OFFICE O/P EST LOW 20 MIN: CPT | Mod: S$GLB,,, | Performed by: STUDENT IN AN ORGANIZED HEALTH CARE EDUCATION/TRAINING PROGRAM

## 2021-06-04 PROCEDURE — 99999 PR PBB SHADOW E&M-EST. PATIENT-LVL IV: CPT | Mod: PBBFAC,,, | Performed by: STUDENT IN AN ORGANIZED HEALTH CARE EDUCATION/TRAINING PROGRAM

## 2021-06-04 PROCEDURE — 99999 PR PBB SHADOW E&M-EST. PATIENT-LVL IV: ICD-10-PCS | Mod: PBBFAC,,, | Performed by: STUDENT IN AN ORGANIZED HEALTH CARE EDUCATION/TRAINING PROGRAM

## 2021-06-04 PROCEDURE — 99213 PR OFFICE/OUTPT VISIT, EST, LEVL III, 20-29 MIN: ICD-10-PCS | Mod: S$GLB,,, | Performed by: STUDENT IN AN ORGANIZED HEALTH CARE EDUCATION/TRAINING PROGRAM

## 2021-06-04 RX ORDER — LISDEXAMFETAMINE DIMESYLATE 40 MG/1
40 CAPSULE ORAL DAILY
Qty: 30 CAPSULE | Refills: 0 | Status: SHIPPED | OUTPATIENT
Start: 2021-07-02 | End: 2021-07-01

## 2021-06-04 RX ORDER — AMITRIPTYLINE HYDROCHLORIDE 25 MG/1
25 TABLET, FILM COATED ORAL NIGHTLY
Qty: 30 TABLET | Refills: 3 | Status: SHIPPED | OUTPATIENT
Start: 2021-06-04 | End: 2021-08-20 | Stop reason: SDUPTHER

## 2021-06-04 RX ORDER — LISDEXAMFETAMINE DIMESYLATE 40 MG/1
40 CAPSULE ORAL DAILY
Qty: 30 CAPSULE | Refills: 0 | Status: SHIPPED | OUTPATIENT
Start: 2021-07-30 | End: 2021-07-01

## 2021-06-04 RX ORDER — LISDEXAMFETAMINE DIMESYLATE 40 MG/1
40 CAPSULE ORAL DAILY
Qty: 30 CAPSULE | Refills: 0 | Status: SHIPPED | OUTPATIENT
Start: 2021-06-04 | End: 2021-07-01

## 2021-06-07 DIAGNOSIS — F51.01 PRIMARY INSOMNIA: ICD-10-CM

## 2021-06-07 DIAGNOSIS — R51.9 NONINTRACTABLE HEADACHE, UNSPECIFIED CHRONICITY PATTERN, UNSPECIFIED HEADACHE TYPE: ICD-10-CM

## 2021-06-07 RX ORDER — ESZOPICLONE 3 MG/1
3 TABLET, FILM COATED ORAL NIGHTLY PRN
Qty: 30 TABLET | Refills: 0 | Status: SHIPPED | OUTPATIENT
Start: 2021-06-07 | End: 2021-07-09 | Stop reason: SDUPTHER

## 2021-06-07 RX ORDER — BUTALBITAL, ACETAMINOPHEN AND CAFFEINE 50; 325; 40 MG/1; MG/1; MG/1
1 TABLET ORAL EVERY 6 HOURS PRN
Qty: 30 TABLET | Refills: 0 | Status: SHIPPED | OUTPATIENT
Start: 2021-06-07 | End: 2021-07-09 | Stop reason: SDUPTHER

## 2021-06-16 RX ORDER — TIZANIDINE 4 MG/1
4 TABLET ORAL 3 TIMES DAILY PRN
Qty: 90 TABLET | Refills: 1 | Status: CANCELLED | OUTPATIENT
Start: 2021-06-03 | End: 2021-07-03

## 2021-06-17 ENCOUNTER — OFFICE VISIT (OUTPATIENT)
Dept: OPTOMETRY | Facility: CLINIC | Age: 36
End: 2021-06-17
Payer: COMMERCIAL

## 2021-06-17 DIAGNOSIS — H52.13 MYOPIA OF BOTH EYES: Primary | ICD-10-CM

## 2021-06-17 DIAGNOSIS — I10 ESSENTIAL (PRIMARY) HYPERTENSION: ICD-10-CM

## 2021-06-17 DIAGNOSIS — Z98.890 HX OF LASIK: ICD-10-CM

## 2021-06-17 PROCEDURE — 99999 PR PBB SHADOW E&M-EST. PATIENT-LVL II: ICD-10-PCS | Mod: PBBFAC,,, | Performed by: OPTOMETRIST

## 2021-06-17 PROCEDURE — 92015 PR REFRACTION: ICD-10-PCS | Mod: S$GLB,,, | Performed by: OPTOMETRIST

## 2021-06-17 PROCEDURE — 92014 COMPRE OPH EXAM EST PT 1/>: CPT | Mod: S$GLB,,, | Performed by: OPTOMETRIST

## 2021-06-17 PROCEDURE — 92015 DETERMINE REFRACTIVE STATE: CPT | Mod: S$GLB,,, | Performed by: OPTOMETRIST

## 2021-06-17 PROCEDURE — 99999 PR PBB SHADOW E&M-EST. PATIENT-LVL II: CPT | Mod: PBBFAC,,, | Performed by: OPTOMETRIST

## 2021-06-17 PROCEDURE — 92014 PR EYE EXAM, EST PATIENT,COMPREHESV: ICD-10-PCS | Mod: S$GLB,,, | Performed by: OPTOMETRIST

## 2021-06-22 DIAGNOSIS — B37.31 RECURRENT CANDIDIASIS OF VAGINA: ICD-10-CM

## 2021-06-22 RX ORDER — FLUCONAZOLE 150 MG/1
TABLET ORAL
Qty: 20 TABLET | Refills: 3 | Status: SHIPPED | OUTPATIENT
Start: 2021-06-22 | End: 2021-11-03

## 2021-06-30 ENCOUNTER — PATIENT MESSAGE (OUTPATIENT)
Dept: PSYCHIATRY | Facility: CLINIC | Age: 36
End: 2021-06-30

## 2021-07-01 ENCOUNTER — OFFICE VISIT (OUTPATIENT)
Dept: PSYCHIATRY | Facility: CLINIC | Age: 36
End: 2021-07-01
Payer: COMMERCIAL

## 2021-07-01 DIAGNOSIS — F41.1 GAD (GENERALIZED ANXIETY DISORDER): ICD-10-CM

## 2021-07-01 DIAGNOSIS — F90.0 ATTENTION DEFICIT HYPERACTIVITY DISORDER (ADHD), PREDOMINANTLY INATTENTIVE TYPE: Primary | ICD-10-CM

## 2021-07-01 DIAGNOSIS — F32.81 PMDD (PREMENSTRUAL DYSPHORIC DISORDER): ICD-10-CM

## 2021-07-01 PROCEDURE — 99999 PR PBB SHADOW E&M-EST. PATIENT-LVL I: CPT | Mod: PBBFAC,,, | Performed by: STUDENT IN AN ORGANIZED HEALTH CARE EDUCATION/TRAINING PROGRAM

## 2021-07-01 PROCEDURE — 99214 PR OFFICE/OUTPT VISIT, EST, LEVL IV, 30-39 MIN: ICD-10-PCS | Mod: S$GLB,,, | Performed by: STUDENT IN AN ORGANIZED HEALTH CARE EDUCATION/TRAINING PROGRAM

## 2021-07-01 PROCEDURE — 99214 OFFICE O/P EST MOD 30 MIN: CPT | Mod: S$GLB,,, | Performed by: STUDENT IN AN ORGANIZED HEALTH CARE EDUCATION/TRAINING PROGRAM

## 2021-07-01 PROCEDURE — 99999 PR PBB SHADOW E&M-EST. PATIENT-LVL I: ICD-10-PCS | Mod: PBBFAC,,, | Performed by: STUDENT IN AN ORGANIZED HEALTH CARE EDUCATION/TRAINING PROGRAM

## 2021-07-01 RX ORDER — LISDEXAMFETAMINE DIMESYLATE 50 MG/1
50 CAPSULE ORAL EVERY MORNING
Qty: 30 CAPSULE | Refills: 0 | Status: SHIPPED | OUTPATIENT
Start: 2021-07-01 | End: 2021-08-20 | Stop reason: SDUPTHER

## 2021-07-01 RX ORDER — VENLAFAXINE HYDROCHLORIDE 37.5 MG/1
37.5 CAPSULE, EXTENDED RELEASE ORAL DAILY
Qty: 30 CAPSULE | Refills: 0 | Status: SHIPPED | OUTPATIENT
Start: 2021-07-01 | End: 2021-08-20 | Stop reason: SDUPTHER

## 2021-07-01 RX ORDER — LISDEXAMFETAMINE DIMESYLATE 50 MG/1
50 CAPSULE ORAL EVERY MORNING
Qty: 30 CAPSULE | Refills: 0 | Status: SHIPPED | OUTPATIENT
Start: 2021-07-28 | End: 2021-08-20 | Stop reason: SDUPTHER

## 2021-07-09 DIAGNOSIS — R51.9 NONINTRACTABLE HEADACHE, UNSPECIFIED CHRONICITY PATTERN, UNSPECIFIED HEADACHE TYPE: ICD-10-CM

## 2021-07-09 DIAGNOSIS — F51.01 PRIMARY INSOMNIA: ICD-10-CM

## 2021-07-09 RX ORDER — BUTALBITAL, ACETAMINOPHEN AND CAFFEINE 50; 325; 40 MG/1; MG/1; MG/1
1 TABLET ORAL EVERY 6 HOURS PRN
Qty: 30 TABLET | Refills: 0 | Status: CANCELLED | OUTPATIENT
Start: 2021-07-09

## 2021-07-09 RX ORDER — BUTALBITAL, ACETAMINOPHEN AND CAFFEINE 50; 325; 40 MG/1; MG/1; MG/1
1 TABLET ORAL EVERY 6 HOURS PRN
Qty: 30 TABLET | Refills: 0 | Status: SHIPPED | OUTPATIENT
Start: 2021-07-09 | End: 2021-08-06 | Stop reason: SDUPTHER

## 2021-07-09 RX ORDER — ESZOPICLONE 3 MG/1
3 TABLET, FILM COATED ORAL NIGHTLY PRN
Qty: 30 TABLET | Refills: 0 | Status: CANCELLED | OUTPATIENT
Start: 2021-07-09

## 2021-07-09 RX ORDER — ESZOPICLONE 3 MG/1
3 TABLET, FILM COATED ORAL NIGHTLY PRN
Qty: 30 TABLET | Refills: 0 | Status: SHIPPED | OUTPATIENT
Start: 2021-07-09 | End: 2021-08-06 | Stop reason: SDUPTHER

## 2021-07-21 ENCOUNTER — PATIENT MESSAGE (OUTPATIENT)
Dept: INTERNAL MEDICINE | Facility: CLINIC | Age: 36
End: 2021-07-21

## 2021-07-21 ENCOUNTER — TELEPHONE (OUTPATIENT)
Dept: INTERNAL MEDICINE | Facility: CLINIC | Age: 36
End: 2021-07-21

## 2021-07-21 DIAGNOSIS — Z00.00 WELL ADULT EXAM: Primary | ICD-10-CM

## 2021-08-06 DIAGNOSIS — F51.01 PRIMARY INSOMNIA: ICD-10-CM

## 2021-08-06 DIAGNOSIS — R51.9 NONINTRACTABLE HEADACHE, UNSPECIFIED CHRONICITY PATTERN, UNSPECIFIED HEADACHE TYPE: ICD-10-CM

## 2021-08-09 RX ORDER — TIZANIDINE 4 MG/1
TABLET ORAL
Qty: 90 TABLET | Refills: 0 | Status: SHIPPED | OUTPATIENT
Start: 2021-08-09 | End: 2021-09-08 | Stop reason: SDUPTHER

## 2021-08-09 RX ORDER — ESZOPICLONE 3 MG/1
3 TABLET, FILM COATED ORAL NIGHTLY PRN
Qty: 30 TABLET | Refills: 0 | Status: SHIPPED | OUTPATIENT
Start: 2021-08-09 | End: 2021-09-08 | Stop reason: SDUPTHER

## 2021-08-09 RX ORDER — BUTALBITAL, ACETAMINOPHEN AND CAFFEINE 50; 325; 40 MG/1; MG/1; MG/1
1 TABLET ORAL EVERY 6 HOURS PRN
Qty: 30 TABLET | Refills: 0 | Status: SHIPPED | OUTPATIENT
Start: 2021-08-09 | End: 2021-09-08 | Stop reason: SDUPTHER

## 2021-08-10 ENCOUNTER — PATIENT MESSAGE (OUTPATIENT)
Dept: INTERNAL MEDICINE | Facility: CLINIC | Age: 36
End: 2021-08-10

## 2021-08-20 ENCOUNTER — OFFICE VISIT (OUTPATIENT)
Dept: PSYCHIATRY | Facility: CLINIC | Age: 36
End: 2021-08-20
Payer: COMMERCIAL

## 2021-08-20 VITALS
DIASTOLIC BLOOD PRESSURE: 86 MMHG | BODY MASS INDEX: 27.96 KG/M2 | SYSTOLIC BLOOD PRESSURE: 127 MMHG | WEIGHT: 183.88 LBS | HEART RATE: 75 BPM

## 2021-08-20 DIAGNOSIS — F41.1 GAD (GENERALIZED ANXIETY DISORDER): ICD-10-CM

## 2021-08-20 DIAGNOSIS — K58.0 IRRITABLE BOWEL SYNDROME WITH DIARRHEA: ICD-10-CM

## 2021-08-20 DIAGNOSIS — F41.9 ANXIETY: ICD-10-CM

## 2021-08-20 DIAGNOSIS — F32.81 PMDD (PREMENSTRUAL DYSPHORIC DISORDER): Primary | ICD-10-CM

## 2021-08-20 DIAGNOSIS — F90.0 ATTENTION DEFICIT HYPERACTIVITY DISORDER (ADHD), PREDOMINANTLY INATTENTIVE TYPE: ICD-10-CM

## 2021-08-20 PROCEDURE — 99999 PR PBB SHADOW E&M-EST. PATIENT-LVL II: CPT | Mod: PBBFAC,,, | Performed by: STUDENT IN AN ORGANIZED HEALTH CARE EDUCATION/TRAINING PROGRAM

## 2021-08-20 PROCEDURE — 99214 PR OFFICE/OUTPT VISIT, EST, LEVL IV, 30-39 MIN: ICD-10-PCS | Mod: S$GLB,,, | Performed by: STUDENT IN AN ORGANIZED HEALTH CARE EDUCATION/TRAINING PROGRAM

## 2021-08-20 PROCEDURE — 99214 OFFICE O/P EST MOD 30 MIN: CPT | Mod: S$GLB,,, | Performed by: STUDENT IN AN ORGANIZED HEALTH CARE EDUCATION/TRAINING PROGRAM

## 2021-08-20 PROCEDURE — 99999 PR PBB SHADOW E&M-EST. PATIENT-LVL II: ICD-10-PCS | Mod: PBBFAC,,, | Performed by: STUDENT IN AN ORGANIZED HEALTH CARE EDUCATION/TRAINING PROGRAM

## 2021-08-20 RX ORDER — VENLAFAXINE HYDROCHLORIDE 37.5 MG/1
37.5 CAPSULE, EXTENDED RELEASE ORAL DAILY
Qty: 30 CAPSULE | Refills: 2 | Status: SHIPPED | OUTPATIENT
Start: 2021-08-20 | End: 2021-11-18 | Stop reason: SDUPTHER

## 2021-08-20 RX ORDER — LISDEXAMFETAMINE DIMESYLATE 50 MG/1
50 CAPSULE ORAL EVERY MORNING
Qty: 30 CAPSULE | Refills: 0 | Status: SHIPPED | OUTPATIENT
Start: 2021-10-20 | End: 2021-11-18 | Stop reason: SDUPTHER

## 2021-08-20 RX ORDER — LISDEXAMFETAMINE DIMESYLATE 50 MG/1
50 CAPSULE ORAL EVERY MORNING
Qty: 30 CAPSULE | Refills: 0 | Status: SHIPPED | OUTPATIENT
Start: 2021-08-20 | End: 2021-09-25

## 2021-08-20 RX ORDER — LISDEXAMFETAMINE DIMESYLATE 50 MG/1
50 CAPSULE ORAL EVERY MORNING
Qty: 30 CAPSULE | Refills: 0 | Status: SHIPPED | OUTPATIENT
Start: 2021-09-20 | End: 2021-10-29

## 2021-08-20 RX ORDER — AMITRIPTYLINE HYDROCHLORIDE 25 MG/1
25 TABLET, FILM COATED ORAL NIGHTLY
Qty: 30 TABLET | Refills: 2 | Status: SHIPPED | OUTPATIENT
Start: 2021-08-20 | End: 2021-11-18 | Stop reason: SDUPTHER

## 2021-09-05 ENCOUNTER — PATIENT MESSAGE (OUTPATIENT)
Dept: PRIMARY CARE CLINIC | Facility: CLINIC | Age: 36
End: 2021-09-05

## 2021-09-08 ENCOUNTER — TELEPHONE (OUTPATIENT)
Dept: INTERNAL MEDICINE | Facility: CLINIC | Age: 36
End: 2021-09-08

## 2021-09-08 DIAGNOSIS — R51.9 NONINTRACTABLE HEADACHE, UNSPECIFIED CHRONICITY PATTERN, UNSPECIFIED HEADACHE TYPE: ICD-10-CM

## 2021-09-08 DIAGNOSIS — F51.01 PRIMARY INSOMNIA: ICD-10-CM

## 2021-09-08 RX ORDER — BUTALBITAL, ACETAMINOPHEN AND CAFFEINE 50; 325; 40 MG/1; MG/1; MG/1
1 TABLET ORAL EVERY 6 HOURS PRN
Qty: 30 TABLET | Refills: 0 | Status: SHIPPED | OUTPATIENT
Start: 2021-09-08 | End: 2021-10-04 | Stop reason: SDUPTHER

## 2021-09-08 RX ORDER — TIZANIDINE 4 MG/1
TABLET ORAL
Qty: 90 TABLET | Refills: 0 | Status: SHIPPED | OUTPATIENT
Start: 2021-09-08 | End: 2021-09-27 | Stop reason: ALTCHOICE

## 2021-09-08 RX ORDER — ESZOPICLONE 3 MG/1
3 TABLET, FILM COATED ORAL NIGHTLY PRN
Qty: 30 TABLET | Refills: 0 | Status: SHIPPED | OUTPATIENT
Start: 2021-09-08 | End: 2021-10-04 | Stop reason: SDUPTHER

## 2021-09-13 RX ORDER — DIPHENOXYLATE HYDROCHLORIDE AND ATROPINE SULFATE 2.5; .025 MG/1; MG/1
1 TABLET ORAL 4 TIMES DAILY PRN
Qty: 360 TABLET | Refills: 0 | Status: SHIPPED | OUTPATIENT
Start: 2021-09-13 | End: 2021-09-16 | Stop reason: SDUPTHER

## 2021-09-17 ENCOUNTER — PATIENT OUTREACH (OUTPATIENT)
Dept: ADMINISTRATIVE | Facility: OTHER | Age: 36
End: 2021-09-17

## 2021-09-19 ENCOUNTER — PATIENT MESSAGE (OUTPATIENT)
Dept: PRIMARY CARE CLINIC | Facility: CLINIC | Age: 36
End: 2021-09-19

## 2021-09-19 ENCOUNTER — OCCUPATIONAL HEALTH (OUTPATIENT)
Dept: URGENT CARE | Facility: CLINIC | Age: 36
End: 2021-09-19

## 2021-09-19 ENCOUNTER — TELEPHONE (OUTPATIENT)
Dept: ADMINISTRATIVE | Facility: HOSPITAL | Age: 36
End: 2021-09-19

## 2021-09-19 DIAGNOSIS — U07.1 COVID-19 VIRUS DETECTED: ICD-10-CM

## 2021-09-19 DIAGNOSIS — R50.9 FEVER, UNSPECIFIED FEVER CAUSE: Primary | ICD-10-CM

## 2021-09-19 DIAGNOSIS — R50.9 FEVER, UNSPECIFIED FEVER CAUSE: ICD-10-CM

## 2021-09-19 LAB
CTP QC/QA: YES
SARS-COV-2 RDRP RESP QL NAA+PROBE: POSITIVE

## 2021-09-19 PROCEDURE — U0002: ICD-10-PCS | Mod: QW,S$GLB,, | Performed by: PREVENTIVE MEDICINE

## 2021-09-19 PROCEDURE — U0002 COVID-19 LAB TEST NON-CDC: HCPCS | Mod: QW,S$GLB,, | Performed by: PREVENTIVE MEDICINE

## 2021-09-20 RX ORDER — DIPHENOXYLATE HYDROCHLORIDE AND ATROPINE SULFATE 2.5; .025 MG/1; MG/1
1 TABLET ORAL 4 TIMES DAILY PRN
Qty: 360 TABLET | Refills: 0 | Status: SHIPPED | OUTPATIENT
Start: 2021-09-20 | End: 2021-11-22 | Stop reason: SDUPTHER

## 2021-09-27 ENCOUNTER — PATIENT MESSAGE (OUTPATIENT)
Dept: PAIN MEDICINE | Facility: CLINIC | Age: 36
End: 2021-09-27

## 2021-09-27 ENCOUNTER — LAB VISIT (OUTPATIENT)
Dept: LAB | Facility: HOSPITAL | Age: 36
End: 2021-09-27
Attending: FAMILY MEDICINE
Payer: COMMERCIAL

## 2021-09-27 ENCOUNTER — OFFICE VISIT (OUTPATIENT)
Dept: PAIN MEDICINE | Facility: CLINIC | Age: 36
End: 2021-09-27
Payer: COMMERCIAL

## 2021-09-27 VITALS — BODY MASS INDEX: 27.96 KG/M2 | WEIGHT: 183.88 LBS

## 2021-09-27 DIAGNOSIS — M54.2 NECK PAIN: ICD-10-CM

## 2021-09-27 DIAGNOSIS — M54.2 CERVICAL PAIN (NECK): ICD-10-CM

## 2021-09-27 DIAGNOSIS — M79.18 MYOFASCIAL MUSCLE PAIN: ICD-10-CM

## 2021-09-27 DIAGNOSIS — Z00.00 WELL ADULT EXAM: ICD-10-CM

## 2021-09-27 DIAGNOSIS — M79.18 MYOFASCIAL PAIN SYNDROME, CERVICAL: Primary | ICD-10-CM

## 2021-09-27 DIAGNOSIS — G89.4 CHRONIC PAIN SYNDROME: ICD-10-CM

## 2021-09-27 DIAGNOSIS — M54.12 CERVICAL RADICULOPATHY: ICD-10-CM

## 2021-09-27 DIAGNOSIS — M62.838 MUSCLE SPASM: ICD-10-CM

## 2021-09-27 LAB
25(OH)D3+25(OH)D2 SERPL-MCNC: 35 NG/ML (ref 30–96)
ALBUMIN SERPL BCP-MCNC: 4.2 G/DL (ref 3.5–5.2)
ALP SERPL-CCNC: 56 U/L (ref 55–135)
ALT SERPL W/O P-5'-P-CCNC: 12 U/L (ref 10–44)
ANION GAP SERPL CALC-SCNC: 8 MMOL/L (ref 8–16)
AST SERPL-CCNC: 12 U/L (ref 10–40)
BASOPHILS # BLD AUTO: 0.04 K/UL (ref 0–0.2)
BASOPHILS NFR BLD: 0.4 % (ref 0–1.9)
BILIRUB SERPL-MCNC: 0.3 MG/DL (ref 0.1–1)
BUN SERPL-MCNC: 7 MG/DL (ref 6–20)
CALCIUM SERPL-MCNC: 9.4 MG/DL (ref 8.7–10.5)
CHLORIDE SERPL-SCNC: 107 MMOL/L (ref 95–110)
CHOLEST SERPL-MCNC: 191 MG/DL (ref 120–199)
CHOLEST/HDLC SERPL: 5 {RATIO} (ref 2–5)
CO2 SERPL-SCNC: 23 MMOL/L (ref 23–29)
CREAT SERPL-MCNC: 0.7 MG/DL (ref 0.5–1.4)
DIFFERENTIAL METHOD: NORMAL
EOSINOPHIL # BLD AUTO: 0.1 K/UL (ref 0–0.5)
EOSINOPHIL NFR BLD: 1.5 % (ref 0–8)
ERYTHROCYTE [DISTWIDTH] IN BLOOD BY AUTOMATED COUNT: 12.8 % (ref 11.5–14.5)
EST. GFR  (AFRICAN AMERICAN): >60 ML/MIN/1.73 M^2
EST. GFR  (NON AFRICAN AMERICAN): >60 ML/MIN/1.73 M^2
ESTIMATED AVG GLUCOSE: 100 MG/DL (ref 68–131)
GLUCOSE SERPL-MCNC: 90 MG/DL (ref 70–110)
HBA1C MFR BLD: 5.1 % (ref 4–5.6)
HCT VFR BLD AUTO: 39.7 % (ref 37–48.5)
HDLC SERPL-MCNC: 38 MG/DL (ref 40–75)
HDLC SERPL: 19.9 % (ref 20–50)
HGB BLD-MCNC: 13.3 G/DL (ref 12–16)
IMM GRANULOCYTES # BLD AUTO: 0.03 K/UL (ref 0–0.04)
IMM GRANULOCYTES NFR BLD AUTO: 0.3 % (ref 0–0.5)
LDLC SERPL CALC-MCNC: 111 MG/DL (ref 63–159)
LYMPHOCYTES # BLD AUTO: 3.4 K/UL (ref 1–4.8)
LYMPHOCYTES NFR BLD: 37.8 % (ref 18–48)
MCH RBC QN AUTO: 28.9 PG (ref 27–31)
MCHC RBC AUTO-ENTMCNC: 33.5 G/DL (ref 32–36)
MCV RBC AUTO: 86 FL (ref 82–98)
MONOCYTES # BLD AUTO: 0.5 K/UL (ref 0.3–1)
MONOCYTES NFR BLD: 5.6 % (ref 4–15)
NEUTROPHILS # BLD AUTO: 4.9 K/UL (ref 1.8–7.7)
NEUTROPHILS NFR BLD: 54.4 % (ref 38–73)
NONHDLC SERPL-MCNC: 153 MG/DL
NRBC BLD-RTO: 0 /100 WBC
PLATELET # BLD AUTO: 331 K/UL (ref 150–450)
PMV BLD AUTO: 10.3 FL (ref 9.2–12.9)
POTASSIUM SERPL-SCNC: 4.3 MMOL/L (ref 3.5–5.1)
PROT SERPL-MCNC: 7.6 G/DL (ref 6–8.4)
RBC # BLD AUTO: 4.61 M/UL (ref 4–5.4)
SODIUM SERPL-SCNC: 138 MMOL/L (ref 136–145)
T4 FREE SERPL-MCNC: 0.81 NG/DL (ref 0.71–1.51)
TRIGL SERPL-MCNC: 210 MG/DL (ref 30–150)
TSH SERPL DL<=0.005 MIU/L-ACNC: 0.7 UIU/ML (ref 0.4–4)
WBC # BLD AUTO: 8.92 K/UL (ref 3.9–12.7)

## 2021-09-27 PROCEDURE — 3044F PR MOST RECENT HEMOGLOBIN A1C LEVEL <7.0%: ICD-10-PCS | Mod: CPTII,S$GLB,, | Performed by: NURSE PRACTITIONER

## 2021-09-27 PROCEDURE — 85025 COMPLETE CBC W/AUTO DIFF WBC: CPT | Performed by: FAMILY MEDICINE

## 2021-09-27 PROCEDURE — 1160F PR REVIEW ALL MEDS BY PRESCRIBER/CLIN PHARMACIST DOCUMENTED: ICD-10-PCS | Mod: CPTII,S$GLB,, | Performed by: NURSE PRACTITIONER

## 2021-09-27 PROCEDURE — 1160F RVW MEDS BY RX/DR IN RCRD: CPT | Mod: CPTII,S$GLB,, | Performed by: NURSE PRACTITIONER

## 2021-09-27 PROCEDURE — 3008F PR BODY MASS INDEX (BMI) DOCUMENTED: ICD-10-PCS | Mod: CPTII,S$GLB,, | Performed by: NURSE PRACTITIONER

## 2021-09-27 PROCEDURE — 99999 PR PBB SHADOW E&M-EST. PATIENT-LVL III: ICD-10-PCS | Mod: PBBFAC,,, | Performed by: NURSE PRACTITIONER

## 2021-09-27 PROCEDURE — 80061 LIPID PANEL: CPT | Performed by: FAMILY MEDICINE

## 2021-09-27 PROCEDURE — 99999 PR PBB SHADOW E&M-EST. PATIENT-LVL III: CPT | Mod: PBBFAC,,, | Performed by: NURSE PRACTITIONER

## 2021-09-27 PROCEDURE — 36415 COLL VENOUS BLD VENIPUNCTURE: CPT | Performed by: FAMILY MEDICINE

## 2021-09-27 PROCEDURE — 83036 HEMOGLOBIN GLYCOSYLATED A1C: CPT | Performed by: FAMILY MEDICINE

## 2021-09-27 PROCEDURE — 4010F ACE/ARB THERAPY RXD/TAKEN: CPT | Mod: CPTII,S$GLB,, | Performed by: NURSE PRACTITIONER

## 2021-09-27 PROCEDURE — 4010F PR ACE/ARB THEARPY RXD/TAKEN: ICD-10-PCS | Mod: CPTII,S$GLB,, | Performed by: NURSE PRACTITIONER

## 2021-09-27 PROCEDURE — 1159F MED LIST DOCD IN RCRD: CPT | Mod: CPTII,S$GLB,, | Performed by: NURSE PRACTITIONER

## 2021-09-27 PROCEDURE — 1159F PR MEDICATION LIST DOCUMENTED IN MEDICAL RECORD: ICD-10-PCS | Mod: CPTII,S$GLB,, | Performed by: NURSE PRACTITIONER

## 2021-09-27 PROCEDURE — 84443 ASSAY THYROID STIM HORMONE: CPT | Performed by: FAMILY MEDICINE

## 2021-09-27 PROCEDURE — 99213 PR OFFICE/OUTPT VISIT, EST, LEVL III, 20-29 MIN: ICD-10-PCS | Mod: S$GLB,,, | Performed by: NURSE PRACTITIONER

## 2021-09-27 PROCEDURE — 99213 OFFICE O/P EST LOW 20 MIN: CPT | Mod: S$GLB,,, | Performed by: NURSE PRACTITIONER

## 2021-09-27 PROCEDURE — 3044F HG A1C LEVEL LT 7.0%: CPT | Mod: CPTII,S$GLB,, | Performed by: NURSE PRACTITIONER

## 2021-09-27 PROCEDURE — 84439 ASSAY OF FREE THYROXINE: CPT | Performed by: FAMILY MEDICINE

## 2021-09-27 PROCEDURE — 82306 VITAMIN D 25 HYDROXY: CPT | Performed by: FAMILY MEDICINE

## 2021-09-27 PROCEDURE — 3008F BODY MASS INDEX DOCD: CPT | Mod: CPTII,S$GLB,, | Performed by: NURSE PRACTITIONER

## 2021-09-27 PROCEDURE — 80053 COMPREHEN METABOLIC PANEL: CPT | Performed by: FAMILY MEDICINE

## 2021-09-27 RX ORDER — BACLOFEN 10 MG/1
10 TABLET ORAL 2 TIMES DAILY PRN
Qty: 60 TABLET | Refills: 0 | Status: SHIPPED | OUTPATIENT
Start: 2021-09-27 | End: 2021-11-10

## 2021-09-29 ENCOUNTER — OFFICE VISIT (OUTPATIENT)
Dept: INTERNAL MEDICINE | Facility: CLINIC | Age: 36
End: 2021-09-29
Payer: COMMERCIAL

## 2021-09-29 VITALS
OXYGEN SATURATION: 99 % | BODY MASS INDEX: 27.83 KG/M2 | WEIGHT: 183.63 LBS | HEART RATE: 89 BPM | SYSTOLIC BLOOD PRESSURE: 122 MMHG | TEMPERATURE: 97 F | HEIGHT: 68 IN | DIASTOLIC BLOOD PRESSURE: 82 MMHG

## 2021-09-29 DIAGNOSIS — K50.90 CROHN'S DISEASE WITHOUT COMPLICATION, UNSPECIFIED GASTROINTESTINAL TRACT LOCATION: ICD-10-CM

## 2021-09-29 DIAGNOSIS — F90.2 ATTENTION DEFICIT HYPERACTIVITY DISORDER (ADHD), COMBINED TYPE: ICD-10-CM

## 2021-09-29 DIAGNOSIS — G89.4 CHRONIC PAIN SYNDROME: ICD-10-CM

## 2021-09-29 DIAGNOSIS — I10 ESSENTIAL (PRIMARY) HYPERTENSION: ICD-10-CM

## 2021-09-29 DIAGNOSIS — M54.12 CERVICAL RADICULOPATHY: ICD-10-CM

## 2021-09-29 DIAGNOSIS — Z00.00 WELL ADULT EXAM: Primary | ICD-10-CM

## 2021-09-29 DIAGNOSIS — R00.0 TACHYCARDIA: ICD-10-CM

## 2021-09-29 DIAGNOSIS — M79.18 MYOFASCIAL MUSCLE PAIN: ICD-10-CM

## 2021-09-29 DIAGNOSIS — F41.9 ANXIETY: ICD-10-CM

## 2021-09-29 DIAGNOSIS — F51.01 PRIMARY INSOMNIA: ICD-10-CM

## 2021-09-29 PROCEDURE — 3074F PR MOST RECENT SYSTOLIC BLOOD PRESSURE < 130 MM HG: ICD-10-PCS | Mod: CPTII,S$GLB,, | Performed by: FAMILY MEDICINE

## 2021-09-29 PROCEDURE — 3079F PR MOST RECENT DIASTOLIC BLOOD PRESSURE 80-89 MM HG: ICD-10-PCS | Mod: CPTII,S$GLB,, | Performed by: FAMILY MEDICINE

## 2021-09-29 PROCEDURE — 3044F PR MOST RECENT HEMOGLOBIN A1C LEVEL <7.0%: ICD-10-PCS | Mod: CPTII,S$GLB,, | Performed by: FAMILY MEDICINE

## 2021-09-29 PROCEDURE — 1159F MED LIST DOCD IN RCRD: CPT | Mod: CPTII,S$GLB,, | Performed by: FAMILY MEDICINE

## 2021-09-29 PROCEDURE — 3074F SYST BP LT 130 MM HG: CPT | Mod: CPTII,S$GLB,, | Performed by: FAMILY MEDICINE

## 2021-09-29 PROCEDURE — 1160F RVW MEDS BY RX/DR IN RCRD: CPT | Mod: CPTII,S$GLB,, | Performed by: FAMILY MEDICINE

## 2021-09-29 PROCEDURE — 1160F PR REVIEW ALL MEDS BY PRESCRIBER/CLIN PHARMACIST DOCUMENTED: ICD-10-PCS | Mod: CPTII,S$GLB,, | Performed by: FAMILY MEDICINE

## 2021-09-29 PROCEDURE — 99999 PR PBB SHADOW E&M-EST. PATIENT-LVL V: ICD-10-PCS | Mod: PBBFAC,,, | Performed by: FAMILY MEDICINE

## 2021-09-29 PROCEDURE — 1159F PR MEDICATION LIST DOCUMENTED IN MEDICAL RECORD: ICD-10-PCS | Mod: CPTII,S$GLB,, | Performed by: FAMILY MEDICINE

## 2021-09-29 PROCEDURE — 3079F DIAST BP 80-89 MM HG: CPT | Mod: CPTII,S$GLB,, | Performed by: FAMILY MEDICINE

## 2021-09-29 PROCEDURE — 3008F BODY MASS INDEX DOCD: CPT | Mod: CPTII,S$GLB,, | Performed by: FAMILY MEDICINE

## 2021-09-29 PROCEDURE — 3008F PR BODY MASS INDEX (BMI) DOCUMENTED: ICD-10-PCS | Mod: CPTII,S$GLB,, | Performed by: FAMILY MEDICINE

## 2021-09-29 PROCEDURE — 4010F PR ACE/ARB THEARPY RXD/TAKEN: ICD-10-PCS | Mod: CPTII,S$GLB,, | Performed by: FAMILY MEDICINE

## 2021-09-29 PROCEDURE — 99395 PR PREVENTIVE VISIT,EST,18-39: ICD-10-PCS | Mod: S$GLB,,, | Performed by: FAMILY MEDICINE

## 2021-09-29 PROCEDURE — 99999 PR PBB SHADOW E&M-EST. PATIENT-LVL V: CPT | Mod: PBBFAC,,, | Performed by: FAMILY MEDICINE

## 2021-09-29 PROCEDURE — 4010F ACE/ARB THERAPY RXD/TAKEN: CPT | Mod: CPTII,S$GLB,, | Performed by: FAMILY MEDICINE

## 2021-09-29 PROCEDURE — 99395 PREV VISIT EST AGE 18-39: CPT | Mod: S$GLB,,, | Performed by: FAMILY MEDICINE

## 2021-09-29 PROCEDURE — 3044F HG A1C LEVEL LT 7.0%: CPT | Mod: CPTII,S$GLB,, | Performed by: FAMILY MEDICINE

## 2021-09-29 RX ORDER — METOPROLOL SUCCINATE 25 MG/1
25 TABLET, EXTENDED RELEASE ORAL 2 TIMES DAILY
Qty: 180 TABLET | Refills: 3 | Status: SHIPPED | OUTPATIENT
Start: 2021-09-29 | End: 2021-11-12 | Stop reason: SDUPTHER

## 2021-10-04 DIAGNOSIS — R51.9 NONINTRACTABLE HEADACHE, UNSPECIFIED CHRONICITY PATTERN, UNSPECIFIED HEADACHE TYPE: ICD-10-CM

## 2021-10-04 DIAGNOSIS — F51.01 PRIMARY INSOMNIA: ICD-10-CM

## 2021-10-04 RX ORDER — ESZOPICLONE 3 MG/1
3 TABLET, FILM COATED ORAL NIGHTLY PRN
Qty: 30 TABLET | Refills: 0 | Status: SHIPPED | OUTPATIENT
Start: 2021-10-04 | End: 2021-11-01 | Stop reason: SDUPTHER

## 2021-10-04 RX ORDER — BUTALBITAL, ACETAMINOPHEN AND CAFFEINE 50; 325; 40 MG/1; MG/1; MG/1
1 TABLET ORAL EVERY 6 HOURS PRN
Qty: 30 TABLET | Refills: 0 | Status: SHIPPED | OUTPATIENT
Start: 2021-10-04 | End: 2021-11-01 | Stop reason: SDUPTHER

## 2021-10-05 ENCOUNTER — OFFICE VISIT (OUTPATIENT)
Dept: PAIN MEDICINE | Facility: CLINIC | Age: 36
End: 2021-10-05
Payer: COMMERCIAL

## 2021-10-05 VITALS — WEIGHT: 183.63 LBS | BODY MASS INDEX: 27.92 KG/M2

## 2021-10-05 DIAGNOSIS — M54.2 CERVICAL PAIN (NECK): ICD-10-CM

## 2021-10-05 DIAGNOSIS — M79.18 MYOFASCIAL PAIN SYNDROME, CERVICAL: Primary | ICD-10-CM

## 2021-10-05 PROCEDURE — 4010F PR ACE/ARB THEARPY RXD/TAKEN: ICD-10-PCS | Mod: CPTII,S$GLB,, | Performed by: NURSE PRACTITIONER

## 2021-10-05 PROCEDURE — 99214 PR OFFICE/OUTPT VISIT, EST, LEVL IV, 30-39 MIN: ICD-10-PCS | Mod: 25,S$GLB,, | Performed by: NURSE PRACTITIONER

## 2021-10-05 PROCEDURE — 99999 PR PBB SHADOW E&M-EST. PATIENT-LVL III: ICD-10-PCS | Mod: PBBFAC,,, | Performed by: NURSE PRACTITIONER

## 2021-10-05 PROCEDURE — 3008F BODY MASS INDEX DOCD: CPT | Mod: CPTII,S$GLB,, | Performed by: NURSE PRACTITIONER

## 2021-10-05 PROCEDURE — 1159F PR MEDICATION LIST DOCUMENTED IN MEDICAL RECORD: ICD-10-PCS | Mod: CPTII,S$GLB,, | Performed by: NURSE PRACTITIONER

## 2021-10-05 PROCEDURE — 99214 OFFICE O/P EST MOD 30 MIN: CPT | Mod: 25,S$GLB,, | Performed by: NURSE PRACTITIONER

## 2021-10-05 PROCEDURE — 1159F MED LIST DOCD IN RCRD: CPT | Mod: CPTII,S$GLB,, | Performed by: NURSE PRACTITIONER

## 2021-10-05 PROCEDURE — 4010F ACE/ARB THERAPY RXD/TAKEN: CPT | Mod: CPTII,S$GLB,, | Performed by: NURSE PRACTITIONER

## 2021-10-05 PROCEDURE — 1160F RVW MEDS BY RX/DR IN RCRD: CPT | Mod: CPTII,S$GLB,, | Performed by: NURSE PRACTITIONER

## 2021-10-05 PROCEDURE — 3044F PR MOST RECENT HEMOGLOBIN A1C LEVEL <7.0%: ICD-10-PCS | Mod: CPTII,S$GLB,, | Performed by: NURSE PRACTITIONER

## 2021-10-05 PROCEDURE — 20552 TRIGGER POINT INJECTION: ICD-10-PCS | Mod: S$GLB,,, | Performed by: NURSE PRACTITIONER

## 2021-10-05 PROCEDURE — 3044F HG A1C LEVEL LT 7.0%: CPT | Mod: CPTII,S$GLB,, | Performed by: NURSE PRACTITIONER

## 2021-10-05 PROCEDURE — 3008F PR BODY MASS INDEX (BMI) DOCUMENTED: ICD-10-PCS | Mod: CPTII,S$GLB,, | Performed by: NURSE PRACTITIONER

## 2021-10-05 PROCEDURE — 1160F PR REVIEW ALL MEDS BY PRESCRIBER/CLIN PHARMACIST DOCUMENTED: ICD-10-PCS | Mod: CPTII,S$GLB,, | Performed by: NURSE PRACTITIONER

## 2021-10-05 PROCEDURE — 99999 PR PBB SHADOW E&M-EST. PATIENT-LVL III: CPT | Mod: PBBFAC,,, | Performed by: NURSE PRACTITIONER

## 2021-10-05 PROCEDURE — 20552 NJX 1/MLT TRIGGER POINT 1/2: CPT | Mod: S$GLB,,, | Performed by: NURSE PRACTITIONER

## 2021-11-01 DIAGNOSIS — R51.9 NONINTRACTABLE HEADACHE, UNSPECIFIED CHRONICITY PATTERN, UNSPECIFIED HEADACHE TYPE: ICD-10-CM

## 2021-11-01 DIAGNOSIS — F51.01 PRIMARY INSOMNIA: ICD-10-CM

## 2021-11-01 RX ORDER — BUTALBITAL, ACETAMINOPHEN AND CAFFEINE 50; 325; 40 MG/1; MG/1; MG/1
1 TABLET ORAL EVERY 6 HOURS PRN
Qty: 30 TABLET | Refills: 0 | Status: SHIPPED | OUTPATIENT
Start: 2021-11-01 | End: 2021-11-29 | Stop reason: SDUPTHER

## 2021-11-01 RX ORDER — ESZOPICLONE 3 MG/1
3 TABLET, FILM COATED ORAL NIGHTLY PRN
Qty: 30 TABLET | Refills: 0 | Status: SHIPPED | OUTPATIENT
Start: 2021-11-01 | End: 2021-11-29 | Stop reason: SDUPTHER

## 2021-11-03 ENCOUNTER — OFFICE VISIT (OUTPATIENT)
Dept: OBSTETRICS AND GYNECOLOGY | Facility: CLINIC | Age: 36
End: 2021-11-03
Attending: OBSTETRICS & GYNECOLOGY
Payer: COMMERCIAL

## 2021-11-03 VITALS
WEIGHT: 180.75 LBS | SYSTOLIC BLOOD PRESSURE: 122 MMHG | DIASTOLIC BLOOD PRESSURE: 80 MMHG | HEIGHT: 68 IN | BODY MASS INDEX: 27.39 KG/M2

## 2021-11-03 DIAGNOSIS — Z12.31 VISIT FOR SCREENING MAMMOGRAM: ICD-10-CM

## 2021-11-03 DIAGNOSIS — Z12.4 SCREENING FOR MALIGNANT NEOPLASM OF CERVIX: Primary | ICD-10-CM

## 2021-11-03 DIAGNOSIS — Z11.51 SCREENING FOR HUMAN PAPILLOMAVIRUS: ICD-10-CM

## 2021-11-03 DIAGNOSIS — B37.31 RECURRENT CANDIDIASIS OF VAGINA: ICD-10-CM

## 2021-11-03 DIAGNOSIS — Z01.419 ENCOUNTER FOR GYNECOLOGICAL EXAMINATION: ICD-10-CM

## 2021-11-03 PROCEDURE — 87624 HPV HI-RISK TYP POOLED RSLT: CPT | Performed by: OBSTETRICS & GYNECOLOGY

## 2021-11-03 PROCEDURE — 3008F PR BODY MASS INDEX (BMI) DOCUMENTED: ICD-10-PCS | Mod: CPTII,S$GLB,, | Performed by: OBSTETRICS & GYNECOLOGY

## 2021-11-03 PROCEDURE — 4010F ACE/ARB THERAPY RXD/TAKEN: CPT | Mod: CPTII,S$GLB,, | Performed by: OBSTETRICS & GYNECOLOGY

## 2021-11-03 PROCEDURE — 3074F PR MOST RECENT SYSTOLIC BLOOD PRESSURE < 130 MM HG: ICD-10-PCS | Mod: CPTII,S$GLB,, | Performed by: OBSTETRICS & GYNECOLOGY

## 2021-11-03 PROCEDURE — 3079F DIAST BP 80-89 MM HG: CPT | Mod: CPTII,S$GLB,, | Performed by: OBSTETRICS & GYNECOLOGY

## 2021-11-03 PROCEDURE — 1159F MED LIST DOCD IN RCRD: CPT | Mod: CPTII,S$GLB,, | Performed by: OBSTETRICS & GYNECOLOGY

## 2021-11-03 PROCEDURE — 4010F PR ACE/ARB THEARPY RXD/TAKEN: ICD-10-PCS | Mod: CPTII,S$GLB,, | Performed by: OBSTETRICS & GYNECOLOGY

## 2021-11-03 PROCEDURE — 3079F PR MOST RECENT DIASTOLIC BLOOD PRESSURE 80-89 MM HG: ICD-10-PCS | Mod: CPTII,S$GLB,, | Performed by: OBSTETRICS & GYNECOLOGY

## 2021-11-03 PROCEDURE — 99999 PR PBB SHADOW E&M-EST. PATIENT-LVL III: ICD-10-PCS | Mod: PBBFAC,,, | Performed by: OBSTETRICS & GYNECOLOGY

## 2021-11-03 PROCEDURE — 3044F PR MOST RECENT HEMOGLOBIN A1C LEVEL <7.0%: ICD-10-PCS | Mod: CPTII,S$GLB,, | Performed by: OBSTETRICS & GYNECOLOGY

## 2021-11-03 PROCEDURE — 99395 PR PREVENTIVE VISIT,EST,18-39: ICD-10-PCS | Mod: S$GLB,,, | Performed by: OBSTETRICS & GYNECOLOGY

## 2021-11-03 PROCEDURE — 99395 PREV VISIT EST AGE 18-39: CPT | Mod: S$GLB,,, | Performed by: OBSTETRICS & GYNECOLOGY

## 2021-11-03 PROCEDURE — 3074F SYST BP LT 130 MM HG: CPT | Mod: CPTII,S$GLB,, | Performed by: OBSTETRICS & GYNECOLOGY

## 2021-11-03 PROCEDURE — 3008F BODY MASS INDEX DOCD: CPT | Mod: CPTII,S$GLB,, | Performed by: OBSTETRICS & GYNECOLOGY

## 2021-11-03 PROCEDURE — 99999 PR PBB SHADOW E&M-EST. PATIENT-LVL III: CPT | Mod: PBBFAC,,, | Performed by: OBSTETRICS & GYNECOLOGY

## 2021-11-03 PROCEDURE — 3044F HG A1C LEVEL LT 7.0%: CPT | Mod: CPTII,S$GLB,, | Performed by: OBSTETRICS & GYNECOLOGY

## 2021-11-03 PROCEDURE — 1159F PR MEDICATION LIST DOCUMENTED IN MEDICAL RECORD: ICD-10-PCS | Mod: CPTII,S$GLB,, | Performed by: OBSTETRICS & GYNECOLOGY

## 2021-11-03 RX ORDER — FLUCONAZOLE 150 MG/1
TABLET ORAL
Qty: 30 TABLET | Refills: 1 | Status: SHIPPED | OUTPATIENT
Start: 2021-11-03 | End: 2022-08-30 | Stop reason: SDUPTHER

## 2021-11-03 RX ORDER — LOPERAMIDE HYDROCHLORIDE 2 MG/1
2 CAPSULE ORAL 4 TIMES DAILY PRN
COMMUNITY

## 2021-11-08 ENCOUNTER — PATIENT MESSAGE (OUTPATIENT)
Dept: PAIN MEDICINE | Facility: CLINIC | Age: 36
End: 2021-11-08
Payer: COMMERCIAL

## 2021-11-10 ENCOUNTER — PATIENT MESSAGE (OUTPATIENT)
Dept: PSYCHIATRY | Facility: CLINIC | Age: 36
End: 2021-11-10
Payer: COMMERCIAL

## 2021-11-10 DIAGNOSIS — M62.838 MUSCLE SPASM: ICD-10-CM

## 2021-11-10 DIAGNOSIS — M79.18 MYOFASCIAL PAIN SYNDROME, CERVICAL: Primary | ICD-10-CM

## 2021-11-10 LAB
CYTOLOGIST CVX/VAG CYTO: NORMAL
CYTOLOGY CVX/VAG DOC CYTO: NORMAL
CYTOLOGY CVX/VAG DOC THIN PREP: NORMAL
CYTOLOGY THINPREP PAP COMMENT: NORMAL
HPV HR 12 DNA CVX QL NAA+PROBE: NEGATIVE
HPV16 DNA CVX QL NAA+PROBE: NEGATIVE
HPV18 DNA CVX QL NAA+PROBE: NEGATIVE
PAP NOTE: NORMAL
STAT OF ADQ CVX/VAG CYTO-IMP: NORMAL

## 2021-11-10 RX ORDER — TIZANIDINE 4 MG/1
4 TABLET ORAL NIGHTLY PRN
Qty: 30 TABLET | Refills: 1 | Status: SHIPPED | OUTPATIENT
Start: 2021-11-10 | End: 2021-11-15 | Stop reason: SDUPTHER

## 2021-11-15 DIAGNOSIS — M79.18 MYOFASCIAL PAIN SYNDROME, CERVICAL: ICD-10-CM

## 2021-11-15 DIAGNOSIS — M62.838 MUSCLE SPASM: ICD-10-CM

## 2021-11-15 RX ORDER — TIZANIDINE 4 MG/1
4 TABLET ORAL 3 TIMES DAILY PRN
Qty: 90 TABLET | Refills: 1 | Status: SHIPPED | OUTPATIENT
Start: 2021-11-15 | End: 2022-01-18 | Stop reason: SDUPTHER

## 2021-11-18 ENCOUNTER — PATIENT MESSAGE (OUTPATIENT)
Dept: PAIN MEDICINE | Facility: CLINIC | Age: 36
End: 2021-11-18
Payer: COMMERCIAL

## 2021-11-18 ENCOUNTER — OFFICE VISIT (OUTPATIENT)
Dept: PSYCHIATRY | Facility: CLINIC | Age: 36
End: 2021-11-18
Payer: COMMERCIAL

## 2021-11-18 DIAGNOSIS — F41.1 GAD (GENERALIZED ANXIETY DISORDER): ICD-10-CM

## 2021-11-18 DIAGNOSIS — F90.0 ATTENTION DEFICIT HYPERACTIVITY DISORDER (ADHD), PREDOMINANTLY INATTENTIVE TYPE: ICD-10-CM

## 2021-11-18 DIAGNOSIS — F41.9 ANXIETY: ICD-10-CM

## 2021-11-18 DIAGNOSIS — K58.0 IRRITABLE BOWEL SYNDROME WITH DIARRHEA: ICD-10-CM

## 2021-11-18 DIAGNOSIS — F32.81 PMDD (PREMENSTRUAL DYSPHORIC DISORDER): Primary | ICD-10-CM

## 2021-11-18 PROCEDURE — 99214 OFFICE O/P EST MOD 30 MIN: CPT | Mod: 95,,, | Performed by: STUDENT IN AN ORGANIZED HEALTH CARE EDUCATION/TRAINING PROGRAM

## 2021-11-18 PROCEDURE — 99214 PR OFFICE/OUTPT VISIT, EST, LEVL IV, 30-39 MIN: ICD-10-PCS | Mod: 95,,, | Performed by: STUDENT IN AN ORGANIZED HEALTH CARE EDUCATION/TRAINING PROGRAM

## 2021-11-18 RX ORDER — LISDEXAMFETAMINE DIMESYLATE 50 MG/1
50 CAPSULE ORAL EVERY MORNING
Qty: 30 CAPSULE | Refills: 0 | Status: SHIPPED | OUTPATIENT
Start: 2022-01-18 | End: 2022-02-11

## 2021-11-18 RX ORDER — AMITRIPTYLINE HYDROCHLORIDE 25 MG/1
25 TABLET, FILM COATED ORAL NIGHTLY
Qty: 30 TABLET | Refills: 3 | Status: SHIPPED | OUTPATIENT
Start: 2021-11-18 | End: 2022-02-11 | Stop reason: SDUPTHER

## 2021-11-18 RX ORDER — LISDEXAMFETAMINE DIMESYLATE 50 MG/1
50 CAPSULE ORAL EVERY MORNING
Qty: 30 CAPSULE | Refills: 0 | Status: SHIPPED | OUTPATIENT
Start: 2021-11-18 | End: 2021-12-22

## 2021-11-18 RX ORDER — LISDEXAMFETAMINE DIMESYLATE 50 MG/1
50 CAPSULE ORAL EVERY MORNING
Qty: 30 CAPSULE | Refills: 0 | Status: SHIPPED | OUTPATIENT
Start: 2021-12-18 | End: 2022-01-22

## 2021-11-18 RX ORDER — VENLAFAXINE HYDROCHLORIDE 37.5 MG/1
37.5 CAPSULE, EXTENDED RELEASE ORAL DAILY
Qty: 30 CAPSULE | Refills: 3 | Status: SHIPPED | OUTPATIENT
Start: 2021-11-18 | End: 2022-02-11 | Stop reason: SDUPTHER

## 2021-11-22 ENCOUNTER — PATIENT MESSAGE (OUTPATIENT)
Dept: PAIN MEDICINE | Facility: CLINIC | Age: 36
End: 2021-11-22
Payer: COMMERCIAL

## 2021-11-22 RX ORDER — DIPHENOXYLATE HYDROCHLORIDE AND ATROPINE SULFATE 2.5; .025 MG/1; MG/1
1 TABLET ORAL 4 TIMES DAILY PRN
Qty: 360 TABLET | Refills: 0 | Status: SHIPPED | OUTPATIENT
Start: 2021-11-22 | End: 2021-11-29 | Stop reason: SDUPTHER

## 2021-11-24 ENCOUNTER — TELEPHONE (OUTPATIENT)
Dept: PAIN MEDICINE | Facility: CLINIC | Age: 36
End: 2021-11-24
Payer: COMMERCIAL

## 2021-11-24 ENCOUNTER — OFFICE VISIT (OUTPATIENT)
Dept: PAIN MEDICINE | Facility: CLINIC | Age: 36
End: 2021-11-24
Payer: COMMERCIAL

## 2021-11-24 VITALS — SYSTOLIC BLOOD PRESSURE: 133 MMHG | DIASTOLIC BLOOD PRESSURE: 89 MMHG | HEART RATE: 71 BPM

## 2021-11-24 DIAGNOSIS — M54.12 CERVICAL RADICULOPATHY: Primary | ICD-10-CM

## 2021-11-24 DIAGNOSIS — M50.30 DDD (DEGENERATIVE DISC DISEASE), CERVICAL: Primary | ICD-10-CM

## 2021-11-24 DIAGNOSIS — M54.2 CERVICAL PAIN (NECK): ICD-10-CM

## 2021-11-24 DIAGNOSIS — M54.12 CERVICAL RADICULOPATHY: ICD-10-CM

## 2021-11-24 PROCEDURE — 99999 PR PBB SHADOW E&M-EST. PATIENT-LVL III: ICD-10-PCS | Mod: PBBFAC,,, | Performed by: PAIN MEDICINE

## 2021-11-24 PROCEDURE — 4010F PR ACE/ARB THEARPY RXD/TAKEN: ICD-10-PCS | Mod: CPTII,S$GLB,, | Performed by: PAIN MEDICINE

## 2021-11-24 PROCEDURE — 99999 PR PBB SHADOW E&M-EST. PATIENT-LVL III: CPT | Mod: PBBFAC,,, | Performed by: PAIN MEDICINE

## 2021-11-24 PROCEDURE — 99214 OFFICE O/P EST MOD 30 MIN: CPT | Mod: S$GLB,,, | Performed by: PAIN MEDICINE

## 2021-11-24 PROCEDURE — 4010F ACE/ARB THERAPY RXD/TAKEN: CPT | Mod: CPTII,S$GLB,, | Performed by: PAIN MEDICINE

## 2021-11-24 PROCEDURE — 99214 PR OFFICE/OUTPT VISIT, EST, LEVL IV, 30-39 MIN: ICD-10-PCS | Mod: S$GLB,,, | Performed by: PAIN MEDICINE

## 2021-11-24 RX ORDER — PREGABALIN 50 MG/1
50 CAPSULE ORAL 2 TIMES DAILY
Qty: 60 CAPSULE | Refills: 2 | Status: SHIPPED | OUTPATIENT
Start: 2021-11-24 | End: 2022-03-14 | Stop reason: SDUPTHER

## 2021-11-24 RX ORDER — TRAMADOL HYDROCHLORIDE 50 MG/1
50 TABLET ORAL EVERY 8 HOURS PRN
Qty: 21 TABLET | Refills: 0 | Status: SHIPPED | OUTPATIENT
Start: 2021-11-24 | End: 2021-12-01

## 2021-11-29 ENCOUNTER — PATIENT MESSAGE (OUTPATIENT)
Dept: GASTROENTEROLOGY | Facility: CLINIC | Age: 36
End: 2021-11-29
Payer: COMMERCIAL

## 2021-11-29 DIAGNOSIS — R51.9 NONINTRACTABLE HEADACHE, UNSPECIFIED CHRONICITY PATTERN, UNSPECIFIED HEADACHE TYPE: ICD-10-CM

## 2021-11-29 DIAGNOSIS — F51.01 PRIMARY INSOMNIA: ICD-10-CM

## 2021-11-29 RX ORDER — BUTALBITAL, ACETAMINOPHEN AND CAFFEINE 50; 325; 40 MG/1; MG/1; MG/1
1 TABLET ORAL EVERY 6 HOURS PRN
Qty: 30 TABLET | Refills: 0 | Status: SHIPPED | OUTPATIENT
Start: 2021-11-29 | End: 2021-12-27 | Stop reason: SDUPTHER

## 2021-11-29 RX ORDER — ESZOPICLONE 3 MG/1
3 TABLET, FILM COATED ORAL NIGHTLY PRN
Qty: 30 TABLET | Refills: 0 | Status: SHIPPED | OUTPATIENT
Start: 2021-11-29 | End: 2021-12-27 | Stop reason: SDUPTHER

## 2021-12-01 ENCOUNTER — TELEPHONE (OUTPATIENT)
Dept: PAIN MEDICINE | Facility: CLINIC | Age: 36
End: 2021-12-01
Payer: COMMERCIAL

## 2021-12-02 ENCOUNTER — HOSPITAL ENCOUNTER (OUTPATIENT)
Facility: HOSPITAL | Age: 36
Discharge: HOME | End: 2021-12-02
Attending: PAIN MEDICINE | Admitting: PAIN MEDICINE
Payer: COMMERCIAL

## 2021-12-02 VITALS
SYSTOLIC BLOOD PRESSURE: 160 MMHG | BODY MASS INDEX: 27.74 KG/M2 | TEMPERATURE: 97 F | HEART RATE: 80 BPM | DIASTOLIC BLOOD PRESSURE: 93 MMHG | WEIGHT: 183 LBS | HEIGHT: 68 IN | RESPIRATION RATE: 17 BRPM | OXYGEN SATURATION: 100 %

## 2021-12-02 DIAGNOSIS — G89.29 CHRONIC PAIN: ICD-10-CM

## 2021-12-02 DIAGNOSIS — M54.12 CERVICAL RADICULOPATHY: Primary | ICD-10-CM

## 2021-12-02 PROCEDURE — 62321 NJX INTERLAMINAR CRV/THRC: CPT | Performed by: PAIN MEDICINE

## 2021-12-02 PROCEDURE — 25500020 PHARM REV CODE 255: Performed by: PAIN MEDICINE

## 2021-12-02 PROCEDURE — 25000003 PHARM REV CODE 250: Performed by: PAIN MEDICINE

## 2021-12-02 PROCEDURE — 62321 PR INJ CERV/THORAC, W/GUIDANCE: ICD-10-PCS | Mod: ,,, | Performed by: PAIN MEDICINE

## 2021-12-02 PROCEDURE — 63600175 PHARM REV CODE 636 W HCPCS: Performed by: PAIN MEDICINE

## 2021-12-02 PROCEDURE — 62321 NJX INTERLAMINAR CRV/THRC: CPT | Mod: ,,, | Performed by: PAIN MEDICINE

## 2021-12-02 RX ORDER — LIDOCAINE HYDROCHLORIDE 10 MG/ML
INJECTION INFILTRATION; PERINEURAL
Status: DISCONTINUED | OUTPATIENT
Start: 2021-12-02 | End: 2021-12-02 | Stop reason: HOSPADM

## 2021-12-02 RX ORDER — DEXAMETHASONE SODIUM PHOSPHATE 10 MG/ML
INJECTION INTRAMUSCULAR; INTRAVENOUS
Status: DISCONTINUED | OUTPATIENT
Start: 2021-12-02 | End: 2021-12-02 | Stop reason: HOSPADM

## 2021-12-02 RX ORDER — SODIUM BICARBONATE 1 MEQ/ML
SYRINGE (ML) INTRAVENOUS
Status: DISCONTINUED | OUTPATIENT
Start: 2021-12-02 | End: 2021-12-02 | Stop reason: HOSPADM

## 2021-12-07 RX ORDER — DIPHENOXYLATE HYDROCHLORIDE AND ATROPINE SULFATE 2.5; .025 MG/1; MG/1
1 TABLET ORAL 4 TIMES DAILY PRN
Qty: 360 TABLET | Refills: 0 | Status: CANCELLED | OUTPATIENT
Start: 2021-12-07 | End: 2022-03-07

## 2021-12-09 RX ORDER — DIPHENOXYLATE HYDROCHLORIDE AND ATROPINE SULFATE 2.5; .025 MG/1; MG/1
1 TABLET ORAL 4 TIMES DAILY PRN
Qty: 360 TABLET | Refills: 0 | Status: SHIPPED | OUTPATIENT
Start: 2021-12-09 | End: 2021-12-13 | Stop reason: SDUPTHER

## 2021-12-29 ENCOUNTER — OFFICE VISIT (OUTPATIENT)
Dept: PAIN MEDICINE | Facility: CLINIC | Age: 36
End: 2021-12-29
Payer: COMMERCIAL

## 2021-12-29 ENCOUNTER — TELEPHONE (OUTPATIENT)
Dept: NEUROSURGERY | Facility: CLINIC | Age: 36
End: 2021-12-29
Payer: COMMERCIAL

## 2021-12-29 VITALS
WEIGHT: 183 LBS | BODY MASS INDEX: 27.82 KG/M2 | SYSTOLIC BLOOD PRESSURE: 148 MMHG | DIASTOLIC BLOOD PRESSURE: 94 MMHG | HEART RATE: 76 BPM

## 2021-12-29 DIAGNOSIS — M50.30 DDD (DEGENERATIVE DISC DISEASE), CERVICAL: ICD-10-CM

## 2021-12-29 DIAGNOSIS — M54.2 CERVICAL PAIN (NECK): ICD-10-CM

## 2021-12-29 DIAGNOSIS — M54.2 CERVICALGIA: ICD-10-CM

## 2021-12-29 DIAGNOSIS — M54.12 CERVICAL RADICULOPATHY: Primary | ICD-10-CM

## 2021-12-29 DIAGNOSIS — G89.4 CHRONIC PAIN SYNDROME: ICD-10-CM

## 2021-12-29 DIAGNOSIS — M54.2 NECK PAIN: ICD-10-CM

## 2021-12-29 PROCEDURE — 3008F BODY MASS INDEX DOCD: CPT | Mod: CPTII,S$GLB,, | Performed by: NURSE PRACTITIONER

## 2021-12-29 PROCEDURE — 99999 PR PBB SHADOW E&M-EST. PATIENT-LVL IV: CPT | Mod: PBBFAC,,, | Performed by: NURSE PRACTITIONER

## 2021-12-29 PROCEDURE — 3044F PR MOST RECENT HEMOGLOBIN A1C LEVEL <7.0%: ICD-10-PCS | Mod: CPTII,S$GLB,, | Performed by: NURSE PRACTITIONER

## 2021-12-29 PROCEDURE — 99213 PR OFFICE/OUTPT VISIT, EST, LEVL III, 20-29 MIN: ICD-10-PCS | Mod: S$GLB,,, | Performed by: NURSE PRACTITIONER

## 2021-12-29 PROCEDURE — 3008F PR BODY MASS INDEX (BMI) DOCUMENTED: ICD-10-PCS | Mod: CPTII,S$GLB,, | Performed by: NURSE PRACTITIONER

## 2021-12-29 PROCEDURE — 3080F PR MOST RECENT DIASTOLIC BLOOD PRESSURE >= 90 MM HG: ICD-10-PCS | Mod: CPTII,S$GLB,, | Performed by: NURSE PRACTITIONER

## 2021-12-29 PROCEDURE — 3077F SYST BP >= 140 MM HG: CPT | Mod: CPTII,S$GLB,, | Performed by: NURSE PRACTITIONER

## 2021-12-29 PROCEDURE — 1160F RVW MEDS BY RX/DR IN RCRD: CPT | Mod: CPTII,S$GLB,, | Performed by: NURSE PRACTITIONER

## 2021-12-29 PROCEDURE — 99213 OFFICE O/P EST LOW 20 MIN: CPT | Mod: S$GLB,,, | Performed by: NURSE PRACTITIONER

## 2021-12-29 PROCEDURE — 3077F PR MOST RECENT SYSTOLIC BLOOD PRESSURE >= 140 MM HG: ICD-10-PCS | Mod: CPTII,S$GLB,, | Performed by: NURSE PRACTITIONER

## 2021-12-29 PROCEDURE — 1160F PR REVIEW ALL MEDS BY PRESCRIBER/CLIN PHARMACIST DOCUMENTED: ICD-10-PCS | Mod: CPTII,S$GLB,, | Performed by: NURSE PRACTITIONER

## 2021-12-29 PROCEDURE — 1159F PR MEDICATION LIST DOCUMENTED IN MEDICAL RECORD: ICD-10-PCS | Mod: CPTII,S$GLB,, | Performed by: NURSE PRACTITIONER

## 2021-12-29 PROCEDURE — 99999 PR PBB SHADOW E&M-EST. PATIENT-LVL IV: ICD-10-PCS | Mod: PBBFAC,,, | Performed by: NURSE PRACTITIONER

## 2021-12-29 PROCEDURE — 3044F HG A1C LEVEL LT 7.0%: CPT | Mod: CPTII,S$GLB,, | Performed by: NURSE PRACTITIONER

## 2021-12-29 PROCEDURE — 4010F PR ACE/ARB THEARPY RXD/TAKEN: ICD-10-PCS | Mod: CPTII,S$GLB,, | Performed by: NURSE PRACTITIONER

## 2021-12-29 PROCEDURE — 4010F ACE/ARB THERAPY RXD/TAKEN: CPT | Mod: CPTII,S$GLB,, | Performed by: NURSE PRACTITIONER

## 2021-12-29 PROCEDURE — 3080F DIAST BP >= 90 MM HG: CPT | Mod: CPTII,S$GLB,, | Performed by: NURSE PRACTITIONER

## 2021-12-29 PROCEDURE — 1159F MED LIST DOCD IN RCRD: CPT | Mod: CPTII,S$GLB,, | Performed by: NURSE PRACTITIONER

## 2022-01-06 ENCOUNTER — PATIENT MESSAGE (OUTPATIENT)
Dept: PAIN MEDICINE | Facility: CLINIC | Age: 37
End: 2022-01-06
Payer: COMMERCIAL

## 2022-01-12 ENCOUNTER — HOSPITAL ENCOUNTER (OUTPATIENT)
Dept: RADIOLOGY | Facility: HOSPITAL | Age: 37
Discharge: HOME | End: 2022-01-12
Attending: NURSE PRACTITIONER
Payer: COMMERCIAL

## 2022-01-12 DIAGNOSIS — M54.2 CERVICALGIA: ICD-10-CM

## 2022-01-12 DIAGNOSIS — M54.12 CERVICAL RADICULOPATHY: ICD-10-CM

## 2022-01-12 DIAGNOSIS — M50.30 DDD (DEGENERATIVE DISC DISEASE), CERVICAL: ICD-10-CM

## 2022-01-18 ENCOUNTER — PATIENT MESSAGE (OUTPATIENT)
Dept: PSYCHIATRY | Facility: CLINIC | Age: 37
End: 2022-01-18
Payer: COMMERCIAL

## 2022-01-18 DIAGNOSIS — M79.18 MYOFASCIAL PAIN SYNDROME, CERVICAL: ICD-10-CM

## 2022-01-18 DIAGNOSIS — M62.838 MUSCLE SPASM: ICD-10-CM

## 2022-01-19 ENCOUNTER — OFFICE VISIT (OUTPATIENT)
Dept: GASTROENTEROLOGY | Facility: CLINIC | Age: 37
End: 2022-01-19
Payer: COMMERCIAL

## 2022-01-19 VITALS — WEIGHT: 187.19 LBS | HEIGHT: 68 IN | BODY MASS INDEX: 28.37 KG/M2

## 2022-01-19 DIAGNOSIS — K52.9 IBD (INFLAMMATORY BOWEL DISEASE): ICD-10-CM

## 2022-01-19 DIAGNOSIS — R11.0 NAUSEA: Primary | ICD-10-CM

## 2022-01-19 DIAGNOSIS — K58.0 IRRITABLE BOWEL SYNDROME WITH DIARRHEA: ICD-10-CM

## 2022-01-19 DIAGNOSIS — Z01.812 PRE-PROCEDURE LAB EXAM: ICD-10-CM

## 2022-01-19 PROCEDURE — 99999 PR PBB SHADOW E&M-EST. PATIENT-LVL IV: ICD-10-PCS | Mod: PBBFAC,,, | Performed by: NURSE PRACTITIONER

## 2022-01-19 PROCEDURE — 99215 OFFICE O/P EST HI 40 MIN: CPT | Mod: S$GLB,,, | Performed by: NURSE PRACTITIONER

## 2022-01-19 PROCEDURE — 99215 PR OFFICE/OUTPT VISIT, EST, LEVL V, 40-54 MIN: ICD-10-PCS | Mod: S$GLB,,, | Performed by: NURSE PRACTITIONER

## 2022-01-19 PROCEDURE — 1159F PR MEDICATION LIST DOCUMENTED IN MEDICAL RECORD: ICD-10-PCS | Mod: CPTII,S$GLB,, | Performed by: NURSE PRACTITIONER

## 2022-01-19 PROCEDURE — 1159F MED LIST DOCD IN RCRD: CPT | Mod: CPTII,S$GLB,, | Performed by: NURSE PRACTITIONER

## 2022-01-19 PROCEDURE — 3008F PR BODY MASS INDEX (BMI) DOCUMENTED: ICD-10-PCS | Mod: CPTII,S$GLB,, | Performed by: NURSE PRACTITIONER

## 2022-01-19 PROCEDURE — 99999 PR PBB SHADOW E&M-EST. PATIENT-LVL IV: CPT | Mod: PBBFAC,,, | Performed by: NURSE PRACTITIONER

## 2022-01-19 PROCEDURE — 3008F BODY MASS INDEX DOCD: CPT | Mod: CPTII,S$GLB,, | Performed by: NURSE PRACTITIONER

## 2022-01-19 RX ORDER — ONDANSETRON 4 MG/1
4 TABLET, ORALLY DISINTEGRATING ORAL EVERY 6 HOURS PRN
Qty: 30 TABLET | Refills: 1 | Status: SHIPPED | OUTPATIENT
Start: 2022-01-19 | End: 2022-02-18

## 2022-01-19 RX ORDER — LISDEXAMFETAMINE DIMESYLATE 60 MG/1
60 CAPSULE ORAL EVERY MORNING
Qty: 30 CAPSULE | Refills: 0 | Status: SHIPPED | OUTPATIENT
Start: 2022-01-19 | End: 2022-02-11 | Stop reason: SDUPTHER

## 2022-01-19 RX ORDER — TIZANIDINE 4 MG/1
4 TABLET ORAL 3 TIMES DAILY PRN
Qty: 90 TABLET | Refills: 1 | Status: SHIPPED | OUTPATIENT
Start: 2022-01-19 | End: 2022-03-14 | Stop reason: SDUPTHER

## 2022-01-19 NOTE — H&P (VIEW-ONLY)
GASTROENTEROLOGY CLINIC NOTE    Chief Complaint: The primary encounter diagnosis was Nausea. Diagnoses of Diarrhea, unspecified type, Pre-procedure lab exam, and Irritable bowel syndrome with diarrhea were also pertinent to this visit.  Referring provider/PCP: Mata Valencia MD    HPI:  April Wendt Schamberger is a 36 y.o. female who is a new patient to me with a PMH that is significant for Abnormal Pap smear of cervix, ADD (attention deficit disorder), Breast disorder, Esophageal reflux, Fibroids, Hypertension, IBS (irritable bowel syndrome), Insomnia, Kidney stones, Mastocytosis, Relies on partner vasectomy for contraception, and Upper GI bleed.  Patient who is new to me was previously followed by Dr. Larkin and is here today to re-establish care for diarrhea, abdominal pain, and h/o IBD.  This has been an ongoing problem since patient was 15 yo.  In 2017, she underwent colonoscopy and EGD with Dr. Larkin.  EGD revealed non-bleeding erosions in gastric body and antrum and erythema in the duodenal bulb.  Colonoscopy revealed several 5mm ulcers in the terminal ileum; biopsies were negative for IBD.  IBD Diagnostic labs were consistent with Crohn's.  She was then placed on budesonide.  Patient reports no improvement in symptoms and 10 lb weight loss while taking budesonide.  She was referred to the IBD clinic at [facility] and it was recommended she repeat the colonoscopy and also have VCE.  This was not done as patient was pregnant and had some improvement in symptoms.  She then sought care with both MEGAN Nolen NP and Dr. Larkin for continued flares.  Dr. Larkin initiated Lomotil in 3/2020. Since then, she has been managing symptoms with combination of Lomotil and Imodium.  She is currently taking 8 Imodium and 6 Lomotil to manage symptoms.  This regimen is no longer effective at controlling the diarrhea.  Over the last 6 months, she reports an increase of flares which are accompanied by frequent loose  bowel movements (8-10 per day), lower abdominal pain, nausea, and hematochezia.  Hematochezia does not occur with every flare; last occurred two weeks ago.  Additionally, she is experiencing nocturnal symptoms 3-4 times weekly.  Flares are occurring 1-2 times per week and last 1-2 days.  Denies any changes in medications or new medications prior to worsening of symptoms.     Prior Upper Endoscopy: 4/2017  Findings: The esophagus was normal. A few dispersed, 5 mm non-bleeding erosions were found in the gastric body and antrum with superficial, linear ulceration. There   were no stigmata of recent bleeding. Biopsies were taken with a cold forceps for histology. Verification of patient identification for the specimen was done by the nurse using the patient's name and birth date. Estimated blood loss was minimal. Localized mildly erythematous mucosa without active bleeding and with no stigmata of bleeding was found in the duodenal bulb. Biopsies for histology were taken with a cold forceps for evaluation of celiac disease. Verification of patient identification for the specimen was done by the nurse using the patient's name and birth date. Estimated blood loss was minimal.     Impression:   - Normal esophagus.                         - Non-bleeding erosive gastropathy. Biopsied.                         - Erythematous duodenopathy. Biopsied.     Recommendation:  - Discharge patient to home.                         - Resume previous diet.                         - Continue present medications.                         - Await pathology results.     Prior Colonoscopy: 4/2017  Findings: The perianal and digital rectal examinations were normal. The colon (entire examined portion) appeared normal. Biopsies for histology were taken with a cold forceps from the ascending colon, transverse colon, descending colon and sigmoid colon for evaluation of microscopic colitis. Verification of patient identification for the specimen was  done by the nurse using the patient's birth date and medical record number. Estimated blood loss was minimal. The terminal ileum contained multiple five mm ulcers. No bleeding was present. No stigmata of recent bleeding were seen. Biopsies were taken with a cold forceps for histology. Verification of patient identification for the specimen was done by the nurse using the patient's name and birth date. Estimated blood loss was minimal.     Impression:  - The entire examined colon is normal. Biopsied.                         - Multiple ulcers in the terminal ileum. Biopsied.     Recommendation:       - Discharge patient to home (via wheelchair).                         - Resume previous diet.                         - Continue present medications.                         - Await pathology results.                         - Repeat colonoscopy at age 48 for screening purposes.     Family h/o Colon Cancer: Cousin  Family h/o Crohn's Disease or Ulcerative Colitis: No  Abdominal Surgeries: No    GI ROS:  Reflux: No  Dysphagia: No   Constipation: No  Diarrhea: Yes  Rectal bleeding/Melena/hematemesis: Hematochezia  NSAIDs: No  Anticoagulation or Antiplatelet: No    Review of Systems   Constitutional: Negative for weight loss.   HENT: Negative for sore throat.    Eyes: Negative for blurred vision.   Respiratory: Negative for cough.    Cardiovascular: Negative for chest pain.   Gastrointestinal: Positive for abdominal pain (lower), blood in stool, diarrhea and nausea. Negative for constipation, heartburn, melena and vomiting.   Genitourinary: Negative for dysuria.   Musculoskeletal: Negative for myalgias.   Skin: Negative for rash.   Neurological: Negative for headaches.   Endo/Heme/Allergies: Negative for environmental allergies.   Psychiatric/Behavioral: Negative for suicidal ideas. The patient is not nervous/anxious.        Past Medical History: has a past medical history of Abnormal Pap smear of cervix, ADD (attention  deficit disorder), Breast disorder, Esophageal reflux, Fibroids, Hypertension, IBS (irritable bowel syndrome), Insomnia, Kidney stones, Mastocytosis, Relies on partner vasectomy for contraception, and Upper GI bleed.    Past Surgical History: has a past surgical history that includes TONSILLECTOMY, ADENOIDECTOMY (1988); Esophagogastroduodenoscopy (2012); Colonoscopy (N/A, 4/28/2017); LASIK (Bilateral, 2005); Refractive surgery; Vaginal delivery; Epidural steroid injection into cervical spine (N/A, 12/1/2020); and Epidural steroid injection into cervical spine (N/A, 12/2/2021).    Family History:family history includes Breast cancer (age of onset: 47) in her mother; Breast cancer (age of onset: 80) in her paternal grandmother; Cancer in her cousin and cousin; Cancer (age of onset: 31) in her cousin; Deep vein thrombosis in her father; Diabetes in her father; Diabetes type II in her paternal grandfather; Heart attack in her maternal grandmother; Hypertension in her father and mother; Ovarian cancer in her maternal aunt; Pancreatic cancer in her maternal grandfather; Prostate cancer in her father.    Allergies:   Review of patient's allergies indicates:   Allergen Reactions    Lactose     Azithromycin Nausea And Vomiting       Social History: reports that she quit smoking about 7 years ago. She has never used smokeless tobacco. She reports that she does not drink alcohol and does not use drugs.    Home medications:   Current Outpatient Medications on File Prior to Visit   Medication Sig Dispense Refill    acetaminophen (TYLENOL) 500 MG tablet Take 2 tablets (1,000 mg total) by mouth 3 (three) times daily as needed for Pain.  0    amitriptyline (ELAVIL) 25 MG tablet Take 1 tablet (25 mg total) by mouth every evening. 30 tablet 3    butalbital-acetaminophen-caffeine -40 mg (FIORICET, ESGIC) -40 mg per tablet Take 1 tablet by mouth every 6 (six) hours as needed for Headaches. 30 tablet 0    clindamycin  phosphate 1% (CLINDAGEL) 1 % gel Apply topically 2 (two) times daily. To entire face 30 g 5    diphenoxylate-atropine 2.5-0.025 mg (LOMOTIL) 2.5-0.025 mg per tablet Take 1 tablet by mouth 4 (four) times daily as needed for Diarrhea. 360 tablet 0    diphenoxylate-atropine 2.5-0.025 mg (LOMOTIL) 2.5-0.025 mg per tablet Take 1 tablet by mouth 4 (four) times daily as needed for Diarrhea. 90 tablet 3    doxycycline monohydrate 100 mg Tab Take 1 tablet by mouth everyday with food. Avoid lying down for 1 hour after taking. Avoid the sun. (Patient taking differently: Take 1 tablet by mouth everyday with food. Avoid lying down for 1 hour after taking. Avoid the sun.) 30 tablet 2    eszopiclone (LUNESTA) 3 mg Tab Take 1 tablet (3 mg total) by mouth nightly as needed (insomnia). 30 tablet 0    fluconazole (DIFLUCAN) 150 MG Tab Take 1 tablet as needed for yeast infection 30 tablet 1    lisdexamfetamine (VYVANSE) 50 MG capsule Take 1 capsule (50 mg total) by mouth every morning. 30 capsule 0    lisdexamfetamine (VYVANSE) 50 MG capsule Take 1 capsule (50 mg total) by mouth every morning. 30 capsule 0    loperamide (IMODIUM) 1 mg/7.5 mL solution Take 0.1 mg/kg by mouth every 12 (twelve) hours.      loperamide (IMODIUM) 2 mg capsule Take 2 mg by mouth 4 (four) times daily as needed for Diarrhea.      metoprolol succinate (TOPROL-XL) 50 MG 24 hr tablet Take 1 tablet (50 mg total) by mouth 2 (two) times daily. 180 tablet 1    pregabalin (LYRICA) 50 MG capsule Take 1 capsule (50 mg total) by mouth 2 (two) times daily. 60 capsule 2    rizatriptan (MAXALT-MLT) 10 MG disintegrating tablet Dissolve 1/2 tablet (5 mg total) by mouth as needed for Migraine (repeat dose once in 2 hours if headache return.). (Patient not taking: Reported on 11/3/2021) 9 tablet 11    tiZANidine (ZANAFLEX) 4 MG tablet Take 1 tablet (4 mg total) by mouth 3 (three) times daily as needed. 90 tablet 1    tretinoin (RETIN-A) 0.025 % cream Apply  topically every evening. Pea sized amount to entire face. If irritation occurs, decrease use to every other night. 20 g 5    valsartan (DIOVAN) 320 MG tablet Take 1 tablet (320 mg total) by mouth once daily. 90 tablet 1    venlafaxine (EFFEXOR-XR) 37.5 MG 24 hr capsule Take 1 capsule (37.5 mg total) by mouth once daily. 30 capsule 3     Current Facility-Administered Medications on File Prior to Visit   Medication Dose Route Frequency Provider Last Rate Last Admin    0.9%  NaCl infusion  500 mL Intravenous Continuous Corky Argueta Jr., MD           Vital signs:  There were no vitals taken for this visit.    Physical Exam  Vitals reviewed.   Constitutional:       General: She is not in acute distress.     Appearance: Normal appearance. She is not ill-appearing.   HENT:      Head: Normocephalic.   Cardiovascular:      Rate and Rhythm: Normal rate and regular rhythm.      Heart sounds: Normal heart sounds. No murmur heard.      Pulmonary:      Effort: Pulmonary effort is normal. No respiratory distress.      Breath sounds: Normal breath sounds.   Chest:      Chest wall: No tenderness.   Abdominal:      General: Bowel sounds are normal. There is no distension.      Palpations: Abdomen is soft.      Tenderness: There is no abdominal tenderness. Negative signs include Baron's sign.      Hernia: No hernia is present.   Skin:     General: Skin is warm.   Neurological:      Mental Status: She is alert and oriented to person, place, and time.   Psychiatric:         Mood and Affect: Mood normal.         Behavior: Behavior normal.         Routine labs:  Lab Results   Component Value Date    WBC 8.92 09/27/2021    HGB 13.3 09/27/2021    HCT 39.7 09/27/2021    MCV 86 09/27/2021     09/27/2021     No results found for: INR  No results found for: IRON, FERRITIN, TIBC, FESATURATED  Lab Results   Component Value Date     09/27/2021    K 4.3 09/27/2021     09/27/2021    CO2 23 09/27/2021    BUN 7 09/27/2021     CREATININE 0.7 09/27/2021     Lab Results   Component Value Date    ALBUMIN 4.2 09/27/2021    ALT 12 09/27/2021    AST 12 09/27/2021    ALKPHOS 56 09/27/2021    BILITOT 0.3 09/27/2021     No results found for: GLUCOSE  Lab Results   Component Value Date    TSH 0.704 09/27/2021     Lab Results   Component Value Date    CALCIUM 9.4 09/27/2021       Imaging:      I have reviewed prior labs, imaging, and notes.      Assessment:  1. Nausea    2. IBD (inflammatory bowel disease)    3. Pre-procedure lab exam    4. Irritable bowel syndrome with diarrhea        Plan:  Orders Placed This Encounter    Clostridium difficile EIA    Stool culture    Calprotectin, Stool    Giardia / Cryptosporidum, EIA    H. pylori antigen, stool    Pancreatic elastase, fecal    Rotavirus antigen, stool    Stool Exam-Ova,Cysts,Parasites    WBC, Stool    COVID-19 Routine Screening    ondansetron (ZOFRAN-ODT) 4 MG TbDL    sod sulf-pot chloride-mag sulf (SUTAB) 1.479-0.188- 0.225 gram tablet    eluxadoline (VIBERZI) 75 mg Tab    Case Request Endoscopy: COLONOSCOPY     Stool Studies  Colonoscopy to further evaluate worsening symptoms. Sutab for bowel prep.  Stop Lomotil.  Start Viberzi 75mg BID.  Consider adding Metamucil in future as bulking agent.  Patient will need to follow up with one of our physicians for continued management following colonoscopy due to complexity of case.    Will discuss with Dr. Roe prior to  Colonoscopy.     Plan of care discussed with patient who is in agreement and verbalized understanding.     I have explained the planned procedures to the patient.The risks, benefits and alternatives of the procedure were also explained in detail. Patient verbalized understanding, all questions were answered. The patient agrees to proceed as planned    Follow Up: After Colonoscopy with Physician in Clinic    More than 45 minutes was spent reviewing and summarizing patient's medical records including reviewing tests,  scans, and labs, performing exam, counseling and educating the patient, documenting information in the electronic health record, interpreting results, coordinating care, ordering procedures, and communicating with other providers.       CELIA Orozco,FNP-BC [facility]

## 2022-01-19 NOTE — PATIENT INSTRUCTIONS
SUTAB Instructions    [facility] [address]  Clinic Office [phone]  Endoscopy Lab [phone]    You are scheduled for a Colonoscopy with   Dr. VINAY MILLER   On   01/26/2022   at [facility] in [place].    Check in at the Hospital -1st floor, Information desk. A covid test will be required 3 days before your procedure.  Call [phone] to reschedule.     An adult friend/family member must come with you to drive you home.  You cannot drive, take a taxi, Uber/Lyft or bus to leave the Endoscopy Center alone.  If you do not have someone to drive you home, your test will be cancelled.      Please follow the directions of your doctor if you take any pills that thin your blood. If you take these meds: Aggrenox, Brilinta, Effient, Eliquis, Lovenox, Plavix, Pletal, Pradaxa, Ticilid, Xarelto or Coumadin, let the doctor's office know.     DON'T: On the morning of the test do not take insulin or pills for diabetes.      DO: On the morning of the test, do take any pills for blood pressure, heart, anti-rejection and or seizures with a small sip of water. Bring any inhalers with you.     To have a good prep, you must follow these instructions - please do not use the directions from the pharmacy.     The doctor will send a prescription for the SUTAB.    The Day Before the test:     You can only drink CLEAR LIQUIDS the whole day before your test.  You can't eat any food for the whole day.     You CAN have:  o Water, Coffee or decaf coffee (no milk or cream)  o Tea  o Soft drinks - regular and sugar free  o Jell-O (green or yellow)  o Apple Juice, grape juice, white cranberry juice  o Gatorade, Power Aid, Crystal Light, Miles Aid  o Lemonade and Limeade  o Bouillon, clear soup  o Snowball, popsicles  o YOU CAN'T DRINK ANYTHING RED  o YOU CAN'T DRINK ALCOHOL  ONLY DRINK WHAT IS ON THE LIST       At 5 pm the night before your test:    o Open the bottle of 12 tablets. Fill the  provided cup with water up to the line = 16 oz. Swallow each pill with water. Drink the rest of the water in the cup in 20 minutes.     At 6 pm:  o Fill the cup with water up to the line = 16 oz. Drink the cup of water in 30 minutes.     At 7 pm:  o Fill the cup with water up to the line = 16 oz. Drink the cup of water in 30 minutes.      Continue to drink water or clear liquids until you go to sleep.      The Day of the test - We will call you 2 days before your test to tell you what time to get to the hospital. We will also tell you when to do the next steps.     5 hours before you come to the hospital (this may be in the middle of the night): ___________________= time  o Open the bottle of 12 tablets. Fill the cup with water up to the line = 16 oz. Swallow each pill with water. Drink the rest of the water in the cup in 20 minutes.     At 4 hours before you come to the hospital: ______________= time  o Fill the cup with water up to the line = 16 oz. Drink the cup of water in 30 minutes.      At 3 hours before you come to the hospital: ______________= time    o YOU CAN'T EAT OR DRINK ANYTHING ELSE ONCE YOU FINISH THE WATER AT _____________ = TIME    o Leave all valuables and jewelry at home. You will be at the hospital for 2-4 hours.    Call the Endoscopy department at [phone] with any questions about your procedure.

## 2022-01-19 NOTE — PROGRESS NOTES
GASTROENTEROLOGY CLINIC NOTE    Chief Complaint: The primary encounter diagnosis was Nausea. Diagnoses of Diarrhea, unspecified type, Pre-procedure lab exam, and Irritable bowel syndrome with diarrhea were also pertinent to this visit.  Referring provider/PCP: Mata Valencia MD    HPI:  April Wendt Schamberger is a 36 y.o. female who is a new patient to me with a PMH that is significant for Abnormal Pap smear of cervix, ADD (attention deficit disorder), Breast disorder, Esophageal reflux, Fibroids, Hypertension, IBS (irritable bowel syndrome), Insomnia, Kidney stones, Mastocytosis, Relies on partner vasectomy for contraception, and Upper GI bleed.  Patient who is new to me was previously followed by Dr. Larkin and is here today to re-establish care for diarrhea, abdominal pain, and h/o IBD.  This has been an ongoing problem since patient was 15 yo.  In 2017, she underwent colonoscopy and EGD with Dr. Larkin.  EGD revealed non-bleeding erosions in gastric body and antrum and erythema in the duodenal bulb.  Colonoscopy revealed several 5mm ulcers in the terminal ileum; biopsies were negative for IBD.  IBD Diagnostic labs were consistent with Crohn's.  She was then placed on budesonide.  Patient reports no improvement in symptoms and 10 lb weight loss while taking budesonide.  She was referred to the IBD clinic at [facility] and it was recommended she repeat the colonoscopy and also have VCE.  This was not done as patient was pregnant and had some improvement in symptoms.  She then sought care with both MEGAN Nolen NP and Dr. Larkin for continued flares.  Dr. Larkin initiated Lomotil in 3/2020. Since then, she has been managing symptoms with combination of Lomotil and Imodium.  She is currently taking 8 Imodium and 6 Lomotil to manage symptoms.  This regimen is no longer effective at controlling the diarrhea.  Over the last 6 months, she reports an increase of flares which are accompanied by frequent loose  bowel movements (8-10 per day), lower abdominal pain, nausea, and hematochezia.  Hematochezia does not occur with every flare; last occurred two weeks ago.  Additionally, she is experiencing nocturnal symptoms 3-4 times weekly.  Flares are occurring 1-2 times per week and last 1-2 days.  Denies any changes in medications or new medications prior to worsening of symptoms.     Prior Upper Endoscopy: 4/2017  Findings: The esophagus was normal. A few dispersed, 5 mm non-bleeding erosions were found in the gastric body and antrum with superficial, linear ulceration. There   were no stigmata of recent bleeding. Biopsies were taken with a cold forceps for histology. Verification of patient identification for the specimen was done by the nurse using the patient's name and birth date. Estimated blood loss was minimal. Localized mildly erythematous mucosa without active bleeding and with no stigmata of bleeding was found in the duodenal bulb. Biopsies for histology were taken with a cold forceps for evaluation of celiac disease. Verification of patient identification for the specimen was done by the nurse using the patient's name and birth date. Estimated blood loss was minimal.     Impression:   - Normal esophagus.                         - Non-bleeding erosive gastropathy. Biopsied.                         - Erythematous duodenopathy. Biopsied.     Recommendation:  - Discharge patient to home.                         - Resume previous diet.                         - Continue present medications.                         - Await pathology results.     Prior Colonoscopy: 4/2017  Findings: The perianal and digital rectal examinations were normal. The colon (entire examined portion) appeared normal. Biopsies for histology were taken with a cold forceps from the ascending colon, transverse colon, descending colon and sigmoid colon for evaluation of microscopic colitis. Verification of patient identification for the specimen was  done by the nurse using the patient's birth date and medical record number. Estimated blood loss was minimal. The terminal ileum contained multiple five mm ulcers. No bleeding was present. No stigmata of recent bleeding were seen. Biopsies were taken with a cold forceps for histology. Verification of patient identification for the specimen was done by the nurse using the patient's name and birth date. Estimated blood loss was minimal.     Impression:  - The entire examined colon is normal. Biopsied.                         - Multiple ulcers in the terminal ileum. Biopsied.     Recommendation:       - Discharge patient to home (via wheelchair).                         - Resume previous diet.                         - Continue present medications.                         - Await pathology results.                         - Repeat colonoscopy at age 48 for screening purposes.     Family h/o Colon Cancer: Cousin  Family h/o Crohn's Disease or Ulcerative Colitis: No  Abdominal Surgeries: No    GI ROS:  Reflux: No  Dysphagia: No   Constipation: No  Diarrhea: Yes  Rectal bleeding/Melena/hematemesis: Hematochezia  NSAIDs: No  Anticoagulation or Antiplatelet: No    Review of Systems   Constitutional: Negative for weight loss.   HENT: Negative for sore throat.    Eyes: Negative for blurred vision.   Respiratory: Negative for cough.    Cardiovascular: Negative for chest pain.   Gastrointestinal: Positive for abdominal pain (lower), blood in stool, diarrhea and nausea. Negative for constipation, heartburn, melena and vomiting.   Genitourinary: Negative for dysuria.   Musculoskeletal: Negative for myalgias.   Skin: Negative for rash.   Neurological: Negative for headaches.   Endo/Heme/Allergies: Negative for environmental allergies.   Psychiatric/Behavioral: Negative for suicidal ideas. The patient is not nervous/anxious.        Past Medical History: has a past medical history of Abnormal Pap smear of cervix, ADD (attention  deficit disorder), Breast disorder, Esophageal reflux, Fibroids, Hypertension, IBS (irritable bowel syndrome), Insomnia, Kidney stones, Mastocytosis, Relies on partner vasectomy for contraception, and Upper GI bleed.    Past Surgical History: has a past surgical history that includes TONSILLECTOMY, ADENOIDECTOMY (1988); Esophagogastroduodenoscopy (2012); Colonoscopy (N/A, 4/28/2017); LASIK (Bilateral, 2005); Refractive surgery; Vaginal delivery; Epidural steroid injection into cervical spine (N/A, 12/1/2020); and Epidural steroid injection into cervical spine (N/A, 12/2/2021).    Family History:family history includes Breast cancer (age of onset: 47) in her mother; Breast cancer (age of onset: 80) in her paternal grandmother; Cancer in her cousin and cousin; Cancer (age of onset: 31) in her cousin; Deep vein thrombosis in her father; Diabetes in her father; Diabetes type II in her paternal grandfather; Heart attack in her maternal grandmother; Hypertension in her father and mother; Ovarian cancer in her maternal aunt; Pancreatic cancer in her maternal grandfather; Prostate cancer in her father.    Allergies:   Review of patient's allergies indicates:   Allergen Reactions    Lactose     Azithromycin Nausea And Vomiting       Social History: reports that she quit smoking about 7 years ago. She has never used smokeless tobacco. She reports that she does not drink alcohol and does not use drugs.    Home medications:   Current Outpatient Medications on File Prior to Visit   Medication Sig Dispense Refill    acetaminophen (TYLENOL) 500 MG tablet Take 2 tablets (1,000 mg total) by mouth 3 (three) times daily as needed for Pain.  0    amitriptyline (ELAVIL) 25 MG tablet Take 1 tablet (25 mg total) by mouth every evening. 30 tablet 3    butalbital-acetaminophen-caffeine -40 mg (FIORICET, ESGIC) -40 mg per tablet Take 1 tablet by mouth every 6 (six) hours as needed for Headaches. 30 tablet 0    clindamycin  phosphate 1% (CLINDAGEL) 1 % gel Apply topically 2 (two) times daily. To entire face 30 g 5    diphenoxylate-atropine 2.5-0.025 mg (LOMOTIL) 2.5-0.025 mg per tablet Take 1 tablet by mouth 4 (four) times daily as needed for Diarrhea. 360 tablet 0    diphenoxylate-atropine 2.5-0.025 mg (LOMOTIL) 2.5-0.025 mg per tablet Take 1 tablet by mouth 4 (four) times daily as needed for Diarrhea. 90 tablet 3    doxycycline monohydrate 100 mg Tab Take 1 tablet by mouth everyday with food. Avoid lying down for 1 hour after taking. Avoid the sun. (Patient taking differently: Take 1 tablet by mouth everyday with food. Avoid lying down for 1 hour after taking. Avoid the sun.) 30 tablet 2    eszopiclone (LUNESTA) 3 mg Tab Take 1 tablet (3 mg total) by mouth nightly as needed (insomnia). 30 tablet 0    fluconazole (DIFLUCAN) 150 MG Tab Take 1 tablet as needed for yeast infection 30 tablet 1    lisdexamfetamine (VYVANSE) 50 MG capsule Take 1 capsule (50 mg total) by mouth every morning. 30 capsule 0    lisdexamfetamine (VYVANSE) 50 MG capsule Take 1 capsule (50 mg total) by mouth every morning. 30 capsule 0    loperamide (IMODIUM) 1 mg/7.5 mL solution Take 0.1 mg/kg by mouth every 12 (twelve) hours.      loperamide (IMODIUM) 2 mg capsule Take 2 mg by mouth 4 (four) times daily as needed for Diarrhea.      metoprolol succinate (TOPROL-XL) 50 MG 24 hr tablet Take 1 tablet (50 mg total) by mouth 2 (two) times daily. 180 tablet 1    pregabalin (LYRICA) 50 MG capsule Take 1 capsule (50 mg total) by mouth 2 (two) times daily. 60 capsule 2    rizatriptan (MAXALT-MLT) 10 MG disintegrating tablet Dissolve 1/2 tablet (5 mg total) by mouth as needed for Migraine (repeat dose once in 2 hours if headache return.). (Patient not taking: Reported on 11/3/2021) 9 tablet 11    tiZANidine (ZANAFLEX) 4 MG tablet Take 1 tablet (4 mg total) by mouth 3 (three) times daily as needed. 90 tablet 1    tretinoin (RETIN-A) 0.025 % cream Apply  topically every evening. Pea sized amount to entire face. If irritation occurs, decrease use to every other night. 20 g 5    valsartan (DIOVAN) 320 MG tablet Take 1 tablet (320 mg total) by mouth once daily. 90 tablet 1    venlafaxine (EFFEXOR-XR) 37.5 MG 24 hr capsule Take 1 capsule (37.5 mg total) by mouth once daily. 30 capsule 3     Current Facility-Administered Medications on File Prior to Visit   Medication Dose Route Frequency Provider Last Rate Last Admin    0.9%  NaCl infusion  500 mL Intravenous Continuous Corky Argueta Jr., MD           Vital signs:  There were no vitals taken for this visit.    Physical Exam  Vitals reviewed.   Constitutional:       General: She is not in acute distress.     Appearance: Normal appearance. She is not ill-appearing.   HENT:      Head: Normocephalic.   Cardiovascular:      Rate and Rhythm: Normal rate and regular rhythm.      Heart sounds: Normal heart sounds. No murmur heard.      Pulmonary:      Effort: Pulmonary effort is normal. No respiratory distress.      Breath sounds: Normal breath sounds.   Chest:      Chest wall: No tenderness.   Abdominal:      General: Bowel sounds are normal. There is no distension.      Palpations: Abdomen is soft.      Tenderness: There is no abdominal tenderness. Negative signs include Baron's sign.      Hernia: No hernia is present.   Skin:     General: Skin is warm.   Neurological:      Mental Status: She is alert and oriented to person, place, and time.   Psychiatric:         Mood and Affect: Mood normal.         Behavior: Behavior normal.         Routine labs:  Lab Results   Component Value Date    WBC 8.92 09/27/2021    HGB 13.3 09/27/2021    HCT 39.7 09/27/2021    MCV 86 09/27/2021     09/27/2021     No results found for: INR  No results found for: IRON, FERRITIN, TIBC, FESATURATED  Lab Results   Component Value Date     09/27/2021    K 4.3 09/27/2021     09/27/2021    CO2 23 09/27/2021    BUN 7 09/27/2021     CREATININE 0.7 09/27/2021     Lab Results   Component Value Date    ALBUMIN 4.2 09/27/2021    ALT 12 09/27/2021    AST 12 09/27/2021    ALKPHOS 56 09/27/2021    BILITOT 0.3 09/27/2021     No results found for: GLUCOSE  Lab Results   Component Value Date    TSH 0.704 09/27/2021     Lab Results   Component Value Date    CALCIUM 9.4 09/27/2021       Imaging:      I have reviewed prior labs, imaging, and notes.      Assessment:  1. Nausea    2. IBD (inflammatory bowel disease)    3. Pre-procedure lab exam    4. Irritable bowel syndrome with diarrhea        Plan:  Orders Placed This Encounter    Clostridium difficile EIA    Stool culture    Calprotectin, Stool    Giardia / Cryptosporidum, EIA    H. pylori antigen, stool    Pancreatic elastase, fecal    Rotavirus antigen, stool    Stool Exam-Ova,Cysts,Parasites    WBC, Stool    COVID-19 Routine Screening    ondansetron (ZOFRAN-ODT) 4 MG TbDL    sod sulf-pot chloride-mag sulf (SUTAB) 1.479-0.188- 0.225 gram tablet    eluxadoline (VIBERZI) 75 mg Tab    Case Request Endoscopy: COLONOSCOPY     Stool Studies  Colonoscopy to further evaluate worsening symptoms. Sutab for bowel prep.  Stop Lomotil.  Start Viberzi 75mg BID.  Consider adding Metamucil in future as bulking agent.  Patient will need to follow up with one of our physicians for continued management following colonoscopy due to complexity of case.    Will discuss with Dr. Roe prior to  Colonoscopy.     Plan of care discussed with patient who is in agreement and verbalized understanding.     I have explained the planned procedures to the patient.The risks, benefits and alternatives of the procedure were also explained in detail. Patient verbalized understanding, all questions were answered. The patient agrees to proceed as planned    Follow Up: After Colonoscopy with Physician in Clinic    More than 45 minutes was spent reviewing and summarizing patient's medical records including reviewing tests,  scans, and labs, performing exam, counseling and educating the patient, documenting information in the electronic health record, interpreting results, coordinating care, ordering procedures, and communicating with other providers.       CELIA Orozco,FNP-BC [facility]

## 2022-01-24 ENCOUNTER — LAB VISIT (OUTPATIENT)
Dept: FAMILY MEDICINE | Facility: CLINIC | Age: 37
End: 2022-01-24
Payer: COMMERCIAL

## 2022-01-24 ENCOUNTER — TELEPHONE (OUTPATIENT)
Dept: ENDOSCOPY | Facility: HOSPITAL | Age: 37
End: 2022-01-24
Payer: COMMERCIAL

## 2022-01-24 ENCOUNTER — PATIENT MESSAGE (OUTPATIENT)
Dept: ADMINISTRATIVE | Facility: OTHER | Age: 37
End: 2022-01-24
Payer: COMMERCIAL

## 2022-01-24 PROCEDURE — U0003 INFECTIOUS AGENT DETECTION BY NUCLEIC ACID (DNA OR RNA); SEVERE ACUTE RESPIRATORY SYNDROME CORONAVIRUS 2 (SARS-COV-2) (CORONAVIRUS DISEASE [COVID-19]), AMPLIFIED PROBE TECHNIQUE, MAKING USE OF HIGH THROUGHPUT TECHNOLOGIES AS DESCRIBED BY CMS-2020-01-R: HCPCS | Performed by: NURSE PRACTITIONER

## 2022-01-24 PROCEDURE — U0005 INFEC AGEN DETEC AMPLI PROBE: HCPCS | Performed by: NURSE PRACTITIONER

## 2022-01-24 NOTE — TELEPHONE ENCOUNTER
Spoke with patient about arrival time @ 0845.   Covid test = in progress, 01/24    Prep instructions reviewed: the day before the procedure, follow a clear liquid diet all day, then start the first 1/2 of prep at 5pm and take 2nd 1/2 of prep @ 0345.  Pt must be completely NPO when prep completed @ 0545.              Medications: Do not take Insulin or oral diabetic medications the day of the procedure.  Take as prescribed: heart, seizure and blood pressure medication in the morning with a sip of water (less than an ounce).  Take any breathing medications and bring inhalers to hospital with you Leave all valuables and jewelry at home.     Wear comfortable clothes to procedure to change into hospital gown You cannot drive for 24 hours after your procedure because you will receive sedation for your procedure to make you comfortable.  A ride must be provided at discharge.

## 2022-01-25 DIAGNOSIS — R51.9 NONINTRACTABLE HEADACHE, UNSPECIFIED CHRONICITY PATTERN, UNSPECIFIED HEADACHE TYPE: ICD-10-CM

## 2022-01-25 DIAGNOSIS — F51.01 PRIMARY INSOMNIA: ICD-10-CM

## 2022-01-25 LAB
SARS-COV-2 RNA RESP QL NAA+PROBE: NOT DETECTED
SARS-COV-2- CYCLE NUMBER: NORMAL

## 2022-01-25 RX ORDER — BUTALBITAL, ACETAMINOPHEN AND CAFFEINE 50; 325; 40 MG/1; MG/1; MG/1
1 TABLET ORAL EVERY 6 HOURS PRN
Qty: 30 TABLET | Refills: 0 | Status: CANCELLED | OUTPATIENT
Start: 2022-01-25

## 2022-01-25 RX ORDER — ESZOPICLONE 3 MG/1
3 TABLET, FILM COATED ORAL NIGHTLY PRN
Qty: 30 TABLET | Refills: 0 | Status: CANCELLED | OUTPATIENT
Start: 2022-01-25

## 2022-01-25 NOTE — TELEPHONE ENCOUNTER
No new care gaps identified.  Powered by [employer]. Reference number: [id number].   1/25/2022 12:47:58 PM CST

## 2022-01-26 ENCOUNTER — TELEPHONE (OUTPATIENT)
Dept: PHARMACY | Facility: CLINIC | Age: 37
End: 2022-01-26
Payer: COMMERCIAL

## 2022-01-26 ENCOUNTER — ANESTHESIA (OUTPATIENT)
Dept: ENDOSCOPY | Facility: HOSPITAL | Age: 37
End: 2022-01-26
Payer: COMMERCIAL

## 2022-01-26 ENCOUNTER — ANESTHESIA EVENT (OUTPATIENT)
Dept: ENDOSCOPY | Facility: HOSPITAL | Age: 37
End: 2022-01-26
Payer: COMMERCIAL

## 2022-01-26 ENCOUNTER — HOSPITAL ENCOUNTER (OUTPATIENT)
Facility: HOSPITAL | Age: 37
Discharge: HOME | End: 2022-01-26
Attending: INTERNAL MEDICINE | Admitting: INTERNAL MEDICINE
Payer: COMMERCIAL

## 2022-01-26 VITALS
TEMPERATURE: 98 F | OXYGEN SATURATION: 100 % | HEART RATE: 76 BPM | SYSTOLIC BLOOD PRESSURE: 128 MMHG | BODY MASS INDEX: 27.89 KG/M2 | HEIGHT: 68 IN | RESPIRATION RATE: 16 BRPM | DIASTOLIC BLOOD PRESSURE: 78 MMHG | WEIGHT: 184 LBS

## 2022-01-26 DIAGNOSIS — R19.7 DIARRHEA: ICD-10-CM

## 2022-01-26 LAB
B-HCG UR QL: NEGATIVE
CTP QC/QA: YES

## 2022-01-26 PROCEDURE — 63600175 PHARM REV CODE 636 W HCPCS: Performed by: NURSE ANESTHETIST, CERTIFIED REGISTERED

## 2022-01-26 PROCEDURE — 45380 PR COLONOSCOPY,BIOPSY: ICD-10-PCS | Mod: ,,, | Performed by: INTERNAL MEDICINE

## 2022-01-26 PROCEDURE — 25000003 PHARM REV CODE 250: Performed by: NURSE ANESTHETIST, CERTIFIED REGISTERED

## 2022-01-26 PROCEDURE — 81025 URINE PREGNANCY TEST: CPT | Performed by: INTERNAL MEDICINE

## 2022-01-26 PROCEDURE — 37000009 HC ANESTHESIA EA ADD 15 MINS: Performed by: INTERNAL MEDICINE

## 2022-01-26 PROCEDURE — 25000003 PHARM REV CODE 250: Performed by: INTERNAL MEDICINE

## 2022-01-26 PROCEDURE — 88305 TISSUE EXAM BY PATHOLOGIST: CPT | Performed by: PATHOLOGY

## 2022-01-26 PROCEDURE — 27201012 HC FORCEPS, HOT/COLD, DISP: Performed by: INTERNAL MEDICINE

## 2022-01-26 PROCEDURE — 45380 COLONOSCOPY AND BIOPSY: CPT | Performed by: INTERNAL MEDICINE

## 2022-01-26 PROCEDURE — 45380 COLONOSCOPY AND BIOPSY: CPT | Mod: ,,, | Performed by: INTERNAL MEDICINE

## 2022-01-26 PROCEDURE — 37000008 HC ANESTHESIA 1ST 15 MINUTES: Performed by: INTERNAL MEDICINE

## 2022-01-26 PROCEDURE — 88305 TISSUE EXAM BY PATHOLOGIST: CPT | Mod: 26,,, | Performed by: PATHOLOGY

## 2022-01-26 PROCEDURE — 88305 TISSUE EXAM BY PATHOLOGIST: ICD-10-PCS | Mod: 26,,, | Performed by: PATHOLOGY

## 2022-01-26 RX ORDER — SODIUM CHLORIDE 9 MG/ML
INJECTION, SOLUTION INTRAVENOUS CONTINUOUS
Status: DISCONTINUED | OUTPATIENT
Start: 2022-01-26 | End: 2022-01-26 | Stop reason: HOSPADM

## 2022-01-26 RX ORDER — PROPOFOL 10 MG/ML
VIAL (ML) INTRAVENOUS
Status: DISCONTINUED | OUTPATIENT
Start: 2022-01-26 | End: 2022-01-26

## 2022-01-26 RX ORDER — LIDOCAINE HYDROCHLORIDE 20 MG/ML
INJECTION INTRAVENOUS
Status: DISCONTINUED | OUTPATIENT
Start: 2022-01-26 | End: 2022-01-26

## 2022-01-26 RX ORDER — FENTANYL CITRATE 50 UG/ML
INJECTION, SOLUTION INTRAMUSCULAR; INTRAVENOUS
Status: DISCONTINUED | OUTPATIENT
Start: 2022-01-26 | End: 2022-01-26

## 2022-01-26 RX ORDER — SODIUM CHLORIDE 0.9 % (FLUSH) 0.9 %
10 SYRINGE (ML) INJECTION
Status: DISCONTINUED | OUTPATIENT
Start: 2022-01-26 | End: 2022-01-26 | Stop reason: HOSPADM

## 2022-01-26 RX ORDER — PROPOFOL 10 MG/ML
VIAL (ML) INTRAVENOUS CONTINUOUS PRN
Status: DISCONTINUED | OUTPATIENT
Start: 2022-01-26 | End: 2022-01-26

## 2022-01-26 RX ADMIN — PROPOFOL 40 MG: 10 INJECTION, EMULSION INTRAVENOUS at 10:01

## 2022-01-26 RX ADMIN — PROPOFOL 150 MCG/KG/MIN: 10 INJECTION, EMULSION INTRAVENOUS at 10:01

## 2022-01-26 RX ADMIN — SODIUM CHLORIDE: 0.9 INJECTION, SOLUTION INTRAVENOUS at 09:01

## 2022-01-26 RX ADMIN — FENTANYL CITRATE 50 MCG: 50 INJECTION, SOLUTION INTRAMUSCULAR; INTRAVENOUS at 10:01

## 2022-01-26 RX ADMIN — PROPOFOL 50 MG: 10 INJECTION, EMULSION INTRAVENOUS at 10:01

## 2022-01-26 RX ADMIN — PROPOFOL 80 MG: 10 INJECTION, EMULSION INTRAVENOUS at 10:01

## 2022-01-26 RX ADMIN — LIDOCAINE HYDROCHLORIDE 100 MG: 20 INJECTION, SOLUTION INTRAVENOUS at 10:01

## 2022-01-26 NOTE — PR[employer]ATION PATIENT INSTRUCTIONS
Discharge Summary/Instructions after an Endoscopic Procedure  Patient Name: April Schamberger  Patient MRN: [id number]  Patient YOB: 1985 Wednesday, January 26, 2022  Inocente Roe MD  Dear patient,  As a result of recent federal legislation (The Federal Cures Act), you may   receive lab or pathology results from your procedure in your [facility]   account before your physician is able to contact you. Your physician or   their representative will relay the results to you with their   recommendations at their soonest availability.  Thank you,  Your health is very important to us during the Covid Crisis. Following your   procedure today, you will receive a daily text for 2 weeks asking about   signs or symptoms of Covid 19.  Please respond to this text when you   receive it so we can follow up and keep you as safe as possible.   RESTRICTIONS:  During your procedure today, you received medications for sedation.  These   medications may affect your judgment, balance and coordination.  Therefore,   for 24 hours, you have the following restrictions:   - DO NOT drive a car, operate machinery, make legal/financial decisions,   sign important papers or drink alcohol.    ACTIVITY:  Today: no heavy lifting, straining or running due to procedural   sedation/anesthesia.  The following day: return to full activity including work.  DIET:  Eat and drink normally unless instructed otherwise.     TREATMENT FOR COMMON SIDE EFFECTS:  - Mild abdominal pain, nausea, belching, bloating or excessive gas:  rest,   eat lightly and use a heating pad.  - Sore Throat: treat with throat lozenges and/or gargle with warm salt   water.  - Because air was used during the procedure, expelling large amounts of air   from your rectum or belching is normal.  - If a bowel prep was taken, you may not have a bowel movement for 1-3 days.    This is normal.  SYMPTOMS TO WATCH FOR AND REPORT TO YOUR PHYSICIAN:  1. Abdominal pain or bloating,  other than gas cramps.  2. Chest pain.  3. Back pain.  4. Signs of infection such as: chills or fever occurring within 24 hours   after the procedure.  5. Rectal bleeding, which would show as bright red, maroon, or black stools.   (A tablespoon of blood from the rectum is not serious, especially if   hemorrhoids are present.)  6. Vomiting.  7. Weakness or dizziness.  GO DIRECTLY TO THE NEAREST EMERGENCY ROOM IF YOU HAVE ANY OF THE FOLLOWING:      Difficulty breathing              Chills and/or fever over 101 F   Persistent vomiting and/or vomiting blood   Severe abdominal pain   Severe chest pain   Black, tarry stools   Bleeding- more than one tablespoon   Any other symptom or condition that you feel may need urgent attention  Your doctor recommends these additional instructions:  If any biopsies were taken, your doctors clinic will contact you in 1 to 2   weeks with any results.  - Discharge patient to home.   - Patient has a contact number available for emergencies.  The signs and   symptoms of potential delayed complications were discussed with the   patient.  Return to normal activities tomorrow.  Written discharge   instructions were provided to the patient.   - Resume previous diet.   - Continue present medications.   - Await pathology results.   - Repeat colonoscopy in 10 years for screening purposes.   - Return to GI office after studies are complete.   - Would consider checking full stool studies including calprotectin.   Consider small bowel video capsule if there remains concern about Crohns   disease, but exam today does not find any suggestion of Crohns in the colon   and good look into TI. Consider further treatment for IBS-D  For questions, problems or results please call your physician - Inocente Roe MD.  EMERGENCY PHONE NUMBER: [phone],  LAB RESULTS: [phone]  IF A COMPLICATION OR EMERGENCY SITUATION ARISES AND YOU ARE UNABLE TO REACH   YOUR PHYSICIAN - GO DIRECTLY TO THE  EMERGENCY ROOM.  Inocente Roe MD  1/26/2022 10:23:58 AM  This report has been verified and signed electronically.  Dear patient,  As a result of recent federal legislation (The Federal Cures Act), you may   receive lab or pathology results from your procedure in your [facility]   account before your physician is able to contact you. Your physician or   their representative will relay the results to you with their   recommendations at their soonest availability.  Thank you,  [employer]

## 2022-01-26 NOTE — TRANSFER OF CARE
"Anesthesia Transfer of Care Note    Patient: April Wendt Schamberger    Procedure(s) Performed: Procedure(s) (LRB):  COLONOSCOPY (N/A)    Patient location: GI    Anesthesia Type: MAC    Transport from OR: Transported from OR on room air with adequate spontaneous ventilation    Post pain: adequate analgesia    Post assessment: no apparent anesthetic complications and tolerated procedure well    Post vital signs: stable    Level of consciousness: awake, alert and oriented    Nausea/Vomiting: no nausea/vomiting    Complications: none    Transfer of care protocol was followed      Last vitals:   Visit Vitals  /85   Pulse 100   Temp 36.8 °C (98.2 °F)   Resp 18   Ht 5' 8" (1.727 m)   Wt 83.5 kg (184 lb)   SpO2 100%   Breastfeeding No   BMI 27.98 kg/m²     "

## 2022-01-26 NOTE — ANESTHESIA PREPROCEDURE EVALUATION
01/26/2022 April Wendt Schamberger is a 36 y.o., female for colonoscopy under MAC    Past Medical History:   Diagnosis Date    Abnormal Pap smear of cervix 2000    Cryo Done    ADD (attention deficit disorder)     Breast disorder     Breast Cysts    Esophageal reflux     Fibroids     Hypertension     IBS (irritable bowel syndrome)     Insomnia     Kidney stones     Mastocytosis     Relies on partner vasectomy for contraception     Upper GI bleed      Past Surgical History:   Procedure Laterality Date    COLONOSCOPY N/A 4/28/2017    Procedure: COLONOSCOPY;  Surgeon: Bren Larkin MD;  Location: [facility] ENDO;  Service: Endoscopy;  Laterality: N/A;    EPIDURAL STEROID INJECTION INTO CERVICAL SPINE N/A 12/1/2020    Procedure: Injection-steroid-epidural-cervical--C7-T1;  Surgeon: Corky Argueta Jr., MD;  Location: [facility] PAIN MGT;  Service: Pain Management;  Laterality: N/A;    EPIDURAL STEROID INJECTION INTO CERVICAL SPINE N/A 12/2/2021    Procedure: Cervical Epidural Steroid Injection C7-T1 (No Sedation);  Surgeon: Corky Argueta Jr., MD;  Location: [facility] PAIN MGT;  Service: Pain Management;  Laterality: N/A;    ESOPHAGOGASTRODUODENOSCOPY  2012    LASIK Bilateral 2005    REFRACTIVE SURGERY      TONSILLECTOMY, ADENOIDECTOMY  1988    VAGINAL DELIVERY      x 2         Anesthesia Evaluation    I have reviewed the Patient Summary Reports.   I have reviewed the NPO Status.   I have reviewed the Medications.     Review of Systems  Social:  Former Smoker        Physical Exam  General:  Well nourished    Airway/Jaw/Neck:  Airway Findings: Mallampati: II      Chest/Lungs:  Chest/Lungs Clear    Heart/Vascular:  Heart Findings: Normal            Anesthesia Plan  Type of Anesthesia, risks & benefits discussed:  Anesthesia Type:  MAC    Patient's Preference:   Plan Factors:          Intra-op Monitoring  Plan: standard ASA monitors  Intra-op Monitoring Plan Comments:   Post Op Pain Control Plan:   Post Op Pain Control Plan Comments:     Induction:    Beta Blocker:  Patient is on a Beta-Blocker and has received one dose within the past 24 hours (No further documentation required).       Informed Consent: Patient understands risks and agrees with Anesthesia plan.  Questions answered. Anesthesia consent signed with patient.  ASA Score: 2     Day of Surgery Review of History & Physical:            Ready For Surgery From Anesthesia Perspective.

## 2022-01-26 NOTE — ANESTHESIA POSTPROCEDURE EVALUATION
Anesthesia Post Evaluation    Patient: April Wendt Schamberger    Procedure(s) Performed: Procedure(s) (LRB):  COLONOSCOPY (N/A)    Final Anesthesia Type: MAC      Patient location during evaluation: GI PACU  Patient participation: Yes- Able to Participate  Level of consciousness: awake and alert and oriented  Post-procedure vital signs: reviewed and stable  Pain management: adequate  Airway patency: patent    PONV status at discharge: No PONV  Anesthetic complications: no      Cardiovascular status: blood pressure returned to baseline, hemodynamically stable and stable  Respiratory status: unassisted, spontaneous ventilation and room air  Hydration status: euvolemic  Follow-up not needed.          Vitals Value Taken Time   BP  01/26/22 1024   Temp  01/26/22 1024   Pulse  01/26/22 1024   Resp  01/26/22 1024   SpO2  01/26/22 1024         No case tracking events are documented in the log.      Pain/Valeria Score: No data recorded

## 2022-01-26 NOTE — DISCHARGE INSTRUCTIONS
Patient Education       Colonoscopy Discharge Instructions   About this topic   Colonoscopy is done so your doctor can see the inside of your large intestines, also called your colon, and your rectum. It uses a lighted tube called a scope, which has a tiny camera that can be moved through the large intestine. This may be done to:  · Screen for colon cancer or polyps  · Look for the source of rectal bleeding  · Find the cause of changes in your bowel movement  · Find the cause of belly or rectal pain  · Check results from other tests  · Check your response to treatment for other diseases     What care is needed at home?   · Ask your doctor what you need to do when you go home. Make sure you ask questions if you do not understand what the doctor says. This way you will know what you need to do.  · Rest. Do not drive the rest of the day. Do not sign any important papers or make any important decisions for the next 24 hours.  · You may feel groggy. Take extra care when moving about.  · You may have gas or mild cramping. This is normal.  · A small amount of bleeding may happen during the first few days after your procedure.  · Start back on your normal diet unless you need some changes in your diet.  What follow-up care is needed?   · Your doctor will talk to you about your test results. Your doctor may ask you to make visits to the office to check on your progress. Be sure to keep these visits.  · Ask your doctor when you need to have another colonoscopy. The amount of time between tests is based on what was found during this test. People with no polyps may be able to wait 10 years to have another colonoscopy. Other people may need to have this test repeated in 1 year because of the kind of polyps that were found in their colon. Ask your doctor when you need to come back.  What drugs may be needed?   Ask your doctor what drugs you will need to take. Take your drugs as told by your doctor.  Will physical activity be  limited?   Physical activities may be limited for a short time. Rest after the procedure. You may go back to your normal activities within a day or so.  What changes to diet are needed?   · Drink 6 to 8 glasses of water each day.  · Eat food rich in fiber like fresh fruits and vegetables.  What problems could happen?   · Tear inside your colon  · Bleeding can happen up to a few days afterwards  When do I need to call the doctor?   · Fever of 100.4°F (38°C) or higher, chills  · Bleeding during bowel movements (1 teaspoon (5 mL) or more) or maroon stool  · Throwing up more than 3 times in the next 48 hours  · Feeling dizzy  · Feeling weak  · Belly pain that is getting worse  · Not able to have a bowel movement for more than 2 days  · Hard swollen belly  Teach Back: Helping You Understand   The Teach Back Method helps you understand the information we are giving you. After you talk with the staff, tell them in your own words what you learned. This helps to make sure the staff has described each thing clearly. It also helps to explain things that may have been confusing. Before going home, make sure you can do these:  · I can tell you about my procedure.  · I can tell you what signs are normal after my procedure.  · I can tell you what I will do if I throw up more than 3 times in the next 48 hours or I have more belly pain.  Where can I learn more?   American Cancer Society  https://www.cancer.org/cancer/colon-rectal-cancer/yiimmdqsv-blviyxcbb-lybmsbq/xneryhuuh-zmuvy-zyer.html   American Society of Clinical Oncology  https://www.cancer.net/navigating-cancer-care/diagnosing-cancer/tests-and-procedures/colonoscopy   Last Reviewed Date   2021-03-10  Consumer Information Use and Disclaimer   This information is not specific medical advice and does not replace information you receive from your health care provider. This is only a brief summary of general information. It does NOT include all information about conditions,  illnesses, injuries, tests, procedures, treatments, therapies, discharge instructions or life-style choices that may apply to you. You must talk with your health care provider for complete information about your health and treatment options. This information should not be used to decide whether or not to accept your health care providers advice, instructions or recommendations. Only your health care provider has the knowledge and training to provide advice that is right for you.  Copyright   Copyright © 2021 [employer] and its affiliates and/or licensors. All rights reserved.

## 2022-01-26 NOTE — PLAN OF CARE
Patient recovered to baseline.  VSS.  Patient seen by MD at bedside.  Discharge instructions reviewed with patient.  Federal cures act read and explained to patient.  Patient verbalized understanding.    Patient escorted out via WC w/staff member and discharged w/family.

## 2022-01-27 ENCOUNTER — TELEPHONE (OUTPATIENT)
Dept: ENDOSCOPY | Facility: HOSPITAL | Age: 37
End: 2022-01-27
Payer: COMMERCIAL

## 2022-01-27 ENCOUNTER — HOSPITAL ENCOUNTER (OUTPATIENT)
Dept: RADIOLOGY | Facility: HOSPITAL | Age: 37
Discharge: HOME | End: 2022-01-27
Attending: NURSE PRACTITIONER
Payer: COMMERCIAL

## 2022-01-27 PROCEDURE — 72141 MRI NECK SPINE W/O DYE: CPT | Mod: TC

## 2022-01-27 PROCEDURE — 72141 MRI NECK SPINE W/O DYE: CPT | Mod: 26,,, | Performed by: RADIOLOGY

## 2022-01-27 PROCEDURE — 72141 MRI CERVICAL SPINE WITHOUT CONTRAST: ICD-10-PCS | Mod: 26,,, | Performed by: RADIOLOGY

## 2022-01-27 NOTE — TELEPHONE ENCOUNTER
Post procedure f/u call, no answer, message left to call [phone] for any post procedure concerns

## 2022-02-01 ENCOUNTER — HOSPITAL ENCOUNTER (OUTPATIENT)
Dept: RADIOLOGY | Facility: HOSPITAL | Age: 37
Discharge: HOME | End: 2022-02-01
Attending: OBSTETRICS & GYNECOLOGY
Payer: COMMERCIAL

## 2022-02-01 ENCOUNTER — PATIENT MESSAGE (OUTPATIENT)
Dept: GASTROENTEROLOGY | Facility: CLINIC | Age: 37
End: 2022-02-01
Payer: COMMERCIAL

## 2022-02-01 DIAGNOSIS — Z12.31 VISIT FOR SCREENING MAMMOGRAM: ICD-10-CM

## 2022-02-01 LAB
FINAL PATHOLOGIC DIAGNOSIS: NORMAL
GROSS: NORMAL
Lab: NORMAL

## 2022-02-08 ENCOUNTER — PATIENT MESSAGE (OUTPATIENT)
Dept: GASTROENTEROLOGY | Facility: CLINIC | Age: 37
End: 2022-02-08
Payer: COMMERCIAL

## 2022-02-09 ENCOUNTER — HOSPITAL ENCOUNTER (OUTPATIENT)
Dept: RADIOLOGY | Facility: HOSPITAL | Age: 37
Discharge: HOME | End: 2022-02-09
Attending: OBSTETRICS & GYNECOLOGY
Payer: COMMERCIAL

## 2022-02-09 PROCEDURE — 77067 MAMMO DIGITAL SCREENING BILAT WITH TOMO: ICD-10-PCS | Mod: 26,,, | Performed by: RADIOLOGY

## 2022-02-09 PROCEDURE — 77063 BREAST TOMOSYNTHESIS BI: CPT | Mod: 26,,, | Performed by: RADIOLOGY

## 2022-02-09 PROCEDURE — 77063 MAMMO DIGITAL SCREENING BILAT WITH TOMO: ICD-10-PCS | Mod: 26,,, | Performed by: RADIOLOGY

## 2022-02-09 PROCEDURE — 77067 SCR MAMMO BI INCL CAD: CPT | Mod: 26,,, | Performed by: RADIOLOGY

## 2022-02-09 PROCEDURE — 77067 SCR MAMMO BI INCL CAD: CPT | Mod: TC

## 2022-02-11 ENCOUNTER — OFFICE VISIT (OUTPATIENT)
Dept: PSYCHIATRY | Facility: CLINIC | Age: 37
End: 2022-02-11
Payer: COMMERCIAL

## 2022-02-11 DIAGNOSIS — F90.0 ATTENTION DEFICIT HYPERACTIVITY DISORDER (ADHD), PREDOMINANTLY INATTENTIVE TYPE: Primary | ICD-10-CM

## 2022-02-11 DIAGNOSIS — F41.9 ANXIETY: ICD-10-CM

## 2022-02-11 DIAGNOSIS — F32.81 PMDD (PREMENSTRUAL DYSPHORIC DISORDER): ICD-10-CM

## 2022-02-11 DIAGNOSIS — F41.1 GAD (GENERALIZED ANXIETY DISORDER): ICD-10-CM

## 2022-02-11 DIAGNOSIS — K58.0 IRRITABLE BOWEL SYNDROME WITH DIARRHEA: ICD-10-CM

## 2022-02-11 PROCEDURE — 99214 PR OFFICE/OUTPT VISIT, EST, LEVL IV, 30-39 MIN: ICD-10-PCS | Mod: 95,,, | Performed by: STUDENT IN AN ORGANIZED HEALTH CARE EDUCATION/TRAINING PROGRAM

## 2022-02-11 PROCEDURE — 99214 OFFICE O/P EST MOD 30 MIN: CPT | Mod: 95,,, | Performed by: STUDENT IN AN ORGANIZED HEALTH CARE EDUCATION/TRAINING PROGRAM

## 2022-02-11 RX ORDER — LISDEXAMFETAMINE DIMESYLATE 60 MG/1
60 CAPSULE ORAL EVERY MORNING
Qty: 30 CAPSULE | Refills: 0 | Status: SHIPPED | OUTPATIENT
Start: 2022-04-11 | End: 2022-05-12 | Stop reason: SDUPTHER

## 2022-02-11 RX ORDER — LISDEXAMFETAMINE DIMESYLATE 60 MG/1
60 CAPSULE ORAL EVERY MORNING
Qty: 30 CAPSULE | Refills: 0 | Status: SHIPPED | OUTPATIENT
Start: 2022-03-11 | End: 2022-04-19 | Stop reason: SDUPTHER

## 2022-02-11 RX ORDER — LISDEXAMFETAMINE DIMESYLATE 60 MG/1
60 CAPSULE ORAL EVERY MORNING
Qty: 30 CAPSULE | Refills: 0 | Status: SHIPPED | OUTPATIENT
Start: 2022-02-11 | End: 2022-03-20

## 2022-02-11 RX ORDER — VENLAFAXINE HYDROCHLORIDE 37.5 MG/1
37.5 CAPSULE, EXTENDED RELEASE ORAL 2 TIMES DAILY
Qty: 60 CAPSULE | Refills: 3 | Status: SHIPPED | OUTPATIENT
Start: 2022-02-11 | End: 2022-05-12 | Stop reason: SDUPTHER

## 2022-02-11 RX ORDER — AMITRIPTYLINE HYDROCHLORIDE 25 MG/1
25 TABLET, FILM COATED ORAL NIGHTLY
Qty: 90 TABLET | Refills: 0 | Status: SHIPPED | OUTPATIENT
Start: 2022-02-11 | End: 2022-05-12 | Stop reason: SDUPTHER

## 2022-02-11 NOTE — PROGRESS NOTES
OUTPATIENT PSYCHIATRY FOLLOW UP VISIT    ENCOUNTER DATE:  2/11/2022  SITE:  [facility]  LENGTH OF SESSION:  30 minutes      CHIEF COMPLAINT:  Follow up for medication management    HISTORY OF PRESENTING ILLNESS:  April Wendt Schamberger is a 36 y.o. female with history of ADHD, PMDD, NIRAV, IBS-D who presents for follow up appointment.      TELE PSYCHIATRY Disclaimer   *The patient was informed despite using HIPPA compliant technology there may be risks including security breach, technological failure, inability to perform a comprehensive physical exam which could delay or prevent an accurate diagnosis, and potential complications from treatment decisions rendered over a telemedical platform. The patient understands and consented to the use of tele-health service as being a safe measure to mitigate during COVID 19 Pandemic .  The patient was also informed of the relationship between the physician and patient and the respective role of any other health care provider with respect to management of the patient; and notified that the pt may decline to receive medical services by telemedicine and may withdraw from such care at any time.      Patient's Current location: at work in [facility] facility  Visit type: Virtual visit with synchronous audio and video  Total time spent with patient: 20 minutes spent face to face, 30 minutes total patient care time      Last Plan  - Continue Vyvanse 60 mg po daily for ADHD, discussed that she want to go up on dose next visit to given effect seems to be waning  - Continue amitriptyline 25 mg qhs for anxiety, sleep, and IBS with diarrhea; patient taking full tab daily; consider increasing for IBS-D once ADHD medication is stable  - Continue venlafaxine 37.5mg for PMDD daily, was previously luteal phase only but feels daily use has been more beneficial         Interval history as told by Patient - & or family/friend/spouse/caregiver with pts permission    Feels  increased vyvanse has been better at 60 mg daily.  Is on a new medication for diarrhea - Viberzi.  Feels that it has helped, does not feet it interacts with other medications.  Still taking other medications as prescribed.  Denies any side effects from other medications.  States that GI testing was normal, and has IBD.        PSYCHIATRIC REVIEW OF SYSTEMS:    Symptoms of Depression: None    Symptoms of Anxiety/ panic attacks: None    Symptoms of Luly/Hypomania: None    Symptoms of psychosis: None    Sleep: None    Alcohol: No excessive drinking reported    Illicit Drugs: No illicit substance use reported      Risk Parameters:  Patient denies no SI, HI or self-injurious behavior      PSYCHIATRIC MED REVIEW  Current medications:  Vyvanse 60 mg po daily for ADHD  Amitriptyline 25 mg qhs for anxiety, sleep, and IBS with diarrhea; patient taking full tab daily; consider increasing for IBS-D  Venlafaxine 37.5mg on onset of PMDD symptoms until 2nd day of menses    Current psych meds  Medication side effects:  none  Medication compliance:  Full    Previous psych meds trials  Benadryl  Melatonin  ambien  Adderall  OTC natural herbs  zoloft - diarrhea was side effect    PAST PSYCHIATRIC, MEDICAL, AND SOCIAL HISTORY REVIEWED  The patient's past medical, family and social history have been reviewed and updated as appropriate within the electronic medical record - see encounter notes.    MEDICAL REVIEW OF SYSTEMS:  Complete review of systems performed covering Constitutional, Musculoskeletal, Neurologic.  All systems negative.    ALL MEDICATIONS:    Current Outpatient Medications:     acetaminophen (TYLENOL) 500 MG tablet, Take 2 tablets (1,000 mg total) by mouth 3 (three) times daily as needed for Pain., Disp: , Rfl: 0    amitriptyline (ELAVIL) 25 MG tablet, Take 1 tablet (25 mg total) by mouth every evening., Disp: 30 tablet, Rfl: 3    butalbital-acetaminophen-caffeine -40 mg (FIORICET, ESGIC) -40 mg per  tablet, Take 1 tablet by mouth every 6 (six) hours as needed for Headaches., Disp: 30 tablet, Rfl: 0    clindamycin phosphate 1% (CLINDAGEL) 1 % gel, Apply topically 2 (two) times daily. To entire face, Disp: 30 g, Rfl: 5    doxycycline monohydrate 100 mg Tab, Take 1 tablet by mouth everyday with food. Avoid lying down for 1 hour after taking. Avoid the sun. (Patient taking differently: Take 1 tablet by mouth everyday with food. Avoid lying down for 1 hour after taking. Avoid the sun.), Disp: 30 tablet, Rfl: 2    eluxadoline (VIBERZI) 75 mg Tab, Take 75 mg by mouth 2 (two) times daily., Disp: 60 tablet, Rfl: 0    eszopiclone (LUNESTA) 3 mg Tab, Take 1 tablet (3 mg total) by mouth nightly as needed (insomnia)., Disp: 30 tablet, Rfl: 0    fluconazole (DIFLUCAN) 150 MG Tab, Take 1 tablet as needed for yeast infection, Disp: 30 tablet, Rfl: 1    lisdexamfetamine (VYVANSE) 50 MG capsule, Take 1 capsule (50 mg total) by mouth every morning., Disp: 30 capsule, Rfl: 0    lisdexamfetamine (VYVANSE) 60 MG capsule, Take 1 capsule (60 mg total) by mouth every morning., Disp: 30 capsule, Rfl: 0    loperamide (IMODIUM) 1 mg/7.5 mL solution, Take 0.1 mg/kg by mouth every 12 (twelve) hours., Disp: , Rfl:     loperamide (IMODIUM) 2 mg capsule, Take 2 mg by mouth 4 (four) times daily as needed for Diarrhea., Disp: , Rfl:     metoprolol succinate (TOPROL-XL) 50 MG 24 hr tablet, Take 1 tablet (50 mg total) by mouth 2 (two) times daily., Disp: 180 tablet, Rfl: 1    ondansetron (ZOFRAN-ODT) 4 MG TbDL, Dissolve 1 tablet (4 mg total) by mouth every 6 (six) hours as needed (nausea)., Disp: 30 tablet, Rfl: 1    pregabalin (LYRICA) 50 MG capsule, Take 1 capsule (50 mg total) by mouth 2 (two) times daily., Disp: 60 capsule, Rfl: 2    sod sulf-pot chloride-mag sulf (SUTAB) 1.479-0.188- 0.225 gram tablet, Take 12 tablets by mouth once daily. Take according to package instructions with indicated amount of water., Disp: 24 tablet,  Rfl: 0    tiZANidine (ZANAFLEX) 4 MG tablet, Take 1 tablet (4 mg total) by mouth 3 (three) times daily as needed., Disp: 90 tablet, Rfl: 1    tretinoin (RETIN-A) 0.025 % cream, Apply topically every evening. Pea sized amount to entire face. If irritation occurs, decrease use to every other night., Disp: 20 g, Rfl: 5    valsartan (DIOVAN) 320 MG tablet, Take 1 tablet (320 mg total) by mouth once daily., Disp: 90 tablet, Rfl: 1    venlafaxine (EFFEXOR-XR) 37.5 MG 24 hr capsule, Take 1 capsule (37.5 mg total) by mouth once daily., Disp: 30 capsule, Rfl: 3  No current facility-administered medications for this visit.    Facility-Administered Medications Ordered in Other Visits:     0.9%  NaCl infusion, 500 mL, Intravenous, Continuous, Corky Argueta Jr., MD    ALLERGIES:  Review of patient's allergies indicates:   Allergen Reactions    Lactose     Azithromycin Nausea And Vomiting       RELEVANT LABS/STUDIES:    Lab Results   Component Value Date    WBC 8.92 09/27/2021    HGB 13.3 09/27/2021    HCT 39.7 09/27/2021    MCV 86 09/27/2021     09/27/2021     BMP  Lab Results   Component Value Date     09/27/2021    K 4.3 09/27/2021     09/27/2021    CO2 23 09/27/2021    BUN 7 09/27/2021    CREATININE 0.7 09/27/2021    CALCIUM 9.4 09/27/2021    ANIONGAP 8 09/27/2021    ESTGFRAFRICA >60 09/27/2021    EGFRNONAA >60 09/27/2021     Lab Results   Component Value Date    ALT 12 09/27/2021    AST 12 09/27/2021    ALKPHOS 56 09/27/2021    BILITOT 0.3 09/27/2021     Lab Results   Component Value Date    TSH 0.704 09/27/2021     Lab Results   Component Value Date    HGBA1C 5.1 09/27/2021       VITALS  There were no vitals filed for this visit.    PHYSICAL EXAM  General: No acute distress, well developed/nourished  Neurologic:   Gait: Normal   Psychomotor signs:  No involuntary movements or tremor  AIMS: none    PSYCHIATRIC EXAM:     Mental Status Exam:  Appearance: good grooming, age  appropriate  Behavior/Cooperation: normal, cooperative   Speech:  normal tone, normal rate, normal pitch, normal volume  Language: uses words appropriately; NO aphasia or dysarthria  Mood: Good  Affect:  Euthymic  Thought Process: Normal and logical  Thought Content: No SI, HI, AVH  Level of Consciousness: Alert and Oriented x3  Memory:  Recent and remote intact  Attention/concentration: appropriate for age/education.   Fund of Knowledge: appears adequate  Insight:  Intact  Judgment: Intact      IMPRESSION:    April Wendt Schamberger is a 36 y.o. female with history of ADHD, PMDD, NIRAV, IBS-D who presents for follow up appointment for medication management.  The  has been reviewed, no concerning signs were noted.       Status/Progress:  Based on the examination today, the patient's problem(s) is/are well controlled.  New problems have not been presented today.     DIAGNOSES:    ICD-10-CM ICD-9-CM   1. Attention deficit hyperactivity disorder (ADHD), predominantly inattentive type  F90.0 314.00   2. PMDD (premenstrual dysphoric disorder)  F32.81 625.4   3. NIRAV (generalized anxiety disorder)  F41.1 300.02       PLAN:  Psych Med:  - Continue Vyvanse 60 mg po daily for ADHD, discussed that she want to go up on dose next visit to given effect seems to be waning  - Continue amitriptyline 25 mg qhs for anxiety, sleep, and IBS with diarrhea; patient taking full tab daily; consider increasing for IBS-D once ADHD medication is stable  -Increase venlafaxine to 37.5mg for PMDD daily with extra 37.5 mg for luteal phase symptoms (prescribed as 37.5 mg two times daily)     Discussed with patient informed consent, risks vs. benefits, alternative treatments, side effect profile and the inherent unpredictability of individual responses to these treatments. Answered any questions patient may have had. The patient expresses understanding of the above and displays the capacity to agree with this current plan     Other: None    RETURN  TO CLINIC:  3 months        Daniel Pal M.D.  [facility], PGY-3  2/11/2022 10:52 AM

## 2022-02-24 DIAGNOSIS — F51.01 PRIMARY INSOMNIA: ICD-10-CM

## 2022-02-24 DIAGNOSIS — R51.9 NONINTRACTABLE HEADACHE, UNSPECIFIED CHRONICITY PATTERN, UNSPECIFIED HEADACHE TYPE: ICD-10-CM

## 2022-02-24 RX ORDER — ESZOPICLONE 3 MG/1
3 TABLET, FILM COATED ORAL NIGHTLY PRN
Qty: 30 TABLET | Refills: 0 | Status: SHIPPED | OUTPATIENT
Start: 2022-02-24 | End: 2022-03-23 | Stop reason: SDUPTHER

## 2022-02-24 RX ORDER — BUTALBITAL, ACETAMINOPHEN AND CAFFEINE 50; 325; 40 MG/1; MG/1; MG/1
1 TABLET ORAL EVERY 6 HOURS PRN
Qty: 30 TABLET | Refills: 0 | Status: SHIPPED | OUTPATIENT
Start: 2022-02-24 | End: 2022-03-23 | Stop reason: SDUPTHER

## 2022-03-03 ENCOUNTER — PATIENT MESSAGE (OUTPATIENT)
Dept: GASTROENTEROLOGY | Facility: CLINIC | Age: 37
End: 2022-03-03
Payer: COMMERCIAL

## 2022-03-06 ENCOUNTER — PATIENT MESSAGE (OUTPATIENT)
Dept: GASTROENTEROLOGY | Facility: CLINIC | Age: 37
End: 2022-03-06
Payer: COMMERCIAL

## 2022-03-08 ENCOUNTER — PATIENT MESSAGE (OUTPATIENT)
Dept: GASTROENTEROLOGY | Facility: CLINIC | Age: 37
End: 2022-03-08
Payer: COMMERCIAL

## 2022-03-08 NOTE — TELEPHONE ENCOUNTER
Spoke with patient via telephone. Viberzi helping but still having loose stools and having to take Imodium. Will trial Xifaxan. Continue Metamucil daily.

## 2022-03-11 NOTE — PROGRESS NOTES
STAFF COMMENTS: I have discussed pt with Dr. Pal and reviewed the history and exam. I agree and concur with the assessment and plan.

## 2022-03-14 DIAGNOSIS — M79.18 MYOFASCIAL PAIN SYNDROME, CERVICAL: ICD-10-CM

## 2022-03-14 DIAGNOSIS — M54.12 CERVICAL RADICULOPATHY: ICD-10-CM

## 2022-03-14 DIAGNOSIS — M54.2 CERVICAL PAIN (NECK): ICD-10-CM

## 2022-03-14 DIAGNOSIS — M62.838 MUSCLE SPASM: ICD-10-CM

## 2022-03-14 RX ORDER — PREGABALIN 50 MG/1
50 CAPSULE ORAL 2 TIMES DAILY
Qty: 60 CAPSULE | Refills: 2 | Status: SHIPPED | OUTPATIENT
Start: 2022-03-14

## 2022-03-14 RX ORDER — TIZANIDINE 4 MG/1
4 TABLET ORAL 3 TIMES DAILY PRN
Qty: 90 TABLET | Refills: 1 | Status: SHIPPED | OUTPATIENT
Start: 2022-03-14 | End: 2022-05-09 | Stop reason: SDUPTHER

## 2022-03-17 ENCOUNTER — TELEPHONE (OUTPATIENT)
Dept: NEUROSURGERY | Facility: CLINIC | Age: 37
End: 2022-03-17
Payer: COMMERCIAL

## 2022-03-17 DIAGNOSIS — M54.12 CERVICAL RADICULOPATHY: Primary | ICD-10-CM

## 2022-03-22 ENCOUNTER — PATIENT MESSAGE (OUTPATIENT)
Dept: ADMINISTRATIVE | Facility: OTHER | Age: 37
End: 2022-03-22
Payer: COMMERCIAL

## 2022-03-22 ENCOUNTER — OFFICE VISIT (OUTPATIENT)
Dept: NEUROSURGERY | Facility: CLINIC | Age: 37
End: 2022-03-22
Payer: COMMERCIAL

## 2022-03-22 ENCOUNTER — HOSPITAL ENCOUNTER (OUTPATIENT)
Dept: RADIOLOGY | Facility: HOSPITAL | Age: 37
Discharge: HOME | End: 2022-03-22
Attending: PHYSICIAN ASSISTANT
Payer: COMMERCIAL

## 2022-03-22 VITALS — DIASTOLIC BLOOD PRESSURE: 99 MMHG | SYSTOLIC BLOOD PRESSURE: 154 MMHG | HEART RATE: 72 BPM

## 2022-03-22 DIAGNOSIS — M54.12 CERVICAL RADICULOPATHY: ICD-10-CM

## 2022-03-22 DIAGNOSIS — G56.20 ULNAR NEUROPATHY, UNSPECIFIED LATERALITY: ICD-10-CM

## 2022-03-22 DIAGNOSIS — M54.2 NECK PAIN: ICD-10-CM

## 2022-03-22 DIAGNOSIS — M40.12 OTHER SECONDARY KYPHOSIS, CERVICAL REGION: Primary | ICD-10-CM

## 2022-03-22 PROCEDURE — 3080F DIAST BP >= 90 MM HG: CPT | Mod: CPTII,S$GLB,, | Performed by: NEUROLOGICAL SURGERY

## 2022-03-22 PROCEDURE — 99999 PR PBB SHADOW E&M-EST. PATIENT-LVL III: CPT | Mod: PBBFAC,,, | Performed by: NEUROLOGICAL SURGERY

## 2022-03-22 PROCEDURE — 72050 X-RAY EXAM NECK SPINE 4/5VWS: CPT | Mod: TC,FY

## 2022-03-22 PROCEDURE — 3077F PR MOST RECENT SYSTOLIC BLOOD PRESSURE >= 140 MM HG: ICD-10-PCS | Mod: CPTII,S$GLB,, | Performed by: NEUROLOGICAL SURGERY

## 2022-03-22 PROCEDURE — 4010F PR ACE/ARB THEARPY RXD/TAKEN: ICD-10-PCS | Mod: CPTII,S$GLB,, | Performed by: NEUROLOGICAL SURGERY

## 2022-03-22 PROCEDURE — 3077F SYST BP >= 140 MM HG: CPT | Mod: CPTII,S$GLB,, | Performed by: NEUROLOGICAL SURGERY

## 2022-03-22 PROCEDURE — 99204 OFFICE O/P NEW MOD 45 MIN: CPT | Mod: S$GLB,,, | Performed by: NEUROLOGICAL SURGERY

## 2022-03-22 PROCEDURE — 4010F ACE/ARB THERAPY RXD/TAKEN: CPT | Mod: CPTII,S$GLB,, | Performed by: NEUROLOGICAL SURGERY

## 2022-03-22 PROCEDURE — 1159F MED LIST DOCD IN RCRD: CPT | Mod: CPTII,S$GLB,, | Performed by: NEUROLOGICAL SURGERY

## 2022-03-22 PROCEDURE — 72050 XR CERVICAL SPINE AP LAT WITH FLEX EXTEN: ICD-10-PCS | Mod: 26,,, | Performed by: RADIOLOGY

## 2022-03-22 PROCEDURE — 99204 PR OFFICE/OUTPT VISIT, NEW, LEVL IV, 45-59 MIN: ICD-10-PCS | Mod: S$GLB,,, | Performed by: NEUROLOGICAL SURGERY

## 2022-03-22 PROCEDURE — 72050 X-RAY EXAM NECK SPINE 4/5VWS: CPT | Mod: 26,,, | Performed by: RADIOLOGY

## 2022-03-22 PROCEDURE — 1159F PR MEDICATION LIST DOCUMENTED IN MEDICAL RECORD: ICD-10-PCS | Mod: CPTII,S$GLB,, | Performed by: NEUROLOGICAL SURGERY

## 2022-03-22 PROCEDURE — 3080F PR MOST RECENT DIASTOLIC BLOOD PRESSURE >= 90 MM HG: ICD-10-PCS | Mod: CPTII,S$GLB,, | Performed by: NEUROLOGICAL SURGERY

## 2022-03-22 PROCEDURE — 99999 PR PBB SHADOW E&M-EST. PATIENT-LVL III: ICD-10-PCS | Mod: PBBFAC,,, | Performed by: NEUROLOGICAL SURGERY

## 2022-03-22 NOTE — PROGRESS NOTES
NEUROSURGICAL OUTPATIENT CONSULTATION NOTE    DATE OF SERVICE:  03/22/2022    ATTENDING PHYSICIAN:  Keny Reed MD    CONSULT REQUESTED BY:  Self, Aaareferral     REASON FOR CONSULT:  Neck pain, hand numbness    HISTORY OF PRESENT ILLNESS:  This is a very pleasant 36 y.o. female who has a multiyear history of neck pain and recent symptoms of right hand numbness.  She also has pain in the forearm on the right side.  The right 4th and 5th digits are affected most consistently.  The numbness is present at nighttime.  She has had cervical epidural steroid as well as trigger point injections.  Her last physical therapy was over 2 years ago.    PAST MEDICAL HISTORY:  Active Ambulatory Problems     Diagnosis Date Noted    Irritable bowel syndrome with diarrhea 06/23/2016    Primary insomnia 10/11/2016    Myofascial muscle pain 01/10/2017    Cervical radiculopathy 01/10/2017    Change in bowel habit 04/28/2017    Gastroesophageal reflux disease 01/07/2013    Benign essential hypertension 01/07/2013    Uterine leiomyoma 01/30/2014    Abdominal cramping 06/20/2017    Ileitis 06/20/2017    Chronic hypertension in pregnancy 07/08/2018    Poor posture 09/17/2019    Neck pain 09/17/2019    Attention deficit hyperactivity disorder (ADHD), combined type 11/26/2019    Anxiety 11/26/2019    Crohn's disease, unspecified, without complications 12/01/2018    Tinea corporis 11/14/2019    Essential (primary) hypertension 12/11/2018    Irritable bowel syndrome without diarrhea 12/01/2018    Tachycardia 08/04/2020    Chronic pain 12/01/2020    Hx of LASIK 06/17/2021    Cervical pain (neck) 11/24/2021     Resolved Ambulatory Problems     Diagnosis Date Noted    Decreased range of motion 02/09/2017    Neck pain 02/09/2017    Decreased functional mobility 02/09/2017    Decreased strength 02/09/2017    Nausea 06/20/2017    Sinusitis 02/05/2018    Bronchitis 02/05/2018    Fever 02/05/2018    Upper respiratory  infection, acute 2018    Term pregnancy 2018    Normal labor 2018    Advanced maternal age in multigravida, third trimester 2018    39 weeks gestation of pregnancy 2018    Normal vaginal delivery 2018    Vaginal delivery 2018     Past Medical History:   Diagnosis Date    Abnormal Pap smear of cervix     ADD (attention deficit disorder)     Breast disorder     Esophageal reflux     Fibroids     Hypertension     IBS (irritable bowel syndrome)     Insomnia     Kidney stones     Mastocytosis     Relies on partner vasectomy for contraception     Upper GI bleed        PAST SURGICAL HISTORY:  Past Surgical History:   Procedure Laterality Date    COLONOSCOPY N/A 2017    Procedure: COLONOSCOPY;  Surgeon: Bren Larkin MD;  Location: [facility] ENDO;  Service: Endoscopy;  Laterality: N/A;    COLONOSCOPY N/A 2022    Procedure: COLONOSCOPY;  Surgeon: Inocente Roe MD;  Location: [facility] ENDO;  Service: Endoscopy;  Laterality: N/A;    EPIDURAL STEROID INJECTION INTO CERVICAL SPINE N/A 2020    Procedure: Injection-steroid-epidural-cervical--C7-T1;  Surgeon: Corky Argueta Jr., MD;  Location: [facility] PAIN MGT;  Service: Pain Management;  Laterality: N/A;    EPIDURAL STEROID INJECTION INTO CERVICAL SPINE N/A 2021    Procedure: Cervical Epidural Steroid Injection C7-T1 (No Sedation);  Surgeon: Corky Argueta Jr., MD;  Location: [facility] PAIN MGT;  Service: Pain Management;  Laterality: N/A;    ESOPHAGOGASTRODUODENOSCOPY      LASIK Bilateral 2005    REFRACTIVE SURGERY      TONSILLECTOMY, ADENOIDECTOMY      VAGINAL DELIVERY      x 2       SOCIAL HISTORY:   Social History     Socioeconomic History    Marital status:     Number of children: 1   Tobacco Use    Smoking status: Former Smoker     Quit date: 2014     Years since quittin.9    Smokeless tobacco: Never Used   Substance and Sexual Activity    Alcohol use: No      Alcohol/week: 0.0 standard drinks     Comment: breastfeeding     Drug use: No    Sexual activity: Yes     Partners: Male     Birth control/protection: Partner-Vasectomy     Comment: : Boris   Social History Narrative    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     Social Determinants of Health     Financial Resource Strain: Low Risk     Difficulty of Paying Living Expenses: Not very hard   Food Insecurity: No Food Insecurity    Worried About Running Out of Food in the Last Year: Never true    Ran Out of Food in the Last Year: Never true   Transportation Needs: No Transportation Needs    Lack of Transportation (Medical): No    Lack of Transportation (Non-Medical): No   Physical Activity: Sufficiently Active    Days of Exercise per Week: 5 days    Minutes of Exercise per Session: 60 min   Stress: Stress Concern Present    Feeling of Stress : Very much   Social Connections: Unknown    Frequency of Communication with Friends and Family: More than three times a week    Frequency of Social Gatherings with Friends and Family: More than three times a week    Active Member of Clubs or Organizations: Yes    Attends Club or Organization Meetings: 1 to 4 times per year    Marital Status:    Housing Stability: Low Risk     Unable to Pay for Housing in the Last Year: No    Number of Places Lived in the Last Year: 1    Unstable Housing in the Last Year: No       FAMILY HISTORY:  Family History   Problem Relation Age of Onset    Breast cancer Mother 47        with Metastasis    Hypertension Mother     Prostate cancer Father     Hypertension Father     Diabetes Father     Deep vein thrombosis Father     Pancreatic cancer Maternal Grandfather     Breast cancer Paternal Grandmother 80    Diabetes type II Paternal Grandfather     Heart attack Maternal Grandmother     Cancer Cousin 31    Cancer Cousin         Thurman Syndrome    Cancer Cousin         Thurman Syndrome    Ovarian cancer Maternal Aunt      Lymphoma Neg Hx     Tuberculosis Neg Hx     Celiac disease Neg Hx     Cirrhosis Neg Hx     Colon polyps Neg Hx     Crohn's disease Neg Hx     Cystic fibrosis Neg Hx     Esophageal cancer Neg Hx     Hemochromatosis Neg Hx     Inflammatory bowel disease Neg Hx     Irritable bowel syndrome Neg Hx     Liver cancer Neg Hx     Liver disease Neg Hx     Rectal cancer Neg Hx     Stomach cancer Neg Hx     Ulcerative colitis Neg Hx     Donavon's disease Neg Hx     Amblyopia Neg Hx     Blindness Neg Hx     Cataracts Neg Hx     Glaucoma Neg Hx     Macular degeneration Neg Hx     Retinal detachment Neg Hx     Strabismus Neg Hx        CURRENTS MEDICATIONS:  Current Outpatient Medications on File Prior to Visit   Medication Sig Dispense Refill    acetaminophen (TYLENOL) 500 MG tablet Take 2 tablets (1,000 mg total) by mouth 3 (three) times daily as needed for Pain.  0    amitriptyline (ELAVIL) 25 MG tablet Take 1 tablet (25 mg total) by mouth every evening. 90 tablet 0    butalbital-acetaminophen-caffeine -40 mg (FIORICET, ESGIC) -40 mg per tablet Take 1 tablet by mouth every 6 (six) hours as needed for Headaches. 30 tablet 0    clindamycin phosphate 1% (CLINDAGEL) 1 % gel Apply topically 2 (two) times daily. To entire face 30 g 5    doxycycline monohydrate 100 mg Tab Take 1 tablet by mouth everyday with food. Avoid lying down for 1 hour after taking. Avoid the sun. (Patient taking differently: Take 1 tablet by mouth everyday with food. Avoid lying down for 1 hour after taking. Avoid the sun.) 30 tablet 2    eszopiclone (LUNESTA) 3 mg Tab Take 1 tablet (3 mg total) by mouth nightly as needed (insomnia). 30 tablet 0    fluconazole (DIFLUCAN) 150 MG Tab Take 1 tablet as needed for yeast infection 30 tablet 1    lisdexamfetamine (VYVANSE) 60 MG capsule Take 1 capsule (60 mg total) by mouth every morning. 30 capsule 0    [START ON 4/11/2022] lisdexamfetamine (VYVANSE) 60 MG capsule Take 1  capsule (60 mg total) by mouth every morning. 30 capsule 0    loperamide (IMODIUM) 1 mg/7.5 mL solution Take 0.1 mg/kg by mouth every 12 (twelve) hours.      loperamide (IMODIUM) 2 mg capsule Take 2 mg by mouth 4 (four) times daily as needed for Diarrhea.      metoprolol succinate (TOPROL-XL) 50 MG 24 hr tablet Take 1 tablet (50 mg total) by mouth 2 (two) times daily. 180 tablet 1    pregabalin (LYRICA) 50 MG capsule Take 1 capsule (50 mg total) by mouth 2 (two) times daily. 60 capsule 2    sod sulf-pot chloride-mag sulf (SUTAB) 1.479-0.188- 0.225 gram tablet Take 12 tablets by mouth once daily. Take according to package instructions with indicated amount of water. 24 tablet 0    tiZANidine (ZANAFLEX) 4 MG tablet Take 1 tablet (4 mg total) by mouth 3 (three) times daily as needed. 90 tablet 1    tretinoin (RETIN-A) 0.025 % cream Apply topically every evening. Pea sized amount to entire face. If irritation occurs, decrease use to every other night. 20 g 5    valsartan (DIOVAN) 320 MG tablet Take 1 tablet (320 mg total) by mouth once daily. 90 tablet 1    venlafaxine (EFFEXOR-XR) 37.5 MG 24 hr capsule Take 1 capsule (37.5 mg total) by mouth 2 (two) times a day. 60 capsule 3     Current Facility-Administered Medications on File Prior to Visit   Medication Dose Route Frequency Provider Last Rate Last Admin    0.9%  NaCl infusion  500 mL Intravenous Continuous Corky Argueta Jr., MD           ALLERGIES:  Review of patient's allergies indicates:   Allergen Reactions    Lactose     Azithromycin Nausea And Vomiting       REVIEW OF SYSTEMS:  ROS - per HPI    OBJECTIVE:    PHYSICAL EXAMINATION:   Vitals:    03/22/22 1007   BP: (!) 154/99   Pulse: 72       Neurologic Exam  Motor exam showed full strength in the upper and lower extremities.  Sensation was intact to light touch including the 4th digit medial and laterally.  Reflexes were 2+ throughout with no Wilbur sign, no clonus, no Babinski  Scratch collapse  testing was positive over the right cubital tunnel  Tinel's testing was negative, Phalen's testing negative    DIAGNOSTIC DATA:  I personally interpreted the following imaging:     I reviewed the cervical MRI scan as well as cervical x-rays.  She has some reversal of her lordosis.  Extension views shows that she has good range of motion and can reestablish her lordosis with extension.  The MRI shows C5-6 and C6-7 degenerative disc changes with broad-based bulging that encroaches upon the central cord without compression it and without significant neural foraminal stenosis.    ASSESMENT:  This is a 36 y.o. female with     Problem List Items Addressed This Visit        Orthopedic    Neck pain    Relevant Orders    Ambulatory referral/consult to Physical/Occupational Therapy      Other Visit Diagnoses     Other secondary kyphosis, cervical region    -  Primary    Relevant Orders    Ambulatory referral/consult to Physical/Occupational Therapy    Ulnar neuropathy, unspecified laterality        Relevant Orders    EMG W/ ULTRASOUND AND NERVE CONDUCTION TEST 1 Extremity          PLAN:  I think she is a very good candidate for physical therapy to help with cervical posture.  I do not think at her age that surgery to correct kyphosis is appropriate.  I am also concerned about ulnar nerve compression based on physical examination in symptom.  I'll refer her for EMG of the right upper extremity.        Keny Reed MD, FAANS    Disclaimer: This note was partly generated using dictation software which may occasionally result in transcription errors.

## 2022-03-23 ENCOUNTER — CLINICAL SUPPORT (OUTPATIENT)
Dept: REHABILITATION | Facility: HOSPITAL | Age: 37
End: 2022-03-23
Attending: NEUROLOGICAL SURGERY
Payer: COMMERCIAL

## 2022-03-23 ENCOUNTER — CLINICAL SUPPORT (OUTPATIENT)
Dept: OTHER | Facility: CLINIC | Age: 37
End: 2022-03-23
Payer: COMMERCIAL

## 2022-03-23 DIAGNOSIS — F51.01 PRIMARY INSOMNIA: ICD-10-CM

## 2022-03-23 DIAGNOSIS — M40.12 OTHER SECONDARY KYPHOSIS, CERVICAL REGION: ICD-10-CM

## 2022-03-23 DIAGNOSIS — Z00.8 ENCOUNTER FOR OTHER GENERAL EXAMINATION: ICD-10-CM

## 2022-03-23 DIAGNOSIS — M54.2 NECK PAIN: Primary | ICD-10-CM

## 2022-03-23 DIAGNOSIS — R29.3 POOR POSTURE: ICD-10-CM

## 2022-03-23 DIAGNOSIS — R51.9 NONINTRACTABLE HEADACHE, UNSPECIFIED CHRONICITY PATTERN, UNSPECIFIED HEADACHE TYPE: ICD-10-CM

## 2022-03-23 DIAGNOSIS — M79.18 MYOFASCIAL MUSCLE PAIN: ICD-10-CM

## 2022-03-23 PROCEDURE — 97161 PT EVAL LOW COMPLEX 20 MIN: CPT | Mod: PN

## 2022-03-23 PROCEDURE — 97140 MANUAL THERAPY 1/> REGIONS: CPT | Mod: PN

## 2022-03-23 PROCEDURE — 97530 THERAPEUTIC ACTIVITIES: CPT | Mod: PN

## 2022-03-23 PROCEDURE — 97110 THERAPEUTIC EXERCISES: CPT | Mod: PN

## 2022-03-23 RX ORDER — BUTALBITAL, ACETAMINOPHEN AND CAFFEINE 50; 325; 40 MG/1; MG/1; MG/1
1 TABLET ORAL EVERY 6 HOURS PRN
Qty: 30 TABLET | Refills: 0 | Status: SHIPPED | OUTPATIENT
Start: 2022-03-23 | End: 2022-05-16 | Stop reason: SDUPTHER

## 2022-03-23 RX ORDER — ESZOPICLONE 3 MG/1
3 TABLET, FILM COATED ORAL NIGHTLY PRN
Qty: 30 TABLET | Refills: 0 | Status: SHIPPED | OUTPATIENT
Start: 2022-03-23 | End: 2022-04-17 | Stop reason: SDUPTHER

## 2022-03-23 NOTE — PLAN OF CARE
[facility]   Physical Therapy Initial Evaluation     Date: 3/23/2022   Name: April Wendt Schamberger  Clinic Number: [id number]    Therapy Diagnosis:   Encounter Diagnoses   Name Primary?    Neck pain Yes    Myofascial muscle pain     Poor posture     Other secondary kyphosis, cervical region      Physician: Keny Reed MD    Physician Orders: PT Eval and Treat  Medical Diagnosis from Referral:   M40.12 (ICD-10-CM) - Other secondary kyphosis, cervical region   M54.2 (ICD-10-CM) - Neck pain   Evaluation Date: 3/23/2022  Authorization Period Expiration: 12/31/2022  Plan of Care Expiration: 6/1/2022  Visit # / Visits authorized: 1/ 60   FOTO: 1/ 5     Precautions: Standard    Time In: 14:30  Time Out: 15:15  Total Appointment Time (timed & untimed codes): 45 minutes      SUBJECTIVE   Date of onset: 2020    History of current condition - April reports: pain in the neck and shoulders. Sharp pain with neck rotation. Numbness and tingling in the 4th and 5th digits especially of the right hand between the digits and elbow with arm in elevation, but can be spontaneous. Denies headaches, dizziness, and double vision. No history or trauma or surgeries to area. Previous occurrence of neck symptoms in 2020 with she lost function of the right upper extremity. Symptoms are not as severe not. She has had multiple other treatments with mild to moderate relief.     Falls: none    Imaging, MRI studies: Similar degenerative changes in the cervical spine when compared to the prior study although mildly improved at C4-5.  Mild foraminal stenosis at C6-7 but no evidence of new disc herniation or cord impingement    Prior Therapy: Physical therapy in 2017; Epidural in 2020 and January 2022; massage  Social History:  lives with their family  Occupation: Registered Nurse in cancer nurse  Prior Level of Function: no to mild pain and difficulties with daily and work activities  Current Level of Function:  moderate to severe pain and difficulties with daily and work activities    Pain:  Current 6/10, worst 8/10, best 5/10   Location: Bilateral Neck and shoulders  Description: Tingling and Sharp  Aggravating Factors: computer use; left sidelying; driving; lifting, carrying  Easing Factors: muscle relaxers; changing positions    Patients goals: To be able to work and take care of my kids without pain.     Medical History:   Past Medical History:   Diagnosis Date    Abnormal Pap smear of cervix 2000    Cryo Done    ADD (attention deficit disorder)     Breast disorder     Breast Cysts    Esophageal reflux     Fibroids     Hypertension     IBS (irritable bowel syndrome)     Insomnia     Kidney stones     Mastocytosis     Relies on partner vasectomy for contraception     Upper GI bleed        Surgical History:   April Wendt Schamberger  has a past surgical history that includes TONSILLECTOMY, ADENOIDECTOMY (1988); Esophagogastroduodenoscopy (2012); Colonoscopy (N/A, 4/28/2017); LASIK (Bilateral, 2005); Refractive surgery; Vaginal delivery; Epidural steroid injection into cervical spine (N/A, 12/1/2020); Epidural steroid injection into cervical spine (N/A, 12/2/2021); and Colonoscopy (N/A, 1/26/2022).    Medications:   April has a current medication list which includes the following prescription(s): acetaminophen, amitriptyline, butalbital-acetaminophen-caffeine -40 mg, clindamycin phosphate 1%, doxycycline monohydrate, eszopiclone, fluconazole, lisdexamfetamine, [START ON 4/11/2022] lisdexamfetamine, loperamide, loperamide, metoprolol succinate, pregabalin, sod sulf-pot chloride-mag sulf, tizanidine, tretinoin, valsartan, and venlafaxine, and the following Facility-Administered Medications: sodium chloride 0.9%.    Allergies:   Review of patient's allergies indicates:   Allergen Reactions    Lactose     Azithromycin Nausea And Vomiting        OBJECTIVE     Posture:   Cervical: Forward head and rounded  shoulders   Thoracic: increased kyphosis   Lumbar: Major increased lordosis   Standing: increased lordotic curve   Scapula: bilateral anterior tipping and upwards rotation    AROM:  Cervical Spine   Action Degrees Dysfunction   Flexion 55 Pain   Extension 65 Pain   Sidebend (Left / Right) 30 / 30 Sharp right    Rotation (Left / Right) 65 / 65 Sharp right      PROM:  · Bilateral upper extremity passive range of motion is within normal limits.     Manual Muscle Testing:  Shoulder   Action Left Right   Flexion 5 5   External Rotation 5 5   Abduction 5 5   Internal Rotation 5 5   Middle Traps 3+ 3+   Lower Traps 3+ 3+      Strength:  Left = 65 pounds  Right = 65 pounds    Special Tests:   Positive (+) Tests = Upper Limb Tension 3 (ulnar)   Negative (-) Tests = Spurling's, Distraction, Cranio-cervical flexion rotation,   C6 DTR = equal bilaterally    Palpation:   Normal cervical joint accessory motion   Normal sensation in bilateral upper extremities   Hypomobile cervicothoracic junction   Tenderness to palpation in bilateral suboccipitals and upper traps.    Limitation/Restriction for FOTO Neck Survey    Therapist reviewed FOTO scores for April Wendt Schamberger on 3/23/2022.   FOTO documents entered into [employer] - see Media section.    Limitation Score: 52%         TREATMENT     Total Treatment time (time-based codes) separate from Evaluation: 24 minutes      April received the treatments listed below:      Therapeutic Exercises to develop strength, endurance, ROM, flexibility, posture and core stabilization for 8 minutes including:  Seated thoracic extension stretch  Horizontal shoulder abduction  Cervical SNAG  Shoulder extension  NEXT >>> Ulnar Nerve Floss, Harvey and Arrow, Foam Roll Ciruit    Manual Therapy Techniques: Joint mobilizations were applied to the: Cervicothoracic Spine for 8 minutes, including:  Prone CT junction HVLAT    Therapeutic Activities to improve functional performance for 8 minutes,  including:  Education (see below)  Questions and answers    PATIENT EDUCATION AND HOME EXERCISES     Education provided:   Anatomy and Pathology.  Symptom management and plan of care progressions.  Home Exercise Program.    Written Home Exercises Provided: yes. Exercises were reviewed and April was able to demonstrate them prior to the end of the session.  April demonstrated good  understanding of the education provided. See EMR under Patient Instructions for exercises provided during therapy sessions.    ASSESSMENT     April is a 36 y.o. female referred to outpatient Physical Therapy with a medical diagnosis of Neck pain and Other secondary kyphosis, cervical region. Patient presents with decreased range of motion, decreased strength, joint hypomobility, neural tension and postural deviations. Signs and symptoms are consistent with myofascial pain secondary to decreased postural and periscapular strength. She will benefit from progressive physical therapy addressing her spinal mobility, posutral strengthening, gross proprioception, and work placed ergonomic education .    Patient prognosis is Good.   Patientt will benefit from skilled outpatient Physical Therapy to address the deficits stated above and in the chart below, provide patient /family education, and to maximize patientt's level of independence.     Plan of care discussed with patient: Yes  Patient's spiritual, cultural and educational needs considered and patient is agreeable to the plan of care and goals as stated below:     Anticipated Barriers for therapy: chronicity of symptoms.    Medical Necessity is demonstrated by the following  History  Co-morbidities and personal factors that may impact the plan of care Co-morbidities:   prior cervical pain.    Personal Factors:   no deficits     low   Examination  Body Structures and Functions, activity limitations and participation restrictions that may impact the plan of care Body Regions:   neck    Body  Systems:    gross symmetry  ROM  strength  transfers  motor control    Participation Restrictions:   None    Activity limitations:   Learning and applying knowledge  no deficits    General Tasks and Commands  no deficits    Communication  no deficits    Mobility  lifting and carrying objects  driving (bike, car, motorcycle)  moving head    Self care  looking after one's health    Domestic Life  doing house work (cleaning house, washing dishes, laundry)    Interactions/Relationships      Life Areas  employment    Community and Social Life  community life  recreation and leisure         high   Clinical Presentation stable and uncomplicated low   Decision Making/ Complexity Score: low     Goals:  Short Term Goals: 3-4 weeks   1. Patient will have reduced pain complaints from 8/10 to less than or equal to 4/10. (Not Met - Progressing)  2. Patient will demonstrate increased AROM/PROM by approximately 25% to 50% of initial measurements. (Not Met - Progressing)  3. Patient will demonstrate increased muscle strength of at least one-half grade as compared to the initial measurements. (Not Met - Progressing)    Long Term Goals: 6-8 weeks   1. Patient will have reduced pain complaints from 4/10 to less than or equal to 0/10. (Not Met - Progressing)  2. Patient will be able to hold right upper extremity overhead for greater than or equal to 2 minutes with no numbness or tingling. (Not Met - Progressing)   3. Patient will demonstrate increased muscle strength of at least one grade as compared to the initial measurements. (Not Met - Progressing)  4. Patient will improve Neck FOTO limitation score from 52% to less than or equal to 38%. (Not Met - Progressing)  5. Patient will be independent with their home exercise program. (Not Met - Progressing)    PLAN   Plan of care Certification: 3/23/2022 to 6/1/2022.    Outpatient Physical Therapy 2 times weekly for 6 weeks to include the following interventions: Electrical  Stimulation with dry needling, Manual Therapy, Neuromuscular Re-ed, Patient Education, Self Care, Therapeutic Activities, Therapeutic Exercise and dry needling and taping.     Shreyas Degroot, PT, DPT, OCS      I CERTIFY THE NEED FOR THESE SERVICES FURNISHED UNDER THIS PLAN OF TREATMENT AND WHILE UNDER MY CARE   Physician's comments:     Physician's Signature: ___________________________________________________

## 2022-03-24 VITALS — HEIGHT: 68 IN | BODY MASS INDEX: 27.98 KG/M2

## 2022-03-24 LAB
HDLC SERPL-MCNC: 45 MG/DL
POC CHOLESTEROL, LDL (DOCK): 133.6 MG/DL
POC CHOLESTEROL, TOTAL: 206 MG/DL
POC GLUCOSE, FASTING: 87 MG/DL (ref 60–110)
TRIGL SERPL-MCNC: 137 MG/DL

## 2022-03-28 ENCOUNTER — CLINICAL SUPPORT (OUTPATIENT)
Dept: REHABILITATION | Facility: HOSPITAL | Age: 37
End: 2022-03-28
Payer: COMMERCIAL

## 2022-03-28 DIAGNOSIS — R29.3 POOR POSTURE: ICD-10-CM

## 2022-03-28 DIAGNOSIS — M54.2 NECK PAIN: Primary | ICD-10-CM

## 2022-03-28 DIAGNOSIS — M79.18 MYOFASCIAL MUSCLE PAIN: ICD-10-CM

## 2022-03-28 PROCEDURE — 97110 THERAPEUTIC EXERCISES: CPT | Mod: PN

## 2022-03-28 PROCEDURE — 97140 MANUAL THERAPY 1/> REGIONS: CPT | Mod: PN

## 2022-03-28 NOTE — PROGRESS NOTES
"[facility]   Physical Therapy Treatment Note     Name: April Wendt Schamberger  Clinic Number: [id number]    Therapy Diagnosis:   Encounter Diagnoses   Name Primary?   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   She was compliant with home exercise program.  Response to previous treatment: relief short term  Functional change: ongoing    Pain: 5/10  Location: right neck  and shoulder      OBJECTIVE     Objective Measures updated at progress report unless specified.     Treatment     April received the treatments listed below:      therapeutic exercises to develop strength, endurance, ROM, flexibility and posture for 32 minutes including:  Seated thoracic extension stretch 4x15"  Horizontal shoulder abduction green theraband 3x10  Cervical SNAG 10x5"  Shoulder extension blue theraband 3x10   Open book 15x   Ulnar Nerve Floss 1x20, 1x10 with head motion   [place] and [place] 2x10   Foam Roll Ciruit:    Zombies 20x   Alternating flys 20x       manual therapy techniques: Joint mobilizations and Manual traction were applied to the: neck for 10 minutes, including:  Gentle manual cervical traction  Grade 3 up/down glides lower cervical spine  Thoracic P/a grade 3  CT mobilization grade 2-3   Prone CT junction HVLAT- not today      Patient Education and Home Exercises "     Home Exercises Provided and Patient Education Provided     Education provided:   - cont home exercise program     Written Home Exercises Provided: Patient instructed to cont prior HEP. Exercises were reviewed and April was able to demonstrate them prior to the end of the session.  April demonstrated good  understanding of the education provided. See EMR under Patient Instructions for exercises provided during therapy sessions    ASSESSMENT     Pt presents for first follow up visit with short term relief following last visit with return to prior level of pain within 3 days. Pt demos decreased lower cervical mobility with joint mobilizations. Reviewed home exercise program with occasional cuing for proper performance of exercise. Progressed shoulder extensions to blue theraband today. Continued to address cervical and thoracic mobility restrictions and progressive periscapular strengthening with good tolerance. Pt with reproduction of ulnar neural symptoms following ulnar nerve glide. Added head movement with some reduction in symptoms and decrease over time with rest. Mild symptoms present at end of session. Monitor response to treatment and progress appropriately.     From initial evaluation 3/23/22:  April is a 36 y.o. female referred to outpatient Physical Therapy with a medical diagnosis of Neck pain and Other secondary kyphosis, cervical region. Patient presents with decreased range of motion, decreased strength, joint hypomobility, neural tension and postural deviations. Signs and symptoms are consistent with myofascial pain secondary to decreased postural and periscapular strength. She will benefit from progressive physical therapy addressing her spinal mobility, posutral strengthening, gross proprioception, and work placed ergonomic education .     April Is progressing well towards her goals.   Pt prognosis is Good.     Pt will continue to benefit from skilled outpatient physical therapy to address the  deficits listed in the problem list box on initial evaluation, provide pt/family education and to maximize pt's level of independence in the home and community environment.     Pt's spiritual, cultural and educational needs considered and pt agreeable to plan of care and goals.     Anticipated barriers to physical therapy: chronicity of symptoms    Goals:   Short Term Goals: 3-4 weeks   1. Patient will have reduced pain complaints from 8/10 to less than or equal to 4/10. (Not Met - Progressing)  2. Patient will demonstrate increased AROM/PROM by approximately 25% to 50% of initial measurements. (Not Met - Progressing)  3. Patient will demonstrate increased muscle strength of at least one-half grade as compared to the initial measurements. (Not Met - Progressing)     Long Term Goals: 6-8 weeks   1. Patient will have reduced pain complaints from 4/10 to less than or equal to 0/10. (Not Met - Progressing)  2. Patient will be able to hold right upper extremity overhead for greater than or equal to 2 minutes with no numbness or tingling. (Not Met - Progressing)   3. Patient will demonstrate increased muscle strength of at least one grade as compared to the initial measurements. (Not Met - Progressing)  4. Patient will improve Neck FOTO limitation score from 52% to less than or equal to 38%. (Not Met - Progressing)  5. Patient will be independent with their home exercise program. (Not Met - Progressing)      PLAN   Plan of care Certification: 3/23/2022 to 6/1/2022.  Cont cervical/thoracic mobility, periscapular  Strengthening      HEATHER JAQUEZ, PT

## 2022-03-29 ENCOUNTER — PATIENT MESSAGE (OUTPATIENT)
Dept: GASTROENTEROLOGY | Facility: CLINIC | Age: 37
End: 2022-03-29
Payer: COMMERCIAL

## 2022-03-29 DIAGNOSIS — K58.0 IRRITABLE BOWEL SYNDROME WITH DIARRHEA: Primary | ICD-10-CM

## 2022-03-29 RX ORDER — DIPHENOXYLATE HYDROCHLORIDE AND ATROPINE SULFATE 2.5; .025 MG/1; MG/1
1 TABLET ORAL 4 TIMES DAILY PRN
Qty: 60 TABLET | Refills: 0 | Status: SHIPPED | OUTPATIENT
Start: 2022-03-29 | End: 2022-04-12 | Stop reason: SDUPTHER

## 2022-03-31 ENCOUNTER — CLINICAL SUPPORT (OUTPATIENT)
Dept: REHABILITATION | Facility: HOSPITAL | Age: 37
End: 2022-03-31
Payer: COMMERCIAL

## 2022-03-31 DIAGNOSIS — M79.18 MYOFASCIAL MUSCLE PAIN: ICD-10-CM

## 2022-03-31 DIAGNOSIS — M54.2 NECK PAIN: Primary | ICD-10-CM

## 2022-03-31 DIAGNOSIS — R29.3 POOR POSTURE: ICD-10-CM

## 2022-03-31 PROCEDURE — 97530 THERAPEUTIC ACTIVITIES: CPT | Mod: PN

## 2022-03-31 PROCEDURE — 97110 THERAPEUTIC EXERCISES: CPT | Mod: PN

## 2022-03-31 PROCEDURE — 97140 MANUAL THERAPY 1/> REGIONS: CPT | Mod: PN

## 2022-03-31 PROCEDURE — 97112 NEUROMUSCULAR REEDUCATION: CPT | Mod: PN

## 2022-03-31 NOTE — PROGRESS NOTES
"[facility]   Physical Therapy Treatment Note     Name: April Wendt Schamberger  Clinic Number: [id number]    Therapy Diagnosis:   Encounter Diagnoses   Name Primary?    Neck pain Yes    Poor posture     Myofascial muscle pain      Physician: Keny Reed MD    Visit Date: 3/31/2022  Physician Orders: PT Eval and Treat  Medical Diagnosis from Referral:   M40.12 (ICD-10-CM) - Other secondary kyphosis, cervical region   M54.2 (ICD-10-CM) - Neck pain   Evaluation Date: 3/23/2022  Authorization Period Expiration: 12/31/2022  Plan of Care Expiration: 6/1/2022  Visit # / Visits authorized: 3/ 60   FOTO: 3/ 5   PTA Visit #: 0/5     Time In: 13:30  Time Out: 14:15  Total Billable Time: 41 minutes    Precautions: Standard    SUBJECTIVE     Pt reports: Increased right, superior medial scapular border stabbing pain since the last visit. No complaints of numbness and tingling in the right upper extremity.    She was compliant with home exercise program.  Response to previous treatment: relief short term  Functional change: ongoing    Pain: 7/10  Location: right neck  and shoulder      OBJECTIVE     Objective Measures updated at progress report unless specified.     Treatment     April received the treatments listed below:      therapeutic exercises to develop strength, endurance, ROM, flexibility and posture for 8 minutes including:  Seated thoracic extension stretch 4x15"  · Prone Chin tuck with retraction and shoulder extension - 3 second holds x20  Horizontal shoulder abduction green theraband 3x10  · Cervical SNAG 10x5"  Shoulder extension blue theraband 3x10   Open book 15x   · Ulnar Nerve Floss 1x10   [place] and [place] 2x10  NEXT >>> Low Shoulder extension     Neuromuscular Re-education activities to improve: Proprioception and Posture for 8 minutes. The following activities were included:  Foam Roll Ciruit - reaches, circles, hugs, zombies; x20 of each    manual therapy techniques: Joint " mobilizations and Manual traction were applied to the: neck for 15 minutes, including:  Grade 3 up/down glides lower cervical spine  · Thoracic distraction HVLAT  CT mobilization grade 2-3   · Prone CT junction HVLAT   Palpation and identification of trigger points pre an post dry needling.   Dry needling performed to Right upper trap and levator scapulae for 5 minutes while using bony landmarks and backdrops and following appropriate sterile precautions    Therapeutic Activities to improve functional performance for 10 minutes, including:  Education (see below)  Questions and answers    Patient Education and Home Exercises     Home Exercises Provided and Patient Education Provided     Education provided:    Symptom management and plan of care progressions.   Home Exercise Program - reviewed    Dry needling benefits and expectations    Written Home Exercises Provided: Patient instructed to cont prior HEP. Exercises were reviewed and April was able to demonstrate them prior to the end of the session.  April demonstrated good  understanding of the education provided. See EMR under Patient Instructions for exercises provided during therapy sessions    ASSESSMENT     April presents back with increased symptoms following the last visit. Resumed HVLAT manual techniques with fair relief. Assess benefits of dry needling at next visit. Increased emphasis on postural awareness with today's exercises noted in bold. No radicular symptoms today, and no exacerbation in symptoms following today's treatments. Monitor response to treatment and progress appropriately.     From initial evaluation 3/23/22:  April is a 36 y.o. female referred to outpatient Physical Therapy with a medical diagnosis of Neck pain and Other secondary kyphosis, cervical region. Patient presents with decreased range of motion, decreased strength, joint hypomobility, neural tension and postural deviations. Signs and symptoms are consistent with myofascial  pain secondary to decreased postural and periscapular strength. She will benefit from progressive physical therapy addressing her spinal mobility, posutral strengthening, gross proprioception, and work placed ergonomic education.     April Is progressing well towards her goals.   Pt prognosis is Good.     Pt will continue to benefit from skilled outpatient physical therapy to address the deficits listed in the problem list box on initial evaluation, provide pt/family education and to maximize pt's level of independence in the home and community environment.   Pt's spiritual, cultural and educational needs considered and pt agreeable to plan of care and goals.     Anticipated barriers to physical therapy: chronicity of symptoms    Goals:   Short Term Goals: 3-4 weeks   1. Patient will have reduced pain complaints from 8/10 to less than or equal to 4/10. (Not Met - Progressing)  2. Patient will demonstrate increased AROM/PROM by approximately 25% to 50% of initial measurements. (Not Met - Progressing)  3. Patient will demonstrate increased muscle strength of at least one-half grade as compared to the initial measurements. (Not Met - Progressing)     Long Term Goals: 6-8 weeks   1. Patient will have reduced pain complaints from 4/10 to less than or equal to 0/10. (Not Met - Progressing)  2. Patient will be able to hold right upper extremity overhead for greater than or equal to 2 minutes with no numbness or tingling. (Not Met - Progressing)   3. Patient will demonstrate increased muscle strength of at least one grade as compared to the initial measurements. (Not Met - Progressing)  4. Patient will improve Neck FOTO limitation score from 52% to less than or equal to 38%. (Not Met - Progressing)  5. Patient will be independent with their home exercise program. (Not Met - Progressing)    PLAN     Plan of care Certification: 3/23/2022 to 6/1/2022.  Cont cervical/thoracic mobility, periscapular  Strengthening      Shreyas   Mikayla, PT, DPT, OCS

## 2022-04-08 ENCOUNTER — CLINICAL SUPPORT (OUTPATIENT)
Dept: REHABILITATION | Facility: HOSPITAL | Age: 37
End: 2022-04-08
Payer: COMMERCIAL

## 2022-04-08 DIAGNOSIS — M79.18 MYOFASCIAL MUSCLE PAIN: ICD-10-CM

## 2022-04-08 DIAGNOSIS — R29.3 POOR POSTURE: ICD-10-CM

## 2022-04-08 DIAGNOSIS — M54.2 NECK PAIN: Primary | ICD-10-CM

## 2022-04-08 PROCEDURE — 97530 THERAPEUTIC ACTIVITIES: CPT | Mod: PN

## 2022-04-08 PROCEDURE — 97110 THERAPEUTIC EXERCISES: CPT | Mod: PN

## 2022-04-08 PROCEDURE — 97140 MANUAL THERAPY 1/> REGIONS: CPT | Mod: PN

## 2022-04-08 NOTE — PROGRESS NOTES
"[facility]   Physical Therapy Treatment Note     Name: April Wendt Schamberger  Clinic Number: [id number]    Therapy Diagnosis:   Encounter Diagnoses   Name Primary?    Neck pain Yes    Poor posture     Myofascial muscle pain      Physician: Keny Reed MD    Visit Date: 4/8/2022  Physician Orders: PT Eval and Treat  Medical Diagnosis from Referral:   M40.12 (ICD-10-CM) - Other secondary kyphosis, cervical region   M54.2 (ICD-10-CM) - Neck pain   Evaluation Date: 3/23/2022  Authorization Period Expiration: 12/31/2022  Plan of Care Expiration: 6/1/2022  Visit # / Visits authorized: 4/ 60   FOTO: 4/ 5     Time In: 13:00  Time Out: 13:45  Total Billable Time: 43 minutes    Precautions: Standard    SUBJECTIVE     Pt reports: no changes in symptoms since the initial evaluation    She was compliant with home exercise program.  Response to previous treatment: relief short term  Functional change: None    Pain: 7/10  Location: right neck  and shoulder      OBJECTIVE     Objective Measures updated at progress report unless specified.     Treatment     April received the treatments listed below:      therapeutic exercises to develop strength, endurance, ROM, flexibility and posture for 10 minutes including:  · Movie Watcher Pose - 2 minutes  · Supine Chin Tucks on Potato Roll - 10 second holds x10  Horizontal shoulder abduction green theraband 3x10  · Cervical SNAG 10x5"  · [place] - bilateral x15   Open book 15x   Ulnar Nerve Floss 1x10   [place] and [place] 2x10  NEXT >>> Low Shoulder extension     manual therapy techniques: Joint mobilizations and Manual traction were applied to the: neck for 25 minutes, including:  Grade 3 up/down glides lower cervical spine  · Thoracic distraction HVLAT  · Prone CT junction HVLAT  · Cervical retraction mobilizations (grade 3)  · Right upper trap inhibition / contract relax   Palpation and identification of trigger points pre an post dry needling.   Dry " needling performed to Right upper trap and right suboccipitals for 5 minutes with point estim while using bony landmarks and backdrops and following appropriate sterile precautions    Therapeutic Activities to improve functional performance for 8 minutes, including:  Education (see below)  Questions and answers    Patient Education and Home Exercises     Home Exercises Provided and Patient Education Provided     Education provided:    Symptom management and plan of care progressions.   Home Exercise Program - reviewed    Dry needling benefits and expectations    Written Home Exercises Provided: Patient instructed to cont prior HEP. Exercises were reviewed and April was able to demonstrate them prior to the end of the session.  April demonstrated good  understanding of the education provided. See EMR under Patient Instructions for exercises provided during therapy sessions    ASSESSMENT     April presents back with no change in symptoms following the last visit. Manual therapy focusing on cervicothoracic extension and upper trap inhibition with unremarkable changes in symptoms. Progressed postural exercises and soft tissue inhibition exercises with mild symptoms improvements. Responded well to today's dry needling. Assess and proceed as indicated at next visit. Address mindfulness training at the next visit.     From initial evaluation 3/23/22:  April is a 36 y.o. female referred to outpatient Physical Therapy with a medical diagnosis of Neck pain and Other secondary kyphosis, cervical region. Patient presents with decreased range of motion, decreased strength, joint hypomobility, neural tension and postural deviations. Signs and symptoms are consistent with myofascial pain secondary to decreased postural and periscapular strength. She will benefit from progressive physical therapy addressing her spinal mobility, posutral strengthening, gross proprioception, and work placed ergonomic education.     April Is  progressing well towards her goals.   Pt prognosis is Good.     Pt will continue to benefit from skilled outpatient physical therapy to address the deficits listed in the problem list box on initial evaluation, provide pt/family education and to maximize pt's level of independence in the home and community environment.   Pt's spiritual, cultural and educational needs considered and pt agreeable to plan of care and goals.     Anticipated barriers to physical therapy: chronicity of symptoms    Goals:   Short Term Goals: 3-4 weeks   1. Patient will have reduced pain complaints from 8/10 to less than or equal to 4/10. (Not Met - Progressing)  2. Patient will demonstrate increased AROM/PROM by approximately 25% to 50% of initial measurements. (Not Met - Progressing)  3. Patient will demonstrate increased muscle strength of at least one-half grade as compared to the initial measurements. (Not Met - Progressing)     Long Term Goals: 6-8 weeks   1. Patient will have reduced pain complaints from 4/10 to less than or equal to 0/10. (Not Met - Progressing)  2. Patient will be able to hold right upper extremity overhead for greater than or equal to 2 minutes with no numbness or tingling. (Not Met - Progressing)   3. Patient will demonstrate increased muscle strength of at least one grade as compared to the initial measurements. (Not Met - Progressing)  4. Patient will improve Neck FOTO limitation score from 52% to less than or equal to 38%. (Not Met - Progressing)  5. Patient will be independent with their home exercise program. (Not Met - Progressing)    PLAN     Plan of care Certification: 3/23/2022 to 6/1/2022.  Cont cervical/thoracic mobility, periscapular  Strengthening      Shreyas Degroot, PT, DPT, OCS

## 2022-04-12 ENCOUNTER — CLINICAL SUPPORT (OUTPATIENT)
Dept: REHABILITATION | Facility: HOSPITAL | Age: 37
End: 2022-04-12
Payer: COMMERCIAL

## 2022-04-12 DIAGNOSIS — M54.2 NECK PAIN: Primary | ICD-10-CM

## 2022-04-12 DIAGNOSIS — K58.0 IRRITABLE BOWEL SYNDROME WITH DIARRHEA: ICD-10-CM

## 2022-04-12 DIAGNOSIS — R29.3 POOR POSTURE: ICD-10-CM

## 2022-04-12 DIAGNOSIS — M79.18 MYOFASCIAL MUSCLE PAIN: ICD-10-CM

## 2022-04-12 PROCEDURE — 97110 THERAPEUTIC EXERCISES: CPT | Mod: PN

## 2022-04-12 PROCEDURE — 97140 MANUAL THERAPY 1/> REGIONS: CPT | Mod: PN

## 2022-04-12 RX ORDER — DIPHENOXYLATE HYDROCHLORIDE AND ATROPINE SULFATE 2.5; .025 MG/1; MG/1
1 TABLET ORAL 4 TIMES DAILY PRN
Qty: 80 TABLET | Refills: 1 | OUTPATIENT
Start: 2022-04-12 | End: 2022-05-12

## 2022-04-12 RX ORDER — DIPHENOXYLATE HYDROCHLORIDE AND ATROPINE SULFATE 2.5; .025 MG/1; MG/1
1 TABLET ORAL 4 TIMES DAILY PRN
Qty: 80 TABLET | Refills: 1 | Status: SHIPPED | OUTPATIENT
Start: 2022-04-12 | End: 2022-05-17 | Stop reason: SDUPTHER

## 2022-04-12 NOTE — PROGRESS NOTES
"[facility]   Physical Therapy Treatment Note     Name: April Wendt Schamberger  Clinic Number: [id number]    Therapy Diagnosis:   Encounter Diagnoses   Name Primary?    Neck pain Yes    Poor posture     Myofascial muscle pain      Physician: Keny Reed MD    Visit Date: 4/12/2022  Physician Orders: PT Eval and Treat  Medical Diagnosis from Referral:   M40.12 (ICD-10-CM) - Other secondary kyphosis, cervical region   M54.2 (ICD-10-CM) - Neck pain   Evaluation Date: 3/23/2022  Authorization Period Expiration: 12/31/2022  Plan of Care Expiration: 6/1/2022  Visit # / Visits authorized: 5/ 60   FOTO: 5/ 5 Next    Time In: 1:50  Time Out: 2:30  Total Billable Time: 40 minutes    Precautions: Standard    SUBJECTIVE     Pt reports: slight decreased pain today with less knot in neck    She was compliant with home exercise program.  Response to previous treatment: relief short term  Functional change: None    Pain: 6/10  Location: right neck  and shoulder      OBJECTIVE     Objective Measures updated at progress report unless specified.     Treatment     April received the treatments listed below:      therapeutic exercises to develop strength, endurance, ROM, flexibility and posture for 15 minutes including:  Movie Watcher Pose - 2 minutes  · Supine Chin Tucks on Potato Roll - 10 second holds x10  Horizontal shoulder abduction green theraband 3x10  · Cervical SNAG 10x5"- >>dc next   · [place] - bilateral x15   · Prone scap retract shoulder extensions 15x 3"  Open book 15x   Ulnar Nerve Floss 1x10   [place] and [place] 2x10  · Low Shoulder extension 20x blue theraband   · Seated thoracic extension 10x - arms crossed--(behind head increased numbness)    Next: rows, prone Ts, chin tuck lift      manual therapy techniques: Joint mobilizations and Manual traction were applied to the: neck for 25 minutes, including:  Grade 3 up/down glides lower cervical spine  Thoracic distraction HVLAT  Prone CT " junction HVLAT  · Cervical retraction mobilizations (grade 3)  Right upper trap inhibition / contract relax   Palpation and ientification of trigger points pre an post dry needling.   Dry needling performed to bilateral upper trap and bilateral suboccipitals for 10 minutes with point estim while using bony landmarks and backdrops and following appropriate sterile precautions    Therapeutic Activities to improve functional performance for 0 minutes, including:  Education (see below)  Questions and answers    Patient Education and Home Exercises     Home Exercises Provided and Patient Education Provided     Education provided:    Symptom management and plan of care progressions.   Home Exercise Program - reviewed    Dry needling benefits and expectations    Written Home Exercises Provided: Patient instructed to cont prior HEP. Exercises were reviewed and April was able to demonstrate them prior to the end of the session.  April demonstrated good  understanding of the education provided. See EMR under Patient Instructions for exercises provided during therapy sessions    ASSESSMENT     April presents with slight decrease in UT pain today following last session and decreased knot in shoulder following dry needling. Continued with dry needling to UTs and suboccipitals with good tolerance. Able to implement lower trap strengthening into program today. Pt interested in progressing strengthening next visit. Plans to have massage prior to session. Monitor response and progress as appropriate.     From initial evaluation 3/23/22:  April is a 36 y.o. female referred to outpatient Physical Therapy with a medical diagnosis of Neck pain and Other secondary kyphosis, cervical region. Patient presents with decreased range of motion, decreased strength, joint hypomobility, neural tension and postural deviations. Signs and symptoms are consistent with myofascial pain secondary to decreased postural and periscapular strength. She  will benefit from progressive physical therapy addressing her spinal mobility, posutral strengthening, gross proprioception, and work placed ergonomic education.     April Is progressing well towards her goals.   Pt prognosis is Good.     Pt will continue to benefit from skilled outpatient physical therapy to address the deficits listed in the problem list box on initial evaluation, provide pt/family education and to maximize pt's level of independence in the home and community environment.   Pt's spiritual, cultural and educational needs considered and pt agreeable to plan of care and goals.     Anticipated barriers to physical therapy: chronicity of symptoms    Goals:   Short Term Goals: 3-4 weeks   1. Patient will have reduced pain complaints from 8/10 to less than or equal to 4/10. (Not Met - Progressing)  2. Patient will demonstrate increased AROM/PROM by approximately 25% to 50% of initial measurements. (Not Met - Progressing)  3. Patient will demonstrate increased muscle strength of at least one-half grade as compared to the initial measurements. (Not Met - Progressing)     Long Term Goals: 6-8 weeks   1. Patient will have reduced pain complaints from 4/10 to less than or equal to 0/10. (Not Met - Progressing)  2. Patient will be able to hold right upper extremity overhead for greater than or equal to 2 minutes with no numbness or tingling. (Not Met - Progressing)   3. Patient will demonstrate increased muscle strength of at least one grade as compared to the initial measurements. (Not Met - Progressing)  4. Patient will improve Neck FOTO limitation score from 52% to less than or equal to 38%. (Not Met - Progressing)  5. Patient will be independent with their home exercise program. (Not Met - Progressing)    PLAN     Plan of care Certification: 3/23/2022 to 6/1/2022.  Cont cervical/thoracic mobility, periscapular  Strengthening      HEATHER JAQUEZ, PT, DPT

## 2022-04-14 ENCOUNTER — CLINICAL SUPPORT (OUTPATIENT)
Dept: REHABILITATION | Facility: HOSPITAL | Age: 37
End: 2022-04-14
Payer: COMMERCIAL

## 2022-04-14 DIAGNOSIS — R29.3 POOR POSTURE: ICD-10-CM

## 2022-04-14 DIAGNOSIS — M54.2 NECK PAIN: Primary | ICD-10-CM

## 2022-04-14 DIAGNOSIS — M79.18 MYOFASCIAL MUSCLE PAIN: ICD-10-CM

## 2022-04-14 PROCEDURE — 97110 THERAPEUTIC EXERCISES: CPT | Mod: PN,CQ

## 2022-04-14 PROCEDURE — 97140 MANUAL THERAPY 1/> REGIONS: CPT | Mod: PN,CQ

## 2022-04-14 NOTE — PROGRESS NOTES
"[facility]   Physical Therapy Treatment Note     Name: April Wendt Schamberger  Clinic Number: [id number]    Therapy Diagnosis:   Encounter Diagnoses   Name Primary?   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  Response to previous treatment: relief short term  Functional change: None    Pain: 8/10  Location: right neck  and shoulder      OBJECTIVE     Objective Measures updated at progress report unless specified.     Treatment     April received the treatments listed below:      therapeutic exercises to develop strength, endurance, ROM, flexibility and posture for 20 minutes including:  Movie Watcher Pose - 2 minutes  · Supine Chin Tucks on Potato Roll - 10 second holds x10  · Chin tucks with extension into pillow:  5" x 10  Horizontal shoulder abduction green theraband 3x10  Cervical SNAG 10x5"- >>dc next   · Egyptians - bilateral x15   · Prone scap retract shoulder extensions 15x 3"  · Open book 15x   Ulnar Nerve Floss 1x10   [place] 2x10  · Low Shoulder extension 20x blue theraband   · Seated thoracic extension 10x - arms crossed--(behind head increased numbness)  · Prone T's  3" 1x10    Next: rows,  chin tuck lift      manual therapy techniques: Joint mobilizations and Manual traction were applied to the: neck for 20 minutes, including:  · STM to bilateral " "cervical paraspinals with elongation  · Gentle sub occipital release  · STM/MFR with manual stretch to bilateral Uppertraps  · Upper thoracic release  Grade 3 up/down glides lower cervical spine  Thoracic distraction HVLAT  Prone CT junction HVLAT  Cervical retraction mobilizations (grade 3)  Right upper trap inhibition / contract relax  Palpation and ientification of trigger points pre an post dry needling.  Dry needling performed to bilateral upper trap and bilateral suboccipitals for 10 minutes with point estim while using bony landmarks and backdrops and following appropriate sterile precautions    Therapeutic Activities to improve functional performance for 0 minutes, including:  Education (see below)  Questions and answers    Patient Education and Home Exercises     Home Exercises Provided and Patient Education Provided     Education provided:    Symptom management and plan of care progressions.   Home Exercise Program - reviewed    Dry needling benefits and expectations    Written Home Exercises Provided: Patient instructed to cont prior HEP. Exercises were reviewed and April was able to demonstrate them prior to the end of the session.  April demonstrated good  understanding of the education provided. See EMR under Patient Instructions for exercises provided during therapy sessions    ASSESSMENT     April presents with slight decrease in UT pain today following last session and decreased knot in shoulder following dry needling. Able to implement increased strengthening into program today. Pt interested in progressing strengthening next visit. Myofascial restrictions reduced after manual interventions.  Reports decreased pain after treatment.  Rates pain "4/10" Monitor response and progress as appropriate.     From initial evaluation 3/23/22:  April is a 36 y.o. female referred to outpatient Physical Therapy with a medical diagnosis of Neck pain and Other secondary kyphosis, cervical region. Patient " presents with decreased range of motion, decreased strength, joint hypomobility, neural tension and postural deviations. Signs and symptoms are consistent with myofascial pain secondary to decreased postural and periscapular strength. She will benefit from progressive physical therapy addressing her spinal mobility, posutral strengthening, gross proprioception, and work placed ergonomic education.     April Is progressing well towards her goals.   Pt prognosis is Good.     Pt will continue to benefit from skilled outpatient physical therapy to address the deficits listed in the problem list box on initial evaluation, provide pt/family education and to maximize pt's level of independence in the home and community environment.   Pt's spiritual, cultural and educational needs considered and pt agreeable to plan of care and goals.     Anticipated barriers to physical therapy: chronicity of symptoms    Goals:   Short Term Goals: 3-4 weeks   1. Patient will have reduced pain complaints from 8/10 to less than or equal to 4/10. (Not Met - Progressing)  2. Patient will demonstrate increased AROM/PROM by approximately 25% to 50% of initial measurements. (Not Met - Progressing)  3. Patient will demonstrate increased muscle strength of at least one-half grade as compared to the initial measurements. (Not Met - Progressing)     Long Term Goals: 6-8 weeks   1. Patient will have reduced pain complaints from 4/10 to less than or equal to 0/10. (Not Met - Progressing)  2. Patient will be able to hold right upper extremity overhead for greater than or equal to 2 minutes with no numbness or tingling. (Not Met - Progressing)   3. Patient will demonstrate increased muscle strength of at least one grade as compared to the initial measurements. (Not Met - Progressing)  4. Patient will improve Neck FOTO limitation score from 52% to less than or equal to 38%. (Not Met - Progressing)  5. Patient will be independent with their home exercise  program. (Not Met - Progressing)    PLAN     Plan of care Certification: 3/23/2022 to 6/1/2022.  Cont cervical/thoracic mobility, periscapular  Strengthening      Zahira Kelly, PTA

## 2022-04-17 DIAGNOSIS — F51.01 PRIMARY INSOMNIA: ICD-10-CM

## 2022-04-18 RX ORDER — ESZOPICLONE 3 MG/1
3 TABLET, FILM COATED ORAL NIGHTLY PRN
Qty: 30 TABLET | Refills: 0 | Status: SHIPPED | OUTPATIENT
Start: 2022-04-18 | End: 2022-05-15 | Stop reason: SDUPTHER

## 2022-04-18 NOTE — TELEPHONE ENCOUNTER
No new care gaps identified.  Powered by [employer]. Reference number: [id number].   4/17/2022 9:57:30 PM CDT

## 2022-04-19 ENCOUNTER — PATIENT MESSAGE (OUTPATIENT)
Dept: PAIN MEDICINE | Facility: CLINIC | Age: 37
End: 2022-04-19
Payer: COMMERCIAL

## 2022-04-19 ENCOUNTER — PATIENT MESSAGE (OUTPATIENT)
Dept: NEUROSURGERY | Facility: CLINIC | Age: 37
End: 2022-04-19
Payer: COMMERCIAL

## 2022-04-19 RX ORDER — LISDEXAMFETAMINE DIMESYLATE 60 MG/1
60 CAPSULE ORAL EVERY MORNING
Qty: 30 CAPSULE | Refills: 0 | Status: SHIPPED | OUTPATIENT
Start: 2022-04-19 | End: 2022-05-12 | Stop reason: SDUPTHER

## 2022-04-20 ENCOUNTER — OFFICE VISIT (OUTPATIENT)
Dept: INTERNAL MEDICINE | Facility: CLINIC | Age: 37
End: 2022-04-20
Payer: COMMERCIAL

## 2022-04-20 DIAGNOSIS — M54.2 NECK PAIN: Primary | ICD-10-CM

## 2022-04-20 DIAGNOSIS — M79.18 MYOFASCIAL MUSCLE PAIN: ICD-10-CM

## 2022-04-20 DIAGNOSIS — R29.3 POOR POSTURE: ICD-10-CM

## 2022-04-20 PROCEDURE — 99213 OFFICE O/P EST LOW 20 MIN: CPT | Mod: 95,,, | Performed by: FAMILY MEDICINE

## 2022-04-20 PROCEDURE — 1160F PR REVIEW ALL MEDS BY PRESCRIBER/CLIN PHARMACIST DOCUMENTED: ICD-10-PCS | Mod: CPTII,95,, | Performed by: FAMILY MEDICINE

## 2022-04-20 PROCEDURE — 4010F ACE/ARB THERAPY RXD/TAKEN: CPT | Mod: CPTII,95,, | Performed by: FAMILY MEDICINE

## 2022-04-20 PROCEDURE — 99213 PR OFFICE/OUTPT VISIT, EST, LEVL III, 20-29 MIN: ICD-10-PCS | Mod: 95,,, | Performed by: FAMILY MEDICINE

## 2022-04-20 PROCEDURE — 1159F MED LIST DOCD IN RCRD: CPT | Mod: CPTII,95,, | Performed by: FAMILY MEDICINE

## 2022-04-20 PROCEDURE — 1160F RVW MEDS BY RX/DR IN RCRD: CPT | Mod: CPTII,95,, | Performed by: FAMILY MEDICINE

## 2022-04-20 PROCEDURE — 1159F PR MEDICATION LIST DOCUMENTED IN MEDICAL RECORD: ICD-10-PCS | Mod: CPTII,95,, | Performed by: FAMILY MEDICINE

## 2022-04-20 PROCEDURE — 4010F PR ACE/ARB THEARPY RXD/TAKEN: ICD-10-PCS | Mod: CPTII,95,, | Performed by: FAMILY MEDICINE

## 2022-04-20 RX ORDER — TRAMADOL HYDROCHLORIDE 50 MG/1
50 TABLET ORAL EVERY 8 HOURS PRN
Qty: 21 EACH | Refills: 0 | Status: SHIPPED | OUTPATIENT
Start: 2022-04-20 | End: 2022-04-28

## 2022-04-20 NOTE — PROGRESS NOTES
Subjective:       Patient ID: April Wendt Schamberger is a 36 y.o. female.    Chief Complaint: Pain  The patient location is:  car  The chief complaint leading to consultation is:  Pain    Visit type: audiovisual    Face to Face time with patient:  7 minutes  21 minutes of total time spent on the encounter, which includes face to face time and non-face to face time preparing to see the patient (eg, review of tests), Obtaining and/or reviewing separately obtained history, Documenting clinical information in the electronic or other health record, Independently interpreting results (not separately reported) and communicating results to the patient/family/caregiver, or Care coordination (not separately reported).         Each patient to whom he or she provides medical services by telemedicine is:  (1) informed of the relationship between the physician and patient and the respective role of any other health care provider with respect to management of the patient; and (2) notified that he or she may decline to receive medical services by telemedicine and may withdraw from such care at any time.    Notes:  36-year-old white female with chronic neck pain is evaluated through telemedicine visit today secondary to continued chronic pain.  She has been followed by pain management for treatment of chronic pain and has been obtaining trigger point injections with mild relief.  She was referred to neurosurgery for further evaluation who recommended that the patient start physical therapy for treatment; however, despite physical therapy she continues to have chronic pain.  She has used Tylenol, Fioricet, and tizanidine with minimal relief of her neck pain and is interested in further treatment.  She is unable to get in with her pain management physician for further treatment recommendations.    Pain  Associated symptoms include headaches and neck pain. Pertinent negatives include no abdominal pain, chest pain, chills, congestion,  coughing, fatigue, fever, myalgias, nausea, rash, sore throat or vomiting.     Review of Systems   Constitutional: Negative for appetite change, chills, fatigue and fever.   HENT: Negative for nasal congestion, ear pain, hearing loss, postnasal drip, rhinorrhea, sinus pressure/congestion, sore throat and tinnitus.    Eyes: Negative for redness, itching and visual disturbance.   Respiratory: Negative for cough, chest tightness and shortness of breath.    Cardiovascular: Negative for chest pain and palpitations.   Gastrointestinal: Negative for abdominal pain, constipation, diarrhea, nausea and vomiting.   Genitourinary: Negative for decreased urine volume, difficulty urinating, dysuria, frequency, hematuria and urgency.   Musculoskeletal: Positive for neck pain. Negative for back pain, myalgias and neck stiffness.   Integumentary:  Negative for rash.   Neurological: Positive for headaches. Negative for dizziness and light-headedness.   Psychiatric/Behavioral: Negative.          Objective:      Visit performed through video.  No physical exam performed.  Assessment:       Problem List Items Addressed This Visit     Myofascial muscle pain    Relevant Medications    traMADoL (ULTRAM) 50 mg tablet    Other Relevant Orders    Ambulatory referral/consult to [facility]    Neck pain - Primary    Relevant Medications    traMADoL (ULTRAM) 50 mg tablet    Other Relevant Orders    Ambulatory referral/consult to [facility]    Poor posture    Relevant Medications    traMADoL (ULTRAM) 50 mg tablet    Other Relevant Orders    Ambulatory referral/consult to [facility]          Plan:       1.  has been checked and there is no suspicious activity.  2. I have provided the patient with a short-term prescription of tramadol 50 mg t.i.d. p.r.n. pain to hold the patient over until she is able to speak to her pain management doctor for further treatment recommendations.  3. I recommend referral to the [facility]  back program for further treatment of the patient's chronic neck pain.  4. Follow-up as needed if symptoms persist or worsen.

## 2022-04-27 ENCOUNTER — TELEPHONE (OUTPATIENT)
Dept: REHABILITATION | Facility: HOSPITAL | Age: 37
End: 2022-04-27
Payer: COMMERCIAL

## 2022-04-27 NOTE — TELEPHONE ENCOUNTER
Pt called due to 3 consecutive no shows, reasons family emergency, and work conflicts. Pt recently with follow up and awaiting referral to healthy [facility]. Pt sees pain management tomorrow 4/28/22 and would like to discuss with them whether to continue therapy at this time. Pt will call back and decide if she wants to continue PT while awaiting healthy back Evaluation following tomorrows appointment.    HEATHER JAQUEZ, PT

## 2022-04-28 ENCOUNTER — OFFICE VISIT (OUTPATIENT)
Dept: PAIN MEDICINE | Facility: CLINIC | Age: 37
End: 2022-04-28
Payer: COMMERCIAL

## 2022-04-28 VITALS
BODY MASS INDEX: 27.99 KG/M2 | WEIGHT: 184.06 LBS | SYSTOLIC BLOOD PRESSURE: 147 MMHG | HEART RATE: 80 BPM | DIASTOLIC BLOOD PRESSURE: 96 MMHG

## 2022-04-28 DIAGNOSIS — M79.18 MYOFASCIAL PAIN SYNDROME, CERVICAL: ICD-10-CM

## 2022-04-28 DIAGNOSIS — M54.12 CERVICAL RADICULOPATHY: Primary | ICD-10-CM

## 2022-04-28 DIAGNOSIS — G89.4 CHRONIC PAIN SYNDROME: ICD-10-CM

## 2022-04-28 DIAGNOSIS — M54.2 CERVICALGIA: ICD-10-CM

## 2022-04-28 DIAGNOSIS — M50.30 DDD (DEGENERATIVE DISC DISEASE), CERVICAL: ICD-10-CM

## 2022-04-28 DIAGNOSIS — M54.2 NECK PAIN: ICD-10-CM

## 2022-04-28 DIAGNOSIS — M54.2 CERVICAL PAIN (NECK): ICD-10-CM

## 2022-04-28 PROCEDURE — 3080F DIAST BP >= 90 MM HG: CPT | Mod: CPTII,S$GLB,, | Performed by: NURSE PRACTITIONER

## 2022-04-28 PROCEDURE — 99999 PR PBB SHADOW E&M-EST. PATIENT-LVL IV: ICD-10-PCS | Mod: PBBFAC,,, | Performed by: NURSE PRACTITIONER

## 2022-04-28 PROCEDURE — 4010F PR ACE/ARB THEARPY RXD/TAKEN: ICD-10-PCS | Mod: CPTII,S$GLB,, | Performed by: NURSE PRACTITIONER

## 2022-04-28 PROCEDURE — 99214 OFFICE O/P EST MOD 30 MIN: CPT | Mod: S$GLB,,, | Performed by: NURSE PRACTITIONER

## 2022-04-28 PROCEDURE — 1159F PR MEDICATION LIST DOCUMENTED IN MEDICAL RECORD: ICD-10-PCS | Mod: CPTII,S$GLB,, | Performed by: NURSE PRACTITIONER

## 2022-04-28 PROCEDURE — 3077F SYST BP >= 140 MM HG: CPT | Mod: CPTII,S$GLB,, | Performed by: NURSE PRACTITIONER

## 2022-04-28 PROCEDURE — 3008F BODY MASS INDEX DOCD: CPT | Mod: CPTII,S$GLB,, | Performed by: NURSE PRACTITIONER

## 2022-04-28 PROCEDURE — 99214 PR OFFICE/OUTPT VISIT, EST, LEVL IV, 30-39 MIN: ICD-10-PCS | Mod: S$GLB,,, | Performed by: NURSE PRACTITIONER

## 2022-04-28 PROCEDURE — 4010F ACE/ARB THERAPY RXD/TAKEN: CPT | Mod: CPTII,S$GLB,, | Performed by: NURSE PRACTITIONER

## 2022-04-28 PROCEDURE — 1159F MED LIST DOCD IN RCRD: CPT | Mod: CPTII,S$GLB,, | Performed by: NURSE PRACTITIONER

## 2022-04-28 PROCEDURE — 3080F PR MOST RECENT DIASTOLIC BLOOD PRESSURE >= 90 MM HG: ICD-10-PCS | Mod: CPTII,S$GLB,, | Performed by: NURSE PRACTITIONER

## 2022-04-28 PROCEDURE — 1160F PR REVIEW ALL MEDS BY PRESCRIBER/CLIN PHARMACIST DOCUMENTED: ICD-10-PCS | Mod: CPTII,S$GLB,, | Performed by: NURSE PRACTITIONER

## 2022-04-28 PROCEDURE — 3077F PR MOST RECENT SYSTOLIC BLOOD PRESSURE >= 140 MM HG: ICD-10-PCS | Mod: CPTII,S$GLB,, | Performed by: NURSE PRACTITIONER

## 2022-04-28 PROCEDURE — 3008F PR BODY MASS INDEX (BMI) DOCUMENTED: ICD-10-PCS | Mod: CPTII,S$GLB,, | Performed by: NURSE PRACTITIONER

## 2022-04-28 PROCEDURE — 1160F RVW MEDS BY RX/DR IN RCRD: CPT | Mod: CPTII,S$GLB,, | Performed by: NURSE PRACTITIONER

## 2022-04-28 PROCEDURE — 99999 PR PBB SHADOW E&M-EST. PATIENT-LVL IV: CPT | Mod: PBBFAC,,, | Performed by: NURSE PRACTITIONER

## 2022-04-28 NOTE — PROGRESS NOTES
Chronic Pain  patient Established Note (Follow up visit)        SUBJECTIVE:  Chief Complaint   Patient presents with    Neck Pain    Shoulder Pain     Bilateral        Interval Updates: 4/28/2022 - Ms. Schamberger is following up for Neck Pain and Shoulder Pain (Bilateral )  Pain is currently rate 7/10 with a weekly range 7-8/10.   37-year-old pleasant female she has established our office she has a history of chronic neck pain with recent symptoms of right hand numbness.  She also has pain in the forearm on the right side.  The right 4th and 5th digits are affected consistently.  She has numbness at night she has had cervical LAZ as well as trigger point injection but they have not been sustainable.  She has attempted physical therapy 2 years ago.  She reports having seen Neurosurgery EMG has been ordered by Dr. Reed he is also referred her to physical therapy her primary care has referred her to the healthy [facility] Back program within [facility].  Today she is requesting a refill of tramadol 50 mg b.i.d. the original prescription was provided to her by her PCP/ Dr. Valencia he directed her to return to PM for refills,  she states it is helping her neuropathic symptoms and pain overall dropped her pain score to a 3/10.       12/29/2021 -Schamberger returns to clinic s/p GHASSAN on 12/2/21 with 50% relief of her numbness and tingling in her right arm.  She reports continued tingling and numbness in the right pinky and right ring finger as well as the right arm this occurs typically she falls asleep and lays on her right side she reports that her pain is still there especially with movement her pain starts in the neck radiates into her mid shoulders and shoulder blades pain is described as achy sharp and stabbing.  She denies any profound weakness, denies any bowel bladder dysfunction denies any recent incident or  trauma since her last clinic visit with our office.  Although the injection helped she says did not provide her with significant relief.  She reports a pain intensity 6/10 today with a weekly range of 6-7/10.     11/24/2021 -  Schamberger returns to clinic s/p TPI on 10/05/21 with no relief.  She reports a pain intensity 6/10 today with a weekly range of 4-7/10.  The pain is described as Aching, Tingling and Numb.  Pain is worsened by: extension, flexion, twisting and certain sleeping positions and improved by: tens unit.  She requests a repeat cervical LAZ, which worked well for her in the past.  She also inquires about medications to help with pain pending completion of the injection.    9/27/2021- 35 y/o female presents requesting cervical TPIs, she has a hx neck and arm pain. She has a hx of cervical myofascial pain, she is established with our office she has been provided TPIs previously with good relief. She endorses being on a cocktail of medications but that they are not effectively treating her pain. She tested positive for COVID On 09/19/2021 (rapid screening)  However she reports to me that she began having symptoms on 09/14 she was symptom-free by 920 and cleared to return to work by [employer] on 09/26/2021.  She stated that she would send me via my chart her clearance note and any other mutation that I would need to clear her for trigger point injections.    12/15/20 - Ms. Schamberger returns to clinic for follow up visit reporting stable neck and arm pain.  Patient is s/p Cervical Epidural Steroid Injection at C7-T1 on 12/1/20 with 40-60% overall relief.  Pain intensity is currently 5/10.  Patient reports her right arm symptoms have improved states she does get mild tingling on and off that radiates in to the right minimal, pinky and ring finger and sometimes radiating into the elbow.  She states flexion causes her worse pain however, lateral, and  flexion  movements have improved since the  injection.  She also mentioned that she only took Lyrica 25 mg b.i.d. instead of t.i.d she reported no adverse SEs while taking this medicine.  She discontinued her Voltaren tablets due to burning in her stomach.  She states she is taking Tylenol 1000 mg b.i.d. as needed for pain which she states is helping.    11/16/20 - Ms. Schamberger returns to clinic for follow up visit reporting worse neck and arm pain.  Patient is s/p TPI on 6/30/20 with 50% relief.  Pain intensity is currently 7/10.   April presents with returning neck, thoracic and bilateral arm pain right greater than left., pain is described as aching dull throbbing with some numbness in her right arm she reports no shooting pain in either arm she denies any profound weakness and denies dropping things.  She did report numbness in her right arm. I discussed with patient that I will provide her with trigger point injections today to help with myofascial pain and  I will also start her on a cocktail of medications to help with I think is neuropathic symptoms in her arm, she is currently taking tizanidine 4 mg nightly, so I will at Lyrica 25 mg t.i.d.,  diclofenac 50 mg b.i.d. as needed for pain, and Tylenol 1000 mg t.i.d. in effort to provide her with some relief.  I discussed with her if she had any side effects with these medications that she should discontinue immediately.  I will have them sent to the [facility] pharmacy.    6/30/20 Pt returns for bilateral chronic neck pain, patient is established our office she has been given cervical paraspinal trigger point injection in the past which has helped alleviate her pain for greater than 6 months.  She is requesting repeat trigger point injections today.    April Wendt Schamberger presents to the clinic for a follow-up appointment for Cervical TPIs today.   Since the last visit, April Wendt Schamberger states the pain has been persistant. Current pain intensity is 6/10.    Pain Disability Index  Review:  Last 3 PDI Scores 2022 2021 12/15/2020   Pain Disability Index (PDI) 43 [phone] 26     Pain Medications:  - Opioids: None  - Adjuvant Medications: Advil,Motrin ( Ibuprofen)  - Anti-Coagulants: None  - Others: see medications list.     Opioid Contract: no      report:  Reviewed and consistent with medication use as prescribed.     Pain Procedures:              2021 cervical LAZ-induced 50% of the numbness and tingling   2020 - GHASSAN @ C7-T1 with 40-60% relief   17 TRIGGER POINT INJECTION   17 TRIGGER POINT INJECTION   17 bilateral C4,5,6 CERVICAL FACET MEDIAL BRANCH NERVE BLOCK (Prone) ( after xray correlation with pain level, decsion was made to proceed at C4,5,6 levels, patient agrees to proceed      Physical Therapy/Home Exercise: no     Imagin17 MRI Cervical Spine Without Contrast  Narrative   Technique: Multiplanar, multisequence MR imaging of the cervical spine obtained without the use of IV contrast.     Comparison: Cervical spine radiographs 12/15/2016.     Results:    There is straightening with mild reversal of the normal cervical lordosis. Vertebral body heights are maintained with no evidence of fracture. Mild intervertebral disc space narrowing and desiccation are visualized at C5-6 and C6-7. No marrow signal abnormality suspicious for an infiltrative process.      The cervical cord is normal in caliber and signal characteristics.  The craniocervical junction and visualized intracranial contents are unremarkable.  The adjacent soft tissue structures show no significant abnormalities.      C2-C3:  No significant spinal canal or neural foraminal narrowing.    C3-C4: No significant spinal canal or neural foraminal narrowing.    C4-C5:  No significant spinal canal or neural foraminal narrowing.    C5-C6:  Disc bulge with right uncovertebral spurring. Findings result in mild spinal canal stenosis and mild right neural foraminal narrowing.    C6-C7:  Mild disc  bulge with thickening of the ligamentum flavum results in mild spinal canal stenosis.No significant neuroforaminal narrowing.    C7-T1:   No significant central spinal or neural foraminal narrowing.   Impression      Mild disc bulge and spinal canal stenosis at the C5-6 and C6-7 levels.      Electronically signed by: UMER MARTIN MD  Date: 01/26/17  Time: 12:56     Encounter   View Encounter      Reviewed By   Hunter Jimenez MD on 1/27/2017  2:18 PM   Exam Details                     Performed Procedure Technologist Supporting Staff Performing Physician   MRI Cervical Spine Without Contrast Andre Hinds, RT         Appointment Date/Status Modality Department        1/26/2017     Completed [facility] MRI1 [facility] MRI       Begin Exam End Exam Begin Exam Questionnaires End Exam Questionnaires     1/26/2017 11:22 AM 1/26/2017 11:59 AM MRI TECH NAVIGATOR QUESTIONS IMAGING END ALL         RIS PREGNANCY TECH NAVIGATOR          X-Ray Cervical Spine AP Lat with Flexion  Extension 12/15/16  Narrative     Cervical spine radiographs     comparison: None    Results: AP, lateral, flexion and extension views  .  The alignment is normal, vertebral body height and disk spaces are well-maintained.    Flexion and extension views demonstrate no translational abnormalities.  Very small anterior osteophyte noted at C5-C6.  No fracture or osseous lesion seen.Prevertebral soft tissues appear normal.   Impression    No significant abnormality seen      Electronically signed by: JOSE A JENKINS MD  Date: 12/15/16  Time: 10:52          Allergies:   Review of patient's allergies indicates:   Allergen Reactions    Lactose     Azithromycin Nausea And Vomiting       Current Medications:   Current Outpatient Medications   Medication Sig Dispense Refill    acetaminophen (TYLENOL) 500 MG tablet Take 2 tablets (1,000 mg total) by mouth 3 (three) times daily as needed for Pain.  0    amitriptyline (ELAVIL) 25 MG tablet Take 1 tablet (25 mg  total) by mouth every evening. 90 tablet 0    butalbital-acetaminophen-caffeine -40 mg (FIORICET, ESGIC) -40 mg per tablet Take 1 tablet by mouth every 6 (six) hours as needed for Headaches. 30 tablet 0    clindamycin phosphate 1% (CLINDAGEL) 1 % gel Apply topically 2 (two) times daily. To entire face 30 g 5    diphenoxylate-atropine 2.5-0.025 mg (LOMOTIL) 2.5-0.025 mg per tablet Take 1 tablet by mouth 4 (four) times daily as needed for Diarrhea (diarrhea). 80 tablet 1    doxycycline monohydrate 100 mg Tab Take 1 tablet by mouth everyday with food. Avoid lying down for 1 hour after taking. Avoid the sun. (Patient taking differently: Take 1 tablet by mouth everyday with food. Avoid lying down for 1 hour after taking. Avoid the sun.) 30 tablet 2    eszopiclone (LUNESTA) 3 mg Tab Take 1 tablet (3 mg total) by mouth nightly as needed (insomnia). 30 tablet 0    fluconazole (DIFLUCAN) 150 MG Tab Take 1 tablet as needed for yeast infection 30 tablet 1    lisdexamfetamine (VYVANSE) 60 MG capsule Take 1 capsule (60 mg total) by mouth every morning. 30 capsule 0    lisdexamfetamine (VYVANSE) 60 MG capsule Take 1 capsule (60 mg total) by mouth every morning. 30 capsule 0    loperamide (IMODIUM) 1 mg/7.5 mL solution Take 0.1 mg/kg by mouth every 12 (twelve) hours.      loperamide (IMODIUM) 2 mg capsule Take 2 mg by mouth 4 (four) times daily as needed for Diarrhea.      metoprolol succinate (TOPROL-XL) 50 MG 24 hr tablet Take 1 tablet (50 mg total) by mouth 2 (two) times daily. 180 tablet 1    pregabalin (LYRICA) 50 MG capsule Take 1 capsule (50 mg total) by mouth 2 (two) times daily. 60 capsule 2    sod sulf-pot chloride-mag sulf (SUTAB) 1.479-0.188- 0.225 gram tablet Take 12 tablets by mouth once daily. Take according to package instructions with indicated amount of water. 24 tablet 0    tiZANidine (ZANAFLEX) 4 MG tablet Take 1 tablet (4 mg total) by mouth 3 (three) times daily as needed. 90 tablet 1     traMADoL (ULTRAM) 50 mg tablet Take 1 tablet (50 mg total) by mouth every 8 (eight) hours as needed for Pain. 21 each 0    tretinoin (RETIN-A) 0.025 % cream Apply topically every evening. Pea sized amount to entire face. If irritation occurs, decrease use to every other night. 20 g 5    valsartan (DIOVAN) 320 MG tablet Take 1 tablet (320 mg total) by mouth once daily. 90 tablet 1    venlafaxine (EFFEXOR-XR) 37.5 MG 24 hr capsule Take 1 capsule (37.5 mg total) by mouth 2 (two) times a day. 60 capsule 3     No current facility-administered medications for this visit.     Facility-Administered Medications Ordered in Other Visits   Medication Dose Route Frequency Provider Last Rate Last Admin    0.9%  NaCl infusion  500 mL Intravenous Continuous Corky Argueta Jr., MD           REVIEW OF SYSTEMS:    GENERAL:  No weight loss, malaise or fevers.  HEENT:  Negative for frequent or significant headaches. + HAs   NECK:  Negative for lumps, goiter, pain and significant neck swelling.  RESPIRATORY:  Negative for cough, wheezing or shortness of breath.  CARDIOVASCULAR:  Negative for chest pain, leg swelling or palpitations.  GI:  Negative for abdominal discomfort, blood in stools or black stools or change in bowel habits.  MUSCULOSKELETAL:  See HPI.  SKIN:  Negative for lesions, rash, and itching.  PSYCH:  Positive for sleep disturbance, mood disorder and recent psychosocial stressors.  HEMATOLOGY/LYMPHOLOGY:  Negative for prolonged bleeding, bruising easily or swollen nodes.  NEURO:   No history of headaches, syncope, paralysis, seizures or tremors.  All other reviewed and negative other than HPI.    Past Medical History:  Past Medical History:   Diagnosis Date    Abnormal Pap smear of cervix 2000    Cryo Done    ADD (attention deficit disorder)     Breast disorder     Breast Cysts    Esophageal reflux     Fibroids     Hypertension     IBS (irritable bowel syndrome)     Insomnia     Kidney stones      Mastocytosis     Relies on partner vasectomy for contraception     Upper GI bleed        Past Surgical History:  Past Surgical History:   Procedure Laterality Date    COLONOSCOPY N/A 4/28/2017    Procedure: COLONOSCOPY;  Surgeon: Bren Larkin MD;  Location: [facility] ENDO;  Service: Endoscopy;  Laterality: N/A;    COLONOSCOPY N/A 1/26/2022    Procedure: COLONOSCOPY;  Surgeon: Inocente Roe MD;  Location: [facility] ENDO;  Service: Endoscopy;  Laterality: N/A;    EPIDURAL STEROID INJECTION INTO CERVICAL SPINE N/A 12/1/2020    Procedure: Injection-steroid-epidural-cervical--C7-T1;  Surgeon: Corky Argueta Jr., MD;  Location: [facility] PAIN MGT;  Service: Pain Management;  Laterality: N/A;    EPIDURAL STEROID INJECTION INTO CERVICAL SPINE N/A 12/2/2021    Procedure: Cervical Epidural Steroid Injection C7-T1 (No Sedation);  Surgeon: Corky Argueta Jr., MD;  Location: [facility] PAIN MGT;  Service: Pain Management;  Laterality: N/A;    ESOPHAGOGASTRODUODENOSCOPY  2012    LASIK Bilateral 2005    REFRACTIVE SURGERY      TONSILLECTOMY, ADENOIDECTOMY  1988    VAGINAL DELIVERY      x 2       Family History:  Family History   Problem Relation Age of Onset    Breast cancer Mother 47        with Metastasis    Hypertension Mother     Prostate cancer Father     Hypertension Father     Diabetes Father     Deep vein thrombosis Father     Pancreatic cancer Maternal Grandfather     Breast cancer Paternal Grandmother 80    Diabetes type II Paternal Grandfather     Heart attack Maternal Grandmother     Cancer Cousin 31    Cancer Cousin         Thurman Syndrome    Cancer Cousin         Thurman Syndrome    Ovarian cancer Maternal Aunt     Lymphoma Neg Hx     Tuberculosis Neg Hx     Celiac disease Neg Hx     Cirrhosis Neg Hx     Colon polyps Neg Hx     Crohn's disease Neg Hx     Cystic fibrosis Neg Hx     Esophageal cancer Neg Hx     Hemochromatosis Neg Hx     Inflammatory bowel disease Neg Hx     Irritable bowel  syndrome Neg Hx     Liver cancer Neg Hx     Liver disease Neg Hx     Rectal cancer Neg Hx     Stomach cancer Neg Hx     Ulcerative colitis Neg Hx     Donavon's disease Neg Hx     Amblyopia Neg Hx     Blindness Neg Hx     Cataracts Neg Hx     Glaucoma Neg Hx     Macular degeneration Neg Hx     Retinal detachment Neg Hx     Strabismus Neg Hx        Social History:  Social History     Socioeconomic History    Marital status:     Number of children: 1   Tobacco Use    Smoking status: Former Smoker     Quit date: 2014     Years since quittin.0    Smokeless tobacco: Never Used   Substance and Sexual Activity    Alcohol use: No     Alcohol/week: 0.0 standard drinks     Comment: breastfeeding     Drug use: No    Sexual activity: Yes     Partners: Male     Birth control/protection: Partner-Vasectomy     Comment: : Boris   Social History Narrative    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     Social Determinants of Health     Financial Resource Strain: Low Risk     Difficulty of Paying Living Expenses: Not very hard   Food Insecurity: No Food Insecurity    Worried About Running Out of Food in the Last Year: Never true    Ran Out of Food in the Last Year: Never true   Transportation Needs: No Transportation Needs    Lack of Transportation (Medical): No    Lack of Transportation (Non-Medical): No   Physical Activity: Sufficiently Active    Days of Exercise per Week: 5 days    Minutes of Exercise per Session: 30 min   Stress: Stress Concern Present    Feeling of Stress : Very much   Social Connections: Unknown    Frequency of Communication with Friends and Family: More than three times a week    Frequency of Social Gatherings with Friends and Family: More than three times a week    Active Member of Clubs or Organizations: Yes    Attends Club or Organization Meetings: More than 4 times per year    Marital Status:    Housing Stability: Low Risk     Unable to Pay for Housing  in the Last Year: No    Number of Places Lived in the Last Year: 1    Unstable Housing in the Last Year: No       OBJECTIVE:    BP (!) 147/96   Pulse 80   Wt 83.5 kg (184 lb 1.4 oz)   BMI 27.99 kg/m²     PHYSICAL EXAMINATION:    General appearance: Well appearing, in no acute distress, alert and oriented x3.  Psych:  Mood and affect appropriate.  Skin: Skin color, texture, turgor normal, no rashes or lesions, in both upper and lower body.  Head/face:  Atraumatic, normocephalic. No palpable lymph nodes  Neck: neg pain to palpation over the cervical paraspinous muscles. Spurling Negative. + for mild pain with extension   Cor: RRR  Pulm: CTA  GI: Abdomen soft and non-tender.  Back: Straight leg raising in the sitting and supine positions is negative to radicular pain. No pain to palpation over the spine or costovertebral angles. Normal range of motion without pain reproduction.  Extremities: Peripheral joint ROM is full and pain free without obvious instability or laxity in all four extremities. No deformities, edema, or skin discoloration. Good capillary refill.  Musculoskeletal: Shoulder, hip, sacroiliac and knee provocative maneuvers are negative. Bilateral upper and lower extremity strength is normal and symmetric.  No atrophy or tone abnormalities are noted.  Neuro: Bilateral upper and lower extremity coordination and muscle stretch reflexes are physiologic and symmetric.  Plantar response are downgoing. No loss of sensation is noted.  Gait: Normal.    ASSESSMENT: 37 y.o. year old female with  neck and shoulder  pain, consistent with patient is status post cervical LAZ targeting C7-T1 with in overall 40% improvement of her pain patient reporting a discernible difference in her pain relief following this injection and the optimization of certain medications.  Her and I discussed that we could consider repeating the cervical LAZ at the above-mentioned levels but will hold off for now.      09/27/2021-April  Wendt Schamberger is a 37 y.o. female who  has a past medical history of Abnormal Pap smear of cervix (2000), ADD (attention deficit disorder), Breast disorder, Esophageal reflux, Fibroids, Hypertension, IBS (irritable bowel syndrome), Insomnia, Kidney stones, Mastocytosis, Relies on partner vasectomy for contraception, and Upper GI bleed.  By history and examination this patient has chronicneck pain with right radiculopathy in the distribution of C4, C5, C6 and C7.  The underlying cause cause is facet arthritis, degenerative disc disease, foraminal stenosis and muscle dysfunction.  Pathology is confirmed by imaging.  We discussed the underlying diagnoses and multiple treatment options including non-opioid medications, injections, physical therapy, home exercise and activity modification / rest.  The risks and benefits of each treatment option were discussed and all questions were answered.    Mrs. Schamberger tested + COVID on 9/19, her symptoms began on 9/14 she reports being symptom-free on 9-10 and reports being cleared by [facility] on 09/26 she shows no COVID symptoms today, however I recommended that we hold off on injections discussed that our protocol is 2 weeks before injections  following testing + COVID. Recommended she follow up in 5 days for consideration of cervical paraspinal TPIs, pt agreed and understood. Patient to send me updated documentation on her COVID clearance.     11/24/21 - this is a 36-year-old female with chronic neck pain and chronic cervical radiculopathy to the right upper extremity in the distribution of C5, C6, and C7.  This is consistent with previous cervical MRI showing degenerative disease contributing to foraminal and lateral recess stenosis.  She has a positive Spurling's sign on the right side, further supporting this diagnosis.  She has tried trigger point injections, TENS, oral medications, and home exercise.  She would like to repeat the cervical epidural steroid  injection that provided her approximately 50% relief from December 2020. Additionally, I will increase her dose of Lyrica and provide her with a short-term prescription of tramadol.      12/29/2021-April Wendt Schamberger is a 37 y.o. female who  has a past medical history of Abnormal Pap smear of cervix (2000), ADD (attention deficit disorder), Breast disorder, Esophageal reflux, Fibroids, Hypertension, IBS (irritable bowel syndrome), Insomnia, Kidney stones, Mastocytosis, Relies on partner vasectomy for contraception, and Upper GI bleed.  By history and examination this patient has chronic and subacuteneck pain with right radiculopathy in the distribution of C4, C5 and C6.  The underlying cause cause is degenerative disc disease and foraminal stenosis.  Pathology is confirmed by imaging.  We discussed the underlying diagnoses and multiple treatment options including non-opioid medications, opioid medications, interventional procedures, physical therapy and home exercise.  The risks and benefits of each treatment option were discussed and all questions were answered.          04/28/2022- 37-year-old female with history of chronic neck pain with increasing symptoms in her right hand described as numbness.  Additionally she has pain in her right forearm affecting the 4th and 5th digits that are very consistent.  She has seen Neurosurgery and decompressive surgery has been recommended however Dr. Reed as ordered an EMG she will follow-up with him for these results.  Her MRI shows at C5-6 and C6-7 degenerative disc disease with broad-based bulging that encroaches to central cord without compression and without significant neural foraminal stenosis.  Her and I discussed today that we will provide her with a short-term prescription of tramadol 50 mg b.i.d. to utilize and be a bridge through therapy at this time Dr. Reed has recommended therapy prior to considering surgery.  Based on patient's history and behavior there  are no red flags with regard to poly if pharmacy of opioids, considering she is dealing with chronic/subacute pain I would recommend continuing tramadol 50 mg b.i.d. until it plan of care has been established from a surgical perspective.  April did report today that she would be willing to have surgery to reduce/rid her of her pain as it is affecting her quality of life and sleep.      Diagnoses    1. Cervical radiculopathy     2. Cervical pain (neck)     3. DDD (degenerative disc disease), cervical     4. Chronic pain syndrome     5. Neck pain     6. Cervicalgia     7. Myofascial pain syndrome, cervical           PLAN:   1. Continue PT, patient will also try [facility] and Back program( she reported PT is providing limited relief)   2. Follow up with nsgy for EMG results.   3. Short term Rx of Tramadol 50 mg BID # 60, previous Rx written by PCP/ Dr. Valencia he deferred future Rxs to PM  4. Continue Tylenol and ibuprofen p.r.n.  5. Continue  Lyrica to 50 mg b.i.d. for neuropathic pain  6. Consider Pain Contract and UDS on next CV         VALERIE Marie  Interventional Pain Management        04/28/2022

## 2022-04-29 ENCOUNTER — PATIENT MESSAGE (OUTPATIENT)
Dept: PAIN MEDICINE | Facility: CLINIC | Age: 37
End: 2022-04-29
Payer: COMMERCIAL

## 2022-04-29 RX ORDER — TRAMADOL HYDROCHLORIDE 50 MG/1
50 TABLET ORAL EVERY 8 HOURS PRN
Qty: 21 TABLET | Refills: 0 | Status: SHIPPED | OUTPATIENT
Start: 2022-05-02 | End: 2022-05-09

## 2022-04-29 NOTE — TELEPHONE ENCOUNTER
Will send in tramadol 50 mg q 8 hrs PRN pain x 7 days since this has not been something Dr. Argueta has been prescribing, but I am covering for him. Then, he can decide the best long-term plan for this patient.  reviewed.

## 2022-04-29 NOTE — TELEPHONE ENCOUNTER
----- Message from LIDIA Miller sent at 4/28/2022  4:44 PM CDT -----  Regarding: Tramadol prescription  Please pend tramadol 50 mg b.i.d. # 60 no refills [facility] in [facility]. Due  on Monday.     This will be a short-term prescription for now on her follow-up we will discuss possible surgery and how we will move forward with chronic opioid therapy.    Thanks     CL

## 2022-05-02 ENCOUNTER — TELEPHONE (OUTPATIENT)
Dept: REHABILITATION | Facility: HOSPITAL | Age: 37
End: 2022-05-02
Payer: COMMERCIAL

## 2022-05-02 NOTE — TELEPHONE ENCOUNTER
Pt called to follow up on plan following pain management assessment and will plan to continue therapy for remaining scheduled visits at [facility] until healthy back evaluation 5/26/22. Confirms appointment tomorrow 5/3/22.    HEATHER JAQUEZ, PT

## 2022-05-03 ENCOUNTER — CLINICAL SUPPORT (OUTPATIENT)
Dept: REHABILITATION | Facility: HOSPITAL | Age: 37
End: 2022-05-03
Payer: COMMERCIAL

## 2022-05-03 DIAGNOSIS — M54.2 NECK PAIN: Primary | ICD-10-CM

## 2022-05-03 DIAGNOSIS — R29.3 POOR POSTURE: ICD-10-CM

## 2022-05-03 DIAGNOSIS — M79.18 MYOFASCIAL MUSCLE PAIN: ICD-10-CM

## 2022-05-03 PROCEDURE — 97140 MANUAL THERAPY 1/> REGIONS: CPT | Mod: PN

## 2022-05-03 PROCEDURE — 97110 THERAPEUTIC EXERCISES: CPT | Mod: PN

## 2022-05-03 NOTE — PROGRESS NOTES
"[facility]   Physical Therapy Treatment Note     Name: April Wendt Schamberger  Clinic Number: [id number]    Therapy Diagnosis:   Encounter Diagnoses   Name Primary?   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 Took pain med a little while before session. She has been doing home exercises. Pain wakes her up every 2-3 hours with numbness in both hands. She reports minimal improvements to date with 3 hrs relief following last session, at most relief lasts until the next day.   She was compliant with home exercise program.  Response to previous treatment: relief short term  Functional change: None    Pain: 6/10  Location: right neck  and shoulder      OBJECTIVE     Objective Measures updated at progress report unless specified.     Treatment     April received the treatments listed below:      therapeutic exercises to develop strength, endurance, ROM, flexibility and posture for 30 minutes including: bulleted exercises only   Movie Watcher Pose - 2 minutes  Supine Chin Tucks on Potato Roll - 10 second holds x10  Chin tucks with extension into pillow:  5" x 10  Horizontal shoulder abduction green theraband 3x10  [place] - bilateral x15   Prone scap retract shoulder extensions 15x 3"  · Open book 15x   Ulnar Nerve " "Floss 1x10   [place] 2x10  · Alternating Shoulder extension with contralateral cervical rotation 20x blue theraband   · Seated thoracic extension 20x - arms crossed--(behind head increased numbness)  · Prone T's  3" 2x10 1#  · Prone Y's 1# 2x10  · Serratus punches 2x10 3#  · Rows Black 3x10  · scaption with chin tuck at wall 2#   · No money green theraband 2x10           manual therapy techniques: Joint mobilizations and Manual traction were applied to the: neck for 10 minutes, including:  · STM to bilateral cervical paraspinals with elongation  Gentle sub occipital release  · STM/MFR with manual stretch to bilateral Uppertraps  Upper thoracic release  · Grade 3 up/down glides lower cervical spine  Thoracic distraction HVLAT  Prone CT junction HVLAT  Cervical retraction mobilizations (grade 3)  Right upper trap inhibition / contract relax  · Palpation and ientification of trigger points pre an post dry needling.  · Dry needling performed to bilateral upper trap while using bony landmarks and backdrops and following appropriate sterile precautions    Therapeutic Activities to improve functional performance for 0 minutes, including:  Education (see below)  Questions and answers    Patient Education and Home Exercises     Home Exercises Provided and Patient Education Provided     Education provided:    Symptom management and plan of care progressions.   Home Exercise Program - reviewed    Dry needling benefits and expectations    Written Home Exercises Provided: Patient instructed to cont prior HEP. Exercises were reviewed and April was able to demonstrate them prior to the end of the session.  April demonstrated good  understanding of the education provided. See EMR under Patient Instructions for exercises provided during therapy sessions    ASSESSMENT     April presents following visit with referring MD. Pt with short term pain relief following last session and typically with several hours longer relief " reported with needling. Performed dry needling to bilateral UTs with good twitch response noted. Pt with decreased complaints of pain in upper cervical spine, now primarily in shoulders. Added progressive periscapular strengthening with good tolerance. Mild provocation of pain with push-up plus which relieved with rest. Pt reports reduction in pain from 6 to 4/10 post treatment. Monitor response and progress accordingly. Will finish out remaining visits then d/c.     From initial evaluation 3/23/22:  April is a 36 y.o. female referred to outpatient Physical Therapy with a medical diagnosis of Neck pain and Other secondary kyphosis, cervical region. Patient presents with decreased range of motion, decreased strength, joint hypomobility, neural tension and postural deviations. Signs and symptoms are consistent with myofascial pain secondary to decreased postural and periscapular strength. She will benefit from progressive physical therapy addressing her spinal mobility, posutral strengthening, gross proprioception, and work placed ergonomic education.     April Is progressing well towards her goals.   Pt prognosis is Good.     Pt will continue to benefit from skilled outpatient physical therapy to address the deficits listed in the problem list box on initial evaluation, provide pt/family education and to maximize pt's level of independence in the home and community environment.   Pt's spiritual, cultural and educational needs considered and pt agreeable to plan of care and goals.     Anticipated barriers to physical therapy: chronicity of symptoms    Goals:   Short Term Goals: 3-4 weeks   1. Patient will have reduced pain complaints from 8/10 to less than or equal to 4/10. (Not Met - Progressing)  2. Patient will demonstrate increased AROM/PROM by approximately 25% to 50% of initial measurements. (Not Met - Progressing)  3. Patient will demonstrate increased muscle strength of at least one-half grade as compared to  the initial measurements. (Not Met - Progressing)     Long Term Goals: 6-8 weeks   1. Patient will have reduced pain complaints from 4/10 to less than or equal to 0/10. (Not Met - Progressing)  2. Patient will be able to hold right upper extremity overhead for greater than or equal to 2 minutes with no numbness or tingling. (Not Met - Progressing)   3. Patient will demonstrate increased muscle strength of at least one grade as compared to the initial measurements. (Not Met - Progressing)  4. Patient will improve Neck FOTO limitation score from 52% to less than or equal to 38%. (Not Met - Progressing)  5. Patient will be independent with their home exercise program. (Not Met - Progressing)    PLAN     Plan of care Certification: 3/23/2022 to 6/1/2022.  Cont cervical/thoracic mobility, periscapular  Strengthening      HEATHER JAQUEZ, PT

## 2022-05-09 ENCOUNTER — PATIENT MESSAGE (OUTPATIENT)
Dept: PAIN MEDICINE | Facility: CLINIC | Age: 37
End: 2022-05-09
Payer: COMMERCIAL

## 2022-05-09 DIAGNOSIS — M62.838 MUSCLE SPASM: ICD-10-CM

## 2022-05-09 DIAGNOSIS — M79.18 MYOFASCIAL PAIN SYNDROME, CERVICAL: ICD-10-CM

## 2022-05-09 RX ORDER — TRAMADOL HYDROCHLORIDE 50 MG/1
50 TABLET ORAL EVERY 8 HOURS PRN
Qty: 21 TABLET | Refills: 0 | OUTPATIENT
Start: 2022-05-09 | End: 2022-05-16

## 2022-05-09 RX ORDER — TIZANIDINE 4 MG/1
4 TABLET ORAL 3 TIMES DAILY PRN
Qty: 90 TABLET | Refills: 1 | Status: SHIPPED | OUTPATIENT
Start: 2022-05-09 | End: 2022-07-10 | Stop reason: SDUPTHER

## 2022-05-10 ENCOUNTER — DOCUMENTATION ONLY (OUTPATIENT)
Dept: REHABILITATION | Facility: HOSPITAL | Age: 37
End: 2022-05-10
Payer: COMMERCIAL

## 2022-05-10 PROBLEM — M54.2 NECK PAIN: Status: RESOLVED | Noted: 2019-09-17 | Resolved: 2022-05-10

## 2022-05-10 PROBLEM — M79.18 MYOFASCIAL MUSCLE PAIN: Status: RESOLVED | Noted: 2017-01-10 | Resolved: 2022-05-10

## 2022-05-10 PROBLEM — R29.3 POOR POSTURE: Status: RESOLVED | Noted: 2019-09-17 | Resolved: 2022-05-10

## 2022-05-10 NOTE — PROGRESS NOTES
Patient was evaluated on 3/23/2022 and was seen 7 times for PT. Patient has not attended PT since 5/3/2022. Patient was given HEP. Patient to be discharged at this time secondary to seeking care at another facility.    Shreyas Degroot, PT, DPT, OCS

## 2022-05-11 NOTE — PROGRESS NOTES
Subjective:      Patient ID: April Wendt Schamberger is a 37 y.o. female.    Chief Complaint: No chief complaint on file.    Ms Schamberger is a 38 yo female sent in consultation by Dr. mendenhall for evaluation for the healthy back cervical program.  she has had neck pain for 6 years with on and off pain.  Pain has been worse since 2019.  There were no accidents or falls.  The pain is right neck dominant, and goes to the left and to the back of the head and across the shoulders and between shoulder blades.  She has pain right elbow and down the arm the finger 4 and 5.  The pain is sharp and stabbing.  She has tingling and burning in the right arm.  She has numbness in both hands, burning in the neck and feels like right hand is weak.  The pain is constant 4-9/10.  It is worse with looking up and down, turning head to both sides, standing, lying on stomach, sitting, using a pillow, and as the day goes on.  It is better lying on back and walking. She had cervical LAZ 12/2020 with relief and 12/2021 with no relief. She had MBBB in 2017 with no relief. She had PT in 2017, and march 2022 with no relief.  She has not been to chiropractor or had surgery.  Her goals are standing, turning her head to both sides, playing with her son looking up and down.  Pattern 1    MRI cervical 1/2022  There is straightening of the cervical lordotic curvature.  No advanced marrow edema or osseous destructive process is identified.     The cervical spinal cord maintains normal signal intensity.     Mild degenerative disc space narrowing is again noted at C5-6 and C6-7.     C2-3, C3-4, C7-T1, T1-2 there is no evidence of disc herniation or advanced stenosis.     C4-5 there is an improved appearance with residual mild disc bulging but no evidence of cord deformity or advanced foraminal stenosis.     C5-6 and C6-7 disc bulging with mild endplate osteophyte formation and facet and ligamentous hypertrophy flattens the ventral and dorsal thecal sac  with mild flattening of the traversing cord at C5-6 but no evidence of cord impingement.  Mild foraminal stenosis bilaterally at C6-7.  Overall there is a similar appearance when compared to the prior study.     Impression:     Similar degenerative changes in the cervical spine when compared to the prior study although mildly improved at C4-5.  Mild foraminal stenosis at C6-7 but no evidence of new disc herniation or cord impingement.    X-ray cervical 3/2022  There is reversal of the normal cervical lordotic curvature.  Visualization to the level of C7-T1 on lateral view.  Lower discogenic change, most pronounced at C5-C6.  No significant translation.  Normal precervical soft tissue thickness.     Impression:     As above.    Past Medical History:  2000: Abnormal Pap smear of cervix      Comment:  Cryo Done  No date: ADD (attention deficit disorder)  No date: Breast disorder      Comment:  Breast Cysts  No date: Esophageal reflux  No date: Fibroids  No date: Hypertension  No date: IBS (irritable bowel syndrome)  No date: Insomnia  No date: Kidney stones  No date: Mastocytosis  No date: Relies on partner vasectomy for contraception  No date: Upper GI bleed    Past Surgical History:  4/28/2017: COLONOSCOPY; N/A      Comment:  Procedure: COLONOSCOPY;  Surgeon: Bren Larkin MD;                 Location: [facility] ENDO;  Service: Endoscopy;  Laterality:                N/A;  1/26/2022: COLONOSCOPY; N/A      Comment:  Procedure: COLONOSCOPY;  Surgeon: Ioncente Roe MD;                 Location: [facility] ENDO;  Service: Endoscopy;  Laterality:                N/A;  12/1/2020: EPIDURAL STEROID INJECTION INTO CERVICAL SPINE; N/A      Comment:  Procedure: Injection-steroid-epidural-cervical--C7-T1;                 Surgeon: Corky Argueta Jr., MD;  Location: [facility] PAIN                MGT;  Service: Pain Management;  Laterality: N/A;  12/2/2021: EPIDURAL STEROID INJECTION INTO CERVICAL SPINE; N/A      Comment:  Procedure:  Cervical Epidural Steroid Injection C7-T1 (No               Sedation);  Surgeon: Corky Argueta Jr., MD;                 Location: [facility];  Service: Pain Management;                 Laterality: N/A;  2012: ESOPHAGOGASTRODUODENOSCOPY  2005: LASIK; Bilateral  No date: REFRACTIVE SURGERY  1988: TONSILLECTOMY, ADENOIDECTOMY  No date: VAGINAL DELIVERY      Comment:  x 2    Review of patient's family history indicates:  Problem: Breast cancer      Relation: Mother          Age of Onset: 47          Comment: with Metastasis  Problem: Hypertension      Relation: Mother          Age of Onset: (Not Specified)  Problem: Prostate cancer      Relation: Father          Age of Onset: (Not Specified)  Problem: Hypertension      Relation: Father          Age of Onset: (Not Specified)  Problem: Diabetes      Relation: Father          Age of Onset: (Not Specified)  Problem: Deep vein thrombosis      Relation: Father          Age of Onset: (Not Specified)  Problem: Pancreatic cancer      Relation: Maternal Grandfather          Age of Onset: (Not Specified)  Problem: Breast cancer      Relation: Paternal Grandmother          Age of Onset: 80  Problem: Diabetes type II      Relation: Paternal Grandfather          Age of Onset: (Not Specified)  Problem: Heart attack      Relation: Maternal Grandmother          Age of Onset: (Not Specified)  Problem: Cancer      Relation: Cousin          Age of Onset: 31  Problem: Cancer      Relation: Cousin          Age of Onset: (Not Specified)          Comment: Thurman Syndrome  Problem: Cancer      Relation: Cousin          Age of Onset: (Not Specified)          Comment: Thurman Syndrome  Problem: Ovarian cancer      Relation: Maternal Aunt          Age of Onset: (Not Specified)  Problem: Lymphoma      Relation: Neg Hx          Age of Onset: (Not Specified)  Problem: Tuberculosis      Relation: Neg Hx          Age of Onset: (Not Specified)  Problem: Celiac disease      Relation: Neg Hx           Age of Onset: (Not Specified)  Problem: Cirrhosis      Relation: Neg Hx          Age of Onset: (Not Specified)  Problem: Colon polyps      Relation: Neg Hx          Age of Onset: (Not Specified)  Problem: Crohn's disease      Relation: Neg Hx          Age of Onset: (Not Specified)  Problem: Cystic fibrosis      Relation: Neg Hx          Age of Onset: (Not Specified)  Problem: Esophageal cancer      Relation: Neg Hx          Age of Onset: (Not Specified)  Problem: Hemochromatosis      Relation: Neg Hx          Age of Onset: (Not Specified)  Problem: Inflammatory bowel disease      Relation: Neg Hx          Age of Onset: (Not Specified)  Problem: Irritable bowel syndrome      Relation: Neg Hx          Age of Onset: (Not Specified)  Problem: Liver cancer      Relation: Neg Hx          Age of Onset: (Not Specified)  Problem: Liver disease      Relation: Neg Hx          Age of Onset: (Not Specified)  Problem: Rectal cancer      Relation: Neg Hx          Age of Onset: (Not Specified)  Problem: Stomach cancer      Relation: Neg Hx          Age of Onset: (Not Specified)  Problem: Ulcerative colitis      Relation: Neg Hx          Age of Onset: (Not Specified)  Problem: Donavon's disease      Relation: Neg Hx          Age of Onset: (Not Specified)  Problem: Amblyopia      Relation: Neg Hx          Age of Onset: (Not Specified)  Problem: Blindness      Relation: Neg Hx          Age of Onset: (Not Specified)  Problem: Cataracts      Relation: Neg Hx          Age of Onset: (Not Specified)  Problem: Glaucoma      Relation: Neg Hx          Age of Onset: (Not Specified)  Problem: Macular degeneration      Relation: Neg Hx          Age of Onset: (Not Specified)  Problem: Retinal detachment      Relation: Neg Hx          Age of Onset: (Not Specified)  Problem: Strabismus      Relation: Neg Hx          Age of Onset: (Not Specified)      Social History    Socioeconomic History      Marital status:       Number of children: 1     Tobacco Use      Smoking status: Former Smoker        Quit date: 2014        Years since quittin.1      Smokeless tobacco: Never Used    Substance and Sexual Activity      Alcohol use: No        Alcohol/week: 0.0 standard drinks        Comment: breastfeeding       Drug use: No      Sexual activity: Yes        Partners: Male        Birth control/protection: Partner-Vasectomy        Comment: : Boris    Social History Narrative      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    Social Determinants of Health  Financial Resource Strain: Low Risk       Difficulty of Paying Living Expenses: Not very hard  Food Insecurity: No Food Insecurity      Worried About Running Out of Food in the Last Year: Never true      Ran Out of Food in the Last Year: Never true  Transportation Needs: No Transportation Needs      Lack of Transportation (Medical): No      Lack of Transportation (Non-Medical): No  Physical Activity: Sufficiently Active      Days of Exercise per Week: 5 days      Minutes of Exercise per Session: 30 min  Stress: Stress Concern Present      Feeling of Stress : Very much  Social Connections: Unknown      Frequency of Communication with Friends and Family: More than three times a week      Frequency of Social Gatherings with Friends and Family: More than three times a week      Active Member of Clubs or Organizations: Yes      Attends Club or Organization Meetings: More than 4 times per year      Marital Status:   Housing Stability: Low Risk       Unable to Pay for Housing in the Last Year: No      Number of Places Lived in the Last Year: 1      Unstable Housing in the Last Year: No    Current Outpatient Medications:  acetaminophen (TYLENOL) 500 MG tablet, Take 2 tablets (1,000 mg total) by mouth 3 (three) times daily as needed for Pain., Disp: , Rfl: 0  amitriptyline (ELAVIL) 25 MG tablet, Take 1 tablet (25 mg total) by mouth every evening., Disp: 90 tablet, Rfl: 0  butalbital-acetaminophen-caffeine  -40 mg (FIORICET, ESGIC) -40 mg per tablet, Take 1 tablet by mouth every 6 (six) hours as needed for Headaches., Disp: 30 tablet, Rfl: 0  clindamycin phosphate 1% (CLINDAGEL) 1 % gel, Apply topically 2 (two) times daily. To entire face, Disp: 30 g, Rfl: 5  diphenoxylate-atropine 2.5-0.025 mg (LOMOTIL) 2.5-0.025 mg per tablet, Take 1 tablet by mouth 4 (four) times daily as needed for Diarrhea (diarrhea)., Disp: 80 tablet, Rfl: 1  doxycycline monohydrate 100 mg Tab, Take 1 tablet by mouth everyday with food. Avoid lying down for 1 hour after taking. Avoid the sun. (Patient taking differently: Take 1 tablet by mouth everyday with food. Avoid lying down for 1 hour after taking. Avoid the sun.), Disp: 30 tablet, Rfl: 2  eszopiclone (LUNESTA) 3 mg Tab, Take 1 tablet (3 mg total) by mouth nightly as needed (insomnia)., Disp: 30 tablet, Rfl: 0  fluconazole (DIFLUCAN) 150 MG Tab, Take 1 tablet as needed for yeast infection, Disp: 30 tablet, Rfl: 1  lisdexamfetamine (VYVANSE) 60 MG capsule, Take 1 capsule (60 mg total) by mouth every morning., Disp: 30 capsule, Rfl: 0  lisdexamfetamine (VYVANSE) 60 MG capsule, Take 1 capsule (60 mg total) by mouth every morning., Disp: 30 capsule, Rfl: 0  loperamide (IMODIUM) 1 mg/7.5 mL solution, Take 0.1 mg/kg by mouth every 12 (twelve) hours., Disp: , Rfl:   loperamide (IMODIUM) 2 mg capsule, Take 2 mg by mouth 4 (four) times daily as needed for Diarrhea., Disp: , Rfl:   metoprolol succinate (TOPROL-XL) 50 MG 24 hr tablet, Take 1 tablet (50 mg total) by mouth 2 (two) times daily., Disp: 180 tablet, Rfl: 1  pregabalin (LYRICA) 50 MG capsule, Take 1 capsule (50 mg total) by mouth 2 (two) times daily., Disp: 60 capsule, Rfl: 2  sod sulf-pot chloride-mag sulf (SUTAB) 1.479-0.188- 0.225 gram tablet, Take 12 tablets by mouth once daily. Take according to package instructions with indicated amount of water., Disp: 24 tablet, Rfl: 0  tiZANidine (ZANAFLEX) 4 MG tablet, Take 1 tablet (4  mg total) by mouth 3 (three) times daily as needed., Disp: 90 tablet, Rfl: 1  tretinoin (RETIN-A) 0.025 % cream, Apply topically every evening. Pea sized amount to entire face. If irritation occurs, decrease use to every other night., Disp: 20 g, Rfl: 5  valsartan (DIOVAN) 320 MG tablet, Take 1 tablet (320 mg total) by mouth once daily., Disp: 90 tablet, Rfl: 1  venlafaxine (EFFEXOR-XR) 37.5 MG 24 hr capsule, Take 1 capsule (37.5 mg total) by mouth 2 (two) times a day., Disp: 60 capsule, Rfl: 3    No current facility-administered medications for this visit.  Facility-Administered Medications Ordered in Other Visits:  0.9%  NaCl infusion, 500 mL, Intravenous, Continuous, Corky Argueta Jr., MD        Review of patient's allergies indicates:   -- Lactose    -- Azithromycin -- Nausea And Vomiting          Review of Systems   Constitutional: Negative for weight gain and weight loss.   Cardiovascular: Negative for chest pain.   Respiratory: Negative for shortness of breath.    Musculoskeletal: Positive for neck pain (right hand). Negative for back pain, joint pain and joint swelling.   Gastrointestinal: Positive for diarrhea and nausea. Negative for abdominal pain, bowel incontinence and vomiting.   Genitourinary: Negative for bladder incontinence.   Neurological: Positive for paresthesias. Negative for numbness.         Objective:        General: April is well-developed, well-nourished, appears stated age, in no acute distress, alert and oriented to time, place and person.     General    Vitals reviewed.  Constitutional: She is oriented to person, place, and time. She appears well-developed and well-nourished.   HENT:   Head: Normocephalic and atraumatic.   Pulmonary/Chest: Effort normal.   Neurological: She is alert and oriented to person, place, and time.   Psychiatric: She has a normal mood and affect. Her behavior is normal. Judgment and thought content normal.     General Musculoskeletal Exam   Gait: normal      Right Ankle/Foot Exam     Tests   Heel Walk: able to perform  Tiptoe Walk: able to perform    Left Ankle/Foot Exam     Tests   Heel Walk: able to perform  Tiptoe Walk: able to perform  Back (L-Spine & T-Spine) / Neck (C-Spine) Exam     Tenderness   The patient is tender to palpation of the right trapezial and left trapezial. Right paramedian tenderness of the Upper C-Spine. Left paramedian tenderness of the Upper C-Spine.     Neck (C-Spine) Range of Motion   Flexion:     40 (with pain)  Extension: 40 (with pain)Right Lateral Bend: 20 (with pain ) Left Lateral Bend: 20 (with pain) Right Rotation: 40 (with pain ) Left Rotation: 40 (with pain)     Spinal Sensation   Right Side Sensation  C-Spine Level: normal   L-Spine Level: normal  S-Spine Level: normal  Left Side Sensation  C-Spine Level: normal  L-Spine Level: normal  S-Spine Level: normal    Back (L-Spine & T-Spine) Tests   Right Side Tests  Straight leg raise:      Sitting SLR: > 70 degrees      Left Side Tests  Straight leg raise:     Sitting SLR: > 70 degrees          Other She has no scoliosis .  Spinal Kyphosis:  Absent      Muscle Strength   Right Upper Extremity   Biceps: 5/5   Deltoid:  5/5  Triceps:  5/5  Wrist extension: 5/5   Finger Flexors:  5/5  Left Upper Extremity  Biceps: 5/5   Deltoid:  5/5  Triceps:  5/5  Wrist extension: 5/5   Finger Flexors:  5/5  Right Lower Extremity   Hip Flexion: 5/5   Quadriceps:  5/5   Anterior tibial:  5/5   EHL:  5/5  Left Lower Extremity   Hip Flexion: 5/5   Quadriceps:  5/5   Anterior tibial:  5/5   EHL:  5/5    Reflexes     Left Side  Biceps:  2+  Triceps:  2+  Brachioradialis:  2+  Achilles:  2+  Left Hughes's Sign:  Absent  Babinski Sign:  absent  Quadriceps:  2+    Right Side   Biceps:  2+  Triceps:  2+  Brachioradialis:  2+  Achilles:  2+  Right Hughes's Sign:  absent  Babinski Sign:  absent  Quadriceps:  2+    Vascular Exam     Right Pulses        Carotid:                  2+    Left Pulses        Carotid:                   2+              Assessment:       1. DDD (degenerative disc disease), cervical    2. Myofascial muscle pain    3. Neck pain    4. Poor posture    5. Other secondary kyphosis, cervical region           Plan:       Orders Placed This Encounter    Ambulatory referral/consult to [facility]     The patient has had a history of neck pain with limitations in there activities of Daily living    Previous treatment has not provided relief.    The situation was discussed at length with the patient.  We discussed different causes of neck pain and different treatment options.  We discussed the importance of stretching and strengthening.  We discussed posture.  We discussed the pros and cons of further diagnostic testing, alternative treatment and Medications    Based on the history, physical exam, and functional index, an active physical therapy program is recommended.  The goal is to restore the patients function and reduce pain.  A program of progressive resistance exercises, biomechanical, and mobility maneuvers, instructions in proper body mechanics, aerobic conditioning and HEP will be utilized. The program will continue as long as making improvements.    An assessment of patients progress will be made at each visit to document change in status.    The patient will be actively involved in there own treatment, and responsible for appointments and home program    The patient's cervical isometric strength will be tested and they will be placed in a program of isolated strength training based on 50% of their total functional torque and advanced as clinically appropriate.      Directional preference of pain will further influence the patients active rehabilitation program    The patient was instructed there might be an initial increase in discomfort    They are enrolled in cervical program with good prognosis  Pattern 1      Follow-up: No follow-ups on file. If there are any questions prior to this,  the patient was instructed to contact the office.

## 2022-05-12 ENCOUNTER — CLINICAL SUPPORT (OUTPATIENT)
Dept: REHABILITATION | Facility: OTHER | Age: 37
End: 2022-05-12
Attending: PHYSICAL MEDICINE & REHABILITATION
Payer: COMMERCIAL

## 2022-05-12 ENCOUNTER — OFFICE VISIT (OUTPATIENT)
Dept: PSYCHIATRY | Facility: CLINIC | Age: 37
End: 2022-05-12
Payer: COMMERCIAL

## 2022-05-12 VITALS
HEART RATE: 85 BPM | BODY MASS INDEX: 28.74 KG/M2 | WEIGHT: 189.63 LBS | SYSTOLIC BLOOD PRESSURE: 124 MMHG | HEIGHT: 68 IN | DIASTOLIC BLOOD PRESSURE: 88 MMHG

## 2022-05-12 DIAGNOSIS — M40.12 OTHER SECONDARY KYPHOSIS, CERVICAL REGION: ICD-10-CM

## 2022-05-12 DIAGNOSIS — F90.0 ATTENTION DEFICIT HYPERACTIVITY DISORDER (ADHD), PREDOMINANTLY INATTENTIVE TYPE: Primary | ICD-10-CM

## 2022-05-12 DIAGNOSIS — F41.9 ANXIETY: ICD-10-CM

## 2022-05-12 DIAGNOSIS — K58.0 IRRITABLE BOWEL SYNDROME WITH DIARRHEA: ICD-10-CM

## 2022-05-12 DIAGNOSIS — F41.1 GAD (GENERALIZED ANXIETY DISORDER): ICD-10-CM

## 2022-05-12 DIAGNOSIS — M50.30 DDD (DEGENERATIVE DISC DISEASE), CERVICAL: Primary | ICD-10-CM

## 2022-05-12 DIAGNOSIS — R29.3 POOR POSTURE: ICD-10-CM

## 2022-05-12 DIAGNOSIS — M54.2 NECK PAIN: ICD-10-CM

## 2022-05-12 DIAGNOSIS — F32.81 PMDD (PREMENSTRUAL DYSPHORIC DISORDER): ICD-10-CM

## 2022-05-12 DIAGNOSIS — M79.18 MYOFASCIAL MUSCLE PAIN: ICD-10-CM

## 2022-05-12 PROCEDURE — 97750 PHYSICAL PERFORMANCE TEST: CPT | Mod: 32 | Performed by: PHYSICAL MEDICINE & REHABILITATION

## 2022-05-12 PROCEDURE — 99213 PR OFFICE/OUTPT VISIT, EST, LEVL III, 20-29 MIN: ICD-10-PCS | Mod: 95,,, | Performed by: STUDENT IN AN ORGANIZED HEALTH CARE EDUCATION/TRAINING PROGRAM

## 2022-05-12 PROCEDURE — 99213 OFFICE O/P EST LOW 20 MIN: CPT | Mod: 95,,, | Performed by: STUDENT IN AN ORGANIZED HEALTH CARE EDUCATION/TRAINING PROGRAM

## 2022-05-12 RX ORDER — AMITRIPTYLINE HYDROCHLORIDE 25 MG/1
25 TABLET, FILM COATED ORAL NIGHTLY
Qty: 90 TABLET | Refills: 0 | Status: SHIPPED | OUTPATIENT
Start: 2022-05-12 | End: 2022-10-25

## 2022-05-12 RX ORDER — VENLAFAXINE HYDROCHLORIDE 37.5 MG/1
37.5 CAPSULE, EXTENDED RELEASE ORAL 2 TIMES DAILY
Qty: 60 CAPSULE | Refills: 3 | Status: SHIPPED | OUTPATIENT
Start: 2022-05-12 | End: 2022-11-18 | Stop reason: SDUPTHER

## 2022-05-12 RX ORDER — LISDEXAMFETAMINE DIMESYLATE 60 MG/1
60 CAPSULE ORAL EVERY MORNING
Qty: 30 CAPSULE | Refills: 0 | Status: SHIPPED | OUTPATIENT
Start: 2022-06-12 | End: 2022-07-22

## 2022-05-12 RX ORDER — LISDEXAMFETAMINE DIMESYLATE 60 MG/1
60 CAPSULE ORAL EVERY MORNING
Qty: 30 CAPSULE | Refills: 0 | Status: SHIPPED | OUTPATIENT
Start: 2022-07-12 | End: 2022-08-12

## 2022-05-12 RX ORDER — LISDEXAMFETAMINE DIMESYLATE 60 MG/1
60 CAPSULE ORAL EVERY MORNING
Qty: 30 CAPSULE | Refills: 0 | Status: SHIPPED | OUTPATIENT
Start: 2022-05-12 | End: 2022-06-17

## 2022-05-12 NOTE — PROGRESS NOTES
Health  met with patient to complete initial outcomes for the Healthy Back cervical program.  Questions were reviewed with patient and discussed with Dr. Block. The patient will meet with Dr. Block to determine program enrollment.      Any changes to patient responses are below in red font.    HealthyBack Questionnaire  5/12/2022   Please select the location of your worst pain:  Neck    Please select the location of any additional pain: (hold down the control key, and click to select multiple responses) Neck, Upper back, Arm - Right, Mid back - Above shoulder blades, Mid back - Between shoulder blades   Did the pain begin after an event, injury, or accident? No   How long has this pain been going on?  6 years with on and off pain and worse since 2019   Please indicate how the pain is changing.  Worsening   What is the WORST level of pain that you have experienced in the last week?  9   What is the LEAST level of pain that you have experienced in the past week?  4   What can you NOT do not that you used to be able to do?  play with kids, drive w/ out pain, sleep more than 2 hrs. at a time   Are your limitations mainly due to your pain?  Yes   What are your additional complaints, if any? (hold down the control key, and click to select multiple responses) Burning, Numbness, Tingling, Weakness   Is there ever a time during the day when your pain stops, even for a brief moment, before returning? No   Does looking down make your typical pain worse? Yes   Morning: Worse during   Afternoon: Worse during   Evening:  Worse during   Nighttime: Worse during   Standing:  Worse   Lying on stomach: Worse   Lying on back: Better   Sitting:  Worse   Walking: Better    Using a pillow: Worse   Emergency department  No   Health care providers (hold down the control key, and click to select multiple responses) Family doctor, Pain management, Neurosurgeon   Investigations done (hold down the control key, and click to select  multiple responses) MRI   Procedures (hold down the control key, and click to select multiple responses) Epidural steroid injections (LAZ), Injections   Have you had any surgery on your neck? No   Please saundra what you are experiencing. 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  Physician

## 2022-05-12 NOTE — PROGRESS NOTES
OUTPATIENT PSYCHIATRY FOLLOW UP VISIT    ENCOUNTER DATE:  5/12/2022  SITE:  [facility], [facility]  LENGTH OF SESSION:  30 minutes      CHIEF COMPLAINT:  Follow up for medication management      TELE PSYCHIATRY Disclaimer   *The patient was informed despite using HIPPA compliant technology there may be risks including security breach, technological failure, inability to perform a comprehensive physical exam which could delay or prevent an accurate diagnosis, and potential complications from treatment decisions rendered over a telemedical platform. The patient understands and consented to the use of tele-health service as being a safe measure to mitigate during COVID 19 Pandemic .  The patient was also informed of the relationship between the physician and patient and the respective role of any other health care provider with respect to management of the patient; and notified that the pt may decline to receive medical services by telemedicine and may withdraw from such care at any time.      Patient's Current location: at work in [facility]  Visit type: Virtual visit with synchronous audio and video  Total time spent with patient: 20 minutes spent face to face, 30 minutes total patient care time      HISTORY OF PRESENTING ILLNESS:  April Wendt Schamberger is a 37 y.o. female with history of ADHD, PMDD, NIRAV, IBS-D  who presents for follow up appointment.        Plan at last appointment on 2/11/2022:  - Continue Vyvanse 60 mg po daily for ADHD, discussed that she want to go up on dose next visit to given effect seems to be waning  - Continue amitriptyline 25 mg qhs for anxiety, sleep, and IBS with diarrhea; patient taking full tab daily; consider increasing for IBS-D once ADHD medication is stable  - Increase venlafaxine to 37.5mg for PMDD daily with extra 37.5 mg for luteal phase symptoms (prescribed as 37.5 mg two times daily)    Interval history as told by Patient - & or  family/friend/spouse/caregiver with pts permission    She feels the vyvanse at 60 mg helps focus and attention.  Takes effexor twice week before period.  Taking amitriptyline before bed as well.  Denies any side effects from medications.        PSYCHIATRIC REVIEW OF SYSTEMS:    Symptoms of Depression: None    Symptoms of Anxiety/ panic attacks: None    Symptoms of Luly/Hypomania: None    Symptoms of psychosis: None    Sleep: None    Alcohol: No excessive drinking reported    Illicit Drugs: No illicit substance use reported      Risk Parameters:  Patient denies no SI, HI or self-injurious behavior      PSYCHIATRIC MED REVIEW  Current medications:  See above in last plan    Current psych meds  Medication side effects:  none  Medication compliance:  Full    Previous psych meds trials  Benadryl  Melatonin  ambien  Adderall  OTC natural herbs  zoloft - diarrhea was side effect       PAST PSYCHIATRIC, MEDICAL, AND SOCIAL HISTORY REVIEWED  The patient's past medical, family and social history have been reviewed and updated as appropriate within the electronic medical record - see encounter notes.    MEDICAL REVIEW OF SYSTEMS:  Complete review of systems performed covering Constitutional, Musculoskeletal, Neurologic.  All systems negative.    ALL MEDICATIONS:    Current Outpatient Medications:     acetaminophen (TYLENOL) 500 MG tablet, Take 2 tablets (1,000 mg total) by mouth 3 (three) times daily as needed for Pain., Disp: , Rfl: 0    amitriptyline (ELAVIL) 25 MG tablet, Take 1 tablet (25 mg total) by mouth every evening., Disp: 90 tablet, Rfl: 0    butalbital-acetaminophen-caffeine -40 mg (FIORICET, ESGIC) -40 mg per tablet, Take 1 tablet by mouth every 6 (six) hours as needed for Headaches., Disp: 30 tablet, Rfl: 0    clindamycin phosphate 1% (CLINDAGEL) 1 % gel, Apply topically 2 (two) times daily. To entire face, Disp: 30 g, Rfl: 5    diphenoxylate-atropine 2.5-0.025 mg (LOMOTIL) 2.5-0.025 mg per  tablet, Take 1 tablet by mouth 4 (four) times daily as needed for Diarrhea (diarrhea)., Disp: 80 tablet, Rfl: 1    doxycycline monohydrate 100 mg Tab, Take 1 tablet by mouth everyday with food. Avoid lying down for 1 hour after taking. Avoid the sun. (Patient taking differently: Take 1 tablet by mouth everyday with food. Avoid lying down for 1 hour after taking. Avoid the sun.), Disp: 30 tablet, Rfl: 2    eszopiclone (LUNESTA) 3 mg Tab, Take 1 tablet (3 mg total) by mouth nightly as needed (insomnia)., Disp: 30 tablet, Rfl: 0    fluconazole (DIFLUCAN) 150 MG Tab, Take 1 tablet as needed for yeast infection, Disp: 30 tablet, Rfl: 1    lisdexamfetamine (VYVANSE) 60 MG capsule, Take 1 capsule (60 mg total) by mouth every morning., Disp: 30 capsule, Rfl: 0    loperamide (IMODIUM) 1 mg/7.5 mL solution, Take 0.1 mg/kg by mouth every 12 (twelve) hours., Disp: , Rfl:     loperamide (IMODIUM) 2 mg capsule, Take 2 mg by mouth 4 (four) times daily as needed for Diarrhea., Disp: , Rfl:     metoprolol succinate (TOPROL-XL) 50 MG 24 hr tablet, Take 1 tablet (50 mg total) by mouth 2 (two) times daily., Disp: 180 tablet, Rfl: 1    pregabalin (LYRICA) 50 MG capsule, Take 1 capsule (50 mg total) by mouth 2 (two) times daily., Disp: 60 capsule, Rfl: 2    sod sulf-pot chloride-mag sulf (SUTAB) 1.479-0.188- 0.225 gram tablet, Take 12 tablets by mouth once daily. Take according to package instructions with indicated amount of water., Disp: 24 tablet, Rfl: 0    tiZANidine (ZANAFLEX) 4 MG tablet, Take 1 tablet (4 mg total) by mouth 3 (three) times daily as needed., Disp: 90 tablet, Rfl: 1    tretinoin (RETIN-A) 0.025 % cream, Apply topically every evening. Pea sized amount to entire face. If irritation occurs, decrease use to every other night., Disp: 20 g, Rfl: 5    valsartan (DIOVAN) 320 MG tablet, Take 1 tablet (320 mg total) by mouth once daily., Disp: 90 tablet, Rfl: 1    venlafaxine (EFFEXOR-XR) 37.5 MG 24 hr capsule,  Take 1 capsule (37.5 mg total) by mouth 2 (two) times a day., Disp: 60 capsule, Rfl: 3  No current facility-administered medications for this visit.    Facility-Administered Medications Ordered in Other Visits:     0.9%  NaCl infusion, 500 mL, Intravenous, Continuous, Corky Argueta Jr., MD    ALLERGIES:  Review of patient's allergies indicates:   Allergen Reactions    Lactose     Azithromycin Nausea And Vomiting       RELEVANT LABS/STUDIES:    Lab Results   Component Value Date    WBC 8.92 09/27/2021    HGB 13.3 09/27/2021    HCT 39.7 09/27/2021    MCV 86 09/27/2021     09/27/2021     BMP  Lab Results   Component Value Date     09/27/2021    K 4.3 09/27/2021     09/27/2021    CO2 23 09/27/2021    BUN 7 09/27/2021    CREATININE 0.7 09/27/2021    CALCIUM 9.4 09/27/2021    ANIONGAP 8 09/27/2021    ESTGFRAFRICA >60 09/27/2021    EGFRNONAA >60 09/27/2021     Lab Results   Component Value Date    ALT 12 09/27/2021    AST 12 09/27/2021    ALKPHOS 56 09/27/2021    BILITOT 0.3 09/27/2021     Lab Results   Component Value Date    TSH 0.704 09/27/2021     Lab Results   Component Value Date    HGBA1C 5.1 09/27/2021       VITALS  There were no vitals filed for this visit.    PHYSICAL EXAM  General: No acute distress, well developed/nourished  Neurologic:   Gait: Normal   Psychomotor signs:  No involuntary movements or tremor  AIMS: none    PSYCHIATRIC EXAM:     Mental Status Exam:  Appearance: good grooming, age appropriate  Behavior/Cooperation: normal, cooperative   Speech:  normal tone, normal rate, normal pitch, normal volume  Language: uses words appropriately; NO aphasia or dysarthria  Mood: Good  Affect:  Euthymic  Thought Process: Normal and logical  Thought Content: No SI, HI, AVH  Level of Consciousness: Alert and Oriented x3  Memory:  Recent and remote intact  Attention/concentration: appropriate for age/education.   Fund of Knowledge: appears adequate  Insight:  Intact  Judgment:  Intact      IMPRESSION:    April Wendt Schamberger is a 37 y.o. female with history of ADHD, PMDD, NIRAV, IBS-D who presents for follow up appointment for medication management.  The  has been reviewed, no concerning signs were noted.       Status/Progress:  Based on the examination today, the patient's problem(s) is/are well controlled.  New problems have not been presented today.     DIAGNOSES:    ICD-10-CM ICD-9-CM   1. Attention deficit hyperactivity disorder (ADHD), predominantly inattentive type  F90.0 314.00   2. PMDD (premenstrual dysphoric disorder)  F32.81 625.4   3. NIRAV (generalized anxiety disorder)  F41.1 300.02       PLAN:  Psych Med:  - Continue Vyvanse 60 mg po daily for ADHD, discussed that she want to go up on dose next visit to given effect seems to be waning  - Continue amitriptyline 25 mg qhs for anxiety, sleep, and IBS with diarrhea; patient taking full tab daily; consider increasing for IBS-D once ADHD medication is stable  - Continue venlafaxine to 37.5mg for PMDD daily with extra 37.5 mg for luteal phase symptoms (prescribed as 37.5 mg two times daily)       Discussed with patient informed consent, risks vs. benefits, alternative treatments, side effect profile and the inherent unpredictability of individual responses to these treatments. Answered any questions patient may have had. The patient expresses understanding of the above and displays the capacity to agree with this current plan       Other: None      RETURN TO CLINIC:  3 months        Daniel Pal M.D.  [facility], PGY-3  5/12/2022 9:17 AM

## 2022-05-14 DIAGNOSIS — R51.9 NONINTRACTABLE HEADACHE, UNSPECIFIED CHRONICITY PATTERN, UNSPECIFIED HEADACHE TYPE: ICD-10-CM

## 2022-05-15 DIAGNOSIS — F51.01 PRIMARY INSOMNIA: ICD-10-CM

## 2022-05-15 NOTE — TELEPHONE ENCOUNTER
No new care gaps identified.  [facility] Embedded Care Gaps. Reference number: [id number]. 5/14/2022   7:52:01 PM CDT

## 2022-05-16 ENCOUNTER — PATIENT OUTREACH (OUTPATIENT)
Dept: ADMINISTRATIVE | Facility: OTHER | Age: 37
End: 2022-05-16
Payer: COMMERCIAL

## 2022-05-16 DIAGNOSIS — R51.9 NONINTRACTABLE HEADACHE, UNSPECIFIED CHRONICITY PATTERN, UNSPECIFIED HEADACHE TYPE: ICD-10-CM

## 2022-05-16 RX ORDER — BUTALBITAL, ACETAMINOPHEN AND CAFFEINE 50; 325; 40 MG/1; MG/1; MG/1
1 TABLET ORAL EVERY 6 HOURS PRN
Qty: 30 TABLET | Refills: 0 | OUTPATIENT
Start: 2022-05-16

## 2022-05-16 RX ORDER — ESZOPICLONE 3 MG/1
3 TABLET, FILM COATED ORAL NIGHTLY PRN
Qty: 30 TABLET | Refills: 0 | Status: SHIPPED | OUTPATIENT
Start: 2022-05-16 | End: 2022-06-13 | Stop reason: SDUPTHER

## 2022-05-16 RX ORDER — BUTALBITAL, ACETAMINOPHEN AND CAFFEINE 50; 325; 40 MG/1; MG/1; MG/1
1 TABLET ORAL EVERY 6 HOURS PRN
Qty: 30 TABLET | Refills: 0 | Status: SHIPPED | OUTPATIENT
Start: 2022-05-16 | End: 2022-06-13 | Stop reason: SDUPTHER

## 2022-05-16 NOTE — PROGRESS NOTES
Health Maintenance Due   Topic Date Due    Hepatitis C Screening  Never done    COVID-19 Vaccine (3 - Booster for Pfizer series) 06/12/2021     Updates were requested from care everywhere.  Chart was reviewed for overdue Proactive [facility] Encounters ([facility]) topics (CRS, Breast Cancer Screening, Eye exam)  Health Maintenance has been updated.  [facility] immunization registry triggered.  Immunizations were reconciled.

## 2022-05-16 NOTE — TELEPHONE ENCOUNTER
No new care gaps identified.  [facility] Embedded Care Gaps. Reference number: [id number]. 5/15/2022   8:21:07 PM CDT

## 2022-05-16 NOTE — TELEPHONE ENCOUNTER
No new care gaps identified.  [facility] Embedded Care Gaps. Reference number: [id number]. 5/16/2022   11:41:29 AM VICT

## 2022-05-17 ENCOUNTER — PATIENT MESSAGE (OUTPATIENT)
Dept: GASTROENTEROLOGY | Facility: CLINIC | Age: 37
End: 2022-05-17
Payer: COMMERCIAL

## 2022-05-17 DIAGNOSIS — K58.0 IRRITABLE BOWEL SYNDROME WITH DIARRHEA: ICD-10-CM

## 2022-05-17 RX ORDER — DIPHENOXYLATE HYDROCHLORIDE AND ATROPINE SULFATE 2.5; .025 MG/1; MG/1
1 TABLET ORAL 4 TIMES DAILY PRN
Qty: 360 TABLET | Refills: 0 | Status: SHIPPED | OUTPATIENT
Start: 2022-05-17 | End: 2022-07-18 | Stop reason: SDUPTHER

## 2022-05-18 ENCOUNTER — OFFICE VISIT (OUTPATIENT)
Dept: PAIN MEDICINE | Facility: CLINIC | Age: 37
End: 2022-05-18
Payer: COMMERCIAL

## 2022-05-18 VITALS — HEART RATE: 69 BPM | SYSTOLIC BLOOD PRESSURE: 167 MMHG | DIASTOLIC BLOOD PRESSURE: 112 MMHG

## 2022-05-18 DIAGNOSIS — M62.838 MUSCLE SPASM: ICD-10-CM

## 2022-05-18 DIAGNOSIS — M50.30 DDD (DEGENERATIVE DISC DISEASE), CERVICAL: ICD-10-CM

## 2022-05-18 DIAGNOSIS — M54.12 CERVICAL RADICULOPATHY: Primary | ICD-10-CM

## 2022-05-18 DIAGNOSIS — M79.18 MYOFASCIAL PAIN SYNDROME, CERVICAL: ICD-10-CM

## 2022-05-18 DIAGNOSIS — M54.2 CERVICALGIA: ICD-10-CM

## 2022-05-18 PROCEDURE — 3077F SYST BP >= 140 MM HG: CPT | Mod: CPTII,S$GLB,, | Performed by: PAIN MEDICINE

## 2022-05-18 PROCEDURE — 99214 OFFICE O/P EST MOD 30 MIN: CPT | Mod: S$GLB,,, | Performed by: PAIN MEDICINE

## 2022-05-18 PROCEDURE — 3077F PR MOST RECENT SYSTOLIC BLOOD PRESSURE >= 140 MM HG: ICD-10-PCS | Mod: CPTII,S$GLB,, | Performed by: PAIN MEDICINE

## 2022-05-18 PROCEDURE — 4010F ACE/ARB THERAPY RXD/TAKEN: CPT | Mod: CPTII,S$GLB,, | Performed by: PAIN MEDICINE

## 2022-05-18 PROCEDURE — 99214 PR OFFICE/OUTPT VISIT, EST, LEVL IV, 30-39 MIN: ICD-10-PCS | Mod: S$GLB,,, | Performed by: PAIN MEDICINE

## 2022-05-18 PROCEDURE — 4010F PR ACE/ARB THEARPY RXD/TAKEN: ICD-10-PCS | Mod: CPTII,S$GLB,, | Performed by: PAIN MEDICINE

## 2022-05-18 PROCEDURE — 99999 PR PBB SHADOW E&M-EST. PATIENT-LVL III: CPT | Mod: PBBFAC,,, | Performed by: PAIN MEDICINE

## 2022-05-18 PROCEDURE — 1159F MED LIST DOCD IN RCRD: CPT | Mod: CPTII,S$GLB,, | Performed by: PAIN MEDICINE

## 2022-05-18 PROCEDURE — 3080F DIAST BP >= 90 MM HG: CPT | Mod: CPTII,S$GLB,, | Performed by: PAIN MEDICINE

## 2022-05-18 PROCEDURE — 99999 PR PBB SHADOW E&M-EST. PATIENT-LVL III: ICD-10-PCS | Mod: PBBFAC,,, | Performed by: PAIN MEDICINE

## 2022-05-18 PROCEDURE — 3080F PR MOST RECENT DIASTOLIC BLOOD PRESSURE >= 90 MM HG: ICD-10-PCS | Mod: CPTII,S$GLB,, | Performed by: PAIN MEDICINE

## 2022-05-18 PROCEDURE — 1159F PR MEDICATION LIST DOCUMENTED IN MEDICAL RECORD: ICD-10-PCS | Mod: CPTII,S$GLB,, | Performed by: PAIN MEDICINE

## 2022-05-18 RX ORDER — TRAMADOL HYDROCHLORIDE 50 MG/1
50 TABLET ORAL EVERY 12 HOURS PRN
Qty: 60 TABLET | Refills: 1 | Status: SHIPPED | OUTPATIENT
Start: 2022-05-18 | End: 2022-07-17

## 2022-05-18 NOTE — PROGRESS NOTES
Chronic Pain  patient Established Note (Follow up visit)        SUBJECTIVE:  Chief Complaint   Patient presents with    Medication Refill     Interval Updates:   05/18/2022 - Ms. Schamberger is following up for No chief complaint on file..  She requests a refill of Tramadol 50 mg which are  working well to control Her pain.  She reports 50-60% relief with the current medication regimen.  Medication side effects: none.  Pain is currently rate 7/10 with a weekly range 6-9/10.  It is described as Aching.   Pain is worsened by: nothing in particular and improved by: medications.  She reports ability to function (work & home life) with use of 2 tablets of tramadol/day.  She uses tizanidine at night, which is effective, but sedation limits use to night time only.  She met with Dr. Reed and Dr. Block.  Qwilt back is starting soon.  EMG is pending for later this month.  F/U with Dr. Reed is scheduled as well.     - Ms. Schamberger is following up for No chief complaint on file.  Pain is currently rate 7/10 with a weekly range 7-8/10.   37-year-old pleasant female she has established our office she has a history of chronic neck pain with recent symptoms of right hand numbness.  She also has pain in the forearm on the right side.  The right 4th and 5th digits are affected consistently.  She has numbness at night she has had cervical LAZ as well as trigger point injection but they have not been sustainable.  She has attempted physical therapy 2 years ago.  She reports having seen Neurosurgery EMG has been ordered by Dr. Reed he is also referred her to physical therapy her primary care has referred her to the healthy neck and Back program within [facility].  Today she is requesting a refill of tramadol 50 mg b.i.d. the original prescription was provided to her by her PCP/ Dr. Valencia he directed her to return to PM for  refills,  she states it is helping her neuropathic symptoms and pain overall dropped her pain score to a 3/10.       12/29/2021 -Schamberger returns to clinic s/p GHASSAN on 12/2/21 with 50% relief of her numbness and tingling in her right arm.  She reports continued tingling and numbness in the right pinky and right ring finger as well as the right arm this occurs typically she falls asleep and lays on her right side she reports that her pain is still there especially with movement her pain starts in the neck radiates into her mid shoulders and shoulder blades pain is described as achy sharp and stabbing.  She denies any profound weakness, denies any bowel bladder dysfunction denies any recent incident or trauma since her last clinic visit with our office.  Although the injection helped she says did not provide her with significant relief.  She reports a pain intensity 6/10 today with a weekly range of 6-7/10.     11/24/2021 - Schamberger returns to clinic s/p TPI on 10/05/21 with no relief.  She reports a pain intensity 6/10 today with a weekly range of 4-7/10.  The pain is described as Aching, Tingling and Numb.  Pain is worsened by: extension, flexion, twisting and certain sleeping positions and improved by: tens unit.  She requests a repeat cervical LAZ, which worked well for her in the past.  She also inquires about medications to help with pain pending completion of the injection.    9/27/2021- 37 y/o female presents requesting cervical TPIs, she has a hx neck and arm pain. She has a hx of cervical myofascial pain, she is established with our office she has been provided TPIs previously with good relief. She endorses being on a cocktail of medications but that they are not effectively treating her pain. She tested positive for COVID On 09/19/2021 (rapid screening)  However she reports to me that she began having symptoms on 09/14 she was symptom-free by 920 and cleared to return to work by [employer] on  09/26/2021.  She stated that she would send me via my chart her clearance note and any other mutation that I would need to clear her for trigger point injections.    12/15/20 - Ms. Schamberger returns to clinic for follow up visit reporting stable neck and arm pain.  Patient is s/p Cervical Epidural Steroid Injection at C7-T1 on 12/1/20 with 40-60% overall relief.  Pain intensity is currently 5/10.  Patient reports her right arm symptoms have improved states she does get mild tingling on and off that radiates in to the right minimal, pinky and ring finger and sometimes radiating into the elbow.  She states flexion causes her worse pain however, lateral, and  flexion  movements have improved since the injection.  She also mentioned that she only took Lyrica 25 mg b.i.d. instead of t.i.d she reported no adverse SEs while taking this medicine.  She discontinued her Voltaren tablets due to burning in her stomach.  She states she is taking Tylenol 1000 mg b.i.d. as needed for pain which she states is helping.    11/16/20 - Ms. Schamberger returns to clinic for follow up visit reporting worse neck and arm pain.  Patient is s/p TPI on 6/30/20 with 50% relief.  Pain intensity is currently 7/10.   April presents with returning neck, thoracic and bilateral arm pain right greater than left., pain is described as aching dull throbbing with some numbness in her right arm she reports no shooting pain in either arm she denies any profound weakness and denies dropping things.  She did report numbness in her right arm. I discussed with patient that I will provide her with trigger point injections today to help with myofascial pain and  I will also start her on a cocktail of medications to help with I think is neuropathic symptoms in her arm, she is currently taking tizanidine 4 mg nightly, so I will at Lyrica 25 mg t.i.d.,  diclofenac 50 mg b.i.d. as needed for pain, and Tylenol 1000 mg t.i.d. in effort to provide her with some  relief.  I discussed with her if she had any side effects with these medications that she should discontinue immediately.  I will have them sent to the [facility] pharmacy.    20 Pt returns for bilateral chronic neck pain, patient is established our office she has been given cervical paraspinal trigger point injection in the past which has helped alleviate her pain for greater than 6 months.  She is requesting repeat trigger point injections today.    April Wendt Schamberger presents to the clinic for a follow-up appointment for Cervical TPIs today.   Since the last visit, April Wendt Schamberger states the pain has been persistant. Current pain intensity is 6/10.    Pain Disability Index Review:  Last 3 PDI Scores 2022 2021 12/15/2020   Pain Disability Index (PDI) 43 [phone] 26     Pain Medications:  - Opioids: None  - Adjuvant Medications: Advil,Motrin ( Ibuprofen)  - Anti-Coagulants: None  - Others: see medications list.     Opioid Contract: no      report:  Reviewed and consistent with medication use as prescribed.     Pain Procedures:              2021 cervical LAZ-induced 50% of the numbness and tingling   2020 - GHASSAN @ C7-T1 with 40-60% relief   17 TRIGGER POINT INJECTION   17 TRIGGER POINT INJECTION   17 bilateral C4,5,6 CERVICAL FACET MEDIAL BRANCH NERVE BLOCK (Prone) ( after xray correlation with pain level, decsion was made to proceed at C4,5,6 levels, patient agrees to proceed      Physical Therapy/Home Exercise: no     Imagin17 MRI Cervical Spine Without Contrast  Narrative   Technique: Multiplanar, multisequence MR imaging of the cervical spine obtained without the use of IV contrast.     Comparison: Cervical spine radiographs 12/15/2016.     Results:    There is straightening with mild reversal of the normal cervical lordosis. Vertebral body heights are maintained with no evidence of fracture. Mild intervertebral disc space narrowing and desiccation  are visualized at C5-6 and C6-7. No marrow signal abnormality suspicious for an infiltrative process.      The cervical cord is normal in caliber and signal characteristics.  The craniocervical junction and visualized intracranial contents are unremarkable.  The adjacent soft tissue structures show no significant abnormalities.      C2-C3:  No significant spinal canal or neural foraminal narrowing.    C3-C4: No significant spinal canal or neural foraminal narrowing.    C4-C5:  No significant spinal canal or neural foraminal narrowing.    C5-C6:  Disc bulge with right uncovertebral spurring. Findings result in mild spinal canal stenosis and mild right neural foraminal narrowing.    C6-C7:  Mild disc bulge with thickening of the ligamentum flavum results in mild spinal canal stenosis.No significant neuroforaminal narrowing.    C7-T1:   No significant central spinal or neural foraminal narrowing.   Impression      Mild disc bulge and spinal canal stenosis at the C5-6 and C6-7 levels.      Electronically signed by: UMER MARTIN MD  Date: 01/26/17  Time: 12:56     Encounter   View Encounter      Reviewed By   Hunter Jimenez MD on 1/27/2017  2:18 PM   Exam Details                     Performed Procedure Technologist Supporting Staff Performing Physician   MRI Cervical Spine Without Contrast Andre Hinds, RT         Appointment Date/Status Modality Department        1/26/2017     Completed [facility] MRI1 [facility]       Begin Exam End Exam Begin Exam Questionnaires End Exam Questionnaires     1/26/2017 11:22 AM 1/26/2017 11:59 AM MRI TECH NAVIGATOR QUESTIONS IMAGING END ALL         RIS PREGNANCY TECH NAVIGATOR          X-Ray Cervical Spine AP Lat with Flexion  Extension 12/15/16  Narrative     Cervical spine radiographs     comparison: None    Results: AP, lateral, flexion and extension views  .  The alignment is normal, vertebral body height and disk spaces are well-maintained.    Flexion and extension views  demonstrate no translational abnormalities.  Very small anterior osteophyte noted at C5-C6.  No fracture or osseous lesion seen.Prevertebral soft tissues appear normal.   Impression    No significant abnormality seen      Electronically signed by: JOSE A JENKINS MD  Date: 12/15/16  Time: 10:52          Allergies:   Review of patient's allergies indicates:   Allergen Reactions    Lactose     Azithromycin Nausea And Vomiting       Current Medications:   Current Outpatient Medications   Medication Sig Dispense Refill    acetaminophen (TYLENOL) 500 MG tablet Take 2 tablets (1,000 mg total) by mouth 3 (three) times daily as needed for Pain. (Patient not taking: Reported on 5/12/2022)  0    amitriptyline (ELAVIL) 25 MG tablet Take 1 tablet (25 mg total) by mouth every evening. 90 tablet 0    butalbital-acetaminophen-caffeine -40 mg (FIORICET, ESGIC) -40 mg per tablet Take 1 tablet by mouth every 6 (six) hours as needed for Headaches. 30 tablet 0    diphenoxylate-atropine 2.5-0.025 mg (LOMOTIL) 2.5-0.025 mg per tablet Take 1 tablet by mouth 4 (four) times daily as needed for Diarrhea (diarrhea). 360 tablet 0    doxycycline monohydrate 100 mg Tab Take 1 tablet by mouth everyday with food. Avoid lying down for 1 hour after taking. Avoid the sun. (Patient not taking: Reported on 5/12/2022) 30 tablet 2    eszopiclone (LUNESTA) 3 mg Tab Take 1 tablet (3 mg total) by mouth nightly as needed (insomnia). 30 tablet 0    fluconazole (DIFLUCAN) 150 MG Tab Take 1 tablet as needed for yeast infection 30 tablet 1    [START ON 7/12/2022] lisdexamfetamine (VYVANSE) 60 MG capsule Take 1 capsule (60 mg total) by mouth every morning. 30 capsule 0    [START ON 6/12/2022] lisdexamfetamine (VYVANSE) 60 MG capsule Take 1 capsule (60 mg total) by mouth every morning. 30 capsule 0    lisdexamfetamine (VYVANSE) 60 MG capsule Take 1 capsule (60 mg total) by mouth every morning. 30 capsule 0    loperamide (IMODIUM) 1  mg/7.5 mL solution Take 0.1 mg/kg by mouth every 12 (twelve) hours.      loperamide (IMODIUM) 2 mg capsule Take 2 mg by mouth 4 (four) times daily as needed for Diarrhea.      metoprolol succinate (TOPROL-XL) 50 MG 24 hr tablet Take 1 tablet (50 mg total) by mouth 2 (two) times daily. 180 tablet 1    pregabalin (LYRICA) 50 MG capsule Take 1 capsule (50 mg total) by mouth 2 (two) times daily. 60 capsule 2    tiZANidine (ZANAFLEX) 4 MG tablet Take 1 tablet (4 mg total) by mouth 3 (three) times daily as needed. 90 tablet 1    tretinoin (RETIN-A) 0.025 % cream Apply topically every evening. Pea sized amount to entire face. If irritation occurs, decrease use to every other night. (Patient not taking: Reported on 5/12/2022) 20 g 5    valsartan (DIOVAN) 320 MG tablet Take 1 tablet (320 mg total) by mouth once daily. 90 tablet 1    venlafaxine (EFFEXOR-XR) 37.5 MG 24 hr capsule Take 1 capsule (37.5 mg total) by mouth 2 (two) times a day. 60 capsule 3     No current facility-administered medications for this visit.     Facility-Administered Medications Ordered in Other Visits   Medication Dose Route Frequency Provider Last Rate Last Admin    0.9%  NaCl infusion  500 mL Intravenous Continuous Corky Argueta Jr., MD           REVIEW OF SYSTEMS:    GENERAL:  No weight loss, malaise or fevers.  HEENT:  Negative for frequent or significant headaches. + HAs   NECK:  Negative for lumps, goiter, pain and significant neck swelling.  RESPIRATORY:  Negative for cough, wheezing or shortness of breath.  CARDIOVASCULAR:  Negative for chest pain, leg swelling or palpitations.  GI:  Negative for abdominal discomfort, blood in stools or black stools or change in bowel habits.  MUSCULOSKELETAL:  See HPI.  SKIN:  Negative for lesions, rash, and itching.  PSYCH:  Positive for sleep disturbance, mood disorder and recent psychosocial stressors.  HEMATOLOGY/LYMPHOLOGY:  Negative for prolonged bleeding, bruising easily or swollen  nodes.  NEURO:   No history of headaches, syncope, paralysis, seizures or tremors.  All other reviewed and negative other than HPI.    Past Medical History:  Past Medical History:   Diagnosis Date    Abnormal Pap smear of cervix 2000    Cryo Done    ADD (attention deficit disorder)     Breast disorder     Breast Cysts    Esophageal reflux     Fibroids     Hypertension     IBS (irritable bowel syndrome)     Insomnia     Kidney stones     Mastocytosis     Relies on partner vasectomy for contraception     Upper GI bleed        Past Surgical History:  Past Surgical History:   Procedure Laterality Date    COLONOSCOPY N/A 4/28/2017    Procedure: COLONOSCOPY;  Surgeon: Bren Larkin MD;  Location: [facility] ENDO;  Service: Endoscopy;  Laterality: N/A;    COLONOSCOPY N/A 1/26/2022    Procedure: COLONOSCOPY;  Surgeon: Inocente Roe MD;  Location: [facility] ENDO;  Service: Endoscopy;  Laterality: N/A;    EPIDURAL STEROID INJECTION INTO CERVICAL SPINE N/A 12/1/2020    Procedure: Injection-steroid-epidural-cervical--C7-T1;  Surgeon: Corky Argueta Jr., MD;  Location: [facility] PAIN MGT;  Service: Pain Management;  Laterality: N/A;    EPIDURAL STEROID INJECTION INTO CERVICAL SPINE N/A 12/2/2021    Procedure: Cervical Epidural Steroid Injection C7-T1 (No Sedation);  Surgeon: Corky Argueta Jr., MD;  Location: [facility] PAIN MGT;  Service: Pain Management;  Laterality: N/A;    ESOPHAGOGASTRODUODENOSCOPY  2012    LASIK Bilateral 2005    REFRACTIVE SURGERY      TONSILLECTOMY, ADENOIDECTOMY  1988    VAGINAL DELIVERY      x 2       Family History:  Family History   Problem Relation Age of Onset    Breast cancer Mother 47        with Metastasis    Hypertension Mother     Prostate cancer Father     Hypertension Father     Diabetes Father     Deep vein thrombosis Father     Pancreatic cancer Maternal Grandfather     Breast cancer Paternal Grandmother 80    Diabetes type II Paternal Grandfather     Heart attack  Maternal Grandmother     Cancer Cousin 31    Cancer Cousin         Thurman Syndrome    Cancer Cousin         Thurman Syndrome    Ovarian cancer Maternal Aunt     Lymphoma Neg Hx     Tuberculosis Neg Hx     Celiac disease Neg Hx     Cirrhosis Neg Hx     Colon polyps Neg Hx     Crohn's disease Neg Hx     Cystic fibrosis Neg Hx     Esophageal cancer Neg Hx     Hemochromatosis Neg Hx     Inflammatory bowel disease Neg Hx     Irritable bowel syndrome Neg Hx     Liver cancer Neg Hx     Liver disease Neg Hx     Rectal cancer Neg Hx     Stomach cancer Neg Hx     Ulcerative colitis Neg Hx     Donavon's disease Neg Hx     Amblyopia Neg Hx     Blindness Neg Hx     Cataracts Neg Hx     Glaucoma Neg Hx     Macular degeneration Neg Hx     Retinal detachment Neg Hx     Strabismus Neg Hx        Social History:  Social History     Socioeconomic History    Marital status:     Number of children: 1   Tobacco Use    Smoking status: Former Smoker     Quit date: 2014     Years since quittin.1    Smokeless tobacco: Never Used   Substance and Sexual Activity    Alcohol use: No     Alcohol/week: 0.0 standard drinks     Comment: breastfeeding     Drug use: No    Sexual activity: Yes     Partners: Male     Birth control/protection: Partner-Vasectomy     Comment: : Boris   Social History Narrative    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     Social Determinants of Health     Financial Resource Strain: Low Risk     Difficulty of Paying Living Expenses: Not very hard   Food Insecurity: No Food Insecurity    Worried About Running Out of Food in the Last Year: Never true    Ran Out of Food in the Last Year: Never true   Transportation Needs: No Transportation Needs    Lack of Transportation (Medical): No    Lack of Transportation (Non-Medical): No   Physical Activity: Sufficiently Active    Days of Exercise per Week: 5 days    Minutes of Exercise per Session: 30 min   Stress: Stress Concern  Present    Feeling of Stress : Very much   Social Connections: Unknown    Frequency of Communication with Friends and Family: More than three times a week    Frequency of Social Gatherings with Friends and Family: More than three times a week    Active Member of Clubs or Organizations: Yes    Attends Club or Organization Meetings: More than 4 times per year    Marital Status:    Housing Stability: Low Risk     Unable to Pay for Housing in the Last Year: No    Number of Places Lived in the Last Year: 1    Unstable Housing in the Last Year: No       OBJECTIVE:    There were no vitals taken for this visit.    PHYSICAL EXAMINATION:    General appearance: Well appearing, in no acute distress, alert and oriented x3.  Psych:  Mood and affect appropriate.  Skin: Skin color, texture, turgor normal, no rashes or lesions, in both upper and lower body.  Head/face:  Atraumatic, normocephalic. No palpable lymph nodes  Neck: neg pain to palpation over the cervical paraspinous muscles. Spurling Negative. + for mild pain with extension   Cor: RRR  Pulm: CTA  GI: Abdomen soft and non-tender.  Back: Straight leg raising in the sitting and supine positions is negative to radicular pain. No pain to palpation over the spine or costovertebral angles. Normal range of motion without pain reproduction.  Extremities: Peripheral joint ROM is full and pain free without obvious instability or laxity in all four extremities. No deformities, edema, or skin discoloration. Good capillary refill.  Musculoskeletal: Shoulder, hip, sacroiliac and knee provocative maneuvers are negative. Bilateral upper and lower extremity strength is normal and symmetric.  No atrophy or tone abnormalities are noted.  Neuro: Bilateral upper and lower extremity coordination and muscle stretch reflexes are physiologic and symmetric.  Plantar response are downgoing. No loss of sensation is noted.  Gait: Normal.    ASSESSMENT: 37 y.o. year old female with   neck and shoulder  pain, consistent with patient is status post cervical LAZ targeting C7-T1 with in overall 40% improvement of her pain patient reporting a discernible difference in her pain relief following this injection and the optimization of certain medications.  Her and I discussed that we could consider repeating the cervical LAZ at the above-mentioned levels but will hold off for now.      09/27/2021-April Wendt Schamberger is a 37 y.o. female who  has a past medical history of Abnormal Pap smear of cervix (2000), ADD (attention deficit disorder), Breast disorder, Esophageal reflux, Fibroids, Hypertension, IBS (irritable bowel syndrome), Insomnia, Kidney stones, Mastocytosis, Relies on partner vasectomy for contraception, and Upper GI bleed.  By history and examination this patient has chronicneck pain with right radiculopathy in the distribution of C4, C5, C6 and C7.  The underlying cause cause is facet arthritis, degenerative disc disease, foraminal stenosis and muscle dysfunction.  Pathology is confirmed by imaging.  We discussed the underlying diagnoses and multiple treatment options including non-opioid medications, injections, physical therapy, home exercise and activity modification / rest.  The risks and benefits of each treatment option were discussed and all questions were answered.    Mrs. Schamberger tested + COVID on 9/19, her symptoms began on 9/14 she reports being symptom-free on 9-10 and reports being cleared by [facility] on 09/26 she shows no COVID symptoms today, however I recommended that we hold off on injections discussed that our protocol is 2 weeks before injections  following testing + COVID. Recommended she follow up in 5 days for consideration of cervical paraspinal TPIs, pt agreed and understood. Patient to send me updated documentation on her COVID clearance.     11/24/21 - this is a 36-year-old female with chronic neck pain and chronic cervical radiculopathy to the right  upper extremity in the distribution of C5, C6, and C7.  This is consistent with previous cervical MRI showing degenerative disease contributing to foraminal and lateral recess stenosis.  She has a positive Spurling's sign on the right side, further supporting this diagnosis.  She has tried trigger point injections, TENS, oral medications, and home exercise.  She would like to repeat the cervical epidural steroid injection that provided her approximately 50% relief from December 2020. Additionally, I will increase her dose of Lyrica and provide her with a short-term prescription of tramadol.      12/29/2021-April Wendt Schamberger is a 37 y.o. female who  has a past medical history of Abnormal Pap smear of cervix (2000), ADD (attention deficit disorder), Breast disorder, Esophageal reflux, Fibroids, Hypertension, IBS (irritable bowel syndrome), Insomnia, Kidney stones, Mastocytosis, Relies on partner vasectomy for contraception, and Upper GI bleed.  By history and examination this patient has chronic and subacuteneck pain with right radiculopathy in the distribution of C4, C5 and C6.  The underlying cause cause is degenerative disc disease and foraminal stenosis.  Pathology is confirmed by imaging.  We discussed the underlying diagnoses and multiple treatment options including non-opioid medications, opioid medications, interventional procedures, physical therapy and home exercise.  The risks and benefits of each treatment option were discussed and all questions were answered.          04/28/2022- 37-year-old female with history of chronic neck pain with increasing symptoms in her right hand described as numbness.  Additionally she has pain in her right forearm affecting the 4th and 5th digits that are very consistent.  She has seen Neurosurgery and decompressive surgery has been recommended however Dr. Reed as ordered an EMG she will follow-up with him for these results.  Her MRI shows at C5-6 and C6-7 degenerative  disc disease with broad-based bulging that encroaches to central cord without compression and without significant neural foraminal stenosis.  Her and I discussed today that we will provide her with a short-term prescription of tramadol 50 mg b.i.d. to utilize and be a bridge through therapy at this time Dr. Reed has recommended therapy prior to considering surgery.  Based on patient's history and behavior there are no red flags with regard to poly if pharmacy of opioids, considering she is dealing with chronic/subacute pain I would recommend continuing tramadol 50 mg b.i.d. until it plan of care has been established from a surgical perspective.  April did report today that she would be willing to have surgery to reduce/rid her of her pain as it is affecting her quality of life and sleep.    5/18/22 - Patient seeking refill of tramadol for daily use (BID).  She is on 4 controlled substances already and I voiced concern about adding another long-term controlled substance to her regimen (opioids).  I advise use of tramadol for 2-3 months to facilitate participation in PT, HE, and during this information gathering phase (pending EMG and f/u NSGY assessment) but cannot recommend opioid therapy long-term.  Myofascial tension in the c-spine remains high.    Diagnoses    1. Cervical radiculopathy     2. Muscle spasm     3. Cervicalgia     4. Myofascial pain syndrome, cervical     5. DDD (degenerative disc disease), cervical       PLAN:   1. Continue PT, patient will also try Healthy Neck/Back program (she reported PT is providing limited relief)   2. Follow up with nsgy for EMG results.   3. Short term Rx of Tramadol 50 mg BID # 60R1 for 60-day supply during PT, [facility], etc.  No plan for long-term use beyond 8/18/2022 especially b/c she is already on 4 controlled substances (fioricet, vyvanse, lomotil, eszopiclone) with psychiatric diagnoses typically worsened by long term opioid therapy.  4. Continue Tylenol and  ibuprofen p.r.n.  5. Continue  Lyrica to 50 mg b.i.d. for neuropathic pain    Corky Argueta Jr, MD  Interventional Pain Medicine / Anesthesiology          05/18/2022

## 2022-05-19 ENCOUNTER — CLINICAL SUPPORT (OUTPATIENT)
Dept: REHABILITATION | Facility: OTHER | Age: 37
End: 2022-05-19
Attending: PHYSICAL MEDICINE & REHABILITATION
Payer: COMMERCIAL

## 2022-05-19 ENCOUNTER — PATIENT MESSAGE (OUTPATIENT)
Dept: NEUROSURGERY | Facility: CLINIC | Age: 37
End: 2022-05-19
Payer: COMMERCIAL

## 2022-05-19 DIAGNOSIS — M50.30 DDD (DEGENERATIVE DISC DISEASE), CERVICAL: ICD-10-CM

## 2022-05-19 DIAGNOSIS — M54.2 NECK PAIN: ICD-10-CM

## 2022-05-19 DIAGNOSIS — R29.898 RIGHT ARM WEAKNESS: ICD-10-CM

## 2022-05-19 DIAGNOSIS — M53.82 WEAKNESS OF NECK: ICD-10-CM

## 2022-05-19 DIAGNOSIS — M79.18 MYOFASCIAL MUSCLE PAIN: ICD-10-CM

## 2022-05-19 DIAGNOSIS — R29.898 DECREASED RANGE OF MOTION OF NECK: ICD-10-CM

## 2022-05-19 PROCEDURE — 97750 PHYSICAL PERFORMANCE TEST: CPT | Mod: 32

## 2022-05-23 ENCOUNTER — TELEPHONE (OUTPATIENT)
Dept: REHABILITATION | Facility: OTHER | Age: 37
End: 2022-05-23
Payer: COMMERCIAL

## 2022-05-23 PROBLEM — M53.82 WEAKNESS OF NECK: Status: ACTIVE | Noted: 2022-05-23

## 2022-05-23 PROBLEM — R29.898 DECREASED RANGE OF MOTION OF NECK: Status: ACTIVE | Noted: 2022-05-23

## 2022-05-23 PROBLEM — R29.898 RIGHT ARM WEAKNESS: Status: ACTIVE | Noted: 2022-05-23

## 2022-05-23 NOTE — TELEPHONE ENCOUNTER
HC talked to Pt about her experience during the evaluation; she reports that all went well, Jonathon was very nice and thorough.  Her lower back felt good after testing on our MedX machine.  I also reminded her that she can now start booking appointments for health coaching.

## 2022-05-23 NOTE — PLAN OF CARE
"  [facility] - PHYSICAL THERAPY EVALUATION     Name: April Wendt Schamberger  Clinic Number: [id number]    Therapy Diagnosis:   Encounter Diagnoses   Name Primary?    Myofascial muscle pain     Neck pain     DDD (degenerative disc disease), cervical     Decreased range of motion of neck     Weakness of neck     Right arm weakness      Physician: Mata Mendenhall MD    Physician Orders: PT Eval and Treat   Medical Diagnosis from Referral:   M79.18 (ICD-10-CM) - Myofascial muscle pain   M54.2 (ICD-10-CM) - Neck pain   M50.30 (ICD-10-CM) - DDD (degenerative disc disease), cervical       Evaluation Date: 5/19/2022  Authorization Period Expiration: 12/31/22  Plan of Care Expiration: 08/19/22  Reassessment Due: 06/19/22  Visit # / Visits authorized: 01/ 01  PROGRAM    Time In: 1:15 pm   Time Out: 2:45 pm  Total Billable Time: 90 minutes    Precautions: Standard    Pattern of pain determined: 1REN      Subjective   Date of onset: 6 years  History of current condition - April reports B cervical and scapular pain (R > L). Pt describe sx as ache progressing to a stabbing sharp "like I have a knife" at back of head and reports consistent "tightness in shoulders (trapezius)".  Pt report no difference in sx presentation AM vs PM.  Interestingly, pt note sx sig decrease during pregnancy.    Pt deny dropping objects but notes she focuses hand holding on L side.  Pt report when SL her topside hand will get numb.   Pt endorse  N/T below elbow to ray 4, 5.    Pt report no issues /c overhead activities.  Pt report used to go workouts at [employer] but stopped due to COVID.  Pt note weight gain.    Pt report difficulty /c looking down leading to pain /c household chores, recreational activities /c her kids       Per MD note:   Ms Schamberger is a 38 yo female sent in consultation by Dr. mendenhall for evaluation for the healthy back cervical program.  she has had neck pain for 6 years with on and off pain.  Pain has been " worse since 2019.  There were no accidents or falls.  The pain is right neck dominant, and goes to the left and to the back of the head and across the shoulders and between shoulder blades.  She has pain right elbow and down the arm the finger 4 and 5.  The pain is sharp and stabbing.  She has tingling and burning in the right arm.  She has numbness in both hands, burning in the neck and feels like right hand is weak.  The pain is constant 4-9/10.  It is worse with looking up and down, turning head to both sides, standing, lying on stomach, sitting, using a pillow, and as the day goes on.  It is better lying on back and walking. She had cervical LAZ 12/2020 with relief and 12/2021 with no relief. She had MBBB in 2017 with no relief. She had PT in 2017, and march 2022 with no relief.  She has not been to chiropractor or had surgery.  Her goals are standing, turning her head to both sides, playing with her son looking up and down.  Pattern 1         Medical History:   Past Medical History:   Diagnosis Date    Abnormal Pap smear of cervix 2000    Cryo Done    ADD (attention deficit disorder)     Breast disorder     Breast Cysts    Esophageal reflux     Fibroids     Hypertension     IBS (irritable bowel syndrome)     Insomnia     Kidney stones     Mastocytosis     Relies on partner vasectomy for contraception     Upper GI bleed        Surgical History:   April Wendt Schamberger  has a past surgical history that includes TONSILLECTOMY, ADENOIDECTOMY (1988); Esophagogastroduodenoscopy (2012); Colonoscopy (N/A, 4/28/2017); LASIK (Bilateral, 2005); Refractive surgery; Vaginal delivery; Epidural steroid injection into cervical spine (N/A, 12/1/2020); Epidural steroid injection into cervical spine (N/A, 12/2/2021); and Colonoscopy (N/A, 1/26/2022).    Medications:   April has a current medication list which includes the following prescription(s): acetaminophen, amitriptyline, butalbital-acetaminophen-caffeine  -40 mg, diphenoxylate-atropine 2.5-0.025 mg, doxycycline monohydrate, eszopiclone, fluconazole, [START ON 7/12/2022] lisdexamfetamine, [START ON 6/12/2022] lisdexamfetamine, lisdexamfetamine, loperamide, loperamide, metoprolol succinate, pregabalin, tizanidine, tramadol, tretinoin, valsartan, and venlafaxine, and the following Facility-Administered Medications: sodium chloride 0.9%.    Allergies:   Review of patient's allergies indicates:   Allergen Reactions    Lactose     Azithromycin Nausea And Vomiting        Imaging:     Per MD note:   EXAMINATION:  XR CERVICAL SPINE AP LAT WITH FLEX EXTEN     CLINICAL HISTORY:  Radiculopathy, cervical region     TECHNIQUE:  Three views of the cervical spine plus flexion and extension views were performed.     COMPARISON:  Cervical spine radiograph dated 12/15/2016     FINDINGS:  There is reversal of the normal cervical lordotic curvature.  Visualization to the level of C7-T1 on lateral view.  Lower discogenic change, most pronounced at C5-C6.  No significant translation.  Normal precervical soft tissue thickness.     Impression:     As above.        Electronically signed by: Gus Ledbetter  Date:                                            03/22/2022  Time:                                           11:20    EXAMINATION:  MRI CERVICAL SPINE WITHOUT CONTRAST     CLINICAL HISTORY:  Neck pain, chronic;cervical radic-right arm;.  Radiculopathy, cervical region bilateral shoulder pain.     TECHNIQUE:  Multiplanar, multisequence MR images of the cervical spine were acquired without the administration of contrast.     COMPARISON:  11/23/2020     FINDINGS:  There is straightening of the cervical lordotic curvature.  No advanced marrow edema or osseous destructive process is identified.     The cervical spinal cord maintains normal signal intensity.     Mild degenerative disc space narrowing is again noted at C5-6 and C6-7.     C2-3, C3-4, C7-T1, T1-2 there is no evidence of  "disc herniation or advanced stenosis.     C4-5 there is an improved appearance with residual mild disc bulging but no evidence of cord deformity or advanced foraminal stenosis.     C5-6 and C6-7 disc bulging with mild endplate osteophyte formation and facet and ligamentous hypertrophy flattens the ventral and dorsal thecal sac with mild flattening of the traversing cord at C5-6 but no evidence of cord impingement.  Mild foraminal stenosis bilaterally at C6-7.  Overall there is a similar appearance when compared to the prior study.     Impression:     Similar degenerative changes in the cervical spine when compared to the prior study although mildly improved at C4-5.  Mild foraminal stenosis at C6-7 but no evidence of new disc herniation or cord impingement.        Electronically signed by: Cruz Richards  Date:                                            01/27/2022  Time:                                           10:09    Prior Therapy: PT for cervical min relief   Prior Treatment:  2020 epidural cervical /c sig relief, 2021 min relief  Social History: lives in house, 1 level, threshold to enter,  lives with their family(, 2 boys 4, 7 yr)  Occupation: cancer researcher   Leisure: working out, playing /c kids      Prior Level of Function: Ind /c ADLs, regular workouts   Current Level of Function: Ind /c ADLs difficulty turning head, playing /c boys - bending, on sitting on ground /c reaching  DME owned/used: none   Owns Tbands, thoracic bolster         Pain:  Current 5/10, worst 9/10 , best 4/10   Location: bilateral cervical  Description: Aching progress sharp, stab  Aggravating Factors: "life"  Easing Factors: pain medication, lying down and TENS unit, massage  Disturbed Sleep: Y every 2 hrs waking         Pattern of pain questions:  1.  Where is your pain the worst? Cervical   2.  Is your pain constant or intermittent? Constant  3.  Does bending forward make your typical pain worse? Y  4.  Since the start of " "your neck pain, has there been a change in your bowel or bladder? N  5.  What can't you do now that you use to be able to do? Playing /c kids - on floor, throwing        Pts goals: "I want less pain so I can go back to doing the fun stuff in life"       Red Flag Screening:   Cough  Sneeze  Strain: (--)  Bladder/ bowel: (--)  Falls: (--)  Night pain: (--)  Unexplained weight loss: (--)  General health: Pt report good    OBJECTIVE   Postural examination/scapula alignment: Rounded shoulder and Head forward  Joint integrity: norm spring /c lat mob C3 - C6    Sitting: alternating leg crossed   Standing: fair  Correction of posture: better with lumbar roll   Palpation: Slight hypertonic B UT    TTP B cervical paraspinals C5 - C7     Range of Motion - MOVEMENT LOSS    ROM Loss   Flexion within functional limits   Extension moderate loss   Side bending Right moderate loss   Side bending Left moderate loss   Rotation Right major loss   Rotation Left moderate loss   Protraction within functional limits   Retraction  minimal loss       Upper Extremity Strength  (R) UE  (L) UE    Shoulder flexion: 4/5 Shoulder flexion: 4+/5   Shoulder Abduction: 4/5 Shoulder abduction: 4+/5   Elbow flexion: 4+/5 Elbow flexion: 5/5   Elbow extension: 4/5 Elbow extension: 4+/5     Horace Dynamometer  R 40#  L 58#      NEUROLOGICAL SCREEN    Sensory deficit:     BUE LT intact   C6 myotome intact   Saddle sensation intact     Special Tests:   Test Name  Testing Result   Compression (+)   Distraction (+)   Neural Tension Test (+) ulnar (-) radial   Saddle Sensation intact     Reflexes:    Left Right   Biceps  2+ 2+   Clonus (--) (--)       REPEATED TEST MOVEMENTS:  6/10   Repeated Protraction in Sitting no effect improved R hand   Repeated Flexion in Sitting no effect   Repeated Retraction in Sitting  no effect worse R hand       Baseline Isometric Testing on Med X equipment:  Testing administered by PT  Date of testin22  ROM 33 - 102 deg "   Max Peak Torque 166   Min Peak Torque 131   Flex/Ext Ratio 1.26   % below normative data 24%       GAIT:  Assistive Device used: none  Level of Assistance: independent  Patient displays the following gait deviations:  no gait deviations observed.       NDI to be administer visit 2       Horace Dynamometer  R 40#  L 58#      Treatment   Treatment Time In: 2:15 pm  Treatment Time Out: 2:45 pm  Total Treatment time separate from Evaluation: 30 minutes    April received therapeutic exercises to develop/improve posture, lumbar/cervical ROM, strength and muscular endurance for 25 minutes including the following exercises:     HEP demo include:     Cervical rotation /c towel assist x 5 5 sec hold  Wall slides x 10   Ulnar nerve glide PRN     Next visit:  UT, LS stretch, Scap retract     April Wendt Schamberger participated in dynamic functional therapeutic activities to improve function for 5 minutes, including:  Pt instructed on pillows for positioning & support to offload areas of pain to improve sleeping quality by reducing pain including  · Side-lying w/ pillows under upper arm/shld  · Semi-supine w/ pillows under trunk, upper arm/shld  Pt w/ fair return demonstration & reported increased comfort w/ use.        Med x dynamic exercise and baseline IM testing    [facility] 5/23/2022   Visit Number 1   VAS Pain Rating 5   Retraction in Sitting 20   Flexion 10   Rotation 5   Cervical Extension Seat Pad 0   Seat Adjustment 340   Top Dead Center 66   Counterweight 1.5   Cervical Flexion 102   Cervical Extension 33   Cervical Peak Torque 166   Cervical Weight 60   Repetitions 5   Ice - Z Lie (in min.) 10         April received the following manual therapy techniques: Joint mobilizations, Myofacial release and Soft tissue Mobilization were applied to the: 00 for 00 minutes.         Written Home Exercises Provided: yes.  Exercises were reviewed and April was able to demonstrate them prior to the end of the session.   April demonstrated fair  understanding of the education provided.     See EMR under Patient Instructions for exercises provided 5/19/2022.      Education provided:   - Patient received education regarding proper posture and body mechanics.  Patient was given top [facility] Visit 1 handouts which discuss what to expect in therapy, the purpose and opportunity for health coaching, the program,  wellness when discharged from therapy, back education and care specifically for posture seated, standing, lifting correctly, components of exercise, importance of nutrition and hydration, and importance of sleep.   Information on lumbar rolls provided.  Patient received education regarding proper posture and body mechanics.  - Moe roll tried, recommended, and purchase information was provided.  - Patient received a handout regarding anticipated muscular soreness following the isometric test and strategies for management were reviewed with patient including stretching, using ice and scheduled rest.   - Patient received education on the Healthy Back program, purpose of the isometric test, progression of neck strengthening as well as wellness approach and systemic strengthening.  Details of the program were discussed.  Reviewed that patient should feel support/pressure from med ex restraints but no pain or discomfort and patient expressed understanding.      April received cold pack for 10 minutes to cervicothoracic region in Z lying.    Assessment   April is a 37 y.o. female referred to [facility] with a medical diagnosis of M79.18 (ICD-10-CM) - Myofascial muscle pain, M54.2 (ICD-10-CM) - Neck pain, M50.30 (ICD-10-CM) - DDD (degenerative disc disease), cervical.  Pt presents /c pain, weakness, impaired mobility, decreased AROM,  fear of pain /c elements of central sensitization, increased psychosocial concerns, & inability to perform ADL's as before due to current functional status. Cervical MedX IM  testing averages 24% below norms.  Poor movement quality noted during physical exam including poor disassociation of cervical and thoracic rotational planes.  Pt will benefit from motor control challenges to inc individual range.  RUE demo dec strength as indicated /c Horace measurements and MMT, however, this may be pain inhibition and disuse as opposed to neural involvement as all myotomes intact.  Neural testing did indicate ulnar nerve stimulation but may be peripheral in nature as imaging does not offer direct evidence of cervical trapping and pt subjective focus on elbow to fingers sx presentation.  Pt ed on pillow placement for improved blood flow and dec neural tension to UE when SL for improved sleep quality.  Recommend f/u.  Also, plan NDI administration next visit to obtain baseline measures for goal setting.      Pain Pattern: 1REN     Pt prognosis is Good.   Pt will benefit from skilled outpatient Physical Therapy to address the deficits stated above and in the chart below, provide pt/family education, and to maximize pt's level of independence.     Plan of care discussed with patient: Yes  Pt's spiritual, cultural and educational needs considered and patient is agreeable to the plan of care and goals as stated below:     Anticipated Barriers for therapy: none    PT Evaluation Completed? Yes    Medical necessity is demonstrated by the following problem list.    Pt presents with the following impairments:     History  Co-morbidities and personal factors that may impact the plan of care Co-morbidities:   coping style/mechanism, difficulty sleeping and HTN    Personal Factors:   coping style     low   Examination  Body Structures and Functions, activity limitations and participation restrictions that may impact the plan of care Body Regions:   head  neck  upper extremities    Body Systems:    gross symmetry  ROM  strength  gross coordinated movement  motor control    Participation Restrictions:    none    Activity limitations:   Learning and applying knowledge  no deficits    General Tasks and Commands  no deficits    Communication  no deficits    Mobility  lifting and carrying objects  fine hand use (grasping/picking up)  driving (bike, car, motorcycle)    Self care  no deficits    Domestic Life  shopping  cooking  doing house work (cleaning house, washing dishes, laundry)    Interactions/Relationships  no deficits    Life Areas  no deficits    Community and Social Life  community life  recreation and leisure         low   Clinical Presentation stable and uncomplicated low   Decision Making/ Complexity Score: low       GOALS: Pt is in agreement with the following goals.    Short term goals: 6 weeks or 10 visits   1.  Pt will demonstratte increased cervical ROM as measured by med ex by 6 degrees from initial test which results in improved  ROM of neck for ease with ADLs and driving  (approp and ongoing)  2. Pt will demonstrate independence with reducing or controlling symptoms with ther ex, movement, or position independently, able to reduce pain 1-2 points on pain scale using strategies taught in therapy  (approp and ongoing)  3. Pt will demonstrate increased MedX average isometric strength value by 15% with  when compared to the initial testing resulting in improved ability to perform bending, lifting, and carrying activities safely, confidently.  (approp and ongoing)        Long term goals: 10 weeks or 20 visits  1. Pt will demonstratte increased cervical ROM as measured by med ex by 12 degrees from initial test which results in functional ROM of neck for ease with ADLs and driving  (approp and ongoing)  2. Pt will demonstrate increased MedX average isometric strength value  by 20% from initial test to improve ability to lift and carry, and sustain good posture while performing ADL's  (approp and ongoing)  TO BE ADMINISTERED VISIT 2  3.Pt will demonstrate reduced pain and improved functional outcomes as  "reported on the Neck Disability Index by reaching a score of TBD or less in order to demonstrate subjective improvement in pt's condition.    (approp and ongoing)  TO BE ADMINISTERED VISIT 2  4. Pt will demonstrate independence with reducing or controlling symptoms with ther ex, movement, or position independently, able to reduce pain 2-4 points on pain scale using strategies taught in therapy  (approp and ongoing)  5. Pt will demonstrate independence with the HEP at discharge.   (approp and ongoing)  6. Pt will report no cervical discomfort when turning head for mirror checks while driving for inc safety.  (approp and ongoing)  7. Pt will report ability to sit /c family to complete puzzles /s inc in cervical discomfort for inc tolerance to recreational activities (approp and ongoing)  8. Pt will perform KENDRA dynamometer /c no more than 10# difference between R and L  for improved tolerance to house hold chores (approp and ongoing)     Plan   Outpatient physical therapy 2x week for 10 weeks or 20 visits to include the following:   - Patient education  - Therapeutic exercise  - Manual therapy  - Performance testing   - Neuromuscular Re-education  - Therapeutic activity   - Modalities    Pt may be seen by PTA as part of the rehabilitation team.     Therapist: Jonathon Concepcion, PT  5/23/2022      "I certify the need for these services furnished under this plan of treatment and while under my care."    ____________________________________  Physician/Referring Practitioner    _______________  Date of Signature          "

## 2022-05-25 ENCOUNTER — OFFICE VISIT (OUTPATIENT)
Dept: NEUROLOGY | Facility: CLINIC | Age: 37
End: 2022-05-25
Payer: COMMERCIAL

## 2022-05-25 ENCOUNTER — PROCEDURE VISIT (OUTPATIENT)
Dept: NEUROLOGY | Facility: CLINIC | Age: 37
End: 2022-05-25
Payer: COMMERCIAL

## 2022-05-25 VITALS
TEMPERATURE: 98 F | DIASTOLIC BLOOD PRESSURE: 95 MMHG | DIASTOLIC BLOOD PRESSURE: 95 MMHG | HEART RATE: 70 BPM | RESPIRATION RATE: 18 BRPM | HEIGHT: 68 IN | BODY MASS INDEX: 28.75 KG/M2 | BODY MASS INDEX: 28.75 KG/M2 | SYSTOLIC BLOOD PRESSURE: 148 MMHG | SYSTOLIC BLOOD PRESSURE: 148 MMHG | TEMPERATURE: 98 F | WEIGHT: 189.69 LBS | HEART RATE: 70 BPM | WEIGHT: 189.69 LBS | HEIGHT: 68 IN | RESPIRATION RATE: 18 BRPM

## 2022-05-25 DIAGNOSIS — M54.2 NECK PAIN: ICD-10-CM

## 2022-05-25 DIAGNOSIS — G56.20 ULNAR NEUROPATHY, UNSPECIFIED LATERALITY: ICD-10-CM

## 2022-05-25 DIAGNOSIS — M79.601 PAIN OF RIGHT UPPER EXTREMITY: Primary | ICD-10-CM

## 2022-05-25 DIAGNOSIS — R20.0 NUMBNESS OF UPPER LIMB: ICD-10-CM

## 2022-05-25 PROCEDURE — 4010F PR ACE/ARB THEARPY RXD/TAKEN: ICD-10-PCS | Mod: CPTII,S$GLB,, | Performed by: PSYCHIATRY & NEUROLOGY

## 2022-05-25 PROCEDURE — 3080F PR MOST RECENT DIASTOLIC BLOOD PRESSURE >= 90 MM HG: ICD-10-PCS | Mod: CPTII,S$GLB,, | Performed by: PSYCHIATRY & NEUROLOGY

## 2022-05-25 PROCEDURE — 3008F PR BODY MASS INDEX (BMI) DOCUMENTED: ICD-10-PCS | Mod: CPTII,S$GLB,, | Performed by: PSYCHIATRY & NEUROLOGY

## 2022-05-25 PROCEDURE — 1160F RVW MEDS BY RX/DR IN RCRD: CPT | Mod: CPTII,S$GLB,, | Performed by: PSYCHIATRY & NEUROLOGY

## 2022-05-25 PROCEDURE — 3080F DIAST BP >= 90 MM HG: CPT | Mod: CPTII,S$GLB,, | Performed by: PSYCHIATRY & NEUROLOGY

## 2022-05-25 PROCEDURE — 3077F SYST BP >= 140 MM HG: CPT | Mod: CPTII,S$GLB,, | Performed by: PSYCHIATRY & NEUROLOGY

## 2022-05-25 PROCEDURE — 1159F PR MEDICATION LIST DOCUMENTED IN MEDICAL RECORD: ICD-10-PCS | Mod: CPTII,S$GLB,, | Performed by: PSYCHIATRY & NEUROLOGY

## 2022-05-25 PROCEDURE — 4010F ACE/ARB THERAPY RXD/TAKEN: CPT | Mod: CPTII,S$GLB,, | Performed by: PSYCHIATRY & NEUROLOGY

## 2022-05-25 PROCEDURE — 95886 MUSC TEST DONE W/N TEST COMP: CPT | Mod: S$GLB,,, | Performed by: PSYCHIATRY & NEUROLOGY

## 2022-05-25 PROCEDURE — 95910 PR NERVE CONDUCTION STUDY; 7-8 STUDIES: ICD-10-PCS | Mod: S$GLB,,, | Performed by: PSYCHIATRY & NEUROLOGY

## 2022-05-25 PROCEDURE — 99203 PR OFFICE/OUTPT VISIT, NEW, LEVL III, 30-44 MIN: ICD-10-PCS | Mod: 25,S$GLB,, | Performed by: PSYCHIATRY & NEUROLOGY

## 2022-05-25 PROCEDURE — 95886 PR EMG COMPLETE, W/ NERVE CONDUCTION STUDIES, 5+ MUSCLES: ICD-10-PCS | Mod: S$GLB,,, | Performed by: PSYCHIATRY & NEUROLOGY

## 2022-05-25 PROCEDURE — 95910 NRV CNDJ TEST 7-8 STUDIES: CPT | Mod: S$GLB,,, | Performed by: PSYCHIATRY & NEUROLOGY

## 2022-05-25 PROCEDURE — 3077F PR MOST RECENT SYSTOLIC BLOOD PRESSURE >= 140 MM HG: ICD-10-PCS | Mod: CPTII,S$GLB,, | Performed by: PSYCHIATRY & NEUROLOGY

## 2022-05-25 PROCEDURE — 99999 PR PBB SHADOW E&M-EST. PATIENT-LVL V: ICD-10-PCS | Mod: PBBFAC,,, | Performed by: PSYCHIATRY & NEUROLOGY

## 2022-05-25 PROCEDURE — 3008F BODY MASS INDEX DOCD: CPT | Mod: CPTII,S$GLB,, | Performed by: PSYCHIATRY & NEUROLOGY

## 2022-05-25 PROCEDURE — 1160F PR REVIEW ALL MEDS BY PRESCRIBER/CLIN PHARMACIST DOCUMENTED: ICD-10-PCS | Mod: CPTII,S$GLB,, | Performed by: PSYCHIATRY & NEUROLOGY

## 2022-05-25 PROCEDURE — 99999 PR PBB SHADOW E&M-EST. PATIENT-LVL V: CPT | Mod: PBBFAC,,, | Performed by: PSYCHIATRY & NEUROLOGY

## 2022-05-25 PROCEDURE — 99203 OFFICE O/P NEW LOW 30 MIN: CPT | Mod: 25,S$GLB,, | Performed by: PSYCHIATRY & NEUROLOGY

## 2022-05-25 PROCEDURE — 1159F MED LIST DOCD IN RCRD: CPT | Mod: CPTII,S$GLB,, | Performed by: PSYCHIATRY & NEUROLOGY

## 2022-05-25 NOTE — PROGRESS NOTES
[facility] [address]       Patient received an EMG and nerve conduction test today.  Please refer to the full procedure report which is found in the media section of chart review.    Kyler Rendon MD, FAAN  Neurology

## 2022-05-25 NOTE — PROGRESS NOTES
[facility]rology [address]     Subjective:       Patient ID: April Wendt Schamberger is a 37 y.o. female here for a EMG focused evaluation for neck and arm pain. Previous visits and diagnostic evaluation reviewed.  Patient has a history of chronic neck pain for several years.  She relates the pain radiates to her right arm.  She specifically relates numbness involving the right elbow which radiates to the right 4th and 5th digits.  Symptoms have been progressively worsening and severe at times.   HPI  Review of patient's allergies indicates:   Allergen Reactions    Lactose     Azithromycin Nausea And Vomiting      Vitals:    22 0903   BP: (!) 148/95   Pulse: 70   Resp: 18   Temp: 98.3 °F (36.8 °C)      Chief Complaint: Pain    Past Medical History:   Diagnosis Date    Abnormal Pap smear of cervix     Cryo Done    ADD (attention deficit disorder)     Breast disorder     Breast Cysts    Esophageal reflux     Fibroids     Hypertension     IBS (irritable bowel syndrome)     Insomnia     Kidney stones     Mastocytosis     Relies on partner vasectomy for contraception     Upper GI bleed       Social History     Socioeconomic History    Marital status:     Number of children: 1   Tobacco Use    Smoking status: Former Smoker     Quit date: 2014     Years since quittin.1    Smokeless tobacco: Never Used   Substance and Sexual Activity    Alcohol use: No     Alcohol/week: 0.0 standard drinks     Comment: breastfeeding     Drug use: No    Sexual activity: Yes     Partners: Male     Birth control/protection: Partner-Vasectomy     Comment: : Boris   Social History Narrative    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     Social Determinants of Health     Financial Resource Strain: Low Risk     Difficulty of Paying Living Expenses: Not very hard   Food Insecurity: No Food Insecurity    Worried About Running Out of Food in the  Last Year: Never true    Ran Out of Food in the Last Year: Never true   Transportation Needs: No Transportation Needs    Lack of Transportation (Medical): No    Lack of Transportation (Non-Medical): No   Physical Activity: Sufficiently Active    Days of Exercise per Week: 5 days    Minutes of Exercise per Session: 30 min   Stress: Stress Concern Present    Feeling of Stress : Very much   Social Connections: Unknown    Frequency of Communication with Friends and Family: More than three times a week    Frequency of Social Gatherings with Friends and Family: More than three times a week    Active Member of Clubs or Organizations: Yes    Attends Club or Organization Meetings: More than 4 times per year    Marital Status:    Housing Stability: Low Risk     Unable to Pay for Housing in the Last Year: No    Number of Places Lived in the Last Year: 1    Unstable Housing in the Last Year: No      Review of Systems:   No Fever  No SOB  No vomiting  No visual disturbance      Objective:      Physical Exam    Constitutional: Patient appears well-nourished.   Head: Normocephalic and atraumatic.   Mouth/Throat: Oropharynx is clear and moist.   Pulmonary/Chest: Effort normal.   Abdominal: Soft.   Skin: Skin is warm and dry.    General:  Patient is alert and cooperative.  Affect:  Patient is appropriate to surroundings and environment.  Language:  Speech is fluent.  HEENT:  There are no outward signs of trauma to head or face.  Cranial Nerves:  Pupils are equal round and reactive to light. Extra-ocular movements are intact. Face, tongue, and palate are symmetrical.  Motor:  Patient exhibits normal strength testing in bilateral proximal and distal upper extremities.  Reflexes:  Symmetrical in bilateral upper extremities.  Gait:  Ambulation is independent without use of cane or walker without signs of ataxia or circumduction.  Cerebellar:  Normal finger to nose testing without dysmetria.  Sensory:  Intact to  sensory modalities tested.  Musculoskeletal:  There is no severe tenderness to palpation and manipulation of the cervical spine region.  Negative Tinel sign at the elbow and wrist.  Assessment:       We reviewed and discussed at length results of EMG of the right upper extremity performed today which was normal.  Specifically there was no significant evidence of ulnar neuropathy.  These findings are available via media section of chart review.   1. Pain of right upper extremity    2. Numbness of upper limb    3. Neck pain              Plan:       We discussed treatment options at length. Recommend patient keep appointment with referring provider.         Kyler Rendon MD   05/25/2022   9:48 AM

## 2022-05-30 ENCOUNTER — CLINICAL SUPPORT (OUTPATIENT)
Dept: REHABILITATION | Facility: OTHER | Age: 37
End: 2022-05-30
Attending: PHYSICAL MEDICINE & REHABILITATION
Payer: COMMERCIAL

## 2022-05-30 DIAGNOSIS — R29.898 DECREASED RANGE OF MOTION OF NECK: Primary | ICD-10-CM

## 2022-05-30 DIAGNOSIS — R29.898 RIGHT ARM WEAKNESS: ICD-10-CM

## 2022-05-30 DIAGNOSIS — M53.82 WEAKNESS OF NECK: ICD-10-CM

## 2022-05-30 PROCEDURE — 97750 PHYSICAL PERFORMANCE TEST: CPT | Mod: 32

## 2022-05-30 NOTE — PROGRESS NOTES
"[facility]      Name: April Wendt Schamberger  Clinic Number: [id number]    Therapy Diagnosis:   Encounter Diagnoses   Name Primary?    Decreased range of motion of neck Yes    Weakness of neck     Right arm weakness      Physician: Mata Valencia MD    Visit Date: 2022    Physician Orders: PT Eval and Treat   Medical Diagnosis from Referral:   M79.18 (ICD-10-CM) - Myofascial muscle pain   M54.2 (ICD-10-CM) - Neck pain   M50.30 (ICD-10-CM) - DDD (degenerative disc disease), cervical         Evaluation Date: 2022  Authorization Period Expiration: 22  Plan of Care Expiration: 22  Reassessment Due: 22  Visit # / Visits authorized:   PROGRAM (PLS issue NDI visit 3)     Time In: 1:45 pm   Time Out: 230m  Total Billable Time: 40 minutes     Precautions: Standard     Pattern of pain determined: 1REN       Subjective   April reports her pain is no better, no worse.  Pt states she has pain all the time with RUE numbness that comes/goes.      Patient reports tolerating previous visit fair  Patient reports their pain to be 7/10 on a 0-10 scale with 0 being no pain and 10 being the worst pain imaginable.  Pain Location: B cervical     Work and leisure: Social History: lives in house, 1 level, threshold to enter,  lives with their family(, 2 boys 4, 7 yr)  Occupation: cancer researcher   Leisure: working out, playing /c kids  Pts goals: "I want less pain so I can go back to doing the fun stuff in life"     Objective        Baseline Isometric Testing on Med X equipment:  Testing administered by PT  Date of testin22  ROM 33 - 102 deg   Max Peak Torque 166   Min Peak Torque 131   Flex/Ext Ratio 1.26   % below normative data 24%           Outcomes:  Initial score:  Visit 5 score:  Goal:      Treatment    Pt was instructed in and performed the following:     April received therapeutic exercises to develop/improved posture, cardiovascular " endurance, muscular endurance, lumbar/cervical ROM, strength and muscular endurance for 45 minutes including the following exercises:   [facility] 5/30/2022   Visit Number 2   VAS Pain Rating 7   Time 5   Retraction in Sitting 20   Flexion 2   Sidebending 2   Rotation -   Scapular Retraction 20   Manual Therapy 5   Cervical Extension Seat Pad -   Seat Adjustment -   Top Dead Center -   Counterweight -   Cervical Flexion -   Cervical Extension -   Cervical Peak Torque -   Cervical Weight 132   Repetitions 15   Rating of Perceived Exertion 5   Ice - Z Lie (in min.) 5     Cervical rotation /c towel assist x 5 5 sec hold N/P  Wall slides x 10   Ulnar nerve glide PRN    upper trap 3x 30 sec  Levator scap 3x 30 sec   scap squeeze  20        Peripheral muscle strengthening which included 1 set of 15-20 repetitions at a slow, controlled 10-13 second per rep pace focused on strengthening supporting musculature for improved body mechanics and functional mobility.  Pt and therapist focused on proper form during treatment to ensure optimal strengthening of each targeted muscle group.  Machines were utilized including torso rotation, leg extension, leg curl, chest press, upright row. Tricep extension, bicep curl, leg press, and hip abduction added visit 3    April received the following manual therapy techniques: Manual traction and Soft tissue Mobilization were applied to the: cervical region for 5 minutes.         Home Exercises Provided and Patient Education Provided   Home exercises include:  Cardio program:6/8/22  Lifting education date:TBD  Lumbar roll: not yet    Education provided:   Need for Lumbar roll ( Enedelia slim line roll) when sitting at work for documentation  Exertion levels for exercise    Written Home Exercises Provided: Patient instructed to cont prior HEP.  Exercises were reviewed and April was able to demonstrate them prior to the end of the session.  April demonstrated good  understanding of the  education provided.     See EMR under Patient Instructions for exercises provided prior visit.          Assessment     Pt arrives today with 7/10 pain level.  Pt states she has been performing HEP.  Pt states she doesn't feel like the trunk rotation isnt helping her.   Added UT/LS stretch today.  Attempted to have pt reach and hold under the chair to stretch to stabilize shoulder.  Pt reported tingling into R UE..  Tingling reduced when pt kept hands in lap to stretch.  Advised pt to perform ulnar nerve glides when she experiences RUE tingling.  Initiated Med X at 132in/lbs with pt completing 15 reps at 5/10 exertion level.Reviewed Patient is making good progress towards established goals.  Pt will continue to benefit from skilled outpatient physical therapy to address the deficits stated in the impairment chart, provide pt/family education and to maximize pt's level of independence in the home and community environment.     Anticipated Barriers for therapy: none  Pt's spiritual, cultural and educational needs considered and pt agreeable to plan of care and goals as stated below:          GOALS: Pt is in agreement with the following goals.     Short term goals: 6 weeks or 10 visits   1.  Pt will demonstratte increased cervical ROM as measured by med ex by 6 degrees from initial test which results in improved  ROM of neck for ease with ADLs and driving  (approp and ongoing)  2. Pt will demonstrate independence with reducing or controlling symptoms with ther ex, movement, or position independently, able to reduce pain 1-2 points on pain scale using strategies taught in therapy  (approp and ongoing)  3. Pt will demonstrate increased MedX average isometric strength value by 15% with  when compared to the initial testing resulting in improved ability to perform bending, lifting, and carrying activities safely, confidently.  (approp and ongoing)           Long term goals: 10 weeks or 20 visits  1. Pt will demonstratte  increased cervical ROM as measured by med ex by 12 degrees from initial test which results in functional ROM of neck for ease with ADLs and driving  (approp and ongoing)  2. Pt will demonstrate increased MedX average isometric strength value  by 20% from initial test to improve ability to lift and carry, and sustain good posture while performing ADL's  (approp and ongoing)  TO BE ADMINISTERED VISIT 2  3.Pt will demonstrate reduced pain and improved functional outcomes as reported on the Neck Disability Index by reaching a score of TBD or less in order to demonstrate subjective improvement in pt's condition.    (approp and ongoing)  TO BE ADMINISTERED VISIT 2  4. Pt will demonstrate independence with reducing or controlling symptoms with ther ex, movement, or position independently, able to reduce pain 2-4 points on pain scale using strategies taught in therapy  (approp and ongoing)  5. Pt will demonstrate independence with the HEP at discharge.   (approp and ongoing)  6. Pt will report no cervical discomfort when turning head for mirror checks while driving for inc safety.  (approp and ongoing)  7. Pt will report ability to sit /c family to complete puzzles /s inc in cervical discomfort for inc tolerance to recreational activities (approp and ongoing)  8. Pt will perform KENDRA dynamometer /c no more than 10# difference between R and L  for improved tolerance to house hold chores (approp and ongoing)      Plan   Continue with established Plan of Care towards established PT goals.

## 2022-06-02 ENCOUNTER — CLINICAL SUPPORT (OUTPATIENT)
Dept: REHABILITATION | Facility: OTHER | Age: 37
End: 2022-06-02
Attending: PHYSICAL MEDICINE & REHABILITATION
Payer: COMMERCIAL

## 2022-06-02 DIAGNOSIS — R29.898 DECREASED RANGE OF MOTION OF NECK: Primary | ICD-10-CM

## 2022-06-02 DIAGNOSIS — R29.898 RIGHT ARM WEAKNESS: ICD-10-CM

## 2022-06-02 DIAGNOSIS — M53.82 WEAKNESS OF NECK: ICD-10-CM

## 2022-06-02 PROCEDURE — 97750 PHYSICAL PERFORMANCE TEST: CPT | Mod: 32

## 2022-06-02 NOTE — PROGRESS NOTES
"[facility]      Name: April Wendt Schamberger  [facility] Number: [id number]    Therapy Diagnosis:   Encounter Diagnoses   Name Primary?    Decreased range of motion of neck Yes    Weakness of neck     Right arm weakness      Physician: Mata Valencia MD    Visit Date: 6/2/2022    Physician Orders: PT Eval and Treat   Medical Diagnosis from Referral:   M79.18 (ICD-10-CM) - Myofascial muscle pain   M54.2 (ICD-10-CM) - Neck pain   M50.30 (ICD-10-CM) - DDD (degenerative disc disease), cervical         Evaluation Date: 5/19/2022  Authorization Period Expiration: 12/31/22  Plan of Care Expiration: 12/31/22  Reassessment Due: 06/19/22  Visit # / Visits authorized: 3 / 20  PROGRAM      Time In: 2:45 pm   Time Out: 3:30 pm  Total Billable Time: 45 minutes     Precautions: Standard     Pattern of pain determined: 1REN       Subjective   April reports no flares ups since last visit.  Pt report HEP compliance including adding UT stretches last visit.  Pt report EMG results indicate no peripheral neural issues  Pt report taking pain med this afternoon and believes this is contributing to slight dec in pain presentation.   Pt reports cont sleep disturbance waking intermittently from both cervical and B hand tingles.   Pt report not obtaining lumbar roll but did get cold pack yesterday and looks forward to using.     Patient reports tolerating previous visit well /c no c/o of excess discomfort  Patient reports their pain to be 6/10 on a 0-10 scale with 0 being no pain and 10 being the worst pain imaginable.  Pain Location: B cervical     Work and leisure: Social History: lives in house, 1 level, threshold to enter,  lives with their family(, 2 boys 4, 7 yr)  Occupation: cancer researcher   Leisure: working out, playing /c kids  Pts goals: "I want less pain so I can go back to doing the fun stuff in life"     Objective        Baseline Isometric Testing on Med X equipment:  Testing " administered by PT  Date of testin22  ROM 33 - 102 deg   Max Peak Torque 166   Min Peak Torque 131   Flex/Ext Ratio 1.26   % below normative data 24%         Neck Disability Index Review 2022   Pain Intensity 2 4   Personal Care (Washing, Dressing, etc.) 2 2   Lifting 2 3   Reading 2 3   Headaches 2 2   Concentration 1 3   Work 2 3   Driving 2 2   Sleeping 3 4   Recreation 2 2   Score: 20 28         Horace Dynamometer     Eval: R 40#               L 58#    Treatment    Pt was instructed in and performed the following:     April received therapeutic exercises to develop/improved posture, cardiovascular endurance, muscular endurance, lumbar/cervical ROM, strength and muscular endurance for 45 minutes including the following exercises:       Admin NDI (see above)     Repeated protraction x 20 no change sx presentation   Repeated retraction x 20     Seated    Upper trap 3x 30 sec   Levator scap 3x 30 sec   Standing    Scap retracts x 20 cue for no B knee hyperextension   Wall slides x 10 cue for dec lumbar lordosis, x 10     Open books on wall x 10 B     Pt demo Ulnar nerve glide PRN     NP:  Cervical rotation /c towel assist x 5 5 sec hold     [facility] 2022   Visit Number 3   VAS Pain Rating 6   Time 5   Retraction in Sitting 20   Flexion -   Sidebending 3   Rotation -   Scapular Retraction 20   Manual Therapy -   Cervical Extension Seat Pad -   Seat Adjustment -   Top Dead Center -   Counterweight -   Cervical Flexion -   Cervical Extension -   Cervical Peak Torque -   Cervical Weight 132   Repetitions 20   Rating of Perceived Exertion 3   Ice - Z Lie (in min.) 5           Peripheral muscle strengthening which included 1 set of 15-20 repetitions at a slow, controlled 10-13 second per rep pace focused on strengthening supporting musculature for improved body mechanics and functional mobility.  Pt and therapist focused on proper form during treatment to ensure optimal strengthening  of each targeted muscle group.  Machines were utilized including torso rotation, leg extension, leg curl, chest press, upright row. Tricep extension, bicep curl, leg press, and hip abduction added visit 3    April received the following manual therapy techniques: Manual traction and Soft tissue Mobilization were applied to the: cervical region for 00 minutes.         Home Exercises Provided and Patient Education Provided   Home exercises include:     Cervical rotation /c towel assist  Wall slides   Scap retract   Ulnar nerve glide PRN     Cardio program: 6/8/22  Lifting education date:TBD  Lumbar roll: not obtained as of 06/02/22    Education provided:       Written Home Exercises Provided: Patient instructed to cont prior HEP.  Exercises were reviewed and April was able to demonstrate them prior to the end of the session.  April demonstrated good  understanding of the education provided.     See EMR under Patient Instructions for exercises provided prior visit.          Assessment     Pt EMG completed indicating no peripheral neural dx thereby indicating likely cervical tian to RUE N/T.   Repeated protraction/retraction performed to determine possible directional preference.  No RUE sx present or provoked today leaving directional preference inconclusive.  Recommend if pt presents /c RUE sx to again perform repeated motions.  Pt instructed to cont RUE ulnar nerve glides PRN /c attention to when in cervical SB for slack and if sx presentation is altered.    Pt cont to demo restricted thoracic rotation especially rotating to R.  Perhaps how she holds her rolling bag in R hand leading to restriction from repetitive aberrant movement pattern.  HEP added Standing Open Books to facilitate thoracic range.   Cervical MedX weight maintained per pt tolerance last visit.   Today pt perform 20 reps at 3 RPE indicating inc strength.  Progress per HB protocol. Also recommend ext range inc as pt consistently surpass max ext  position /c reps.  Pt complete full complement of peripheral MedX exercises /s issue.  Progress per tolerance.   NDI completed and goals established to illustrate POC progression.      Patient is making fair progress towards established goals.  Pt will continue to benefit from skilled outpatient physical therapy to address the deficits stated in the impairment chart, provide pt/family education and to maximize pt's level of independence in the home and community environment.     Anticipated Barriers for therapy: none  Pt's spiritual, cultural and educational needs considered and pt agreeable to plan of care and goals as stated below:          GOALS: Pt is in agreement with the following goals.     Short term goals: 6 weeks or 10 visits   1.  Pt will demonstratte increased cervical ROM as measured by med ex by 6 degrees from initial test which results in improved  ROM of neck for ease with ADLs and driving  (approp and ongoing)  2. Pt will demonstrate independence with reducing or controlling symptoms with ther ex, movement, or position independently, able to reduce pain 1-2 points on pain scale using strategies taught in therapy  (approp and ongoing)  3. Pt will demonstrate increased MedX average isometric strength value by 15% with  when compared to the initial testing resulting in improved ability to perform bending, lifting, and carrying activities safely, confidently.  (approp and ongoing)           Long term goals: 10 weeks or 20 visits  1. Pt will demonstratte increased cervical ROM as measured by med ex by 12 degrees from initial test which results in functional ROM of neck for ease with ADLs and driving  (approp and ongoing)  2. Pt will demonstrate increased MedX average isometric strength value  by 20% from initial test to improve ability to lift and carry, and sustain good posture while performing ADL's  (approp and ongoing)  3.Pt will demonstrate reduced pain and improved functional outcomes as reported  on the Neck Disability Index by reaching a score of 15 or less in order to demonstrate subjective improvement in pt's condition.    (approp and ongoing)  4. Pt will demonstrate independence with reducing or controlling symptoms with ther ex, movement, or position independently, able to reduce pain 2-4 points on pain scale using strategies taught in therapy  (approp and ongoing)  5. Pt will demonstrate independence with the HEP at discharge.   (approp and ongoing)  6. Pt will report no cervical discomfort when turning head for mirror checks while driving for inc safety.  (approp and ongoing)  7. Pt will report ability to sit /c family to complete puzzles /s inc in cervical discomfort for inc tolerance to recreational activities (approp and ongoing)  8. Pt will perform KENDRA dynamometer /c no more than 10# difference between R and L  for improved tolerance to house hold chores (approp and ongoing)      Plan   Continue with established Plan of Care towards established PT goals.

## 2022-06-06 ENCOUNTER — CLINICAL SUPPORT (OUTPATIENT)
Dept: REHABILITATION | Facility: OTHER | Age: 37
End: 2022-06-06
Attending: PHYSICAL MEDICINE & REHABILITATION
Payer: COMMERCIAL

## 2022-06-06 DIAGNOSIS — R29.898 RIGHT ARM WEAKNESS: ICD-10-CM

## 2022-06-06 DIAGNOSIS — R29.898 DECREASED RANGE OF MOTION OF NECK: Primary | ICD-10-CM

## 2022-06-06 DIAGNOSIS — M53.82 WEAKNESS OF NECK: ICD-10-CM

## 2022-06-06 PROCEDURE — 97750 PHYSICAL PERFORMANCE TEST: CPT | Mod: 32

## 2022-06-06 NOTE — PROGRESS NOTES
"[facility]      Name: April Wendt Schamberger  [facility] Number: [id number]    Therapy Diagnosis:   Encounter Diagnoses   Name Primary?    Decreased range of motion of neck Yes    Weakness of neck     Right arm weakness      Physician: Bee Block, *    Visit Date: 2022    Physician Orders: PT Eval and Treat   Medical Diagnosis from Referral:   M79.18 (ICD-10-CM) - Myofascial muscle pain   M54.2 (ICD-10-CM) - Neck pain   M50.30 (ICD-10-CM) - DDD (degenerative disc disease), cervical         Evaluation Date: 2022  Authorization Period Expiration: 22  Plan of Care Expiration: 22  Reassessment Due: 22  Visit # / Visits authorized:   PROGRAM      Time In: 2:25 pm   Time Out: 310  Total Billable Time: 40 minutes     Precautions: Standard     Pattern of pain determined: 1REN       Subjective   April reports no flares ups since last visit. Still reporting interrupted sleep and tingling in R hand  Patient reports tolerating previous visit well /c no c/o of excess discomfort  Patient reports their pain to be 6/10 on a 0-10 scale with 0 being no pain and 10 being the worst pain imaginable.  Pain Location: B cervical     Work and leisure: Social History: lives in house, 1 level, threshold to enter,  lives with their family(, 2 boys 4, 7 yr)  Occupation: cancer researcher   Leisure: working out, playing /c kids  Pts goals: "I want less pain so I can go back to doing the fun stuff in life"     Objective        Baseline Isometric Testing on Med X equipment:  Testing administered by PT  Date of testin22  ROM 33 - 102 deg   Max Peak Torque 166   Min Peak Torque 131   Flex/Ext Ratio 1.26   % below normative data 24%         Neck Disability Index Review 2022   Pain Intensity 2 4   Personal Care (Washing, Dressing, etc.) 2 2   Lifting 2 3   Reading 2 3   Headaches 2 2   Concentration 1 3   Work 2 3   Driving 2 2   Sleeping 3 4 "   Recreation 2 2   Score: 20 28         Horace Dynamometer     Eval: R 40#               L 58#    Treatment    Pt was instructed in and performed the following:     April received therapeutic exercises to develop/improved posture, cardiovascular endurance, muscular endurance, lumbar/cervical ROM, strength and muscular endurance for 45 minutes including the following exercises:     [facility] 6/6/2022   Visit Number 4   VAS Pain Rating 6   Time 5   Retraction in Sitting 20   Flexion -   Sidebending 3   Rotation -   Scapular Retraction 20   Manual Therapy -   Cervical Extension Seat Pad -   Seat Adjustment -   Top Dead Center -   Counterweight -   Cervical Flexion -   Cervical Extension -   Cervical Peak Torque -   Cervical Weight 141   Repetitions 15   Rating of Perceived Exertion 5   Ice - Z Lie (in min.) 5         Seated    Upper trap 3x 30 sec   Levator scap 3x 30 sec   Standing    Scap retracts x 20 cue for no B knee hyperextension   Wall slides x 10 cue for dec lumbar lordosis, x 10     Open books on wall x 10 B     Pt demo Ulnar nerve glide PRN     NP:  Cervical rotation /c towel assist x 5 5 sec hold           Peripheral muscle strengthening which included 1 set of 15-20 repetitions at a slow, controlled 10-13 second per rep pace focused on strengthening supporting musculature for improved body mechanics and functional mobility.  Pt and therapist focused on proper form during treatment to ensure optimal strengthening of each targeted muscle group.  Machines were utilized including torso rotation, leg extension, leg curl, chest press, upright row. Tricep extension, bicep curl, leg press, and hip abduction added visit 3    April received the following manual therapy techniques: Manual traction and Soft tissue Mobilization were applied to the: cervical region for 00 minutes.         Home Exercises Provided and Patient Education Provided   Home exercises include:     Cervical rotation /c towel assist  Wall  slides   Scap retract   Ulnar nerve glide PRN     Cardio program: 6/8/22  Lifting education date:TBD  Lumbar roll: not obtained as of 06/02/22    Education provided:       Written Home Exercises Provided: Patient instructed to cont prior HEP.  Exercises were reviewed and April was able to demonstrate them prior to the end of the session.  April demonstrated good  understanding of the education provided.     See EMR under Patient Instructions for exercises provided prior visit.      Assessment   Pt arrives today with 6/10 pain level.  Pt able to perform thoracic rotation with good form, but continues to have difficulty with wall slides.  Continue to reinforce decreasing lumbar lordosis with wall slides.  Start focusing on thoracic stability to assist with posture.  Increased weight on Med X 141 in/lb with completion of 15 reps with 5/10 exertion level.  Patient is making fair progress towards established goals.  Pt will continue to benefit from skilled outpatient physical therapy to address the deficits stated in the impairment chart, provide pt/family education and to maximize pt's level of independence in the home and community environment.     Anticipated Barriers for therapy: none  Pt's spiritual, cultural and educational needs considered and pt agreeable to plan of care and goals as stated below:          GOALS: Pt is in agreement with the following goals.     Short term goals: 6 weeks or 10 visits   1.  Pt will demonstratte increased cervical ROM as measured by med ex by 6 degrees from initial test which results in improved  ROM of neck for ease with ADLs and driving  (approp and ongoing)  2. Pt will demonstrate independence with reducing or controlling symptoms with ther ex, movement, or position independently, able to reduce pain 1-2 points on pain scale using strategies taught in therapy  (approp and ongoing)  3. Pt will demonstrate increased MedX average isometric strength value by 15% with  when compared  to the initial testing resulting in improved ability to perform bending, lifting, and carrying activities safely, confidently.  (approp and ongoing)           Long term goals: 10 weeks or 20 visits  1. Pt will demonstratte increased cervical ROM as measured by med ex by 12 degrees from initial test which results in functional ROM of neck for ease with ADLs and driving  (approp and ongoing)  2. Pt will demonstrate increased MedX average isometric strength value  by 20% from initial test to improve ability to lift and carry, and sustain good posture while performing ADL's  (approp and ongoing)  3.Pt will demonstrate reduced pain and improved functional outcomes as reported on the Neck Disability Index by reaching a score of 15 or less in order to demonstrate subjective improvement in pt's condition.    (approp and ongoing)  4. Pt will demonstrate independence with reducing or controlling symptoms with ther ex, movement, or position independently, able to reduce pain 2-4 points on pain scale using strategies taught in therapy  (approp and ongoing)  5. Pt will demonstrate independence with the HEP at discharge.   (approp and ongoing)  6. Pt will report no cervical discomfort when turning head for mirror checks while driving for inc safety.  (approp and ongoing)  7. Pt will report ability to sit /c family to complete puzzles /s inc in cervical discomfort for inc tolerance to recreational activities (approp and ongoing)  8. Pt will perform KENDRA dynamometer /c no more than 10# difference between R and L  for improved tolerance to house hold chores (approp and ongoing)      Plan   Continue with established Plan of Care towards established PT goals.

## 2022-06-07 ENCOUNTER — OFFICE VISIT (OUTPATIENT)
Dept: NEUROSURGERY | Facility: CLINIC | Age: 37
End: 2022-06-07
Payer: COMMERCIAL

## 2022-06-07 ENCOUNTER — TELEPHONE (OUTPATIENT)
Dept: NEUROSURGERY | Facility: CLINIC | Age: 37
End: 2022-06-07

## 2022-06-07 VITALS — WEIGHT: 189.63 LBS | BODY MASS INDEX: 28.74 KG/M2 | HEIGHT: 68 IN

## 2022-06-07 DIAGNOSIS — G56.20 ULNAR NEUROPATHY, UNSPECIFIED LATERALITY: Primary | ICD-10-CM

## 2022-06-07 DIAGNOSIS — M40.12 OTHER SECONDARY KYPHOSIS, CERVICAL REGION: Primary | ICD-10-CM

## 2022-06-07 DIAGNOSIS — M54.2 NECK PAIN: ICD-10-CM

## 2022-06-07 DIAGNOSIS — G56.20 ULNAR NEUROPATHY, UNSPECIFIED LATERALITY: ICD-10-CM

## 2022-06-07 PROCEDURE — 4010F PR ACE/ARB THEARPY RXD/TAKEN: ICD-10-PCS | Mod: CPTII,S$GLB,, | Performed by: NEUROLOGICAL SURGERY

## 2022-06-07 PROCEDURE — 4010F ACE/ARB THERAPY RXD/TAKEN: CPT | Mod: CPTII,S$GLB,, | Performed by: NEUROLOGICAL SURGERY

## 2022-06-07 PROCEDURE — 3008F PR BODY MASS INDEX (BMI) DOCUMENTED: ICD-10-PCS | Mod: CPTII,S$GLB,, | Performed by: NEUROLOGICAL SURGERY

## 2022-06-07 PROCEDURE — 99999 PR PBB SHADOW E&M-EST. PATIENT-LVL III: ICD-10-PCS | Mod: PBBFAC,,, | Performed by: NEUROLOGICAL SURGERY

## 2022-06-07 PROCEDURE — 1159F MED LIST DOCD IN RCRD: CPT | Mod: CPTII,S$GLB,, | Performed by: NEUROLOGICAL SURGERY

## 2022-06-07 PROCEDURE — 99214 PR OFFICE/OUTPT VISIT, EST, LEVL IV, 30-39 MIN: ICD-10-PCS | Mod: S$GLB,,, | Performed by: NEUROLOGICAL SURGERY

## 2022-06-07 PROCEDURE — 1159F PR MEDICATION LIST DOCUMENTED IN MEDICAL RECORD: ICD-10-PCS | Mod: CPTII,S$GLB,, | Performed by: NEUROLOGICAL SURGERY

## 2022-06-07 PROCEDURE — 3008F BODY MASS INDEX DOCD: CPT | Mod: CPTII,S$GLB,, | Performed by: NEUROLOGICAL SURGERY

## 2022-06-07 PROCEDURE — 99999 PR PBB SHADOW E&M-EST. PATIENT-LVL III: CPT | Mod: PBBFAC,,, | Performed by: NEUROLOGICAL SURGERY

## 2022-06-07 PROCEDURE — 99214 OFFICE O/P EST MOD 30 MIN: CPT | Mod: S$GLB,,, | Performed by: NEUROLOGICAL SURGERY

## 2022-06-07 NOTE — PROGRESS NOTES
NEUROSURGICAL OUTPATIENT CONSULTATION NOTE    DATE OF SERVICE:  06/07/2022    ATTENDING PHYSICIAN:  Keny Reed MD    CONSULT REQUESTED BY:        REASON FOR CONSULT:  Follow after EMG    HISTORY OF PRESENT ILLNESS:  This is a very pleasant 37 y.o. female who is following after starting PT and having EMG RUE.  Her symptoms are basically unchanged.  PT was not helpful, but she has recently started the healthy back program, and thinks this will be beneficial.     Review 3/22  This is a very pleasant 36 y.o. female who has a multiyear history of neck pain and recent symptoms of right hand numbness.  She also has pain in the forearm on the right side.  The right 4th and 5th digits are affected most consistently.  The numbness is present at nighttime.  She has had cervical epidural steroid as well as trigger point injections.  Her last physical therapy was over 2 years ago.    PAST MEDICAL HISTORY:  Active Ambulatory Problems     Diagnosis Date Noted    Irritable bowel syndrome with diarrhea 06/23/2016    Primary insomnia 10/11/2016    Cervical radiculopathy 01/10/2017    Change in bowel habit 04/28/2017    Gastroesophageal reflux disease 01/07/2013    Benign essential hypertension 01/07/2013    Uterine leiomyoma 01/30/2014    Abdominal cramping 06/20/2017    Ileitis 06/20/2017    Chronic hypertension in pregnancy 07/08/2018    Attention deficit hyperactivity disorder (ADHD), combined type 11/26/2019    Anxiety 11/26/2019    Crohn's disease, unspecified, without complications 12/01/2018    Tinea corporis 11/14/2019    Essential (primary) hypertension 12/11/2018    Irritable bowel syndrome without diarrhea 12/01/2018    Tachycardia 08/04/2020    Chronic pain 12/01/2020    Hx of LASIK 06/17/2021    Cervical pain (neck) 11/24/2021    Muscle spasm 05/18/2022    Cervicalgia 05/18/2022    Myofascial pain syndrome, cervical 05/18/2022    Decreased range of motion of neck 05/23/2022    Weakness of  neck 05/23/2022    Right arm weakness 05/23/2022     Resolved Ambulatory Problems     Diagnosis Date Noted    Myofascial muscle pain 01/10/2017    Decreased range of motion 02/09/2017    Neck pain 02/09/2017    Decreased functional mobility 02/09/2017    Decreased strength 02/09/2017    Nausea 06/20/2017    Sinusitis 02/05/2018    Bronchitis 02/05/2018    Fever 02/05/2018    Upper respiratory infection, acute 05/02/2018    Term pregnancy 07/08/2018    Normal labor 07/08/2018    Advanced maternal age in multigravida, third trimester 07/08/2018    39 weeks gestation of pregnancy 07/08/2018    Normal vaginal delivery 07/09/2018    Vaginal delivery 07/09/2018    Poor posture 09/17/2019    Neck pain 09/17/2019     Past Medical History:   Diagnosis Date    Abnormal Pap smear of cervix 2000    ADD (attention deficit disorder)     Breast disorder     Esophageal reflux     Fibroids     Hypertension     IBS (irritable bowel syndrome)     Insomnia     Kidney stones     Mastocytosis     Relies on partner vasectomy for contraception     Upper GI bleed        PAST SURGICAL HISTORY:  Past Surgical History:   Procedure Laterality Date    COLONOSCOPY N/A 4/28/2017    Procedure: COLONOSCOPY;  Surgeon: Bren Larkin MD;  Location: [facility];  Service: Endoscopy;  Laterality: N/A;    COLONOSCOPY N/A 1/26/2022    Procedure: COLONOSCOPY;  Surgeon: Inocente Roe MD;  Location: [facility];  Service: Endoscopy;  Laterality: N/A;    EPIDURAL STEROID INJECTION INTO CERVICAL SPINE N/A 12/1/2020    Procedure: Injection-steroid-epidural-cervical--C7-T1;  Surgeon: Corky Argueta Jr., MD;  Location: [facility] [place] [place];  Service: Pain Management;  Laterality: N/A;    EPIDURAL STEROID INJECTION INTO CERVICAL SPINE N/A 12/2/2021    Procedure: Cervical Epidural Steroid Injection C7-T1 (No Sedation);  Surgeon: Corky Argueta Jr., MD;  Location: [facility] [place] [place];  Service: Pain Management;  Laterality: N/A;     ESOPHAGOGASTRODUODENOSCOPY  2012    LASIK Bilateral 2005    REFRACTIVE SURGERY      TONSILLECTOMY, ADENOIDECTOMY  1988    VAGINAL DELIVERY      x 2       SOCIAL HISTORY:   Social History     Socioeconomic History    Marital status:     Number of children: 1   Tobacco Use    Smoking status: Former Smoker     Quit date: 2014     Years since quittin.1    Smokeless tobacco: Never Used   Substance and Sexual Activity    Alcohol use: No     Alcohol/week: 0.0 standard drinks     Comment: breastfeeding     Drug use: No    Sexual activity: Yes     Partners: Male     Birth control/protection: Partner-Vasectomy     Comment: : Boris   Social History Narrative    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[facility] [employer]     Social Determinants of Health     Financial Resource Strain: Low Risk     Difficulty of Paying Living Expenses: Not very hard   Food Insecurity: No Food Insecurity    Worried About Running Out of Food in the Last Year: Never true    Ran Out of Food in the Last Year: Never true   Transportation Needs: No Transportation Needs    Lack of Transportation (Medical): No    Lack of Transportation (Non-Medical): No   Physical Activity: Sufficiently Active    Days of Exercise per Week: 5 days    Minutes of Exercise per Session: 30 min   Stress: Stress Concern Present    Feeling of Stress : Very much   Social Connections: Unknown    Frequency of Communication with Friends and Family: More than three times a week    Frequency of Social Gatherings with Friends and Family: More than three times a week    Active Member of Clubs or Organizations: Yes    Attends Club or Organization Meetings: More than 4 times per year    Marital Status:    Housing Stability: Low Risk     Unable to Pay for Housing in the Last Year: No    Number of Places Lived in the Last Year: 1    Unstable Housing in the Last Year: No       FAMILY HISTORY:  Family History   Problem Relation Age of Onset    Breast cancer  Mother 47        with Metastasis    Hypertension Mother     Prostate cancer Father     Hypertension Father     Diabetes Father     Deep vein thrombosis Father     Pancreatic cancer Maternal Grandfather     Breast cancer Paternal Grandmother 80    Diabetes type II Paternal Grandfather     Heart attack Maternal Grandmother     Cancer Cousin 31    Cancer Cousin         Thurman Syndrome    Cancer Cousin         Thurman Syndrome    Ovarian cancer Maternal Aunt     Lymphoma Neg Hx     Tuberculosis Neg Hx     Celiac disease Neg Hx     Cirrhosis Neg Hx     Colon polyps Neg Hx     Crohn's disease Neg Hx     Cystic fibrosis Neg Hx     Esophageal cancer Neg Hx     Hemochromatosis Neg Hx     Inflammatory bowel disease Neg Hx     Irritable bowel syndrome Neg Hx     Liver cancer Neg Hx     Liver disease Neg Hx     Rectal cancer Neg Hx     Stomach cancer Neg Hx     Ulcerative colitis Neg Hx     Donavon's disease Neg Hx     Amblyopia Neg Hx     Blindness Neg Hx     Cataracts Neg Hx     Glaucoma Neg Hx     Macular degeneration Neg Hx     Retinal detachment Neg Hx     Strabismus Neg Hx        CURRENTS MEDICATIONS:  Current Outpatient Medications on File Prior to Visit   Medication Sig Dispense Refill    acetaminophen (TYLENOL) 500 MG tablet Take 2 tablets (1,000 mg total) by mouth 3 (three) times daily as needed for Pain.  0    amitriptyline (ELAVIL) 25 MG tablet Take 1 tablet (25 mg total) by mouth every evening. 90 tablet 0    butalbital-acetaminophen-caffeine -40 mg (FIORICET, ESGIC) -40 mg per tablet Take 1 tablet by mouth every 6 (six) hours as needed for Headaches. 30 tablet 0    diphenoxylate-atropine 2.5-0.025 mg (LOMOTIL) 2.5-0.025 mg per tablet Take 1 tablet by mouth 4 (four) times daily as needed for Diarrhea (diarrhea). 360 tablet 0    doxycycline monohydrate 100 mg Tab Take 1 tablet by mouth everyday with food. Avoid lying down for 1 hour after taking. Avoid the sun. 30  tablet 2    eszopiclone (LUNESTA) 3 mg Tab Take 1 tablet (3 mg total) by mouth nightly as needed (insomnia). 30 tablet 0    fluconazole (DIFLUCAN) 150 MG Tab Take 1 tablet as needed for yeast infection 30 tablet 1    [START ON 7/12/2022] lisdexamfetamine (VYVANSE) 60 MG capsule Take 1 capsule (60 mg total) by mouth every morning. 30 capsule 0    [START ON 6/12/2022] lisdexamfetamine (VYVANSE) 60 MG capsule Take 1 capsule (60 mg total) by mouth every morning. 30 capsule 0    lisdexamfetamine (VYVANSE) 60 MG capsule Take 1 capsule (60 mg total) by mouth every morning. 30 capsule 0    loperamide (IMODIUM) 1 mg/7.5 mL solution Take 0.1 mg/kg by mouth every 12 (twelve) hours.      loperamide (IMODIUM) 2 mg capsule Take 2 mg by mouth 4 (four) times daily as needed for Diarrhea.      metoprolol succinate (TOPROL-XL) 50 MG 24 hr tablet Take 1 tablet (50 mg total) by mouth 2 (two) times daily. 180 tablet 1    pregabalin (LYRICA) 50 MG capsule Take 1 capsule (50 mg total) by mouth 2 (two) times daily. 60 capsule 2    tiZANidine (ZANAFLEX) 4 MG tablet Take 1 tablet (4 mg total) by mouth 3 (three) times daily as needed. 90 tablet 1    traMADoL (ULTRAM) 50 mg tablet Take 1 tablet (50 mg total) by mouth every 12 (twelve) hours as needed for Pain. 60 tablet 1    tretinoin (RETIN-A) 0.025 % cream Apply topically every evening. Pea sized amount to entire face. If irritation occurs, decrease use to every other night. 20 g 5    valsartan (DIOVAN) 320 MG tablet Take 1 tablet (320 mg total) by mouth once daily. 90 tablet 1    venlafaxine (EFFEXOR-XR) 37.5 MG 24 hr capsule Take 1 capsule (37.5 mg total) by mouth 2 (two) times a day. 60 capsule 3     Current Facility-Administered Medications on File Prior to Visit   Medication Dose Route Frequency Provider Last Rate Last Admin    0.9%  NaCl infusion  500 mL Intravenous Continuous Corky Argueta Jr., MD           ALLERGIES:  Review of patient's allergies indicates:    Allergen Reactions    Lactose     Azithromycin Nausea And Vomiting       REVIEW OF SYSTEMS:  ROS - per HPI    OBJECTIVE:    PHYSICAL EXAMINATION:   There were no vitals filed for this visit.    Neurologic Exam     Neurologic Exam  Motor exam showed full strength in the upper and lower extremities.  Sensation was intact to light touch including the 4th digit medial and laterally.  Reflexes were 2+ throughout with no Wilbur sign, no clonus, no Babinski  Scratch collapse testing was positive over the right cubital tunnel  Tinel's testing was negative, Phalen's testing negative     DIAGNOSTIC DATA:  I personally interpreted the following imaging:      I reviewed the cervical MRI scan as well as cervical x-rays.  She has some reversal of her lordosis.  Extension views shows that she has good range of motion and can reestablish her lordosis with extension.  The MRI shows C5-6 and C6-7 degenerative disc changes with broad-based bulging that encroaches upon the central cord without compression it and without significant neural foraminal stenosis.    EMG: essentially normal      ASSESMENT:  This is a 37 y.o. female with     Problem List Items Addressed This Visit    None     Visit Diagnoses     Other secondary kyphosis, cervical region    -  Primary    Neck pain        Ulnar neuropathy, unspecified laterality              PLAN:  To me, her clinical symptoms and positive scratch collapse testing over the right cubital tunnel suggest that she has ulnar neuropathy, despite the EMG not showing evidence of this.  I do not think she has any contribution of cervical radiculopathy at this time.  She has 2 levels at C6-7 and C5-6 showing degenerative change and disc bulging without cord compression and she has no signs of myelopathy.  I recommend she continue conservative treatment including healthy back program for neck pain, and I would like her to have an evaluation by a peripheral nerve specialist for possible ulnar  neuropathy on the right side.        Keny Reed MD, FAANS    Disclaimer: This note was partly generated using dictation software which may occasionally result in transcription errors.

## 2022-06-08 ENCOUNTER — CLINICAL SUPPORT (OUTPATIENT)
Dept: REHABILITATION | Facility: OTHER | Age: 37
End: 2022-06-08
Attending: PHYSICAL MEDICINE & REHABILITATION
Payer: COMMERCIAL

## 2022-06-08 DIAGNOSIS — M53.82 WEAKNESS OF NECK: ICD-10-CM

## 2022-06-08 DIAGNOSIS — R29.898 RIGHT ARM WEAKNESS: ICD-10-CM

## 2022-06-08 DIAGNOSIS — R29.898 DECREASED RANGE OF MOTION OF NECK: Primary | ICD-10-CM

## 2022-06-08 PROCEDURE — 97750 PHYSICAL PERFORMANCE TEST: CPT | Mod: 32

## 2022-06-08 NOTE — PROGRESS NOTES
"[facility]      Name: April Wendt Schamberger  [facility] Number: [id number]    Therapy Diagnosis:   Encounter Diagnoses   Name Primary?    Decreased range of motion of neck Yes    Weakness of neck     Right arm weakness      Physician: Bee Bolck, *    Visit Date: 2022    Physician Orders: PT Eval and Treat   Medical Diagnosis from Referral:   M79.18 (ICD-10-CM) - Myofascial muscle pain   M54.2 (ICD-10-CM) - Neck pain   M50.30 (ICD-10-CM) - DDD (degenerative disc disease), cervical         Evaluation Date: 2022  Authorization Period Expiration: 22  Plan of Care Expiration: 22  Reassessment Due: 22  Visit # / Visits authorized:   PROGRAM      Time In: 2:20 pm   Time Out: 310  Total Billable Time: 40 minutes     Precautions: Standard     Pattern of pain determined: 1REN       Subjective   April reports she is having the same amount of pain without relief  Patient reports tolerating previous visit well /c no c/o of excess discomfort  Patient reports their pain to be 6/10 on a 0-10 scale with 0 being no pain and 10 being the worst pain imaginable.  Pain Location: B cervical     Work and leisure: Social History: lives in house, 1 level, threshold to enter,  lives with their family(, 2 boys 4, 7 yr)  Occupation: cancer researcher   Leisure: working out, playing /c kids  Pts goals: "I want less pain so I can go back to doing the fun stuff in life"     Objective        Baseline Isometric Testing on Med X equipment:  Testing administered by PT  Date of testin22  ROM 33 - 102 deg   Max Peak Torque 166   Min Peak Torque 131   Flex/Ext Ratio 1.26   % below normative data 24%         Neck Disability Index Review 2022   Pain Intensity 2 4   Personal Care (Washing, Dressing, etc.) 2 2   Lifting 2 3   Reading 2 3   Headaches 2 2   Concentration 1 3   Work 2 3   Driving 2 2   Sleeping 3 4   Recreation 2 2   Score: 20 28 "         Horace Dynamometer     Eval: R 40#               L 58#    Treatment    Pt was instructed in and performed the following:     April received therapeutic exercises to develop/improved posture, cardiovascular endurance, muscular endurance, lumbar/cervical ROM, strength and muscular endurance for 50 minutes including the following exercises:   [facility] 6/8/2022   Visit Number 5   VAS Pain Rating 6   Time 5   Retraction in Sitting 10   Flexion -   Sidebending 3   Rotation -   Scapular Retraction -   Manual Therapy 5   Cervical Extension Seat Pad -   Seat Adjustment -   Top Dead Center -   Counterweight -   Cervical Flexion -   Cervical Extension -   Cervical Peak Torque -   Cervical Weight 141   Repetitions 20   Rating of Perceived Exertion 4   Ice - Z Lie (in min.) 5       Seated    Upper trap 3x 30 sec   Levator scap 3x 30 sec   Standing    Scap retracts x 20 cue for no B knee hyperextension   Wall slides x 10 cue for dec lumbar lordosis, x 10     Open books on wall x 10 B     Pt demo Ulnar nerve glide PRN     NP:  Cervical rotation /c towel assist x 5 5 sec hold           Peripheral muscle strengthening which included 1 set of 15-20 repetitions at a slow, controlled 10-13 second per rep pace focused on strengthening supporting musculature for improved body mechanics and functional mobility.  Pt and therapist focused on proper form during treatment to ensure optimal strengthening of each targeted muscle group.  Machines were utilized including torso rotation, leg extension, leg curl, chest press, upright row. Tricep extension, bicep curl, leg press, and hip abduction added visit 3    April received the following manual therapy techniques: Manual traction and Soft tissue Mobilization were applied to the: cervical region for 5 minutes.         Home Exercises Provided and Patient Education Provided   Home exercises include:     Cervical rotation /c towel assist  Wall slides   Scap retract   Ulnar nerve  glide PRN     Cardio program: 6/8/22  Lifting education date:TBD  Lumbar roll: not obtained as of 06/02/22    Education provided:       Written Home Exercises Provided: Patient instructed to cont prior HEP.  Exercises were reviewed and April was able to demonstrate them prior to the end of the session.  April demonstrated good  understanding of the education provided.     See EMR under Patient Instructions for exercises provided prior visit.      Assessment   Pt arrives today with 6/10 pain level.   Added sub occipital release and gentle traction today to decrease pain level. Pt able to achieve 20 reps at 141in/lbs with 4/10 exertion level.  Inc 5% next visit.   Patient is making fair progress towards established goals.  Pt will continue to benefit from skilled outpatient physical therapy to address the deficits stated in the impairment chart, provide pt/family education and to maximize pt's level of independence in the home and community environment.     Anticipated Barriers for therapy: none  Pt's spiritual, cultural and educational needs considered and pt agreeable to plan of care and goals as stated below:          GOALS: Pt is in agreement with the following goals.     Short term goals: 6 weeks or 10 visits   1.  Pt will demonstratte increased cervical ROM as measured by med ex by 6 degrees from initial test which results in improved  ROM of neck for ease with ADLs and driving  (approp and ongoing)  2. Pt will demonstrate independence with reducing or controlling symptoms with ther ex, movement, or position independently, able to reduce pain 1-2 points on pain scale using strategies taught in therapy  (approp and ongoing)  3. Pt will demonstrate increased MedX average isometric strength value by 15% with  when compared to the initial testing resulting in improved ability to perform bending, lifting, and carrying activities safely, confidently.  (approp and ongoing)           Long term goals: 10 weeks or 20  visits  1. Pt will demonstratte increased cervical ROM as measured by med ex by 12 degrees from initial test which results in functional ROM of neck for ease with ADLs and driving  (approp and ongoing)  2. Pt will demonstrate increased MedX average isometric strength value  by 20% from initial test to improve ability to lift and carry, and sustain good posture while performing ADL's  (approp and ongoing)  3.Pt will demonstrate reduced pain and improved functional outcomes as reported on the Neck Disability Index by reaching a score of 15 or less in order to demonstrate subjective improvement in pt's condition.    (approp and ongoing)  4. Pt will demonstrate independence with reducing or controlling symptoms with ther ex, movement, or position independently, able to reduce pain 2-4 points on pain scale using strategies taught in therapy  (approp and ongoing)  5. Pt will demonstrate independence with the HEP at discharge.   (approp and ongoing)  6. Pt will report no cervical discomfort when turning head for mirror checks while driving for inc safety.  (approp and ongoing)  7. Pt will report ability to sit /c family to complete puzzles /s inc in cervical discomfort for inc tolerance to recreational activities (approp and ongoing)  8. Pt will perform KENDRA dynamometer /c no more than 10# difference between R and L  for improved tolerance to house hold chores (approp and ongoing)      Plan   Continue with established Plan of Care towards established PT goals.

## 2022-06-13 ENCOUNTER — CLINICAL SUPPORT (OUTPATIENT)
Dept: REHABILITATION | Facility: OTHER | Age: 37
End: 2022-06-13
Attending: PHYSICAL MEDICINE & REHABILITATION
Payer: COMMERCIAL

## 2022-06-13 DIAGNOSIS — M53.82 WEAKNESS OF NECK: ICD-10-CM

## 2022-06-13 DIAGNOSIS — R51.9 NONINTRACTABLE HEADACHE, UNSPECIFIED CHRONICITY PATTERN, UNSPECIFIED HEADACHE TYPE: ICD-10-CM

## 2022-06-13 DIAGNOSIS — R29.898 DECREASED RANGE OF MOTION OF NECK: Primary | ICD-10-CM

## 2022-06-13 DIAGNOSIS — R29.898 RIGHT ARM WEAKNESS: ICD-10-CM

## 2022-06-13 DIAGNOSIS — F51.01 PRIMARY INSOMNIA: ICD-10-CM

## 2022-06-13 PROCEDURE — 97750 PHYSICAL PERFORMANCE TEST: CPT | Mod: 32

## 2022-06-13 RX ORDER — ESZOPICLONE 3 MG/1
3 TABLET, FILM COATED ORAL NIGHTLY PRN
Qty: 30 TABLET | Refills: 0 | Status: SHIPPED | OUTPATIENT
Start: 2022-06-13 | End: 2022-07-10 | Stop reason: SDUPTHER

## 2022-06-13 RX ORDER — BUTALBITAL, ACETAMINOPHEN AND CAFFEINE 50; 325; 40 MG/1; MG/1; MG/1
1 TABLET ORAL EVERY 6 HOURS PRN
Qty: 30 TABLET | Refills: 0 | Status: SHIPPED | OUTPATIENT
Start: 2022-06-13 | End: 2022-08-08 | Stop reason: SDUPTHER

## 2022-06-13 NOTE — TELEPHONE ENCOUNTER
No new care gaps identified.  [facility] Embedded Care Gaps. Reference number: [id number]. 6/13/2022   10:57:53 AM VICT

## 2022-06-13 NOTE — PROGRESS NOTES
"[facility]      Name: April Wendt Schamberger  [facility] Number: [id number]    Therapy Diagnosis:   Encounter Diagnoses   Name Primary?    Decreased range of motion of neck Yes    Weakness of neck     Right arm weakness      Physician: Bee Block, *    Visit Date: 2022    Physician Orders: PT Eval and Treat   Medical Diagnosis from Referral:   M79.18 (ICD-10-CM) - Myofascial muscle pain   M54.2 (ICD-10-CM) - Neck pain   M50.30 (ICD-10-CM) - DDD (degenerative disc disease), cervical         Evaluation Date: 2022  Authorization Period Expiration: 22  Plan of Care Expiration: 22  Reassessment Due: 22  Visit # / Visits authorized:   PROGRAM      Time In: 3:30 pm   Time Out: 4:15 pm  Total Billable Time: 45 minutes     Precautions: Standard     Pattern of pain determined: 1REN       Subjective   April reports cont B hand N/T when laying on back at night.  She reports using pillows to keep herself from rolling over and limiting bouts of hand N/T.  Pt report MD not convinced this is cervical tian and will be meeting MD for more investigation as to possible peripheral sx tian.     Pt report taking pain med this afternoon and believes this is contributing to slight dec in pain presentation.    Pt report obtaining lumbar roll and using in car.     Patient reports tolerating previous visit well /c no c/o of excess discomfort  Patient reports their pain to be 5/10 on a 0-10 scale with 0 being no pain and 10 being the worst pain imaginable.  Pain Location: B cervical       Occupation: cancer researcher   Leisure: working out, playing /c kids  Pts goals: "I want less pain so I can go back to doing the fun stuff in life"     Objective        Baseline Isometric Testing on Med X equipment:  Testing administered by PT  Date of testin22  ROM 33 - 102 deg   Max Peak Torque 166   Min Peak Torque 131   Flex/Ext Ratio 1.26   % below normative " data 24%         Neck Disability Index Review 6/2/2022 5/16/2022   Pain Intensity 2 4   Personal Care (Washing, Dressing, etc.) 2 2   Lifting 2 3   Reading 2 3   Headaches 2 2   Concentration 1 3   Work 2 3   Driving 2 2   Sleeping 3 4   Recreation 2 2   Score: 20 28         Horace Dynamometer     Eval:  R 40#              L 58#   06/13/22:  R 45#  L 58#    Treatment    Pt was instructed in and performed the following:     April received therapeutic exercises to develop/improved posture, cardiovascular endurance, muscular endurance, lumbar/cervical ROM, strength and muscular endurance for 45 minutes including the following exercises:       Thoracic BASELINE rotation, neural sx,  squeeze measurements    Thoracic rotation R   Pt OP x 20    Clinician OP x 10   Afterward inc R rotation from min to min-mod, dec pain at end range     Seated    Upper trap 3x 30 sec   Levator scap 3x 30 sec     Cervical BASELINE rotation, neural sx,  Cervical retraction x 20, /c ext x 20   Afterward inc rotation R rotation min to mod, dec pain at end range    NP:  Standing    Scap retracts x 20 cue for no B knee hyperextension   Wall slides x 10 cue for dec lumbar lordosis, x 10     Open books on wall x 10 B   Cervical rotation /c towel assist x 5 5 sec hold      [facility] 6/13/2022   Visit Number 6   VAS Pain Rating 5   Time -   Retraction in Sitting 20   Retraction with Extension 20   Flexion -   Sidebending 6   Rotation -   Scapular Retraction -   Manual Therapy -   Cervical Extension Seat Pad -   Seat Adjustment -   Top Dead Center -   Counterweight -   Cervical Flexion 105   Cervical Extension 27   Cervical Peak Torque -   Cervical Weight 147   Repetitions 20   Rating of Perceived Exertion 4   Ice - Z Lie (in min.) 5       Peripheral muscle strengthening which included 1 set of 15-20 repetitions at a slow, controlled 10-13 second per rep pace focused on strengthening supporting musculature for improved body mechanics and  functional mobility.  Pt and therapist focused on proper form during treatment to ensure optimal strengthening of each targeted muscle group.  Machines were utilized including torso rotation, leg extension, leg curl, chest press, upright row. Tricep extension, bicep curl, leg press, and hip abduction added visit 3    April received the following manual therapy techniques: Manual traction and Soft tissue Mobilization were applied to the: cervical region for 00 minutes.         Home Exercises Provided and Patient Education Provided   Home exercises include:     UT stretch  Wall slides   Scap retract /c Blue TB      Cervical rotation /c towel assist  Ulnar nerve glide PRN     Cardio program: 6/8/22  Lifting education date:TBD  Lumbar roll: obtained uses in chair at home    Education provided:       Written Home Exercises Provided: Patient instructed to cont prior HEP.  Exercises were reviewed and April was able to demonstrate them prior to the end of the session.  April demonstrated good  understanding of the education provided.     See EMR under Patient Instructions for exercises provided prior visit.      Assessment     Pt cont to demo deficit /c R side thoracic and ext rotation.  Tx today explore possible derangement in thoracic region and aimed to achieve improve posture.   Pt demo improved range after repeated rotation to R therefore open books reinforced as HEP.  Returned to cervical retraction and progressed to /c ext.  Pt note slight dec sx /c R cervical rotation, therefore HEP include exercises in sagittal plane for improved ROM.  Cervical MedX weight increased per pt tolerance last visit.   Also, considering inc range noted /c repeated cervical retraction and ext, inc flex and ext range for mobility challenge.  Today pt perform 20 reps at 4 RPE indicating improved strength and mobility.  Progress per HB protocol.    Although pt cont note B hand sx, pt also prioritize cervical sx and report taking daily pain  med to cope.  Therefore, recommend tx cont to focus on cervico-thoracic mobility and strength rather than directly pursuing possible peripheral neural sx.    HEP worksheet issued for exercise consolidation and to promote inc participation          Patient is making fair progress towards established goals.  Pt will continue to benefit from skilled outpatient physical therapy to address the deficits stated in the impairment chart, provide pt/family education and to maximize pt's level of independence in the home and community environment.     Anticipated Barriers for therapy: none  Pt's spiritual, cultural and educational needs considered and pt agreeable to plan of care and goals as stated below:          GOALS: Pt is in agreement with the following goals.     Short term goals: 6 weeks or 10 visits   1.  Pt will demonstratte increased cervical ROM as measured by med ex by 6 degrees from initial test which results in improved  ROM of neck for ease with ADLs and driving  (approp and ongoing)  2. Pt will demonstrate independence with reducing or controlling symptoms with ther ex, movement, or position independently, able to reduce pain 1-2 points on pain scale using strategies taught in therapy  (approp and ongoing)  3. Pt will demonstrate increased MedX average isometric strength value by 15% with  when compared to the initial testing resulting in improved ability to perform bending, lifting, and carrying activities safely, confidently.  (approp and ongoing)           Long term goals: 10 weeks or 20 visits  1. Pt will demonstratte increased cervical ROM as measured by med ex by 12 degrees from initial test which results in functional ROM of neck for ease with ADLs and driving  (approp and ongoing)  2. Pt will demonstrate increased MedX average isometric strength value  by 20% from initial test to improve ability to lift and carry, and sustain good posture while performing ADL's  (approp and ongoing)  3.Pt will  demonstrate reduced pain and improved functional outcomes as reported on the Neck Disability Index by reaching a score of 15 or less in order to demonstrate subjective improvement in pt's condition.    (approp and ongoing)  4. Pt will demonstrate independence with reducing or controlling symptoms with ther ex, movement, or position independently, able to reduce pain 2-4 points on pain scale using strategies taught in therapy  (approp and ongoing)  5. Pt will demonstrate independence with the HEP at discharge.   (approp and ongoing)  6. Pt will report no cervical discomfort when turning head for mirror checks while driving for inc safety.  (approp and ongoing)  7. Pt will report ability to sit /c family to complete puzzles /s inc in cervical discomfort for inc tolerance to recreational activities (approp and ongoing)  8. Pt will perform KENDRA dynamometer /c no more than 10# difference between R and L  for improved tolerance to house hold chores (approp and ongoing)      Plan   Continue with established Plan of Care towards established PT goals.

## 2022-06-20 ENCOUNTER — OFFICE VISIT (OUTPATIENT)
Dept: ORTHOPEDICS | Facility: CLINIC | Age: 37
End: 2022-06-20
Payer: COMMERCIAL

## 2022-06-20 VITALS — WEIGHT: 189 LBS | HEIGHT: 68 IN | BODY MASS INDEX: 28.64 KG/M2

## 2022-06-20 DIAGNOSIS — G56.21 CUBITAL TUNNEL SYNDROME ON RIGHT: ICD-10-CM

## 2022-06-20 PROCEDURE — 99999 PR PBB SHADOW E&M-EST. PATIENT-LVL III: ICD-10-PCS | Mod: PBBFAC,,, | Performed by: ORTHOPAEDIC SURGERY

## 2022-06-20 PROCEDURE — 1159F MED LIST DOCD IN RCRD: CPT | Mod: CPTII,S$GLB,, | Performed by: ORTHOPAEDIC SURGERY

## 2022-06-20 PROCEDURE — 99204 PR OFFICE/OUTPT VISIT, NEW, LEVL IV, 45-59 MIN: ICD-10-PCS | Mod: S$GLB,,, | Performed by: ORTHOPAEDIC SURGERY

## 2022-06-20 PROCEDURE — 3008F PR BODY MASS INDEX (BMI) DOCUMENTED: ICD-10-PCS | Mod: CPTII,S$GLB,, | Performed by: ORTHOPAEDIC SURGERY

## 2022-06-20 PROCEDURE — 99999 PR PBB SHADOW E&M-EST. PATIENT-LVL III: CPT | Mod: PBBFAC,,, | Performed by: ORTHOPAEDIC SURGERY

## 2022-06-20 PROCEDURE — 4010F ACE/ARB THERAPY RXD/TAKEN: CPT | Mod: CPTII,S$GLB,, | Performed by: ORTHOPAEDIC SURGERY

## 2022-06-20 PROCEDURE — 1159F PR MEDICATION LIST DOCUMENTED IN MEDICAL RECORD: ICD-10-PCS | Mod: CPTII,S$GLB,, | Performed by: ORTHOPAEDIC SURGERY

## 2022-06-20 PROCEDURE — 3008F BODY MASS INDEX DOCD: CPT | Mod: CPTII,S$GLB,, | Performed by: ORTHOPAEDIC SURGERY

## 2022-06-20 PROCEDURE — 4010F PR ACE/ARB THEARPY RXD/TAKEN: ICD-10-PCS | Mod: CPTII,S$GLB,, | Performed by: ORTHOPAEDIC SURGERY

## 2022-06-20 PROCEDURE — 99204 OFFICE O/P NEW MOD 45 MIN: CPT | Mod: S$GLB,,, | Performed by: ORTHOPAEDIC SURGERY

## 2022-06-20 RX ORDER — CEFAZOLIN SODIUM 2 G/50ML
2 SOLUTION INTRAVENOUS
Status: CANCELLED | OUTPATIENT
Start: 2022-06-20

## 2022-06-20 NOTE — H&P (VIEW-ONLY)
CC:  Right cubital tunnel syndrome        HPI:  April Wendt Schamberger is a very pleasant 37 y.o. female with ongoing symptoms right hand for the past 2 years  She has been having problems with pain in the right elbow and right hand  She also has associated numbness and tingling in the right ring and small finger  These symptoms usually worse at night during the day with certain activities  She has tried activity modification as well as sleeping with wrist splints at night without relief  Previous nerve conduction study was normal in the right arm  She is referred by Dr. Reed who was evaluating her for cervical disc disease  He feels like some of her symptoms may be coming from the elbow with a double crush phenomenon  Even though the nerve test was negative he thinks she might be a candidate for ulnar nerve release right elbow         PAST MEDICAL HISTORY:   Past Medical History:   Diagnosis Date    Abnormal Pap smear of cervix 2000    Cryo Done    ADD (attention deficit disorder)     Breast disorder     Breast Cysts    Esophageal reflux     Fibroids     Hypertension     IBS (irritable bowel syndrome)     Insomnia     Kidney stones     Mastocytosis     Relies on partner vasectomy for contraception     Upper GI bleed      PAST SURGICAL HISTORY:   Past Surgical History:   Procedure Laterality Date    COLONOSCOPY N/A 4/28/2017    Procedure: COLONOSCOPY;  Surgeon: Bren Larkin MD;  Location: [facility] ENDO;  Service: Endoscopy;  Laterality: N/A;    COLONOSCOPY N/A 1/26/2022    Procedure: COLONOSCOPY;  Surgeon: Inocente Roe MD;  Location: [facility] ENDO;  Service: Endoscopy;  Laterality: N/A;    EPIDURAL STEROID INJECTION INTO CERVICAL SPINE N/A 12/1/2020    Procedure: Injection-steroid-epidural-cervical--C7-T1;  Surgeon: Corky Argueta Jr., MD;  Location: [facility] PAIN MGT;  Service: Pain Management;  Laterality: N/A;    EPIDURAL STEROID INJECTION INTO CERVICAL SPINE N/A 12/2/2021    Procedure: Cervical  Epidural Steroid Injection C7-T1 (No Sedation);  Surgeon: Corky Argueta Jr., MD;  Location: [facility] PAIN MGT;  Service: Pain Management;  Laterality: N/A;    ESOPHAGOGASTRODUODENOSCOPY  2012    LASIK Bilateral 2005    REFRACTIVE SURGERY      TONSILLECTOMY, ADENOIDECTOMY      VAGINAL DELIVERY      x 2     FAMILY HISTORY:   Family History   Problem Relation Age of Onset    Breast cancer Mother 47        with Metastasis    Hypertension Mother     Prostate cancer Father     Hypertension Father     Diabetes Father     Deep vein thrombosis Father     Pancreatic cancer Maternal Grandfather     Breast cancer Paternal Grandmother 80    Diabetes type II Paternal Grandfather     Heart attack Maternal Grandmother     Cancer Cousin 31    Cancer Cousin         Thurman Syndrome    Cancer Cousin         Thurman Syndrome    Ovarian cancer Maternal Aunt     Lymphoma Neg Hx     Tuberculosis Neg Hx     Celiac disease Neg Hx     Cirrhosis Neg Hx     Colon polyps Neg Hx     Crohn's disease Neg Hx     Cystic fibrosis Neg Hx     Esophageal cancer Neg Hx     Hemochromatosis Neg Hx     Inflammatory bowel disease Neg Hx     Irritable bowel syndrome Neg Hx     Liver cancer Neg Hx     Liver disease Neg Hx     Rectal cancer Neg Hx     Stomach cancer Neg Hx     Ulcerative colitis Neg Hx     Donavon's disease Neg Hx     Amblyopia Neg Hx     Blindness Neg Hx     Cataracts Neg Hx     Glaucoma Neg Hx     Macular degeneration Neg Hx     Retinal detachment Neg Hx     Strabismus Neg Hx      SOCIAL HISTORY:   Social History     Socioeconomic History    Marital status:     Number of children: 1   Tobacco Use    Smoking status: Former Smoker     Quit date: 2014     Years since quittin.2    Smokeless tobacco: Never Used   Substance and Sexual Activity    Alcohol use: No     Alcohol/week: 0.0 standard drinks     Comment: breastfeeding     Drug use: No    Sexual activity: Yes     Partners: Male      Birth control/protection: Partner-Vasectomy     Comment: : Boris   Social History Narrative    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     Social Determinants of Health     Financial Resource Strain: Low Risk     Difficulty of Paying Living Expenses: Not very hard   Food Insecurity: No Food Insecurity    Worried About Running Out of Food in the Last Year: Never true    Ran Out of Food in the Last Year: Never true   Transportation Needs: No Transportation Needs    Lack of Transportation (Medical): No    Lack of Transportation (Non-Medical): No   Physical Activity: Sufficiently Active    Days of Exercise per Week: 5 days    Minutes of Exercise per Session: 30 min   Stress: Stress Concern Present    Feeling of Stress : Very much   Social Connections: Unknown    Frequency of Communication with Friends and Family: More than three times a week    Frequency of Social Gatherings with Friends and Family: More than three times a week    Active Member of Clubs or Organizations: Yes    Attends Club or Organization Meetings: More than 4 times per year    Marital Status:    Housing Stability: Low Risk     Unable to Pay for Housing in the Last Year: No    Number of Places Lived in the Last Year: 1    Unstable Housing in the Last Year: No       MEDICATIONS:   Current Outpatient Medications:     acetaminophen (TYLENOL) 500 MG tablet, Take 2 tablets (1,000 mg total) by mouth 3 (three) times daily as needed for Pain., Disp: , Rfl: 0    amitriptyline (ELAVIL) 25 MG tablet, Take 1 tablet (25 mg total) by mouth every evening., Disp: 90 tablet, Rfl: 0    butalbital-acetaminophen-caffeine -40 mg (FIORICET, ESGIC) -40 mg per tablet, Take 1 tablet by mouth every 6 (six) hours as needed for Headaches., Disp: 30 tablet, Rfl: 0    diphenoxylate-atropine 2.5-0.025 mg (LOMOTIL) 2.5-0.025 mg per tablet, Take 1 tablet by mouth 4 (four) times daily as needed for Diarrhea (diarrhea)., Disp: 360 tablet,  Rfl: 0    eszopiclone (LUNESTA) 3 mg Tab, Take 1 tablet (3 mg total) by mouth nightly as needed (insomnia)., Disp: 30 tablet, Rfl: 0    fluconazole (DIFLUCAN) 150 MG Tab, Take 1 tablet as needed for yeast infection, Disp: 30 tablet, Rfl: 1    [START ON 7/12/2022] lisdexamfetamine (VYVANSE) 60 MG capsule, Take 1 capsule (60 mg total) by mouth every morning., Disp: 30 capsule, Rfl: 0    lisdexamfetamine (VYVANSE) 60 MG capsule, Take 1 capsule (60 mg total) by mouth every morning., Disp: 30 capsule, Rfl: 0    loperamide (IMODIUM) 1 mg/7.5 mL solution, Take 0.1 mg/kg by mouth every 12 (twelve) hours., Disp: , Rfl:     loperamide (IMODIUM) 2 mg capsule, Take 2 mg by mouth 4 (four) times daily as needed for Diarrhea., Disp: , Rfl:     metoprolol succinate (TOPROL-XL) 50 MG 24 hr tablet, Take 1 tablet (50 mg total) by mouth 2 (two) times daily., Disp: 180 tablet, Rfl: 1    pregabalin (LYRICA) 50 MG capsule, Take 1 capsule (50 mg total) by mouth 2 (two) times daily., Disp: 60 capsule, Rfl: 2    tiZANidine (ZANAFLEX) 4 MG tablet, Take 1 tablet (4 mg total) by mouth 3 (three) times daily as needed., Disp: 90 tablet, Rfl: 1    traMADoL (ULTRAM) 50 mg tablet, Take 1 tablet (50 mg total) by mouth every 12 (twelve) hours as needed for Pain., Disp: 60 tablet, Rfl: 1    tretinoin (RETIN-A) 0.025 % cream, Apply topically every evening. Pea sized amount to entire face. If irritation occurs, decrease use to every other night., Disp: 20 g, Rfl: 5    valsartan (DIOVAN) 320 MG tablet, Take 1 tablet (320 mg total) by mouth once daily., Disp: 90 tablet, Rfl: 1    venlafaxine (EFFEXOR-XR) 37.5 MG 24 hr capsule, Take 1 capsule (37.5 mg total) by mouth 2 (two) times a day., Disp: 60 capsule, Rfl: 3    doxycycline monohydrate 100 mg Tab, Take 1 tablet by mouth everyday with food. Avoid lying down for 1 hour after taking. Avoid the sun. (Patient not taking: Reported on 6/20/2022), Disp: 30 tablet, Rfl: 2  No current  "facility-administered medications for this visit.    Facility-Administered Medications Ordered in Other Visits:     0.9%  NaCl infusion, 500 mL, Intravenous, Continuous, Corky Argueta Jr., MD  ALLERGIES:   Review of patient's allergies indicates:   Allergen Reactions    Lactose     Azithromycin Nausea And Vomiting       Review of Systems:  Constitutional: no fever or chills  ENT: no nasal congestion or sore throat  Respiratory: no cough or shortness of breath  Cardiovascular: no chest pain or palpitations  Gastrointestinal: no nausea or vomiting, PUD, GERD, NSAID intolerance  Genitourinary: no hematuria or dysuria  Integument/Breast: no rash or pruritis  Hematologic/Lymphatic: no easy bruising or lymphadenopathy  Musculoskeletal: see HPI  Neurological: no seizures or tremors  Behavioral/Psych: no auditory or visual hallucinations      Physical Exam   Vitals:    06/20/22 0901   Weight: 85.7 kg (189 lb)   Height: 5' 8" (1.727 m)   PainSc:   6       Constitutional: Oriented to person, place, and time. Appears well-developed and well-nourished.   HENT:   Head: Normocephalic and atraumatic.   Nose: Nose normal.   Eyes: No scleral icterus.   Neck: Normal range of motion.   Cardiovascular: Normal rate and regular rhythm.    Pulses:       Radial pulses are 2+ on the right side, and 2+ on the left side.   Pulmonary/Chest: Effort normal and breath sounds normal.   Abdominal: Soft.   Neurological: Alert and oriented to person, place, and time.   Skin: Skin is warm.   Psychiatric: Normal mood and affect.     MUSCULOSKELETAL UPPER EXTREMITY:  Examination right elbow there is some mild swelling  Positive Tinel sign at the elbow over the cubital tunnel  Range of motion right elbow is full no instability  She does have some numbness with elbow flexion test to the right arm  Distally no atrophy of the hand no weakness of the intrinsic muscles  Sensation intact              Diagnostic Studies:  None        Assessment:  Right " "cubital tunnel syndrome with negative nerve test    Plan:  I explained this problems the patient  This could be a double crush phenomenon involving the cervical spine in the elbow  In a case like this it may be reasonable to proceed with ulnar nerve release even with a normal nerve test in the hopes of relieving her symptoms  She is in agreement  She would like to set up surgery for cubital tunnel release right elbow  The risks and benefits explained as well as the possibility of her symptoms may not completely resolved because of the superimposed cervical issues she understands      The risks and benefits of surgery were discussed with the patient today and they understand.  The consent was signed in the office for surgery.      Saqib Smith MD (Jay)  [facility]  Orthopedic Upper Extremity Surgery      "

## 2022-06-20 NOTE — PROGRESS NOTES
CC:  Right cubital tunnel syndrome        HPI:  April Wendt Schamberger is a very pleasant 37 y.o. female with ongoing symptoms right hand for the past 2 years  She has been having problems with pain in the right elbow and right hand  She also has associated numbness and tingling in the right ring and small finger  These symptoms usually worse at night during the day with certain activities  She has tried activity modification as well as sleeping with wrist splints at night without relief  Previous nerve conduction study was normal in the right arm  She is referred by Dr. Reed who was evaluating her for cervical disc disease  He feels like some of her symptoms may be coming from the elbow with a double crush phenomenon  Even though the nerve test was negative he thinks she might be a candidate for ulnar nerve release right elbow         PAST MEDICAL HISTORY:   Past Medical History:   Diagnosis Date    Abnormal Pap smear of cervix 2000    Cryo Done    ADD (attention deficit disorder)     Breast disorder     Breast Cysts    Esophageal reflux     Fibroids     Hypertension     IBS (irritable bowel syndrome)     Insomnia     Kidney stones     Mastocytosis     Relies on partner vasectomy for contraception     Upper GI bleed      PAST SURGICAL HISTORY:   Past Surgical History:   Procedure Laterality Date    COLONOSCOPY N/A 4/28/2017    Procedure: COLONOSCOPY;  Surgeon: Bren Larkin MD;  Location: [facility] ENDO;  Service: Endoscopy;  Laterality: N/A;    COLONOSCOPY N/A 1/26/2022    Procedure: COLONOSCOPY;  Surgeon: Inocente Roe MD;  Location: [facility] ENDO;  Service: Endoscopy;  Laterality: N/A;    EPIDURAL STEROID INJECTION INTO CERVICAL SPINE N/A 12/1/2020    Procedure: Injection-steroid-epidural-cervical--C7-T1;  Surgeon: Corky Argueta Jr., MD;  Location: [facility] PAIN MGT;  Service: Pain Management;  Laterality: N/A;    EPIDURAL STEROID INJECTION INTO CERVICAL SPINE N/A 12/2/2021    Procedure: Cervical  Epidural Steroid Injection C7-T1 (No Sedation);  Surgeon: Corky Argueta Jr., MD;  Location: [facility] PAIN MGT;  Service: Pain Management;  Laterality: N/A;    ESOPHAGOGASTRODUODENOSCOPY  2012    LASIK Bilateral 2005    REFRACTIVE SURGERY      TONSILLECTOMY, ADENOIDECTOMY      VAGINAL DELIVERY      x 2     FAMILY HISTORY:   Family History   Problem Relation Age of Onset    Breast cancer Mother 47        with Metastasis    Hypertension Mother     Prostate cancer Father     Hypertension Father     Diabetes Father     Deep vein thrombosis Father     Pancreatic cancer Maternal Grandfather     Breast cancer Paternal Grandmother 80    Diabetes type II Paternal Grandfather     Heart attack Maternal Grandmother     Cancer Cousin 31    Cancer Cousin         Thurman Syndrome    Cancer Cousin         Thurman Syndrome    Ovarian cancer Maternal Aunt     Lymphoma Neg Hx     Tuberculosis Neg Hx     Celiac disease Neg Hx     Cirrhosis Neg Hx     Colon polyps Neg Hx     Crohn's disease Neg Hx     Cystic fibrosis Neg Hx     Esophageal cancer Neg Hx     Hemochromatosis Neg Hx     Inflammatory bowel disease Neg Hx     Irritable bowel syndrome Neg Hx     Liver cancer Neg Hx     Liver disease Neg Hx     Rectal cancer Neg Hx     Stomach cancer Neg Hx     Ulcerative colitis Neg Hx     Donavon's disease Neg Hx     Amblyopia Neg Hx     Blindness Neg Hx     Cataracts Neg Hx     Glaucoma Neg Hx     Macular degeneration Neg Hx     Retinal detachment Neg Hx     Strabismus Neg Hx      SOCIAL HISTORY:   Social History     Socioeconomic History    Marital status:     Number of children: 1   Tobacco Use    Smoking status: Former Smoker     Quit date: 2014     Years since quittin.2    Smokeless tobacco: Never Used   Substance and Sexual Activity    Alcohol use: No     Alcohol/week: 0.0 standard drinks     Comment: breastfeeding     Drug use: No    Sexual activity: Yes     Partners: Male      Birth control/protection: Partner-Vasectomy     Comment: : Boris   Social History Narrative    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     Social Determinants of Health     Financial Resource Strain: Low Risk     Difficulty of Paying Living Expenses: Not very hard   Food Insecurity: No Food Insecurity    Worried About Running Out of Food in the Last Year: Never true    Ran Out of Food in the Last Year: Never true   Transportation Needs: No Transportation Needs    Lack of Transportation (Medical): No    Lack of Transportation (Non-Medical): No   Physical Activity: Sufficiently Active    Days of Exercise per Week: 5 days    Minutes of Exercise per Session: 30 min   Stress: Stress Concern Present    Feeling of Stress : Very much   Social Connections: Unknown    Frequency of Communication with Friends and Family: More than three times a week    Frequency of Social Gatherings with Friends and Family: More than three times a week    Active Member of Clubs or Organizations: Yes    Attends Club or Organization Meetings: More than 4 times per year    Marital Status:    Housing Stability: Low Risk     Unable to Pay for Housing in the Last Year: No    Number of Places Lived in the Last Year: 1    Unstable Housing in the Last Year: No       MEDICATIONS:   Current Outpatient Medications:     acetaminophen (TYLENOL) 500 MG tablet, Take 2 tablets (1,000 mg total) by mouth 3 (three) times daily as needed for Pain., Disp: , Rfl: 0    amitriptyline (ELAVIL) 25 MG tablet, Take 1 tablet (25 mg total) by mouth every evening., Disp: 90 tablet, Rfl: 0    butalbital-acetaminophen-caffeine -40 mg (FIORICET, ESGIC) -40 mg per tablet, Take 1 tablet by mouth every 6 (six) hours as needed for Headaches., Disp: 30 tablet, Rfl: 0    diphenoxylate-atropine 2.5-0.025 mg (LOMOTIL) 2.5-0.025 mg per tablet, Take 1 tablet by mouth 4 (four) times daily as needed for Diarrhea (diarrhea)., Disp: 360 tablet,  Rfl: 0    eszopiclone (LUNESTA) 3 mg Tab, Take 1 tablet (3 mg total) by mouth nightly as needed (insomnia)., Disp: 30 tablet, Rfl: 0    fluconazole (DIFLUCAN) 150 MG Tab, Take 1 tablet as needed for yeast infection, Disp: 30 tablet, Rfl: 1    [START ON 7/12/2022] lisdexamfetamine (VYVANSE) 60 MG capsule, Take 1 capsule (60 mg total) by mouth every morning., Disp: 30 capsule, Rfl: 0    lisdexamfetamine (VYVANSE) 60 MG capsule, Take 1 capsule (60 mg total) by mouth every morning., Disp: 30 capsule, Rfl: 0    loperamide (IMODIUM) 1 mg/7.5 mL solution, Take 0.1 mg/kg by mouth every 12 (twelve) hours., Disp: , Rfl:     loperamide (IMODIUM) 2 mg capsule, Take 2 mg by mouth 4 (four) times daily as needed for Diarrhea., Disp: , Rfl:     metoprolol succinate (TOPROL-XL) 50 MG 24 hr tablet, Take 1 tablet (50 mg total) by mouth 2 (two) times daily., Disp: 180 tablet, Rfl: 1    pregabalin (LYRICA) 50 MG capsule, Take 1 capsule (50 mg total) by mouth 2 (two) times daily., Disp: 60 capsule, Rfl: 2    tiZANidine (ZANAFLEX) 4 MG tablet, Take 1 tablet (4 mg total) by mouth 3 (three) times daily as needed., Disp: 90 tablet, Rfl: 1    traMADoL (ULTRAM) 50 mg tablet, Take 1 tablet (50 mg total) by mouth every 12 (twelve) hours as needed for Pain., Disp: 60 tablet, Rfl: 1    tretinoin (RETIN-A) 0.025 % cream, Apply topically every evening. Pea sized amount to entire face. If irritation occurs, decrease use to every other night., Disp: 20 g, Rfl: 5    valsartan (DIOVAN) 320 MG tablet, Take 1 tablet (320 mg total) by mouth once daily., Disp: 90 tablet, Rfl: 1    venlafaxine (EFFEXOR-XR) 37.5 MG 24 hr capsule, Take 1 capsule (37.5 mg total) by mouth 2 (two) times a day., Disp: 60 capsule, Rfl: 3    doxycycline monohydrate 100 mg Tab, Take 1 tablet by mouth everyday with food. Avoid lying down for 1 hour after taking. Avoid the sun. (Patient not taking: Reported on 6/20/2022), Disp: 30 tablet, Rfl: 2  No current  "facility-administered medications for this visit.    Facility-Administered Medications Ordered in Other Visits:     0.9%  NaCl infusion, 500 mL, Intravenous, Continuous, Corky Argueta Jr., MD  ALLERGIES:   Review of patient's allergies indicates:   Allergen Reactions    Lactose     Azithromycin Nausea And Vomiting       Review of Systems:  Constitutional: no fever or chills  ENT: no nasal congestion or sore throat  Respiratory: no cough or shortness of breath  Cardiovascular: no chest pain or palpitations  Gastrointestinal: no nausea or vomiting, PUD, GERD, NSAID intolerance  Genitourinary: no hematuria or dysuria  Integument/Breast: no rash or pruritis  Hematologic/Lymphatic: no easy bruising or lymphadenopathy  Musculoskeletal: see HPI  Neurological: no seizures or tremors  Behavioral/Psych: no auditory or visual hallucinations      Physical Exam   Vitals:    06/20/22 0901   Weight: 85.7 kg (189 lb)   Height: 5' 8" (1.727 m)   PainSc:   6       Constitutional: Oriented to person, place, and time. Appears well-developed and well-nourished.   HENT:   Head: Normocephalic and atraumatic.   Nose: Nose normal.   Eyes: No scleral icterus.   Neck: Normal range of motion.   Cardiovascular: Normal rate and regular rhythm.    Pulses:       Radial pulses are 2+ on the right side, and 2+ on the left side.   Pulmonary/Chest: Effort normal and breath sounds normal.   Abdominal: Soft.   Neurological: Alert and oriented to person, place, and time.   Skin: Skin is warm.   Psychiatric: Normal mood and affect.     MUSCULOSKELETAL UPPER EXTREMITY:  Examination right elbow there is some mild swelling  Positive Tinel sign at the elbow over the cubital tunnel  Range of motion right elbow is full no instability  She does have some numbness with elbow flexion test to the right arm  Distally no atrophy of the hand no weakness of the intrinsic muscles  Sensation intact              Diagnostic Studies:  None        Assessment:  Right " "cubital tunnel syndrome with negative nerve test    Plan:  I explained this problems the patient  This could be a double crush phenomenon involving the cervical spine in the elbow  In a case like this it may be reasonable to proceed with ulnar nerve release even with a normal nerve test in the hopes of relieving her symptoms  She is in agreement  She would like to set up surgery for cubital tunnel release right elbow  The risks and benefits explained as well as the possibility of her symptoms may not completely resolved because of the superimposed cervical issues she understands      The risks and benefits of surgery were discussed with the patient today and they understand.  The consent was signed in the office for surgery.      Saqib Smith MD (Jay)  [facility]  Orthopedic Upper Extremity Surgery      "

## 2022-06-22 ENCOUNTER — CLINICAL SUPPORT (OUTPATIENT)
Dept: REHABILITATION | Facility: OTHER | Age: 37
End: 2022-06-22
Attending: PHYSICAL MEDICINE & REHABILITATION
Payer: COMMERCIAL

## 2022-06-22 DIAGNOSIS — M53.82 WEAKNESS OF NECK: ICD-10-CM

## 2022-06-22 DIAGNOSIS — R29.898 RIGHT ARM WEAKNESS: ICD-10-CM

## 2022-06-22 DIAGNOSIS — R29.898 DECREASED RANGE OF MOTION OF NECK: Primary | ICD-10-CM

## 2022-06-22 PROCEDURE — 97750 PHYSICAL PERFORMANCE TEST: CPT | Mod: 32

## 2022-06-22 NOTE — PROGRESS NOTES
"[facility]      Name: April Wendt Schamberger  Clinic Number: [id number]    Therapy Diagnosis:   Encounter Diagnoses   Name Primary?    Decreased range of motion of neck Yes    Weakness of neck     Right arm weakness      Physician: Mata Valencia MD    Visit Date: 2022    Physician Orders: PT Eval and Treat   Medical Diagnosis from Referral:   M79.18 (ICD-10-CM) - Myofascial muscle pain   M54.2 (ICD-10-CM) - Neck pain   M50.30 (ICD-10-CM) - DDD (degenerative disc disease), cervical         Evaluation Date: 2022  Authorization Period Expiration: 22  Plan of Care Expiration: 22  Reassessment Due: 22  Visit # / Visits authorized:   PROGRAM      Time In: 2 pm   Time Out: 245 pm  Total Billable Time: 45 minutes     Precautions: Standard     Pattern of pain determined: 1REN       Subjective   April reports feeling a little better today. She is going to get an ulnar release in a few weeks and hopes that will help with the N/T    Patient reports tolerating previous visit well /c no c/o of excess discomfort  Patient reports their pain to be 5/10 on a 0-10 scale with 0 being no pain and 10 being the worst pain imaginable.  Pain Location: B cervical       Occupation: cancer researcher   Leisure: working out, playing /c kids  Pts goals: "I want less pain so I can go back to doing the fun stuff in life"     Objective        Baseline Isometric Testing on Med X equipment:  Testing administered by PT  Date of testin22  ROM 33 - 102 deg   Max Peak Torque 166   Min Peak Torque 131   Flex/Ext Ratio 1.26   % below normative data 24%         Neck Disability Index Review 2022   Pain Intensity 2 4   Personal Care (Washing, Dressing, etc.) 2 2   Lifting 2 3   Reading 2 3   Headaches 2 2   Concentration 1 3   Work 2 3   Driving 2 2   Sleeping 3 4   Recreation 2 2   Score: 20 28         Horace Dynamometer     Eval:  R 40#              L " 58#   06/13/22:  R 45#  L 58#    Treatment    Pt was instructed in and performed the following:     April received therapeutic exercises to develop/improved posture, cardiovascular endurance, muscular endurance, lumbar/cervical ROM, strength and muscular endurance for 45 minutes including the following exercises:       Thoracic rotation R   Pt OP x 20    Clinician OP x 10     Seated    Upper trap 3x 30 sec   Levator scap 3x 30 sec     Cervical BASELINE rotation, neural sx,  Cervical retraction x 20, /c ext x 20   Afterward inc rotation R rotation min to mod, dec pain at end range  Open books on mat with bent arms x 15    NP:  Standing    Scap retracts x 20 cue for no B knee hyperextension   Wall slides x 10 cue for dec lumbar lordosis, x 10     Open books on wall x 10 B   Cervical rotation /c towel assist x 5 5 sec hold      [facility] 6/22/2022   Visit Number 7   VAS Pain Rating 5   Treadmill Time (in min.) 5   Time 5   Retraction in Sitting 20   Retraction with Extension 20   Flexion -   Sidebending -   Rotation -   Scapular Retraction -   Manual Therapy -   Cervical Extension Seat Pad -   Seat Adjustment -   Top Dead Center -   Counterweight -   Cervical Flexion -   Cervical Extension -   Cervical Peak Torque -   Cervical Weight 153   Repetitions 15   Rating of Perceived Exertion 5   Ice - Z Lie (in min.) 5         Peripheral muscle strengthening which included 1 set of 15-20 repetitions at a slow, controlled 10-13 second per rep pace focused on strengthening supporting musculature for improved body mechanics and functional mobility.  Pt and therapist focused on proper form during treatment to ensure optimal strengthening of each targeted muscle group.  Machines were utilized including torso rotation, leg extension, leg curl, chest press, upright row. Tricep extension, bicep curl, leg press, and hip abduction added visit 3    April received the following manual therapy techniques: Manual traction and  Soft tissue Mobilization were applied to the: cervical region for 00 minutes.         Home Exercises Provided and Patient Education Provided   Home exercises include:     UT stretch  Wall slides   Scap retract /c Blue TB      Cervical rotation /c towel assist  Ulnar nerve glide PRN     Cardio program: 6/8/22  Lifting education date:TBD  Lumbar roll: obtained uses in chair at home    Education provided:       Written Home Exercises Provided: Patient instructed to cont prior HEP.  Exercises were reviewed and April was able to demonstrate them prior to the end of the session.  April demonstrated good  understanding of the education provided.     See EMR under Patient Instructions for exercises provided prior visit.      Assessment   April tolerated treatment session well. She demos decreased thoracic ROM, better with increased reps. Weight increased 5% this visit she was able to complete 15 reps with 5/10 RPE>       Patient is making fair progress towards established goals.  Pt will continue to benefit from skilled outpatient physical therapy to address the deficits stated in the impairment chart, provide pt/family education and to maximize pt's level of independence in the home and community environment.     Anticipated Barriers for therapy: none  Pt's spiritual, cultural and educational needs considered and pt agreeable to plan of care and goals as stated below:          GOALS: Pt is in agreement with the following goals.     Short term goals: 6 weeks or 10 visits   1.  Pt will demonstratte increased cervical ROM as measured by med ex by 6 degrees from initial test which results in improved  ROM of neck for ease with ADLs and driving  (approp and ongoing)  2. Pt will demonstrate independence with reducing or controlling symptoms with ther ex, movement, or position independently, able to reduce pain 1-2 points on pain scale using strategies taught in therapy  (approp and ongoing)  3. Pt will demonstrate increased MedX  average isometric strength value by 15% with  when compared to the initial testing resulting in improved ability to perform bending, lifting, and carrying activities safely, confidently.  (approp and ongoing)           Long term goals: 10 weeks or 20 visits  1. Pt will demonstratte increased cervical ROM as measured by med ex by 12 degrees from initial test which results in functional ROM of neck for ease with ADLs and driving  (approp and ongoing)  2. Pt will demonstrate increased MedX average isometric strength value  by 20% from initial test to improve ability to lift and carry, and sustain good posture while performing ADL's  (approp and ongoing)  3.Pt will demonstrate reduced pain and improved functional outcomes as reported on the Neck Disability Index by reaching a score of 15 or less in order to demonstrate subjective improvement in pt's condition.    (approp and ongoing)  4. Pt will demonstrate independence with reducing or controlling symptoms with ther ex, movement, or position independently, able to reduce pain 2-4 points on pain scale using strategies taught in therapy  (approp and ongoing)  5. Pt will demonstrate independence with the HEP at discharge.   (approp and ongoing)  6. Pt will report no cervical discomfort when turning head for mirror checks while driving for inc safety.  (approp and ongoing)  7. Pt will report ability to sit /c family to complete puzzles /s inc in cervical discomfort for inc tolerance to recreational activities (approp and ongoing)  8. Pt will perform KENDRA dynamometer /c no more than 10# difference between R and L  for improved tolerance to house hold chores (approp and ongoing)      Plan   Continue with established Plan of Care towards established PT goals.

## 2022-06-24 ENCOUNTER — CLINICAL SUPPORT (OUTPATIENT)
Dept: REHABILITATION | Facility: OTHER | Age: 37
End: 2022-06-24
Attending: PHYSICAL MEDICINE & REHABILITATION
Payer: COMMERCIAL

## 2022-06-24 DIAGNOSIS — R29.898 DECREASED RANGE OF MOTION OF NECK: Primary | ICD-10-CM

## 2022-06-24 DIAGNOSIS — R29.898 RIGHT ARM WEAKNESS: ICD-10-CM

## 2022-06-24 DIAGNOSIS — M53.82 WEAKNESS OF NECK: ICD-10-CM

## 2022-06-24 PROCEDURE — 97750 PHYSICAL PERFORMANCE TEST: CPT | Mod: 32

## 2022-06-24 NOTE — PROGRESS NOTES
"[facility]      Name: April Wendt Schamberger  Clinic Number: [id number]    Therapy Diagnosis:   Encounter Diagnoses   Name Primary?    Decreased range of motion of neck Yes    Weakness of neck     Right arm weakness      Physician: Mata Valencia MD    Visit Date: 2022    Physician Orders: PT Eval and Treat   Medical Diagnosis from Referral:   M79.18 (ICD-10-CM) - Myofascial muscle pain   M54.2 (ICD-10-CM) - Neck pain   M50.30 (ICD-10-CM) - DDD (degenerative disc disease), cervical         Evaluation Date: 2022  Authorization Period Expiration: 22  Plan of Care Expiration: 22  Visit # / Visits authorized:   PROGRAM      Time In: 3 pm   Time Out: 345 pm  Total Billable Time: 45 minutes     Precautions: Standard     Pattern of pain determined: 1REN       Subjective   April reports feeling a little better today. She continues to be hopeful that the ulnar release will help with the N/T though she is overall feeling better than when she started    Patient reports tolerating previous visit well /c no c/o of excess discomfort  Patient reports their pain to be 3/10 on a 0-10 scale with 0 being no pain and 10 being the worst pain imaginable.  Pain Location: B cervical       Occupation: cancer researcher   Leisure: working out, playing /c kids  Pts goals: "I want less pain so I can go back to doing the fun stuff in life"     Objective        Baseline Isometric Testing on Med X equipment:  Testing administered by PT  Date of testin22  ROM 33 - 102 deg   Max Peak Torque 166   Min Peak Torque 131   Flex/Ext Ratio 1.26   % below normative data 24%         Neck Disability Index Review 2022   Pain Intensity 2 4   Personal Care (Washing, Dressing, etc.) 2 2   Lifting 2 3   Reading 2 3   Headaches 2 2   Concentration 1 3   Work 2 3   Driving 2 2   Sleeping 3 4   Recreation 2 2   Score: 20 28         Horace Dynamometer     Eval:  R 40#        "       L 58#   06/13/22:  R 45#  L 58#    Treatment    Pt was instructed in and performed the following:     April received therapeutic exercises to develop/improved posture, cardiovascular endurance, muscular endurance, lumbar/cervical ROM, strength and muscular endurance for 45 minutes including the following exercises:       Thoracic rotation R   Pt OP x 20    Clinician OP x 10   Seated    Upper trap 3x 30 sec   Levator scap 3x 30 sec   Cervical retraction x 20, /c ext x 20   Open books on mat with bent arms x 15    NP:  Standing    Scap retracts x 20 cue for no B knee hyperextension   Wall slides x 10 cue for dec lumbar lordosis, x 10     Open books on wall x 10 B   Cervical rotation /c towel assist x 5 5 sec hold    [facility] 6/24/2022   Visit Number 8   VAS Pain Rating 3   Treadmill Time (in min.) -   Time -   Retraction in Sitting 10   Retraction with Extension -   Flexion -   Sidebending 10   Rotation -   Scapular Retraction 10   Manual Therapy 15   Cervical Extension - Seat Pad -   Seat Adjustment -   Top Dead Center -   Counterweight -   Cervical Flexion -   Cervical Extension -   Cervical Peak Torque -   Cervical Weight 153   Repetitions 20   Rating of Perceived Exertion 6         Peripheral muscle strengthening which included 1 set of 15-20 repetitions at a slow, controlled 10-13 second per rep pace focused on strengthening supporting musculature for improved body mechanics and functional mobility.  Pt and therapist focused on proper form during treatment to ensure optimal strengthening of each targeted muscle group.  Machines were utilized including torso rotation, leg extension, leg curl, chest press, upright row. Tricep extension, bicep curl, leg press, and hip abduction added visit 3    April received the following manual therapy techniques: kinesiotaping for X brace postural support for 10 minutes.         Home Exercises Provided and Patient Education Provided   Home exercises  include:     UT stretch  Wall slides   Scap retract /c Blue TB    Cervical rotation /c towel assist  Ulnar nerve glide PRN     Cardio program: 6/8/22  Lifting education date: visit 11  Lumbar roll: obtained uses in chair at home    Education provided: Benefit/risk and contraindications for kinesiotaping single and repeated use      Written Home Exercises Provided: Patient instructed to cont prior HEP.  Exercises were reviewed and April was able to demonstrate them prior to the end of the session.  April demonstrated good  understanding of the education provided.     See EMR under Patient Instructions for exercises provided prior visit.      Assessment   April tolerated treatment session well. Kinesiotaping for x-brace postural support trialed with initial good response, will follow with this to see if full postural brace is worthwhile. MedX settings were continued at same ROM and 153 in/lb and she was able to complete 20 repetitions but with 6/10 RPE. Assess response at next session prior to determining whether to increased resistance 5% next session.       Patient is making fair progress towards established goals.  Pt will continue to benefit from skilled outpatient physical therapy to address the deficits stated in the impairment chart, provide pt/family education and to maximize pt's level of independence in the home and community environment.     Anticipated Barriers for therapy: none  Pt's spiritual, cultural and educational needs considered and pt agreeable to plan of care and goals as stated below:          GOALS: Pt is in agreement with the following goals.     Short term goals: 6 weeks or 10 visits   1.  Pt will demonstratte increased cervical ROM as measured by med ex by 6 degrees from initial test which results in improved  ROM of neck for ease with ADLs and driving  (approp and ongoing)  2. Pt will demonstrate independence with reducing or controlling symptoms with ther ex, movement, or position  independently, able to reduce pain 1-2 points on pain scale using strategies taught in therapy  (approp and ongoing)  3. Pt will demonstrate increased MedX average isometric strength value by 15% with  when compared to the initial testing resulting in improved ability to perform bending, lifting, and carrying activities safely, confidently.  (approp and ongoing)           Long term goals: 10 weeks or 20 visits  1. Pt will demonstratte increased cervical ROM as measured by med ex by 12 degrees from initial test which results in functional ROM of neck for ease with ADLs and driving  (approp and ongoing)  2. Pt will demonstrate increased MedX average isometric strength value  by 20% from initial test to improve ability to lift and carry, and sustain good posture while performing ADL's  (approp and ongoing)  3.Pt will demonstrate reduced pain and improved functional outcomes as reported on the Neck Disability Index by reaching a score of 15 or less in order to demonstrate subjective improvement in pt's condition.    (approp and ongoing)  4. Pt will demonstrate independence with reducing or controlling symptoms with ther ex, movement, or position independently, able to reduce pain 2-4 points on pain scale using strategies taught in therapy  (approp and ongoing)  5. Pt will demonstrate independence with the HEP at discharge.   (approp and ongoing)  6. Pt will report no cervical discomfort when turning head for mirror checks while driving for inc safety.  (approp and ongoing)  7. Pt will report ability to sit /c family to complete puzzles /s inc in cervical discomfort for inc tolerance to recreational activities (approp and ongoing)  8. Pt will perform KENDRA dynamometer /c no more than 10# difference between R and L  for improved tolerance to house hold chores (approp and ongoing)      Plan   Continue with established Plan of Care towards established PT goals.     Huseyin Vaughn, PT, DPT  6/24/2022

## 2022-07-01 ENCOUNTER — CLINICAL SUPPORT (OUTPATIENT)
Dept: REHABILITATION | Facility: OTHER | Age: 37
End: 2022-07-01
Attending: PHYSICAL MEDICINE & REHABILITATION
Payer: COMMERCIAL

## 2022-07-01 DIAGNOSIS — R29.898 DECREASED RANGE OF MOTION OF NECK: Primary | ICD-10-CM

## 2022-07-01 DIAGNOSIS — M53.82 WEAKNESS OF NECK: ICD-10-CM

## 2022-07-01 DIAGNOSIS — R29.898 RIGHT ARM WEAKNESS: ICD-10-CM

## 2022-07-01 PROCEDURE — 97750 PHYSICAL PERFORMANCE TEST: CPT | Mod: 32

## 2022-07-01 NOTE — PROGRESS NOTES
"[facility]      Name: April Wendt Schamberger  Clinic Number: [id number]    Therapy Diagnosis:   Encounter Diagnoses   Name Primary?    Decreased range of motion of neck Yes    Weakness of neck     Right arm weakness      Physician: Mata Valencia MD    Visit Date: 2022    Physician Orders: PT Eval and Treat   Medical Diagnosis from Referral:   M79.18 (ICD-10-CM) - Myofascial muscle pain   M54.2 (ICD-10-CM) - Neck pain   M50.30 (ICD-10-CM) - DDD (degenerative disc disease), cervical         Evaluation Date: 2022  Authorization Period Expiration: 22  Plan of Care Expiration: 22  Visit # / Visits authorized:   PROGRAM     NEXT VISIT IS POST-OP ULNAR RELEASE; REASSESS INCLUDING POSSIBLE DELAY OF MIDPOINT TEST AND POSSIBLE HOLD OF PT ALTOGETHER PENDING STATUS     Time In: 3 pm   Time Out: 350 pm  Total Billable Time: 45 minutes     Precautions: Standard     Pattern of pain determined: 1REN       Subjective   April reports feeling a little better today; taping felt good while it was on but started to itch a little bit at her collarbone so she had her  take it off after a few days. She now received her postural brace to try instead but hasn't unpackaged it yet.     Patient reports tolerating previous visit well /c no c/o of excess discomfort  Patient reports their pain to be 3/10 on a 0-10 scale with 0 being no pain and 10 being the worst pain imaginable.  Pain Location: B cervical       Occupation: cancer researcher   Leisure: working out, playing /c kids  Pts goals: "I want less pain so I can go back to doing the fun stuff in life"     Objective        Baseline Isometric Testing on Med X equipment:  Testing administered by PT  Date of testin22  ROM 33 - 102 deg   Max Peak Torque 166   Min Peak Torque 131   Flex/Ext Ratio 1.26   % below normative data 24%          Treatment    Pt was instructed in and performed the following:     April " received therapeutic exercises to develop/improved posture, cardiovascular endurance, muscular endurance, lumbar/cervical ROM, strength and muscular endurance for 45 minutes including the following exercises:         Seated    Upper trap 3x 30 sec   Levator scap 3x 30 sec   Cervical retraction x 20, /c ext x 20   Open books on mat with bent arms x 15  Standing    Wall slides x 10 cue for dec lumbar lordosis, x 10     Open books on wall x 10 B   Standing D1/D2 PNF c/GTB x 15 SAVAGE emery    [facility] 7/1/2022   Visit Number 9   VAS Pain Rating 3   Treadmill Time (in min.) 5   Speed 10   Time -   Retraction in Sitting -   Retraction with Extension -   Flexion -   Sidebending 10   Rotation -   Scapular Retraction 10   Manual Therapy -   Cervical Extension - Seat Pad -   Seat Adjustment -   Top Dead Center -   Counterweight -   Cervical Flexion -   Cervical Extension -   Cervical Peak Torque -   Cervical Weight 159   Repetitions 20   Rating of Perceived Exertion 3             Peripheral muscle strengthening which included 1 set of 15-20 repetitions at a slow, controlled 10-13 second per rep pace focused on strengthening supporting musculature for improved body mechanics and functional mobility.  Pt and therapist focused on proper form during treatment to ensure optimal strengthening of each targeted muscle group.  Machines were utilized including torso rotation, leg extension, leg curl, chest press, upright row. Tricep extension, bicep curl, leg press, and hip abduction added visit 3    April received the following manual therapy techniques: STM to B suboccipitals and upper trap for 5 minutes.         Home Exercises Provided and Patient Education Provided   Home exercises include:     UT stretch  Wall slides   Scap retract /c Blue TB    Cervical rotation /c towel assist  Ulnar nerve glide PRN     Cardio program: 6/8/22  Lifting education date: visit 11  Lumbar roll: obtained uses in chair at  home    Education provided: Benefit/risk and contraindications for kinesiotaping single and repeated use      Written Home Exercises Provided: Patient instructed to cont prior HEP.  Exercises were reviewed and April was able to demonstrate them prior to the end of the session.  April demonstrated good  understanding of the education provided.     See EMR under Patient Instructions for exercises provided prior visit.      Assessment   April tolerated treatment session well. Currently not scheduled for therapy until after ulnar release surgery so may need to delay testing past visit 10 for this reason. Will assess status as it related to all MedX machines and other exercises at next scheduled session. MedX resistance progressed to 159 in/lb and she was able to complete 20 repetitions with 3/10 RPE.     Patient is making fair progress towards established goals.  Pt will continue to benefit from skilled outpatient physical therapy to address the deficits stated in the impairment chart, provide pt/family education and to maximize pt's level of independence in the home and community environment.     Anticipated Barriers for therapy: none  Pt's spiritual, cultural and educational needs considered and pt agreeable to plan of care and goals as stated below:          GOALS: Pt is in agreement with the following goals.     Short term goals: 6 weeks or 10 visits   1.  Pt will demonstratte increased cervical ROM as measured by med ex by 6 degrees from initial test which results in improved  ROM of neck for ease with ADLs and driving  (approp and ongoing)  2. Pt will demonstrate independence with reducing or controlling symptoms with ther ex, movement, or position independently, able to reduce pain 1-2 points on pain scale using strategies taught in therapy  (approp and ongoing)  3. Pt will demonstrate increased MedX average isometric strength value by 15% with  when compared to the initial testing resulting in improved ability  to perform bending, lifting, and carrying activities safely, confidently.  (approp and ongoing)           Long term goals: 10 weeks or 20 visits  1. Pt will demonstratte increased cervical ROM as measured by med ex by 12 degrees from initial test which results in functional ROM of neck for ease with ADLs and driving  (approp and ongoing)  2. Pt will demonstrate increased MedX average isometric strength value  by 20% from initial test to improve ability to lift and carry, and sustain good posture while performing ADL's  (approp and ongoing)  3.Pt will demonstrate reduced pain and improved functional outcomes as reported on the Neck Disability Index by reaching a score of 15 or less in order to demonstrate subjective improvement in pt's condition.    (approp and ongoing)  4. Pt will demonstrate independence with reducing or controlling symptoms with ther ex, movement, or position independently, able to reduce pain 2-4 points on pain scale using strategies taught in therapy  (approp and ongoing)  5. Pt will demonstrate independence with the HEP at discharge.   (approp and ongoing)  6. Pt will report no cervical discomfort when turning head for mirror checks while driving for inc safety.  (approp and ongoing)  7. Pt will report ability to sit /c family to complete puzzles /s inc in cervical discomfort for inc tolerance to recreational activities (approp and ongoing)  8. Pt will perform KENDRA dynamometer /c no more than 10# difference between R and L  for improved tolerance to house hold chores (approp and ongoing)      Plan   Continue with established Plan of Care towards established PT goals.     Huseyin Vaughn, PT, DPT  6/24/2022

## 2022-07-05 ENCOUNTER — PATIENT MESSAGE (OUTPATIENT)
Dept: GASTROENTEROLOGY | Facility: CLINIC | Age: 37
End: 2022-07-05
Payer: COMMERCIAL

## 2022-07-05 DIAGNOSIS — R11.0 NAUSEA: Primary | ICD-10-CM

## 2022-07-06 RX ORDER — ONDANSETRON 4 MG/1
4 TABLET, ORALLY DISINTEGRATING ORAL EVERY 6 HOURS PRN
Qty: 30 TABLET | Refills: 1 | Status: SHIPPED | OUTPATIENT
Start: 2022-07-06 | End: 2022-08-10

## 2022-07-08 ENCOUNTER — ANESTHESIA EVENT (OUTPATIENT)
Dept: SURGERY | Facility: HOSPITAL | Age: 37
End: 2022-07-08
Payer: COMMERCIAL

## 2022-07-08 ENCOUNTER — HOSPITAL ENCOUNTER (OUTPATIENT)
Facility: HOSPITAL | Age: 37
Discharge: HOME | End: 2022-07-08
Attending: ORTHOPAEDIC SURGERY | Admitting: ORTHOPAEDIC SURGERY
Payer: COMMERCIAL

## 2022-07-08 ENCOUNTER — ANESTHESIA (OUTPATIENT)
Dept: SURGERY | Facility: HOSPITAL | Age: 37
End: 2022-07-08
Payer: COMMERCIAL

## 2022-07-08 VITALS
HEIGHT: 68 IN | BODY MASS INDEX: 28.79 KG/M2 | HEART RATE: 76 BPM | TEMPERATURE: 98 F | RESPIRATION RATE: 20 BRPM | WEIGHT: 190 LBS | DIASTOLIC BLOOD PRESSURE: 69 MMHG | OXYGEN SATURATION: 99 % | SYSTOLIC BLOOD PRESSURE: 114 MMHG

## 2022-07-08 DIAGNOSIS — G56.21 CUBITAL TUNNEL SYNDROME ON RIGHT: ICD-10-CM

## 2022-07-08 LAB
B-HCG UR QL: NEGATIVE
CTP QC/QA: YES

## 2022-07-08 PROCEDURE — 63600175 PHARM REV CODE 636 W HCPCS: Performed by: ANESTHESIOLOGY

## 2022-07-08 PROCEDURE — 71000016 HC POSTOP RECOV ADDL HR: Performed by: ORTHOPAEDIC SURGERY

## 2022-07-08 PROCEDURE — 63600175 PHARM REV CODE 636 W HCPCS: Performed by: NURSE ANESTHETIST, CERTIFIED REGISTERED

## 2022-07-08 PROCEDURE — 25000003 PHARM REV CODE 250: Performed by: ORTHOPAEDIC SURGERY

## 2022-07-08 PROCEDURE — 36000707: Performed by: ORTHOPAEDIC SURGERY

## 2022-07-08 PROCEDURE — 25000003 PHARM REV CODE 250: Performed by: NURSE ANESTHETIST, CERTIFIED REGISTERED

## 2022-07-08 PROCEDURE — 63600175 PHARM REV CODE 636 W HCPCS: Performed by: ORTHOPAEDIC SURGERY

## 2022-07-08 PROCEDURE — 36000706: Performed by: ORTHOPAEDIC SURGERY

## 2022-07-08 PROCEDURE — 64718 REVISE ULNAR NERVE AT ELBOW: CPT | Mod: RT,,, | Performed by: ORTHOPAEDIC SURGERY

## 2022-07-08 PROCEDURE — 37000009 HC ANESTHESIA EA ADD 15 MINS: Performed by: ORTHOPAEDIC SURGERY

## 2022-07-08 PROCEDURE — 71000015 HC POSTOP RECOV 1ST HR: Performed by: ORTHOPAEDIC SURGERY

## 2022-07-08 PROCEDURE — 64718 PR REVISE ULNAR NERVE AT ELBOW: ICD-10-PCS | Mod: RT,,, | Performed by: ORTHOPAEDIC SURGERY

## 2022-07-08 PROCEDURE — 25000003 PHARM REV CODE 250: Performed by: ANESTHESIOLOGY

## 2022-07-08 PROCEDURE — 37000008 HC ANESTHESIA 1ST 15 MINUTES: Performed by: ORTHOPAEDIC SURGERY

## 2022-07-08 PROCEDURE — 63600175 PHARM REV CODE 636 W HCPCS: Performed by: STUDENT IN AN ORGANIZED HEALTH CARE EDUCATION/TRAINING PROGRAM

## 2022-07-08 RX ORDER — DEXAMETHASONE SODIUM PHOSPHATE 10 MG/ML
INJECTION INTRAMUSCULAR; INTRAVENOUS
Status: COMPLETED | OUTPATIENT
Start: 2022-07-08 | End: 2022-07-08

## 2022-07-08 RX ORDER — OXYCODONE HYDROCHLORIDE 5 MG/1
10 TABLET ORAL EVERY 4 HOURS PRN
Status: DISCONTINUED | OUTPATIENT
Start: 2022-07-08 | End: 2022-07-08 | Stop reason: HOSPADM

## 2022-07-08 RX ORDER — CEFAZOLIN SODIUM 1 G/3ML
INJECTION, POWDER, FOR SOLUTION INTRAMUSCULAR; INTRAVENOUS
Status: DISCONTINUED | OUTPATIENT
Start: 2022-07-08 | End: 2022-07-08

## 2022-07-08 RX ORDER — BUPIVACAINE HYDROCHLORIDE 5 MG/ML
INJECTION, SOLUTION EPIDURAL; INTRACAUDAL
Status: DISCONTINUED | OUTPATIENT
Start: 2022-07-08 | End: 2022-07-08 | Stop reason: HOSPADM

## 2022-07-08 RX ORDER — HYDROCODONE BITARTRATE AND ACETAMINOPHEN 5; 325 MG/1; MG/1
1 TABLET ORAL EVERY 4 HOURS PRN
Qty: 12 TABLET | Refills: 0 | Status: SHIPPED | OUTPATIENT
Start: 2022-07-08 | End: 2023-11-09

## 2022-07-08 RX ORDER — ONDANSETRON 8 MG/1
8 TABLET, ORALLY DISINTEGRATING ORAL EVERY 8 HOURS PRN
Status: DISCONTINUED | OUTPATIENT
Start: 2022-07-08 | End: 2022-07-08 | Stop reason: HOSPADM

## 2022-07-08 RX ORDER — KETOROLAC TROMETHAMINE 30 MG/ML
INJECTION, SOLUTION INTRAMUSCULAR; INTRAVENOUS
Status: DISCONTINUED | OUTPATIENT
Start: 2022-07-08 | End: 2022-07-08

## 2022-07-08 RX ORDER — FENTANYL CITRATE 50 UG/ML
INJECTION, SOLUTION INTRAMUSCULAR; INTRAVENOUS
Status: DISCONTINUED | OUTPATIENT
Start: 2022-07-08 | End: 2022-07-08

## 2022-07-08 RX ORDER — CEFAZOLIN SODIUM 2 G/50ML
2 SOLUTION INTRAVENOUS
Status: DISCONTINUED | OUTPATIENT
Start: 2022-07-08 | End: 2022-07-08 | Stop reason: HOSPADM

## 2022-07-08 RX ORDER — LIDOCAINE HCL/PF 100 MG/5ML
SYRINGE (ML) INTRAVENOUS
Status: DISCONTINUED | OUTPATIENT
Start: 2022-07-08 | End: 2022-07-08

## 2022-07-08 RX ORDER — PROPOFOL 10 MG/ML
VIAL (ML) INTRAVENOUS
Status: DISCONTINUED | OUTPATIENT
Start: 2022-07-08 | End: 2022-07-08

## 2022-07-08 RX ORDER — PHENYLEPHRINE HYDROCHLORIDE 10 MG/ML
INJECTION INTRAVENOUS
Status: DISCONTINUED | OUTPATIENT
Start: 2022-07-08 | End: 2022-07-08

## 2022-07-08 RX ORDER — MIDAZOLAM HYDROCHLORIDE 1 MG/ML
INJECTION INTRAMUSCULAR; INTRAVENOUS
Status: DISCONTINUED | OUTPATIENT
Start: 2022-07-08 | End: 2022-07-08

## 2022-07-08 RX ORDER — ACETAMINOPHEN 325 MG/1
650 TABLET ORAL EVERY 4 HOURS PRN
Status: DISCONTINUED | OUTPATIENT
Start: 2022-07-08 | End: 2022-07-08 | Stop reason: HOSPADM

## 2022-07-08 RX ORDER — KETOROLAC TROMETHAMINE 30 MG/ML
30 INJECTION, SOLUTION INTRAMUSCULAR; INTRAVENOUS ONCE
Status: CANCELLED | OUTPATIENT
Start: 2022-07-08 | End: 2022-07-11

## 2022-07-08 RX ORDER — PROPOFOL 10 MG/ML
VIAL (ML) INTRAVENOUS CONTINUOUS PRN
Status: DISCONTINUED | OUTPATIENT
Start: 2022-07-08 | End: 2022-07-08

## 2022-07-08 RX ORDER — LIDOCAINE HYDROCHLORIDE 10 MG/ML
INJECTION, SOLUTION EPIDURAL; INFILTRATION; INTRACAUDAL; PERINEURAL
Status: DISCONTINUED | OUTPATIENT
Start: 2022-07-08 | End: 2022-07-08 | Stop reason: HOSPADM

## 2022-07-08 RX ORDER — BUPIVACAINE HYDROCHLORIDE 5 MG/ML
INJECTION, SOLUTION EPIDURAL; INTRACAUDAL
Status: COMPLETED | OUTPATIENT
Start: 2022-07-08 | End: 2022-07-08

## 2022-07-08 RX ORDER — SODIUM CHLORIDE, SODIUM LACTATE, POTASSIUM CHLORIDE, CALCIUM CHLORIDE 600; 310; 30; 20 MG/100ML; MG/100ML; MG/100ML; MG/100ML
INJECTION, SOLUTION INTRAVENOUS CONTINUOUS PRN
Status: DISCONTINUED | OUTPATIENT
Start: 2022-07-08 | End: 2022-07-08

## 2022-07-08 RX ORDER — SODIUM CHLORIDE, SODIUM LACTATE, POTASSIUM CHLORIDE, CALCIUM CHLORIDE 600; 310; 30; 20 MG/100ML; MG/100ML; MG/100ML; MG/100ML
INJECTION, SOLUTION INTRAVENOUS CONTINUOUS
Status: DISCONTINUED | OUTPATIENT
Start: 2022-07-08 | End: 2022-07-08 | Stop reason: HOSPADM

## 2022-07-08 RX ADMIN — PROPOFOL 60 MG: 10 INJECTION, EMULSION INTRAVENOUS at 08:07

## 2022-07-08 RX ADMIN — BUPIVACAINE HYDROCHLORIDE 30 ML: 5 INJECTION, SOLUTION EPIDURAL; INTRACAUDAL; PERINEURAL at 08:07

## 2022-07-08 RX ADMIN — PHENYLEPHRINE HYDROCHLORIDE 100 MCG: 10 INJECTION INTRAVENOUS at 08:07

## 2022-07-08 RX ADMIN — PROPOFOL 150 MCG/KG/MIN: 10 INJECTION, EMULSION INTRAVENOUS at 08:07

## 2022-07-08 RX ADMIN — SODIUM CHLORIDE, SODIUM LACTATE, POTASSIUM CHLORIDE, AND CALCIUM CHLORIDE: .6; .31; .03; .02 INJECTION, SOLUTION INTRAVENOUS at 07:07

## 2022-07-08 RX ADMIN — CEFAZOLIN 2 G: 330 INJECTION, POWDER, FOR SOLUTION INTRAMUSCULAR; INTRAVENOUS at 08:07

## 2022-07-08 RX ADMIN — MIDAZOLAM HYDROCHLORIDE 2 MG: 1 INJECTION, SOLUTION INTRAMUSCULAR; INTRAVENOUS at 08:07

## 2022-07-08 RX ADMIN — LIDOCAINE HYDROCHLORIDE 80 MG: 20 INJECTION, SOLUTION INTRAVENOUS at 08:07

## 2022-07-08 RX ADMIN — FENTANYL CITRATE 50 MCG: 50 INJECTION, SOLUTION INTRAMUSCULAR; INTRAVENOUS at 08:07

## 2022-07-08 RX ADMIN — SODIUM CHLORIDE, SODIUM LACTATE, POTASSIUM CHLORIDE, AND CALCIUM CHLORIDE: .6; .31; .03; .02 INJECTION, SOLUTION INTRAVENOUS at 08:07

## 2022-07-08 RX ADMIN — KETOROLAC TROMETHAMINE 30 MG: 30 INJECTION, SOLUTION INTRAMUSCULAR; INTRAVENOUS at 08:07

## 2022-07-08 RX ADMIN — DEXAMETHASONE SODIUM PHOSPHATE 4 MG: 10 INJECTION, SOLUTION INTRAMUSCULAR; INTRAVENOUS at 08:07

## 2022-07-08 RX ADMIN — MIDAZOLAM HYDROCHLORIDE 5 MG: 1 INJECTION, SOLUTION INTRAMUSCULAR; INTRAVENOUS at 08:07

## 2022-07-08 NOTE — ANESTHESIA POSTPROCEDURE EVALUATION
Anesthesia Post Evaluation    Patient: April Wendt Schamberger    Procedure(s) Performed: Procedure(s) (LRB):  DECOMPRESSION, NERVE, ULNAR (Right)    Final Anesthesia Type: MAC      Patient location during evaluation: OPS  Patient participation: Yes- Able to Participate  Level of consciousness: awake and alert and oriented  Post-procedure vital signs: reviewed and stable  Pain management: adequate  Airway patency: patent    PONV status at discharge: No PONV  Anesthetic complications: no      Cardiovascular status: blood pressure returned to baseline and hemodynamically stable  Respiratory status: unassisted, spontaneous ventilation and room air  Hydration status: euvolemic  Follow-up not needed.          Vitals Value Taken Time   /69 07/08/22 0912   Temp  07/08/22 0912   Pulse 84 07/08/22 0912   Resp 18 07/08/22 0912   SpO2 98 07/08/22 0912         No case tracking events are documented in the log.      Pain/Valeria Score: No data recorded

## 2022-07-08 NOTE — DISCHARGE SUMMARY
[facility] ([facility])  Discharge Note  Short Stay    Procedure(s) (LRB):  DECOMPRESSION, NERVE, ULNAR (Right)    OUTCOME: Patient tolerated treatment/procedure well without complication and is now ready for discharge.    DISPOSITION: Home or Self Care    FINAL DIAGNOSIS: Status-post right ulnar nerve decompression    FOLLOWUP: In clinic    DISCHARGE INSTRUCTIONS:  No discharge procedures on file.     TIME SPENT ON DISCHARGE: 10 minutes

## 2022-07-08 NOTE — OP NOTE
[facility] ([facility])  Operative Note      Date of Procedure: 7/8/2022     Procedure: Procedure(s) (LRB):  DECOMPRESSION, NERVE, ULNAR (Right)     Surgeon(s) and Role:     * Saqib Smith Jr., MD - Primary    Assisting Surgeon: None    Pre-Operative Diagnosis: Cubital tunnel syndrome on right [G56.21]    Post-Operative Diagnosis: Post-Op Diagnosis Codes:     * Cubital tunnel syndrome on right [G56.21]    Anesthesia: Regional    Operative Findings (including complications, if any):  Cubital tunnel right elbow    Description of Technical Procedures:     Preop diagnosis:  Cubital tunnel syndrome right elbow.    Postop diagnosis:  Same.    Operative procedure:  Ulnar nerve decompression right elbow cubital tunnel.    Surgeon:  Luis.    First assistant:  Diana Degroot    Anesthesia:  Regional block.    EBL:  Minimal.    Specimen:  None.    Operative procedure in detail as follows:    After operative consent was obtained the patient was brought to the operating room placed supine on the operating table.  Anesthesia by regional block performed by the anesthesia staff.  A tourniquet applied to the right arm the right upper extremity prepped and draped out in the normal sterile fashion.  The Esmarch used to exsanguinate the limb tourniquet inflated 225 mmHg.  Following this a curved medial incision made just posterior to the medial epicondyle with 15 blade.  Careful dissection used device subcutaneous tissue.  Hemostasis achieved with Bovie.  The ulnar nerve was identified proximally traced from proximal to distal unroofing Jimenez's fascia and dividing the FCU fascia distally.  Proximally a portion of the medial triceps was excised to prevent impingement.  After complete release range of motion of the elbow was checked noted to be full without subluxation of the nerve.  There was complete decompression the nerve looked good.  The wound irrigated with antibiotic saline solution subcutaneous tissue closed with  3-0 Vicryl Steri-Strips on the skin.  Sterile dressing applied followed by light wrap.  Tourniquet deflated patient brought to recovery room stable condition all sponge needle counts reported as correct no complications    Significant Surgical Tasks Conducted by the Assistant(s), if Applicable: retraction    Estimated Blood Loss (EBL): * No values recorded between 7/8/2022  8:37 AM and 7/8/2022  9:05 AM *           Implants: * No implants in log *    Specimens:   Specimen (24h ago, onward)            None                  Condition: Good    Disposition: PACU - hemodynamically stable.    Attestation: I was present and scrubbed for the entire procedure.    Discharge Note    OUTCOME: Patient tolerated treatment/procedure well without complication and is now ready for discharge.    DISPOSITION: Home or Self Care    FINAL DIAGNOSIS:  Cubital tunnel syndrome on right    FOLLOWUP: In clinic    DISCHARGE INSTRUCTIONS:    Discharge Procedure Orders   Diet general     Call MD for:  temperature >100.4     Call MD for:  persistent nausea and vomiting     Call MD for:  severe uncontrolled pain     Remove dressing in 72 hours

## 2022-07-08 NOTE — TRANSFER OF CARE
"Anesthesia Transfer of Care Note    Patient: April Wendt Schamberger    Procedure(s) Performed: Procedure(s) (LRB):  DECOMPRESSION, NERVE, ULNAR (Right)    Patient location: OPS    Anesthesia Type: MAC    Transport from OR: Transported from OR on room air with adequate spontaneous ventilation    Post pain: adequate analgesia    Post assessment: no apparent anesthetic complications and tolerated procedure well    Post vital signs: stable    Level of consciousness: awake, alert and oriented    Nausea/Vomiting: no nausea/vomiting    Complications: none    Transfer of care protocol was followed      Last vitals:   Visit Vitals  /71 (BP Location: Right arm, Patient Position: Lying)   Pulse 75   Temp 36.5 °C (97.7 °F) (Skin)   Resp 18   Ht 5' 8" (1.727 m)   Wt 86.2 kg (190 lb)   LMP 07/07/2022 (Exact Date)   SpO2 100%   Breastfeeding No   BMI 28.89 kg/m²     "

## 2022-07-08 NOTE — ANESTHESIA PREPROCEDURE EVALUATION
07/08/2022 April Wendt Schamberger is a 37 y.o., female for colonoscopy under MAC    Past Medical History:   Diagnosis Date    Abnormal Pap smear of cervix 2000    Cryo Done    ADD (attention deficit disorder)     Breast disorder     Breast Cysts    Esophageal reflux     Fibroids     Hypertension     IBS (irritable bowel syndrome)     Insomnia     Kidney stones     Mastocytosis     Relies on partner vasectomy for contraception     Upper GI bleed      Past Surgical History:   Procedure Laterality Date    COLONOSCOPY N/A 4/28/2017    Procedure: COLONOSCOPY;  Surgeon: Bren Larkin MD;  Location: [facility] ENDO;  Service: Endoscopy;  Laterality: N/A;    COLONOSCOPY N/A 1/26/2022    Procedure: COLONOSCOPY;  Surgeon: Inocente Roe MD;  Location: [facility] ENDO;  Service: Endoscopy;  Laterality: N/A;    EPIDURAL STEROID INJECTION INTO CERVICAL SPINE N/A 12/1/2020    Procedure: Injection-steroid-epidural-cervical--C7-T1;  Surgeon: Corky Argueta Jr., MD;  Location: [facility] PAIN MGT;  Service: Pain Management;  Laterality: N/A;    EPIDURAL STEROID INJECTION INTO CERVICAL SPINE N/A 12/2/2021    Procedure: Cervical Epidural Steroid Injection C7-T1 (No Sedation);  Surgeon: Corky Argueta Jr., MD;  Location: [facility] PAIN MGT;  Service: Pain Management;  Laterality: N/A;    ESOPHAGOGASTRODUODENOSCOPY  2012    LASIK Bilateral 2005    REFRACTIVE SURGERY      TONSILLECTOMY, ADENOIDECTOMY  1988    VAGINAL DELIVERY      x 2         Pre-op Assessment    I have reviewed the Patient Summary Reports.   I have reviewed the NPO Status.   I have reviewed the Medications.     Review of Systems  Social:  Former Smoker        Physical Exam  General:  Well nourished      Airway/Jaw/Neck:  Airway Findings: Mallampati: II      Chest/Lungs:  Chest/Lungs Clear    Heart/Vascular:  Heart Findings: Normal            Anesthesia  Plan  Type of Anesthesia, risks & benefits discussed:  Anesthesia Type:  MAC    Patient's Preference:   Plan Factors:          Intra-op Monitoring Plan: standard ASA monitors  Intra-op Monitoring Plan Comments:   Post Op Pain Control Plan:   Post Op Pain Control Plan Comments:     Induction:    Beta Blocker:  Patient is on a Beta-Blocker and has received one dose within the past 24 hours (No further documentation required).       Informed Consent: Informed consent signed with the Patient and all parties understand the risks and agree with anesthesia plan.  All questions answered.  Anesthesia consent signed with patient.  ASA Score: 2     Day of Surgery Review of History & Physical:              Ready For Surgery From Anesthesia Perspective.           Physical Exam  General: Well nourished    Airway:  Mallampati: II             Anesthesia Plan  Type of Anesthesia, risks & benefits discussed:    Anesthesia Type: MAC  Intra-op Monitoring Plan: standard ASA monitors  Informed Consent: Informed consent signed with the Patient and all parties understand the risks and agree with anesthesia plan.  All questions answered.   ASA Score: 2    Ready For Surgery From Anesthesia Perspective.       .

## 2022-07-10 DIAGNOSIS — M79.18 MYOFASCIAL PAIN SYNDROME, CERVICAL: ICD-10-CM

## 2022-07-10 DIAGNOSIS — M62.838 MUSCLE SPASM: ICD-10-CM

## 2022-07-10 DIAGNOSIS — F51.01 PRIMARY INSOMNIA: ICD-10-CM

## 2022-07-11 RX ORDER — ESZOPICLONE 3 MG/1
3 TABLET, FILM COATED ORAL NIGHTLY PRN
Qty: 30 TABLET | Refills: 0 | Status: SHIPPED | OUTPATIENT
Start: 2022-07-11 | End: 2022-08-08 | Stop reason: SDUPTHER

## 2022-07-11 RX ORDER — TIZANIDINE 4 MG/1
4 TABLET ORAL 3 TIMES DAILY PRN
Qty: 90 TABLET | Refills: 1 | Status: SHIPPED | OUTPATIENT
Start: 2022-07-11 | End: 2022-09-25 | Stop reason: SDUPTHER

## 2022-07-15 NOTE — PROGRESS NOTES
Subjective:      Patient ID: April Wendt Schamberger is a 37 y.o. female.    Chief Complaint: postop follow up    Surgery: Ulnar nerve decompression, Right  Date:  7/8/2022  Surgeon: Dr. Smith    HPI: April Wendt Schamberger is here for a postop visit. She is 11 days s/p Right ulnar nerve decompression. She is doing well. Pt reports pain has been controlled.  Numbness and tingling is somewhat improved but not resolved. She additionally notes decreased sensation to thumb pad. Post surgical complaints include: elbow swelling, bruising.     Past Medical History:   Diagnosis Date    Abnormal Pap smear of cervix 2000    Cryo Done    ADD (attention deficit disorder)     Breast disorder     Breast Cysts    Esophageal reflux     Fibroids     Hypertension     IBS (irritable bowel syndrome)     Insomnia     Kidney stones     Mastocytosis     Relies on partner vasectomy for contraception     Upper GI bleed        Current Outpatient Medications:     acetaminophen (TYLENOL) 500 MG tablet, Take 2 tablets (1,000 mg total) by mouth 3 (three) times daily as needed for Pain., Disp: , Rfl: 0    amitriptyline (ELAVIL) 25 MG tablet, Take 1 tablet (25 mg total) by mouth every evening., Disp: 90 tablet, Rfl: 0    butalbital-acetaminophen-caffeine -40 mg (FIORICET, ESGIC) -40 mg per tablet, Take 1 tablet by mouth every 6 (six) hours as needed for Headaches., Disp: 30 tablet, Rfl: 0    diphenoxylate-atropine 2.5-0.025 mg (LOMOTIL) 2.5-0.025 mg per tablet, Take 1 tablet by mouth 4 (four) times daily as needed for Diarrhea (diarrhea)., Disp: 360 tablet, Rfl: 0    doxycycline monohydrate 100 mg Tab, Take 1 tablet by mouth everyday with food. Avoid lying down for 1 hour after taking. Avoid the sun. (Patient not taking: Reported on 6/20/2022), Disp: 30 tablet, Rfl: 2    eszopiclone (LUNESTA) 3 mg Tab, Take 1 tablet (3 mg total) by mouth nightly as needed (insomnia)., Disp: 30 tablet, Rfl: 0    fluconazole  (DIFLUCAN) 150 MG Tab, Take 1 tablet as needed for yeast infection, Disp: 30 tablet, Rfl: 1    HYDROcodone-acetaminophen (NORCO) 5-325 mg per tablet, Take 1 tablet by mouth every 4 (four) hours as needed for Pain., Disp: 12 tablet, Rfl: 0    lisdexamfetamine (VYVANSE) 60 MG capsule, Take 1 capsule (60 mg total) by mouth every morning., Disp: 30 capsule, Rfl: 0    lisdexamfetamine (VYVANSE) 60 MG capsule, Take 1 capsule (60 mg total) by mouth every morning., Disp: 30 capsule, Rfl: 0    loperamide (IMODIUM) 1 mg/7.5 mL solution, Take 0.1 mg/kg by mouth every 12 (twelve) hours., Disp: , Rfl:     loperamide (IMODIUM) 2 mg capsule, Take 2 mg by mouth 4 (four) times daily as needed for Diarrhea., Disp: , Rfl:     metoprolol succinate (TOPROL-XL) 50 MG 24 hr tablet, Take 1 tablet (50 mg total) by mouth 2 (two) times daily., Disp: 180 tablet, Rfl: 1    ondansetron (ZOFRAN-ODT) 4 MG TbDL, Take 1 tablet (4 mg total) by mouth every 6 (six) hours as needed (nausea)., Disp: 30 tablet, Rfl: 1    pregabalin (LYRICA) 50 MG capsule, Take 1 capsule (50 mg total) by mouth 2 (two) times daily., Disp: 60 capsule, Rfl: 2    tiZANidine (ZANAFLEX) 4 MG tablet, Take 1 tablet (4 mg total) by mouth 3 (three) times daily as needed., Disp: 90 tablet, Rfl: 1    traMADoL (ULTRAM) 50 mg tablet, Take 1 tablet (50 mg total) by mouth every 12 (twelve) hours as needed for Pain., Disp: 60 tablet, Rfl: 1    tretinoin (RETIN-A) 0.025 % cream, Apply topically every evening. Pea sized amount to entire face. If irritation occurs, decrease use to every other night., Disp: 20 g, Rfl: 5    valsartan (DIOVAN) 320 MG tablet, Take 1 tablet (320 mg total) by mouth once daily., Disp: 90 tablet, Rfl: 1    venlafaxine (EFFEXOR-XR) 37.5 MG 24 hr capsule, Take 1 capsule (37.5 mg total) by mouth 2 (two) times a day., Disp: 60 capsule, Rfl: 3  No current facility-administered medications for this visit.    Facility-Administered Medications Ordered in Other  "Visits:     0.9%  NaCl infusion, 500 mL, Intravenous, Continuous, Corky Argueta Jr., MD  Review of patient's allergies indicates:   Allergen Reactions    Lactose     Azithromycin Nausea And Vomiting       LMP 07/07/2022 (Exact Date)   Review of Systems   Constitutional: Negative for chills and fever.   Respiratory: Negative for shortness of breath.    Cardiovascular: Negative for chest pain and leg swelling.   Gastrointestinal: Negative for nausea and vomiting.   Genitourinary: Negative for dysuria.   Musculoskeletal: Negative for falls.   Skin: Negative for rash.   Neurological: Negative for dizziness and sensory change.         Objective:    Ortho Exam     Ht 5' 8" (1.727 m)   Wt 86.2 kg (190 lb)   LMP 07/07/2022 (Exact Date)   BMI 28.89 kg/m²    General: A&O, NAD, sitting comfortably in room  Right elbow:  Significant for cubital tunnel incision. Wound margins are well approximated and healing nicely. No redness, warmth, drainage, or other signs of infection. moderate elbow, forearm, and hand dorsum swelling with (+) ecchymosis surrounding surgical site. ROM wrist and fingers intact.   strength not tested today.  Sensation subjectively decreased to all digits. Pulses present.  Tinel's (+)          Assessment:     1. Status post decompression of ulnar nerve at elbow     2. Cubital tunnel syndrome on right           No diagnosis found.      Plan:       Regular wound care explained with soap and water.  Wean out of sling and begin gentle ROM to elbow.  No heavy lifting with RUE.     I explained numbness and tingling symptoms will continue to resolve with time, the patient understands.    Bracing: pt provided gel wrist brace for carpal tunnel symptom relief with nighttime bracing instructions     Follow Up: 3wks      "

## 2022-07-18 DIAGNOSIS — K58.0 IRRITABLE BOWEL SYNDROME WITH DIARRHEA: ICD-10-CM

## 2022-07-18 RX ORDER — DIPHENOXYLATE HYDROCHLORIDE AND ATROPINE SULFATE 2.5; .025 MG/1; MG/1
1 TABLET ORAL 4 TIMES DAILY PRN
Qty: 360 TABLET | Refills: 0 | Status: SHIPPED | OUTPATIENT
Start: 2022-07-18 | End: 2022-11-12 | Stop reason: SDUPTHER

## 2022-07-19 ENCOUNTER — OFFICE VISIT (OUTPATIENT)
Dept: ORTHOPEDICS | Facility: CLINIC | Age: 37
End: 2022-07-19
Payer: COMMERCIAL

## 2022-07-19 VITALS — WEIGHT: 190 LBS | HEIGHT: 68 IN | BODY MASS INDEX: 28.79 KG/M2

## 2022-07-19 DIAGNOSIS — Z98.890 STATUS POST DECOMPRESSION OF ULNAR NERVE AT ELBOW: Primary | ICD-10-CM

## 2022-07-19 DIAGNOSIS — G56.21 CUBITAL TUNNEL SYNDROME ON RIGHT: ICD-10-CM

## 2022-07-19 PROCEDURE — 1160F RVW MEDS BY RX/DR IN RCRD: CPT | Mod: CPTII,S$GLB,, | Performed by: PHYSICIAN ASSISTANT

## 2022-07-19 PROCEDURE — 99024 POSTOP FOLLOW-UP VISIT: CPT | Mod: S$GLB,,, | Performed by: PHYSICIAN ASSISTANT

## 2022-07-19 PROCEDURE — 99999 PR PBB SHADOW E&M-EST. PATIENT-LVL IV: ICD-10-PCS | Mod: PBBFAC,,, | Performed by: PHYSICIAN ASSISTANT

## 2022-07-19 PROCEDURE — 3008F BODY MASS INDEX DOCD: CPT | Mod: CPTII,S$GLB,, | Performed by: PHYSICIAN ASSISTANT

## 2022-07-19 PROCEDURE — 4010F PR ACE/ARB THEARPY RXD/TAKEN: ICD-10-PCS | Mod: CPTII,S$GLB,, | Performed by: PHYSICIAN ASSISTANT

## 2022-07-19 PROCEDURE — 4010F ACE/ARB THERAPY RXD/TAKEN: CPT | Mod: CPTII,S$GLB,, | Performed by: PHYSICIAN ASSISTANT

## 2022-07-19 PROCEDURE — 99024 PR POST-OP FOLLOW-UP VISIT: ICD-10-PCS | Mod: S$GLB,,, | Performed by: PHYSICIAN ASSISTANT

## 2022-07-19 PROCEDURE — 1160F PR REVIEW ALL MEDS BY PRESCRIBER/CLIN PHARMACIST DOCUMENTED: ICD-10-PCS | Mod: CPTII,S$GLB,, | Performed by: PHYSICIAN ASSISTANT

## 2022-07-19 PROCEDURE — 1159F MED LIST DOCD IN RCRD: CPT | Mod: CPTII,S$GLB,, | Performed by: PHYSICIAN ASSISTANT

## 2022-07-19 PROCEDURE — 3008F PR BODY MASS INDEX (BMI) DOCUMENTED: ICD-10-PCS | Mod: CPTII,S$GLB,, | Performed by: PHYSICIAN ASSISTANT

## 2022-07-19 PROCEDURE — 99999 PR PBB SHADOW E&M-EST. PATIENT-LVL IV: CPT | Mod: PBBFAC,,, | Performed by: PHYSICIAN ASSISTANT

## 2022-07-19 PROCEDURE — 1159F PR MEDICATION LIST DOCUMENTED IN MEDICAL RECORD: ICD-10-PCS | Mod: CPTII,S$GLB,, | Performed by: PHYSICIAN ASSISTANT

## 2022-08-02 ENCOUNTER — TELEPHONE (OUTPATIENT)
Dept: PAIN MEDICINE | Facility: CLINIC | Age: 37
End: 2022-08-02
Payer: COMMERCIAL

## 2022-08-11 ENCOUNTER — PATIENT MESSAGE (OUTPATIENT)
Dept: ORTHOPEDICS | Facility: CLINIC | Age: 37
End: 2022-08-11
Payer: COMMERCIAL

## 2022-08-12 ENCOUNTER — OFFICE VISIT (OUTPATIENT)
Dept: PSYCHIATRY | Facility: CLINIC | Age: 37
End: 2022-08-12
Payer: COMMERCIAL

## 2022-08-12 VITALS
WEIGHT: 192.44 LBS | SYSTOLIC BLOOD PRESSURE: 142 MMHG | DIASTOLIC BLOOD PRESSURE: 86 MMHG | BODY MASS INDEX: 29.26 KG/M2 | HEART RATE: 82 BPM

## 2022-08-12 DIAGNOSIS — K58.0 IRRITABLE BOWEL SYNDROME WITH DIARRHEA: ICD-10-CM

## 2022-08-12 DIAGNOSIS — F32.81 PMDD (PREMENSTRUAL DYSPHORIC DISORDER): ICD-10-CM

## 2022-08-12 DIAGNOSIS — F90.2 ATTENTION DEFICIT HYPERACTIVITY DISORDER (ADHD), COMBINED TYPE: Primary | ICD-10-CM

## 2022-08-12 DIAGNOSIS — F41.9 ANXIETY: ICD-10-CM

## 2022-08-12 PROCEDURE — 3008F BODY MASS INDEX DOCD: CPT | Mod: CPTII,S$GLB,, | Performed by: STUDENT IN AN ORGANIZED HEALTH CARE EDUCATION/TRAINING PROGRAM

## 2022-08-12 PROCEDURE — 99999 PR PBB SHADOW E&M-EST. PATIENT-LVL II: CPT | Mod: PBBFAC,,, | Performed by: STUDENT IN AN ORGANIZED HEALTH CARE EDUCATION/TRAINING PROGRAM

## 2022-08-12 PROCEDURE — 3079F DIAST BP 80-89 MM HG: CPT | Mod: CPTII,S$GLB,, | Performed by: STUDENT IN AN ORGANIZED HEALTH CARE EDUCATION/TRAINING PROGRAM

## 2022-08-12 PROCEDURE — 99214 OFFICE O/P EST MOD 30 MIN: CPT | Mod: S$GLB,,, | Performed by: STUDENT IN AN ORGANIZED HEALTH CARE EDUCATION/TRAINING PROGRAM

## 2022-08-12 PROCEDURE — 3008F PR BODY MASS INDEX (BMI) DOCUMENTED: ICD-10-PCS | Mod: CPTII,S$GLB,, | Performed by: STUDENT IN AN ORGANIZED HEALTH CARE EDUCATION/TRAINING PROGRAM

## 2022-08-12 PROCEDURE — 4010F ACE/ARB THERAPY RXD/TAKEN: CPT | Mod: CPTII,S$GLB,, | Performed by: STUDENT IN AN ORGANIZED HEALTH CARE EDUCATION/TRAINING PROGRAM

## 2022-08-12 PROCEDURE — 3077F SYST BP >= 140 MM HG: CPT | Mod: CPTII,S$GLB,, | Performed by: STUDENT IN AN ORGANIZED HEALTH CARE EDUCATION/TRAINING PROGRAM

## 2022-08-12 PROCEDURE — 3077F PR MOST RECENT SYSTOLIC BLOOD PRESSURE >= 140 MM HG: ICD-10-PCS | Mod: CPTII,S$GLB,, | Performed by: STUDENT IN AN ORGANIZED HEALTH CARE EDUCATION/TRAINING PROGRAM

## 2022-08-12 PROCEDURE — 3079F PR MOST RECENT DIASTOLIC BLOOD PRESSURE 80-89 MM HG: ICD-10-PCS | Mod: CPTII,S$GLB,, | Performed by: STUDENT IN AN ORGANIZED HEALTH CARE EDUCATION/TRAINING PROGRAM

## 2022-08-12 PROCEDURE — 4010F PR ACE/ARB THEARPY RXD/TAKEN: ICD-10-PCS | Mod: CPTII,S$GLB,, | Performed by: STUDENT IN AN ORGANIZED HEALTH CARE EDUCATION/TRAINING PROGRAM

## 2022-08-12 PROCEDURE — 99999 PR PBB SHADOW E&M-EST. PATIENT-LVL II: ICD-10-PCS | Mod: PBBFAC,,, | Performed by: STUDENT IN AN ORGANIZED HEALTH CARE EDUCATION/TRAINING PROGRAM

## 2022-08-12 PROCEDURE — 99214 PR OFFICE/OUTPT VISIT, EST, LEVL IV, 30-39 MIN: ICD-10-PCS | Mod: S$GLB,,, | Performed by: STUDENT IN AN ORGANIZED HEALTH CARE EDUCATION/TRAINING PROGRAM

## 2022-08-12 RX ORDER — LISDEXAMFETAMINE DIMESYLATE 70 MG/1
70 CAPSULE ORAL EVERY MORNING
Qty: 30 CAPSULE | Refills: 0 | Status: SHIPPED | OUTPATIENT
Start: 2022-08-12 | End: 2022-09-18

## 2022-08-12 RX ORDER — LISDEXAMFETAMINE DIMESYLATE 70 MG/1
70 CAPSULE ORAL EVERY MORNING
Qty: 30 CAPSULE | Refills: 0 | Status: SHIPPED | OUTPATIENT
Start: 2022-10-07 | End: 2022-11-18 | Stop reason: SDUPTHER

## 2022-08-12 RX ORDER — LISDEXAMFETAMINE DIMESYLATE 70 MG/1
70 CAPSULE ORAL EVERY MORNING
Qty: 30 CAPSULE | Refills: 0 | Status: SHIPPED | OUTPATIENT
Start: 2022-09-09 | End: 2022-10-19

## 2022-08-12 NOTE — PROGRESS NOTES
"OUTPATIENT PSYCHIATRY FOLLOW UP VISIT    ENCOUNTER DATE:  8/12/2022  SITE:  [facility]  LENGTH OF SESSION:  30 minutes      CHIEF COMPLAINT:  Follow up for medication management    HISTORY OF PRESENTING ILLNESS:  April Wendt Schamberger is a 37 y.o. female with history of ADHD, PMDD, NIRAV, IBS-D  who presents for follow up appointment. Patient was last seen in this clinic by Dr. Pal in May 2022, during which no changes were made to the medication regimen.     Per last note, current psychotropic medications included:  - Vyvanse 60 mg po daily for ADHD  - Amitriptyline 25 mg qhs for anxiety, sleep, and IBS with diarrhea  - Venlafaxine to 37.5mg for PMDD daily with extra 37.5 mg for luteal phase symptoms (prescribed as 37.5 mg two times daily)    Interval history as told by Patient - & or family/friend/spouse/caregiver with pts permission    Patient reports that Vyvanse appears to be wearing off in the early afternoon (between noon and 1 PM). Reports she takes it around 7 AM consistently. Typically when the medication wears off, she describes significant difficulty focusing on tasks. She describes "reading the same paragraph twenty times" without understanding it. She is able to function, despite the waning effects of the Vyvanse in the afternoon, as she plans to do her hardest tasks in the morning. Regarding mood, she reports some decrease in irritability since her last appointment. Attributes this to increasing her Effexor. Reports taking two Effexor pills daily (total 75 mg), even when not in luteal phase. She states she started doing this because due to recent increased stressor at home. Doing well with Amitriptyline, but reports some early morning drowsiness. Sleeping only 4-5 hours per night, which she states is normal for her. Feels like she can function well with this amount of sleep and feels more drowsy when she sleeps longer than this. Takes Lunesta, per PCP. No other symptoms of " "depression at this time. Denies SI. Generalized anxiety appears to be well-controlled at this time. No history of luly or psychosis. Denies AVH. Denies recreational substance use. Drinks 1-2 Truly once per week.    PSYCHIATRIC REVIEW OF SYSTEMS:    Symptoms of Depression: None    Symptoms of Anxiety/ panic attacks: None    Symptoms of Luly/Hypomania: None    Symptoms of psychosis: None    Sleep: None    Alcohol: No excessive drinking reported    Illicit Drugs: No illicit substance use reported      Risk Parameters:  Patient denies no SI, HI or self-injurious behavior      PSYCHIATRIC MED REVIEW  Current medications:  See above in last plan    Current psych meds  Medication side effects:  Morning drowsiness associated with Amitriptyline, tolerable to patient  Medication compliance:  Full    Medical Hx:  Chronic neck pain, HTN, Ulnar tunnel release, IBS-D  Medications: Lunesta, Metoprolol, valsartan, lamotil, immodium PRN, Zofran PRN, tizanidine    Psychiatric Hx:  Diagnosis: ADHD, combined type, unspecified anxiety, PMDD, IBS-D  Previous medication trials:  Antidepressants   - Zoloft: AE: diarrhea   - Elavil: currently taking   - Effexor: currently taking  Stimulants   - Adderall XR: currently taking   - Adderall IR: AE: "cranky"  Anxiolytics   - Klonopin: SE drowsiness  Sedatives   - Ambien   - Lunesta: currently taking (per PCP)  Other   - Melatonin   - Benadryl  Hospitalizations: none  Suicide attempt: none    Social Hx: Lives with , two children (4 & 7 year old). 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    Family Hx: Sister: depression (currently on Prozac)      PAST PSYCHIATRIC, MEDICAL, AND SOCIAL HISTORY REVIEWED  The patient's past medical, family and social history have been reviewed and updated as appropriate within the electronic medical record - see encounter notes.    MEDICAL REVIEW OF SYSTEMS:  Complete review of systems performed covering Constitutional, " Musculoskeletal, Neurologic.  All systems negative.    ALL MEDICATIONS:    Current Outpatient Medications:     acetaminophen (TYLENOL) 500 MG tablet, Take 2 tablets (1,000 mg total) by mouth 3 (three) times daily as needed for Pain., Disp: , Rfl: 0    amitriptyline (ELAVIL) 25 MG tablet, Take 1 tablet (25 mg total) by mouth every evening., Disp: 90 tablet, Rfl: 0    butalbital-acetaminophen-caffeine -40 mg (FIORICET, ESGIC) -40 mg per tablet, Take 1 tablet by mouth every 6 (six) hours as needed for Headaches., Disp: 30 tablet, Rfl: 0    diphenoxylate-atropine 2.5-0.025 mg (LOMOTIL) 2.5-0.025 mg per tablet, Take 1 tablet by mouth 4 (four) times daily as needed for Diarrhea (diarrhea)., Disp: 360 tablet, Rfl: 0    doxycycline monohydrate 100 mg Tab, Take 1 tablet by mouth everyday with food. Avoid lying down for 1 hour after taking. Avoid the sun., Disp: 30 tablet, Rfl: 2    eszopiclone (LUNESTA) 3 mg Tab, Take 1 tablet (3 mg total) by mouth nightly as needed (insomnia)., Disp: 30 tablet, Rfl: 0    fluconazole (DIFLUCAN) 150 MG Tab, Take 1 tablet as needed for yeast infection, Disp: 30 tablet, Rfl: 1    HYDROcodone-acetaminophen (NORCO) 5-325 mg per tablet, Take 1 tablet by mouth every 4 (four) hours as needed for Pain., Disp: 12 tablet, Rfl: 0    lisdexamfetamine (VYVANSE) 60 MG capsule, Take 1 capsule (60 mg total) by mouth every morning., Disp: 30 capsule, Rfl: 0    loperamide (IMODIUM) 1 mg/7.5 mL solution, Take 0.1 mg/kg by mouth every 12 (twelve) hours., Disp: , Rfl:     loperamide (IMODIUM) 2 mg capsule, Take 2 mg by mouth 4 (four) times daily as needed for Diarrhea., Disp: , Rfl:     metoprolol succinate (TOPROL-XL) 50 MG 24 hr tablet, Take 1 tablet (50 mg total) by mouth 2 (two) times daily., Disp: 180 tablet, Rfl: 1    pregabalin (LYRICA) 50 MG capsule, Take 1 capsule (50 mg total) by mouth 2 (two) times daily., Disp: 60 capsule, Rfl: 2    tiZANidine (ZANAFLEX) 4 MG tablet, Take 1  tablet (4 mg total) by mouth 3 (three) times daily as needed., Disp: 90 tablet, Rfl: 1    tretinoin (RETIN-A) 0.025 % cream, Apply topically every evening. Pea sized amount to entire face. If irritation occurs, decrease use to every other night., Disp: 20 g, Rfl: 5    valsartan (DIOVAN) 320 MG tablet, Take 1 tablet (320 mg total) by mouth once daily., Disp: 90 tablet, Rfl: 0    venlafaxine (EFFEXOR-XR) 37.5 MG 24 hr capsule, Take 1 capsule (37.5 mg total) by mouth 2 (two) times a day., Disp: 60 capsule, Rfl: 3  No current facility-administered medications for this visit.    Facility-Administered Medications Ordered in Other Visits:     0.9%  NaCl infusion, 500 mL, Intravenous, Continuous, Corky Argueta Jr., MD    ALLERGIES:  Review of patient's allergies indicates:   Allergen Reactions    Lactose     Azithromycin Nausea And Vomiting       RELEVANT LABS/STUDIES:    Lab Results   Component Value Date    WBC 8.92 09/27/2021    HGB 13.3 09/27/2021    HCT 39.7 09/27/2021    MCV 86 09/27/2021     09/27/2021     BMP  Lab Results   Component Value Date     09/27/2021    K 4.3 09/27/2021     09/27/2021    CO2 23 09/27/2021    BUN 7 09/27/2021    CREATININE 0.7 09/27/2021    CALCIUM 9.4 09/27/2021    ANIONGAP 8 09/27/2021    ESTGFRAFRICA >60 09/27/2021    EGFRNONAA >60 09/27/2021     Lab Results   Component Value Date    ALT 12 09/27/2021    AST 12 09/27/2021    ALKPHOS 56 09/27/2021    BILITOT 0.3 09/27/2021     Lab Results   Component Value Date    TSH 0.704 09/27/2021     Lab Results   Component Value Date    HGBA1C 5.1 09/27/2021       VITALS  Vitals:    08/12/22 1331   BP: (!) 142/86   Pulse: 82   Weight: 87.3 kg (192 lb 7.4 oz)       PHYSICAL EXAM  General: No acute distress, well developed/nourished  Neurologic:   Gait: Normal   Psychomotor signs:  No involuntary movements or tremor  AIMS: none    PSYCHIATRIC EXAM:     Mental Status Exam:  Appearance: good grooming, age  appropriate  Behavior/Cooperation: normal, cooperative   Speech:  normal tone, normal rate, normal pitch, normal volume  Language: uses words appropriately; NO aphasia or dysarthria  Mood: Good  Affect:  Euthymic  Thought Process: Normal and logical  Thought Content: No SI, HI, AVH  Level of Consciousness: Alert and Oriented x3  Memory:  Recent and remote intact  Attention/concentration: appropriate for age/education.   Fund of Knowledge: appears adequate  Insight:  Intact  Judgment: Intact      IMPRESSION:    April Wendt Schamberger is a 37 y.o. female with history of ADHD, PMDD, NIRAV, IBS-D who presents for follow up appointment for medication management.  The  has been reviewed, no concerning signs were noted.       Status/Progress:  Based on the examination today, the patient's problem(s) is/are well controlled.  New problems have not been presented today.     DIAGNOSES:    ICD-10-CM ICD-9-CM   1. Attention deficit hyperactivity disorder (ADHD), combined type  F90.2 314.01   2. PMDD (premenstrual dysphoric disorder)  F32.81 625.4   3. Irritable bowel syndrome with diarrhea  K58.0 564.1   4. Anxiety  F41.9 300.00       PLAN:  Psych Med:  - Increase Vyvanse to 70 mg po daily for ADHD, provided with three 30-day prescriptions  - PDMP reviewed: patient filling all medications on time, no abnormalities noted  - Continue amitriptyline 25 mg qhs for anxiety, sleep, and IBS with diarrhea; patient taking full tab daily; consider increasing for IBS-D once ADHD medication is stable  - Continue venlafaxine to 37.5mg for PMDD daily with extra 37.5 mg for luteal phase symptoms (prescribed as 37.5 mg two times daily)       Discussed with patient informed consent, risks vs. benefits, alternative treatments, side effect profile and the inherent unpredictability of individual responses to these treatments. Answered any questions patient may have had. The patient expresses understanding of the above and displays the capacity to  agree with this current plan       Other: None      RETURN TO CLINIC:  3 months        Ray Gloria MD  [facility], PGY-III   8/12/2022 9:17 AM

## 2022-08-23 ENCOUNTER — PATIENT MESSAGE (OUTPATIENT)
Dept: INTERNAL MEDICINE | Facility: CLINIC | Age: 37
End: 2022-08-23
Payer: COMMERCIAL

## 2022-08-23 ENCOUNTER — OFFICE VISIT (OUTPATIENT)
Dept: ORTHOPEDICS | Facility: CLINIC | Age: 37
End: 2022-08-23
Payer: COMMERCIAL

## 2022-08-23 VITALS — BODY MASS INDEX: 29.1 KG/M2 | WEIGHT: 192 LBS | HEIGHT: 68 IN

## 2022-08-23 DIAGNOSIS — Z98.890 STATUS POST DECOMPRESSION OF ULNAR NERVE AT ELBOW: Primary | ICD-10-CM

## 2022-08-23 PROCEDURE — 99024 PR POST-OP FOLLOW-UP VISIT: ICD-10-PCS | Mod: S$GLB,,, | Performed by: PHYSICIAN ASSISTANT

## 2022-08-23 PROCEDURE — 4010F ACE/ARB THERAPY RXD/TAKEN: CPT | Mod: CPTII,S$GLB,, | Performed by: PHYSICIAN ASSISTANT

## 2022-08-23 PROCEDURE — 1160F RVW MEDS BY RX/DR IN RCRD: CPT | Mod: CPTII,S$GLB,, | Performed by: PHYSICIAN ASSISTANT

## 2022-08-23 PROCEDURE — 99024 POSTOP FOLLOW-UP VISIT: CPT | Mod: S$GLB,,, | Performed by: PHYSICIAN ASSISTANT

## 2022-08-23 PROCEDURE — 99999 PR PBB SHADOW E&M-EST. PATIENT-LVL IV: ICD-10-PCS | Mod: PBBFAC,,, | Performed by: PHYSICIAN ASSISTANT

## 2022-08-23 PROCEDURE — 4010F PR ACE/ARB THEARPY RXD/TAKEN: ICD-10-PCS | Mod: CPTII,S$GLB,, | Performed by: PHYSICIAN ASSISTANT

## 2022-08-23 PROCEDURE — 1160F PR REVIEW ALL MEDS BY PRESCRIBER/CLIN PHARMACIST DOCUMENTED: ICD-10-PCS | Mod: CPTII,S$GLB,, | Performed by: PHYSICIAN ASSISTANT

## 2022-08-23 PROCEDURE — 99999 PR PBB SHADOW E&M-EST. PATIENT-LVL IV: CPT | Mod: PBBFAC,,, | Performed by: PHYSICIAN ASSISTANT

## 2022-08-23 PROCEDURE — 1159F MED LIST DOCD IN RCRD: CPT | Mod: CPTII,S$GLB,, | Performed by: PHYSICIAN ASSISTANT

## 2022-08-23 PROCEDURE — 1159F PR MEDICATION LIST DOCUMENTED IN MEDICAL RECORD: ICD-10-PCS | Mod: CPTII,S$GLB,, | Performed by: PHYSICIAN ASSISTANT

## 2022-08-23 PROCEDURE — 3008F PR BODY MASS INDEX (BMI) DOCUMENTED: ICD-10-PCS | Mod: CPTII,S$GLB,, | Performed by: PHYSICIAN ASSISTANT

## 2022-08-23 PROCEDURE — 3008F BODY MASS INDEX DOCD: CPT | Mod: CPTII,S$GLB,, | Performed by: PHYSICIAN ASSISTANT

## 2022-08-23 NOTE — PROGRESS NOTES
Subjective:      Patient ID: April Wendt Schamberger is a 37 y.o. female.    Chief Complaint: postop follow up    Surgery: Ulnar nerve decompression, Right  Date:  7/8/2022  Surgeon: Dr. Smith    HPI: April Wendt Schamberger is here for a postop visit. She is 6wks s/p Right ulnar nerve decompression. She is doing well with no ongoing c/o pain. Numbness and tingling in the hand have improved as has post-surgical swelling.      Past Medical History:   Diagnosis Date    Abnormal Pap smear of cervix 2000    Cryo Done    ADD (attention deficit disorder)     Breast disorder     Breast Cysts    Esophageal reflux     Fibroids     Hypertension     IBS (irritable bowel syndrome)     Insomnia     Kidney stones     Mastocytosis     Relies on partner vasectomy for contraception     Upper GI bleed        Current Outpatient Medications:     acetaminophen (TYLENOL) 500 MG tablet, Take 2 tablets (1,000 mg total) by mouth 3 (three) times daily as needed for Pain., Disp: , Rfl: 0    amitriptyline (ELAVIL) 25 MG tablet, Take 1 tablet (25 mg total) by mouth every evening., Disp: 90 tablet, Rfl: 0    butalbital-acetaminophen-caffeine -40 mg (FIORICET, ESGIC) -40 mg per tablet, Take 1 tablet by mouth every 6 (six) hours as needed for Headaches., Disp: 30 tablet, Rfl: 0    diphenoxylate-atropine 2.5-0.025 mg (LOMOTIL) 2.5-0.025 mg per tablet, Take 1 tablet by mouth 4 (four) times daily as needed for Diarrhea (diarrhea)., Disp: 360 tablet, Rfl: 0    doxycycline monohydrate 100 mg Tab, Take 1 tablet by mouth everyday with food. Avoid lying down for 1 hour after taking. Avoid the sun., Disp: 30 tablet, Rfl: 2    eszopiclone (LUNESTA) 3 mg Tab, Take 1 tablet (3 mg total) by mouth nightly as needed (insomnia)., Disp: 30 tablet, Rfl: 0    fluconazole (DIFLUCAN) 150 MG Tab, Take 1 tablet as needed for yeast infection, Disp: 30 tablet, Rfl: 1    HYDROcodone-acetaminophen (NORCO) 5-325 mg per tablet, Take 1 tablet  by mouth every 4 (four) hours as needed for Pain., Disp: 12 tablet, Rfl: 0    lisdexamfetamine (VYVANSE) 70 MG capsule, Take 1 capsule (70 mg total) by mouth every morning., Disp: 30 capsule, Rfl: 0    [START ON 9/9/2022] lisdexamfetamine (VYVANSE) 70 MG capsule, Take 1 capsule (70 mg total) by mouth every morning., Disp: 30 capsule, Rfl: 0    [START ON 10/7/2022] lisdexamfetamine (VYVANSE) 70 MG capsule, Take 1 capsule (70 mg total) by mouth every morning., Disp: 30 capsule, Rfl: 0    loperamide (IMODIUM) 1 mg/7.5 mL solution, Take 0.1 mg/kg by mouth every 12 (twelve) hours., Disp: , Rfl:     loperamide (IMODIUM) 2 mg capsule, Take 2 mg by mouth 4 (four) times daily as needed for Diarrhea., Disp: , Rfl:     metoprolol succinate (TOPROL-XL) 50 MG 24 hr tablet, Take 1 tablet (50 mg total) by mouth 2 (two) times daily., Disp: 180 tablet, Rfl: 1    pregabalin (LYRICA) 50 MG capsule, Take 1 capsule (50 mg total) by mouth 2 (two) times daily., Disp: 60 capsule, Rfl: 2    tiZANidine (ZANAFLEX) 4 MG tablet, Take 1 tablet (4 mg total) by mouth 3 (three) times daily as needed., Disp: 90 tablet, Rfl: 1    tretinoin (RETIN-A) 0.025 % cream, Apply topically every evening. Pea sized amount to entire face. If irritation occurs, decrease use to every other night., Disp: 20 g, Rfl: 5    valsartan (DIOVAN) 320 MG tablet, Take 1 tablet (320 mg total) by mouth once daily., Disp: 90 tablet, Rfl: 0    venlafaxine (EFFEXOR-XR) 37.5 MG 24 hr capsule, Take 1 capsule (37.5 mg total) by mouth 2 (two) times a day., Disp: 60 capsule, Rfl: 3  No current facility-administered medications for this visit.    Facility-Administered Medications Ordered in Other Visits:     0.9%  NaCl infusion, 500 mL, Intravenous, Continuous, Corky Argueta Jr., MD  Review of patient's allergies indicates:   Allergen Reactions    Lactose     Azithromycin Nausea And Vomiting       There were no vitals taken for this visit.  Review of Systems  "  Constitutional: Negative for chills and fever.   Respiratory: Negative for shortness of breath.    Cardiovascular: Negative for chest pain and leg swelling.   Gastrointestinal: Negative for nausea and vomiting.   Genitourinary: Negative for dysuria.   Musculoskeletal: Negative for falls.   Skin: Negative for rash.   Neurological: Negative for dizziness and sensory change.         Objective:    Ortho Exam     Ht 5' 8" (1.727 m)   Wt 87.1 kg (192 lb)   BMI 29.19 kg/m²      General: A&O, NAD, sitting comfortably in room  Right elbow: Surgical incision well-healed with appropriate scar formation. Wound margins are well approximated and healing nicely. No redness, warmth, drainage, or other signs of infection. Improved elbow swelling. ROM wrist and fingers intact.   strength intact.  Sensation intact to 4th/5th digits. Pulses present.      Assessment:     1. Status post decompression of ulnar nerve at elbow       Plan:       Pt may slowly advance to activities as tolerated  OTC NSAIDs for any intermittent soreness  Continue scar massage to desensitize incisional region  We did discuss that it may be some time before she sees complete resolution of weakness and soreness     Follow Up: PRN        "

## 2022-08-30 DIAGNOSIS — B37.31 RECURRENT CANDIDIASIS OF VAGINA: ICD-10-CM

## 2022-08-30 RX ORDER — FLUCONAZOLE 150 MG/1
TABLET ORAL
Qty: 30 TABLET | Refills: 1 | Status: SHIPPED | OUTPATIENT
Start: 2022-08-30 | End: 2023-11-09 | Stop reason: SDUPTHER

## 2022-09-01 ENCOUNTER — LAB VISIT (OUTPATIENT)
Dept: LAB | Facility: HOSPITAL | Age: 37
End: 2022-09-01
Attending: FAMILY MEDICINE
Payer: COMMERCIAL

## 2022-09-01 DIAGNOSIS — Z00.00 WELL ADULT EXAM: ICD-10-CM

## 2022-09-01 LAB
25(OH)D3+25(OH)D2 SERPL-MCNC: 34 NG/ML (ref 30–96)
ALBUMIN SERPL BCP-MCNC: 3.9 G/DL (ref 3.5–5.2)
ALP SERPL-CCNC: 62 U/L (ref 55–135)
ALT SERPL W/O P-5'-P-CCNC: 6 U/L (ref 10–44)
ANION GAP SERPL CALC-SCNC: 10 MMOL/L (ref 8–16)
AST SERPL-CCNC: 12 U/L (ref 10–40)
BASOPHILS # BLD AUTO: 0.04 K/UL (ref 0–0.2)
BASOPHILS NFR BLD: 0.5 % (ref 0–1.9)
BILIRUB SERPL-MCNC: 0.2 MG/DL (ref 0.1–1)
BUN SERPL-MCNC: 5 MG/DL (ref 6–20)
CALCIUM SERPL-MCNC: 9.1 MG/DL (ref 8.7–10.5)
CHLORIDE SERPL-SCNC: 104 MMOL/L (ref 95–110)
CHOLEST SERPL-MCNC: 194 MG/DL (ref 120–199)
CHOLEST/HDLC SERPL: 5.1 {RATIO} (ref 2–5)
CO2 SERPL-SCNC: 25 MMOL/L (ref 23–29)
CREAT SERPL-MCNC: 0.8 MG/DL (ref 0.5–1.4)
DIFFERENTIAL METHOD: NORMAL
EOSINOPHIL # BLD AUTO: 0.2 K/UL (ref 0–0.5)
EOSINOPHIL NFR BLD: 2.6 % (ref 0–8)
ERYTHROCYTE [DISTWIDTH] IN BLOOD BY AUTOMATED COUNT: 12.8 % (ref 11.5–14.5)
EST. GFR  (NO RACE VARIABLE): >60 ML/MIN/1.73 M^2
ESTIMATED AVG GLUCOSE: 108 MG/DL (ref 68–131)
GLUCOSE SERPL-MCNC: 84 MG/DL (ref 70–110)
HBA1C MFR BLD: 5.4 % (ref 4–5.6)
HCT VFR BLD AUTO: 39 % (ref 37–48.5)
HDLC SERPL-MCNC: 38 MG/DL (ref 40–75)
HDLC SERPL: 19.6 % (ref 20–50)
HGB BLD-MCNC: 13 G/DL (ref 12–16)
IMM GRANULOCYTES # BLD AUTO: 0.03 K/UL (ref 0–0.04)
IMM GRANULOCYTES NFR BLD AUTO: 0.4 % (ref 0–0.5)
LDLC SERPL CALC-MCNC: 121.8 MG/DL (ref 63–159)
LYMPHOCYTES # BLD AUTO: 2.8 K/UL (ref 1–4.8)
LYMPHOCYTES NFR BLD: 33.6 % (ref 18–48)
MCH RBC QN AUTO: 28.9 PG (ref 27–31)
MCHC RBC AUTO-ENTMCNC: 33.3 G/DL (ref 32–36)
MCV RBC AUTO: 87 FL (ref 82–98)
MONOCYTES # BLD AUTO: 0.5 K/UL (ref 0.3–1)
MONOCYTES NFR BLD: 5.7 % (ref 4–15)
NEUTROPHILS # BLD AUTO: 4.7 K/UL (ref 1.8–7.7)
NEUTROPHILS NFR BLD: 57.2 % (ref 38–73)
NONHDLC SERPL-MCNC: 156 MG/DL
NRBC BLD-RTO: 0 /100 WBC
PLATELET # BLD AUTO: 304 K/UL (ref 150–450)
PMV BLD AUTO: 11 FL (ref 9.2–12.9)
POTASSIUM SERPL-SCNC: 4.1 MMOL/L (ref 3.5–5.1)
PROT SERPL-MCNC: 7.3 G/DL (ref 6–8.4)
RBC # BLD AUTO: 4.5 M/UL (ref 4–5.4)
SODIUM SERPL-SCNC: 139 MMOL/L (ref 136–145)
T4 FREE SERPL-MCNC: 0.85 NG/DL (ref 0.71–1.51)
TRIGL SERPL-MCNC: 171 MG/DL (ref 30–150)
TSH SERPL DL<=0.005 MIU/L-ACNC: 1.04 UIU/ML (ref 0.4–4)
WBC # BLD AUTO: 8.22 K/UL (ref 3.9–12.7)

## 2022-09-01 PROCEDURE — 84439 ASSAY OF FREE THYROXINE: CPT | Performed by: FAMILY MEDICINE

## 2022-09-01 PROCEDURE — 83036 HEMOGLOBIN GLYCOSYLATED A1C: CPT | Performed by: FAMILY MEDICINE

## 2022-09-01 PROCEDURE — 85025 COMPLETE CBC W/AUTO DIFF WBC: CPT | Performed by: FAMILY MEDICINE

## 2022-09-01 PROCEDURE — 36415 COLL VENOUS BLD VENIPUNCTURE: CPT | Performed by: FAMILY MEDICINE

## 2022-09-01 PROCEDURE — 82306 VITAMIN D 25 HYDROXY: CPT | Performed by: FAMILY MEDICINE

## 2022-09-01 PROCEDURE — 84443 ASSAY THYROID STIM HORMONE: CPT | Performed by: FAMILY MEDICINE

## 2022-09-01 PROCEDURE — 80061 LIPID PANEL: CPT | Performed by: FAMILY MEDICINE

## 2022-09-01 PROCEDURE — 80053 COMPREHEN METABOLIC PANEL: CPT | Performed by: FAMILY MEDICINE

## 2022-09-06 ENCOUNTER — OFFICE VISIT (OUTPATIENT)
Dept: INTERNAL MEDICINE | Facility: CLINIC | Age: 37
End: 2022-09-06
Payer: COMMERCIAL

## 2022-09-06 DIAGNOSIS — R51.9 NONINTRACTABLE HEADACHE, UNSPECIFIED CHRONICITY PATTERN, UNSPECIFIED HEADACHE TYPE: ICD-10-CM

## 2022-09-06 DIAGNOSIS — B37.31 VAGINAL CANDIDIASIS: Primary | ICD-10-CM

## 2022-09-06 DIAGNOSIS — B96.89 BV (BACTERIAL VAGINOSIS): ICD-10-CM

## 2022-09-06 DIAGNOSIS — N76.0 BV (BACTERIAL VAGINOSIS): ICD-10-CM

## 2022-09-06 DIAGNOSIS — F51.01 PRIMARY INSOMNIA: ICD-10-CM

## 2022-09-06 PROCEDURE — 3044F HG A1C LEVEL LT 7.0%: CPT | Mod: CPTII,95,, | Performed by: INTERNAL MEDICINE

## 2022-09-06 PROCEDURE — 4010F PR ACE/ARB THEARPY RXD/TAKEN: ICD-10-PCS | Mod: CPTII,95,, | Performed by: INTERNAL MEDICINE

## 2022-09-06 PROCEDURE — 4010F ACE/ARB THERAPY RXD/TAKEN: CPT | Mod: CPTII,95,, | Performed by: INTERNAL MEDICINE

## 2022-09-06 PROCEDURE — 99213 OFFICE O/P EST LOW 20 MIN: CPT | Mod: 95,,, | Performed by: INTERNAL MEDICINE

## 2022-09-06 PROCEDURE — 99213 PR OFFICE/OUTPT VISIT, EST, LEVL III, 20-29 MIN: ICD-10-PCS | Mod: 95,,, | Performed by: INTERNAL MEDICINE

## 2022-09-06 PROCEDURE — 3044F PR MOST RECENT HEMOGLOBIN A1C LEVEL <7.0%: ICD-10-PCS | Mod: CPTII,95,, | Performed by: INTERNAL MEDICINE

## 2022-09-06 RX ORDER — METRONIDAZOLE 500 MG/1
500 TABLET ORAL EVERY 12 HOURS
Qty: 14 TABLET | Refills: 0 | Status: SHIPPED | OUTPATIENT
Start: 2022-09-06 | End: 2023-11-09

## 2022-09-06 RX ORDER — ESZOPICLONE 3 MG/1
3 TABLET, FILM COATED ORAL NIGHTLY PRN
Qty: 30 TABLET | Refills: 0 | Status: SHIPPED | OUTPATIENT
Start: 2022-09-06 | End: 2022-10-04 | Stop reason: SDUPTHER

## 2022-09-06 RX ORDER — BUTALBITAL, ACETAMINOPHEN AND CAFFEINE 50; 325; 40 MG/1; MG/1; MG/1
1 TABLET ORAL EVERY 6 HOURS PRN
Qty: 30 TABLET | Refills: 0 | Status: SHIPPED | OUTPATIENT
Start: 2022-09-06 | End: 2022-10-04

## 2022-09-06 RX ORDER — TERCONAZOLE 4 MG/G
1 CREAM VAGINAL NIGHTLY
Qty: 45 G | Refills: 0 | Status: SHIPPED | OUTPATIENT
Start: 2022-09-06 | End: 2023-11-09

## 2022-09-06 NOTE — PROGRESS NOTES
Subjective:       Patient ID: April Wendt Schamberger is a 37 y.o. female.    Chief Complaint: BV    HPI  The patient location is: [place], [place]  The chief complaint leading to consultation is: Vaginal discharge    Visit type: audiovisual    Face to Face time with patient: 7 minutes of total time spent on the encounter, which includes face to face time and non-face to face time preparing to see the patient (eg, review of tests), Obtaining and/or reviewing separately obtained history, Documenting clinical information in the electronic or other health record, Independently interpreting results (not separately reported) and communicating results to the patient/family/caregiver, or Care coordination (not separately reported).         Each patient to whom he or she provides medical services by telemedicine is:  (1) informed of the relationship between the physician and patient and the respective role of any other health care provider with respect to management of the patient; and (2) notified that he or she may decline to receive medical services by telemedicine and may withdraw from such care at any time.    Notes:  Pt went to [place] last month and has been taking diflucan every three days for presumptive yeast and it has not been helping.  Thinks it could be BV due to odor.  No pain, no itch.  No fever or chills.  Denies pregnancy.  Review of Systems   Constitutional:  Negative for activity change and unexpected weight change.   HENT:  Positive for rhinorrhea. Negative for hearing loss and trouble swallowing.    Eyes:  Negative for discharge and visual disturbance.   Respiratory:  Negative for chest tightness and wheezing.    Cardiovascular:  Negative for chest pain and palpitations.   Gastrointestinal:  Positive for blood in stool and diarrhea. Negative for constipation and vomiting.   Endocrine: Negative for polydipsia and polyuria.   Genitourinary:  Negative for difficulty urinating, dysuria, hematuria and menstrual  problem.   Musculoskeletal:  Positive for neck pain. Negative for arthralgias and joint swelling.   Neurological:  Positive for headaches. Negative for weakness.   Psychiatric/Behavioral:  Negative for confusion and dysphoric mood.      Objective:      Physical Exam  Constitutional:       General: She is not in acute distress.     Appearance: Normal appearance. She is not ill-appearing.   Neurological:      Mental Status: She is alert.       Assessment:       No diagnosis found.    Plan:   Vaginal candidiasis  Terazol 7  BV (bacterial vaginosis)   One week one metronidazole - advised no alcohol while taking    Needs to be seen if symptoms persist after completing meds for a vaginal exam and vaginosis screen

## 2022-09-06 NOTE — TELEPHONE ENCOUNTER
No new care gaps identified.  [facility] Embedded Care Gaps. Reference number: [id number]. 9/06/2022   10:06:02 AM JOSE

## 2022-09-08 ENCOUNTER — OFFICE VISIT (OUTPATIENT)
Dept: INTERNAL MEDICINE | Facility: CLINIC | Age: 37
End: 2022-09-08
Payer: COMMERCIAL

## 2022-09-08 VITALS
HEART RATE: 88 BPM | BODY MASS INDEX: 29.23 KG/M2 | TEMPERATURE: 97 F | DIASTOLIC BLOOD PRESSURE: 88 MMHG | SYSTOLIC BLOOD PRESSURE: 132 MMHG | WEIGHT: 192.88 LBS | OXYGEN SATURATION: 98 % | HEIGHT: 68 IN | RESPIRATION RATE: 16 BRPM

## 2022-09-08 DIAGNOSIS — F90.2 ATTENTION DEFICIT HYPERACTIVITY DISORDER (ADHD), COMBINED TYPE: ICD-10-CM

## 2022-09-08 DIAGNOSIS — F41.9 ANXIETY: ICD-10-CM

## 2022-09-08 DIAGNOSIS — K58.0 IRRITABLE BOWEL SYNDROME WITH DIARRHEA: ICD-10-CM

## 2022-09-08 DIAGNOSIS — F51.01 PRIMARY INSOMNIA: ICD-10-CM

## 2022-09-08 DIAGNOSIS — R00.0 TACHYCARDIA: ICD-10-CM

## 2022-09-08 DIAGNOSIS — I10 ESSENTIAL (PRIMARY) HYPERTENSION: ICD-10-CM

## 2022-09-08 DIAGNOSIS — Z00.00 WELL ADULT EXAM: Primary | ICD-10-CM

## 2022-09-08 PROBLEM — K58.9 IRRITABLE BOWEL SYNDROME WITHOUT DIARRHEA: Status: RESOLVED | Noted: 2018-12-01 | Resolved: 2022-09-08

## 2022-09-08 PROBLEM — K50.90 CROHN'S DISEASE, UNSPECIFIED, WITHOUT COMPLICATIONS: Status: RESOLVED | Noted: 2018-12-01 | Resolved: 2022-09-08

## 2022-09-08 PROCEDURE — 1160F RVW MEDS BY RX/DR IN RCRD: CPT | Mod: CPTII,S$GLB,, | Performed by: FAMILY MEDICINE

## 2022-09-08 PROCEDURE — 99395 PREV VISIT EST AGE 18-39: CPT | Mod: S$GLB,,, | Performed by: FAMILY MEDICINE

## 2022-09-08 PROCEDURE — 4010F PR ACE/ARB THEARPY RXD/TAKEN: ICD-10-PCS | Mod: CPTII,S$GLB,, | Performed by: FAMILY MEDICINE

## 2022-09-08 PROCEDURE — 1159F PR MEDICATION LIST DOCUMENTED IN MEDICAL RECORD: ICD-10-PCS | Mod: CPTII,S$GLB,, | Performed by: FAMILY MEDICINE

## 2022-09-08 PROCEDURE — 99999 PR PBB SHADOW E&M-EST. PATIENT-LVL V: ICD-10-PCS | Mod: PBBFAC,,, | Performed by: FAMILY MEDICINE

## 2022-09-08 PROCEDURE — 99395 PR PREVENTIVE VISIT,EST,18-39: ICD-10-PCS | Mod: S$GLB,,, | Performed by: FAMILY MEDICINE

## 2022-09-08 PROCEDURE — 3044F PR MOST RECENT HEMOGLOBIN A1C LEVEL <7.0%: ICD-10-PCS | Mod: CPTII,S$GLB,, | Performed by: FAMILY MEDICINE

## 2022-09-08 PROCEDURE — 1160F PR REVIEW ALL MEDS BY PRESCRIBER/CLIN PHARMACIST DOCUMENTED: ICD-10-PCS | Mod: CPTII,S$GLB,, | Performed by: FAMILY MEDICINE

## 2022-09-08 PROCEDURE — 99999 PR PBB SHADOW E&M-EST. PATIENT-LVL V: CPT | Mod: PBBFAC,,, | Performed by: FAMILY MEDICINE

## 2022-09-08 PROCEDURE — 3075F SYST BP GE 130 - 139MM HG: CPT | Mod: CPTII,S$GLB,, | Performed by: FAMILY MEDICINE

## 2022-09-08 PROCEDURE — 3008F PR BODY MASS INDEX (BMI) DOCUMENTED: ICD-10-PCS | Mod: CPTII,S$GLB,, | Performed by: FAMILY MEDICINE

## 2022-09-08 PROCEDURE — 3079F PR MOST RECENT DIASTOLIC BLOOD PRESSURE 80-89 MM HG: ICD-10-PCS | Mod: CPTII,S$GLB,, | Performed by: FAMILY MEDICINE

## 2022-09-08 PROCEDURE — 3008F BODY MASS INDEX DOCD: CPT | Mod: CPTII,S$GLB,, | Performed by: FAMILY MEDICINE

## 2022-09-08 PROCEDURE — 3044F HG A1C LEVEL LT 7.0%: CPT | Mod: CPTII,S$GLB,, | Performed by: FAMILY MEDICINE

## 2022-09-08 PROCEDURE — 3079F DIAST BP 80-89 MM HG: CPT | Mod: CPTII,S$GLB,, | Performed by: FAMILY MEDICINE

## 2022-09-08 PROCEDURE — 4010F ACE/ARB THERAPY RXD/TAKEN: CPT | Mod: CPTII,S$GLB,, | Performed by: FAMILY MEDICINE

## 2022-09-08 PROCEDURE — 1159F MED LIST DOCD IN RCRD: CPT | Mod: CPTII,S$GLB,, | Performed by: FAMILY MEDICINE

## 2022-09-08 PROCEDURE — 3075F PR MOST RECENT SYSTOLIC BLOOD PRESS GE 130-139MM HG: ICD-10-PCS | Mod: CPTII,S$GLB,, | Performed by: FAMILY MEDICINE

## 2022-09-08 RX ORDER — METOPROLOL SUCCINATE 50 MG/1
50 TABLET, EXTENDED RELEASE ORAL 2 TIMES DAILY
Qty: 180 TABLET | Refills: 3 | Status: SHIPPED | OUTPATIENT
Start: 2022-09-08 | End: 2023-09-14 | Stop reason: SDUPTHER

## 2022-09-08 NOTE — PROGRESS NOTES
Subjective:       Patient ID: April Wendt Schamberger is a 37 y.o. female.    Chief Complaint: Annual Exam    HPI 37-year-old white female presents to clinic today for annual physical exam.  She continues to be followed by Psychiatry for treatment of ADHD and anxiety which remains stable on Vyvanse and venlafaxine.  Hypertension with associated tachycardia remains well controlled on valsartan 320 mg daily and metoprolol 50 mg b.i.d..  She continues to be followed by GI secondary to irritable bowel syndrome with diarrhea.  She remains stable on Lomotil and Imodium.  She does note occasional episodes of bright red blood per rectum.  She continues to use Lunesta as needed for insomnia with improvement of her sleep.  She reports a past surgical history of tonsillectomy, adenoidectomy, EGD, colonoscopy, LASIK eye surgery, and right ulnar release.  She reports a family history of diabetes, hypertension, breast cancer, and prostate cancer.  She is up-to-date with all vaccinations.  Review of Systems   Constitutional:  Negative for appetite change, chills, fatigue and fever.   HENT:  Negative for nasal congestion, ear pain, hearing loss, postnasal drip, rhinorrhea, sinus pressure/congestion, sore throat and tinnitus.    Eyes:  Negative for redness, itching and visual disturbance.   Respiratory:  Negative for cough, chest tightness and shortness of breath.    Cardiovascular:  Negative for chest pain and palpitations.   Gastrointestinal:  Positive for diarrhea. Negative for abdominal pain, constipation, nausea and vomiting.   Genitourinary:  Negative for decreased urine volume, difficulty urinating, dysuria, frequency, hematuria and urgency.   Musculoskeletal:  Negative for back pain, myalgias, neck pain and neck stiffness.   Integumentary:  Negative for rash.   Neurological:  Negative for dizziness, light-headedness and headaches.   Psychiatric/Behavioral: Negative.         Objective:      Physical Exam  Vitals and nursing  note reviewed.   Constitutional:       General: She is not in acute distress.     Appearance: She is well-developed. She is not diaphoretic.   HENT:      Head: Normocephalic and atraumatic.      Right Ear: External ear normal.      Left Ear: External ear normal.      Nose: Nose normal.      Mouth/Throat:      Pharynx: No oropharyngeal exudate.   Eyes:      General: No scleral icterus.        Right eye: No discharge.         Left eye: No discharge.      Conjunctiva/sclera: Conjunctivae normal.      Pupils: Pupils are equal, round, and reactive to light.   Neck:      Thyroid: No thyromegaly.      Vascular: No JVD.      Trachea: No tracheal deviation.   Cardiovascular:      Rate and Rhythm: Normal rate and regular rhythm.      Heart sounds: Normal heart sounds. No murmur heard.    No friction rub. No gallop.   Pulmonary:      Effort: Pulmonary effort is normal. No respiratory distress.      Breath sounds: Normal breath sounds. No stridor. No wheezing or rales.   Abdominal:      General: Bowel sounds are normal. There is no distension.      Palpations: Abdomen is soft. There is no mass.      Tenderness: There is no abdominal tenderness. There is no guarding or rebound.   Musculoskeletal:         General: No tenderness. Normal range of motion.      Cervical back: Normal range of motion and neck supple.   Lymphadenopathy:      Cervical: No cervical adenopathy.   Skin:     General: Skin is warm and dry.      Coloration: Skin is not pale.      Findings: No erythema or rash.   Neurological:      Mental Status: She is alert and oriented to person, place, and time.   Psychiatric:         Behavior: Behavior normal.         Thought Content: Thought content normal.         Judgment: Judgment normal.       Assessment:       Problem List Items Addressed This Visit       Anxiety    Attention deficit hyperactivity disorder (ADHD), combined type    Essential (primary) hypertension    Relevant Medications    metoprolol succinate  (TOPROL-XL) 50 MG 24 hr tablet    Irritable bowel syndrome with diarrhea    Primary insomnia    Tachycardia    Relevant Medications    metoprolol succinate (TOPROL-XL) 50 MG 24 hr tablet     Other Visit Diagnoses       Well adult exam    -  Primary            Plan:         1. Labs have been reviewed and are overall within normal limits.  2. Continue valsartan 320 mg daily and metoprolol 50 mg b.i.d..  Hypertension and tachycardia are well controlled.  3. Continue Lunesta as needed for insomnia.  Insomnia stable.   has been checked and the patient is compliant with medication.  4. Continue venlafaxine and Vyvanse as prescribed by Psychiatry and continue follow-up with psychiatry as scheduled.  Anxiety and ADHD remains stable.  5. Continue use of Imodium and Lomotil as prescribed and continue follow-up with GI as scheduled.  IBS-D remains stable.  6. Return to clinic as needed or in 1 year for annual physical exam.

## 2022-09-25 DIAGNOSIS — M79.18 MYOFASCIAL PAIN SYNDROME, CERVICAL: ICD-10-CM

## 2022-09-25 DIAGNOSIS — M62.838 MUSCLE SPASM: ICD-10-CM

## 2022-09-26 RX ORDER — TIZANIDINE 4 MG/1
4 TABLET ORAL 3 TIMES DAILY PRN
Qty: 90 TABLET | Refills: 1 | Status: SHIPPED | OUTPATIENT
Start: 2022-09-26 | End: 2022-12-15 | Stop reason: SDUPTHER

## 2022-09-27 ENCOUNTER — PATIENT MESSAGE (OUTPATIENT)
Dept: INTERNAL MEDICINE | Facility: CLINIC | Age: 37
End: 2022-09-27
Payer: COMMERCIAL

## 2022-09-28 ENCOUNTER — PATIENT MESSAGE (OUTPATIENT)
Dept: ADMINISTRATIVE | Facility: OTHER | Age: 37
End: 2022-09-28
Payer: COMMERCIAL

## 2022-09-29 ENCOUNTER — PATIENT MESSAGE (OUTPATIENT)
Dept: INTERNAL MEDICINE | Facility: CLINIC | Age: 37
End: 2022-09-29
Payer: COMMERCIAL

## 2022-09-29 ENCOUNTER — PATIENT MESSAGE (OUTPATIENT)
Dept: ORTHOPEDICS | Facility: CLINIC | Age: 37
End: 2022-09-29
Payer: COMMERCIAL

## 2022-09-29 RX ORDER — RIZATRIPTAN BENZOATE 10 MG/1
TABLET, ORALLY DISINTEGRATING ORAL
Qty: 9 TABLET | Refills: 6 | Status: SHIPPED | OUTPATIENT
Start: 2022-09-29 | End: 2023-05-28

## 2022-09-29 NOTE — TELEPHONE ENCOUNTER
PT is requesting an Rx for rizatriptan 10mg disintegrating tabs: take 1/2 tablet by mouth as needed for migraine. May repeat at 2 hrs if headache returns to [facility] pharmacy. # [phone].

## 2022-10-04 DIAGNOSIS — R51.9 NONINTRACTABLE HEADACHE, UNSPECIFIED CHRONICITY PATTERN, UNSPECIFIED HEADACHE TYPE: ICD-10-CM

## 2022-10-04 DIAGNOSIS — F51.01 PRIMARY INSOMNIA: ICD-10-CM

## 2022-10-04 RX ORDER — BUTALBITAL, ACETAMINOPHEN AND CAFFEINE 50; 325; 40 MG/1; MG/1; MG/1
1 TABLET ORAL EVERY 6 HOURS PRN
Qty: 30 TABLET | Refills: 0 | Status: CANCELLED | OUTPATIENT
Start: 2022-10-04

## 2022-10-04 NOTE — TELEPHONE ENCOUNTER
No new care gaps identified.  [facility] Embedded Care Gaps. Reference number: [id number]. 10/04/2022   2:16:48 PM CDT

## 2022-10-05 RX ORDER — ESZOPICLONE 3 MG/1
3 TABLET, FILM COATED ORAL NIGHTLY PRN
Qty: 30 TABLET | Refills: 0 | Status: SHIPPED | OUTPATIENT
Start: 2022-10-05 | End: 2022-11-01 | Stop reason: SDUPTHER

## 2022-10-18 DIAGNOSIS — F90.2 ATTENTION DEFICIT HYPERACTIVITY DISORDER (ADHD), COMBINED TYPE: ICD-10-CM

## 2022-10-18 RX ORDER — LISDEXAMFETAMINE DIMESYLATE 70 MG/1
70 CAPSULE ORAL EVERY MORNING
Qty: 30 CAPSULE | Refills: 0 | Status: CANCELLED | OUTPATIENT
Start: 2022-10-18 | End: 2022-11-17

## 2022-10-20 DIAGNOSIS — K58.0 IRRITABLE BOWEL SYNDROME WITH DIARRHEA: ICD-10-CM

## 2022-10-20 DIAGNOSIS — F41.9 ANXIETY: ICD-10-CM

## 2022-10-20 RX ORDER — AMITRIPTYLINE HYDROCHLORIDE 25 MG/1
25 TABLET, FILM COATED ORAL NIGHTLY
Qty: 90 TABLET | Refills: 0 | Status: CANCELLED | OUTPATIENT
Start: 2022-10-20 | End: 2023-01-18

## 2022-10-20 RX ORDER — LISDEXAMFETAMINE DIMESYLATE 70 MG/1
70 CAPSULE ORAL EVERY MORNING
Qty: 30 CAPSULE | Refills: 0 | Status: SHIPPED | OUTPATIENT
Start: 2022-10-20 | End: 2022-11-18 | Stop reason: SDUPTHER

## 2022-10-20 NOTE — TELEPHONE ENCOUNTER
Vyvanse 70 mg PO daily, pt requesting refill:   - PDMP reviewed: patient filling all medications on time, no abnormalities noted    - Last refill picked up on 9/19/22   - Provided with one refills, starting on 10/20/22    Ray Gloria MD  [facility], PGY-III

## 2022-11-01 DIAGNOSIS — F51.01 PRIMARY INSOMNIA: ICD-10-CM

## 2022-11-01 DIAGNOSIS — R51.9 NONINTRACTABLE HEADACHE, UNSPECIFIED CHRONICITY PATTERN, UNSPECIFIED HEADACHE TYPE: ICD-10-CM

## 2022-11-01 RX ORDER — ESZOPICLONE 3 MG/1
3 TABLET, FILM COATED ORAL NIGHTLY PRN
Qty: 30 TABLET | Refills: 0 | Status: SHIPPED | OUTPATIENT
Start: 2022-11-01 | End: 2022-11-28 | Stop reason: SDUPTHER

## 2022-11-01 RX ORDER — BUTALBITAL, ACETAMINOPHEN AND CAFFEINE 50; 325; 40 MG/1; MG/1; MG/1
1 TABLET ORAL EVERY 6 HOURS PRN
Qty: 30 TABLET | Refills: 0 | Status: SHIPPED | OUTPATIENT
Start: 2022-11-01 | End: 2022-11-28 | Stop reason: SDUPTHER

## 2022-11-01 NOTE — TELEPHONE ENCOUNTER
No new care gaps identified.  [facility] Embedded Care Gaps. Reference number: [id number]. 11/01/2022   2:20:31 PM CDT

## 2022-11-12 DIAGNOSIS — K58.0 IRRITABLE BOWEL SYNDROME WITH DIARRHEA: ICD-10-CM

## 2022-11-14 RX ORDER — DIPHENOXYLATE HYDROCHLORIDE AND ATROPINE SULFATE 2.5; .025 MG/1; MG/1
1 TABLET ORAL 4 TIMES DAILY PRN
Qty: 360 TABLET | Refills: 0 | Status: SHIPPED | OUTPATIENT
Start: 2022-11-14 | End: 2023-02-11 | Stop reason: SDUPTHER

## 2022-11-18 ENCOUNTER — OFFICE VISIT (OUTPATIENT)
Dept: PSYCHIATRY | Facility: CLINIC | Age: 37
End: 2022-11-18
Payer: COMMERCIAL

## 2022-11-18 DIAGNOSIS — F41.1 GAD (GENERALIZED ANXIETY DISORDER): ICD-10-CM

## 2022-11-18 DIAGNOSIS — F32.81 PMDD (PREMENSTRUAL DYSPHORIC DISORDER): ICD-10-CM

## 2022-11-18 DIAGNOSIS — F90.2 ATTENTION DEFICIT HYPERACTIVITY DISORDER (ADHD), COMBINED TYPE: ICD-10-CM

## 2022-11-18 DIAGNOSIS — K58.0 IRRITABLE BOWEL SYNDROME WITH DIARRHEA: Primary | ICD-10-CM

## 2022-11-18 PROCEDURE — 99213 PR OFFICE/OUTPT VISIT, EST, LEVL III, 20-29 MIN: ICD-10-PCS | Mod: 95,,, | Performed by: STUDENT IN AN ORGANIZED HEALTH CARE EDUCATION/TRAINING PROGRAM

## 2022-11-18 PROCEDURE — 99213 OFFICE O/P EST LOW 20 MIN: CPT | Mod: 95,,, | Performed by: STUDENT IN AN ORGANIZED HEALTH CARE EDUCATION/TRAINING PROGRAM

## 2022-11-18 RX ORDER — LISDEXAMFETAMINE DIMESYLATE 70 MG/1
70 CAPSULE ORAL EVERY MORNING
Qty: 30 CAPSULE | Refills: 0 | Status: SHIPPED | OUTPATIENT
Start: 2022-12-15 | End: 2023-01-22

## 2022-11-18 RX ORDER — VENLAFAXINE HYDROCHLORIDE 37.5 MG/1
112.5 CAPSULE, EXTENDED RELEASE ORAL DAILY
Qty: 90 CAPSULE | Refills: 2 | Status: SHIPPED | OUTPATIENT
Start: 2022-11-18 | End: 2023-03-10 | Stop reason: SDUPTHER

## 2022-11-18 RX ORDER — LISDEXAMFETAMINE DIMESYLATE 70 MG/1
70 CAPSULE ORAL EVERY MORNING
Qty: 30 CAPSULE | Refills: 0 | Status: SHIPPED | OUTPATIENT
Start: 2023-02-09 | End: 2023-03-10 | Stop reason: SDUPTHER

## 2022-11-18 RX ORDER — DEXTROAMPHETAMINE SACCHARATE, AMPHETAMINE ASPARTATE, DEXTROAMPHETAMINE SULFATE AND AMPHETAMINE SULFATE 2.5; 2.5; 2.5; 2.5 MG/1; MG/1; MG/1; MG/1
10 TABLET ORAL
Qty: 30 TABLET | Refills: 0 | Status: SHIPPED | OUTPATIENT
Start: 2022-11-18 | End: 2022-12-18

## 2022-11-18 RX ORDER — DEXTROAMPHETAMINE SACCHARATE, AMPHETAMINE ASPARTATE, DEXTROAMPHETAMINE SULFATE AND AMPHETAMINE SULFATE 2.5; 2.5; 2.5; 2.5 MG/1; MG/1; MG/1; MG/1
10 TABLET ORAL
Qty: 30 TABLET | Refills: 0 | Status: SHIPPED | OUTPATIENT
Start: 2022-12-16 | End: 2023-03-10

## 2022-11-18 RX ORDER — LISDEXAMFETAMINE DIMESYLATE 70 MG/1
70 CAPSULE ORAL EVERY MORNING
Qty: 30 CAPSULE | Refills: 0 | Status: SHIPPED | OUTPATIENT
Start: 2023-01-12 | End: 2023-02-16

## 2022-11-18 NOTE — PROGRESS NOTES
"OUTPATIENT PSYCHIATRY FOLLOW UP VISIT    ENCOUNTER DATE:  11/18/2022  SITE:  [facility], [facility]  LENGTH OF SESSION:  30 minutes      CHIEF COMPLAINT:  Follow up for medication management    HISTORY OF PRESENTING ILLNESS:  April Wendt Schamberger is a 37 y.o. female with history of ADHD, PMDD, NIRAV, IBS-D  who presents for follow up appointment. Patient was last seen in this clinic three months ago, during which  Vyvanse was increased.     Per last note, current psychotropic medications included:  - Vyvanse 70 mg po daily for ADHD  - Amitriptyline 25 mg qhs for anxiety, sleep, and IBS with diarrhea  - Venlafaxine to 37.5mg for PMDD daily with extra 37.5 mg for luteal phase symptoms (prescribed as 37.5 mg two times daily)    Interval history as told by Patient - & or family/friend/spouse/caregiver with pts permission    Patient reports that Vyvanse appears to be wearing off in the early afternoon (between noon and 1 PM). Reports she takes it around 7 AM consistently. Typically when the medication wears off, she describes significant difficulty focusing on tasks. She describes "reading the same paragraph twenty times" without understanding it. She is able to function, despite the waning effects of the Vyvanse in the afternoon, as she plans to do her hardest tasks in the morning. Regarding mood, she reports some decrease in irritability since her last appointment. Attributes this to increasing her Effexor. Reports taking two Effexor pills daily (total 75 mg), even when not in luteal phase. She states she started doing this because due to recent increased stressor at home. Doing well with Amitriptyline, but reports some early morning drowsiness. Sleeping only 4-5 hours per night, which she states is normal for her. Feels like she can function well with this amount of sleep and feels more drowsy when she sleeps longer than this. Takes Lunesta, per PCP. No other symptoms of depression at this time. " "Denies SI. Generalized anxiety appears to be well-controlled at this time. No history of luly or psychosis. Denies AVH. Denies recreational substance use. Drinks 1-2 Truly once per week.    PSYCHIATRIC REVIEW OF SYSTEMS:    Symptoms of Depression: None    Symptoms of Anxiety/ panic attacks: None    Symptoms of Luly/Hypomania: None    Symptoms of psychosis: None    Sleep: None    Alcohol: No excessive drinking reported    Illicit Drugs: No illicit substance use reported      Risk Parameters:  Patient denies no SI, HI or self-injurious behavior      PSYCHIATRIC MED REVIEW  Current medications:  See above in last plan    Current psych meds  Medication side effects:  Morning drowsiness associated with Amitriptyline, tolerable to patient  Medication compliance:  Full    Medical Hx:  Chronic neck pain, HTN, Ulnar tunnel release, IBS-D  Medications: Lunesta, Metoprolol, valsartan, lamotil, immodium PRN, Zofran PRN, tizanidine    Psychiatric Hx:  Diagnosis: ADHD, combined type, unspecified anxiety, PMDD, IBS-D  Previous medication trials:  Antidepressants   - Zoloft: AE: diarrhea   - Elavil: currently taking   - Effexor: currently taking  Stimulants   - Adderall XR: currently taking   - Adderall IR: AE: "cranky"  Anxiolytics   - Klonopin: SE drowsiness  Sedatives   - Ambien   - Lunesta: currently taking (per PCP)  Other   - Melatonin   - Benadryl  Hospitalizations: none  Suicide attempt: none    Social Hx: Lives with , two children (4 & 7 year old). 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    Family Hx: Sister: depression (currently on Prozac)      PAST PSYCHIATRIC, MEDICAL, AND SOCIAL HISTORY REVIEWED  The patient's past medical, family and social history have been reviewed and updated as appropriate within the electronic medical record - see encounter notes.    MEDICAL REVIEW OF SYSTEMS:  Complete review of systems performed covering Constitutional, Musculoskeletal, Neurologic.  All " systems negative.    ALL MEDICATIONS:    Current Outpatient Medications:     acetaminophen (TYLENOL) 500 MG tablet, Take 2 tablets (1,000 mg total) by mouth 3 (three) times daily as needed for Pain., Disp: , Rfl: 0    amitriptyline (ELAVIL) 25 MG tablet, Take 1 tablet (25 mg total) by mouth every evening., Disp: 90 tablet, Rfl: 3    butalbital-acetaminophen-caffeine -40 mg (FIORICET, ESGIC) -40 mg per tablet, Take 1 tablet by mouth every 6 (six) hours as needed for Headaches., Disp: 30 tablet, Rfl: 0    diphenoxylate-atropine 2.5-0.025 mg (LOMOTIL) 2.5-0.025 mg per tablet, Take 1 tablet by mouth 4 (four) times daily as needed for Diarrhea (diarrhea)., Disp: 360 tablet, Rfl: 0    eszopiclone (LUNESTA) 3 mg Tab, Take 1 tablet (3 mg total) by mouth nightly as needed (insomnia)., Disp: 30 tablet, Rfl: 0    fluconazole (DIFLUCAN) 150 MG Tab, Take 1 tablet as needed for yeast infection, Disp: 30 tablet, Rfl: 1    HYDROcodone-acetaminophen (NORCO) 5-325 mg per tablet, Take 1 tablet by mouth every 4 (four) hours as needed for Pain., Disp: 12 tablet, Rfl: 0    lisdexamfetamine (VYVANSE) 70 MG capsule, Take 1 capsule (70 mg total) by mouth every morning., Disp: 30 capsule, Rfl: 0    lisdexamfetamine (VYVANSE) 70 MG capsule, Take 1 capsule (70 mg total) by mouth every morning., Disp: 30 capsule, Rfl: 0    loperamide (IMODIUM) 1 mg/7.5 mL solution, Take 0.1 mg/kg by mouth every 12 (twelve) hours., Disp: , Rfl:     loperamide (IMODIUM) 2 mg capsule, Take 2 mg by mouth 4 (four) times daily as needed for Diarrhea., Disp: , Rfl:     metoprolol succinate (TOPROL-XL) 50 MG 24 hr tablet, Take 1 tablet (50 mg total) by mouth 2 (two) times daily., Disp: 180 tablet, Rfl: 3    metroNIDAZOLE (FLAGYL) 500 MG tablet, Take 1 tablet (500 mg total) by mouth every 12 (twelve) hours. Do not drink alcohol while taking this medication, Disp: 14 tablet, Rfl: 0    pregabalin (LYRICA) 50 MG capsule, Take 1 capsule (50 mg total) by mouth 2  (two) times daily., Disp: 60 capsule, Rfl: 2    rizatriptan (MAXALT-MLT) 10 MG disintegrating tablet, Take 1/2 tablet (5 mg total) by mouth as needed for Migraine (repeat dose once in 2 hours if headache return.)., Disp: 9 tablet, Rfl: 6    terconazole (TERAZOL 7) 0.4 % Crea, Place 1 applicator vaginally every evening., Disp: 45 g, Rfl: 0    tiZANidine (ZANAFLEX) 4 MG tablet, Take 1 tablet (4 mg total) by mouth 3 (three) times daily as needed., Disp: 90 tablet, Rfl: 1    tretinoin (RETIN-A) 0.025 % cream, Apply topically every evening. Pea sized amount to entire face. If irritation occurs, decrease use to every other night., Disp: 20 g, Rfl: 5    valsartan (DIOVAN) 320 MG tablet, Take 1 tablet (320 mg total) by mouth once daily., Disp: 90 tablet, Rfl: 0    venlafaxine (EFFEXOR-XR) 37.5 MG 24 hr capsule, Take 1 capsule (37.5 mg total) by mouth 2 (two) times a day., Disp: 60 capsule, Rfl: 3  No current facility-administered medications for this visit.    Facility-Administered Medications Ordered in Other Visits:     0.9%  NaCl infusion, 500 mL, Intravenous, Continuous, Corky Argueta Jr., MD    ALLERGIES:  Review of patient's allergies indicates:   Allergen Reactions    Lactose     Azithromycin Nausea And Vomiting       RELEVANT LABS/STUDIES:    Lab Results   Component Value Date    WBC 8.22 09/01/2022    HGB 13.0 09/01/2022    HCT 39.0 09/01/2022    MCV 87 09/01/2022     09/01/2022     BMP  Lab Results   Component Value Date     09/01/2022    K 4.1 09/01/2022     09/01/2022    CO2 25 09/01/2022    BUN 5 (L) 09/01/2022    CREATININE 0.8 09/01/2022    CALCIUM 9.1 09/01/2022    ANIONGAP 10 09/01/2022    ESTGFRAFRICA >60 09/27/2021    EGFRNONAA >60 09/27/2021     Lab Results   Component Value Date    ALT 6 (L) 09/01/2022    AST 12 09/01/2022    ALKPHOS 62 09/01/2022    BILITOT 0.2 09/01/2022     Lab Results   Component Value Date    TSH 1.042 09/01/2022     Lab Results   Component Value Date     HGBA1C 5.4 09/01/2022       VITALS  There were no vitals filed for this visit.      PHYSICAL EXAM  General: No acute distress, well developed/nourished  Neurologic:   Gait: Normal   Psychomotor signs:  No involuntary movements or tremor  AIMS: none    PSYCHIATRIC EXAM:     Mental Status Exam:  Appearance: good grooming, age appropriate  Behavior/Cooperation: normal, cooperative   Speech:  normal tone, normal rate, normal pitch, normal volume  Language: uses words appropriately; NO aphasia or dysarthria  Mood: Good  Affect:  Euthymic  Thought Process: Normal and logical  Thought Content: No SI, HI, AVH  Level of Consciousness: Alert and Oriented x3  Memory:  Recent and remote intact  Attention/concentration: appropriate for age/education.   Fund of Knowledge: appears adequate  Insight:  Intact  Judgment: Intact      IMPRESSION:    April Wendt Schamberger is a 37 y.o. female with history of ADHD, PMDD, NIRAV, IBS-D who presents for follow up appointment for medication management.  The  has been reviewed, no concerning signs were noted.       Status/Progress:  Based on the examination today, the patient's problem(s) is/are well controlled.  New problems have not been presented today.     DIAGNOSES:    ICD-10-CM ICD-9-CM   1. Irritable bowel syndrome with diarrhea  K58.0 564.1   2. Attention deficit hyperactivity disorder (ADHD), combined type  F90.2 314.01   3. PMDD (premenstrual dysphoric disorder)  F32.81 625.4   4. NIRAV (generalized anxiety disorder)  F41.1 300.02         PLAN:  Psych Med:  - Continue Vyvanse 70 mg po daily for ADHD, provided with three 30-day prescriptions  - Start Adderall IR 10 mg PO daily for breakthrough ADHD symptoms  - PDMP reviewed: patient filling all medications on time, no abnormalities noted  - Continue amitriptyline 25 mg qhs for anxiety, sleep, and IBS with diarrhea; patient taking full tab daily; consider increasing for IBS-D once ADHD medication is stable  - Increase Effexor to 75 mg  for PMDD daily with extra 37.5 mg for luteal phase symptoms      Discussed with patient informed consent, risks vs. benefits, alternative treatments, side effect profile and the inherent unpredictability of individual responses to these treatments. Answered any questions patient may have had. The patient expresses understanding of the above and displays the capacity to agree with this current plan       Other: None      RETURN TO CLINIC:  3 months    Case to be discussed with supervising staff, MD Ray Rangel MD  [facility], PGY-III   11/18/2022 9:17 AM

## 2022-11-28 DIAGNOSIS — M79.18 MYOFASCIAL PAIN SYNDROME, CERVICAL: ICD-10-CM

## 2022-11-28 DIAGNOSIS — M62.838 MUSCLE SPASM: ICD-10-CM

## 2022-11-28 DIAGNOSIS — R51.9 NONINTRACTABLE HEADACHE, UNSPECIFIED CHRONICITY PATTERN, UNSPECIFIED HEADACHE TYPE: ICD-10-CM

## 2022-11-28 DIAGNOSIS — F51.01 PRIMARY INSOMNIA: ICD-10-CM

## 2022-11-28 RX ORDER — BUTALBITAL, ACETAMINOPHEN AND CAFFEINE 50; 325; 40 MG/1; MG/1; MG/1
1 TABLET ORAL EVERY 6 HOURS PRN
Qty: 30 TABLET | Refills: 0 | Status: SHIPPED | OUTPATIENT
Start: 2022-11-28 | End: 2022-12-29 | Stop reason: SDUPTHER

## 2022-11-28 RX ORDER — TIZANIDINE 4 MG/1
4 TABLET ORAL 3 TIMES DAILY PRN
Qty: 90 TABLET | Refills: 1 | OUTPATIENT
Start: 2022-11-28 | End: 2023-01-27

## 2022-11-28 RX ORDER — ESZOPICLONE 3 MG/1
3 TABLET, FILM COATED ORAL NIGHTLY PRN
Qty: 30 TABLET | Refills: 0 | Status: SHIPPED | OUTPATIENT
Start: 2022-11-28 | End: 2022-12-29 | Stop reason: SDUPTHER

## 2022-11-28 NOTE — TELEPHONE ENCOUNTER
No new care gaps identified.  [facility] Embedded Care Gaps. Reference number: [id number]. 11/28/2022   7:53:45 AM CST

## 2022-12-05 ENCOUNTER — PATIENT MESSAGE (OUTPATIENT)
Dept: INTERNAL MEDICINE | Facility: CLINIC | Age: 37
End: 2022-12-05
Payer: COMMERCIAL

## 2022-12-05 DIAGNOSIS — M79.18 MYOFASCIAL PAIN SYNDROME, CERVICAL: ICD-10-CM

## 2022-12-05 DIAGNOSIS — M62.838 MUSCLE SPASM: ICD-10-CM

## 2022-12-05 RX ORDER — TIZANIDINE 4 MG/1
4 TABLET ORAL 3 TIMES DAILY PRN
Qty: 90 TABLET | Refills: 1 | Status: CANCELLED | OUTPATIENT
Start: 2022-12-05 | End: 2023-02-03

## 2022-12-05 NOTE — TELEPHONE ENCOUNTER
No new care gaps identified.  [facility] Embedded Care Gaps. Reference number: [id number]. 12/05/2022   11:11:33 AM CST

## 2022-12-15 DIAGNOSIS — M79.18 MYOFASCIAL PAIN SYNDROME, CERVICAL: ICD-10-CM

## 2022-12-15 DIAGNOSIS — M62.838 MUSCLE SPASM: ICD-10-CM

## 2022-12-15 RX ORDER — TIZANIDINE 4 MG/1
4 TABLET ORAL 3 TIMES DAILY PRN
Qty: 90 TABLET | Refills: 1 | Status: SHIPPED | OUTPATIENT
Start: 2022-12-15 | End: 2023-02-11 | Stop reason: SDUPTHER

## 2022-12-29 DIAGNOSIS — R51.9 NONINTRACTABLE HEADACHE, UNSPECIFIED CHRONICITY PATTERN, UNSPECIFIED HEADACHE TYPE: ICD-10-CM

## 2022-12-29 DIAGNOSIS — F51.01 PRIMARY INSOMNIA: ICD-10-CM

## 2022-12-29 RX ORDER — ESZOPICLONE 3 MG/1
3 TABLET, FILM COATED ORAL NIGHTLY PRN
Qty: 30 TABLET | Refills: 0 | Status: SHIPPED | OUTPATIENT
Start: 2022-12-29 | End: 2023-01-25 | Stop reason: SDUPTHER

## 2022-12-29 RX ORDER — BUTALBITAL, ACETAMINOPHEN AND CAFFEINE 50; 325; 40 MG/1; MG/1; MG/1
1 TABLET ORAL EVERY 6 HOURS PRN
Qty: 30 TABLET | Refills: 0 | Status: SHIPPED | OUTPATIENT
Start: 2022-12-29 | End: 2023-01-25 | Stop reason: SDUPTHER

## 2022-12-29 NOTE — TELEPHONE ENCOUNTER
No new care gaps identified.  [facility] Embedded Care Gaps. Reference number: [id number]. 12/29/2022   9:42:07 AM CST

## 2023-01-12 ENCOUNTER — OFFICE VISIT (OUTPATIENT)
Dept: PSYCHIATRY | Facility: CLINIC | Age: 38
End: 2023-01-12
Payer: COMMERCIAL

## 2023-01-12 VITALS
DIASTOLIC BLOOD PRESSURE: 83 MMHG | WEIGHT: 185.5 LBS | SYSTOLIC BLOOD PRESSURE: 124 MMHG | BODY MASS INDEX: 28.21 KG/M2 | HEART RATE: 80 BPM

## 2023-01-12 DIAGNOSIS — K58.0 IRRITABLE BOWEL SYNDROME WITH DIARRHEA: ICD-10-CM

## 2023-01-12 DIAGNOSIS — F32.81 PMDD (PREMENSTRUAL DYSPHORIC DISORDER): ICD-10-CM

## 2023-01-12 DIAGNOSIS — F41.1 GAD (GENERALIZED ANXIETY DISORDER): ICD-10-CM

## 2023-01-12 DIAGNOSIS — F90.0 ATTENTION DEFICIT HYPERACTIVITY DISORDER (ADHD), PREDOMINANTLY INATTENTIVE TYPE: Primary | ICD-10-CM

## 2023-01-12 PROCEDURE — 99999 PR PBB SHADOW E&M-EST. PATIENT-LVL II: CPT | Mod: PBBFAC,,, | Performed by: STUDENT IN AN ORGANIZED HEALTH CARE EDUCATION/TRAINING PROGRAM

## 2023-01-12 PROCEDURE — 99213 OFFICE O/P EST LOW 20 MIN: CPT | Mod: S$GLB,,, | Performed by: STUDENT IN AN ORGANIZED HEALTH CARE EDUCATION/TRAINING PROGRAM

## 2023-01-12 PROCEDURE — 3079F PR MOST RECENT DIASTOLIC BLOOD PRESSURE 80-89 MM HG: ICD-10-PCS | Mod: CPTII,S$GLB,, | Performed by: STUDENT IN AN ORGANIZED HEALTH CARE EDUCATION/TRAINING PROGRAM

## 2023-01-12 PROCEDURE — 99213 PR OFFICE/OUTPT VISIT, EST, LEVL III, 20-29 MIN: ICD-10-PCS | Mod: S$GLB,,, | Performed by: STUDENT IN AN ORGANIZED HEALTH CARE EDUCATION/TRAINING PROGRAM

## 2023-01-12 PROCEDURE — 3079F DIAST BP 80-89 MM HG: CPT | Mod: CPTII,S$GLB,, | Performed by: STUDENT IN AN ORGANIZED HEALTH CARE EDUCATION/TRAINING PROGRAM

## 2023-01-12 PROCEDURE — 3074F PR MOST RECENT SYSTOLIC BLOOD PRESSURE < 130 MM HG: ICD-10-PCS | Mod: CPTII,S$GLB,, | Performed by: STUDENT IN AN ORGANIZED HEALTH CARE EDUCATION/TRAINING PROGRAM

## 2023-01-12 PROCEDURE — 3074F SYST BP LT 130 MM HG: CPT | Mod: CPTII,S$GLB,, | Performed by: STUDENT IN AN ORGANIZED HEALTH CARE EDUCATION/TRAINING PROGRAM

## 2023-01-12 PROCEDURE — 3008F BODY MASS INDEX DOCD: CPT | Mod: CPTII,S$GLB,, | Performed by: STUDENT IN AN ORGANIZED HEALTH CARE EDUCATION/TRAINING PROGRAM

## 2023-01-12 PROCEDURE — 3008F PR BODY MASS INDEX (BMI) DOCUMENTED: ICD-10-PCS | Mod: CPTII,S$GLB,, | Performed by: STUDENT IN AN ORGANIZED HEALTH CARE EDUCATION/TRAINING PROGRAM

## 2023-01-12 PROCEDURE — 99999 PR PBB SHADOW E&M-EST. PATIENT-LVL II: ICD-10-PCS | Mod: PBBFAC,,, | Performed by: STUDENT IN AN ORGANIZED HEALTH CARE EDUCATION/TRAINING PROGRAM

## 2023-01-12 NOTE — PROGRESS NOTES
OUTPATIENT PSYCHIATRY FOLLOW UP VISIT    ENCOUNTER DATE:  1/12/2023  SITE:  [facility], [facility]  LENGTH OF SESSION:  30 minutes      CHIEF COMPLAINT:  Follow up for medication management    HISTORY OF PRESENTING ILLNESS:  April Wendt Schamberger is a 37 y.o. female with history of ADHD, PMDD, NIRAV, IBS-D  who presents for follow up appointment. Patient was last seen in this clinic in two months ago, during which  Adderall IR was initiated and Effexor was increased.     Per last note, current psychotropic medications included:  - Vyvanse 70 mg po daily for ADHD  - Adderall IR 10 mg PO daily as needed in afternoon for breakthrough ADHD symptoms  - Amitriptyline 25 mg qhs for anxiety, sleep, and IBS with diarrhea  - Venlafaxine 75 mg for PMDD daily with extra 37.5 mg for luteal phase symptoms    Interval history as told by Patient - & or family/friend/spouse/caregiver with pts permission    Patient reports improvement in attention with addition of Adderall IR dose in the afternoon. States she is only taking it 1-2 times per week. Reports she takes Vyvanse around 7 AM consistently. She took increased dose of Effexor for a few months but ultimately felt that she did not like how she felt on the higher dose and returned to previous dose of 37.5 +37.5. She feels that she had been struggling with personal issues and now that they have resolved, she is happy on the current dose. Doing well with Amitriptyline, but reports some early morning drowsiness. Sleeping only 4-5 hours per night, which she states is normal for her. Admits to snoring at night. Feels like she can function well with this amount of sleep and feels more drowsy when she sleeps longer than this. Takes Lunesta, per PCP. No other symptoms of depression at this time. Denies SI. Generalized anxiety appears to be well-controlled at this time. No history of chepe or psychosis. Denies AVH. Denies recreational substance use. Drinks 1-2 Truly once  "per week.    PSYCHIATRIC REVIEW OF SYSTEMS:    Symptoms of Depression: None    Symptoms of Anxiety/ panic attacks: None    Symptoms of Luly/Hypomania: None    Symptoms of psychosis: None    Sleep: None    Alcohol: No excessive drinking reported    Illicit Drugs: No illicit substance use reported      Risk Parameters:  Patient denies no SI, HI or self-injurious behavior      PSYCHIATRIC MED REVIEW  Current medications:  See above in last plan    Current psych meds  Medication side effects:  Morning drowsiness associated with Amitriptyline, tolerable to patient  Medication compliance:  Full    Medical Hx:  Chronic neck pain, HTN, Ulnar tunnel release, IBS-D  Medications: Lunesta, Metoprolol, valsartan, lamotil, immodium PRN, Zofran PRN, tizanidine    Psychiatric Hx:  Diagnosis: ADHD, combined type, unspecified anxiety, PMDD, IBS-D  Previous medication trials:  Antidepressants   - Zoloft: AE: diarrhea   - Elavil: currently taking   - Effexor: currently taking  Stimulants   - Adderall XR: currently taking   - Adderall IR: AE: "cranky"  Anxiolytics   - Klonopin: SE drowsiness  Sedatives   - Ambien   - Lunesta: currently taking (per PCP)  Other   - Melatonin   - Benadryl  Hospitalizations: none  Suicide attempt: none    Social Hx: Lives with , two children (4 & 7 year old). 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    Family Hx: Sister: depression (currently on Prozac)      PAST PSYCHIATRIC, MEDICAL, AND SOCIAL HISTORY REVIEWED  The patient's past medical, family and social history have been reviewed and updated as appropriate within the electronic medical record - see encounter notes.    MEDICAL REVIEW OF SYSTEMS:  Complete review of systems performed covering Constitutional, Musculoskeletal, Neurologic.  All systems negative.    ALL MEDICATIONS:    Current Outpatient Medications:     acetaminophen (TYLENOL) 500 MG tablet, Take 2 tablets (1,000 mg total) by mouth 3 (three) times daily as " needed for Pain., Disp: , Rfl: 0    amitriptyline (ELAVIL) 25 MG tablet, Take 1 tablet (25 mg total) by mouth every evening., Disp: 90 tablet, Rfl: 3    butalbital-acetaminophen-caffeine -40 mg (FIORICET, ESGIC) -40 mg per tablet, Take 1 tablet by mouth every 6 (six) hours as needed for Headaches., Disp: 30 tablet, Rfl: 0    dextroamphetamine-amphetamine (ADDERALL) 10 mg Tab, Take 1 tablet (10 mg total) by mouth after lunch., Disp: 30 tablet, Rfl: 0    diphenoxylate-atropine 2.5-0.025 mg (LOMOTIL) 2.5-0.025 mg per tablet, Take 1 tablet by mouth 4 (four) times daily as needed for Diarrhea (diarrhea)., Disp: 360 tablet, Rfl: 0    eszopiclone (LUNESTA) 3 mg Tab, Take 1 tablet (3 mg total) by mouth nightly as needed (insomnia)., Disp: 30 tablet, Rfl: 0    fluconazole (DIFLUCAN) 150 MG Tab, Take 1 tablet as needed for yeast infection, Disp: 30 tablet, Rfl: 1    HYDROcodone-acetaminophen (NORCO) 5-325 mg per tablet, Take 1 tablet by mouth every 4 (four) hours as needed for Pain., Disp: 12 tablet, Rfl: 0    lisdexamfetamine (VYVANSE) 70 MG capsule, Take 1 capsule (70 mg total) by mouth every morning., Disp: 30 capsule, Rfl: 0    lisdexamfetamine (VYVANSE) 70 MG capsule, Take 1 capsule (70 mg total) by mouth every morning., Disp: 30 capsule, Rfl: 0    [START ON 2/9/2023] lisdexamfetamine (VYVANSE) 70 MG capsule, Take 1 capsule (70 mg total) by mouth every morning., Disp: 30 capsule, Rfl: 0    loperamide (IMODIUM) 1 mg/7.5 mL solution, Take 0.1 mg/kg by mouth every 12 (twelve) hours., Disp: , Rfl:     loperamide (IMODIUM) 2 mg capsule, Take 2 mg by mouth 4 (four) times daily as needed for Diarrhea., Disp: , Rfl:     metoprolol succinate (TOPROL-XL) 50 MG 24 hr tablet, Take 1 tablet (50 mg total) by mouth 2 (two) times daily., Disp: 180 tablet, Rfl: 3    metroNIDAZOLE (FLAGYL) 500 MG tablet, Take 1 tablet (500 mg total) by mouth every 12 (twelve) hours. Do not drink alcohol while taking this medication, Disp: 14  tablet, Rfl: 0    pregabalin (LYRICA) 50 MG capsule, Take 1 capsule (50 mg total) by mouth 2 (two) times daily., Disp: 60 capsule, Rfl: 2    rizatriptan (MAXALT-MLT) 10 MG disintegrating tablet, Take 1/2 tablet (5 mg total) by mouth as needed for Migraine (repeat dose once in 2 hours if headache return.)., Disp: 9 tablet, Rfl: 6    terconazole (TERAZOL 7) 0.4 % Crea, Place 1 applicator vaginally every evening., Disp: 45 g, Rfl: 0    tiZANidine (ZANAFLEX) 4 MG tablet, Take 1 tablet (4 mg total) by mouth 3 (three) times daily as needed., Disp: 90 tablet, Rfl: 1    tretinoin (RETIN-A) 0.025 % cream, Apply topically every evening. Pea sized amount to entire face. If irritation occurs, decrease use to every other night., Disp: 20 g, Rfl: 5    valsartan (DIOVAN) 320 MG tablet, Take 1 tablet (320 mg total) by mouth once daily., Disp: 90 tablet, Rfl: 0    venlafaxine (EFFEXOR-XR) 37.5 MG 24 hr capsule, Take 3 capsules (112.5 mg total) by mouth once daily., Disp: 90 capsule, Rfl: 2  No current facility-administered medications for this visit.    Facility-Administered Medications Ordered in Other Visits:     0.9%  NaCl infusion, 500 mL, Intravenous, Continuous, Corky Argueta Jr., MD    ALLERGIES:  Review of patient's allergies indicates:   Allergen Reactions    Lactose     Azithromycin Nausea And Vomiting       RELEVANT LABS/STUDIES:    Lab Results   Component Value Date    WBC 8.22 09/01/2022    HGB 13.0 09/01/2022    HCT 39.0 09/01/2022    MCV 87 09/01/2022     09/01/2022     BMP  Lab Results   Component Value Date     09/01/2022    K 4.1 09/01/2022     09/01/2022    CO2 25 09/01/2022    BUN 5 (L) 09/01/2022    CREATININE 0.8 09/01/2022    CALCIUM 9.1 09/01/2022    ANIONGAP 10 09/01/2022    ESTGFRAFRICA >60 09/27/2021    EGFRNONAA >60 09/27/2021     Lab Results   Component Value Date    ALT 6 (L) 09/01/2022    AST 12 09/01/2022    ALKPHOS 62 09/01/2022    BILITOT 0.2 09/01/2022     Lab Results    Component Value Date    TSH 1.042 09/01/2022     Lab Results   Component Value Date    HGBA1C 5.4 09/01/2022       VITALS  Vitals:    01/12/23 1004   BP: 124/83   Pulse: 80   Weight: 84.1 kg (185 lb 8.3 oz)         PHYSICAL EXAM  General: No acute distress, well developed/nourished  Neurologic:   Gait: Normal   Psychomotor signs:  No involuntary movements or tremor  AIMS: none    PSYCHIATRIC EXAM:     Mental Status Exam:  Appearance: good grooming, age appropriate  Behavior/Cooperation: normal, cooperative   Speech:  normal tone, normal rate, normal pitch, normal volume  Language: uses words appropriately; NO aphasia or dysarthria  Mood: Good  Affect:  Euthymic  Thought Process: Normal and logical  Thought Content: No SI, HI, AVH  Level of Consciousness: Alert and Oriented x3  Memory:  Recent and remote intact  Attention/concentration: appropriate for age/education.   Fund of Knowledge: appears adequate  Insight:  Intact  Judgment: Intact      IMPRESSION:    April Wendt Schamberger is a 37 y.o. female with history of ADHD, PMDD, NIRAV, IBS-D who presents for follow up appointment for medication management.  The  has been reviewed, no concerning signs were noted.       Status/Progress:  Based on the examination today, the patient's problem(s) is/are well controlled.  New problems have not been presented today.     DIAGNOSES:  No diagnosis found.      PLAN:  Psych Med:  - Continue Vyvanse to 70 mg po daily for ADHD   - PDMP reviewed: patient filling all medications on time, no abnormalities noted    - Last refill picked up on 12/23/22, with one refill remaining   - No refills provided during this visit            - Continue Adderall IR 10 mg PO in afternoon for breakthrough ADHD symptoms   - PDMP reviewed: patient filling all medications on time, no abnormalities noted    - Last refill picked up on 11/18/22   - No refills provided during this visit            - Continue amitriptyline 25 mg qhs for anxiety, sleep,  and IBS with diarrhea; patient taking full tab daily; consider increasing for IBS-D once ADHD medication is stable  - Continue venlafaxine to 37.5mg for PMDD daily with extra 37.5 mg for luteal phase symptoms (prescribed as 37.5 mg two times daily)  - Continue Lunesta, per PCP  - Provided with contact information for sleep medicine, for evaluation for sleep apnea      Discussed with patient informed consent, risks vs. benefits, alternative treatments, side effect profile and the inherent unpredictability of individual responses to these treatments. Answered any questions patient may have had. The patient expresses understanding of the above and displays the capacity to agree with this current plan       Other: None      RETURN TO CLINIC:  3 months    Case to be discussed with supervising staff, MD Ray Rangel MD  [facility], PGY-III   1/12/2023 9:17 AM

## 2023-01-24 ENCOUNTER — OFFICE VISIT (OUTPATIENT)
Dept: INTERNAL MEDICINE | Facility: CLINIC | Age: 38
End: 2023-01-24
Payer: COMMERCIAL

## 2023-01-24 VITALS
HEIGHT: 68 IN | BODY MASS INDEX: 28.46 KG/M2 | SYSTOLIC BLOOD PRESSURE: 130 MMHG | DIASTOLIC BLOOD PRESSURE: 88 MMHG | WEIGHT: 187.81 LBS

## 2023-01-24 DIAGNOSIS — L73.9 FOLLICULITIS: Primary | ICD-10-CM

## 2023-01-24 PROCEDURE — 1159F PR MEDICATION LIST DOCUMENTED IN MEDICAL RECORD: ICD-10-PCS | Mod: CPTII,S$GLB,, | Performed by: FAMILY MEDICINE

## 2023-01-24 PROCEDURE — 3075F PR MOST RECENT SYSTOLIC BLOOD PRESS GE 130-139MM HG: ICD-10-PCS | Mod: CPTII,S$GLB,, | Performed by: FAMILY MEDICINE

## 2023-01-24 PROCEDURE — 3075F SYST BP GE 130 - 139MM HG: CPT | Mod: CPTII,S$GLB,, | Performed by: FAMILY MEDICINE

## 2023-01-24 PROCEDURE — 3008F BODY MASS INDEX DOCD: CPT | Mod: CPTII,S$GLB,, | Performed by: FAMILY MEDICINE

## 2023-01-24 PROCEDURE — 3008F PR BODY MASS INDEX (BMI) DOCUMENTED: ICD-10-PCS | Mod: CPTII,S$GLB,, | Performed by: FAMILY MEDICINE

## 2023-01-24 PROCEDURE — 99213 PR OFFICE/OUTPT VISIT, EST, LEVL III, 20-29 MIN: ICD-10-PCS | Mod: S$GLB,,, | Performed by: FAMILY MEDICINE

## 2023-01-24 PROCEDURE — 1159F MED LIST DOCD IN RCRD: CPT | Mod: CPTII,S$GLB,, | Performed by: FAMILY MEDICINE

## 2023-01-24 PROCEDURE — 3079F DIAST BP 80-89 MM HG: CPT | Mod: CPTII,S$GLB,, | Performed by: FAMILY MEDICINE

## 2023-01-24 PROCEDURE — 99213 OFFICE O/P EST LOW 20 MIN: CPT | Mod: S$GLB,,, | Performed by: FAMILY MEDICINE

## 2023-01-24 PROCEDURE — 99999 PR PBB SHADOW E&M-EST. PATIENT-LVL IV: ICD-10-PCS | Mod: PBBFAC,,, | Performed by: FAMILY MEDICINE

## 2023-01-24 PROCEDURE — 99999 PR PBB SHADOW E&M-EST. PATIENT-LVL IV: CPT | Mod: PBBFAC,,, | Performed by: FAMILY MEDICINE

## 2023-01-24 PROCEDURE — 3079F PR MOST RECENT DIASTOLIC BLOOD PRESSURE 80-89 MM HG: ICD-10-PCS | Mod: CPTII,S$GLB,, | Performed by: FAMILY MEDICINE

## 2023-01-24 RX ORDER — SULFAMETHOXAZOLE AND TRIMETHOPRIM 800; 160 MG/1; MG/1
1 TABLET ORAL 2 TIMES DAILY
Qty: 10 TABLET | Refills: 0 | Status: SHIPPED | OUTPATIENT
Start: 2023-01-24 | End: 2023-01-29

## 2023-01-24 RX ORDER — MUPIROCIN 20 MG/G
OINTMENT TOPICAL 3 TIMES DAILY
Qty: 22 G | Refills: 0 | Status: SHIPPED | OUTPATIENT
Start: 2023-01-24 | End: 2023-02-03

## 2023-01-24 NOTE — PROGRESS NOTES
Subjective:       Patient ID: April Wendt Schamberger is a 37 y.o. female.    Chief Complaint: Abscess    Patient is here for UC for the above  Shaving above vaginal area, blister with pus popped yesterday, area read, not painful  My BRODIE Acosta was in the room during exam as chaperone    Review of patient's allergies indicates:   Allergen Reactions    Lactose     Azithromycin Nausea And Vomiting       Social History     Tobacco Use    Smoking status: Former     Types: Cigarettes     Quit date: 2014     Years since quittin.8    Smokeless tobacco: Never   Substance Use Topics    Alcohol use: No     Alcohol/week: 0.0 standard drinks     Comment: breastfeeding     Drug use: No       Family History   Problem Relation Age of Onset    Breast cancer Mother 47        with Metastasis    Hypertension Mother     Prostate cancer Father     Hypertension Father     Diabetes Father     Deep vein thrombosis Father     Pancreatic cancer Maternal Grandfather     Breast cancer Paternal Grandmother 80    Diabetes type II Paternal Grandfather     Heart attack Maternal Grandmother     Cancer Cousin 31    Cancer Cousin         Thurman Syndrome    Cancer Cousin         Thurman Syndrome    Ovarian cancer Maternal Aunt     Lymphoma Neg Hx     Tuberculosis Neg Hx     Celiac disease Neg Hx     Cirrhosis Neg Hx     Colon polyps Neg Hx     Crohn's disease Neg Hx     Cystic fibrosis Neg Hx     Esophageal cancer Neg Hx     Hemochromatosis Neg Hx     Inflammatory bowel disease Neg Hx     Irritable bowel syndrome Neg Hx     Liver cancer Neg Hx     Liver disease Neg Hx     Rectal cancer Neg Hx     Stomach cancer Neg Hx     Ulcerative colitis Neg Hx     Donavon's disease Neg Hx     Amblyopia Neg Hx     Blindness Neg Hx     Cataracts Neg Hx     Glaucoma Neg Hx     Macular degeneration Neg Hx     Retinal detachment Neg Hx     Strabismus Neg Hx        Past Surgical History:   Procedure Laterality Date    COLONOSCOPY N/A 2017    Procedure:  COLONOSCOPY;  Surgeon: Bren Larkin MD;  Location: [facility] ENDO;  Service: Endoscopy;  Laterality: N/A;    COLONOSCOPY N/A 01/26/2022    Procedure: COLONOSCOPY;  Surgeon: Inocente Roe MD;  Location: [facility] ENDO;  Service: Endoscopy;  Laterality: N/A;    EPIDURAL STEROID INJECTION INTO CERVICAL SPINE N/A 12/01/2020    Procedure: Injection-steroid-epidural-cervical--C7-T1;  Surgeon: Corky Argueta Jr., MD;  Location: [facility] PAIN MGT;  Service: Pain Management;  Laterality: N/A;    EPIDURAL STEROID INJECTION INTO CERVICAL SPINE N/A 12/02/2021    Procedure: Cervical Epidural Steroid Injection C7-T1 (No Sedation);  Surgeon: Corky Argueta Jr., MD;  Location: [facility] PAIN [facility]GT;  Service: Pain Management;  Laterality: N/A;    ESOPHAGOGASTRODUODENOSCOPY  2012    LASIK Bilateral 2005    REFRACTIVE SURGERY      TONSILLECTOMY, ADENOIDECTOMY  1988    ULNAR TUNNEL RELEASE Right 07/08/2022    VAGINAL DELIVERY      x 2       Patient Active Problem List   Diagnosis    Irritable bowel syndrome with diarrhea    Primary insomnia    Cervical radiculopathy    Change in bowel habit    Gastroesophageal reflux disease    Benign essential hypertension    Uterine leiomyoma    Abdominal cramping    Ileitis    Chronic hypertension in pregnancy    Attention deficit hyperactivity disorder (ADHD), combined type    Anxiety    Tinea corporis    Essential (primary) hypertension    Tachycardia    Chronic pain    Hx of LASIK    Cervical pain (neck)    Muscle spasm    Cervicalgia    Myofascial pain syndrome, cervical    Decreased range of motion of neck    Weakness of neck    Right arm weakness    Cubital tunnel syndrome on right       Current Outpatient Medications on File Prior to Visit   Medication Sig Dispense Refill    acetaminophen (TYLENOL) 500 MG tablet Take 2 tablets (1,000 mg total) by mouth 3 (three) times daily as needed for Pain.  0    amitriptyline (ELAVIL) 25 MG tablet Take 1 tablet (25 mg total) by mouth every evening. 90 tablet 3     butalbital-acetaminophen-caffeine -40 mg (FIORICET, ESGIC) -40 mg per tablet Take 1 tablet by mouth every 6 (six) hours as needed for Headaches. 30 tablet 0    dextroamphetamine-amphetamine (ADDERALL) 10 mg Tab Take 1 tablet (10 mg total) by mouth after lunch. 30 tablet 0    diphenoxylate-atropine 2.5-0.025 mg (LOMOTIL) 2.5-0.025 mg per tablet Take 1 tablet by mouth 4 (four) times daily as needed for Diarrhea (diarrhea). 360 tablet 0    eszopiclone (LUNESTA) 3 mg Tab Take 1 tablet (3 mg total) by mouth nightly as needed (insomnia). 30 tablet 0    fluconazole (DIFLUCAN) 150 MG Tab Take 1 tablet as needed for yeast infection 30 tablet 1    HYDROcodone-acetaminophen (NORCO) 5-325 mg per tablet Take 1 tablet by mouth every 4 (four) hours as needed for Pain. 12 tablet 0    lisdexamfetamine (VYVANSE) 70 MG capsule Take 1 capsule (70 mg total) by mouth every morning. 30 capsule 0    [START ON 2/9/2023] lisdexamfetamine (VYVANSE) 70 MG capsule Take 1 capsule (70 mg total) by mouth every morning. 30 capsule 0    loperamide (IMODIUM) 1 mg/7.5 mL solution Take 0.1 mg/kg by mouth every 12 (twelve) hours.      loperamide (IMODIUM) 2 mg capsule Take 2 mg by mouth 4 (four) times daily as needed for Diarrhea.      metoprolol succinate (TOPROL-XL) 50 MG 24 hr tablet Take 1 tablet (50 mg total) by mouth 2 (two) times daily. 180 tablet 3    metroNIDAZOLE (FLAGYL) 500 MG tablet Take 1 tablet (500 mg total) by mouth every 12 (twelve) hours. Do not drink alcohol while taking this medication 14 tablet 0    pregabalin (LYRICA) 50 MG capsule Take 1 capsule (50 mg total) by mouth 2 (two) times daily. 60 capsule 2    rizatriptan (MAXALT-MLT) 10 MG disintegrating tablet Take 1/2 tablet (5 mg total) by mouth as needed for Migraine (repeat dose once in 2 hours if headache return.). 9 tablet 6    terconazole (TERAZOL 7) 0.4 % Crea Place 1 applicator vaginally every evening. 45 g 0    tiZANidine (ZANAFLEX) 4 MG tablet Take 1 tablet (4  "mg total) by mouth 3 (three) times daily as needed. 90 tablet 1    tretinoin (RETIN-A) 0.025 % cream Apply topically every evening. Pea sized amount to entire face. If irritation occurs, decrease use to every other night. 20 g 5    valsartan (DIOVAN) 320 MG tablet Take 1 tablet (320 mg total) by mouth once daily. 90 tablet 0    venlafaxine (EFFEXOR-XR) 37.5 MG 24 hr capsule Take 3 capsules (112.5 mg total) by mouth once daily. 90 capsule 2     Current Facility-Administered Medications on File Prior to Visit   Medication Dose Route Frequency Provider Last Rate Last Admin    0.9%  NaCl infusion  500 mL Intravenous Continuous Corky Argueta Jr., MD               Review of Systems    Objective:    /88 (BP Location: Right arm, Patient Position: Sitting, BP Method: Medium (Manual))   Ht 5' 8" (1.727 m)   Wt 85.2 kg (187 lb 13.3 oz)   BMI 28.56 kg/m²     Physical Exam    Open sore about 4mm dominick, surrounding erythema about 1-2 cm dominick  Assessment:       1. Folliculitis          Plan:       April was seen today for abscess.    Diagnoses and all orders for this visit:    Folliculitis  -     mupirocin (BACTROBAN) 2 % ointment; Apply topically 3 (three) times daily. for 10 days  -     sulfamethoxazole-trimethoprim 800-160mg (BACTRIM DS) 800-160 mg Tab; Take 1 tablet by mouth 2 (two) times daily. for 5 days    F/u prn  Cc: PCP        "

## 2023-01-25 DIAGNOSIS — F51.01 PRIMARY INSOMNIA: ICD-10-CM

## 2023-01-25 DIAGNOSIS — R51.9 NONINTRACTABLE HEADACHE, UNSPECIFIED CHRONICITY PATTERN, UNSPECIFIED HEADACHE TYPE: ICD-10-CM

## 2023-01-25 RX ORDER — ESZOPICLONE 3 MG/1
3 TABLET, FILM COATED ORAL NIGHTLY PRN
Qty: 30 TABLET | Refills: 0 | Status: SHIPPED | OUTPATIENT
Start: 2023-01-25 | End: 2023-02-22 | Stop reason: SDUPTHER

## 2023-01-25 RX ORDER — BUTALBITAL, ACETAMINOPHEN AND CAFFEINE 50; 325; 40 MG/1; MG/1; MG/1
1 TABLET ORAL EVERY 6 HOURS PRN
Qty: 30 TABLET | Refills: 0 | Status: SHIPPED | OUTPATIENT
Start: 2023-01-25 | End: 2023-02-22 | Stop reason: SDUPTHER

## 2023-01-25 NOTE — TELEPHONE ENCOUNTER
No new care gaps identified.  [facility] Embedded Care Gaps. Reference number: [id number]. 1/25/2023   12:00:00 PM CST

## 2023-01-30 ENCOUNTER — OFFICE VISIT (OUTPATIENT)
Dept: PAIN MEDICINE | Facility: CLINIC | Age: 38
End: 2023-01-30
Payer: COMMERCIAL

## 2023-01-30 VITALS
SYSTOLIC BLOOD PRESSURE: 112 MMHG | WEIGHT: 187.81 LBS | BODY MASS INDEX: 28.56 KG/M2 | DIASTOLIC BLOOD PRESSURE: 79 MMHG | HEART RATE: 85 BPM

## 2023-01-30 DIAGNOSIS — M62.838 MUSCLE SPASM: ICD-10-CM

## 2023-01-30 DIAGNOSIS — M54.2 CERVICAL PAIN (NECK): ICD-10-CM

## 2023-01-30 DIAGNOSIS — M50.30 DDD (DEGENERATIVE DISC DISEASE), CERVICAL: ICD-10-CM

## 2023-01-30 DIAGNOSIS — M54.2 NECK PAIN: ICD-10-CM

## 2023-01-30 DIAGNOSIS — M79.18 MYOFASCIAL MUSCLE PAIN: ICD-10-CM

## 2023-01-30 DIAGNOSIS — G89.4 CHRONIC PAIN SYNDROME: ICD-10-CM

## 2023-01-30 DIAGNOSIS — M79.18 MYOFASCIAL PAIN SYNDROME, CERVICAL: Primary | ICD-10-CM

## 2023-01-30 PROCEDURE — 99999 PR PBB SHADOW E&M-EST. PATIENT-LVL IV: ICD-10-PCS | Mod: PBBFAC,,, | Performed by: NURSE PRACTITIONER

## 2023-01-30 PROCEDURE — 99214 OFFICE O/P EST MOD 30 MIN: CPT | Mod: 25,S$GLB,, | Performed by: NURSE PRACTITIONER

## 2023-01-30 PROCEDURE — 99214 PR OFFICE/OUTPT VISIT, EST, LEVL IV, 30-39 MIN: ICD-10-PCS | Mod: 25,S$GLB,, | Performed by: NURSE PRACTITIONER

## 2023-01-30 PROCEDURE — 3074F PR MOST RECENT SYSTOLIC BLOOD PRESSURE < 130 MM HG: ICD-10-PCS | Mod: CPTII,S$GLB,, | Performed by: NURSE PRACTITIONER

## 2023-01-30 PROCEDURE — 1159F MED LIST DOCD IN RCRD: CPT | Mod: CPTII,S$GLB,, | Performed by: NURSE PRACTITIONER

## 2023-01-30 PROCEDURE — 3008F PR BODY MASS INDEX (BMI) DOCUMENTED: ICD-10-PCS | Mod: CPTII,S$GLB,, | Performed by: NURSE PRACTITIONER

## 2023-01-30 PROCEDURE — 3074F SYST BP LT 130 MM HG: CPT | Mod: CPTII,S$GLB,, | Performed by: NURSE PRACTITIONER

## 2023-01-30 PROCEDURE — 1159F PR MEDICATION LIST DOCUMENTED IN MEDICAL RECORD: ICD-10-PCS | Mod: CPTII,S$GLB,, | Performed by: NURSE PRACTITIONER

## 2023-01-30 PROCEDURE — 20553 NJX 1/MLT TRIGGER POINTS 3/>: CPT | Mod: S$GLB,,, | Performed by: NURSE PRACTITIONER

## 2023-01-30 PROCEDURE — 99999 PR PBB SHADOW E&M-EST. PATIENT-LVL IV: CPT | Mod: PBBFAC,,, | Performed by: NURSE PRACTITIONER

## 2023-01-30 PROCEDURE — 20553 TRIGGER POINT INJECTION: ICD-10-PCS | Mod: S$GLB,,, | Performed by: NURSE PRACTITIONER

## 2023-01-30 PROCEDURE — 1160F RVW MEDS BY RX/DR IN RCRD: CPT | Mod: CPTII,S$GLB,, | Performed by: NURSE PRACTITIONER

## 2023-01-30 PROCEDURE — 1160F PR REVIEW ALL MEDS BY PRESCRIBER/CLIN PHARMACIST DOCUMENTED: ICD-10-PCS | Mod: CPTII,S$GLB,, | Performed by: NURSE PRACTITIONER

## 2023-01-30 PROCEDURE — 3078F DIAST BP <80 MM HG: CPT | Mod: CPTII,S$GLB,, | Performed by: NURSE PRACTITIONER

## 2023-01-30 PROCEDURE — 3008F BODY MASS INDEX DOCD: CPT | Mod: CPTII,S$GLB,, | Performed by: NURSE PRACTITIONER

## 2023-01-30 PROCEDURE — 3078F PR MOST RECENT DIASTOLIC BLOOD PRESSURE < 80 MM HG: ICD-10-PCS | Mod: CPTII,S$GLB,, | Performed by: NURSE PRACTITIONER

## 2023-01-30 RX ORDER — TRIAMCINOLONE ACETONIDE 40 MG/ML
40 INJECTION, SUSPENSION INTRA-ARTICULAR; INTRAMUSCULAR
Status: DISCONTINUED | OUTPATIENT
Start: 2023-01-30 | End: 2023-01-30 | Stop reason: HOSPADM

## 2023-01-30 RX ADMIN — TRIAMCINOLONE ACETONIDE 40 MG: 40 INJECTION, SUSPENSION INTRA-ARTICULAR; INTRAMUSCULAR at 04:01

## 2023-01-30 NOTE — PROCEDURES
Trigger Point Injection  Performed by: LIDIA Miller  Authorized by: LIDIA Miller     Cervical Paraspinal:  Bilateral  Trapezus:  Bilateral    Consent Done?:  Yes (Written)    Pre-Procedure:   Indications:  Pain relief and pain  Site marked: the procedure site was marked     Timeout: prior to procedure the correct patient, procedure, and site was verified      Local anesthesia used?: Yes    Local anesthetic:  Lidocaine 1% without epinephrine  Medications: 40 mg triamcinolone acetonide 40 mg/mL

## 2023-01-30 NOTE — PROGRESS NOTES
Chronic Pain  patient Established Note (Follow up visit)        SUBJECTIVE:  Chief Complaint   Patient presents with    Neck Pain       Interval History (01/30/2023):  April Wendt Schamberger returns today for follow up for returning neck pain R>L , she is denying an radiating symptoms, denies any paraesthesia symptoms, denies any profound weakness in either arm.  Currently, the neck pain is worse.        Current Pain Scales:  Current: 5/10              Typical Range: 5-6/10     05/18/2022 - Ms. Schamberger is following up for No chief complaint on file..  She requests a refill of Tramadol 50 mg which are  working well to control Her pain.  She reports 50-60% relief with the current medication regimen.  Medication side effects: none.  Pain is currently rate 7/10 with a weekly range 6-9/10.  It is described as Aching.   Pain is worsened by: nothing in particular and improved by: medications.  She reports ability to function (work & home life) with use of 2 tablets of tramadol/day.  She uses tizanidine at night, which is effective, but sedation limits use to night time only.  She met with Dr. Reed and Dr. Block.  Health back is starting soon.  EMG is pending for later this month.  F/U with Dr. Reed is scheduled as well.     - Ms. Schamberger is following up for No chief complaint on file.  Pain is currently rate 7/10 with a weekly range 7-8/10.   37-year-old pleasant female she has established our office she has a history of chronic neck pain with recent symptoms of right hand numbness.  She also has pain in the forearm on the right side.  The right 4th and 5th digits are affected consistently.  She has numbness at night she has had cervical LAZ as well as trigger point injection but they have not been sustainable.  She has attempted physical therapy 2 years ago.  She reports having seen Neurosurgery EMG has been  ordered by Dr. Reed he is also referred her to physical therapy her primary care has referred her to the healthy neck and Back program within [facility].  Today she is requesting a refill of tramadol 50 mg b.i.d. the original prescription was provided to her by her PCP/ Dr. Valencia he directed her to return to PM for refills,  she states it is helping her neuropathic symptoms and pain overall dropped her pain score to a 3/10.     12/29/2021 -Schamberger returns to clinic s/p GHASSAN on 12/2/21 with 50% relief of her numbness and tingling in her right arm.  She reports continued tingling and numbness in the right pinky and right ring finger as well as the right arm this occurs typically she falls asleep and lays on her right side she reports that her pain is still there especially with movement her pain starts in the neck radiates into her mid shoulders and shoulder blades pain is described as achy sharp and stabbing.  She denies any profound weakness, denies any bowel bladder dysfunction denies any recent incident or trauma since her last clinic visit with our office.  Although the injection helped she says did not provide her with significant relief.  She reports a pain intensity 6/10 today with a weekly range of 6-7/10.     11/24/2021 - Schamberger returns to clinic s/p TPI on 10/05/21 with no relief.  She reports a pain intensity 6/10 today with a weekly range of 4-7/10.  The pain is described as Aching, Tingling and Numb.  Pain is worsened by: extension, flexion, twisting and certain sleeping positions  and improved by: tens unit.  She requests a repeat cervical LAZ, which worked well for her in the past.  She also inquires about medications to help with pain pending completion of the injection.    9/27/2021- 37 y/o female presents requesting cervical TPIs, she has a hx neck and arm pain. She has a hx of cervical myofascial pain, she is established with our office she has been provided TPIs previously with good  relief. She endorses being on a cocktail of medications but that they are not effectively treating her pain. She tested positive for COVID On 09/19/2021 (rapid screening)  However she reports to me that she began having symptoms on 09/14 she was symptom-free by 920 and cleared to return to work by [employer] on 09/26/2021.  She stated that she would send me via my chart her clearance note and any other mutation that I would need to clear her for trigger point injections.    12/15/20 - Ms. Schamberger returns to clinic for follow up visit reporting stable neck and arm pain.  Patient is s/p Cervical Epidural Steroid Injection at C7-T1 on 12/1/20 with 40-60% overall relief.  Pain intensity is currently 5/10.  Patient reports her right arm symptoms have improved states she does get mild tingling on and off that radiates in to the right minimal, pinky and ring finger and sometimes radiating into the elbow.  She states flexion causes her worse pain however, lateral, and  flexion  movements have improved since the injection.  She also mentioned that she only took Lyrica 25 mg b.i.d. instead of t.i.d she reported no adverse SEs while taking this medicine.  She discontinued her Voltaren tablets due to burning in her stomach.  She states she is taking Tylenol 1000 mg b.i.d. as needed for pain which she states is helping.    11/16/20 - Ms. Schamberger returns to clinic for follow up visit reporting worse neck and arm pain.  Patient is s/p TPI on 6/30/20 with 50% relief.  Pain intensity is currently 7/10.   April presents with returning neck, thoracic and bilateral arm pain right greater than left., pain is described as aching dull throbbing with some numbness in her right arm she reports no shooting pain in either arm she denies any profound weakness and denies dropping things.  She did report numbness in her right arm. I discussed with patient that I will provide her with trigger point injections today to help with  myofascial pain and  I will also start her on a cocktail of medications to help with I think is neuropathic symptoms in her arm, she is currently taking tizanidine 4 mg nightly, so I will at Lyrica 25 mg t.i.d.,  diclofenac 50 mg b.i.d. as needed for pain, and Tylenol 1000 mg t.i.d. in effort to provide her with some relief.  I discussed with her if she had any side effects with these medications that she should discontinue immediately.  I will have them sent to the [facility] pharmacy.    20 Pt returns for bilateral chronic neck pain, patient is established our office she has been given cervical paraspinal trigger point injection in the past which has helped alleviate her pain for greater than 6 months.  She is requesting repeat trigger point injections today.    April Wendt Schamberger presents to the clinic for a follow-up appointment for Cervical TPIs today.   Since the last visit, April Wendt Schamberger states the pain has been persistant. Current pain intensity is 6/10.    Pain Disability Index Review:  Last 3 PDI Scores 2023   Pain Disability Index (PDI) 18 45 43     Pain Medications:  - Opioids: None  - Adjuvant Medications: Advil,Motrin ( Ibuprofen)  - Anti-Coagulants: None  - Others: see medications list.     Opioid Contract: no      report:  Reviewed and consistent with medication use as prescribed.     Pain Procedures:              2021 cervical LAZ-induced 50% of the numbness and tingling   2020 - GHASSAN @ C7-T1 with 40-60% relief   17 TRIGGER POINT INJECTION   17 TRIGGER POINT INJECTION   17 bilateral C4,5,6 CERVICAL FACET MEDIAL BRANCH NERVE BLOCK (Prone) ( after xray correlation with pain level, decsion was made to proceed at C4,5,6 levels, patient agrees to proceed      Physical Therapy/Home Exercise: no     Imagin17 MRI Cervical Spine Without Contrast  Narrative   Technique: Multiplanar, multisequence MR imaging of the cervical  spine obtained without the use of IV contrast.     Comparison: Cervical spine radiographs 12/15/2016.     Results:    There is straightening with mild reversal of the normal cervical lordosis. Vertebral body heights are maintained with no evidence of fracture. Mild intervertebral disc space narrowing and desiccation are visualized at C5-6 and C6-7. No marrow signal abnormality suspicious for an infiltrative process.      The cervical cord is normal in caliber and signal characteristics.  The craniocervical junction and visualized intracranial contents are unremarkable.  The adjacent soft tissue structures show no significant abnormalities.      C2-C3:  No significant spinal canal or neural foraminal narrowing.    C3-C4: No significant spinal canal or neural foraminal narrowing.    C4-C5:  No significant spinal canal or neural foraminal narrowing.    C5-C6:  Disc bulge with right uncovertebral spurring. Findings result in mild spinal canal stenosis and mild right neural foraminal narrowing.    C6-C7:  Mild disc bulge with thickening of the ligamentum flavum results in mild spinal canal stenosis.No significant neuroforaminal narrowing.    C7-T1:   No significant central spinal or neural foraminal narrowing.   Impression      Mild disc bulge and spinal canal stenosis at the C5-6 and C6-7 levels.      Electronically signed by: UMER MARTIN MD  Date: 01/26/17  Time: 12:56     Encounter   View Encounter      Reviewed By   Hunter Jimenez MD on 1/27/2017  2:18 PM   Exam Details                     Performed Procedure Technologist Supporting Staff Performing Physician   MRI Cervical Spine Without Contrast Andre Hinds, RT         Appointment Date/Status Modality Department        1/26/2017     Completed [facility] MRI1 [facility] MRI       Begin Exam End Exam Begin Exam Questionnaires End Exam Questionnaires     1/26/2017 11:22 AM 1/26/2017 11:59 AM MRI TECH NAVIGATOR QUESTIONS IMAGING END ALL         RIS PREGNANCY TECH  NAVIGATOR          X-Ray Cervical Spine AP Lat with Flexion  Extension 12/15/16  Narrative     Cervical spine radiographs     comparison: None    Results: AP, lateral, flexion and extension views  .  The alignment is normal, vertebral body height and disk spaces are well-maintained.    Flexion and extension views demonstrate no translational abnormalities.  Very small anterior osteophyte noted at C5-C6.  No fracture or osseous lesion seen.Prevertebral soft tissues appear normal.   Impression    No significant abnormality seen      Electronically signed by: JOSE A JENKINS MD  Date: 12/15/16  Time: 10:52          Allergies:   Review of patient's allergies indicates:   Allergen Reactions    Lactose     Azithromycin Nausea And Vomiting       Current Medications:   Current Outpatient Medications   Medication Sig Dispense Refill    acetaminophen (TYLENOL) 500 MG tablet Take 2 tablets (1,000 mg total) by mouth 3 (three) times daily as needed for Pain.  0    amitriptyline (ELAVIL) 25 MG tablet Take 1 tablet (25 mg total) by mouth every evening. 90 tablet 3    butalbital-acetaminophen-caffeine -40 mg (FIORICET, ESGIC) -40 mg per tablet Take 1 tablet by mouth every 6 (six) hours as needed for Headaches. 30 tablet 0    dextroamphetamine-amphetamine (ADDERALL) 10 mg Tab Take 1 tablet (10 mg total) by mouth after lunch. 30 tablet 0    diphenoxylate-atropine 2.5-0.025 mg (LOMOTIL) 2.5-0.025 mg per tablet Take 1 tablet by mouth 4 (four) times daily as needed for Diarrhea (diarrhea). 360 tablet 0    eszopiclone (LUNESTA) 3 mg Tab Take 1 tablet (3 mg total) by mouth nightly as needed (insomnia). 30 tablet 0    fluconazole (DIFLUCAN) 150 MG Tab Take 1 tablet as needed for yeast infection 30 tablet 1    HYDROcodone-acetaminophen (NORCO) 5-325 mg per tablet Take 1 tablet by mouth every 4 (four) hours as needed for Pain. 12 tablet 0    lisdexamfetamine (VYVANSE) 70 MG capsule Take 1 capsule (70 mg total) by mouth every  morning. 30 capsule 0    [START ON 2/9/2023] lisdexamfetamine (VYVANSE) 70 MG capsule Take 1 capsule (70 mg total) by mouth every morning. 30 capsule 0    loperamide (IMODIUM) 1 mg/7.5 mL solution Take 0.1 mg/kg by mouth every 12 (twelve) hours.      loperamide (IMODIUM) 2 mg capsule Take 2 mg by mouth 4 (four) times daily as needed for Diarrhea.      metoprolol succinate (TOPROL-XL) 50 MG 24 hr tablet Take 1 tablet (50 mg total) by mouth 2 (two) times daily. 180 tablet 3    metroNIDAZOLE (FLAGYL) 500 MG tablet Take 1 tablet (500 mg total) by mouth every 12 (twelve) hours. Do not drink alcohol while taking this medication 14 tablet 0    mupirocin (BACTROBAN) 2 % ointment Apply topically 3 (three) times daily for 10 days 22 g 0    pregabalin (LYRICA) 50 MG capsule Take 1 capsule (50 mg total) by mouth 2 (two) times daily. 60 capsule 2    rizatriptan (MAXALT-MLT) 10 MG disintegrating tablet Take 1/2 tablet (5 mg total) by mouth as needed for Migraine (repeat dose once in 2 hours if headache return.). 9 tablet 6    terconazole (TERAZOL 7) 0.4 % Crea Place 1 applicator vaginally every evening. 45 g 0    tiZANidine (ZANAFLEX) 4 MG tablet Take 1 tablet (4 mg total) by mouth 3 (three) times daily as needed. 90 tablet 1    tretinoin (RETIN-A) 0.025 % cream Apply topically every evening. Pea sized amount to entire face. If irritation occurs, decrease use to every other night. 20 g 5    valsartan (DIOVAN) 320 MG tablet Take 1 tablet (320 mg total) by mouth once daily. 90 tablet 0    venlafaxine (EFFEXOR-XR) 37.5 MG 24 hr capsule Take 3 capsules (112.5 mg total) by mouth once daily. 90 capsule 2     No current facility-administered medications for this visit.     Facility-Administered Medications Ordered in Other Visits   Medication Dose Route Frequency Provider Last Rate Last Admin    0.9%  NaCl infusion  500 mL Intravenous Continuous Corky Argueta Jr., MD           REVIEW OF SYSTEMS:    GENERAL:  No weight loss, malaise  or fevers.  HEENT:  Negative for frequent or significant headaches. + HAs   NECK:  Negative for lumps, goiter, pain and significant neck swelling.  RESPIRATORY:  Negative for cough, wheezing or shortness of breath.  CARDIOVASCULAR:  Negative for chest pain, leg swelling or palpitations.  GI:  Negative for abdominal discomfort, blood in stools or black stools or change in bowel habits.  MUSCULOSKELETAL:  See HPI.  SKIN:  Negative for lesions, rash, and itching.  PSYCH:  Positive for sleep disturbance, mood disorder and recent psychosocial stressors.  HEMATOLOGY/LYMPHOLOGY:  Negative for prolonged bleeding, bruising easily or swollen nodes.  NEURO:   No history of headaches, syncope, paralysis, seizures or tremors.  All other reviewed and negative other than HPI.    Past Medical History:  Past Medical History:   Diagnosis Date    Abnormal Pap smear of cervix 2000    Cryo Done    ADD (attention deficit disorder)     Breast disorder     Breast Cysts    Esophageal reflux     Fibroids     Hypertension     IBS (irritable bowel syndrome)     Insomnia     Kidney stones     Mastocytosis     Relies on partner vasectomy for contraception     Upper GI bleed        Past Surgical History:  Past Surgical History:   Procedure Laterality Date    COLONOSCOPY N/A 04/28/2017    Procedure: COLONOSCOPY;  Surgeon: Bren Larkin MD;  Location: [facility] ENDO;  Service: Endoscopy;  Laterality: N/A;    COLONOSCOPY N/A 01/26/2022    Procedure: COLONOSCOPY;  Surgeon: Inocente Roe MD;  Location: [facility] ENDO;  Service: Endoscopy;  Laterality: N/A;    EPIDURAL STEROID INJECTION INTO CERVICAL SPINE N/A 12/01/2020    Procedure: Injection-steroid-epidural-cervical--C7-T1;  Surgeon: Corky Argueta Jr., MD;  Location: [facility] PAIN MGT;  Service: Pain Management;  Laterality: N/A;    EPIDURAL STEROID INJECTION INTO CERVICAL SPINE N/A 12/02/2021    Procedure: Cervical Epidural Steroid Injection C7-T1 (No Sedation);  Surgeon: Corky Argueta Jr., MD;   [facility];  Service: Pain Management;  Laterality: N/A;    ESOPHAGOGASTRODUODENOSCOPY  2012    LASIK Bilateral 2005    REFRACTIVE SURGERY      TONSILLECTOMY, ADENOIDECTOMY  1988    ULNAR TUNNEL RELEASE Right 2022    VAGINAL DELIVERY      x 2       Family History:  Family History   Problem Relation Age of Onset    Breast cancer Mother 47        with Metastasis    Hypertension Mother     Prostate cancer Father     Hypertension Father     Diabetes Father     Deep vein thrombosis Father     Pancreatic cancer Maternal Grandfather     Breast cancer Paternal Grandmother 80    Diabetes type II Paternal Grandfather     Heart attack Maternal Grandmother     Cancer Cousin 31    Cancer Cousin         Thurman Syndrome    Cancer Cousin         Thurman Syndrome    Ovarian cancer Maternal Aunt     Lymphoma Neg Hx     Tuberculosis Neg Hx     Celiac disease Neg Hx     Cirrhosis Neg Hx     Colon polyps Neg Hx     Crohn's disease Neg Hx     Cystic fibrosis Neg Hx     Esophageal cancer Neg Hx     Hemochromatosis Neg Hx     Inflammatory bowel disease Neg Hx     Irritable bowel syndrome Neg Hx     Liver cancer Neg Hx     Liver disease Neg Hx     Rectal cancer Neg Hx     Stomach cancer Neg Hx     Ulcerative colitis Neg Hx     Donavon's disease Neg Hx     Amblyopia Neg Hx     Blindness Neg Hx     Cataracts Neg Hx     Glaucoma Neg Hx     Macular degeneration Neg Hx     Retinal detachment Neg Hx     Strabismus Neg Hx        Social History:  Social History     Socioeconomic History    Marital status:     Number of children: 1   Tobacco Use    Smoking status: Former     Types: Cigarettes     Quit date: 2014     Years since quittin.8    Smokeless tobacco: Never   Substance and Sexual Activity    Alcohol use: No     Alcohol/week: 0.0 standard drinks     Comment: breastfeeding     Drug use: No    Sexual activity: Yes     Partners: Male     Birth control/protection: Partner-Vasectomy     Comment: : Boris    Social History Narrative    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     Social Determinants of Health     Financial Resource Strain: Low Risk     Difficulty of Paying Living Expenses: Not hard at all   Food Insecurity: No Food Insecurity    Worried About Running Out of Food in the Last Year: Never true    Ran Out of Food in the Last Year: Never true   Transportation Needs: No Transportation Needs    Lack of Transportation (Medical): No    Lack of Transportation (Non-Medical): No   Physical Activity: Sufficiently Active    Days of Exercise per Week: 5 days    Minutes of Exercise per Session: 30 min   Stress: Stress Concern Present    Feeling of Stress : Rather much   Social Connections: Unknown    Frequency of Communication with Friends and Family: More than three times a week    Frequency of Social Gatherings with Friends and Family: Twice a week    Active Member of Clubs or Organizations: Yes    Attends Club or Organization Meetings: 1 to 4 times per year    Marital Status:    Housing Stability: Low Risk     Unable to Pay for Housing in the Last Year: No    Number of Places Lived in the Last Year: 1    Unstable Housing in the Last Year: No       OBJECTIVE:    /79   Pulse 85   Wt 85.2 kg (187 lb 13.3 oz)   BMI 28.56 kg/m²     PHYSICAL EXAMINATION:    General appearance: Well appearing, in no acute distress, alert and oriented x3.  Psych:  Mood and affect appropriate.  Skin: Skin color, texture, turgor normal, no rashes or lesions, in both upper and lower body.  Head/face:  Atraumatic, normocephalic. No palpable lymph nodes  Neck: neg pain to palpation over the cervical paraspinous muscles. Spurling Negative. + for mild pain with extension   Cor: RRR  Pulm: CTA  GI: Abdomen soft and non-tender.  Back: Straight leg raising in the sitting and supine positions is negative to radicular pain. No pain to palpation over the spine or costovertebral angles. Normal range of motion without pain  reproduction.  Extremities: Peripheral joint ROM is full and pain free without obvious instability or laxity in all four extremities. No deformities, edema, or skin discoloration. Good capillary refill.  Musculoskeletal: Shoulder, hip, sacroiliac and knee provocative maneuvers are negative. Bilateral upper and lower extremity strength is normal and symmetric.  No atrophy or tone abnormalities are noted.  Neuro: Bilateral upper and lower extremity coordination and muscle stretch reflexes are physiologic and symmetric.  Plantar response are downgoing. No loss of sensation is noted.  Gait: Normal.    ASSESSMENT: 37 y.o. year old female with  neck and shoulder  pain, consistent with patient is status post cervical LAZ targeting C7-T1 with in overall 40% improvement of her pain patient reporting a discernible difference in her pain relief following this injection and the optimization of certain medications.  Her and I discussed that we could consider repeating the cervical LAZ at the above-mentioned levels but will hold off for now.      09/27/2021-April Wendt Schamberger is a 37 y.o. female who  has a past medical history of Abnormal Pap smear of cervix (2000), ADD (attention deficit disorder), Breast disorder, Esophageal reflux, Fibroids, Hypertension, IBS (irritable bowel syndrome), Insomnia, Kidney stones, Mastocytosis, Relies on partner vasectomy for contraception, and Upper GI bleed.  By history and examination this patient has chronicneck pain with right radiculopathy in the distribution of C4, C5, C6 and C7.  The underlying cause cause is facet arthritis, degenerative disc disease, foraminal stenosis and muscle dysfunction.  Pathology is confirmed by imaging.  We discussed the underlying diagnoses and multiple treatment options including non-opioid medications, injections, physical therapy, home exercise and activity modification / rest.  The risks and benefits of each treatment option were discussed and all  questions were answered.    Mrs. Schamberger tested + COVID on 9/19, her symptoms began on 9/14 she reports being symptom-free on 9-10 and reports being cleared by [employer] [facility] on 09/26 she shows no COVID symptoms today, however I recommended that we hold off on injections discussed that our protocol is 2 weeks before injections  following testing + COVID. Recommended she follow up in 5 days for consideration of cervical paraspinal TPIs, pt agreed and understood. Patient to send me updated documentation on her COVID clearance.     11/24/21 - this is a 36-year-old female with chronic neck pain and chronic cervical radiculopathy to the right upper extremity in the distribution of C5, C6, and C7.  This is consistent with previous cervical MRI showing degenerative disease contributing to foraminal and lateral recess stenosis.  She has a positive Spurling's sign on the right side, further supporting this diagnosis.  She has tried trigger point injections, TENS, oral medications, and home exercise.  She would like to repeat the cervical epidural steroid injection that provided her approximately 50% relief from December 2020. Additionally, I will increase her dose of Lyrica and provide her with a short-term prescription of tramadol.      12/29/2021-April Wendt Schamberger is a 37 y.o. female who  has a past medical history of Abnormal Pap smear of cervix (2000), ADD (attention deficit disorder), Breast disorder, Esophageal reflux, Fibroids, Hypertension, IBS (irritable bowel syndrome), Insomnia, Kidney stones, Mastocytosis, Relies on partner vasectomy for contraception, and Upper GI bleed.  By history and examination this patient has chronic and subacuteneck pain with right radiculopathy in the distribution of C4, C5 and C6.  The underlying cause cause is degenerative disc disease and foraminal stenosis.  Pathology is confirmed by imaging.  We discussed the underlying diagnoses and multiple treatment options including  non-opioid medications, opioid medications, interventional procedures, physical therapy and home exercise.  The risks and benefits of each treatment option were discussed and all questions were answered.          04/28/2022- 37-year-old female with history of chronic neck pain with increasing symptoms in her right hand described as numbness.  Additionally she has pain in her right forearm affecting the 4th and 5th digits that are very consistent.  She has seen Neurosurgery and decompressive surgery has been recommended however Dr. Reed as ordered an EMG she will follow-up with him for these results.  Her MRI shows at C5-6 and C6-7 degenerative disc disease with broad-based bulging that encroaches to central cord without compression and without significant neural foraminal stenosis.  Her and I discussed today that we will provide her with a short-term prescription of tramadol 50 mg b.i.d. to utilize and be a bridge through therapy at this time Dr. Reed has recommended therapy prior to considering surgery.  Based on patient's history and behavior there are no red flags with regard to poly if pharmacy of opioids, considering she is dealing with chronic/subacute pain I would recommend continuing tramadol 50 mg b.i.d. until it plan of care has been established from a surgical perspective.  April did report today that she would be willing to have surgery to reduce/rid her of her pain as it is affecting her quality of life and sleep.    5/18/22 - Patient seeking refill of tramadol for daily use (BID).  She is on 4 controlled substances already and I voiced concern about adding another long-term controlled substance to her regimen (opioids).  I advise use of tramadol for 2-3 months to facilitate participation in PT, HE, and during this information gathering phase (pending EMG and f/u NSGY assessment) but cannot recommend opioid therapy long-term.  Myofascial tension in the c-spine remains high.      1/30/2023- April Jackson  Schamberger is a 37 y.o. female who  has a past medical history of Abnormal Pap smear of cervix (2000), ADD (attention deficit disorder), Breast disorder, Esophageal reflux, Fibroids, Hypertension, IBS (irritable bowel syndrome), Insomnia, Kidney stones, Mastocytosis, Relies on partner vasectomy for contraception, and Upper GI bleed.  By history and examination this patient has chronicneck pain without bilateral radiculopathy in the distribution of C5, C6, and C7.  The underlying cause cause is muscle dysfunction and muscles strain.  Pathology is confirmed by imaging.  We discussed the underlying diagnoses and multiple treatment options including non-opioid medications, interventional procedures, home exercise, and core muscle enhancement.  The risks and benefits of each treatment option were discussed and all questions were answered.        Diagnoses    1. Myofascial pain syndrome, cervical        2. Cervical pain (neck)        3. Chronic pain syndrome        4. Neck pain        5. Myofascial muscle pain        6. Muscle spasm        7. DDD (degenerative disc disease), cervical            PLAN:   Continue HE plan discussed importance of exercise and stretching.  S/f Cervical TPIs bilaterally   Continue Tylenol and ibuprofen p.r.n.  Continue  Lyrica to 50 mg b.i.d. for neuropathic pain    VALERIE Marie  Interventional Pain Management          01/30/2023

## 2023-02-11 DIAGNOSIS — M79.18 MYOFASCIAL PAIN SYNDROME, CERVICAL: ICD-10-CM

## 2023-02-11 DIAGNOSIS — K58.0 IRRITABLE BOWEL SYNDROME WITH DIARRHEA: ICD-10-CM

## 2023-02-11 DIAGNOSIS — M62.838 MUSCLE SPASM: ICD-10-CM

## 2023-02-13 RX ORDER — DIPHENOXYLATE HYDROCHLORIDE AND ATROPINE SULFATE 2.5; .025 MG/1; MG/1
1 TABLET ORAL 4 TIMES DAILY PRN
Qty: 360 TABLET | Refills: 0 | Status: SHIPPED | OUTPATIENT
Start: 2023-02-13 | End: 2023-02-14 | Stop reason: SDUPTHER

## 2023-02-13 RX ORDER — TIZANIDINE 4 MG/1
4 TABLET ORAL 3 TIMES DAILY PRN
Qty: 90 TABLET | Refills: 1 | Status: SHIPPED | OUTPATIENT
Start: 2023-02-13 | End: 2023-04-18 | Stop reason: SDUPTHER

## 2023-02-15 ENCOUNTER — PATIENT MESSAGE (OUTPATIENT)
Dept: GASTROENTEROLOGY | Facility: CLINIC | Age: 38
End: 2023-02-15
Payer: COMMERCIAL

## 2023-02-15 DIAGNOSIS — K58.0 IRRITABLE BOWEL SYNDROME WITH DIARRHEA: ICD-10-CM

## 2023-02-15 RX ORDER — DIPHENOXYLATE HYDROCHLORIDE AND ATROPINE SULFATE 2.5; .025 MG/1; MG/1
1 TABLET ORAL 4 TIMES DAILY PRN
Qty: 360 TABLET | Refills: 0 | Status: CANCELLED | OUTPATIENT
Start: 2023-02-15 | End: 2024-05-12

## 2023-02-22 DIAGNOSIS — R51.9 NONINTRACTABLE HEADACHE, UNSPECIFIED CHRONICITY PATTERN, UNSPECIFIED HEADACHE TYPE: ICD-10-CM

## 2023-02-22 DIAGNOSIS — F51.01 PRIMARY INSOMNIA: ICD-10-CM

## 2023-02-22 RX ORDER — ESZOPICLONE 3 MG/1
3 TABLET, FILM COATED ORAL NIGHTLY PRN
Qty: 30 TABLET | Refills: 0 | Status: SHIPPED | OUTPATIENT
Start: 2023-02-22 | End: 2023-03-20 | Stop reason: SDUPTHER

## 2023-02-22 RX ORDER — BUTALBITAL, ACETAMINOPHEN AND CAFFEINE 50; 325; 40 MG/1; MG/1; MG/1
1 TABLET ORAL EVERY 6 HOURS PRN
Qty: 30 TABLET | Refills: 0 | Status: SHIPPED | OUTPATIENT
Start: 2023-02-22 | End: 2023-03-20 | Stop reason: SDUPTHER

## 2023-02-22 NOTE — TELEPHONE ENCOUNTER
No new care gaps identified.  [facility] Embedded Care Gaps. Reference number: [id number]. 2/22/2023   11:20:41 AM CST

## 2023-03-10 ENCOUNTER — OFFICE VISIT (OUTPATIENT)
Dept: PSYCHIATRY | Facility: CLINIC | Age: 38
End: 2023-03-10
Payer: COMMERCIAL

## 2023-03-10 DIAGNOSIS — F41.1 GAD (GENERALIZED ANXIETY DISORDER): Primary | ICD-10-CM

## 2023-03-10 DIAGNOSIS — F90.2 ATTENTION DEFICIT HYPERACTIVITY DISORDER (ADHD), COMBINED TYPE: ICD-10-CM

## 2023-03-10 DIAGNOSIS — K58.0 IRRITABLE BOWEL SYNDROME WITH DIARRHEA: ICD-10-CM

## 2023-03-10 DIAGNOSIS — F32.81 PMDD (PREMENSTRUAL DYSPHORIC DISORDER): ICD-10-CM

## 2023-03-10 PROCEDURE — 4010F ACE/ARB THERAPY RXD/TAKEN: CPT | Mod: CPTII,95,, | Performed by: STUDENT IN AN ORGANIZED HEALTH CARE EDUCATION/TRAINING PROGRAM

## 2023-03-10 PROCEDURE — 99213 OFFICE O/P EST LOW 20 MIN: CPT | Mod: 95,,, | Performed by: STUDENT IN AN ORGANIZED HEALTH CARE EDUCATION/TRAINING PROGRAM

## 2023-03-10 PROCEDURE — 99213 PR OFFICE/OUTPT VISIT, EST, LEVL III, 20-29 MIN: ICD-10-PCS | Mod: 95,,, | Performed by: STUDENT IN AN ORGANIZED HEALTH CARE EDUCATION/TRAINING PROGRAM

## 2023-03-10 PROCEDURE — 4010F PR ACE/ARB THEARPY RXD/TAKEN: ICD-10-PCS | Mod: CPTII,95,, | Performed by: STUDENT IN AN ORGANIZED HEALTH CARE EDUCATION/TRAINING PROGRAM

## 2023-03-10 RX ORDER — DEXTROAMPHETAMINE SACCHARATE, AMPHETAMINE ASPARTATE, DEXTROAMPHETAMINE SULFATE AND AMPHETAMINE SULFATE 3.75; 3.75; 3.75; 3.75 MG/1; MG/1; MG/1; MG/1
15 TABLET ORAL
Qty: 30 TABLET | Refills: 0 | Status: SHIPPED | OUTPATIENT
Start: 2023-03-10 | End: 2023-04-20

## 2023-03-10 RX ORDER — VENLAFAXINE HYDROCHLORIDE 37.5 MG/1
75 CAPSULE, EXTENDED RELEASE ORAL DAILY
Qty: 60 CAPSULE | Refills: 3 | Status: SHIPPED | OUTPATIENT
Start: 2023-03-10 | End: 2023-05-25 | Stop reason: SDUPTHER

## 2023-03-10 RX ORDER — DEXTROAMPHETAMINE SACCHARATE, AMPHETAMINE ASPARTATE, DEXTROAMPHETAMINE SULFATE AND AMPHETAMINE SULFATE 3.75; 3.75; 3.75; 3.75 MG/1; MG/1; MG/1; MG/1
15 TABLET ORAL
Qty: 30 TABLET | Refills: 0 | Status: SHIPPED | OUTPATIENT
Start: 2023-04-07 | End: 2023-05-25 | Stop reason: SDUPTHER

## 2023-03-10 RX ORDER — LISDEXAMFETAMINE DIMESYLATE 70 MG/1
70 CAPSULE ORAL EVERY MORNING
Qty: 30 CAPSULE | Refills: 0 | Status: SHIPPED | OUTPATIENT
Start: 2023-03-15 | End: 2023-04-14

## 2023-03-10 RX ORDER — LISDEXAMFETAMINE DIMESYLATE 70 MG/1
70 CAPSULE ORAL EVERY MORNING
Qty: 30 CAPSULE | Refills: 0 | Status: SHIPPED | OUTPATIENT
Start: 2023-05-10 | End: 2023-05-25 | Stop reason: SDUPTHER

## 2023-03-10 RX ORDER — LISDEXAMFETAMINE DIMESYLATE 70 MG/1
70 CAPSULE ORAL EVERY MORNING
Qty: 30 CAPSULE | Refills: 0 | Status: SHIPPED | OUTPATIENT
Start: 2023-04-12 | End: 2023-05-13

## 2023-03-10 NOTE — PROGRESS NOTES
OUTPATIENT PSYCHIATRY FOLLOW UP VISIT    ENCOUNTER DATE:  3/10/2023  SITE:  [facility]  LENGTH OF SESSION:  30 minutes      CHIEF COMPLAINT:  Follow up for medication management    HISTORY OF PRESENTING ILLNESS:  April Wendt Schamberger is a 37 y.o. female with history of ADHD, PMDD, NIRAV, IBS-D  who presents for follow up appointment. Patient was last seen in this clinic in two months ago, during which no changes were made to the medication regimen.     Per last note, current psychotropic medications included:  - Vyvanse 70 mg po daily for ADHD  - Adderall IR 10 mg PO daily as needed in afternoon for breakthrough ADHD symptoms  - Amitriptyline 25 mg qhs for anxiety, sleep, and IBS with diarrhea  - Venlafaxine 75 mg for PMDD daily with extra 37.5 mg for luteal phase symptoms    Interval history as told by Patient - & or family/friend/spouse/caregiver with pts permission    Patient reports she is doing well. Mood is stable throughout her cycle. IBS symptoms and sleep are unchanged. Sleeps 6-7 hours per night. Denies insomnia. No changes in eating habits or unintentional weight gain. Reports increasing the number of days she takes the Adderall IR after she was reprimanded for failing to answer messages at work. Now taking the IR dose 4-5 times per week. Still feels like she has been making mistakes at work in the afternoons. Concentration and focus are very good in the morning. Reports she takes Vyvanse around 7 AM consistently. Takes Adderall IR between noon and 1 PM. Doing well with Amitriptyline, but reports some early morning drowsiness. Takes Lunesta, per PCP. No symptoms of depression at this time. Denies SI. Generalized anxiety appears to be well-controlled at this time. Denies AVH. Denies recreational substance use. Drinks 1-2 Truly once per week.    PSYCHIATRIC REVIEW OF SYSTEMS:    Symptoms of Depression: None    Symptoms of Anxiety/ panic attacks: None    Symptoms of  "Luly/Hypomania: None    Symptoms of psychosis: None    Sleep: None    Alcohol: No excessive drinking reported    Illicit Drugs: No illicit substance use reported      Risk Parameters:  Patient denies no SI, HI or self-injurious behavior      PSYCHIATRIC MED REVIEW  Current medications:  See above in last plan    Current psych meds  Medication side effects:  Morning drowsiness associated with Amitriptyline, tolerable to patient  Medication compliance:  Full    Medical Hx:  Chronic neck pain, HTN, Ulnar tunnel release, IBS-D  Medications: Lunesta, Metoprolol, valsartan, lamotil, immodium PRN, Zofran PRN, tizanidine    Psychiatric Hx:  Diagnosis: ADHD, combined type, unspecified anxiety, PMDD, IBS-D  Previous medication trials:  Antidepressants   - Zoloft: AE: diarrhea   - Elavil: currently taking   - Effexor: currently taking  Stimulants   - Vyvanse: currently taking  - Adderall XR   - Adderall IR: AE: "cranky"  Anxiolytics   - Klonopin: SE drowsiness  Sedatives   - Ambien   - Lunesta: currently taking (per PCP)  Other   - Melatonin   - Benadryl  Hospitalizations: none  Suicide attempt: none    Social Hx: Lives with , two children (4 & 7 year old). 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    Family Hx: Sister: depression (currently on Prozac)      PAST PSYCHIATRIC, MEDICAL, AND SOCIAL HISTORY REVIEWED  The patient's past medical, family and social history have been reviewed and updated as appropriate within the electronic medical record - see encounter notes.    MEDICAL REVIEW OF SYSTEMS:  Complete review of systems performed covering Constitutional, Musculoskeletal, Neurologic.  All systems negative.    ALL MEDICATIONS:    Current Outpatient Medications:     acetaminophen (TYLENOL) 500 MG tablet, Take 2 tablets (1,000 mg total) by mouth 3 (three) times daily as needed for Pain., Disp: , Rfl: 0    acetaminophen-codeine 300-30mg (TYLENOL #3) 300-30 mg Tab, Take 1 tablet by mouth every 4 " to 6 hours as needed for pain., Disp: 12 tablet, Rfl: 0    amitriptyline (ELAVIL) 25 MG tablet, Take 1 tablet (25 mg total) by mouth every evening., Disp: 90 tablet, Rfl: 3    butalbital-acetaminophen-caffeine -40 mg (FIORICET, ESGIC) -40 mg per tablet, Take 1 tablet by mouth every 6 (six) hours as needed for Headaches., Disp: 30 tablet, Rfl: 0    chlorhexidine (PERIDEX) 0.12 % solution, Swish and spit 15mls by mouth three times daily as directed, Disp: 473 mL, Rfl: 4    dextroamphetamine-amphetamine (ADDERALL) 10 mg Tab, Take 1 tablet (10 mg total) by mouth after lunch., Disp: 30 tablet, Rfl: 0    diphenoxylate-atropine 2.5-0.025 mg (LOMOTIL) 2.5-0.025 mg per tablet, Take 1 tablet by mouth 4 (four) times daily as needed for Diarrhea (diarrhea)., Disp: 360 tablet, Rfl: 0    eszopiclone (LUNESTA) 3 mg Tab, Take 1 tablet (3 mg total) by mouth nightly as needed (insomnia)., Disp: 30 tablet, Rfl: 0    fluconazole (DIFLUCAN) 150 MG Tab, Take 1 tablet as needed for yeast infection, Disp: 30 tablet, Rfl: 1    HYDROcodone-acetaminophen (NORCO) 5-325 mg per tablet, Take 1 tablet by mouth every 4 (four) hours as needed for Pain., Disp: 12 tablet, Rfl: 0    lisdexamfetamine (VYVANSE) 70 MG capsule, Take 1 capsule (70 mg total) by mouth every morning., Disp: 30 capsule, Rfl: 0    loperamide (IMODIUM) 1 mg/7.5 mL solution, Take 0.1 mg/kg by mouth every 12 (twelve) hours., Disp: , Rfl:     loperamide (IMODIUM) 2 mg capsule, Take 2 mg by mouth 4 (four) times daily as needed for Diarrhea., Disp: , Rfl:     metoprolol succinate (TOPROL-XL) 50 MG 24 hr tablet, Take 1 tablet (50 mg total) by mouth 2 (two) times daily., Disp: 180 tablet, Rfl: 3    metroNIDAZOLE (FLAGYL) 500 MG tablet, Take 1 tablet (500 mg total) by mouth every 12 (twelve) hours. Do not drink alcohol while taking this medication, Disp: 14 tablet, Rfl: 0    penicillin v potassium (VEETID) 500 MG tablet, Take one tablet by mouth every 6 hours for 7 days,  Disp: 28 tablet, Rfl: 0    pregabalin (LYRICA) 50 MG capsule, Take 1 capsule (50 mg total) by mouth 2 (two) times daily., Disp: 60 capsule, Rfl: 2    rizatriptan (MAXALT-MLT) 10 MG disintegrating tablet, Take 1/2 tablet (5 mg total) by mouth as needed for Migraine (repeat dose once in 2 hours if headache return.)., Disp: 9 tablet, Rfl: 6    terconazole (TERAZOL 7) 0.4 % Crea, Place 1 applicator vaginally every evening., Disp: 45 g, Rfl: 0    tiZANidine (ZANAFLEX) 4 MG tablet, Take 1 tablet (4 mg total) by mouth 3 (three) times daily as needed., Disp: 90 tablet, Rfl: 1    traMADoL (ULTRAM) 50 mg tablet, Take one tablet by mouth every 4-6 hours as needed for pain, Disp: 10 tablet, Rfl: 0    traMADoL (ULTRAM) 50 mg tablet, Take 1 tablet (50 mg total) by mouth every 4 to 6 hours as needed., Disp: 21 tablet, Rfl: 0    tretinoin (RETIN-A) 0.025 % cream, Apply topically every evening. Pea sized amount to entire face. If irritation occurs, decrease use to every other night., Disp: 20 g, Rfl: 5    valsartan (DIOVAN) 320 MG tablet, Take 1 tablet (320 mg total) by mouth once daily., Disp: 90 tablet, Rfl: 1    venlafaxine (EFFEXOR-XR) 37.5 MG 24 hr capsule, Take 3 capsules (112.5 mg total) by mouth once daily., Disp: 90 capsule, Rfl: 2  No current facility-administered medications for this visit.    Facility-Administered Medications Ordered in Other Visits:     0.9%  NaCl infusion, 500 mL, Intravenous, Continuous, Corky Argueta Jr., MD    ALLERGIES:  Review of patient's allergies indicates:   Allergen Reactions    Lactose     Azithromycin Nausea And Vomiting       RELEVANT LABS/STUDIES:    Lab Results   Component Value Date    WBC 8.22 09/01/2022    HGB 13.0 09/01/2022    HCT 39.0 09/01/2022    MCV 87 09/01/2022     09/01/2022     BMP  Lab Results   Component Value Date     09/01/2022    K 4.1 09/01/2022     09/01/2022    CO2 25 09/01/2022    BUN 5 (L) 09/01/2022    CREATININE 0.8 09/01/2022    CALCIUM  9.1 09/01/2022    ANIONGAP 10 09/01/2022    ESTGFRAFRICA >60 09/27/2021    EGFRNONAA >60 09/27/2021     Lab Results   Component Value Date    ALT 6 (L) 09/01/2022    AST 12 09/01/2022    ALKPHOS 62 09/01/2022    BILITOT 0.2 09/01/2022     Lab Results   Component Value Date    TSH 1.042 09/01/2022     Lab Results   Component Value Date    HGBA1C 5.4 09/01/2022       VITALS  There were no vitals filed for this visit.        PHYSICAL EXAM  General: No acute distress, well developed/nourished  Neurologic:   Gait: unable to assess  Psychomotor signs:  No involuntary movements or tremor  AIMS: none    PSYCHIATRIC EXAM:     Mental Status Exam:  Appearance: good grooming, age appropriate  Behavior/Cooperation: normal, cooperative   Speech:  normal tone, normal rate, normal pitch, normal volume  Language: uses words appropriately; NO aphasia or dysarthria  Mood: Euthymic  Affect:  Full, reactive, appropriate  Thought Process: Normal and logical  Thought Content: No SI, HI, AVH  Level of Consciousness: Alert and Oriented x3  Memory:  Recent and remote intact  Attention/concentration: appropriate for age/education.   Fund of Knowledge: appears adequate  Insight:  Has awareness of illness  Judgment: Behavior appropriate for circumstances      IMPRESSION:    April Wendt Schamberger is a 37 y.o. female with history of ADHD, PMDD, NIRAV, IBS-D who presents for follow up appointment for medication management.  The  has been reviewed, no concerning signs were noted.       Status/Progress:  Based on the examination today, the patient's problem(s) is/are adequately but not ideally controlled.  New problems have not been presented today.     DIAGNOSES:    ICD-10-CM ICD-9-CM   1. NIRAV (generalized anxiety disorder)  F41.1 300.02   2. Attention deficit hyperactivity disorder (ADHD), combined type  F90.2 314.01   3. PMDD (premenstrual dysphoric disorder)  F32.81 625.4   4. Irritable bowel syndrome with diarrhea  K58.0 564.1          PLAN:  Psych Med:  - Continue Vyvanse to 70 mg po daily for ADHD   - PDMP reviewed: patient filling all medications on time, no abnormalities noted    - Last refill picked up on 2/15/23   - Provided with three refills, starting on 3/15/23    - Increase Adderall IR to 15 mg PO in afternoon for breakthrough ADHD symptoms   - PDMP reviewed: patient filling all medications on time, no abnormalities noted    - Last refill picked up on 1/17/23   - Provided with two refills, starting on 3/10/23    - Continue Elavil 25 mg qhs for anxiety, sleep, and IBS with diarrhea; patient taking full tab daily; consider increasing for IBS-D once ADHD medication is stable  - Continue Effexor 37.5mg for PMDD daily with extra 37.5 mg for luteal phase symptoms (prescribed as 37.5 mg two times daily)  - Continue Lunesta, per PCP  - Provided with contact information for sleep medicine, for evaluation for sleep apnea      Discussed with patient informed consent, risks vs. benefits, alternative treatments, side effect profile and the inherent unpredictability of individual responses to these treatments. Answered any questions patient may have had. The patient expresses understanding of the above and displays the capacity to agree with this current plan       Other: None      RETURN TO CLINIC:  3 months    Case to be discussed with supervising staff, MD Ray Rangel MD  [facility], PGY-III   3/10/2023 9:17 AM

## 2023-03-20 DIAGNOSIS — F51.01 PRIMARY INSOMNIA: ICD-10-CM

## 2023-03-20 DIAGNOSIS — R51.9 NONINTRACTABLE HEADACHE, UNSPECIFIED CHRONICITY PATTERN, UNSPECIFIED HEADACHE TYPE: ICD-10-CM

## 2023-03-20 RX ORDER — BUTALBITAL, ACETAMINOPHEN AND CAFFEINE 50; 325; 40 MG/1; MG/1; MG/1
1 TABLET ORAL EVERY 6 HOURS PRN
Qty: 30 TABLET | Refills: 0 | Status: SHIPPED | OUTPATIENT
Start: 2023-03-20 | End: 2023-04-18 | Stop reason: SDUPTHER

## 2023-03-20 RX ORDER — ESZOPICLONE 3 MG/1
3 TABLET, FILM COATED ORAL NIGHTLY PRN
Qty: 30 TABLET | Refills: 0 | Status: SHIPPED | OUTPATIENT
Start: 2023-03-20 | End: 2023-04-18 | Stop reason: SDUPTHER

## 2023-03-20 NOTE — TELEPHONE ENCOUNTER
No new care gaps identified.  [facility] Embedded Care Gaps. Reference number: [id number]. 3/20/2023   12:26:44 PM CDT

## 2023-03-30 ENCOUNTER — CLINICAL SUPPORT (OUTPATIENT)
Dept: OTHER | Facility: CLINIC | Age: 38
End: 2023-03-30

## 2023-03-30 DIAGNOSIS — Z00.8 ENCOUNTER FOR OTHER GENERAL EXAMINATION: ICD-10-CM

## 2023-04-05 VITALS
HEIGHT: 68 IN | WEIGHT: 180 LBS | SYSTOLIC BLOOD PRESSURE: 132 MMHG | DIASTOLIC BLOOD PRESSURE: 82 MMHG | BODY MASS INDEX: 27.28 KG/M2

## 2023-04-05 LAB
GLUCOSE SERPL-MCNC: 130 MG/DL (ref 60–140)
HDLC SERPL-MCNC: 33 MG/DL
POC CHOLESTEROL, LDL (DOCK): 132 MG/DL
POC CHOLESTEROL, TOTAL: 185 MG/DL
TRIGL SERPL-MCNC: 109 MG/DL

## 2023-04-18 DIAGNOSIS — M79.18 MYOFASCIAL PAIN SYNDROME, CERVICAL: ICD-10-CM

## 2023-04-18 DIAGNOSIS — R51.9 NONINTRACTABLE HEADACHE, UNSPECIFIED CHRONICITY PATTERN, UNSPECIFIED HEADACHE TYPE: ICD-10-CM

## 2023-04-18 DIAGNOSIS — F51.01 PRIMARY INSOMNIA: ICD-10-CM

## 2023-04-18 DIAGNOSIS — M62.838 MUSCLE SPASM: ICD-10-CM

## 2023-04-18 RX ORDER — ESZOPICLONE 3 MG/1
3 TABLET, FILM COATED ORAL NIGHTLY PRN
Qty: 30 TABLET | Refills: 0 | Status: SHIPPED | OUTPATIENT
Start: 2023-04-18 | End: 2023-05-17 | Stop reason: SDUPTHER

## 2023-04-18 RX ORDER — TIZANIDINE 4 MG/1
4 TABLET ORAL 3 TIMES DAILY PRN
Qty: 90 TABLET | Refills: 1 | Status: SHIPPED | OUTPATIENT
Start: 2023-04-18 | End: 2023-06-14 | Stop reason: SDUPTHER

## 2023-04-18 RX ORDER — BUTALBITAL, ACETAMINOPHEN AND CAFFEINE 50; 325; 40 MG/1; MG/1; MG/1
1 TABLET ORAL EVERY 6 HOURS PRN
Qty: 30 TABLET | Refills: 0 | Status: SHIPPED | OUTPATIENT
Start: 2023-04-18 | End: 2023-06-14 | Stop reason: SDUPTHER

## 2023-04-18 NOTE — TELEPHONE ENCOUNTER
Butalbital last filled once 3/20/23.  Lunesta last filled 30 pills 3/20/23    Lov 9/8/22 annual.

## 2023-04-18 NOTE — TELEPHONE ENCOUNTER
No new care gaps identified.  [facility] Embedded Care Gaps. Reference number: [id number]. 4/18/2023   11:04:55 AM VICT

## 2023-04-26 DIAGNOSIS — K58.0 IRRITABLE BOWEL SYNDROME WITH DIARRHEA: ICD-10-CM

## 2023-04-26 RX ORDER — DIPHENOXYLATE HYDROCHLORIDE AND ATROPINE SULFATE 2.5; .025 MG/1; MG/1
1 TABLET ORAL 4 TIMES DAILY PRN
Qty: 360 TABLET | Refills: 0 | Status: CANCELLED | OUTPATIENT
Start: 2023-04-26 | End: 2024-07-21

## 2023-04-28 DIAGNOSIS — K58.0 IRRITABLE BOWEL SYNDROME WITH DIARRHEA: ICD-10-CM

## 2023-05-01 RX ORDER — DIPHENOXYLATE HYDROCHLORIDE AND ATROPINE SULFATE 2.5; .025 MG/1; MG/1
1 TABLET ORAL 4 TIMES DAILY PRN
Qty: 360 TABLET | Refills: 0 | Status: SHIPPED | OUTPATIENT
Start: 2023-05-01 | End: 2023-08-24 | Stop reason: SDUPTHER

## 2023-05-16 ENCOUNTER — TELEPHONE (OUTPATIENT)
Dept: PSYCHIATRY | Facility: CLINIC | Age: 38
End: 2023-05-16
Payer: COMMERCIAL

## 2023-05-16 ENCOUNTER — PATIENT MESSAGE (OUTPATIENT)
Dept: PSYCHIATRY | Facility: CLINIC | Age: 38
End: 2023-05-16
Payer: COMMERCIAL

## 2023-05-17 DIAGNOSIS — F51.01 PRIMARY INSOMNIA: ICD-10-CM

## 2023-05-18 RX ORDER — ESZOPICLONE 3 MG/1
3 TABLET, FILM COATED ORAL NIGHTLY PRN
Qty: 30 TABLET | Refills: 0 | Status: SHIPPED | OUTPATIENT
Start: 2023-05-18 | End: 2023-06-14 | Stop reason: SDUPTHER

## 2023-05-18 NOTE — TELEPHONE ENCOUNTER
No care due was identified.  [facility] Embedded Care Due Messages. Reference number: [id number].   5/17/2023 9:44:14 PM CDT

## 2023-05-25 ENCOUNTER — OFFICE VISIT (OUTPATIENT)
Dept: PSYCHIATRY | Facility: CLINIC | Age: 38
End: 2023-05-25
Payer: COMMERCIAL

## 2023-05-25 VITALS
DIASTOLIC BLOOD PRESSURE: 88 MMHG | BODY MASS INDEX: 28.48 KG/M2 | WEIGHT: 187.25 LBS | SYSTOLIC BLOOD PRESSURE: 137 MMHG | HEART RATE: 78 BPM

## 2023-05-25 DIAGNOSIS — F90.2 ATTENTION DEFICIT HYPERACTIVITY DISORDER (ADHD), COMBINED TYPE: Primary | ICD-10-CM

## 2023-05-25 DIAGNOSIS — K58.0 IRRITABLE BOWEL SYNDROME WITH DIARRHEA: ICD-10-CM

## 2023-05-25 DIAGNOSIS — F41.1 GAD (GENERALIZED ANXIETY DISORDER): ICD-10-CM

## 2023-05-25 DIAGNOSIS — F32.81 PMDD (PREMENSTRUAL DYSPHORIC DISORDER): ICD-10-CM

## 2023-05-25 PROCEDURE — 3079F PR MOST RECENT DIASTOLIC BLOOD PRESSURE 80-89 MM HG: ICD-10-PCS | Mod: CPTII,S$GLB,, | Performed by: STUDENT IN AN ORGANIZED HEALTH CARE EDUCATION/TRAINING PROGRAM

## 2023-05-25 PROCEDURE — 99213 PR OFFICE/OUTPT VISIT, EST, LEVL III, 20-29 MIN: ICD-10-PCS | Mod: S$GLB,,, | Performed by: STUDENT IN AN ORGANIZED HEALTH CARE EDUCATION/TRAINING PROGRAM

## 2023-05-25 PROCEDURE — 3008F PR BODY MASS INDEX (BMI) DOCUMENTED: ICD-10-PCS | Mod: CPTII,S$GLB,, | Performed by: STUDENT IN AN ORGANIZED HEALTH CARE EDUCATION/TRAINING PROGRAM

## 2023-05-25 PROCEDURE — 3075F PR MOST RECENT SYSTOLIC BLOOD PRESS GE 130-139MM HG: ICD-10-PCS | Mod: CPTII,S$GLB,, | Performed by: STUDENT IN AN ORGANIZED HEALTH CARE EDUCATION/TRAINING PROGRAM

## 2023-05-25 PROCEDURE — 99999 PR PBB SHADOW E&M-EST. PATIENT-LVL II: ICD-10-PCS | Mod: PBBFAC,,, | Performed by: STUDENT IN AN ORGANIZED HEALTH CARE EDUCATION/TRAINING PROGRAM

## 2023-05-25 PROCEDURE — 4010F ACE/ARB THERAPY RXD/TAKEN: CPT | Mod: CPTII,S$GLB,, | Performed by: STUDENT IN AN ORGANIZED HEALTH CARE EDUCATION/TRAINING PROGRAM

## 2023-05-25 PROCEDURE — 99213 OFFICE O/P EST LOW 20 MIN: CPT | Mod: S$GLB,,, | Performed by: STUDENT IN AN ORGANIZED HEALTH CARE EDUCATION/TRAINING PROGRAM

## 2023-05-25 PROCEDURE — 3075F SYST BP GE 130 - 139MM HG: CPT | Mod: CPTII,S$GLB,, | Performed by: STUDENT IN AN ORGANIZED HEALTH CARE EDUCATION/TRAINING PROGRAM

## 2023-05-25 PROCEDURE — 3008F BODY MASS INDEX DOCD: CPT | Mod: CPTII,S$GLB,, | Performed by: STUDENT IN AN ORGANIZED HEALTH CARE EDUCATION/TRAINING PROGRAM

## 2023-05-25 PROCEDURE — 99999 PR PBB SHADOW E&M-EST. PATIENT-LVL II: CPT | Mod: PBBFAC,,, | Performed by: STUDENT IN AN ORGANIZED HEALTH CARE EDUCATION/TRAINING PROGRAM

## 2023-05-25 PROCEDURE — 4010F PR ACE/ARB THEARPY RXD/TAKEN: ICD-10-PCS | Mod: CPTII,S$GLB,, | Performed by: STUDENT IN AN ORGANIZED HEALTH CARE EDUCATION/TRAINING PROGRAM

## 2023-05-25 PROCEDURE — 3079F DIAST BP 80-89 MM HG: CPT | Mod: CPTII,S$GLB,, | Performed by: STUDENT IN AN ORGANIZED HEALTH CARE EDUCATION/TRAINING PROGRAM

## 2023-05-25 RX ORDER — VENLAFAXINE HYDROCHLORIDE 37.5 MG/1
75 CAPSULE, EXTENDED RELEASE ORAL DAILY
Qty: 60 CAPSULE | Refills: 3 | Status: SHIPPED | OUTPATIENT
Start: 2023-05-25 | End: 2024-02-26 | Stop reason: SDUPTHER

## 2023-05-25 RX ORDER — DEXTROAMPHETAMINE SACCHARATE, AMPHETAMINE ASPARTATE, DEXTROAMPHETAMINE SULFATE AND AMPHETAMINE SULFATE 3.75; 3.75; 3.75; 3.75 MG/1; MG/1; MG/1; MG/1
15 TABLET ORAL
Qty: 30 TABLET | Refills: 0 | Status: SHIPPED | OUTPATIENT
Start: 2023-05-25 | End: 2023-06-28

## 2023-05-25 RX ORDER — DEXTROAMPHETAMINE SACCHARATE, AMPHETAMINE ASPARTATE, DEXTROAMPHETAMINE SULFATE AND AMPHETAMINE SULFATE 3.75; 3.75; 3.75; 3.75 MG/1; MG/1; MG/1; MG/1
15 TABLET ORAL
Qty: 30 TABLET | Refills: 0 | Status: SHIPPED | OUTPATIENT
Start: 2023-07-20 | End: 2023-07-17 | Stop reason: SDUPTHER

## 2023-05-25 RX ORDER — LISDEXAMFETAMINE DIMESYLATE 70 MG/1
70 CAPSULE ORAL EVERY MORNING
Qty: 30 CAPSULE | Refills: 0 | Status: SHIPPED | OUTPATIENT
Start: 2023-06-16 | End: 2023-07-17 | Stop reason: SDUPTHER

## 2023-05-25 RX ORDER — LISDEXAMFETAMINE DIMESYLATE 70 MG/1
70 CAPSULE ORAL EVERY MORNING
Qty: 30 CAPSULE | Refills: 0 | Status: SHIPPED | OUTPATIENT
Start: 2023-07-14 | End: 2023-07-17 | Stop reason: SDUPTHER

## 2023-05-25 RX ORDER — LISDEXAMFETAMINE DIMESYLATE 70 MG/1
70 CAPSULE ORAL EVERY MORNING
Qty: 30 CAPSULE | Refills: 0 | Status: SHIPPED | OUTPATIENT
Start: 2023-08-11 | End: 2023-07-17 | Stop reason: SDUPTHER

## 2023-05-25 RX ORDER — DEXTROAMPHETAMINE SACCHARATE, AMPHETAMINE ASPARTATE, DEXTROAMPHETAMINE SULFATE AND AMPHETAMINE SULFATE 3.75; 3.75; 3.75; 3.75 MG/1; MG/1; MG/1; MG/1
15 TABLET ORAL
Qty: 30 TABLET | Refills: 0 | Status: SHIPPED | OUTPATIENT
Start: 2023-06-22 | End: 2023-07-17 | Stop reason: SDUPTHER

## 2023-05-25 NOTE — PROGRESS NOTES
OUTPATIENT PSYCHIATRY FOLLOW UP VISIT    ENCOUNTER DATE:  5/25/2023  SITE:  [facility], [facility]  LENGTH OF SESSION:  30 minutes      CHIEF COMPLAINT:  Follow up for medication management    HISTORY OF PRESENTING ILLNESS:  April Wendt Schamberger is a 38 y.o. female with history of ADHD, PMDD, NIRAV, IBS-D  who presents for follow up appointment. Patient was last seen in this clinic in two months ago, during which  Adderall was increased.     Per last note, current psychotropic medications included:  - Vyvanse 70 mg po daily for ADHD  - Adderall IR 15 mg PO daily as needed in afternoon for breakthrough ADHD symptoms  - Amitriptyline 25 mg qhs for anxiety, sleep, and IBS with diarrhea  - Venlafaxine 75 mg for PMDD daily with extra 37.5 mg for luteal phase symptoms    Interval history as told by Patient - & or family/friend/spouse/caregiver with pts permission    Patient reports she is doing well. Mood is stable throughout her cycle. IBS symptoms and sleep are unchanged. Sleeps 7 hours per night. States that sleep is improved with recent increase in Adderall. Denies insomnia. No changes in eating habits or unintentional weight gain. Taking Adderall IR most days and has had noticeable improvement in ADHD symptoms in the afternoon. However, she continues to accomplish most tasks in the mornings, as the Adderall is still not as effective as the Vyvanse. No further issues at work. Concentration and focus are very good in the morning. Reports she takes Vyvanse around 7 AM consistently. Takes Adderall IR between noon and 1 PM. Doing well with Amitriptyline, but reports some early morning drowsiness. Takes Lunesta, per PCP. No symptoms of depression at this time. Denies SI. Generalized anxiety appears to be well-controlled at this time. Denies AVH. Denies recreational substance use. Drinks 1-2 Truly once per week.    PSYCHIATRIC REVIEW OF SYSTEMS:    Symptoms of Depression: None    Symptoms of Anxiety/ panic  "attacks: None    Symptoms of Luly/Hypomania: None    Symptoms of psychosis: None    Sleep: None    Alcohol: No excessive drinking reported    Illicit Drugs: No illicit substance use reported      Risk Parameters:  Patient denies no SI, HI or self-injurious behavior      PSYCHIATRIC MED REVIEW  Current medications:  See above in last plan    Current psych meds  Medication side effects:  Morning drowsiness associated with Amitriptyline, tolerable to patient  Medication compliance:  Full    Medical Hx:  Chronic neck pain, HTN, Ulnar tunnel release, IBS-D  Medications: Lunesta, Metoprolol, valsartan, lamotil, immodium PRN, Zofran PRN, tizanidine    Psychiatric Hx:  Diagnosis: ADHD, combined type, unspecified anxiety, PMDD, IBS-D  Previous medication trials:  Antidepressants   - Zoloft: AE: diarrhea   - Elavil: currently taking   - Effexor: currently taking  Stimulants   - Vyvanse: currently taking  - Adderall XR   - Adderall IR: AE: "cranky"  Anxiolytics   - Klonopin: SE drowsiness  Sedatives   - Ambien   - Lunesta: currently taking (per PCP)  Other   - Melatonin   - Benadryl  Hospitalizations: none  Suicide attempt: none    Social Hx: Lives with , two children (4 & 7 year old). 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    Family Hx: Sister: depression (currently on Prozac)      PAST PSYCHIATRIC, MEDICAL, AND SOCIAL HISTORY REVIEWED  The patient's past medical, family and social history have been reviewed and updated as appropriate within the electronic medical record - see encounter notes.    MEDICAL REVIEW OF SYSTEMS:  Complete review of systems performed covering Constitutional, Musculoskeletal, Neurologic.  All systems negative.    ALL MEDICATIONS:    Current Outpatient Medications:     acetaminophen (TYLENOL) 500 MG tablet, Take 2 tablets (1,000 mg total) by mouth 3 (three) times daily as needed for Pain., Disp: , Rfl: 0    acetaminophen-codeine 300-30mg (TYLENOL #3) 300-30 mg Tab, " Take 1 tablet by mouth every 4 to 6 hours as needed for pain., Disp: 12 tablet, Rfl: 0    amitriptyline (ELAVIL) 25 MG tablet, Take 1 tablet (25 mg total) by mouth every evening., Disp: 90 tablet, Rfl: 3    butalbital-acetaminophen-caffeine -40 mg (FIORICET, ESGIC) -40 mg per tablet, Take 1 tablet by mouth every 6 (six) hours as needed for Headaches., Disp: 30 tablet, Rfl: 0    chlorhexidine (PERIDEX) 0.12 % solution, Swish and spit 15mls by mouth three times daily as directed, Disp: 473 mL, Rfl: 4    dextroamphetamine-amphetamine (ADDERALL) 15 mg tablet, Take 1 tablet (15 mg total) by mouth with lunch., Disp: 30 tablet, Rfl: 0    diphenoxylate-atropine 2.5-0.025 mg (LOMOTIL) 2.5-0.025 mg per tablet, Take 1 tablet by mouth 4 (four) times daily as needed for Diarrhea (diarrhea)., Disp: 360 tablet, Rfl: 0    eszopiclone (LUNESTA) 3 mg Tab, Take 1 tablet (3 mg total) by mouth nightly as needed (insomnia)., Disp: 30 tablet, Rfl: 0    fluconazole (DIFLUCAN) 150 MG Tab, Take 1 tablet as needed for yeast infection, Disp: 30 tablet, Rfl: 1    HYDROcodone-acetaminophen (NORCO) 5-325 mg per tablet, Take 1 tablet by mouth every 4 (four) hours as needed for Pain., Disp: 12 tablet, Rfl: 0    lisdexamfetamine (VYVANSE) 70 MG capsule, Take 1 capsule (70 mg total) by mouth every morning., Disp: 30 capsule, Rfl: 0    loperamide (IMODIUM) 1 mg/7.5 mL solution, Take 0.1 mg/kg by mouth every 12 (twelve) hours., Disp: , Rfl:     loperamide (IMODIUM) 2 mg capsule, Take 2 mg by mouth 4 (four) times daily as needed for Diarrhea., Disp: , Rfl:     metoprolol succinate (TOPROL-XL) 50 MG 24 hr tablet, Take 1 tablet (50 mg total) by mouth 2 (two) times daily., Disp: 180 tablet, Rfl: 3    metroNIDAZOLE (FLAGYL) 500 MG tablet, Take 1 tablet (500 mg total) by mouth every 12 (twelve) hours. Do not drink alcohol while taking this medication, Disp: 14 tablet, Rfl: 0    penicillin v potassium (VEETID) 500 MG tablet, Take one tablet by  mouth every 6 hours for 7 days, Disp: 28 tablet, Rfl: 0    pregabalin (LYRICA) 50 MG capsule, Take 1 capsule (50 mg total) by mouth 2 (two) times daily., Disp: 60 capsule, Rfl: 2    rizatriptan (MAXALT-MLT) 10 MG disintegrating tablet, Take ½ tablet (5 mg total) by mouth as needed for Migraine (repeat dose once in 2 hours if headache return.)., Disp: 9 tablet, Rfl: 6    terconazole (TERAZOL 7) 0.4 % Crea, Place 1 applicator vaginally every evening., Disp: 45 g, Rfl: 0    tiZANidine (ZANAFLEX) 4 MG tablet, Take 1 tablet (4 mg total) by mouth 3 (three) times daily as needed., Disp: 90 tablet, Rfl: 1    traMADoL (ULTRAM) 50 mg tablet, Take one tablet by mouth every 4-6 hours as needed for pain, Disp: 10 tablet, Rfl: 0    traMADoL (ULTRAM) 50 mg tablet, Take 1 tablet (50 mg total) by mouth every 4 to 6 hours as needed., Disp: 21 tablet, Rfl: 0    tretinoin (RETIN-A) 0.025 % cream, Apply topically every evening. Pea sized amount to entire face. If irritation occurs, decrease use to every other night., Disp: 20 g, Rfl: 5    valsartan (DIOVAN) 320 MG tablet, Take 1 tablet (320 mg total) by mouth once daily., Disp: 90 tablet, Rfl: 1    venlafaxine (EFFEXOR-XR) 37.5 MG 24 hr capsule, Take 2 capsules (75 mg total) by mouth once daily., Disp: 60 capsule, Rfl: 3  No current facility-administered medications for this visit.    Facility-Administered Medications Ordered in Other Visits:     0.9%  NaCl infusion, 500 mL, Intravenous, Continuous, Corky Argueta Jr., MD    ALLERGIES:  Review of patient's allergies indicates:   Allergen Reactions    Lactose     Azithromycin Nausea And Vomiting       RELEVANT LABS/STUDIES:    Lab Results   Component Value Date    WBC 8.22 09/01/2022    HGB 13.0 09/01/2022    HCT 39.0 09/01/2022    MCV 87 09/01/2022     09/01/2022     BMP  Lab Results   Component Value Date     09/01/2022    K 4.1 09/01/2022     09/01/2022    CO2 25 09/01/2022    BUN 5 (L) 09/01/2022    CREATININE  0.8 09/01/2022    CALCIUM 9.1 09/01/2022    ANIONGAP 10 09/01/2022    ESTGFRAFRICA >60 09/27/2021    EGFRNONAA >60 09/27/2021     Lab Results   Component Value Date    ALT 6 (L) 09/01/2022    AST 12 09/01/2022    ALKPHOS 62 09/01/2022    BILITOT 0.2 09/01/2022     Lab Results   Component Value Date    TSH 1.042 09/01/2022     Lab Results   Component Value Date    HGBA1C 5.4 09/01/2022       VITALS  Vitals:    05/25/23 1126   BP: 137/88   Pulse: 78   Weight: 84.9 kg (187 lb 4.5 oz)           PHYSICAL EXAM  General: No acute distress, well developed/nourished  Neurologic:   Gait: unable to assess  Psychomotor signs:  No involuntary movements or tremor  AIMS: none    PSYCHIATRIC EXAM:     Mental Status Exam:  Appearance: good grooming, age appropriate  Behavior/Cooperation: normal, cooperative   Speech:  normal tone, normal rate, normal pitch, normal volume  Language: uses words appropriately; NO aphasia or dysarthria  Mood: Euthymic  Affect:  Full, reactive, appropriate  Thought Process: Normal and logical  Thought Content: No SI, HI, AVH  Level of Consciousness: Alert and Oriented x3  Memory:  Recent and remote intact  Attention/concentration: appropriate for age/education.   Fund of Knowledge: appears adequate  Insight:  Has awareness of illness  Judgment: Behavior appropriate for circumstances      IMPRESSION:    April Wendt Schamberger is a 38 y.o. female with history of ADHD, PMDD, NIRAV, IBS-D who presents for follow up appointment for medication management.  The  has been reviewed, no concerning signs were noted.       Status/Progress:  Based on the examination today, the patient's problem(s) is/are well controlled.  New problems have not been presented today.     DIAGNOSES:    ICD-10-CM ICD-9-CM   1. Attention deficit hyperactivity disorder (ADHD), combined type  F90.2 314.01   2. PMDD (premenstrual dysphoric disorder)  F32.81 625.4   3. NIRAV (generalized anxiety disorder)  F41.1 300.02   4. Irritable bowel  syndrome with diarrhea  K58.0 564.1       PLAN:  Psych Med:  - Continue Vyvanse to 70 mg po daily for ADHD   - PDMP reviewed: patient filling all medications on time, no abnormalities noted    - Last refill picked up on 5/19/23   - Provided with three refills, starting on 6/16/23    - Continue Adderall IR 15 mg PO in afternoon for breakthrough ADHD symptoms   - PDMP reviewed: patient filling all medications on time, no abnormalities noted    - Last refill picked up on 4/19/23   - Provided with three refills, starting on 5/25/23    - Continue Elavil 25 mg qhs for anxiety, sleep, and IBS with diarrhea; patient taking full tab daily; consider increasing for IBS-D once ADHD medication is stable  - Continue Effexor 37.5mg for PMDD daily with extra 37.5 mg for luteal phase symptoms (prescribed as 37.5 mg two times daily)  - Continue Lunesta, per PCP  - Provided with contact information for sleep medicine, for evaluation for sleep apnea      Discussed with patient informed consent, risks vs. benefits, alternative treatments, side effect profile and the inherent unpredictability of individual responses to these treatments. Answered any questions patient may have had. The patient expresses understanding of the above and displays the capacity to agree with this current plan       Other: None      RETURN TO CLINIC:  3 months    Case to be discussed with supervising staff, MD Ray Rangel MD  [facility], PGY-III   5/25/2023 9:17 AM

## 2023-06-14 DIAGNOSIS — R51.9 NONINTRACTABLE HEADACHE, UNSPECIFIED CHRONICITY PATTERN, UNSPECIFIED HEADACHE TYPE: ICD-10-CM

## 2023-06-14 DIAGNOSIS — F51.01 PRIMARY INSOMNIA: ICD-10-CM

## 2023-06-14 DIAGNOSIS — M79.18 MYOFASCIAL PAIN SYNDROME, CERVICAL: ICD-10-CM

## 2023-06-14 DIAGNOSIS — M62.838 MUSCLE SPASM: ICD-10-CM

## 2023-06-14 RX ORDER — ESZOPICLONE 3 MG/1
3 TABLET, FILM COATED ORAL NIGHTLY PRN
Qty: 30 TABLET | Refills: 0 | Status: SHIPPED | OUTPATIENT
Start: 2023-06-14 | End: 2023-07-12 | Stop reason: SDUPTHER

## 2023-06-14 RX ORDER — BUTALBITAL, ACETAMINOPHEN AND CAFFEINE 50; 325; 40 MG/1; MG/1; MG/1
1 TABLET ORAL EVERY 6 HOURS PRN
Qty: 30 TABLET | Refills: 0 | Status: SHIPPED | OUTPATIENT
Start: 2023-06-14 | End: 2023-09-04 | Stop reason: SDUPTHER

## 2023-06-14 NOTE — TELEPHONE ENCOUNTER
Care Due:                  Date            Visit Type   Department     Provider  --------------------------------------------------------------------------------                                EP -                              PRIMARY      MET INTERNAL  Last Visit: 09-      CARE ([facility])   MEDICINE       Mata Valencia  Next Visit: None Scheduled  None         None Found                                                            Last  Test          Frequency    Reason                     Performed    Due Date  --------------------------------------------------------------------------------    Office Visit  12 months..  amitriptyline,             09- 09-                             metoprolol, rizatriptan,                             valsartan................    CMP.........  12 months..  valsartan................  09- 08-    [facility] Embedded Care Due Messages. Reference number: [id number].   6/14/2023 12:26:44 PM CDT

## 2023-06-15 RX ORDER — TIZANIDINE 4 MG/1
4 TABLET ORAL 3 TIMES DAILY PRN
Qty: 90 TABLET | Refills: 1 | Status: SHIPPED | OUTPATIENT
Start: 2023-06-15 | End: 2023-08-20 | Stop reason: SDUPTHER

## 2023-07-12 DIAGNOSIS — F51.01 PRIMARY INSOMNIA: ICD-10-CM

## 2023-07-13 RX ORDER — ESZOPICLONE 3 MG/1
3 TABLET, FILM COATED ORAL NIGHTLY PRN
Qty: 30 TABLET | Refills: 0 | Status: SHIPPED | OUTPATIENT
Start: 2023-07-13 | End: 2023-08-08 | Stop reason: SDUPTHER

## 2023-07-13 NOTE — TELEPHONE ENCOUNTER
No care due was identified.  [facility] Embedded Care Due Messages. Reference number: [id number].   7/12/2023 8:35:57 PM CDT

## 2023-07-17 ENCOUNTER — OFFICE VISIT (OUTPATIENT)
Dept: PSYCHIATRY | Facility: CLINIC | Age: 38
End: 2023-07-17
Payer: COMMERCIAL

## 2023-07-17 DIAGNOSIS — K58.0 IRRITABLE BOWEL SYNDROME WITH DIARRHEA: ICD-10-CM

## 2023-07-17 DIAGNOSIS — F32.81 PMDD (PREMENSTRUAL DYSPHORIC DISORDER): ICD-10-CM

## 2023-07-17 DIAGNOSIS — F41.1 GAD (GENERALIZED ANXIETY DISORDER): ICD-10-CM

## 2023-07-17 DIAGNOSIS — F90.2 ATTENTION DEFICIT HYPERACTIVITY DISORDER (ADHD), COMBINED TYPE: Primary | ICD-10-CM

## 2023-07-17 PROCEDURE — 99999 PR PBB SHADOW E&M-EST. PATIENT-LVL I: CPT | Mod: PBBFAC,,, | Performed by: STUDENT IN AN ORGANIZED HEALTH CARE EDUCATION/TRAINING PROGRAM

## 2023-07-17 PROCEDURE — 99999 PR PBB SHADOW E&M-EST. PATIENT-LVL I: ICD-10-PCS | Mod: PBBFAC,,, | Performed by: STUDENT IN AN ORGANIZED HEALTH CARE EDUCATION/TRAINING PROGRAM

## 2023-07-17 RX ORDER — DEXTROAMPHETAMINE SACCHARATE, AMPHETAMINE ASPARTATE, DEXTROAMPHETAMINE SULFATE AND AMPHETAMINE SULFATE 3.75; 3.75; 3.75; 3.75 MG/1; MG/1; MG/1; MG/1
15 TABLET ORAL DAILY
Qty: 30 TABLET | Refills: 0 | Status: SHIPPED | OUTPATIENT
Start: 2023-09-19 | End: 2023-10-18 | Stop reason: SDUPTHER

## 2023-07-17 RX ORDER — LISDEXAMFETAMINE DIMESYLATE 70 MG/1
70 CAPSULE ORAL EVERY MORNING
Qty: 30 CAPSULE | Refills: 0 | Status: SHIPPED | OUTPATIENT
Start: 2023-09-16 | End: 2023-08-21

## 2023-07-17 RX ORDER — DEXTROAMPHETAMINE SACCHARATE, AMPHETAMINE ASPARTATE, DEXTROAMPHETAMINE SULFATE AND AMPHETAMINE SULFATE 3.75; 3.75; 3.75; 3.75 MG/1; MG/1; MG/1; MG/1
15 TABLET ORAL
Qty: 30 TABLET | Refills: 0 | Status: SHIPPED | OUTPATIENT
Start: 2023-07-20 | End: 2023-07-17

## 2023-07-17 RX ORDER — DEXTROAMPHETAMINE SACCHARATE, AMPHETAMINE ASPARTATE, DEXTROAMPHETAMINE SULFATE AND AMPHETAMINE SULFATE 3.75; 3.75; 3.75; 3.75 MG/1; MG/1; MG/1; MG/1
15 TABLET ORAL
Qty: 30 TABLET | Refills: 0 | Status: SHIPPED | OUTPATIENT
Start: 2023-07-20 | End: 2023-08-31

## 2023-07-17 RX ORDER — DEXTROAMPHETAMINE SACCHARATE, AMPHETAMINE ASPARTATE, DEXTROAMPHETAMINE SULFATE AND AMPHETAMINE SULFATE 3.75; 3.75; 3.75; 3.75 MG/1; MG/1; MG/1; MG/1
15 TABLET ORAL
Qty: 30 TABLET | Refills: 0 | Status: SHIPPED | OUTPATIENT
Start: 2023-08-19 | End: 2023-07-17

## 2023-07-17 RX ORDER — LISDEXAMFETAMINE DIMESYLATE 70 MG/1
70 CAPSULE ORAL EVERY MORNING
Qty: 30 CAPSULE | Refills: 0 | Status: SHIPPED | OUTPATIENT
Start: 2023-07-17 | End: 2023-08-24

## 2023-07-17 RX ORDER — DEXTROAMPHETAMINE SACCHARATE, AMPHETAMINE ASPARTATE, DEXTROAMPHETAMINE SULFATE AND AMPHETAMINE SULFATE 3.75; 3.75; 3.75; 3.75 MG/1; MG/1; MG/1; MG/1
15 TABLET ORAL
Qty: 30 TABLET | Refills: 0 | Status: SHIPPED | OUTPATIENT
Start: 2023-08-19 | End: 2023-10-07

## 2023-07-17 RX ORDER — LISDEXAMFETAMINE DIMESYLATE 70 MG/1
70 CAPSULE ORAL EVERY MORNING
Qty: 30 CAPSULE | Refills: 0 | Status: SHIPPED | OUTPATIENT
Start: 2023-08-17 | End: 2023-08-21

## 2023-07-17 NOTE — PROGRESS NOTES
"  Outpatient Psychiatry Follow-Up Visit (MD/NP)    7/17/2023    Clinical Status of Patient:  Outpatient (Ambulatory)    Chief Complaint:  April Wendt Schamberger is a 38 y.o. female with history of ADHD, PMDD, NIRAV, IBS-D  who presents for follow up appointment. Patient was last seen in this clinic on 5/25/23, during which her current medication regimen was continued.      Interval History and Content of Current Session:  Interim Events/Subjective Report/Content of Current Session:     Patient reports a long h/o ADHD (managed with Vyvanse, Adderall IR), insomnia (currently managed with Lunesta, Elavil), IBS-D, PMDD (managed with Effexor) anxiety (Elavil).     With regard to her ADHD, pt has been on Vyvanse 70 mg x1 year and states it relieves her ADHD sxs for only 5 hours, thus she also takes Adderall IR 15 mg in the afternoons. Pt states she increased to 15 mg 6 months ago and reports that the effectiveness wears off after 1 hour. States she has significant difficulty with inattention and procrastination in the afternoons both at work and at home. Discussed other potential contributors to inattention including sleeping difficulties, home environment (mother-in-law currently living with pt and family x2 years), and anxiety.     With regard to her insomnia, pt states she has been sleeping well now that her Adderall was increased to 15 mg. Continues to take Lunesta, Elavil (started these several years ago) which also help. Prior to starting Lunesta, pt states she "just didn't sleep". States her insomnia is genetic (mother, father, older sister all have insomnia) and started when she was in grade school. She describes mostly sleep maintenance issues and states that in high school she wouldn't sleep during the week and then would sleep in until noon on the weekends (4am-noon) to catch up on sleep (during this time, pt denies frequent nighttime awakenings). When she was pregnant with her now 6 yo son, pt stopped taking " "the Lunesta and "didn't sleep at all during the pregnancy".     Pt was dx with PMDD and describes it as increased irritability towards her . This has been well controlled with Effexor.     Tolerating all medications well and denies side effects.        Review of Systems   PSYCHIATRIC: Pertinant items are noted in the narrative.    Past Medical, Family and Social History: The patient's past medical, family and social history have been reviewed and updated as appropriate within the electronic medical record - see encounter notes.    Compliance: yes    Side effects: None    Risk Parameters:  Patient reports no suicidal ideation  Patient reports no homicidal ideation  Patient reports no self-injurious behavior  Patient reports no violent behavior    Exam (detailed: at least 9 elements; comprehensive: all 15 elements)   Constitutional  Vitals:  Most recent vital signs, dated less than 90 days prior to this appointment, were reviewed.   There were no vitals filed for this visit.     General:  unremarkable, age appropriate     Musculoskeletal  Muscle Strength/Tone:  not examined   Gait & Station:  non-ataxic     Psychiatric  Speech:  no latency; no press   Mood & Affect:  steady, euthymic  congruent and appropriate   Thought Process:  normal and logical   Associations:  intact   Thought Content:  normal, no suicidality, no homicidality, delusions, or paranoia   Insight:  intact, has awareness of illness   Judgement: behavior is adequate to circumstances   Orientation:  person, place, situation, time/date   Memory: intact for content of interview, memory >3 objects at five mins, able to remember recent events- Yes, able to remember remote events- Yes   Language: grossly intact   Attention Span & Concentration:  able to focus, completed tasks   Fund of Knowledge:  intact and appropriate to age and level of education, 4 of 4 recent presidents     Assessment and Diagnosis   Status/Progress: Based on the examination " today, the patient's problem(s) is/are well controlled.  New problems have not been presented today.   Co-morbidities are not complicating management of the primary condition.  There are no active rule-out diagnoses for this patient at this time, however would like to rule-out sleep apnea given persistent inattention and some ineffectiveness of stimulant medication despite max dose of Vyvanse.    General Impression:       ICD-10-CM ICD-9-CM   1. Attention deficit hyperactivity disorder (ADHD), combined type  F90.2 314.01   2. PMDD (premenstrual dysphoric disorder)  F32.81 625.4   3. NIRAV (generalized anxiety disorder)  F41.1 300.02   4. Irritable bowel syndrome with diarrhea  K58.0 564.1       Intervention/Counseling/Treatment Plan       -Continue Vyvanse to 70 mg po daily for ADHD              - PDMP reviewed: patient filling all medications on time, no discrepancies              - Provided with three refills, starting on 7/17/23  - Continue Adderall IR 15 mg PO in afternoon for breakthrough ADHD symptoms              - PDMP reviewed: patient filling all medications on time, no discrepancies              - Provided with three refills, starting on 7/20/23   - Continue Elavil 25 mg qhs for anxiety, sleep, and IBS with diarrhea; patient taking full tab daily; consider increasing for IBS-D once ADHD medication is stable  - Continue Effexor 37.5mg for PMDD daily with extra 37.5 mg for luteal phase symptoms (prescribed as 37.5 mg two times daily)  - Continue Lunesta, per PCP  - Provided with contact information for sleep medicine, for evaluation for sleep apnea      Return to Clinic: 3  months    Discussed with staff: Dr. Tony Hutson DO, [facility], PGY-3

## 2023-07-20 ENCOUNTER — OFFICE VISIT (OUTPATIENT)
Dept: FAMILY MEDICINE | Facility: CLINIC | Age: 38
End: 2023-07-20
Payer: COMMERCIAL

## 2023-07-20 VITALS
DIASTOLIC BLOOD PRESSURE: 88 MMHG | WEIGHT: 184.31 LBS | OXYGEN SATURATION: 98 % | HEART RATE: 96 BPM | HEIGHT: 68 IN | SYSTOLIC BLOOD PRESSURE: 132 MMHG | BODY MASS INDEX: 27.93 KG/M2 | TEMPERATURE: 98 F

## 2023-07-20 DIAGNOSIS — R50.9 FEVER, UNSPECIFIED FEVER CAUSE: ICD-10-CM

## 2023-07-20 DIAGNOSIS — J02.9 SORE THROAT: ICD-10-CM

## 2023-07-20 DIAGNOSIS — J06.9 VIRAL URI: Primary | ICD-10-CM

## 2023-07-20 LAB
CTP QC/QA: YES
CTP QC/QA: YES
S PYO RRNA THROAT QL PROBE: NEGATIVE
SARS-COV-2 RDRP RESP QL NAA+PROBE: NEGATIVE

## 2023-07-20 PROCEDURE — 3075F PR MOST RECENT SYSTOLIC BLOOD PRESS GE 130-139MM HG: ICD-10-PCS | Mod: CPTII,S$GLB,, | Performed by: FAMILY MEDICINE

## 2023-07-20 PROCEDURE — 99999 PR PBB SHADOW E&M-EST. PATIENT-LVL III: CPT | Mod: PBBFAC,,, | Performed by: FAMILY MEDICINE

## 2023-07-20 PROCEDURE — 3008F PR BODY MASS INDEX (BMI) DOCUMENTED: ICD-10-PCS | Mod: CPTII,S$GLB,, | Performed by: FAMILY MEDICINE

## 2023-07-20 PROCEDURE — 87880 STREP A ASSAY W/OPTIC: CPT | Mod: QW,S$GLB,, | Performed by: FAMILY MEDICINE

## 2023-07-20 PROCEDURE — 87635 SARS-COV-2 COVID-19 AMP PRB: CPT | Mod: QW,S$GLB,, | Performed by: FAMILY MEDICINE

## 2023-07-20 PROCEDURE — 1159F MED LIST DOCD IN RCRD: CPT | Mod: CPTII,S$GLB,, | Performed by: FAMILY MEDICINE

## 2023-07-20 PROCEDURE — 1159F PR MEDICATION LIST DOCUMENTED IN MEDICAL RECORD: ICD-10-PCS | Mod: CPTII,S$GLB,, | Performed by: FAMILY MEDICINE

## 2023-07-20 PROCEDURE — 4010F PR ACE/ARB THEARPY RXD/TAKEN: ICD-10-PCS | Mod: CPTII,S$GLB,, | Performed by: FAMILY MEDICINE

## 2023-07-20 PROCEDURE — 99214 PR OFFICE/OUTPT VISIT, EST, LEVL IV, 30-39 MIN: ICD-10-PCS | Mod: S$GLB,,, | Performed by: FAMILY MEDICINE

## 2023-07-20 PROCEDURE — 3008F BODY MASS INDEX DOCD: CPT | Mod: CPTII,S$GLB,, | Performed by: FAMILY MEDICINE

## 2023-07-20 PROCEDURE — 99214 OFFICE O/P EST MOD 30 MIN: CPT | Mod: S$GLB,,, | Performed by: FAMILY MEDICINE

## 2023-07-20 PROCEDURE — 87635: ICD-10-PCS | Mod: QW,S$GLB,, | Performed by: FAMILY MEDICINE

## 2023-07-20 PROCEDURE — 3079F PR MOST RECENT DIASTOLIC BLOOD PRESSURE 80-89 MM HG: ICD-10-PCS | Mod: CPTII,S$GLB,, | Performed by: FAMILY MEDICINE

## 2023-07-20 PROCEDURE — 3075F SYST BP GE 130 - 139MM HG: CPT | Mod: CPTII,S$GLB,, | Performed by: FAMILY MEDICINE

## 2023-07-20 PROCEDURE — 3079F DIAST BP 80-89 MM HG: CPT | Mod: CPTII,S$GLB,, | Performed by: FAMILY MEDICINE

## 2023-07-20 PROCEDURE — 87880 POCT RAPID STREP A: ICD-10-PCS | Mod: QW,S$GLB,, | Performed by: FAMILY MEDICINE

## 2023-07-20 PROCEDURE — 4010F ACE/ARB THERAPY RXD/TAKEN: CPT | Mod: CPTII,S$GLB,, | Performed by: FAMILY MEDICINE

## 2023-07-20 PROCEDURE — 99999 PR PBB SHADOW E&M-EST. PATIENT-LVL III: ICD-10-PCS | Mod: PBBFAC,,, | Performed by: FAMILY MEDICINE

## 2023-07-20 NOTE — PROGRESS NOTES
(Portions of this note were dictated using voice recognition software and may contain dictation related errors in spelling/grammar/syntax not found on text review)    CC:   Chief Complaint   Patient presents with    Sore Throat       HPI: 38 y.o. female, pt of Dr Valencia, presented with sore throat and fever as a new patient to me for urgent care visit.  She has chronic conditions of ADD, fibroid, hypertension, IBS, kidney stones.  Patient reports she has been having fever for the past 3 days, reports T-max of 101.5, she has been taking Tylenol and hydration, this morning temperature was 100° and she took Tylenol and Motrin before coming here, she has associated symptoms of sore throat and has pain with swallowing food, also has postnasal drip, nasal congestion with nasal secretions of white mixed with blood while blowing hard.  Her son was tested positive for strep throat last week.  She denies having any ear symptoms, cough, shortness of breath.  She denies having any other symptoms or concerns.    Past Medical History:   Diagnosis Date    Abnormal Pap smear of cervix 2000    Cryo Done    ADD (attention deficit disorder)     Breast disorder     Breast Cysts    Esophageal reflux     Fibroids     Hypertension     IBS (irritable bowel syndrome)     Insomnia     Kidney stones     Mastocytosis     Relies on partner vasectomy for contraception     Upper GI bleed        Past Surgical History:   Procedure Laterality Date    COLONOSCOPY N/A 04/28/2017    Procedure: COLONOSCOPY;  Surgeon: Bren Larkin MD;  Location: [facility] ENDO;  Service: Endoscopy;  Laterality: N/A;    COLONOSCOPY N/A 01/26/2022    Procedure: COLONOSCOPY;  Surgeon: Inocente Roe MD;  Location: [facility] ENDO;  Service: Endoscopy;  Laterality: N/A;    EPIDURAL STEROID INJECTION INTO CERVICAL SPINE N/A 12/01/2020    Procedure: Injection-steroid-epidural-cervical--C7-T1;  Surgeon: Corky Argueta Jr., MD;  Location: [facility] PAIN MGT;  Service: Pain Management;   Laterality: N/A;    EPIDURAL STEROID INJECTION INTO CERVICAL SPINE N/A 2021    Procedure: Cervical Epidural Steroid Injection C7-T1 (No Sedation);  Surgeon: Corky Argueta Jr., MD;  Location: [facility];  Service: Pain Management;  Laterality: N/A;    ESOPHAGOGASTRODUODENOSCOPY  2012    LASIK Bilateral 2005    REFRACTIVE SURGERY      TONSILLECTOMY, ADENOIDECTOMY  1988    ULNAR TUNNEL RELEASE Right 2022    VAGINAL DELIVERY      x 2       Family History   Problem Relation Age of Onset    Breast cancer Mother 47        with Metastasis    Hypertension Mother     Prostate cancer Father     Hypertension Father     Diabetes Father     Deep vein thrombosis Father     Pancreatic cancer Maternal Grandfather     Breast cancer Paternal Grandmother 80    Diabetes type II Paternal Grandfather     Heart attack Maternal Grandmother     Cancer Cousin 31    Cancer Cousin         Thurman Syndrome    Cancer Cousin         Thurman Syndrome    Ovarian cancer Maternal Aunt     Lymphoma Neg Hx     Tuberculosis Neg Hx     Celiac disease Neg Hx     Cirrhosis Neg Hx     Colon polyps Neg Hx     Crohn's disease Neg Hx     Cystic fibrosis Neg Hx     Esophageal cancer Neg Hx     Hemochromatosis Neg Hx     Inflammatory bowel disease Neg Hx     Irritable bowel syndrome Neg Hx     Liver cancer Neg Hx     Liver disease Neg Hx     Rectal cancer Neg Hx     Stomach cancer Neg Hx     Ulcerative colitis Neg Hx     Donavon's disease Neg Hx     Amblyopia Neg Hx     Blindness Neg Hx     Cataracts Neg Hx     Glaucoma Neg Hx     Macular degeneration Neg Hx     Retinal detachment Neg Hx     Strabismus Neg Hx        Social History     Tobacco Use    Smoking status: Former     Types: Cigarettes     Quit date: 2014     Years since quittin.3    Smokeless tobacco: Never   Substance Use Topics    Alcohol use: No     Alcohol/week: 0.0 standard drinks     Comment: breastfeeding     Drug use: No       Lab Results   Component Value Date    WBC 8.22  09/01/2022    HGB 13.0 09/01/2022    HCT 39.0 09/01/2022    MCV 87 09/01/2022     09/01/2022    CHOL 194 09/01/2022    TRIG 171 (H) 09/01/2022    HDL 38 (L) 09/01/2022    ALT 6 (L) 09/01/2022    AST 12 09/01/2022    BILITOT 0.2 09/01/2022    ALKPHOS 62 09/01/2022     09/01/2022    K 4.1 09/01/2022     09/01/2022    CREATININE 0.8 09/01/2022    ESTGFRAFRICA >60 09/27/2021    EGFRNONAA >60 09/27/2021    CALCIUM 9.1 09/01/2022    ALBUMIN 3.9 09/01/2022    BUN 5 (L) 09/01/2022    CO2 25 09/01/2022    TSH 1.042 09/01/2022    HGBA1C 5.4 09/01/2022    LDLCALC 121.8 09/01/2022    GLU 84 09/01/2022    WTRHIGLW89MM 34 09/01/2022             Vital signs reviewed  PE:   APPEARANCE: Well nourished, well developed, in no acute distress.    HEAD: Normocephalic, atraumatic.  EYES: EOMI.  Conjunctivae noninjected.  NOSE: Mucosa pink. Airway clear.  MOUTH & THROAT: No tonsillar enlargement. No pharyngeal erythema or exudate.   NECK: Supple with no cervical lymphadenopathy.    CHEST: Good inspiratory effort. Lungs clear to auscultation with no wheezes or crackles.  CARDIOVASCULAR: Normal S1, S2. No rubs, murmurs, or gallops.  ABDOMEN: Bowel sounds normal. Not distended. Soft. No tenderness or masses. No organomegaly.  EXTREMITIES: No edema, cyanosis, or clubbing.    Review of Systems   Constitutional:  Positive for fever. Negative for chills and fatigue.   HENT:  Positive for postnasal drip, rhinorrhea and sore throat.    Respiratory:  Negative for cough, shortness of breath and wheezing.    Cardiovascular:  Negative for chest pain, palpitations and leg swelling.   Gastrointestinal: Negative.    Genitourinary: Negative.    Musculoskeletal: Negative.    Neurological: Negative.    Psychiatric/Behavioral: Negative.     All other systems reviewed and are negative.    IMPRESSION  1. Viral URI    2. Sore throat    3. Fever, unspecified fever cause            PLAN      1. Sore throat    - POCT Rapid Strep A  - POCT Covid  testing      2. Fever, unspecified fever cause    - POCT Rapid Strep A       3. Viral URI       Strept and Covid tests are negative    Symptomatic treatment advised    Advised to use Zyrtec/Claritin  Can add Flonase/azelastine nasal spray on as needed basis  Warm liquids, salt water gargles nightly, mouthwash gargles 2-3 times daily recommended  Tylenol/ibuprofen as needed      Rest and hydration advised    Return ED/urgent care precautions discussed      Age/demographic appropriate health maintenance:    Health Maintenance Due   Topic Date Due    Hepatitis C Screening  Never done    COVID-19 Vaccine (3 - Pfizer series) 03/09/2021    Mammogram  02/10/2023             Sean Ugalde   7/20/2023

## 2023-08-08 DIAGNOSIS — F51.01 PRIMARY INSOMNIA: ICD-10-CM

## 2023-08-09 RX ORDER — ESZOPICLONE 3 MG/1
3 TABLET, FILM COATED ORAL NIGHTLY PRN
Qty: 30 TABLET | Refills: 0 | Status: SHIPPED | OUTPATIENT
Start: 2023-08-09 | End: 2023-09-04 | Stop reason: SDUPTHER

## 2023-08-09 NOTE — TELEPHONE ENCOUNTER
No care due was identified.  [facility] Embedded Care Due Messages. Reference number: [id number].   8/08/2023 8:38:56 PM CDT

## 2023-08-10 ENCOUNTER — OFFICE VISIT (OUTPATIENT)
Dept: INTERNAL MEDICINE | Facility: CLINIC | Age: 38
End: 2023-08-10
Payer: COMMERCIAL

## 2023-08-10 VITALS
RESPIRATION RATE: 16 BRPM | SYSTOLIC BLOOD PRESSURE: 116 MMHG | HEART RATE: 91 BPM | DIASTOLIC BLOOD PRESSURE: 78 MMHG | WEIGHT: 185.19 LBS | BODY MASS INDEX: 28.07 KG/M2 | OXYGEN SATURATION: 98 % | HEIGHT: 68 IN | TEMPERATURE: 98 F

## 2023-08-10 DIAGNOSIS — F90.2 ATTENTION DEFICIT HYPERACTIVITY DISORDER (ADHD), COMBINED TYPE: ICD-10-CM

## 2023-08-10 DIAGNOSIS — F41.9 ANXIETY: ICD-10-CM

## 2023-08-10 DIAGNOSIS — I10 BENIGN ESSENTIAL HYPERTENSION: ICD-10-CM

## 2023-08-10 DIAGNOSIS — F51.01 PRIMARY INSOMNIA: ICD-10-CM

## 2023-08-10 DIAGNOSIS — Z00.00 WELL ADULT EXAM: Primary | ICD-10-CM

## 2023-08-10 DIAGNOSIS — K58.0 IRRITABLE BOWEL SYNDROME WITH DIARRHEA: ICD-10-CM

## 2023-08-10 DIAGNOSIS — Z12.31 VISIT FOR SCREENING MAMMOGRAM: ICD-10-CM

## 2023-08-10 PROCEDURE — 3074F SYST BP LT 130 MM HG: CPT | Mod: CPTII,S$GLB,, | Performed by: FAMILY MEDICINE

## 2023-08-10 PROCEDURE — 99999 PR PBB SHADOW E&M-EST. PATIENT-LVL V: ICD-10-PCS | Mod: PBBFAC,,, | Performed by: FAMILY MEDICINE

## 2023-08-10 PROCEDURE — 3078F PR MOST RECENT DIASTOLIC BLOOD PRESSURE < 80 MM HG: ICD-10-PCS | Mod: CPTII,S$GLB,, | Performed by: FAMILY MEDICINE

## 2023-08-10 PROCEDURE — 4010F ACE/ARB THERAPY RXD/TAKEN: CPT | Mod: CPTII,S$GLB,, | Performed by: FAMILY MEDICINE

## 2023-08-10 PROCEDURE — 99396 PR PREVENTIVE VISIT,EST,40-64: ICD-10-PCS | Mod: S$GLB,,, | Performed by: FAMILY MEDICINE

## 2023-08-10 PROCEDURE — 99396 PREV VISIT EST AGE 40-64: CPT | Mod: S$GLB,,, | Performed by: FAMILY MEDICINE

## 2023-08-10 PROCEDURE — 3008F BODY MASS INDEX DOCD: CPT | Mod: CPTII,S$GLB,, | Performed by: FAMILY MEDICINE

## 2023-08-10 PROCEDURE — 4010F PR ACE/ARB THEARPY RXD/TAKEN: ICD-10-PCS | Mod: CPTII,S$GLB,, | Performed by: FAMILY MEDICINE

## 2023-08-10 PROCEDURE — 99999 PR PBB SHADOW E&M-EST. PATIENT-LVL V: CPT | Mod: PBBFAC,,, | Performed by: FAMILY MEDICINE

## 2023-08-10 PROCEDURE — 3078F DIAST BP <80 MM HG: CPT | Mod: CPTII,S$GLB,, | Performed by: FAMILY MEDICINE

## 2023-08-10 PROCEDURE — 3074F PR MOST RECENT SYSTOLIC BLOOD PRESSURE < 130 MM HG: ICD-10-PCS | Mod: CPTII,S$GLB,, | Performed by: FAMILY MEDICINE

## 2023-08-10 PROCEDURE — 1160F PR REVIEW ALL MEDS BY PRESCRIBER/CLIN PHARMACIST DOCUMENTED: ICD-10-PCS | Mod: CPTII,S$GLB,, | Performed by: FAMILY MEDICINE

## 2023-08-10 PROCEDURE — 1160F RVW MEDS BY RX/DR IN RCRD: CPT | Mod: CPTII,S$GLB,, | Performed by: FAMILY MEDICINE

## 2023-08-10 PROCEDURE — 1159F MED LIST DOCD IN RCRD: CPT | Mod: CPTII,S$GLB,, | Performed by: FAMILY MEDICINE

## 2023-08-10 PROCEDURE — 1159F PR MEDICATION LIST DOCUMENTED IN MEDICAL RECORD: ICD-10-PCS | Mod: CPTII,S$GLB,, | Performed by: FAMILY MEDICINE

## 2023-08-10 PROCEDURE — 3008F PR BODY MASS INDEX (BMI) DOCUMENTED: ICD-10-PCS | Mod: CPTII,S$GLB,, | Performed by: FAMILY MEDICINE

## 2023-08-10 NOTE — PROGRESS NOTES
Subjective     Patient ID: April Wendt Schamberger is a 38 y.o. female.    Chief Complaint: Annual Exam    HPI white female presents to clinic today for annual physical exam.  Hypertension remains well controlled on valsartan 320 mg daily and metoprolol 50 mg b.i.d..  She continues to be followed by Psychiatry for treatment of ADHD and anxiety which remains stable on Vyvanse and venlafaxine.  She continues to use Lunesta as needed for insomnia with improvement of symptoms.  She continues to be followed by GI secondary to irritable bowel syndrome and diarrhea which remains stable on as needed Lomotil and Imodium.  She reports occasional migraine headaches for which she uses Fioricet.  She reports a past surgical history of tonsillectomy, adenoidectomy, EGD, colonoscopy, LASIK eye surgery, and right ulnar release.  She reports a family history of diabetes, hypertension, breast cancer, and prostate cancer.  She is up-to-date with all vaccinations.  Review of Systems   Constitutional:  Negative for activity change, appetite change, chills, fatigue, fever and unexpected weight change.   HENT:  Negative for nasal congestion, ear pain, hearing loss, postnasal drip, rhinorrhea, sinus pressure/congestion, sore throat, tinnitus and trouble swallowing.    Eyes:  Negative for discharge, redness, itching and visual disturbance.   Respiratory:  Negative for cough, chest tightness, shortness of breath and wheezing.    Cardiovascular:  Negative for chest pain and palpitations.   Gastrointestinal:  Positive for blood in stool and diarrhea. Negative for abdominal pain, constipation, nausea and vomiting.   Endocrine: Negative for polydipsia and polyuria.   Genitourinary:  Negative for decreased urine volume, difficulty urinating, dysuria, frequency, hematuria, menstrual problem and urgency.   Musculoskeletal:  Negative for arthralgias, back pain, joint swelling, myalgias, neck pain and neck stiffness.   Integumentary:  Negative for  rash.   Neurological:  Negative for dizziness, weakness, light-headedness and headaches.   Psychiatric/Behavioral: Negative.  Negative for confusion and dysphoric mood.           Objective     Physical Exam  Vitals and nursing note reviewed.   Constitutional:       General: She is not in acute distress.     Appearance: She is well-developed. She is not diaphoretic.   HENT:      Head: Normocephalic and atraumatic.      Right Ear: External ear normal.      Left Ear: External ear normal.      Nose: Nose normal.      Mouth/Throat:      Pharynx: No oropharyngeal exudate.   Eyes:      General: No scleral icterus.        Right eye: No discharge.         Left eye: No discharge.      Conjunctiva/sclera: Conjunctivae normal.      Pupils: Pupils are equal, round, and reactive to light.   Neck:      Thyroid: No thyromegaly.      Vascular: No JVD.      Trachea: No tracheal deviation.   Cardiovascular:      Rate and Rhythm: Normal rate and regular rhythm.      Heart sounds: Normal heart sounds. No murmur heard.     No friction rub. No gallop.   Pulmonary:      Effort: Pulmonary effort is normal. No respiratory distress.      Breath sounds: Normal breath sounds. No stridor. No wheezing or rales.   Abdominal:      General: Bowel sounds are normal. There is no distension.      Palpations: Abdomen is soft. There is no mass.      Tenderness: There is no abdominal tenderness. There is no guarding or rebound.   Musculoskeletal:         General: No tenderness. Normal range of motion.      Cervical back: Normal range of motion and neck supple.   Lymphadenopathy:      Cervical: No cervical adenopathy.   Skin:     General: Skin is warm and dry.      Coloration: Skin is not pale.      Findings: No erythema or rash.   Neurological:      Mental Status: She is alert and oriented to person, place, and time.   Psychiatric:         Behavior: Behavior normal.         Thought Content: Thought content normal.         Judgment: Judgment normal.             Assessment and Plan     1. Well adult exam  -     CBC Auto Differential; Future; Expected date: 08/10/2023  -     Comprehensive Metabolic Panel; Future; Expected date: 08/10/2023  -     Lipid Panel; Future; Expected date: 08/10/2023  -     T4, Free; Future; Expected date: 08/10/2023  -     TSH; Future; Expected date: 08/10/2023  -     Urinalysis; Future; Expected date: 08/10/2023  -     Hemoglobin A1C; Future; Expected date: 08/10/2023    2. Benign essential hypertension  Overview:  Note: Unchanged      3. Primary insomnia    4. Attention deficit hyperactivity disorder (ADHD), combined type    5. Anxiety    6. Irritable bowel syndrome with diarrhea    7. Gastroesophageal reflux disease, unspecified whether esophagitis present  Overview:  Note: Unchanged      8. Visit for screening mammogram  -     Mammo Digital Screening Bilat w/ Rian; Future; Expected date: 08/10/2023        1. CBC, CMP, UA, TSH, free T4, fasting lipids, and hemoglobin A1c.    2. Continue valsartan 320 mg daily and metoprolol 50 mg b.i.d..  Hypertension is well controlled.  3.  has been checked and the patient is compliant with medication.  Continue use of Lunesta as needed for insomnia.  Insomnia is stable.    4. Continue Vyvanse and venlafaxine as prescribed and continue follow-up with psychiatry as scheduled.  ADHD and anxiety remained stable.    5. Continue use of Lomotil and Imodium as needed.  Irritable bowel syndrome remained stable.  Continue follow-up with GI as scheduled.    6. Screening mammogram.    7. Return to clinic as needed or in 1 year for annual physical exam.             Follow up in about 1 year (around 8/10/2024), or if symptoms worsen or fail to improve, for Annual exam.

## 2023-08-20 DIAGNOSIS — M79.18 MYOFASCIAL PAIN SYNDROME, CERVICAL: ICD-10-CM

## 2023-08-20 DIAGNOSIS — M62.838 MUSCLE SPASM: ICD-10-CM

## 2023-08-21 DIAGNOSIS — F90.2 ATTENTION DEFICIT HYPERACTIVITY DISORDER (ADHD), COMBINED TYPE: ICD-10-CM

## 2023-08-21 RX ORDER — LISDEXAMFETAMINE DIMESYLATE 70 MG/1
70 CAPSULE ORAL EVERY MORNING
Qty: 30 CAPSULE | Refills: 0 | Status: SHIPPED | OUTPATIENT
Start: 2023-08-21 | End: 2023-09-20

## 2023-08-21 RX ORDER — TIZANIDINE 4 MG/1
4 TABLET ORAL 3 TIMES DAILY PRN
Qty: 90 TABLET | Refills: 1 | Status: SHIPPED | OUTPATIENT
Start: 2023-08-21 | End: 2023-11-06 | Stop reason: SDUPTHER

## 2023-08-21 RX ORDER — LISDEXAMFETAMINE DIMESYLATE 70 MG/1
70 CAPSULE ORAL EVERY MORNING
Qty: 30 CAPSULE | Refills: 0 | Status: SHIPPED | OUTPATIENT
Start: 2023-09-16 | End: 2023-10-23

## 2023-08-22 ENCOUNTER — PATIENT MESSAGE (OUTPATIENT)
Dept: PSYCHIATRY | Facility: CLINIC | Age: 38
End: 2023-08-22
Payer: COMMERCIAL

## 2023-08-23 ENCOUNTER — PATIENT MESSAGE (OUTPATIENT)
Dept: INTERNAL MEDICINE | Facility: CLINIC | Age: 38
End: 2023-08-23
Payer: COMMERCIAL

## 2023-08-24 ENCOUNTER — TELEPHONE (OUTPATIENT)
Dept: GASTROENTEROLOGY | Facility: CLINIC | Age: 38
End: 2023-08-24
Payer: COMMERCIAL

## 2023-08-24 DIAGNOSIS — K58.0 IRRITABLE BOWEL SYNDROME WITH DIARRHEA: ICD-10-CM

## 2023-08-24 RX ORDER — DIPHENOXYLATE HYDROCHLORIDE AND ATROPINE SULFATE 2.5; .025 MG/1; MG/1
1 TABLET ORAL 4 TIMES DAILY PRN
Qty: 360 TABLET | Refills: 0 | Status: SHIPPED | OUTPATIENT
Start: 2023-08-24 | End: 2023-08-28 | Stop reason: SDUPTHER

## 2023-08-25 ENCOUNTER — HOSPITAL ENCOUNTER (OUTPATIENT)
Dept: RADIOLOGY | Facility: HOSPITAL | Age: 38
Discharge: HOME | End: 2023-08-25
Attending: FAMILY MEDICINE
Payer: COMMERCIAL

## 2023-08-25 DIAGNOSIS — Z12.31 VISIT FOR SCREENING MAMMOGRAM: ICD-10-CM

## 2023-08-25 PROCEDURE — 77063 MAMMO DIGITAL SCREENING BILAT WITH TOMO: ICD-10-PCS | Mod: 26,,, | Performed by: RADIOLOGY

## 2023-08-25 PROCEDURE — 77067 SCR MAMMO BI INCL CAD: CPT | Mod: 26,,, | Performed by: RADIOLOGY

## 2023-08-25 PROCEDURE — 77067 MAMMO DIGITAL SCREENING BILAT WITH TOMO: ICD-10-PCS | Mod: 26,,, | Performed by: RADIOLOGY

## 2023-08-25 PROCEDURE — 77063 BREAST TOMOSYNTHESIS BI: CPT | Mod: 26,,, | Performed by: RADIOLOGY

## 2023-08-25 PROCEDURE — 77067 SCR MAMMO BI INCL CAD: CPT | Mod: TC

## 2023-08-28 ENCOUNTER — OFFICE VISIT (OUTPATIENT)
Dept: PSYCHIATRY | Facility: CLINIC | Age: 38
End: 2023-08-28
Payer: COMMERCIAL

## 2023-08-28 DIAGNOSIS — F32.81 PMDD (PREMENSTRUAL DYSPHORIC DISORDER): ICD-10-CM

## 2023-08-28 DIAGNOSIS — K58.0 IRRITABLE BOWEL SYNDROME WITH DIARRHEA: ICD-10-CM

## 2023-08-28 DIAGNOSIS — F90.2 ATTENTION DEFICIT HYPERACTIVITY DISORDER (ADHD), COMBINED TYPE: Primary | ICD-10-CM

## 2023-08-28 DIAGNOSIS — F90.0 ATTENTION DEFICIT HYPERACTIVITY DISORDER (ADHD), PREDOMINANTLY INATTENTIVE TYPE: ICD-10-CM

## 2023-08-28 DIAGNOSIS — F41.1 GAD (GENERALIZED ANXIETY DISORDER): ICD-10-CM

## 2023-08-28 RX ORDER — SERDEXMETHYLPHENIDATE AND DEXMETHYLPHENIDATE 7.8; 39.2 MG/1; MG/1
1 CAPSULE ORAL DAILY
Qty: 30 CAPSULE | Refills: 0 | Status: SHIPPED | OUTPATIENT
Start: 2023-08-28 | End: 2023-09-25

## 2023-08-28 NOTE — PROGRESS NOTES
Outpatient Psychiatry Follow-Up Visit (MD/NP)    8/28/2023    TELE PSYCHIATRY Disclaimer   *The patient was informed despite using HIPPA compliant technology there may be risks including security breach, technological failure, inability to perform a comprehensive physical exam which could delay or prevent an accurate diagnosis, and potential complications from treatment decisions rendered over a telemedical platform. The patient understands and consented to the use of tele-health service as being a safe measure to mitigate during COVID 19 Pandemic .  The patient was also informed of the relationship between the physician and patient and the respective role of any other health care provider with respect to management of the patient; and notified that the pt may decline to receive medical services by telemedicine and may withdraw from such care at any time.     Patient's Current location: work- [employer] main [facility] [address]  Visit type: Virtual visit with synchronous audio and video  Total time spent with patient: 20 minutes     Clinical Status of Patient:  Outpatient (Ambulatory)    Chief Complaint:  April Wendt Schamberger is a 38 y.o. female with history of ADHD, PMDD, NIRAV, IBS-D  who presents for follow up appointment. Patient was last seen in this clinic on 7/17/23, during which her current medication regimen was continued.      Interval History and Content of Current Session:  Interim Events/Subjective Report/Content of Current Session:     Patient reports being ok since her last visit. States she has been unable to fill her Vyvanse 70 mg daily at 5 different pharmacies and says they won't have Vyvanse 70 or 50 in stock until November. States that she has been scatter-brained, disorganized, and overwhelmed with everything that she needs to do, especially at work. Pt is hoping to try a Vyvanse alternative however doesn't want to try Adderall XR since this caused significant irritability at night. Has not  tried Ritalin IR or XR or Aztarys.       Review of Systems   PSYCHIATRIC: Pertinant items are noted in the narrative.    Past Medical, Family and Social History: The patient's past medical, family and social history have been reviewed and updated as appropriate within the electronic medical record - see encounter notes.    Compliance: yes    Side effects: None    Risk Parameters:  Patient reports no suicidal ideation  Patient reports no homicidal ideation  Patient reports no self-injurious behavior  Patient reports no violent behavior    Exam (detailed: at least 9 elements; comprehensive: all 15 elements)   Constitutional  Vitals:  Most recent vital signs, dated less than 90 days prior to this appointment, were reviewed.   There were no vitals filed for this visit.     General:  unremarkable, age appropriate     Musculoskeletal  Muscle Strength/Tone:  not examined   Gait & Station:  non-ataxic     Psychiatric  Speech:  no latency; no press   Mood & Affect:  steady, euthymic  congruent and appropriate   Thought Process:  normal and logical   Associations:  intact   Thought Content:  normal, no suicidality, no homicidality, delusions, or paranoia   Insight:  intact, has awareness of illness   Judgement: behavior is adequate to circumstances   Orientation:  person, place, situation, time/date   Memory: intact for content of interview, memory >3 objects at five mins, able to remember recent events- Yes, able to remember remote events- Yes   Language: grossly intact   Attention Span & Concentration:  able to focus, completed tasks   Fund of Knowledge:  intact and appropriate to age and level of education, 4 of 4 recent presidents     Assessment and Diagnosis   Status/Progress: Based on the examination today, the patient's problem(s) is/are well controlled.  New problems have not been presented today.   Co-morbidities are not complicating management of the primary condition.  There are no active rule-out diagnoses for  this patient at this time, however would like to rule-out sleep apnea given persistent inattention and some ineffectiveness of stimulant medication despite max dose of Vyvanse.     General Impression:       ICD-10-CM ICD-9-CM   1. Attention deficit hyperactivity disorder (ADHD), combined type  F90.2 314.01   2. PMDD (premenstrual dysphoric disorder)  F32.81 625.4   3. Irritable bowel syndrome with diarrhea  K58.0 564.1   4. NIRAV (generalized anxiety disorder)  F41.1 300.02   5. Attention deficit hyperactivity disorder (ADHD), predominantly inattentive type  F90.0 314.00         Intervention/Counseling/Treatment Plan       -Discontinue Vyvanse 70 mg given it's on backorder at numerous pharmacies until 11/2023. Initiate Aztarys 39.2/7.8 mg for ADHD in the meantime.               - PDMP reviewed: no discrepancies  - Continue Adderall IR 15 mg PO in afternoon for breakthrough ADHD symptoms              - PDMP reviewed:  no discrepancies  - Continue Elavil 25 mg qhs for anxiety, sleep, and IBS with diarrhea; patient taking full tab daily; consider increasing for IBS-D once ADHD medication is stable  - Continue Effexor 37.5mg for PMDD daily with extra 37.5 mg for luteal phase symptoms (prescribed as 37.5 mg two times daily)  - Continue Lunesta, per PCP  - Provided with contact information for sleep medicine, for evaluation for sleep apnea      Return to Clinic: 3  months    Discussed with staff: Dr. Tony Hutson, DO, [facility], PGY-3

## 2023-08-29 RX ORDER — DIPHENOXYLATE HYDROCHLORIDE AND ATROPINE SULFATE 2.5; .025 MG/1; MG/1
1 TABLET ORAL 4 TIMES DAILY PRN
Qty: 360 TABLET | Refills: 0 | Status: SHIPPED | OUTPATIENT
Start: 2023-08-29 | End: 2023-11-17 | Stop reason: SDUPTHER

## 2023-09-04 DIAGNOSIS — F51.01 PRIMARY INSOMNIA: ICD-10-CM

## 2023-09-04 DIAGNOSIS — R51.9 NONINTRACTABLE HEADACHE, UNSPECIFIED CHRONICITY PATTERN, UNSPECIFIED HEADACHE TYPE: ICD-10-CM

## 2023-09-04 NOTE — TELEPHONE ENCOUNTER
No care due was identified.  [facility] Embedded Care Due Messages. Reference number: [id number].   9/04/2023 5:58:45 PM CDT

## 2023-09-05 RX ORDER — ESZOPICLONE 3 MG/1
3 TABLET, FILM COATED ORAL NIGHTLY PRN
Qty: 30 TABLET | Refills: 0 | Status: SHIPPED | OUTPATIENT
Start: 2023-09-05 | End: 2023-10-07 | Stop reason: SDUPTHER

## 2023-09-05 RX ORDER — BUTALBITAL, ACETAMINOPHEN AND CAFFEINE 50; 325; 40 MG/1; MG/1; MG/1
1 TABLET ORAL EVERY 6 HOURS PRN
Qty: 30 TABLET | Refills: 0 | Status: SHIPPED | OUTPATIENT
Start: 2023-09-05 | End: 2023-11-06 | Stop reason: SDUPTHER

## 2023-09-14 DIAGNOSIS — R00.0 TACHYCARDIA: ICD-10-CM

## 2023-09-14 DIAGNOSIS — I10 ESSENTIAL (PRIMARY) HYPERTENSION: ICD-10-CM

## 2023-09-14 RX ORDER — METOPROLOL SUCCINATE 50 MG/1
50 TABLET, EXTENDED RELEASE ORAL 2 TIMES DAILY
Qty: 180 TABLET | Refills: 3 | Status: SHIPPED | OUTPATIENT
Start: 2023-09-14 | End: 2024-03-18 | Stop reason: SDUPTHER

## 2023-09-14 NOTE — TELEPHONE ENCOUNTER
No care due was identified.  [facility] Embedded Care Due Messages. Reference number: [id number].   9/14/2023 9:48:57 AM CDT

## 2023-09-14 NOTE — TELEPHONE ENCOUNTER
Refill Decision Note   April Anaflaquita  is requesting a refill authorization.  Brief Assessment and Rationale for Refill:  Approve     Medication Therapy Plan:         Comments:     Note composed:12:34 PM 09/14/2023             Appointments     Last Visit   8/10/2023 Mata Valencia MD   Next Visit   Visit date not found Mata Valencia MD

## 2023-09-19 ENCOUNTER — OFFICE VISIT (OUTPATIENT)
Dept: GASTROENTEROLOGY | Facility: CLINIC | Age: 38
End: 2023-09-19
Payer: COMMERCIAL

## 2023-09-19 ENCOUNTER — PATIENT MESSAGE (OUTPATIENT)
Dept: PSYCHIATRY | Facility: CLINIC | Age: 38
End: 2023-09-19
Payer: COMMERCIAL

## 2023-09-19 DIAGNOSIS — K58.0 IRRITABLE BOWEL SYNDROME WITH DIARRHEA: Primary | ICD-10-CM

## 2023-09-19 DIAGNOSIS — K21.9 GASTROESOPHAGEAL REFLUX DISEASE, UNSPECIFIED WHETHER ESOPHAGITIS PRESENT: ICD-10-CM

## 2023-09-19 PROCEDURE — 4010F PR ACE/ARB THEARPY RXD/TAKEN: ICD-10-PCS | Mod: CPTII,95,, | Performed by: NURSE PRACTITIONER

## 2023-09-19 PROCEDURE — 99214 OFFICE O/P EST MOD 30 MIN: CPT | Mod: 95,,, | Performed by: NURSE PRACTITIONER

## 2023-09-19 PROCEDURE — 99214 PR OFFICE/OUTPT VISIT, EST, LEVL IV, 30-39 MIN: ICD-10-PCS | Mod: 95,,, | Performed by: NURSE PRACTITIONER

## 2023-09-19 PROCEDURE — 4010F ACE/ARB THERAPY RXD/TAKEN: CPT | Mod: CPTII,95,, | Performed by: NURSE PRACTITIONER

## 2023-09-19 PROCEDURE — 3044F HG A1C LEVEL LT 7.0%: CPT | Mod: CPTII,95,, | Performed by: NURSE PRACTITIONER

## 2023-09-19 PROCEDURE — 3044F PR MOST RECENT HEMOGLOBIN A1C LEVEL <7.0%: ICD-10-PCS | Mod: CPTII,95,, | Performed by: NURSE PRACTITIONER

## 2023-09-19 RX ORDER — PANTOPRAZOLE SODIUM 40 MG/1
40 TABLET, DELAYED RELEASE ORAL DAILY
Qty: 30 TABLET | Refills: 2 | Status: SHIPPED | OUTPATIENT
Start: 2023-09-19 | End: 2024-01-06 | Stop reason: SDUPTHER

## 2023-09-19 NOTE — PROGRESS NOTES
GASTROENTEROLOGY CLINIC NOTE    Chief Complaint: The primary encounter diagnosis was Irritable bowel syndrome with diarrhea. A diagnosis of Gastroesophageal reflux disease, unspecified whether esophagitis present was also pertinent to this visit.  Referring provider/PCP: Mtaa Valencia MD    HPI:  April Wendt Schamberger is a 38 y.o. female who is a new patient to me with a PMH that is significant for Abnormal Pap smear of cervix, ADD (attention deficit disorder), Breast disorder, Esophageal reflux, Fibroids, Hypertension, IBS (irritable bowel syndrome), Insomnia, Kidney stones, Mastocytosis, Relies on partner vasectomy for contraception, and Upper GI bleed.  Patient who is new to me was previously followed by Dr. Larkin and is here today to re-establish care for diarrhea, abdominal pain, and h/o IBD.  This has been an ongoing problem since patient was 17 yo.  In 2017, she underwent colonoscopy and EGD with Dr. Larkin.  EGD revealed non-bleeding erosions in gastric body and antrum and erythema in the duodenal bulb.  Colonoscopy revealed several 5mm ulcers in the terminal ileum; biopsies were negative for IBD.  IBD Diagnostic labs were consistent with Crohn's.  She was then placed on budesonide.  Patient reports no improvement in symptoms and 10 lb weight loss while taking budesonide.  She was referred to the IBD clinic at [facility] and it was recommended she repeat the colonoscopy and also have VCE.  This was not done as patient was pregnant and had some improvement in symptoms.  She then sought care with both MEGAN Nolen NP and Dr. Larkin for continued flares.  Dr. Larkin initiated Lomotil in 3/2020. Since then, she has been managing symptoms with combination of Lomotil and Imodium.  She is currently taking 8 Imodium and 6 Lomotil to manage symptoms.  This regimen is no longer effective at controlling the diarrhea.  Over the last 6 months, she reports an increase of flares which are accompanied by  frequent loose bowel movements (8-10 per day), lower abdominal pain, nausea, and hematochezia.  Hematochezia does not occur with every flare; last occurred two weeks ago.  Additionally, she is experiencing nocturnal symptoms 3-4 times weekly.  Flares are occurring 1-2 times per week and last 1-2 days.  Denies any changes in medications or new medications prior to worsening of symptoms.   ___________________________________________________________________________________________________    Interval Note 9/19/2023  The patient location is: [place]  The chief complaint leading to consultation is: Follow up IBS    Visit type: audiovisual    Face to Face time with patient: 15:36  25 minutes of total time spent on the encounter, which includes face to face time and non-face to face time preparing to see the patient (eg, review of tests), Obtaining and/or reviewing separately obtained history, Documenting clinical information in the electronic or other health record, Independently interpreting results (not separately reported) and communicating results to the patient/family/caregiver, or Care coordination (not separately reported).     Each patient to whom he or she provides medical services by telemedicine is:  (1) informed of the relationship between the physician and patient and the respective role of any other health care provider with respect to management of the patient; and (2) notified that he or she may decline to receive medical services by telemedicine and may withdraw from such care at any time.    Notes:    April Schamberger who is known to me presents for follow up visit regarding diarrhea. Last seen by me 1/2022. Colonoscopy performed by Dr. Roe (see procedure report below). Also trialed Viberzi and Xifaxan without success.   Continues to use Lomotil and Imodium to control diarrhea. She will take 8 imodium in the morning and 4 before bedtime. Additionally, will take 3 Lomotil in morning and 3 before  bedtime. Has 3-4 bowel movements per day in the morning before 0900 and one bowel movement before bed. Consistency is soft.   Continues to have flares one week prior to menstrual cycle that will last 2-3 days. With flares has several bowel movements per day with hematochezia. Consistency of stool is watery.   Lower abdominal cramping accompanied bowel movements.   Denies undigested food in stool, oily or greasy stool.     Treatments: Metamucil, budesonide, Viberzi, Xifaxan, Imodium. Lomotil, Amitriptyline   Stool studies done 2022 Negative    Also reports more frequent reflux with accompanied throat burning. Taking TUMS daily.   Has noticed if she eats after dinner or eats ice cream will have frequent coughing after   Denies nocturnal symptoms.     Prior Upper Endoscopy: 4/2017  Findings: The esophagus was normal. A few dispersed, 5 mm non-bleeding erosions were found in the gastric body and antrum with superficial, linear ulceration. There   were no stigmata of recent bleeding. Biopsies were taken with a cold forceps for histology. Verification of patient identification for the specimen was done by the nurse using the patient's name and birth date. Estimated blood loss was minimal. Localized mildly erythematous mucosa without active bleeding and with no stigmata of bleeding was found in the duodenal bulb. Biopsies for histology were taken with a cold forceps for evaluation of celiac disease. Verification of patient identification for the specimen was done by the nurse using the patient's name and birth date. Estimated blood loss was minimal.     Impression:   - Normal esophagus.                         - Non-bleeding erosive gastropathy. Biopsied.                         - Erythematous duodenopathy. Biopsied.     Recommendation:  - Discharge patient to home.                         - Resume previous diet.                         - Continue present medications.                         - Await pathology results.      Pathology:  1. Small bowel (biopsy):   -Duodenal mucosa with no significant histopathologic change   -Normal villus architecture   -No increase in intraepithelial lymphocytes or expansion of villi with plasma cells   -Negative for dysplasia or malignancy     2. Gastric biopsy:   -Mild chronic inflammation in background of reactive change   -No Helicobacter organisms identified on H&E   -Negative for intestinal metaplasia, dysplasia or malignancy    Prior Colonoscopy: 1/2022 with Dr. Roe  Impression:            - Hemorrhoids found on perianal exam.                          - The entire examined colon is normal. Biopsied.                          - The examined portion of the ileum was normal.                          - The examination was otherwise normal on direct                          and retroflexion views.     Recommendation:        - Discharge patient to home.                          - Patient has a contact number available for                          emergencies. The signs and symptoms of potential                          delayed complications were discussed with the                          patient. Return to normal activities tomorrow.                          Written discharge instructions were provided to                          the patient.                          - Resume previous diet.                          - Continue present medications.                          - Await pathology results.                          - Repeat colonoscopy in 10 years for screening                          purposes.                          - Return to GI office after studies are complete.                          - Would consider checking full stool studies                          including calprotectin. Consider small bowel video                          capsule if there remains concern about Crohns                          disease, but exam today does not find any                          suggestion of  Crohns in the colon and good look                          into TI. Consider further treatment for IBS-D     Pathology:  COLON BIOPSIES:   NO SIGNIFICANT HISTOLOGIC ALTERATION   NO COLITIS IDENTIFIED     4/2017  Findings: The perianal and digital rectal examinations were normal. The colon (entire examined portion) appeared normal. Biopsies for histology were taken with a cold forceps from the ascending colon, transverse colon, descending colon and sigmoid colon for evaluation of microscopic colitis. Verification of patient identification for the specimen was done by the nurse using the patient's birth date and medical record number. Estimated blood loss was minimal. The terminal ileum contained multiple five mm ulcers. No bleeding was present. No stigmata of recent bleeding were seen. Biopsies were taken with a cold forceps for histology. Verification of patient identification for the specimen was done by the nurse using the patient's name and birth date. Estimated blood loss was minimal.     Impression:  - The entire examined colon is normal. Biopsied.                         - Multiple ulcers in the terminal ileum. Biopsied.     Recommendation:       - Discharge patient to home (via wheelchair).                         - Resume previous diet.                         - Continue present medications.                         - Await pathology results.                         - Repeat colonoscopy at age 48 for screening purposes.     Pathology:  3. Biopsy terminal ileum:   -Ileitis with detached ulcer debris   -Prominent lymphoid aggregates   -Negative for dysplasia or malignancy -See note     4. Random colon biopsies:   -Benign colonic mucosa with patchy lymphoid aggregates   -No acute or microscopic colitis -Negative for dysplasia or malignancy    Family h/o Colon Cancer: Cousin  Family h/o Crohn's Disease or Ulcerative Colitis: No  Abdominal Surgeries: No    NSAIDs: No  Anticoagulation or Antiplatelet: No    Review  of Systems   Constitutional:  Negative for weight loss.   HENT:  Negative for sore throat.    Eyes:  Negative for blurred vision.   Respiratory:  Negative for cough.    Cardiovascular:  Negative for chest pain.   Gastrointestinal:  Positive for abdominal pain (lower), blood in stool, diarrhea and heartburn. Negative for constipation, melena, nausea and vomiting.   Genitourinary:  Negative for dysuria.   Musculoskeletal:  Negative for myalgias.   Skin:  Negative for rash.   Neurological:  Negative for headaches.   Endo/Heme/Allergies:  Negative for environmental allergies.   Psychiatric/Behavioral:  Negative for suicidal ideas. The patient is not nervous/anxious.        Past Medical History: has a past medical history of Abnormal Pap smear of cervix, ADD (attention deficit disorder), Breast disorder, Esophageal reflux, Fibroids, Hypertension, IBS (irritable bowel syndrome), Insomnia, Kidney stones, Mastocytosis, Relies on partner vasectomy for contraception, and Upper GI bleed.    Past Surgical History: has a past surgical history that includes TONSILLECTOMY, ADENOIDECTOMY (1988); Esophagogastroduodenoscopy (2012); Colonoscopy (N/A, 04/28/2017); LASIK (Bilateral, 2005); Refractive surgery; Vaginal delivery; Epidural steroid injection into cervical spine (N/A, 12/01/2020); Epidural steroid injection into cervical spine (N/A, 12/02/2021); Colonoscopy (N/A, 01/26/2022); and Ulnar tunnel release (Right, 07/08/2022).    Family History:family history includes Breast cancer (age of onset: 47) in her mother; Breast cancer (age of onset: 80) in her paternal grandmother; Cancer in her cousin and cousin; Cancer (age of onset: 31) in her cousin; Deep vein thrombosis in her father; Diabetes in her father; Diabetes type II in her paternal grandfather; Heart attack in her maternal grandmother; Hypertension in her father and mother; Ovarian cancer in her maternal aunt; Pancreatic cancer in her maternal grandfather; Prostate cancer  in her father.    Allergies:   Review of patient's allergies indicates:   Allergen Reactions    Lactose     Azithromycin Nausea And Vomiting       Social History: reports that she quit smoking about 9 years ago. Her smoking use included cigarettes. She has never used smokeless tobacco. She reports that she does not drink alcohol and does not use drugs.    Home medications:   Current Outpatient Medications on File Prior to Visit   Medication Sig Dispense Refill    acetaminophen (TYLENOL) 500 MG tablet Take 2 tablets (1,000 mg total) by mouth 3 (three) times daily as needed for Pain.  0    acetaminophen-codeine 300-30mg (TYLENOL #3) 300-30 mg Tab Take 1 tablet by mouth every 4 to 6 hours as needed for pain. 12 tablet 0    amitriptyline (ELAVIL) 25 MG tablet Take 1 tablet (25 mg total) by mouth every evening. 90 tablet 3    butalbital-acetaminophen-caffeine -40 mg (FIORICET, ESGIC) -40 mg per tablet Take 1 tablet by mouth every 6 (six) hours as needed for Headaches. 30 tablet 0    chlorhexidine (PERIDEX) 0.12 % solution Swish and spit 15mls by mouth three times daily as directed 473 mL 4    dextroamphetamine-amphetamine (ADDERALL) 15 mg tablet Take 1 tablet (15 mg total) by mouth once daily. 30 tablet 0    dextroamphetamine-amphetamine (ADDERALL) 15 mg tablet Take 1 tablet (15 mg total) by mouth with lunch. 30 tablet 0    diphenoxylate-atropine 2.5-0.025 mg (LOMOTIL) 2.5-0.025 mg per tablet Take 1 tablet by mouth 4 (four) times daily as needed for Diarrhea (diarrhea). 360 tablet 0    eszopiclone (LUNESTA) 3 mg Tab Take 1 tablet (3 mg total) by mouth nightly as needed (insomnia). 30 tablet 0    fluconazole (DIFLUCAN) 150 MG Tab Take 1 tablet as needed for yeast infection 30 tablet 1    HYDROcodone-acetaminophen (NORCO) 5-325 mg per tablet Take 1 tablet by mouth every 4 (four) hours as needed for Pain. 12 tablet 0    lisdexamfetamine (VYVANSE) 70 MG capsule Take 1 capsule (70 mg total) by mouth every morning.  30 capsule 0    lisdexamfetamine (VYVANSE) 70 MG capsule Take 1 capsule (70 mg total) by mouth every morning. 30 capsule 0    loperamide (IMODIUM) 1 mg/7.5 mL solution Take 0.1 mg/kg by mouth every 12 (twelve) hours.      loperamide (IMODIUM) 2 mg capsule Take 2 mg by mouth 4 (four) times daily as needed for Diarrhea.      metoprolol succinate (TOPROL-XL) 50 MG 24 hr tablet Take 1 tablet (50 mg total) by mouth 2 (two) times daily. 180 tablet 3    metroNIDAZOLE (FLAGYL) 500 MG tablet Take 1 tablet (500 mg total) by mouth every 12 (twelve) hours. Do not drink alcohol while taking this medication 14 tablet 0    pregabalin (LYRICA) 50 MG capsule Take 1 capsule (50 mg total) by mouth 2 (two) times daily. 60 capsule 2    rizatriptan (MAXALT-MLT) 10 MG disintegrating tablet Take ½ tablet (5 mg total) by mouth as needed for Migraine (repeat dose once in 2 hours if headache return.). 9 tablet 6    serdexmethylphen-dexmethylphen (AZSTARYS) 39.2 mg- 7.8 mg Cap Take 1 capsule by mouth once daily. 30 capsule 0    terconazole (TERAZOL 7) 0.4 % Crea Place 1 applicator vaginally every evening. 45 g 0    tiZANidine (ZANAFLEX) 4 MG tablet Take 1 tablet (4 mg total) by mouth 3 (three) times daily as needed. 90 tablet 1    tretinoin (RETIN-A) 0.025 % cream Apply topically every evening. Pea sized amount to entire face. If irritation occurs, decrease use to every other night. 20 g 5    valsartan (DIOVAN) 320 MG tablet Take 1 tablet (320 mg total) by mouth once daily. 90 tablet 3    venlafaxine (EFFEXOR-XR) 37.5 MG 24 hr capsule Take 2 capsules (75 mg total) by mouth once daily. 60 capsule 3     Current Facility-Administered Medications on File Prior to Visit   Medication Dose Route Frequency Provider Last Rate Last Admin    0.9%  NaCl infusion  500 mL Intravenous Continuous Corky Argueta Jr., MD           Vital signs:  There were no vitals taken for this visit.    Physical Exam  Constitutional:       General: She is not in acute  "distress.     Appearance: Normal appearance. She is not ill-appearing.      Comments: Limited Physical Exam d/t Telemedicine Encounter   HENT:      Head: Normocephalic.   Pulmonary:      Effort: Pulmonary effort is normal. No respiratory distress.   Abdominal:      Tenderness: Negative signs include Baron's sign.   Neurological:      Mental Status: She is alert and oriented to person, place, and time.   Psychiatric:         Mood and Affect: Mood normal.         Behavior: Behavior normal.         Thought Content: Thought content normal.         Judgment: Judgment normal.         Routine labs:  Lab Results   Component Value Date    WBC 7.30 08/25/2023    HGB 12.7 08/25/2023    HCT 39.5 08/25/2023    MCV 87 08/25/2023     08/25/2023     No results found for: "INR"  No results found for: "IRON", "FERRITIN", "TIBC", "FESATURATED"  Lab Results   Component Value Date     08/25/2023    K 4.0 08/25/2023     08/25/2023    CO2 25 08/25/2023    BUN 5 (L) 08/25/2023    CREATININE 0.7 08/25/2023     Lab Results   Component Value Date    ALBUMIN 4.0 08/25/2023    ALT 11 08/25/2023    AST 13 08/25/2023    ALKPHOS 53 (L) 08/25/2023    BILITOT 0.5 08/25/2023     No results found for: "GLUCOSE"  Lab Results   Component Value Date    TSH 1.388 08/25/2023     Lab Results   Component Value Date    CALCIUM 9.0 08/25/2023      Latest Reference Range & Units 01/26/22 08:47   Calprotectin <50 mcg/g <27.1   C. diff Antigen Negative  Negative   C difficile Toxins A+B, EIA Negative  Negative   Elastase 1, Fecal >200 (Normal) mcg/g 373   Giardia Antigen - EIA Negative  Negative   Cryptosporidium Antigen Negative  Negative   H. Pylori Antigen, Stool  SEE BELOW   Rotavirus Negative  Negative   Stool Exam-Ova,Cysts,Parasites  FINAL 01/28/2022 1155   Stool WBC No neutrophils seen  No neutrophils seen   H. pylori Antigen Source  unknown      01/26/22 08:47   Shiga Toxin 1 E.coli Negative   Shiga Toxin 2 E.coli Negative "     Imaging:      I have reviewed prior labs, imaging, and notes.      Assessment:  1. Irritable bowel syndrome with diarrhea    2. Gastroesophageal reflux disease, unspecified whether esophagitis present      IBS-D currently being managed with Imodium and Lomotil. Continues with monthly flares lasting 2-3 days.   Stool studies and colonoscopy negative.   Experiencing more frequent reflux. Taking TUMS almost daily to control symptoms.   Also taking Amitriptyline nightly.     Plan:  Orders Placed This Encounter    pantoprazole (PROTONIX) 40 MG tablet     Start Protonix 40mg daily.   Continue Lomotil and Imodium. Lomotil filled 9/1/2023.     Recommend patient follow up with physician as she has been taking Lomotil for extended period of time and is having to take several doses of Lomotil and imodium daily to control diarrhea.         Plan of care discussed with patient who is in agreement and verbalized understanding.     I have explained the planned procedures to the patient.The risks, benefits and alternatives of the procedure were also explained in detail. Patient verbalized understanding, all questions were answered. The patient agrees to proceed as planned    Follow Up: with Physician          CELIA Orozco,FNP-BC  [facility]

## 2023-09-21 NOTE — PROGRESS NOTES
Pt reports increased afternoon irritability with Aztarys. Will switch to Focalin XR 30 mg daily. Consider re-starting Vyvanse albeit at highest available dose in context of shortage (per pharmacies, 50 mg and 70 mg unavailable until 11/2023).

## 2023-09-25 RX ORDER — DEXTROAMPHETAMINE SULFATE, DEXTROAMPHETAMINE SACCHARATE, AMPHETAMINE ASPARTATE MONOHYDRATE, AND AMPHETAMINE SULFATE 9.375; 9.375; 9.375; 9.375 MG/1; MG/1; MG/1; MG/1
37.5 CAPSULE, EXTENDED RELEASE ORAL DAILY
Qty: 14 EACH | Refills: 0 | Status: SHIPPED | OUTPATIENT
Start: 2023-09-25 | End: 2023-10-18 | Stop reason: SDUPTHER

## 2023-09-25 RX ORDER — DEXTROAMPHETAMINE SACCHARATE, AMPHETAMINE ASPARTATE MONOHYDRATE, DEXTROAMPHETAMINE SULFATE, AMPHETAMINE SULFATE 3.125; 3.125; 3.125; 3.125 MG/1; MG/1; MG/1; MG/1
12.5 CAPSULE, EXTENDED RELEASE ORAL DAILY
Qty: 7 EACH | Refills: 0 | Status: SHIPPED | OUTPATIENT
Start: 2023-09-25 | End: 2023-10-04

## 2023-09-25 RX ORDER — DEXTROAMPHETAMINE SACCHARATE, AMPHETAMINE ASPARTATE MONOHYDRATE, DEXTROAMPHETAMINE SULFATE, AMPHETAMINE SULFATE 6.25; 6.25; 6.25; 6.25 MG/1; MG/1; MG/1; MG/1
25 CAPSULE, EXTENDED RELEASE ORAL DAILY
Qty: 7 EACH | Refills: 0 | Status: SHIPPED | OUTPATIENT
Start: 2023-09-25 | End: 2023-10-04

## 2023-09-25 NOTE — TELEPHONE ENCOUNTER
Patient reports intolerable afternoon irritability with Azstarys.     Will switch to Mydayis:  -Mydayis 12.5 mg daily x7 days  -Mydayis 25 mg daily x7 days  -Mydayis 37.5 mg daily x 2 weeks and will assess for dose effectiveness at next in person visit    Mayte Hutson DO, [facility], PGY-3

## 2023-10-07 DIAGNOSIS — F51.01 PRIMARY INSOMNIA: ICD-10-CM

## 2023-10-07 NOTE — TELEPHONE ENCOUNTER
No care due was identified.  [facility] Embedded Care Due Messages. Reference number: [id number].   10/07/2023 7:41:39 AM CDT

## 2023-10-09 RX ORDER — ESZOPICLONE 3 MG/1
3 TABLET, FILM COATED ORAL NIGHTLY PRN
Qty: 30 TABLET | Refills: 0 | Status: SHIPPED | OUTPATIENT
Start: 2023-10-09 | End: 2023-11-06 | Stop reason: SDUPTHER

## 2023-10-09 NOTE — TELEPHONE ENCOUNTER
Refill Routing Note   Medication(s) are not appropriate for processing by [facility] for the following reason(s):      Medication outside of protocol: not delegated for [facility] to sign      [facility] action(s):  Route Care Due:  None identified   Medication Therapy Plan:         Appointments  past 12m or future 3m with PCP    Date Provider   Last Visit   8/10/2023 Mata Valencia MD   Next Visit   Visit date not found Mata Valencia MD   ED visits in past 90 days: 0        Note composed:8:27 AM 10/09/2023

## 2023-10-18 ENCOUNTER — PATIENT MESSAGE (OUTPATIENT)
Dept: PSYCHIATRY | Facility: CLINIC | Age: 38
End: 2023-10-18
Payer: COMMERCIAL

## 2023-10-18 DIAGNOSIS — K58.0 IRRITABLE BOWEL SYNDROME WITH DIARRHEA: ICD-10-CM

## 2023-10-18 DIAGNOSIS — F41.9 ANXIETY: ICD-10-CM

## 2023-10-18 RX ORDER — AMITRIPTYLINE HYDROCHLORIDE 25 MG/1
25 TABLET, FILM COATED ORAL NIGHTLY
Qty: 90 TABLET | Refills: 3 | Status: SHIPPED | OUTPATIENT
Start: 2023-10-18 | End: 2024-10-17

## 2023-10-18 NOTE — TELEPHONE ENCOUNTER
No care due was identified.  [facility] Embedded Care Due Messages. Reference number: [id number].   10/18/2023 7:10:14 AM CDT

## 2023-10-18 NOTE — TELEPHONE ENCOUNTER
Refill Decision Note      Refill Decision Note   April Schamberger  is requesting a refill authorization.  Brief Assessment and Rationale for Refill:  Approve     Medication Therapy Plan:         Pharmacist review requested: Yes   Extended chart review required: Yes   Comments:     Note composed:10:22 AM 10/18/2023             Appointments     Last Visit   8/10/2023 Mata Valencia MD   Next Visit   Visit date not found Mata Valencia MD

## 2023-10-18 NOTE — TELEPHONE ENCOUNTER
Refill Routing Note   Medication(s) are not appropriate for processing by [facility] for the following reason(s):      Drug-disease interaction    [facility] action(s):  Defer Care Due:  None identified     Medication Therapy Plan: Tachycardia    Pharmacist review requested: Yes     Appointments  past 12m or future 3m with PCP    Date Provider   Last Visit   8/10/2023 Mata Valencia MD   Next Visit   Visit date not found Mata Valencia MD   ED visits in past 90 days: 0        Note composed:8:40 AM 10/18/2023

## 2023-10-20 RX ORDER — DEXTROAMPHETAMINE SACCHARATE, AMPHETAMINE ASPARTATE, DEXTROAMPHETAMINE SULFATE AND AMPHETAMINE SULFATE 3.75; 3.75; 3.75; 3.75 MG/1; MG/1; MG/1; MG/1
15 TABLET ORAL DAILY
Qty: 30 TABLET | Refills: 0 | Status: SHIPPED | OUTPATIENT
Start: 2023-10-20 | End: 2023-10-23 | Stop reason: SDUPTHER

## 2023-10-20 RX ORDER — DEXTROAMPHETAMINE SULFATE, DEXTROAMPHETAMINE SACCHARATE, AMPHETAMINE ASPARTATE MONOHYDRATE, AND AMPHETAMINE SULFATE 9.375; 9.375; 9.375; 9.375 MG/1; MG/1; MG/1; MG/1
37.5 CAPSULE, EXTENDED RELEASE ORAL DAILY
Qty: 14 EACH | Refills: 0 | Status: SHIPPED | OUTPATIENT
Start: 2023-10-20 | End: 2023-10-23 | Stop reason: SDUPTHER

## 2023-10-23 RX ORDER — DEXTROAMPHETAMINE SULFATE, DEXTROAMPHETAMINE SACCHARATE, AMPHETAMINE ASPARTATE MONOHYDRATE, AND AMPHETAMINE SULFATE 9.375; 9.375; 9.375; 9.375 MG/1; MG/1; MG/1; MG/1
37.5 CAPSULE, EXTENDED RELEASE ORAL DAILY
Qty: 30 EACH | Refills: 0 | Status: SHIPPED | OUTPATIENT
Start: 2023-10-23 | End: 2023-11-21 | Stop reason: SDUPTHER

## 2023-10-23 RX ORDER — DEXTROAMPHETAMINE SACCHARATE, AMPHETAMINE ASPARTATE, DEXTROAMPHETAMINE SULFATE AND AMPHETAMINE SULFATE 3.75; 3.75; 3.75; 3.75 MG/1; MG/1; MG/1; MG/1
15 TABLET ORAL DAILY
Qty: 30 TABLET | Refills: 0 | Status: SHIPPED | OUTPATIENT
Start: 2023-10-23 | End: 2023-11-23

## 2023-10-23 RX ORDER — DEXTROAMPHETAMINE SULFATE, DEXTROAMPHETAMINE SACCHARATE, AMPHETAMINE ASPARTATE MONOHYDRATE, AND AMPHETAMINE SULFATE 9.375; 9.375; 9.375; 9.375 MG/1; MG/1; MG/1; MG/1
37.5 CAPSULE, EXTENDED RELEASE ORAL DAILY
Qty: 30 EACH | Refills: 0 | Status: SHIPPED | OUTPATIENT
Start: 2023-10-23 | End: 2023-10-23 | Stop reason: SDUPTHER

## 2023-11-06 DIAGNOSIS — M79.18 MYOFASCIAL PAIN SYNDROME, CERVICAL: ICD-10-CM

## 2023-11-06 DIAGNOSIS — M62.838 MUSCLE SPASM: ICD-10-CM

## 2023-11-06 DIAGNOSIS — R51.9 NONINTRACTABLE HEADACHE, UNSPECIFIED CHRONICITY PATTERN, UNSPECIFIED HEADACHE TYPE: ICD-10-CM

## 2023-11-06 DIAGNOSIS — F51.01 PRIMARY INSOMNIA: ICD-10-CM

## 2023-11-07 RX ORDER — BUTALBITAL, ACETAMINOPHEN AND CAFFEINE 50; 325; 40 MG/1; MG/1; MG/1
1 TABLET ORAL EVERY 6 HOURS PRN
Qty: 30 TABLET | Refills: 0 | Status: SHIPPED | OUTPATIENT
Start: 2023-11-07 | End: 2024-01-06 | Stop reason: SDUPTHER

## 2023-11-07 RX ORDER — ESZOPICLONE 3 MG/1
3 TABLET, FILM COATED ORAL NIGHTLY PRN
Qty: 30 TABLET | Refills: 0 | Status: SHIPPED | OUTPATIENT
Start: 2023-11-07 | End: 2023-12-06 | Stop reason: SDUPTHER

## 2023-11-07 RX ORDER — TIZANIDINE 4 MG/1
4 TABLET ORAL 3 TIMES DAILY PRN
Qty: 90 TABLET | Refills: 1 | Status: SHIPPED | OUTPATIENT
Start: 2023-11-07 | End: 2024-01-15 | Stop reason: SDUPTHER

## 2023-11-07 NOTE — TELEPHONE ENCOUNTER
No care due was identified.  Health [facility] Embedded Care Due Messages. Reference number: [id number].   11/06/2023 10:13:03 PM CST

## 2023-11-09 ENCOUNTER — TELEPHONE (OUTPATIENT)
Dept: OBSTETRICS AND GYNECOLOGY | Facility: CLINIC | Age: 38
End: 2023-11-09
Payer: COMMERCIAL

## 2023-11-09 ENCOUNTER — OFFICE VISIT (OUTPATIENT)
Dept: OBSTETRICS AND GYNECOLOGY | Facility: CLINIC | Age: 38
End: 2023-11-09
Payer: COMMERCIAL

## 2023-11-09 VITALS
DIASTOLIC BLOOD PRESSURE: 72 MMHG | BODY MASS INDEX: 28.74 KG/M2 | SYSTOLIC BLOOD PRESSURE: 126 MMHG | WEIGHT: 189.63 LBS | HEIGHT: 68 IN

## 2023-11-09 DIAGNOSIS — B37.31 RECURRENT CANDIDIASIS OF VAGINA: ICD-10-CM

## 2023-11-09 DIAGNOSIS — Z11.51 ENCOUNTER FOR SCREENING FOR HUMAN PAPILLOMAVIRUS (HPV): ICD-10-CM

## 2023-11-09 DIAGNOSIS — Z12.4 ENCOUNTER FOR PAPANICOLAOU SMEAR FOR CERVICAL CANCER SCREENING: ICD-10-CM

## 2023-11-09 DIAGNOSIS — Z01.419 ENCOUNTER FOR GYNECOLOGICAL EXAMINATION: Primary | ICD-10-CM

## 2023-11-09 DIAGNOSIS — Z91.89 AT HIGH RISK FOR BREAST CANCER: Primary | ICD-10-CM

## 2023-11-09 PROCEDURE — 3074F SYST BP LT 130 MM HG: CPT | Mod: CPTII,S$GLB,, | Performed by: OBSTETRICS & GYNECOLOGY

## 2023-11-09 PROCEDURE — 3044F HG A1C LEVEL LT 7.0%: CPT | Mod: CPTII,S$GLB,, | Performed by: OBSTETRICS & GYNECOLOGY

## 2023-11-09 PROCEDURE — 99396 PREV VISIT EST AGE 40-64: CPT | Mod: S$GLB,,, | Performed by: OBSTETRICS & GYNECOLOGY

## 2023-11-09 PROCEDURE — 88175 CYTOPATH C/V AUTO FLUID REDO: CPT | Performed by: OBSTETRICS & GYNECOLOGY

## 2023-11-09 PROCEDURE — 3074F PR MOST RECENT SYSTOLIC BLOOD PRESSURE < 130 MM HG: ICD-10-PCS | Mod: CPTII,S$GLB,, | Performed by: OBSTETRICS & GYNECOLOGY

## 2023-11-09 PROCEDURE — 99396 PR PREVENTIVE VISIT,EST,40-64: ICD-10-PCS | Mod: S$GLB,,, | Performed by: OBSTETRICS & GYNECOLOGY

## 2023-11-09 PROCEDURE — 1160F RVW MEDS BY RX/DR IN RCRD: CPT | Mod: CPTII,S$GLB,, | Performed by: OBSTETRICS & GYNECOLOGY

## 2023-11-09 PROCEDURE — 3078F DIAST BP <80 MM HG: CPT | Mod: CPTII,S$GLB,, | Performed by: OBSTETRICS & GYNECOLOGY

## 2023-11-09 PROCEDURE — 1159F MED LIST DOCD IN RCRD: CPT | Mod: CPTII,S$GLB,, | Performed by: OBSTETRICS & GYNECOLOGY

## 2023-11-09 PROCEDURE — 87624 HPV HI-RISK TYP POOLED RSLT: CPT | Performed by: OBSTETRICS & GYNECOLOGY

## 2023-11-09 PROCEDURE — 3008F PR BODY MASS INDEX (BMI) DOCUMENTED: ICD-10-PCS | Mod: CPTII,S$GLB,, | Performed by: OBSTETRICS & GYNECOLOGY

## 2023-11-09 PROCEDURE — 4010F PR ACE/ARB THEARPY RXD/TAKEN: ICD-10-PCS | Mod: CPTII,S$GLB,, | Performed by: OBSTETRICS & GYNECOLOGY

## 2023-11-09 PROCEDURE — 3008F BODY MASS INDEX DOCD: CPT | Mod: CPTII,S$GLB,, | Performed by: OBSTETRICS & GYNECOLOGY

## 2023-11-09 PROCEDURE — 3078F PR MOST RECENT DIASTOLIC BLOOD PRESSURE < 80 MM HG: ICD-10-PCS | Mod: CPTII,S$GLB,, | Performed by: OBSTETRICS & GYNECOLOGY

## 2023-11-09 PROCEDURE — 4010F ACE/ARB THERAPY RXD/TAKEN: CPT | Mod: CPTII,S$GLB,, | Performed by: OBSTETRICS & GYNECOLOGY

## 2023-11-09 PROCEDURE — 1159F PR MEDICATION LIST DOCUMENTED IN MEDICAL RECORD: ICD-10-PCS | Mod: CPTII,S$GLB,, | Performed by: OBSTETRICS & GYNECOLOGY

## 2023-11-09 PROCEDURE — 99999 PR PBB SHADOW E&M-EST. PATIENT-LVL IV: ICD-10-PCS | Mod: PBBFAC,,, | Performed by: OBSTETRICS & GYNECOLOGY

## 2023-11-09 PROCEDURE — 1160F PR REVIEW ALL MEDS BY PRESCRIBER/CLIN PHARMACIST DOCUMENTED: ICD-10-PCS | Mod: CPTII,S$GLB,, | Performed by: OBSTETRICS & GYNECOLOGY

## 2023-11-09 PROCEDURE — 99999 PR PBB SHADOW E&M-EST. PATIENT-LVL IV: CPT | Mod: PBBFAC,,, | Performed by: OBSTETRICS & GYNECOLOGY

## 2023-11-09 PROCEDURE — 3044F PR MOST RECENT HEMOGLOBIN A1C LEVEL <7.0%: ICD-10-PCS | Mod: CPTII,S$GLB,, | Performed by: OBSTETRICS & GYNECOLOGY

## 2023-11-09 RX ORDER — FLUCONAZOLE 150 MG/1
TABLET ORAL
Qty: 30 TABLET | Refills: 0 | Status: SHIPPED | OUTPATIENT
Start: 2023-11-09

## 2023-11-09 RX ORDER — MECLIZINE HYDROCHLORIDE 25 MG/1
25 TABLET ORAL 3 TIMES DAILY PRN
Qty: 30 TABLET | Refills: 0 | Status: SHIPPED | OUTPATIENT
Start: 2023-11-09

## 2023-11-09 NOTE — PROGRESS NOTES
Subjective:       Patient ID: April Wendt Schamberger is a 38 y.o. female.    Chief Complaint:  Annual Exam      History of Present Illness  - here for annual. Regular periods.  - has motion sickness the week before her periods.  - takes diflucan after periods and after intercourse to prevent change in vaginal pH that causes itching.    Past Medical History:   Diagnosis Date    Abnormal Pap smear of cervix 2000    Cryo Done    ADD (attention deficit disorder)     Breast disorder     Breast Cysts    Esophageal reflux     Fibroids     Hypertension     IBS (irritable bowel syndrome)     Insomnia     Kidney stones     Mastocytosis     Relies on partner vasectomy for contraception     Upper GI bleed        Past Surgical History:   Procedure Laterality Date    COLONOSCOPY N/A 04/28/2017    Procedure: COLONOSCOPY;  Surgeon: Bren Larkin MD;  Location: [facility] ENDO;  Service: Endoscopy;  Laterality: N/A;    COLONOSCOPY N/A 01/26/2022    Procedure: COLONOSCOPY;  Surgeon: Inocente Roe MD;  Location: [facility] ENDO;  Service: Endoscopy;  Laterality: N/A;    EPIDURAL STEROID INJECTION INTO CERVICAL SPINE N/A 12/01/2020    Procedure: Injection-steroid-epidural-cervical--C7-T1;  Surgeon: Corky Argueta Jr., MD;  Location: [facility] PAIN MGT;  Service: Pain Management;  Laterality: N/A;    EPIDURAL STEROID INJECTION INTO CERVICAL SPINE N/A 12/02/2021    Procedure: Cervical Epidural Steroid Injection C7-T1 (No Sedation);  Surgeon: Corky Argueta Jr., MD;  Location: [facility] PAIN MGT;  Service: Pain Management;  Laterality: N/A;    ESOPHAGOGASTRODUODENOSCOPY  2012    LASIK Bilateral 2005    REFRACTIVE SURGERY      TONSILLECTOMY, ADENOIDECTOMY  1988    ULNAR TUNNEL RELEASE Right 07/08/2022    VAGINAL DELIVERY      x 2        Family History   Problem Relation Age of Onset    Breast cancer Mother 47        with Metastasis    Hypertension Mother     Prostate cancer Father     Hypertension Father     Diabetes Father     Deep vein thrombosis  Father     Pancreatic cancer Maternal Grandfather     Breast cancer Paternal Grandmother 80    Diabetes type II Paternal Grandfather     Heart attack Maternal Grandmother     Cancer Cousin 31    Cancer Cousin         Thurman Syndrome    Cancer Cousin         Thurman Syndrome    Ovarian cancer Maternal Aunt     Lymphoma Neg Hx     Tuberculosis Neg Hx     Celiac disease Neg Hx     Cirrhosis Neg Hx     Colon polyps Neg Hx     Crohn's disease Neg Hx     Cystic fibrosis Neg Hx     Esophageal cancer Neg Hx     Hemochromatosis Neg Hx     Inflammatory bowel disease Neg Hx     Irritable bowel syndrome Neg Hx     Liver cancer Neg Hx     Liver disease Neg Hx     Rectal cancer Neg Hx     Stomach cancer Neg Hx     Ulcerative colitis Neg Hx     Donavon's disease Neg Hx     Amblyopia Neg Hx     Blindness Neg Hx     Cataracts Neg Hx     Glaucoma Neg Hx     Macular degeneration Neg Hx     Retinal detachment Neg Hx     Strabismus Neg Hx         Social History     Socioeconomic History    Marital status:     Number of children: 1   Tobacco Use    Smoking status: Former     Current packs/day: 0.00     Types: Cigarettes     Quit date: 2014     Years since quittin.6    Smokeless tobacco: Never   Substance and Sexual Activity    Alcohol use: No     Alcohol/week: 0.0 standard drinks of alcohol     Comment: breastfeeding     Drug use: No    Sexual activity: Yes     Partners: Male     Birth control/protection: Partner-Vasectomy     Comment: : Boris   Social History Narrative    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     Social Determinants of Health     Financial Resource Strain: Unknown (2023)    Overall Financial Resource Strain (CARDIA)     Difficulty of Paying Living Expenses: Patient refused   Food Insecurity: Unknown (2023)    Hunger Vital Sign     Worried About Running Out of Food in the Last Year: Patient refused     Ran Out of Food in the Last Year: Patient refused   Transportation Needs: Unknown (2023)  "   PRAPARE - Transportation     Lack of Transportation (Medical): Patient refused     Lack of Transportation (Non-Medical): Patient refused   Physical Activity: Sufficiently Active (8/28/2023)    Exercise Vital Sign     Days of Exercise per Week: 5 days     Minutes of Exercise per Session: 60 min   Stress: Unknown (8/28/2023)    [employer] [place] [employer] - Occupational Stress Questionnaire     Feeling of Stress : Patient refused   Recent Concern: Stress - Stress Concern Present (8/8/2023)    [employer] [place] [employer] - Occupational Stress Questionnaire     Feeling of Stress : Very much   Social Connections: Unknown (8/28/2023)    Social Connection and Isolation Panel [NHANES]     Frequency of Communication with Friends and Family: More than three times a week     Frequency of Social Gatherings with Friends and Family: Patient refused     Active Member of Clubs or Organizations: Patient refused     Attends Club or Organization Meetings: 1 to 4 times per year     Marital Status:    Housing Stability: Unknown (8/28/2023)    Housing Stability Vital Sign     Unable to Pay for Housing in the Last Year: Patient refused     Number of Places Lived in the Last Year: 1     Unstable Housing in the Last Year: No           Objective:     Vitals:    11/09/23 0843   BP: 126/72   Patient Position: Sitting   Weight: 86 kg (189 lb 9.5 oz)   Height: 5' 8" (1.727 m)       Physical Exam:   Constitutional: She is oriented to person, place, and time. She appears well-developed and well-nourished.        Pulmonary/Chest: Right breast exhibits no mass, no nipple discharge, no skin change, no tenderness and no swelling. Left breast exhibits no mass, no nipple discharge, no skin change, no tenderness and no swelling. Breasts are symmetrical.        Abdominal: Soft. She exhibits no distension. There is no abdominal tenderness.     Genitourinary:    Vagina and uterus normal.   There is no tenderness or lesion " on the right labia. There is no tenderness or lesion on the left labia. Cervix is normal. Right adnexum displays no mass, no tenderness and no fullness. Left adnexum displays no mass, no tenderness and no fullness. No  no vaginal discharge in the vagina.    pap smear completed   Genitourinary Comments: Spotted with pap             Musculoskeletal: Moves all extremeties.       Neurological: She is alert and oriented to person, place, and time.     Psychiatric: She has a normal mood and affect.        Assessment/ Plan:     Orders Placed This Encounter    HPV High Risk Genotypes, PCR    Liquid-Based Pap Smear, Screening    fluconazole (DIFLUCAN) 150 MG Tab    meclizine (ANTIVERT) 25 mg tablet       April was seen today for annual exam.    Diagnoses and all orders for this visit:    Encounter for gynecological examination    Recurrent candidiasis of vagina  -     fluconazole (DIFLUCAN) 150 MG Tab; Take 1 tablet by mouth as needed for yeast infection    Encounter for screening for human papillomavirus (HPV)  -     HPV High Risk Genotypes, PCR    Encounter for Papanicolaou smear for cervical cancer screening  -     Liquid-Based Pap Smear, Screening    Other orders  -     meclizine (ANTIVERT) 25 mg tablet; Take 1 tablet (25 mg total) by mouth 3 (three) times daily as needed (motion sickness).    - advised to take meclizine 25 mg an hour before gets in car, etc.    Follow up in about 1 year (around 11/9/2024) for annual exam.    As of April 1, 2021, the Cures Act has been passed nationally. This new law requires that all doctors progress notes, lab results, pathology reports and radiology reports be released IMMEDIATELY to the patient in the patient portal. That means that the results are released to you at the EXACT same time they are released to me. Therefore, with all of the patients that I have I am not able to reply to each patient exactly when the results come in. So there will be a delay from when you see the results  to when I see them and have time to come up with a response to send you. Also I only see these results when I am on the computer at work. So if the results come in over the weekend or after 5 pm of a work day, I will not see them until the next business day. As you can tell, this is a challenge as a physician to give every patient the quick response they hope for and deserve. So please be patient!   Thanks for your understanding and patience.

## 2023-11-14 LAB
HPV HR 12 DNA SPEC QL NAA+PROBE: NEGATIVE
HPV16 AG SPEC QL: NEGATIVE
HPV18 DNA SPEC QL NAA+PROBE: NEGATIVE

## 2023-11-17 DIAGNOSIS — K58.0 IRRITABLE BOWEL SYNDROME WITH DIARRHEA: ICD-10-CM

## 2023-11-17 LAB
FINAL PATHOLOGIC DIAGNOSIS: NORMAL
Lab: NORMAL

## 2023-11-17 RX ORDER — DIPHENOXYLATE HYDROCHLORIDE AND ATROPINE SULFATE 2.5; .025 MG/1; MG/1
1 TABLET ORAL 4 TIMES DAILY PRN
Qty: 360 TABLET | Refills: 0 | Status: SHIPPED | OUTPATIENT
Start: 2023-11-17 | End: 2024-02-19 | Stop reason: SDUPTHER

## 2023-11-21 RX ORDER — DEXTROAMPHETAMINE SACCHARATE, AMPHETAMINE ASPARTATE MONOHYDRATE, DEXTROAMPHETAMINE SULFATE, AMPHETAMINE SULFATE 9.375; 9.375; 9.375; 9.375 MG/1; MG/1; MG/1; MG/1
37.5 CAPSULE, EXTENDED RELEASE ORAL DAILY
Qty: 30 EACH | Refills: 0 | Status: CANCELLED | OUTPATIENT
Start: 2023-11-21

## 2023-11-24 RX ORDER — DEXTROAMPHETAMINE SACCHARATE, AMPHETAMINE ASPARTATE MONOHYDRATE, DEXTROAMPHETAMINE SULFATE, AMPHETAMINE SULFATE 9.375; 9.375; 9.375; 9.375 MG/1; MG/1; MG/1; MG/1
37.5 CAPSULE, EXTENDED RELEASE ORAL DAILY
Qty: 30 EACH | Refills: 0 | Status: SHIPPED | OUTPATIENT
Start: 2023-11-24 | End: 2024-02-19 | Stop reason: SDUPTHER

## 2023-12-06 DIAGNOSIS — F51.01 PRIMARY INSOMNIA: ICD-10-CM

## 2023-12-07 ENCOUNTER — PATIENT MESSAGE (OUTPATIENT)
Dept: PSYCHIATRY | Facility: CLINIC | Age: 38
End: 2023-12-07
Payer: COMMERCIAL

## 2023-12-07 RX ORDER — ESZOPICLONE 3 MG/1
3 TABLET, FILM COATED ORAL NIGHTLY PRN
Qty: 30 TABLET | Refills: 0 | Status: SHIPPED | OUTPATIENT
Start: 2023-12-07 | End: 2024-01-06 | Stop reason: SDUPTHER

## 2023-12-07 NOTE — TELEPHONE ENCOUNTER
No care due was identified.  [facility] Embedded Care Due Messages. Reference number: [id number].   12/06/2023 9:42:38 PM CST

## 2023-12-11 ENCOUNTER — OFFICE VISIT (OUTPATIENT)
Dept: GASTROENTEROLOGY | Facility: CLINIC | Age: 38
End: 2023-12-11
Payer: COMMERCIAL

## 2023-12-11 ENCOUNTER — PATIENT MESSAGE (OUTPATIENT)
Dept: GASTROENTEROLOGY | Facility: CLINIC | Age: 38
End: 2023-12-11
Payer: COMMERCIAL

## 2023-12-11 ENCOUNTER — TELEPHONE (OUTPATIENT)
Dept: GASTROENTEROLOGY | Facility: CLINIC | Age: 38
End: 2023-12-11
Payer: COMMERCIAL

## 2023-12-11 VITALS — BODY MASS INDEX: 28.74 KG/M2 | WEIGHT: 189.63 LBS | HEIGHT: 68 IN

## 2023-12-11 DIAGNOSIS — R11.0 NAUSEA: ICD-10-CM

## 2023-12-11 DIAGNOSIS — Z79.1 NSAID LONG-TERM USE: ICD-10-CM

## 2023-12-11 DIAGNOSIS — K58.0 IRRITABLE BOWEL SYNDROME WITH DIARRHEA: Primary | ICD-10-CM

## 2023-12-11 DIAGNOSIS — R19.4 CHANGE IN BOWEL HABIT: ICD-10-CM

## 2023-12-11 PROCEDURE — 3044F PR MOST RECENT HEMOGLOBIN A1C LEVEL <7.0%: ICD-10-PCS | Mod: CPTII,95,, | Performed by: INTERNAL MEDICINE

## 2023-12-11 PROCEDURE — 1159F PR MEDICATION LIST DOCUMENTED IN MEDICAL RECORD: ICD-10-PCS | Mod: CPTII,95,, | Performed by: INTERNAL MEDICINE

## 2023-12-11 PROCEDURE — 4010F ACE/ARB THERAPY RXD/TAKEN: CPT | Mod: CPTII,95,, | Performed by: INTERNAL MEDICINE

## 2023-12-11 PROCEDURE — 99215 PR OFFICE/OUTPT VISIT, EST, LEVL V, 40-54 MIN: ICD-10-PCS | Mod: 95,,, | Performed by: INTERNAL MEDICINE

## 2023-12-11 PROCEDURE — 3008F BODY MASS INDEX DOCD: CPT | Mod: CPTII,95,, | Performed by: INTERNAL MEDICINE

## 2023-12-11 PROCEDURE — 99215 OFFICE O/P EST HI 40 MIN: CPT | Mod: 95,,, | Performed by: INTERNAL MEDICINE

## 2023-12-11 PROCEDURE — 4010F PR ACE/ARB THEARPY RXD/TAKEN: ICD-10-PCS | Mod: CPTII,95,, | Performed by: INTERNAL MEDICINE

## 2023-12-11 PROCEDURE — 3044F HG A1C LEVEL LT 7.0%: CPT | Mod: CPTII,95,, | Performed by: INTERNAL MEDICINE

## 2023-12-11 PROCEDURE — 1159F MED LIST DOCD IN RCRD: CPT | Mod: CPTII,95,, | Performed by: INTERNAL MEDICINE

## 2023-12-11 PROCEDURE — 3008F PR BODY MASS INDEX (BMI) DOCUMENTED: ICD-10-PCS | Mod: CPTII,95,, | Performed by: INTERNAL MEDICINE

## 2023-12-11 RX ORDER — CHOLESTYRAMINE 4 G/9G
4 POWDER, FOR SUSPENSION ORAL
Qty: 60 PACKET | Refills: 3 | Status: SHIPPED | OUTPATIENT
Start: 2023-12-11 | End: 2024-12-10

## 2023-12-11 NOTE — TELEPHONE ENCOUNTER
----- Message from Inocente Roe MD sent at 12/11/2023  8:53 AM CST -----  Arrange 4-6 week follow up

## 2023-12-11 NOTE — PROGRESS NOTES
The patient location is: home  The chief complaint leading to consultation is: diarrhea    Visit type: audiovisual    Face to Face time with patient: 22min  52 minutes of total time spent on the encounter, which includes face to face time and non-face to face time preparing to see the patient (eg, review of tests), Obtaining and/or reviewing separately obtained history, Documenting clinical information in the electronic or other health record, Independently interpreting results (not separately reported) and communicating results to the patient/family/caregiver, or Care coordination (not separately reported).         Each patient to whom he or she provides medical services by telemedicine is:  (1) informed of the relationship between the physician and patient and the respective role of any other health care provider with respect to management of the patient; and (2) notified that he or she may decline to receive medical services by telemedicine and may withdraw from such care at any time.       GASTROENTEROLOGY CLINIC NOTE    Reason for visit: The primary encounter diagnosis was Irritable bowel syndrome with diarrhea. Diagnoses of Nausea, Change in bowel habit, and NSAID long-term use were also pertinent to this visit.  Referring provider/PCP: Mata Valencia MD    HPI:  April Wendt Schamberger is a 38 y.o. female here today for diarrhea, new to me.    Ongoing symptoms for years, at least more than 10 years.  Has had numerous workups by multiple GI doctors.  This is my 1st visit with her.  She has approximately 8-10 stools a day, most of them do seem to be nocturnal.  She is currently on her extensive regimen as detailed below that does seem to control her diarrhea, however maybe a few times a month she will have a flare that will be despite her current bowel movements.  No blood. No pus.  Has tried numerous medications. None seem to be effective.    When asked about hyperdefecation, she does feel like at  times she will have a symptom like dry heaving of the rectum.  Regimen  AM -  8 immodium in AM, with 3 lomotil  PM - 4 immodium and 2 lomotil     Treatments: Metamucil, budesonide, Viberzi, Xifaxan, Imodium. Lomotil, Amitriptyline   Stool studies done 2022 Negative        Prior Upper Endoscopy: 4/2017  Findings: The esophagus was normal. A few dispersed, 5 mm non-bleeding erosions were found in the gastric body and antrum with superficial, linear ulceration. There   were no stigmata of recent bleeding. Biopsies were taken with a cold forceps for histology. Verification of patient identification for the specimen was done by the nurse using the patient's name and birth date. Estimated blood loss was minimal. Localized mildly erythematous mucosa without active bleeding and with no stigmata of bleeding was found in the duodenal bulb. Biopsies for histology were taken with a cold forceps for evaluation of celiac disease. Verification of patient identification for the specimen was done by the nurse using the patient's name and birth date. Estimated blood loss was minimal.      Impression:   - Normal esophagus.                         - Non-bleeding erosive gastropathy. Biopsied.                         - Erythematous duodenopathy. Biopsied.     Recommendation:  - Discharge patient to home.                         - Resume previous diet.                         - Continue present medications.                         - Await pathology results.      Pathology:  1. Small bowel (biopsy):   -Duodenal mucosa with no significant histopathologic change   -Normal villus architecture   -No increase in intraepithelial lymphocytes or expansion of villi with plasma cells   -Negative for dysplasia or malignancy      2. Gastric biopsy:   -Mild chronic inflammation in background of reactive change   -No Helicobacter organisms identified on H&E   -Negative for intestinal metaplasia, dysplasia or malignancy     Prior Colonoscopy: 1/2022  with Dr. Roe  Impression:            - Hemorrhoids found on perianal exam.                          - The entire examined colon is normal. Biopsied.                          - The examined portion of the ileum was normal.                          - The examination was otherwise normal on direct                          and retroflexion views.     Recommendation:                              - Resume previous diet.                          - Continue present medications.                          - Await pathology results.                          - Repeat colonoscopy in 10 years for screening                          purposes.                          - Return to GI office after studies are complete.                          - Would consider checking full stool studies                          including calprotectin. Consider small bowel video                          capsule if there remains concern about Crohns                          disease, but exam today does not find any                          suggestion of Crohns in the colon and good look                          into TI. Consider further treatment for IBS-D      Pathology:  COLON BIOPSIES:   NO SIGNIFICANT HISTOLOGIC ALTERATION   NO COLITIS IDENTIFIED      4/2017  Findings: The perianal and digital rectal examinations were normal. The colon (entire examined portion) appeared normal. Biopsies for histology were taken with a cold forceps from the ascending colon, transverse colon, descending colon and sigmoid colon for evaluation of microscopic colitis. Verification of patient identification for the specimen was done by the nurse using the patient's birth date and medical record number. Estimated blood loss was minimal. The terminal ileum contained multiple five mm ulcers. No bleeding was present. No stigmata of recent bleeding were seen. Biopsies were taken with a cold forceps for histology. Verification of patient identification for the specimen was  done by the nurse using the patient's name and birth date. Estimated blood loss was minimal.      Impression:  - The entire examined colon is normal. Biopsied.                         - Multiple ulcers in the terminal ileum. Biopsied.      Recommendation:       - Discharge patient to home (via wheelchair).                         - Resume previous diet.                         - Continue present medications.                         - Await pathology results.                         - Repeat colonoscopy at age 48 for screening purposes.      Pathology:  3. Biopsy terminal ileum:   -Ileitis with detached ulcer debris   -Prominent lymphoid aggregates   -Negative for dysplasia or malignancy -See note      4. Random colon biopsies:   -Benign colonic mucosa with patchy lymphoid aggregates   -No acute or microscopic colitis -Negative for dysplasia or malignancy    (Portions of this note were dictated using voice recognition software and may contain dictation related errors in spelling/grammar/syntax not found on text review)    Review of Systems   Constitutional:  Negative for fever and malaise/fatigue.   Respiratory:  Negative for cough and shortness of breath.    Cardiovascular:  Negative for chest pain and palpitations.   Gastrointestinal:  Positive for diarrhea. Negative for abdominal pain, blood in stool, nausea and vomiting.   Neurological:  Negative for dizziness and headaches.       Past Medical History: has a past medical history of Abnormal Pap smear of cervix, ADD (attention deficit disorder), Breast disorder, Esophageal reflux, Fibroids, Hypertension, IBS (irritable bowel syndrome), Insomnia, Kidney stones, Mastocytosis, Relies on partner vasectomy for contraception, and Upper GI bleed.    Past Surgical History: has a past surgical history that includes TONSILLECTOMY, ADENOIDECTOMY (1988); Esophagogastroduodenoscopy (2012); Colonoscopy (N/A, 04/28/2017); LASIK (Bilateral, 2005); Refractive surgery; Vaginal  delivery; Epidural steroid injection into cervical spine (N/A, 12/01/2020); Epidural steroid injection into cervical spine (N/A, 12/02/2021); Colonoscopy (N/A, 01/26/2022); and Ulnar tunnel release (Right, 07/08/2022).    Family History:family history includes Breast cancer (age of onset: 47) in her mother; Breast cancer (age of onset: 80) in her paternal grandmother; Cancer in her cousin and cousin; Cancer (age of onset: 31) in her cousin; Deep vein thrombosis in her father; Diabetes in her father; Diabetes type II in her paternal grandfather; Heart attack in her maternal grandmother; Hypertension in her father and mother; Ovarian cancer in her maternal aunt; Pancreatic cancer in her maternal grandfather; Prostate cancer in her father.    Allergies:   Review of patient's allergies indicates:   Allergen Reactions    Lactose     Azithromycin Nausea And Vomiting       Social History: reports that she quit smoking about 9 years ago. Her smoking use included cigarettes. She has never used smokeless tobacco. She reports that she does not drink alcohol and does not use drugs.    Home medications:   Current Outpatient Medications on File Prior to Visit   Medication Sig Dispense Refill    acetaminophen (TYLENOL) 500 MG tablet Take 2 tablets (1,000 mg total) by mouth 3 (three) times daily as needed for Pain.  0    amitriptyline (ELAVIL) 25 MG tablet Take 1 tablet (25 mg total) by mouth every evening. 90 tablet 3    butalbital-acetaminophen-caffeine -40 mg (FIORICET, ESGIC) -40 mg per tablet Take 1 tablet by mouth every 6 (six) hours as needed for Headaches. 30 tablet 0    chlorhexidine (PERIDEX) 0.12 % solution Swish and spit 15mls by mouth three times daily as directed 473 mL 4    dextroamphetamine-amphetamine (MYDAYIS) 37.5 mg CT24 Take 37.5 mg by mouth once daily. 30 each 0    diphenoxylate-atropine 2.5-0.025 mg (LOMOTIL) 2.5-0.025 mg per tablet Take 1 tablet by mouth 4 (four) times daily as needed for  "Diarrhea (diarrhea). 360 tablet 0    eszopiclone (LUNESTA) 3 mg Tab Take 1 tablet (3 mg total) by mouth nightly as needed (insomnia). 30 tablet 0    fluconazole (DIFLUCAN) 150 MG Tab Take 1 tablet by mouth as needed for yeast infection 30 tablet 0    loperamide (IMODIUM) 1 mg/7.5 mL solution Take 0.1 mg/kg by mouth every 12 (twelve) hours.      loperamide (IMODIUM) 2 mg capsule Take 2 mg by mouth 4 (four) times daily as needed for Diarrhea.      meclizine (ANTIVERT) 25 mg tablet Take 1 tablet (25 mg total) by mouth 3 (three) times daily as needed (motion sickness). 30 tablet 0    metoprolol succinate (TOPROL-XL) 50 MG 24 hr tablet Take 1 tablet (50 mg total) by mouth 2 (two) times daily. 180 tablet 3    pantoprazole (PROTONIX) 40 MG tablet Take 1 tablet (40 mg total) by mouth once daily. 30 tablet 2    pregabalin (LYRICA) 50 MG capsule Take 1 capsule (50 mg total) by mouth 2 (two) times daily. 60 capsule 2    tiZANidine (ZANAFLEX) 4 MG tablet Take 1 tablet (4 mg total) by mouth 3 (three) times daily as needed. 90 tablet 1    tretinoin (RETIN-A) 0.025 % cream Apply topically every evening. Pea sized amount to entire face. If irritation occurs, decrease use to every other night. 20 g 5    valsartan (DIOVAN) 320 MG tablet Take 1 tablet (320 mg total) by mouth once daily. 90 tablet 3    venlafaxine (EFFEXOR-XR) 37.5 MG 24 hr capsule Take 2 capsules (75 mg total) by mouth once daily. 60 capsule 3    rizatriptan (MAXALT-MLT) 10 MG disintegrating tablet Take ½ tablet (5 mg total) by mouth as needed for Migraine (repeat dose once in 2 hours if headache return.). 9 tablet 6     Current Facility-Administered Medications on File Prior to Visit   Medication Dose Route Frequency Provider Last Rate Last Admin    0.9%  NaCl infusion  500 mL Intravenous Continuous Corky Argueta Jr., MD           Vital signs:  Ht 5' 8" (1.727 m)   Wt 86 kg (189 lb 9.5 oz)   BMI 28.83 kg/m²     Physical Exam  Vitals reviewed.   Constitutional: "       Appearance: She is not toxic-appearing.   Neurological:      Mental Status: She is alert.         I have reviewed prior labs, imaging, notes from last month    Assessment:  1. Irritable bowel syndrome with diarrhea    2. Nausea    3. Change in bowel habit    4. NSAID long-term use      Working Dx is IBS-D.  EXTENSIVE prior eval, I have reviewed this, including negative MRE , numerous endoscopy, including VCE And colonoscopy, negative.  One colonoscopy did have ulcers, path not entirely c/w crohns disease.  She has seen our IBD clinic in the past. The impression at that time was possible crohns disease, but she was using heavy NSAIDs and thus they wanted to stop all NSAIDs and then do colonoscopy and VCE at that time. This was never done.    Numerous BMs, chronic diarrhea ongoing for years, with intermittent flares, extensive prior eval  Calpro and other stool testing negative    Interesting, she has actually gained about 30 lbs over past 2-3 years, despite having severe diarrhea.    At this time, I think we should still consider a diagnosis of crohns disease and we may need to offer another VCE down the road.   She also has symptoms that dont fit with pathologic diarrhea, including wt gain of about 20-30lbs over last few years  I do wonder if this is more a hyperdefecation type syndrome.    Plan:  Orders Placed This Encounter    cholestyramine (QUESTRAN) 4 gram packet     Trial questran powder.    Continue current regimen as that seems to be helping but would like to tailor that in future    Refer to GI dietician    May need to consider a hyperdefecation disorder, consider ARM vs. Defecogram and pelvic PT  May consider VCE off nsaids down the road    RTC 6 weeks to review     Inocente Roe MD  [facility]    A total of 52 minutes were spent during this encounter including reviewing records, interviewing, examining patient, and counseling / coordination of care.

## 2024-01-01 ENCOUNTER — PATIENT MESSAGE (OUTPATIENT)
Dept: ADMINISTRATIVE | Facility: OTHER | Age: 39
End: 2024-01-01

## 2024-01-01 ENCOUNTER — PATIENT MESSAGE (OUTPATIENT)
Dept: PSYCHIATRY | Facility: CLINIC | Age: 39
End: 2024-01-01

## 2024-01-06 DIAGNOSIS — R51.9 NONINTRACTABLE HEADACHE, UNSPECIFIED CHRONICITY PATTERN, UNSPECIFIED HEADACHE TYPE: ICD-10-CM

## 2024-01-06 DIAGNOSIS — F51.01 PRIMARY INSOMNIA: ICD-10-CM

## 2024-01-06 DIAGNOSIS — K21.9 GASTROESOPHAGEAL REFLUX DISEASE, UNSPECIFIED WHETHER ESOPHAGITIS PRESENT: ICD-10-CM

## 2024-01-07 NOTE — TELEPHONE ENCOUNTER
No care due was identified.  [facility] Embedded Care Due Messages. Reference number: [id number].   1/06/2024 9:53:27 PM CST

## 2024-01-08 RX ORDER — PANTOPRAZOLE SODIUM 40 MG/1
40 TABLET, DELAYED RELEASE ORAL DAILY
Qty: 30 TABLET | Refills: 2 | Status: SHIPPED | OUTPATIENT
Start: 2024-01-08 | End: 2024-04-07

## 2024-01-08 RX ORDER — BUTALBITAL, ACETAMINOPHEN AND CAFFEINE 50; 325; 40 MG/1; MG/1; MG/1
1 TABLET ORAL EVERY 6 HOURS PRN
Qty: 30 TABLET | Refills: 0 | Status: SHIPPED | OUTPATIENT
Start: 2024-01-08

## 2024-01-08 RX ORDER — ESZOPICLONE 3 MG/1
3 TABLET, FILM COATED ORAL NIGHTLY PRN
Qty: 30 TABLET | Refills: 0 | Status: SHIPPED | OUTPATIENT
Start: 2024-01-08 | End: 2024-02-09 | Stop reason: SDUPTHER

## 2024-01-08 NOTE — TELEPHONE ENCOUNTER
Lunesta last filled 30 pills 12/7/23  Butalbital last filled 30 pills 11/7/23    Lov 8/10/23 annual.

## 2024-01-15 DIAGNOSIS — M62.838 MUSCLE SPASM: ICD-10-CM

## 2024-01-15 DIAGNOSIS — M79.18 MYOFASCIAL PAIN SYNDROME, CERVICAL: ICD-10-CM

## 2024-01-16 RX ORDER — TIZANIDINE 4 MG/1
4 TABLET ORAL 3 TIMES DAILY PRN
Qty: 90 TABLET | Refills: 1 | Status: SHIPPED | OUTPATIENT
Start: 2024-01-16 | End: 2024-03-16

## 2024-01-22 RX ORDER — DEXTROAMPHETAMINE SACCHARATE, AMPHETAMINE ASPARTATE, DEXTROAMPHETAMINE SULFATE AND AMPHETAMINE SULFATE 3.75; 3.75; 3.75; 3.75 MG/1; MG/1; MG/1; MG/1
15 TABLET ORAL DAILY
Qty: 30 TABLET | Refills: 0 | Status: SHIPPED | OUTPATIENT
Start: 2024-01-22 | End: 2024-02-26 | Stop reason: SDUPTHER

## 2024-02-08 ENCOUNTER — PATIENT MESSAGE (OUTPATIENT)
Dept: INTERNAL MEDICINE | Facility: CLINIC | Age: 39
End: 2024-02-08

## 2024-02-08 DIAGNOSIS — F51.01 PRIMARY INSOMNIA: ICD-10-CM

## 2024-02-08 DIAGNOSIS — I10 BENIGN ESSENTIAL HYPERTENSION: ICD-10-CM

## 2024-02-09 RX ORDER — ESZOPICLONE 3 MG/1
3 TABLET, FILM COATED ORAL NIGHTLY PRN
Qty: 30 TABLET | Refills: 0 | Status: SHIPPED | OUTPATIENT
Start: 2024-02-09 | End: 2024-03-05

## 2024-02-09 RX ORDER — VALSARTAN 320 MG/1
320 TABLET ORAL DAILY
Qty: 90 TABLET | Refills: 3 | Status: SHIPPED | OUTPATIENT
Start: 2024-02-09

## 2024-02-09 NOTE — TELEPHONE ENCOUNTER
No care due was identified.  [facility] Embedded Care Due Messages. Reference number: [id number].   2/09/2024 8:47:33 AM CST

## 2024-02-09 NOTE — TELEPHONE ENCOUNTER
Refill Routing Note   Medication(s) are not appropriate for processing by [facility] for the following reason(s):        Outside of protocol  Drug-disease interaction: valsartan and Chronic hypertension in pregnancy     [facility] action(s):  Defer  Route      Medication Therapy Plan:       Pharmacist review requested: Yes     Appointments  past 12m or future 3m with PCP    Date Provider   Last Visit   8/10/2023 Mata Valencia MD   Next Visit   Visit date not found Mata Valencia MD   ED visits in past 90 days: 0        Note composed:8:52 AM 02/09/2024

## 2024-02-09 NOTE — TELEPHONE ENCOUNTER
Refill Routing Note   Medication(s) are not appropriate for processing by [facility] for the following reason(s):        Outside of protocol  Drug-disease interaction    [facility] action(s):  Defer  Route             Pharmacist review requested: Yes     Appointments  past 12m or future 3m with PCP    Date Provider   Last Visit   8/10/2023 Mata Valencia MD   Next Visit   Visit date not found Mata Valencia MD   ED visits in past 90 days: 0        Note composed:12:01 PM 02/09/2024

## 2024-02-13 ENCOUNTER — PATIENT MESSAGE (OUTPATIENT)
Dept: PSYCHIATRY | Facility: CLINIC | Age: 39
End: 2024-02-13

## 2024-02-19 ENCOUNTER — PATIENT MESSAGE (OUTPATIENT)
Dept: PSYCHIATRY | Facility: CLINIC | Age: 39
End: 2024-02-19

## 2024-02-19 DIAGNOSIS — K58.0 IRRITABLE BOWEL SYNDROME WITH DIARRHEA: ICD-10-CM

## 2024-02-19 RX ORDER — DIPHENOXYLATE HYDROCHLORIDE AND ATROPINE SULFATE 2.5; .025 MG/1; MG/1
1 TABLET ORAL 4 TIMES DAILY PRN
Qty: 360 TABLET | Refills: 0 | Status: SHIPPED | OUTPATIENT
Start: 2024-02-19 | End: 2024-04-15 | Stop reason: SDUPTHER

## 2024-02-19 RX ORDER — DEXTROAMPHETAMINE SACCHARATE, AMPHETAMINE ASPARTATE MONOHYDRATE, DEXTROAMPHETAMINE SULFATE, AMPHETAMINE SULFATE 9.375; 9.375; 9.375; 9.375 MG/1; MG/1; MG/1; MG/1
37.5 CAPSULE, EXTENDED RELEASE ORAL DAILY
Qty: 30 EACH | Refills: 0 | Status: SHIPPED | OUTPATIENT
Start: 2024-02-19 | End: 2024-03-25 | Stop reason: SDUPTHER

## 2024-02-23 ENCOUNTER — PATIENT MESSAGE (OUTPATIENT)
Dept: PSYCHIATRY | Facility: CLINIC | Age: 39
End: 2024-02-23

## 2024-02-24 ENCOUNTER — PATIENT MESSAGE (OUTPATIENT)
Dept: GASTROENTEROLOGY | Facility: CLINIC | Age: 39
End: 2024-02-24

## 2024-02-26 DIAGNOSIS — F32.81 PMDD (PREMENSTRUAL DYSPHORIC DISORDER): ICD-10-CM

## 2024-02-26 RX ORDER — DEXTROAMPHETAMINE SACCHARATE, AMPHETAMINE ASPARTATE, DEXTROAMPHETAMINE SULFATE AND AMPHETAMINE SULFATE 3.75; 3.75; 3.75; 3.75 MG/1; MG/1; MG/1; MG/1
15 TABLET ORAL DAILY
Qty: 30 TABLET | Refills: 0 | Status: SHIPPED | OUTPATIENT
Start: 2024-02-26 | End: 2024-04-13 | Stop reason: SDUPTHER

## 2024-02-26 RX ORDER — VENLAFAXINE HYDROCHLORIDE 37.5 MG/1
75 CAPSULE, EXTENDED RELEASE ORAL DAILY
Qty: 60 CAPSULE | Refills: 3 | Status: SHIPPED | OUTPATIENT
Start: 2024-02-26 | End: 2024-06-25

## 2024-03-04 DIAGNOSIS — F51.01 PRIMARY INSOMNIA: ICD-10-CM

## 2024-03-05 RX ORDER — ESZOPICLONE 3 MG/1
3 TABLET, FILM COATED ORAL NIGHTLY PRN
Qty: 30 TABLET | Refills: 0 | Status: SHIPPED | OUTPATIENT
Start: 2024-03-05 | End: 2024-04-10 | Stop reason: SDUPTHER

## 2024-03-05 NOTE — TELEPHONE ENCOUNTER
No care due was identified.  [facility] Embedded Care Due Messages. Reference number: [id number].   3/04/2024 6:55:26 PM CST

## 2024-03-16 ENCOUNTER — PATIENT MESSAGE (OUTPATIENT)
Dept: INTERNAL MEDICINE | Facility: CLINIC | Age: 39
End: 2024-03-16

## 2024-03-16 DIAGNOSIS — R00.0 TACHYCARDIA: ICD-10-CM

## 2024-03-16 DIAGNOSIS — I10 ESSENTIAL (PRIMARY) HYPERTENSION: ICD-10-CM

## 2024-03-18 RX ORDER — METOPROLOL SUCCINATE 50 MG/1
50 TABLET, EXTENDED RELEASE ORAL 2 TIMES DAILY
Qty: 180 TABLET | Refills: 1 | Status: SHIPPED | OUTPATIENT
Start: 2024-03-18

## 2024-03-18 NOTE — PATIENT INSTRUCTIONS
Refill Decision Note   April Schamberger  is requesting a refill authorization.  Brief Assessment and Rationale for Refill:  Approve     Medication Therapy Plan: Rx sent to pharmacy requested via patient portal.      Comments:     Note composed:1:33 PM 03/18/2024

## 2024-03-18 NOTE — TELEPHONE ENCOUNTER
No care due was identified.  [facility] Embedded Care Due Messages. Reference number: [id number].   3/18/2024 9:29:12 AM CDT

## 2024-03-25 RX ORDER — DEXTROAMPHETAMINE SACCHARATE, AMPHETAMINE ASPARTATE MONOHYDRATE, DEXTROAMPHETAMINE SULFATE, AMPHETAMINE SULFATE 9.375; 9.375; 9.375; 9.375 MG/1; MG/1; MG/1; MG/1
37.5 CAPSULE, EXTENDED RELEASE ORAL DAILY
Qty: 30 EACH | Refills: 0 | Status: SHIPPED | OUTPATIENT
Start: 2024-03-25 | End: 2024-04-25 | Stop reason: SDUPTHER

## 2024-04-01 ENCOUNTER — OFFICE VISIT (OUTPATIENT)
Dept: PAIN MEDICINE | Facility: CLINIC | Age: 39
End: 2024-04-01

## 2024-04-01 VITALS
SYSTOLIC BLOOD PRESSURE: 128 MMHG | HEIGHT: 68 IN | WEIGHT: 200.94 LBS | HEART RATE: 91 BPM | BODY MASS INDEX: 30.45 KG/M2 | DIASTOLIC BLOOD PRESSURE: 83 MMHG

## 2024-04-01 DIAGNOSIS — M54.2 NECK PAIN: ICD-10-CM

## 2024-04-01 DIAGNOSIS — M79.18 MYOFASCIAL PAIN SYNDROME, CERVICAL: ICD-10-CM

## 2024-04-01 DIAGNOSIS — M62.838 MUSCLE SPASM: Primary | ICD-10-CM

## 2024-04-01 DIAGNOSIS — M54.2 CERVICAL PAIN (NECK): ICD-10-CM

## 2024-04-01 DIAGNOSIS — M50.30 DDD (DEGENERATIVE DISC DISEASE), CERVICAL: ICD-10-CM

## 2024-04-01 PROCEDURE — 99999 PR PBB SHADOW E&M-EST. PATIENT-LVL IV: CPT | Mod: PBBFAC,,, | Performed by: NURSE PRACTITIONER

## 2024-04-01 PROCEDURE — 99214 OFFICE O/P EST MOD 30 MIN: CPT | Mod: PBBFAC,PO | Performed by: NURSE PRACTITIONER

## 2024-04-01 PROCEDURE — 99214 OFFICE O/P EST MOD 30 MIN: CPT | Mod: S$PBB,,, | Performed by: NURSE PRACTITIONER

## 2024-04-01 RX ORDER — TIZANIDINE 4 MG/1
4 TABLET ORAL 3 TIMES DAILY
Qty: 90 TABLET | Refills: 2 | Status: SHIPPED | OUTPATIENT
Start: 2024-04-01 | End: 2024-06-04

## 2024-04-01 NOTE — PROGRESS NOTES
Chronic Pain  patient Established Note (Follow up visit)        SUBJECTIVE:  Chief Complaint   Patient presents with    Medication Refill       Interval history 04/01/2024:  38 the patient that presents today for a follow-up appointment complaining of returning neck pain right greater than left today she denies any radiating symptoms denies any recent incident or trauma denies any paresthesia like symptoms with regard to her pain.  She also denies any profound weakness.  Her description of pain is tight and knots in the cervical paraspinal right greater than left.  Typically I would provide her trigger point injections however today we will refill her tizanidine 4 mg t.i.d. which in the past has helped her symptoms.        Interval History  (01/30/2023)   April Wendt Schamberger returns today for follow up for returning neck pain R>L , she is denying an radiating symptoms, denies any paraesthesia symptoms, denies any profound weakness in either arm.  Currently, the neck pain is worse.        Current Pain Scales:  Current: 5/10              Typical Range: 5-6/10     Interval History 05/18/2022 - Ms. Schamberger is following up for No chief complaint on file..  She requests a refill of Tramadol 50 mg which are  working well to control Her pain.  She reports 50-60% relief with the current medication regimen.  Medication side effects: none.  Pain is currently rate 7/10 with a weekly range 6-9/10.  It is described as Aching.   Pain is worsened by: nothing in particular and improved by: medications.  She reports ability to function (work & home life) with use of 2 tablets of tramadol/day.  She uses tizanidine at night, which is effective, but sedation limits use to night time only.  She met with Dr. Reed and Dr. Block.  Health back is starting soon.  EMG is pending for later this month.  F/U with Dr. Reed is scheduled  as well.     - Ms. Schamberger is following up for No chief complaint on file.  Pain is currently rate 7/10 with a weekly range 7-8/10.   37-year-old pleasant female she has established our office she has a history of chronic neck pain with recent symptoms of right hand numbness.  She also has pain in the forearm on the right side.  The right 4th and 5th digits are affected consistently.  She has numbness at night she has had cervical LAZ as well as trigger point injection but they have not been sustainable.  She has attempted physical therapy 2 years ago.  She reports having seen Neurosurgery EMG has been ordered by Dr. Reed he is also referred her to physical therapy her primary care has referred her to the healthy neck and Back program within [facility].  Today she is requesting a refill of tramadol 50 mg b.i.d. the original prescription was provided to her by her PCP/ Dr. Valencia he directed her to return to PM for refills,  she states it is helping her neuropathic symptoms and pain overall dropped her pain score to a 3/10.     12/29/2021 -Schamberger returns to clinic s/p GHASSAN on 12/2/21 with 50% relief of her numbness and tingling in her right arm.  She reports continued tingling and numbness in the right pinky and right ring finger as well as the right arm this occurs typically she falls asleep and lays on her right side she reports that her pain is still there especially with movement her pain starts in the neck radiates into her mid shoulders and shoulder blades pain is described as achy sharp and stabbing.  She denies any profound weakness, denies any bowel bladder dysfunction denies any recent incident or trauma since her last clinic visit with our office.  Although the injection helped she says did not provide her with significant relief.  She reports a pain intensity 6/10 today with a weekly range of 6-7/10.     11/24/2021 - Schamberger returns to clinic s/p TPI on 10/05/21 with no relief.  She  reports a pain intensity 6/10 today with a weekly range of 4-7/10.  The pain is described as Aching, Tingling and Numb.  Pain is worsened by: extension, flexion, twisting and certain sleeping positions  and improved by: tens unit.  She requests a repeat cervical LAZ, which worked well for her in the past.  She also inquires about medications to help with pain pending completion of the injection.    9/27/2021- 37 y/o female presents requesting cervical TPIs, she has a hx neck and arm pain. She has a hx of cervical myofascial pain, she is established with our office she has been provided TPIs previously with good relief. She endorses being on a cocktail of medications but that they are not effectively treating her pain. She tested positive for COVID On 09/19/2021 (rapid screening)  However she reports to me that she began having symptoms on 09/14 she was symptom-free by 920 and cleared to return to work by [employer] on 09/26/2021.  She stated that she would send me via my chart her clearance note and any other mutation that I would need to clear her for trigger point injections.    12/15/20 - Ms. Schamberger returns to clinic for follow up visit reporting stable neck and arm pain.  Patient is s/p Cervical Epidural Steroid Injection at C7-T1 on 12/1/20 with 40-60% overall relief.  Pain intensity is currently 5/10.  Patient reports her right arm symptoms have improved states she does get mild tingling on and off that radiates in to the right minimal, pinky and ring finger and sometimes radiating into the elbow.  She states flexion causes her worse pain however, lateral, and  flexion  movements have improved since the injection.  She also mentioned that she only took Lyrica 25 mg b.i.d. instead of t.i.d she reported no adverse SEs while taking this medicine.  She discontinued her Voltaren tablets due to burning in her stomach.  She states she is taking Tylenol 1000 mg b.i.d. as needed for pain which she states is  bonnie.    11/16/20 - Ms. Schamberger returns to clinic for follow up visit reporting worse neck and arm pain.  Patient is s/p TPI on 6/30/20 with 50% relief.  Pain intensity is currently 7/10.   April presents with returning neck, thoracic and bilateral arm pain right greater than left., pain is described as aching dull throbbing with some numbness in her right arm she reports no shooting pain in either arm she denies any profound weakness and denies dropping things.  She did report numbness in her right arm. I discussed with patient that I will provide her with trigger point injections today to help with myofascial pain and  I will also start her on a cocktail of medications to help with I think is neuropathic symptoms in her arm, she is currently taking tizanidine 4 mg nightly, so I will at Lyrica 25 mg t.i.d.,  diclofenac 50 mg b.i.d. as needed for pain, and Tylenol 1000 mg t.i.d. in effort to provide her with some relief.  I discussed with her if she had any side effects with these medications that she should discontinue immediately.  I will have them sent to the [facility] pharmacy.    6/30/20 Pt returns for bilateral chronic neck pain, patient is established our office she has been given cervical paraspinal trigger point injection in the past which has helped alleviate her pain for greater than 6 months.  She is requesting repeat trigger point injections today.    April Wendt Schamberger presents to the clinic for a follow-up appointment for Cervical TPIs today.   Since the last visit, April Wendt Schamberger states the pain has been persistant. Current pain intensity is 6/10.    Pain Disability Index Review:      4/1/2024     9:42 AM 1/30/2023     3:33 PM 5/18/2022    10:21 AM   Last 3 PDI Scores   Pain Disability Index (PDI) 21 18 45     Pain Medications:  - Opioids: None  - Adjuvant Medications: Advil,Motrin ( Ibuprofen)  - Anti-Coagulants: None  - Others: see medications list.     Opioid Contract:  no      report:  Reviewed and consistent with medication use as prescribed.     Pain Procedures:              2021 cervical LAZ-induced 50% of the numbness and tingling   2020 - GHASSAN @ C7-T1 with 40-60% relief   17 TRIGGER POINT INJECTION   17 TRIGGER POINT INJECTION   17 bilateral C4,5,6 CERVICAL FACET MEDIAL BRANCH NERVE BLOCK (Prone) ( after xray correlation with pain level, decsion was made to proceed at C4,5,6 levels, patient agrees to proceed      Physical Therapy/Home Exercise: no     Imagin17 MRI Cervical Spine Without Contrast  Narrative   Technique: Multiplanar, multisequence MR imaging of the cervical spine obtained without the use of IV contrast.     Comparison: Cervical spine radiographs 12/15/2016.     Results:    There is straightening with mild reversal of the normal cervical lordosis. Vertebral body heights are maintained with no evidence of fracture. Mild intervertebral disc space narrowing and desiccation are visualized at C5-6 and C6-7. No marrow signal abnormality suspicious for an infiltrative process.      The cervical cord is normal in caliber and signal characteristics.  The craniocervical junction and visualized intracranial contents are unremarkable.  The adjacent soft tissue structures show no significant abnormalities.      C2-C3:  No significant spinal canal or neural foraminal narrowing.    C3-C4: No significant spinal canal or neural foraminal narrowing.    C4-C5:  No significant spinal canal or neural foraminal narrowing.    C5-C6:  Disc bulge with right uncovertebral spurring. Findings result in mild spinal canal stenosis and mild right neural foraminal narrowing.    C6-C7:  Mild disc bulge with thickening of the ligamentum flavum results in mild spinal canal stenosis.No significant neuroforaminal narrowing.    C7-T1:   No significant central spinal or neural foraminal narrowing.   Impression      Mild disc bulge and spinal canal stenosis at the  C5-6 and C6-7 levels.      Electronically signed by: UMER MARTIN MD  Date: 01/26/17  Time: 12:56     Encounter   View Encounter      Reviewed By   Hunter Jimenez MD on 1/27/2017  2:18 PM   Exam Details                     Performed Procedure Technologist Supporting Staff Performing Physician   MRI Cervical Spine Without Contrast Andre Hinds, RT         Appointment Date/Status Modality Department        1/26/2017     Completed [facility] MRI1 [facility] MRI       Begin Exam End Exam Begin Exam Questionnaires End Exam Questionnaires     1/26/2017 11:22 AM 1/26/2017 11:59 AM MRI TECH NAVIGATOR QUESTIONS IMAGING END ALL         RIS PREGNANCY TECH NAVIGATOR          X-Ray Cervical Spine AP Lat with Flexion  Extension 12/15/16  Narrative     Cervical spine radiographs     comparison: None    Results: AP, lateral, flexion and extension views  .  The alignment is normal, vertebral body height and disk spaces are well-maintained.    Flexion and extension views demonstrate no translational abnormalities.  Very small anterior osteophyte noted at C5-C6.  No fracture or osseous lesion seen.Prevertebral soft tissues appear normal.   Impression    No significant abnormality seen      Electronically signed by: JOSE A JENKINS MD  Date: 12/15/16  Time: 10:52          Allergies:   Review of patient's allergies indicates:   Allergen Reactions    Lactose     Azithromycin Nausea And Vomiting       Current Medications:   Current Outpatient Medications   Medication Sig Dispense Refill    acetaminophen (TYLENOL) 500 MG tablet Take 2 tablets (1,000 mg total) by mouth 3 (three) times daily as needed for Pain.  0    amitriptyline (ELAVIL) 25 MG tablet Take 1 tablet (25 mg total) by mouth every evening. 90 tablet 3    butalbital-acetaminophen-caffeine -40 mg (FIORICET, ESGIC) -40 mg per tablet Take 1 tablet by mouth every 6 (six) hours as needed for Headaches. 30 tablet 0    chlorhexidine (PERIDEX) 0.12 % solution Swish and spit  15mls by mouth three times daily as directed 473 mL 4    cholestyramine (QUESTRAN) 4 gram packet Take 1 packet (4 g total) by mouth 3 (three) times daily with meals. 60 packet 3    dextroamphetamine-amphetamine (ADDERALL) 15 mg tablet Take 1 tablet (15 mg total) by mouth once daily. 30 tablet 0    dextroamphetamine-amphetamine (MYDAYIS) 37.5 mg CT24 Take 37.5 mg by mouth once daily. 30 each 0    diphenoxylate-atropine 2.5-0.025 mg (LOMOTIL) 2.5-0.025 mg per tablet Take 1 tablet by mouth 4 (four) times daily as needed for Diarrhea (diarrhea). 360 tablet 0    eszopiclone (LUNESTA) 3 mg Tab TAKE 1 TABLET (3 MG TOTAL) BY MOUTH NIGHTLY AS NEEDED (INSOMNIA). 30 tablet 0    fluconazole (DIFLUCAN) 150 MG Tab Take 1 tablet by mouth as needed for yeast infection 30 tablet 0    loperamide (IMODIUM) 1 mg/7.5 mL solution Take 0.1 mg/kg by mouth every 12 (twelve) hours.      loperamide (IMODIUM) 2 mg capsule Take 2 mg by mouth 4 (four) times daily as needed for Diarrhea.      meclizine (ANTIVERT) 25 mg tablet Take 1 tablet (25 mg total) by mouth 3 (three) times daily as needed (motion sickness). 30 tablet 0    metoprolol succinate (TOPROL-XL) 50 MG 24 hr tablet Take 1 tablet (50 mg total) by mouth 2 (two) times daily. 180 tablet 1    pantoprazole (PROTONIX) 40 MG tablet Take 1 tablet (40 mg total) by mouth once daily. 30 tablet 2    pregabalin (LYRICA) 50 MG capsule Take 1 capsule (50 mg total) by mouth 2 (two) times daily. 60 capsule 2    tretinoin (RETIN-A) 0.025 % cream Apply topically every evening. Pea sized amount to entire face. If irritation occurs, decrease use to every other night. 20 g 5    valsartan (DIOVAN) 320 MG tablet Take 1 tablet (320 mg total) by mouth once daily. 90 tablet 3    venlafaxine (EFFEXOR-XR) 37.5 MG 24 hr capsule Take 2 capsules (75 mg total) by mouth once daily. 60 capsule 3    rizatriptan (MAXALT-MLT) 10 MG disintegrating tablet Take ½ tablet (5 mg total) by mouth as needed for Migraine (repeat  dose once in 2 hours if headache return.). 9 tablet 6     No current facility-administered medications for this visit.     Facility-Administered Medications Ordered in Other Visits   Medication Dose Route Frequency Provider Last Rate Last Admin    0.9%  NaCl infusion  500 mL Intravenous Continuous Corky Argueta Jr., MD           REVIEW OF SYSTEMS:    GENERAL:  No weight loss, malaise or fevers.  HEENT:  Negative for frequent or significant headaches. + HAs   NECK:  Negative for lumps, goiter, pain and significant neck swelling.  RESPIRATORY:  Negative for cough, wheezing or shortness of breath.  CARDIOVASCULAR:  Negative for chest pain, leg swelling or palpitations.  GI:  Negative for abdominal discomfort, blood in stools or black stools or change in bowel habits.  MUSCULOSKELETAL:  See HPI.  SKIN:  Negative for lesions, rash, and itching.  PSYCH:  Positive for sleep disturbance, mood disorder and recent psychosocial stressors.  HEMATOLOGY/LYMPHOLOGY:  Negative for prolonged bleeding, bruising easily or swollen nodes.  NEURO:   No history of headaches, syncope, paralysis, seizures or tremors.  All other reviewed and negative other than HPI.    Past Medical History:  Past Medical History:   Diagnosis Date    Abnormal Pap smear of cervix 2000    Cryo Done    ADD (attention deficit disorder)     Breast disorder     Breast Cysts    Esophageal reflux     Fibroids     Hypertension     IBS (irritable bowel syndrome)     Insomnia     Kidney stones     Mastocytosis     Relies on partner vasectomy for contraception     Upper GI bleed        Past Surgical History:  Past Surgical History:   Procedure Laterality Date    COLONOSCOPY N/A 04/28/2017    Procedure: COLONOSCOPY;  Surgeon: Bren Larkin MD;  Location: [facility] ENDO;  Service: Endoscopy;  Laterality: N/A;    COLONOSCOPY N/A 01/26/2022    Procedure: COLONOSCOPY;  Surgeon: Inocente Roe MD;  Location: [facility] ENDO;  Service: Endoscopy;  Laterality: N/A;    EPIDURAL  STEROID INJECTION INTO CERVICAL SPINE N/A 12/01/2020    Procedure: Injection-steroid-epidural-cervical--C7-T1;  Surgeon: Corky Argueta Jr., MD;  Location: [facility] PAIN MGT;  Service: Pain Management;  Laterality: N/A;    EPIDURAL STEROID INJECTION INTO CERVICAL SPINE N/A 12/02/2021    Procedure: Cervical Epidural Steroid Injection C7-T1 (No Sedation);  Surgeon: Corky Argueta Jr., MD;  Location: [facility] PAIN MGT;  Service: Pain Management;  Laterality: N/A;    ESOPHAGOGASTRODUODENOSCOPY  2012    LASIK Bilateral 2005    REFRACTIVE SURGERY      TONSILLECTOMY, ADENOIDECTOMY  1988    ULNAR TUNNEL RELEASE Right 07/08/2022    VAGINAL DELIVERY      x 2       Family History:  Family History   Problem Relation Age of Onset    Breast cancer Mother 47        with Metastasis    Hypertension Mother     Prostate cancer Father     Hypertension Father     Diabetes Father     Deep vein thrombosis Father     Pancreatic cancer Maternal Grandfather     Breast cancer Paternal Grandmother 80    Diabetes type II Paternal Grandfather     Heart attack Maternal Grandmother     Cancer Cousin 31    Cancer Cousin         Thurman Syndrome    Cancer Cousin         Thurman Syndrome    Ovarian cancer Maternal Aunt     Lymphoma Neg Hx     Tuberculosis Neg Hx     Celiac disease Neg Hx     Cirrhosis Neg Hx     Colon polyps Neg Hx     Crohn's disease Neg Hx     Cystic fibrosis Neg Hx     Esophageal cancer Neg Hx     Hemochromatosis Neg Hx     Inflammatory bowel disease Neg Hx     Irritable bowel syndrome Neg Hx     Liver cancer Neg Hx     Liver disease Neg Hx     Rectal cancer Neg Hx     Stomach cancer Neg Hx     Ulcerative colitis Neg Hx     Donavon's disease Neg Hx     Amblyopia Neg Hx     Blindness Neg Hx     Cataracts Neg Hx     Glaucoma Neg Hx     Macular degeneration Neg Hx     Retinal detachment Neg Hx     Strabismus Neg Hx        Social History:  Social History     Socioeconomic History    Marital status:     Number of children: 1   Tobacco  Use    Smoking status: Former     Current packs/day: 0.00     Types: Cigarettes     Quit date: 4/1/2014     Years since quitting: 10.0    Smokeless tobacco: Never   Substance and Sexual Activity    Alcohol use: No     Alcohol/week: 0.0 standard drinks of alcohol     Comment: breastfeeding     Drug use: No    Sexual activity: Yes     Partners: Male     Birth control/protection: Partner-Vasectomy     Comment: : Boris   Social History Narrative    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     Social Determinants of Health     Financial Resource Strain: Medium Risk (1/1/2024)    Overall Financial Resource Strain (CARDIA)     Difficulty of Paying Living Expenses: Somewhat hard   Food Insecurity: No Food Insecurity (1/1/2024)    Hunger Vital Sign     Worried About Running Out of Food in the Last Year: Never true     Ran Out of Food in the Last Year: Never true   Transportation Needs: Unmet Transportation Needs (1/1/2024)    PRAPARE - Transportation     Lack of Transportation (Medical): No     Lack of Transportation (Non-Medical): Yes   Physical Activity: Insufficiently Active (1/1/2024)    Exercise Vital Sign     Days of Exercise per Week: 5 days     Minutes of Exercise per Session: 20 min   Stress: Stress Concern Present (1/1/2024)    [employer] [place] - Occupational Stress Questionnaire     Feeling of Stress : Very much   Social Connections: Unknown (1/1/2024)    Social Connection and Isolation Panel [NHANES]     Frequency of Communication with Friends and Family: More than three times a week     Frequency of Social Gatherings with Friends and Family: Twice a week     Active Member of Clubs or Organizations: Yes     Attends Club or Organization Meetings: 1 to 4 times per year     Marital Status:    Housing Stability: Low Risk  (1/1/2024)    Housing Stability Vital Sign     Unable to Pay for Housing in the Last Year: No     Number of Places Lived in the Last Year: 1     Unstable Housing in  "the Last Year: No       OBJECTIVE:    /83   Pulse 91   Ht 5' 8" (1.727 m)   Wt 91.2 kg (200 lb 15.2 oz)   BMI 30.55 kg/m²     PHYSICAL EXAMINATION:    General appearance: Well appearing, in no acute distress, alert and oriented x3.  Psych:  Mood and affect appropriate.  Skin: Skin color, texture, turgor normal, no rashes or lesions, in both upper and lower body.  Head/face:  Atraumatic, normocephalic. No palpable lymph nodes  Neck: neg pain to palpation over the cervical paraspinous muscles. Spurling Negative. + for mild pain with extension   Cor: RRR  Pulm: CTA  GI: Abdomen soft and non-tender.  Back: Straight leg raising in the sitting and supine positions is negative to radicular pain. No pain to palpation over the spine or costovertebral angles. Normal range of motion without pain reproduction.  Extremities: Peripheral joint ROM is full and pain free without obvious instability or laxity in all four extremities. No deformities, edema, or skin discoloration. Good capillary refill.  Musculoskeletal: Shoulder, hip, sacroiliac and knee provocative maneuvers are negative. Bilateral upper and lower extremity strength is normal and symmetric.  No atrophy or tone abnormalities are noted.  Neuro: Bilateral upper and lower extremity coordination and muscle stretch reflexes are physiologic and symmetric.  Plantar response are downgoing. No loss of sensation is noted.  Gait: Normal.    ASSESSMENT: 38 y.o. year old female with  neck and shoulder  pain, consistent with patient is status post cervical LAZ targeting C7-T1 with in overall 40% improvement of her pain patient reporting a discernible difference in her pain relief following this injection and the optimization of certain medications.  Her and I discussed that we could consider repeating the cervical LAZ at the above-mentioned levels but will hold off for now.      09/27/2021-April Wendt Schamberger is a 38 y.o. female who  has a past medical history of " Abnormal Pap smear of cervix (2000), ADD (attention deficit disorder), Breast disorder, Esophageal reflux, Fibroids, Hypertension, IBS (irritable bowel syndrome), Insomnia, Kidney stones, Mastocytosis, Relies on partner vasectomy for contraception, and Upper GI bleed.  By history and examination this patient has chronicneck pain with right radiculopathy in the distribution of C4, C5, C6 and C7.  The underlying cause cause is facet arthritis, degenerative disc disease, foraminal stenosis and muscle dysfunction.  Pathology is confirmed by imaging.  We discussed the underlying diagnoses and multiple treatment options including non-opioid medications, injections, physical therapy, home exercise and activity modification / rest.  The risks and benefits of each treatment option were discussed and all questions were answered.    Mrs. Schamberger tested + COVID on 9/19, her symptoms began on 9/14 she reports being symptom-free on 9-10 and reports being cleared by [facility] on 09/26 she shows no COVID symptoms today, however I recommended that we hold off on injections discussed that our protocol is 2 weeks before injections  following testing + COVID. Recommended she follow up in 5 days for consideration of cervical paraspinal TPIs, pt agreed and understood. Patient to send me updated documentation on her COVID clearance.     11/24/21 - this is a 36-year-old female with chronic neck pain and chronic cervical radiculopathy to the right upper extremity in the distribution of C5, C6, and C7.  This is consistent with previous cervical MRI showing degenerative disease contributing to foraminal and lateral recess stenosis.  She has a positive Spurling's sign on the right side, further supporting this diagnosis.  She has tried trigger point injections, TENS, oral medications, and home exercise.  She would like to repeat the cervical epidural steroid injection that provided her approximately 50% relief from December 2020.  Additionally, I will increase her dose of Lyrica and provide her with a short-term prescription of tramadol.      12/29/2021-April Wendt Schamberger is a 38 y.o. female who  has a past medical history of Abnormal Pap smear of cervix (2000), ADD (attention deficit disorder), Breast disorder, Esophageal reflux, Fibroids, Hypertension, IBS (irritable bowel syndrome), Insomnia, Kidney stones, Mastocytosis, Relies on partner vasectomy for contraception, and Upper GI bleed.  By history and examination this patient has chronic and subacuteneck pain with right radiculopathy in the distribution of C4, C5 and C6.  The underlying cause cause is degenerative disc disease and foraminal stenosis.  Pathology is confirmed by imaging.  We discussed the underlying diagnoses and multiple treatment options including non-opioid medications, opioid medications, interventional procedures, physical therapy and home exercise.  The risks and benefits of each treatment option were discussed and all questions were answered.          04/28/2022- 37-year-old female with history of chronic neck pain with increasing symptoms in her right hand described as numbness.  Additionally she has pain in her right forearm affecting the 4th and 5th digits that are very consistent.  She has seen Neurosurgery and decompressive surgery has been recommended however Dr. Reed as ordered an EMG she will follow-up with him for these results.  Her MRI shows at C5-6 and C6-7 degenerative disc disease with broad-based bulging that encroaches to central cord without compression and without significant neural foraminal stenosis.  Her and I discussed today that we will provide her with a short-term prescription of tramadol 50 mg b.i.d. to utilize and be a bridge through therapy at this time Dr. Reed has recommended therapy prior to considering surgery.  Based on patient's history and behavior there are no red flags with regard to poly if pharmacy of opioids, considering  she is dealing with chronic/subacute pain I would recommend continuing tramadol 50 mg b.i.d. until it plan of care has been established from a surgical perspective.  April did report today that she would be willing to have surgery to reduce/rid her of her pain as it is affecting her quality of life and sleep.    5/18/22 - Patient seeking refill of tramadol for daily use (BID).  She is on 4 controlled substances already and I voiced concern about adding another long-term controlled substance to her regimen (opioids).  I advise use of tramadol for 2-3 months to facilitate participation in PT, HE, and during this information gathering phase (pending EMG and f/u NSGY assessment) but cannot recommend opioid therapy long-term.  Myofascial tension in the c-spine remains high.      1/30/2023- April Wendt Schamberger is a 38 y.o. female who  has a past medical history of Abnormal Pap smear of cervix (2000), ADD (attention deficit disorder), Breast disorder, Esophageal reflux, Fibroids, Hypertension, IBS (irritable bowel syndrome), Insomnia, Kidney stones, Mastocytosis, Relies on partner vasectomy for contraception, and Upper GI bleed.  By history and examination this patient has chronicneck pain without bilateral radiculopathy in the distribution of C5, C6, and C7.  The underlying cause cause is muscle dysfunction and muscles strain.  Pathology is confirmed by imaging.  We discussed the underlying diagnoses and multiple treatment options including non-opioid medications, interventional procedures, home exercise, and core muscle enhancement.  The risks and benefits of each treatment option were discussed and all questions were answered.        04/01/2024-April Wendt Schamberger is a 38 y.o. female who  has a past medical history of Abnormal Pap smear of cervix (2000), ADD (attention deficit disorder), Breast disorder, Esophageal reflux, Fibroids, Hypertension, IBS (irritable bowel syndrome), Insomnia, Kidney stones,  Mastocytosis, Relies on partner vasectomy for contraception, and Upper GI bleed.  By history and examination this patient has chronic and subacuteneck pain without bilateral radiculopathy in the distribution of C4, C5, C6, and C7.  The underlying cause cause is degenerative disc disease, muscle dysfunction, and muscles strain.  Pathology is confirmed by imaging.  We discussed the underlying diagnoses and multiple treatment options including non-opioid medications, interventional procedures, physical therapy, home exercise, core muscle enhancement, activity modification, and weight loss.  The risks and benefits of each treatment option were discussed and all questions were answered.        Diagnoses    1. Muscle spasm  tiZANidine (ZANAFLEX) 4 MG tablet      2. Cervical pain (neck)  tiZANidine (ZANAFLEX) 4 MG tablet      3. Myofascial pain syndrome, cervical  tiZANidine (ZANAFLEX) 4 MG tablet      4. Neck pain        5. DDD (degenerative disc disease), cervical              PLAN:   Continue HE plan discussed importance of exercise and stretching.  Continue refill tizanidine 4 mg t.i.d. p.r.n.R2  Continue Tylenol and ibuprofen p.r.n.  Continue  Lyrica to 50 mg b.i.d. for neuropathic pain    MACK MarieC  Interventional Pain Management      04/01/2024

## 2024-04-10 DIAGNOSIS — F51.01 PRIMARY INSOMNIA: ICD-10-CM

## 2024-04-10 RX ORDER — ESZOPICLONE 3 MG/1
3 TABLET, FILM COATED ORAL NIGHTLY PRN
Qty: 30 TABLET | Refills: 0 | Status: SHIPPED | OUTPATIENT
Start: 2024-04-10 | End: 2024-05-17 | Stop reason: SDUPTHER

## 2024-04-10 NOTE — TELEPHONE ENCOUNTER
No care due was identified.  [facility] Embedded Care Due Messages. Reference number: [id number].   4/10/2024 10:05:28 AM CDT

## 2024-04-14 ENCOUNTER — PATIENT MESSAGE (OUTPATIENT)
Dept: OBSTETRICS AND GYNECOLOGY | Facility: CLINIC | Age: 39
End: 2024-04-14

## 2024-04-14 DIAGNOSIS — B37.31 RECURRENT CANDIDIASIS OF VAGINA: ICD-10-CM

## 2024-04-15 ENCOUNTER — TELEPHONE (OUTPATIENT)
Dept: OBSTETRICS AND GYNECOLOGY | Facility: CLINIC | Age: 39
End: 2024-04-15

## 2024-04-15 DIAGNOSIS — K58.0 IRRITABLE BOWEL SYNDROME WITH DIARRHEA: ICD-10-CM

## 2024-04-15 RX ORDER — DIPHENOXYLATE HYDROCHLORIDE AND ATROPINE SULFATE 2.5; .025 MG/1; MG/1
1 TABLET ORAL 4 TIMES DAILY PRN
Qty: 360 TABLET | Refills: 0 | Status: SHIPPED | OUTPATIENT
Start: 2024-04-15 | End: 2024-04-17

## 2024-04-15 RX ORDER — FLUCONAZOLE 150 MG/1
TABLET ORAL
Qty: 30 TABLET | Refills: 0 | Status: SHIPPED | OUTPATIENT
Start: 2024-04-15

## 2024-04-17 RX ORDER — DIPHENOXYLATE HYDROCHLORIDE AND ATROPINE SULFATE 2.5; .025 MG/1; MG/1
1 TABLET ORAL 4 TIMES DAILY PRN
Qty: 360 TABLET | Refills: 0 | Status: SHIPPED | OUTPATIENT
Start: 2024-04-17 | End: 2025-07-13

## 2024-04-22 ENCOUNTER — PATIENT MESSAGE (OUTPATIENT)
Dept: OBSTETRICS AND GYNECOLOGY | Facility: CLINIC | Age: 39
End: 2024-04-22

## 2024-04-22 RX ORDER — MECLIZINE HYDROCHLORIDE 25 MG/1
25 TABLET ORAL 3 TIMES DAILY PRN
Qty: 30 TABLET | Refills: 0 | Status: SHIPPED | OUTPATIENT
Start: 2024-04-22

## 2024-04-22 RX ORDER — DEXTROAMPHETAMINE SACCHARATE, AMPHETAMINE ASPARTATE, DEXTROAMPHETAMINE SULFATE AND AMPHETAMINE SULFATE 3.75; 3.75; 3.75; 3.75 MG/1; MG/1; MG/1; MG/1
15 TABLET ORAL DAILY
Qty: 30 TABLET | Refills: 0 | Status: SHIPPED | OUTPATIENT
Start: 2024-04-22 | End: 2024-05-23 | Stop reason: SDUPTHER

## 2024-04-30 RX ORDER — DEXTROAMPHETAMINE SACCHARATE, AMPHETAMINE ASPARTATE MONOHYDRATE, DEXTROAMPHETAMINE SULFATE, AMPHETAMINE SULFATE 9.375; 9.375; 9.375; 9.375 MG/1; MG/1; MG/1; MG/1
37.5 CAPSULE, EXTENDED RELEASE ORAL DAILY
Qty: 30 EACH | Refills: 0 | Status: SHIPPED | OUTPATIENT
Start: 2024-04-30 | End: 2024-05-26 | Stop reason: SDUPTHER

## 2024-04-30 RX ORDER — DEXTROAMPHETAMINE SACCHARATE, AMPHETAMINE ASPARTATE MONOHYDRATE, DEXTROAMPHETAMINE SULFATE, AMPHETAMINE SULFATE 9.375; 9.375; 9.375; 9.375 MG/1; MG/1; MG/1; MG/1
37.5 CAPSULE, EXTENDED RELEASE ORAL DAILY
Qty: 30 EACH | Refills: 0 | Status: SHIPPED | OUTPATIENT
Start: 2024-04-30 | End: 2024-05-27 | Stop reason: SDUPTHER

## 2024-05-09 RX ORDER — DEXTROAMPHETAMINE SACCHARATE, AMPHETAMINE ASPARTATE MONOHYDRATE, DEXTROAMPHETAMINE SULFATE, AMPHETAMINE SULFATE 9.375; 9.375; 9.375; 9.375 MG/1; MG/1; MG/1; MG/1
37.5 CAPSULE, EXTENDED RELEASE ORAL DAILY
Qty: 30 EACH | Refills: 0 | Status: SHIPPED | OUTPATIENT
Start: 2024-05-09

## 2024-05-17 DIAGNOSIS — R51.9 NONINTRACTABLE HEADACHE, UNSPECIFIED CHRONICITY PATTERN, UNSPECIFIED HEADACHE TYPE: ICD-10-CM

## 2024-05-17 DIAGNOSIS — F51.01 PRIMARY INSOMNIA: ICD-10-CM

## 2024-05-18 NOTE — TELEPHONE ENCOUNTER
No care due was identified.  [facility] Embedded Care Due Messages. Reference number: [id number].   5/17/2024 9:30:33 PM CDT

## 2024-05-18 NOTE — TELEPHONE ENCOUNTER
No care due was identified.  [facility] Embedded Care Due Messages. Reference number: [id number].   5/17/2024 9:31:10 PM CDT

## 2024-05-20 RX ORDER — BUTALBITAL, ACETAMINOPHEN AND CAFFEINE 50; 325; 40 MG/1; MG/1; MG/1
1 TABLET ORAL EVERY 6 HOURS PRN
Qty: 30 TABLET | Refills: 0 | Status: SHIPPED | OUTPATIENT
Start: 2024-05-20

## 2024-05-20 RX ORDER — ESZOPICLONE 3 MG/1
3 TABLET, FILM COATED ORAL NIGHTLY PRN
Qty: 30 TABLET | Refills: 0 | Status: SHIPPED | OUTPATIENT
Start: 2024-05-20

## 2024-05-27 RX ORDER — DEXTROAMPHETAMINE SACCHARATE, AMPHETAMINE ASPARTATE MONOHYDRATE, DEXTROAMPHETAMINE SULFATE, AMPHETAMINE SULFATE 9.375; 9.375; 9.375; 9.375 MG/1; MG/1; MG/1; MG/1
37.5 CAPSULE, EXTENDED RELEASE ORAL DAILY
Qty: 30 EACH | Refills: 0 | Status: SHIPPED | OUTPATIENT
Start: 2024-05-27

## 2024-05-27 RX ORDER — DEXTROAMPHETAMINE SACCHARATE, AMPHETAMINE ASPARTATE, DEXTROAMPHETAMINE SULFATE AND AMPHETAMINE SULFATE 3.75; 3.75; 3.75; 3.75 MG/1; MG/1; MG/1; MG/1
15 TABLET ORAL DAILY
Qty: 30 TABLET | Refills: 0 | Status: SHIPPED | OUTPATIENT
Start: 2024-05-27

## 2024-06-04 DIAGNOSIS — M54.2 CERVICAL PAIN (NECK): ICD-10-CM

## 2024-06-04 DIAGNOSIS — M62.838 MUSCLE SPASM: ICD-10-CM

## 2024-06-04 DIAGNOSIS — M79.18 MYOFASCIAL PAIN SYNDROME, CERVICAL: ICD-10-CM

## 2024-06-04 RX ORDER — TIZANIDINE 4 MG/1
4 TABLET ORAL 3 TIMES DAILY
Qty: 270 TABLET | Refills: 1 | Status: SHIPPED | OUTPATIENT
Start: 2024-06-04

## 2024-06-25 DIAGNOSIS — K21.9 GASTROESOPHAGEAL REFLUX DISEASE, UNSPECIFIED WHETHER ESOPHAGITIS PRESENT: ICD-10-CM

## 2024-06-26 RX ORDER — PANTOPRAZOLE SODIUM 40 MG/1
40 TABLET, DELAYED RELEASE ORAL
Qty: 30 TABLET | Refills: 1 | Status: SHIPPED | OUTPATIENT
Start: 2024-06-26

## 2024-07-05 ENCOUNTER — OFFICE VISIT (OUTPATIENT)
Dept: INTERNAL MEDICINE | Facility: CLINIC | Age: 39
End: 2024-07-05

## 2024-07-05 DIAGNOSIS — F90.2 ATTENTION DEFICIT HYPERACTIVITY DISORDER (ADHD), COMBINED TYPE: Primary | ICD-10-CM

## 2024-07-05 DIAGNOSIS — G43.809 OTHER MIGRAINE WITHOUT STATUS MIGRAINOSUS, NOT INTRACTABLE: ICD-10-CM

## 2024-07-05 RX ORDER — DEXTROAMPHETAMINE SACCHARATE, AMPHETAMINE ASPARTATE MONOHYDRATE, DEXTROAMPHETAMINE SULFATE, AMPHETAMINE SULFATE 9.375; 9.375; 9.375; 9.375 MG/1; MG/1; MG/1; MG/1
37.5 CAPSULE, EXTENDED RELEASE ORAL DAILY
Qty: 30 EACH | Refills: 0 | Status: SHIPPED | OUTPATIENT
Start: 2024-08-04

## 2024-07-05 RX ORDER — DEXTROAMPHETAMINE SACCHARATE, AMPHETAMINE ASPARTATE, DEXTROAMPHETAMINE SULFATE AND AMPHETAMINE SULFATE 3.75; 3.75; 3.75; 3.75 MG/1; MG/1; MG/1; MG/1
15 TABLET ORAL DAILY
Qty: 30 TABLET | Refills: 0 | Status: SHIPPED | OUTPATIENT
Start: 2024-08-04

## 2024-07-05 RX ORDER — RIZATRIPTAN BENZOATE 10 MG/1
TABLET, ORALLY DISINTEGRATING ORAL
Qty: 9 TABLET | Refills: 6 | Status: SHIPPED | OUTPATIENT
Start: 2024-07-05 | End: 2024-07-05

## 2024-07-05 RX ORDER — DEXTROAMPHETAMINE SACCHARATE, AMPHETAMINE ASPARTATE MONOHYDRATE, DEXTROAMPHETAMINE SULFATE, AMPHETAMINE SULFATE 9.375; 9.375; 9.375; 9.375 MG/1; MG/1; MG/1; MG/1
37.5 CAPSULE, EXTENDED RELEASE ORAL DAILY
Qty: 30 EACH | Refills: 0 | Status: SHIPPED | OUTPATIENT
Start: 2024-09-03

## 2024-07-05 RX ORDER — DEXTROAMPHETAMINE SACCHARATE, AMPHETAMINE ASPARTATE MONOHYDRATE, DEXTROAMPHETAMINE SULFATE, AMPHETAMINE SULFATE 9.375; 9.375; 9.375; 9.375 MG/1; MG/1; MG/1; MG/1
37.5 CAPSULE, EXTENDED RELEASE ORAL DAILY
Qty: 30 EACH | Refills: 0 | Status: SHIPPED | OUTPATIENT
Start: 2024-07-05

## 2024-07-05 RX ORDER — DEXTROAMPHETAMINE SACCHARATE, AMPHETAMINE ASPARTATE, DEXTROAMPHETAMINE SULFATE AND AMPHETAMINE SULFATE 3.75; 3.75; 3.75; 3.75 MG/1; MG/1; MG/1; MG/1
15 TABLET ORAL DAILY
Qty: 30 TABLET | Refills: 0 | Status: SHIPPED | OUTPATIENT
Start: 2024-09-03

## 2024-07-05 RX ORDER — RIZATRIPTAN BENZOATE 10 MG/1
10 TABLET, ORALLY DISINTEGRATING ORAL
Qty: 9 TABLET | Refills: 6 | Status: SHIPPED | OUTPATIENT
Start: 2024-07-05 | End: 2024-08-04

## 2024-07-05 RX ORDER — DEXTROAMPHETAMINE SACCHARATE, AMPHETAMINE ASPARTATE, DEXTROAMPHETAMINE SULFATE AND AMPHETAMINE SULFATE 3.75; 3.75; 3.75; 3.75 MG/1; MG/1; MG/1; MG/1
15 TABLET ORAL DAILY
Qty: 30 TABLET | Refills: 0 | Status: SHIPPED | OUTPATIENT
Start: 2024-07-05

## 2024-07-05 NOTE — PROGRESS NOTES
Subjective     Patient ID: April Wendt Schamberger is a 39 y.o. female.    Chief Complaint: Medication Refill    The patient location is:  [place]  The chief complaint leading to consultation is:  Medication refills    Visit type: audiovisual    Face to Face time with patient:  7 minutes  23 minutes of total time spent on the encounter, which includes face to face time and non-face to face time preparing to see the patient (eg, review of tests), Obtaining and/or reviewing separately obtained history, Documenting clinical information in the electronic or other health record, Independently interpreting results (not separately reported) and communicating results to the patient/family/caregiver, or Care coordination (not separately reported).         Each patient to whom he or she provides medical services by telemedicine is:  (1) informed of the relationship between the physician and patient and the respective role of any other health care provider with respect to management of the patient; and (2) notified that he or she may decline to receive medical services by telemedicine and may withdraw from such care at any time.    Notes:  39-year-old white female is evaluated through telemedicine visit for medication refills.  She has recently changed jobs and is under new insurance; therefore, insurance coverage does not cover Psychiatry.  She is previously seen Psychiatry for ADHD which currently remains stable on Mydayis 37 mg daily and Adderall 15 mg each afternoon as needed for concentration.  She denies any side effects such as headaches, dizziness, chest pain, shortness of breath, or difficulty sleeping while on this medication.  Secondarily, she does report occasional rare migraines related to her menstrual cycle for which he uses Maxalt with relief.    Review of Systems   Constitutional:  Negative for appetite change, chills, fatigue and fever.   HENT:  Negative for nasal congestion, ear pain, hearing loss,  postnasal drip, rhinorrhea, sinus pressure/congestion, sore throat and tinnitus.    Eyes:  Negative for redness, itching and visual disturbance.   Respiratory:  Negative for cough, chest tightness and shortness of breath.    Cardiovascular:  Negative for chest pain and palpitations.   Gastrointestinal:  Negative for abdominal pain, constipation, diarrhea, nausea and vomiting.   Genitourinary:  Negative for decreased urine volume, difficulty urinating, dysuria, frequency, hematuria and urgency.   Musculoskeletal:  Negative for back pain, myalgias, neck pain and neck stiffness.   Integumentary:  Negative for rash.   Neurological:  Negative for dizziness, light-headedness and headaches.   Psychiatric/Behavioral: Negative.            Objective     Physical Exam  Constitutional:       Appearance: Normal appearance.   HENT:      Head: Normocephalic and atraumatic.   Pulmonary:      Effort: Pulmonary effort is normal.   Neurological:      Mental Status: She is alert and oriented to person, place, and time.   Psychiatric:         Mood and Affect: Mood normal.         Behavior: Behavior normal.         Thought Content: Thought content normal.         Judgment: Judgment normal.            Assessment and Plan     1. Attention deficit hyperactivity disorder (ADHD), combined type  -     dextroamphetamine-amphetamine (ADDERALL) 15 mg tablet; Take 1 tablet (15 mg total) by mouth once daily.  Dispense: 30 tablet; Refill: 0  -     dextroamphetamine-amphetamine (ADDERALL) 15 mg tablet; Take 1 tablet (15 mg total) by mouth once daily.  Dispense: 30 tablet; Refill: 0  -     dextroamphetamine-amphetamine (MYDAYIS) 37.5 mg CT24; Take 37.5 mg by mouth once daily.  Dispense: 30 each; Refill: 0  -     dextroamphetamine-amphetamine (MYDAYIS) 37.5 mg CT24; Take 37.5 mg by mouth once daily.  Dispense: 30 each; Refill: 0  -     dextroamphetamine-amphetamine (MYDAYIS) 37.5 mg CT24; Take 37.5 mg by mouth once daily.  Dispense: 30 each; Refill:  0  -     dextroamphetamine-amphetamine (ADDERALL) 15 mg tablet; Take 1 tablet (15 mg total) by mouth once daily.  Dispense: 30 tablet; Refill: 0    2. Other migraine without status migrainosus, not intractable  -     Discontinue: rizatriptan (MAXALT-MLT) 10 MG disintegrating tablet; Take ½ tablet (5 mg total) by mouth as needed for Migraine (repeat dose once in 2 hours if headache return.).  Dispense: 9 tablet; Refill: 6  -     Discontinue: rizatriptan (MAXALT-MLT) 10 MG disintegrating tablet; Take ½ tablet (5 mg total) by mouth as needed for Migraine (repeat dose once in 2 hours if headache return.).  Dispense: 9 tablet; Refill: 6  -     rizatriptan (MAXALT-MLT) 10 MG disintegrating tablet; Take 1 tablet (10 mg total) by mouth as needed for Migraine (repeat dose once in 2 hours if headache return.).  Dispense: 9 tablet; Refill: 6        1.  has been checked and the patient is compliant with medication.  2. Continue Mydayis 37 mg daily and Adderall 15 mg each afternoon as needed for concentration.  Three separate prescriptions sent to pharmacy.    3. Continue Maxalt as prescribed.  Refill provided.    4. Return to clinic as needed or in 3 months for follow-up.             No follow-ups on file.

## 2024-07-08 DIAGNOSIS — K58.0 IRRITABLE BOWEL SYNDROME WITH DIARRHEA: ICD-10-CM

## 2024-07-08 DIAGNOSIS — F41.9 ANXIETY: ICD-10-CM

## 2024-07-09 RX ORDER — AMITRIPTYLINE HYDROCHLORIDE 25 MG/1
25 TABLET, FILM COATED ORAL NIGHTLY
Qty: 90 TABLET | Refills: 3 | Status: SHIPPED | OUTPATIENT
Start: 2024-07-09 | End: 2025-07-09

## 2024-07-09 NOTE — TELEPHONE ENCOUNTER
Care Due:                  Date            Visit Type   Department     Provider  --------------------------------------------------------------------------------                                ESTABLISHED                              PATIENT -    [facility] INTERNAL  Last Visit: 07-      [facility]       Mata Valencia  Next Visit: None Scheduled  None         None Found                                                            Last  Test          Frequency    Reason                     Performed    Due Date  --------------------------------------------------------------------------------    CMP.........  12 months..  valsartan................  08- 08-    [facility] Embedded Care Due Messages. Reference number: [id number].   7/08/2024 9:55:37 PM CDT

## 2024-07-18 DIAGNOSIS — F51.01 PRIMARY INSOMNIA: ICD-10-CM

## 2024-07-18 NOTE — TELEPHONE ENCOUNTER
No care due was identified.  [facility] Embedded Care Due Messages. Reference number: [id number].   7/18/2024 6:56:11 PM CDT

## 2024-07-19 RX ORDER — ESZOPICLONE 3 MG/1
3 TABLET, FILM COATED ORAL NIGHTLY PRN
Qty: 30 TABLET | Refills: 0 | Status: SHIPPED | OUTPATIENT
Start: 2024-07-19

## 2024-08-05 DIAGNOSIS — F90.2 ATTENTION DEFICIT HYPERACTIVITY DISORDER (ADHD), COMBINED TYPE: ICD-10-CM

## 2024-08-05 RX ORDER — DEXTROAMPHETAMINE SACCHARATE, AMPHETAMINE ASPARTATE MONOHYDRATE, DEXTROAMPHETAMINE SULFATE, AMPHETAMINE SULFATE 9.375; 9.375; 9.375; 9.375 MG/1; MG/1; MG/1; MG/1
37.5 CAPSULE, EXTENDED RELEASE ORAL DAILY
Qty: 30 EACH | Refills: 0 | Status: SHIPPED | OUTPATIENT
Start: 2024-08-05

## 2024-08-05 RX ORDER — DEXTROAMPHETAMINE SACCHARATE, AMPHETAMINE ASPARTATE, DEXTROAMPHETAMINE SULFATE AND AMPHETAMINE SULFATE 3.75; 3.75; 3.75; 3.75 MG/1; MG/1; MG/1; MG/1
15 TABLET ORAL DAILY
Qty: 30 TABLET | Refills: 0 | Status: SHIPPED | OUTPATIENT
Start: 2024-08-05

## 2024-08-18 DIAGNOSIS — K58.0 IRRITABLE BOWEL SYNDROME WITH DIARRHEA: ICD-10-CM

## 2024-08-18 DIAGNOSIS — G43.809 OTHER MIGRAINE WITHOUT STATUS MIGRAINOSUS, NOT INTRACTABLE: ICD-10-CM

## 2024-08-18 DIAGNOSIS — R51.9 NONINTRACTABLE HEADACHE, UNSPECIFIED CHRONICITY PATTERN, UNSPECIFIED HEADACHE TYPE: ICD-10-CM

## 2024-08-18 DIAGNOSIS — F51.01 PRIMARY INSOMNIA: ICD-10-CM

## 2024-08-18 NOTE — TELEPHONE ENCOUNTER
No care due was identified.  [facility] Embedded Care Due Messages. Reference number: [id number].   8/18/2024 2:27:05 PM CDT

## 2024-08-19 ENCOUNTER — TELEPHONE (OUTPATIENT)
Dept: INTERNAL MEDICINE | Facility: CLINIC | Age: 39
End: 2024-08-19

## 2024-08-19 DIAGNOSIS — R51.9 NONINTRACTABLE HEADACHE, UNSPECIFIED CHRONICITY PATTERN, UNSPECIFIED HEADACHE TYPE: ICD-10-CM

## 2024-08-19 RX ORDER — MECLIZINE HYDROCHLORIDE 25 MG/1
25 TABLET ORAL 3 TIMES DAILY PRN
Qty: 30 TABLET | Refills: 0 | Status: SHIPPED | OUTPATIENT
Start: 2024-08-19

## 2024-08-19 RX ORDER — BUTALBITAL, ACETAMINOPHEN AND CAFFEINE 50; 325; 40 MG/1; MG/1; MG/1
1 TABLET ORAL EVERY 6 HOURS PRN
Qty: 30 TABLET | Refills: 0 | Status: SHIPPED | OUTPATIENT
Start: 2024-08-19

## 2024-08-19 RX ORDER — ESZOPICLONE 3 MG/1
3 TABLET, FILM COATED ORAL NIGHTLY PRN
Qty: 30 TABLET | Refills: 0 | Status: SHIPPED | OUTPATIENT
Start: 2024-08-19

## 2024-08-19 RX ORDER — BUTALBITAL, ACETAMINOPHEN AND CAFFEINE 50; 325; 40 MG/1; MG/1; MG/1
1 TABLET ORAL EVERY 6 HOURS PRN
Qty: 30 TABLET | Refills: 0 | Status: SHIPPED | OUTPATIENT
Start: 2024-08-19 | End: 2024-08-19

## 2024-08-19 RX ORDER — RIZATRIPTAN BENZOATE 10 MG/1
10 TABLET, ORALLY DISINTEGRATING ORAL
Qty: 9 TABLET | Refills: 6 | Status: SHIPPED | OUTPATIENT
Start: 2024-08-19 | End: 2024-09-18

## 2024-08-19 NOTE — TELEPHONE ENCOUNTER
----- Message from Toya Velasquez sent at 8/19/2024 11:30 AM CDT -----  Contact: [phone]  Pharmacy is calling to clarify an RX.    RX name:  butalbital-acetaminophen-caffeine -40 mg (FIORICET, ESGIC) -40 mg per tablet    What do they need to clarify:   Need a verbal order    Comments:       [facility] [id number] [facility] - [address]  [address] [address]  Phone: [phone] Fax: [phone]

## 2024-08-19 NOTE — TELEPHONE ENCOUNTER
Refill Routing Note   Medication(s) are not appropriate for processing by [facility] for the following reason(s):        Outside of protocol    [facility] action(s):  Route        Medication Therapy Plan: LOV 11/9/23 [facility]      Appointments  past 12m or future 3m with PCP    Date Provider   Last Visit   11/9/2023 Marline Lambert MD   Next Visit   Visit date not found Marline Lambert MD   ED visits in past 90 days: 0        Note composed:6:28 AM 08/19/2024

## 2024-08-20 RX ORDER — DIPHENOXYLATE HYDROCHLORIDE AND ATROPINE SULFATE 2.5; .025 MG/1; MG/1
1 TABLET ORAL 4 TIMES DAILY PRN
Qty: 360 TABLET | Refills: 0 | Status: SHIPPED | OUTPATIENT
Start: 2024-08-20 | End: 2025-11-15

## 2024-08-20 NOTE — TELEPHONE ENCOUNTER
I haave signed refill, but she needs a visit to continue getting refills...   It seems she will need to see me in office (previously ssspreeti elise)

## 2024-08-21 ENCOUNTER — TELEPHONE (OUTPATIENT)
Dept: GASTROENTEROLOGY | Facility: CLINIC | Age: 39
End: 2024-08-21

## 2024-08-21 NOTE — TELEPHONE ENCOUNTER
Attempted to contact pt. Left VM and call back number    ----- Message from Vicente Mathis MA sent at 8/20/2024  2:51 PM CDT -----  I haave signed refill, but she needs a visit to continue getting refills...   It seems she will need to see me in office (previously ssseinjosé elise)

## 2024-09-02 DIAGNOSIS — I10 ESSENTIAL (PRIMARY) HYPERTENSION: ICD-10-CM

## 2024-09-02 DIAGNOSIS — R00.0 TACHYCARDIA: ICD-10-CM

## 2024-09-02 NOTE — TELEPHONE ENCOUNTER
No care due was identified.  [facility] Embedded Care Due Messages. Reference number: [id number].   9/02/2024 3:38:34 PM CDT

## 2024-09-03 DIAGNOSIS — F90.2 ATTENTION DEFICIT HYPERACTIVITY DISORDER (ADHD), COMBINED TYPE: ICD-10-CM

## 2024-09-03 RX ORDER — METOPROLOL SUCCINATE 50 MG/1
50 TABLET, EXTENDED RELEASE ORAL 2 TIMES DAILY
Qty: 180 TABLET | Refills: 1 | Status: SHIPPED | OUTPATIENT
Start: 2024-09-03

## 2024-09-03 RX ORDER — DEXTROAMPHETAMINE SACCHARATE, AMPHETAMINE ASPARTATE MONOHYDRATE, DEXTROAMPHETAMINE SULFATE, AMPHETAMINE SULFATE 9.375; 9.375; 9.375; 9.375 MG/1; MG/1; MG/1; MG/1
37.5 CAPSULE, EXTENDED RELEASE ORAL DAILY
Qty: 30 EACH | Refills: 0 | Status: CANCELLED | OUTPATIENT
Start: 2024-09-03

## 2024-09-03 RX ORDER — DEXTROAMPHETAMINE SACCHARATE, AMPHETAMINE ASPARTATE, DEXTROAMPHETAMINE SULFATE AND AMPHETAMINE SULFATE 3.75; 3.75; 3.75; 3.75 MG/1; MG/1; MG/1; MG/1
15 TABLET ORAL DAILY
Qty: 30 TABLET | Refills: 0 | Status: CANCELLED | OUTPATIENT
Start: 2024-09-03

## 2024-09-03 NOTE — TELEPHONE ENCOUNTER
No care due was identified.  [facility] Embedded Care Due Messages. Reference number: [id number].   9/03/2024 7:12:46 AM CDT

## 2024-09-03 NOTE — TELEPHONE ENCOUNTER
Refill Decision Note   April Schamberger  is requesting a refill authorization.  Brief Assessment and Rationale for Refill:  Approve     Medication Therapy Plan:         Comments:     Note composed:2:29 PM 09/03/2024

## 2024-09-04 DIAGNOSIS — Z12.31 OTHER SCREENING MAMMOGRAM: ICD-10-CM

## 2024-09-17 DIAGNOSIS — F51.01 PRIMARY INSOMNIA: ICD-10-CM

## 2024-09-17 RX ORDER — ESZOPICLONE 3 MG/1
3 TABLET, FILM COATED ORAL NIGHTLY PRN
Qty: 30 TABLET | Refills: 0 | Status: SHIPPED | OUTPATIENT
Start: 2024-09-17

## 2024-09-17 NOTE — TELEPHONE ENCOUNTER
Care Due:                  Date            Visit Type   Department     Provider  --------------------------------------------------------------------------------                                ESTABLISHED                              PATIENT -    [facility] INTERNAL  Last Visit: 07-      [facility]       Mata Valencia  Next Visit: None Scheduled  None         None Found                                                            Last  Test          Frequency    Reason                     Performed    Due Date  --------------------------------------------------------------------------------    CMP.........  12 months..  valsartan................  08- 08-    [facility] Embedded Care Due Messages. Reference number: [id number].   9/17/2024 11:23:38 AM CDT

## 2024-09-25 ENCOUNTER — OFFICE VISIT (OUTPATIENT)
Dept: INTERNAL MEDICINE | Facility: CLINIC | Age: 39
End: 2024-09-25
Payer: COMMERCIAL

## 2024-09-25 DIAGNOSIS — Z00.00 ENCOUNTER FOR ANNUAL HEALTH EXAMINATION: ICD-10-CM

## 2024-09-25 DIAGNOSIS — F90.2 ATTENTION DEFICIT HYPERACTIVITY DISORDER (ADHD), COMBINED TYPE: Primary | ICD-10-CM

## 2024-09-25 PROCEDURE — 99214 OFFICE O/P EST MOD 30 MIN: CPT | Mod: 95,,, | Performed by: NURSE PRACTITIONER

## 2024-09-25 PROCEDURE — 4010F ACE/ARB THERAPY RXD/TAKEN: CPT | Mod: CPTII,95,, | Performed by: NURSE PRACTITIONER

## 2024-09-25 PROCEDURE — 1159F MED LIST DOCD IN RCRD: CPT | Mod: CPTII,95,, | Performed by: NURSE PRACTITIONER

## 2024-09-25 PROCEDURE — 1160F RVW MEDS BY RX/DR IN RCRD: CPT | Mod: CPTII,95,, | Performed by: NURSE PRACTITIONER

## 2024-09-25 RX ORDER — DEXTROAMPHETAMINE SACCHARATE, AMPHETAMINE ASPARTATE MONOHYDRATE, DEXTROAMPHETAMINE SULFATE, AMPHETAMINE SULFATE 9.375; 9.375; 9.375; 9.375 MG/1; MG/1; MG/1; MG/1
37.5 CAPSULE, EXTENDED RELEASE ORAL DAILY
Qty: 30 EACH | Refills: 0 | Status: SHIPPED | OUTPATIENT
Start: 2024-09-25

## 2024-09-25 RX ORDER — DEXTROAMPHETAMINE SACCHARATE, AMPHETAMINE ASPARTATE MONOHYDRATE, DEXTROAMPHETAMINE SULFATE, AMPHETAMINE SULFATE 9.375; 9.375; 9.375; 9.375 MG/1; MG/1; MG/1; MG/1
37.5 CAPSULE, EXTENDED RELEASE ORAL DAILY
Qty: 30 EACH | Refills: 0 | Status: SHIPPED | OUTPATIENT
Start: 2024-11-25

## 2024-09-25 RX ORDER — DEXTROAMPHETAMINE SACCHARATE, AMPHETAMINE ASPARTATE, DEXTROAMPHETAMINE SULFATE AND AMPHETAMINE SULFATE 3.75; 3.75; 3.75; 3.75 MG/1; MG/1; MG/1; MG/1
15 TABLET ORAL DAILY
Qty: 30 TABLET | Refills: 0 | Status: SHIPPED | OUTPATIENT
Start: 2024-11-25

## 2024-09-25 RX ORDER — DEXTROAMPHETAMINE SACCHARATE, AMPHETAMINE ASPARTATE, DEXTROAMPHETAMINE SULFATE AND AMPHETAMINE SULFATE 3.75; 3.75; 3.75; 3.75 MG/1; MG/1; MG/1; MG/1
15 TABLET ORAL DAILY
Qty: 30 TABLET | Refills: 0 | Status: SHIPPED | OUTPATIENT
Start: 2024-09-25

## 2024-09-25 RX ORDER — DEXTROAMPHETAMINE SACCHARATE, AMPHETAMINE ASPARTATE MONOHYDRATE, DEXTROAMPHETAMINE SULFATE, AMPHETAMINE SULFATE 9.375; 9.375; 9.375; 9.375 MG/1; MG/1; MG/1; MG/1
37.5 CAPSULE, EXTENDED RELEASE ORAL DAILY
Qty: 30 EACH | Refills: 0 | Status: SHIPPED | OUTPATIENT
Start: 2024-10-25

## 2024-09-25 RX ORDER — DEXTROAMPHETAMINE SACCHARATE, AMPHETAMINE ASPARTATE, DEXTROAMPHETAMINE SULFATE AND AMPHETAMINE SULFATE 3.75; 3.75; 3.75; 3.75 MG/1; MG/1; MG/1; MG/1
15 TABLET ORAL DAILY
Qty: 30 TABLET | Refills: 0 | Status: SHIPPED | OUTPATIENT
Start: 2024-10-25

## 2024-09-25 NOTE — PROGRESS NOTES
Subjective:       Patient ID: April Wendt Schamberger is a 39 y.o. female.    Chief Complaint: Medication Refill    Visit type: audiovisual    April Wendt Schamberger is a 39 y.o. female who presents today for medication refill.     Current medication(s): Adderall 15 mg in the afternoon and Mydayis 37.5 mg in the AM  Takes Medication: daily  Appetite: No change  Sleep:no problems  Side effects: none    The patient location is: [place]    Review of patient's allergies indicates:   Allergen Reactions    Lactose     Azithromycin Nausea And Vomiting       Medication List with Changes/Refills   Current Medications    ACETAMINOPHEN (TYLENOL) 500 MG TABLET    Take 2 tablets (1,000 mg total) by mouth 3 (three) times daily as needed for Pain.    AMITRIPTYLINE (ELAVIL) 25 MG TABLET    Take 1 tablet (25 mg total) by mouth every evening.    BUTALBITAL-ACETAMINOPHEN-CAFFEINE -40 MG (FIORICET, ESGIC) -40 MG PER TABLET    Take 1 tablet by mouth every 6 (six) hours as needed for Headaches.    CHLORHEXIDINE (PERIDEX) 0.12 % SOLUTION    Swish and spit 15mls by mouth three times daily as directed    CHOLESTYRAMINE (QUESTRAN) 4 GRAM PACKET    Take 1 packet (4 g total) by mouth 3 (three) times daily with meals.    DIPHENOXYLATE-ATROPINE 2.5-0.025 MG (LOMOTIL) 2.5-0.025 MG PER TABLET    Take 1 tablet by mouth 4 (four) times daily as needed for Diarrhea (diarrhea).    ESZOPICLONE (LUNESTA) 3 MG TAB    Take 1 tablet (3 mg total) by mouth nightly as needed (insomnia).    FLUCONAZOLE (DIFLUCAN) 150 MG TAB    Take 1 tablet by mouth as needed for yeast infection    LOPERAMIDE (IMODIUM) 1 MG/7.5 ML SOLUTION    Take 0.1 mg/kg by mouth every 12 (twelve) hours.    LOPERAMIDE (IMODIUM) 2 MG CAPSULE    Take 2 mg by mouth 4 (four) times daily as needed for Diarrhea.    MECLIZINE (ANTIVERT) 25 MG TABLET    Take 1 tablet (25 mg total) by mouth 3 (three) times daily as needed (motion sickness).    METOPROLOL SUCCINATE (TOPROL-XL) 50 MG 24  HR TABLET    TAKE 1 TABLET BY MOUTH TWICE A DAY    PANTOPRAZOLE (PROTONIX) 40 MG TABLET    TAKE 1 TABLET BY MOUTH ONCE DAILY    PREGABALIN (LYRICA) 50 MG CAPSULE    Take 1 capsule (50 mg total) by mouth 2 (two) times daily.    RIZATRIPTAN (MAXALT-MLT) 10 MG DISINTEGRATING TABLET    Take 1 tablet (10 mg total) by mouth as needed for Migraine (repeat dose once in 2 hours if headache return.).    TIZANIDINE (ZANAFLEX) 4 MG TABLET    TAKE 1 TABLET BY MOUTH 3 TIMES DAILY.    TRETINOIN (RETIN-A) 0.025 % CREAM    Apply topically every evening. Pea sized amount to entire face. If irritation occurs, decrease use to every other night.    VALSARTAN (DIOVAN) 320 MG TABLET    Take 1 tablet (320 mg total) by mouth once daily.    VENLAFAXINE (EFFEXOR-XR) 37.5 MG 24 HR CAPSULE    Take 2 capsules (75 mg total) by mouth once daily.   Changed and/or Refilled Medications    Modified Medication Previous Medication    DEXTROAMPHETAMINE-AMPHETAMINE (ADDERALL) 15 MG TABLET dextroamphetamine-amphetamine (ADDERALL) 15 mg tablet       Take 1 tablet (15 mg total) by mouth once daily.    Take 1 tablet (15 mg total) by mouth once daily.    DEXTROAMPHETAMINE-AMPHETAMINE (ADDERALL) 15 MG TABLET dextroamphetamine-amphetamine (ADDERALL) 15 mg tablet       Take 1 tablet (15 mg total) by mouth once daily.    Take 1 tablet (15 mg total) by mouth once daily.    DEXTROAMPHETAMINE-AMPHETAMINE (ADDERALL) 15 MG TABLET dextroamphetamine-amphetamine (ADDERALL) 15 mg tablet       Take 1 tablet (15 mg total) by mouth once daily.    Take 1 tablet (15 mg total) by mouth once daily.    DEXTROAMPHETAMINE-AMPHETAMINE (MYDAYIS) 37.5 MG CT24 dextroamphetamine-amphetamine (MYDAYIS) 37.5 mg CT24       Take 37.5 mg by mouth once daily.    Take 37.5 mg by mouth once daily.    DEXTROAMPHETAMINE-AMPHETAMINE (MYDAYIS) 37.5 MG CT24 dextroamphetamine-amphetamine (MYDAYIS) 37.5 mg CT24       Take 37.5 mg by mouth once daily.    Take 37.5 mg by mouth once daily.     DEXTROAMPHETAMINE-AMPHETAMINE (MYDAYIS) 37.5 MG CT24 dextroamphetamine-amphetamine (MYDAYIS) 37.5 mg CT24       Take 37.5 mg by mouth once daily.    Take 37.5 mg by mouth once daily.     Review of Systems   Constitutional:  Negative for chills and fever.   Respiratory:  Negative for cough and shortness of breath.    Cardiovascular:  Negative for chest pain.   Neurological:  Negative for dizziness and headaches.     Objective:   LMP 09/24/2024 (Exact Date)     Physical Exam  Vitals reviewed: Exam Limited due to Video Consultation.   Constitutional:       General: She is not in acute distress.  HENT:      Head: Normocephalic.   Neurological:      Mental Status: She is alert and oriented to person, place, and time.       Assessment and Plan:       1. Attention deficit hyperactivity disorder (ADHD), combined type    Chronic, stable, continue current medications    - dextroamphetamine-amphetamine (MYDAYIS) 37.5 mg CT24; Take 37.5 mg by mouth once daily.  Dispense: 30 each; Refill: 0  - dextroamphetamine-amphetamine (MYDAYIS) 37.5 mg CT24; Take 37.5 mg by mouth once daily.  Dispense: 30 each; Refill: 0  - dextroamphetamine-amphetamine (ADDERALL) 15 mg tablet; Take 1 tablet (15 mg total) by mouth once daily.  Dispense: 30 tablet; Refill: 0  - dextroamphetamine-amphetamine (ADDERALL) 15 mg tablet; Take 1 tablet (15 mg total) by mouth once daily.  Dispense: 30 tablet; Refill: 0  - dextroamphetamine-amphetamine (MYDAYIS) 37.5 mg CT24; Take 37.5 mg by mouth once daily.  Dispense: 30 each; Refill: 0  - dextroamphetamine-amphetamine (ADDERALL) 15 mg tablet; Take 1 tablet (15 mg total) by mouth once daily.  Dispense: 30 tablet; Refill: 0    2. Encounter for annual health examination  - CBC Auto Differential; Future  - Comprehensive Metabolic Panel; Future  - Lipid Panel; Future  - TSH; Future    Patient advised that her next follow up will need to be in person for her annual exam. Advised to schedule now.       Face to Face time  with patient: 3  10 minutes of total time spent on the encounter, which includes face to face time and non-face to face time preparing to see the patient (eg, review of tests), Obtaining and/or reviewing separately obtained history, Documenting clinical information in the electronic or other health record, Independently interpreting results (not separately reported) and communicating results to the patient/family/caregiver, or Care coordination (not separately reported).     Each patient to whom he or she provides medical services by telemedicine is:  (1) informed of the relationship between the physician and patient and the respective role of any other health care provider with respect to management of the patient; and (2) notified that he or she may decline to receive medical services by telemedicine and may withdraw from such care at any time.

## 2024-10-15 DIAGNOSIS — M79.18 MYOFASCIAL PAIN SYNDROME, CERVICAL: ICD-10-CM

## 2024-10-15 DIAGNOSIS — M54.2 CERVICAL PAIN (NECK): ICD-10-CM

## 2024-10-15 DIAGNOSIS — F51.01 PRIMARY INSOMNIA: ICD-10-CM

## 2024-10-15 DIAGNOSIS — M62.838 MUSCLE SPASM: ICD-10-CM

## 2024-10-15 RX ORDER — MECLIZINE HYDROCHLORIDE 25 MG/1
25 TABLET ORAL 3 TIMES DAILY PRN
Qty: 30 TABLET | Refills: 0 | Status: SHIPPED | OUTPATIENT
Start: 2024-10-15

## 2024-10-16 RX ORDER — TIZANIDINE 4 MG/1
4 TABLET ORAL 3 TIMES DAILY
Qty: 270 TABLET | Refills: 1 | Status: SHIPPED | OUTPATIENT
Start: 2024-10-16

## 2024-10-16 RX ORDER — ESZOPICLONE 3 MG/1
3 TABLET, FILM COATED ORAL NIGHTLY PRN
Qty: 30 TABLET | Refills: 2 | Status: SHIPPED | OUTPATIENT
Start: 2024-10-16

## 2024-11-03 DIAGNOSIS — F90.2 ATTENTION DEFICIT HYPERACTIVITY DISORDER (ADHD), COMBINED TYPE: ICD-10-CM

## 2024-11-04 DIAGNOSIS — I10 BENIGN ESSENTIAL HYPERTENSION: ICD-10-CM

## 2024-11-04 RX ORDER — VALSARTAN 320 MG/1
320 TABLET ORAL DAILY
Qty: 90 TABLET | Refills: 3 | Status: CANCELLED | OUTPATIENT
Start: 2024-11-04

## 2024-11-04 NOTE — TELEPHONE ENCOUNTER
No care due was identified.  [facility] Embedded Care Due Messages. Reference number: [id number].   11/04/2024 11:00:37 AM CST

## 2024-11-05 RX ORDER — DEXTROAMPHETAMINE SACCHARATE, AMPHETAMINE ASPARTATE MONOHYDRATE, DEXTROAMPHETAMINE SULFATE, AMPHETAMINE SULFATE 9.375; 9.375; 9.375; 9.375 MG/1; MG/1; MG/1; MG/1
37.5 CAPSULE, EXTENDED RELEASE ORAL DAILY
Qty: 30 EACH | Refills: 0 | OUTPATIENT
Start: 2024-11-05

## 2024-11-05 RX ORDER — DEXTROAMPHETAMINE SACCHARATE, AMPHETAMINE ASPARTATE, DEXTROAMPHETAMINE SULFATE AND AMPHETAMINE SULFATE 3.75; 3.75; 3.75; 3.75 MG/1; MG/1; MG/1; MG/1
15 TABLET ORAL DAILY
Qty: 30 TABLET | Refills: 0 | OUTPATIENT
Start: 2024-11-05

## 2024-11-05 NOTE — TELEPHONE ENCOUNTER
Sent message through [employer] re: refill duplicate & contact pharmacy to j[facility] ([facility], [place]).

## 2024-11-12 ENCOUNTER — PATIENT MESSAGE (OUTPATIENT)
Dept: INTERNAL MEDICINE | Facility: CLINIC | Age: 39
End: 2024-11-12
Payer: COMMERCIAL

## 2024-11-12 DIAGNOSIS — F32.81 PMDD (PREMENSTRUAL DYSPHORIC DISORDER): ICD-10-CM

## 2024-11-12 RX ORDER — VENLAFAXINE HYDROCHLORIDE 75 MG/1
75 CAPSULE, EXTENDED RELEASE ORAL DAILY
Qty: 90 CAPSULE | Refills: 0 | Status: SHIPPED | OUTPATIENT
Start: 2024-11-12

## 2024-11-12 RX ORDER — VENLAFAXINE HYDROCHLORIDE 37.5 MG/1
75 CAPSULE, EXTENDED RELEASE ORAL DAILY
Qty: 60 CAPSULE | Refills: 3 | Status: SHIPPED | OUTPATIENT
Start: 2024-11-12 | End: 2024-11-12

## 2024-11-12 NOTE — TELEPHONE ENCOUNTER
Last filled 60 x 3   2/26/24 by fanny oliveros do/ psych      Lov 9/25/24 otis add meds. / virtual.

## 2024-11-12 NOTE — TELEPHONE ENCOUNTER
No care due was identified.  [facility] Embedded Care Due Messages. Reference number: [id number].   11/12/2024 10:31:27 AM CST

## 2024-11-12 NOTE — TELEPHONE ENCOUNTER
No care due was identified.  [facility] Embedded Care Due Messages. Reference number: [id number].   11/12/2024 12:06:07 PM CST

## 2024-11-12 NOTE — TELEPHONE ENCOUNTER
Refill Routing Note   Medication(s) are not appropriate for processing by [facility] for the following reason(s):        Clarification of medication (Rx) details  New or recently adjusted medication  Required labs outdated  No active prescription written by provider    [facility] action(s):  Defer               Appointments  past 12m or future 3m with PCP    Date Provider   Last Visit   7/5/2024 Mata Valencia MD   Next Visit   Visit date not found Mata Valencia MD   ED visits in past 90 days: 0        Note composed:12:20 PM 11/12/2024

## 2024-11-20 DIAGNOSIS — F51.01 PRIMARY INSOMNIA: ICD-10-CM

## 2024-11-20 DIAGNOSIS — M62.838 MUSCLE SPASM: ICD-10-CM

## 2024-11-20 DIAGNOSIS — M54.2 CERVICAL PAIN (NECK): ICD-10-CM

## 2024-11-20 DIAGNOSIS — M79.18 MYOFASCIAL PAIN SYNDROME, CERVICAL: ICD-10-CM

## 2024-11-21 RX ORDER — TIZANIDINE 4 MG/1
4 TABLET ORAL 3 TIMES DAILY
Qty: 270 TABLET | Refills: 1 | Status: SHIPPED | OUTPATIENT
Start: 2024-11-21

## 2024-11-21 RX ORDER — ESZOPICLONE 3 MG/1
3 TABLET, FILM COATED ORAL NIGHTLY PRN
Qty: 30 TABLET | Refills: 0 | Status: SHIPPED | OUTPATIENT
Start: 2024-11-21

## 2024-11-21 NOTE — TELEPHONE ENCOUNTER
Care Due:                  Date            Visit Type   Department     Provider  --------------------------------------------------------------------------------                                ESTABLISHED                              PATIENT -    [facility] INTERNAL  Last Visit: 07-      [facility]       Mata Valencia  Next Visit: None Scheduled  None         None Found                                                            Last  Test          Frequency    Reason                     Performed    Due Date  --------------------------------------------------------------------------------    CMP.........  12 months..  valsartan, venlafaxine...  08- 08-    [facility] Embedded Care Due Messages. Reference number: [id number].   11/20/2024 9:10:06 PM CST

## 2024-12-04 DIAGNOSIS — F90.2 ATTENTION DEFICIT HYPERACTIVITY DISORDER (ADHD), COMBINED TYPE: ICD-10-CM

## 2024-12-04 DIAGNOSIS — K58.0 IRRITABLE BOWEL SYNDROME WITH DIARRHEA: ICD-10-CM

## 2024-12-04 RX ORDER — DEXTROAMPHETAMINE SACCHARATE, AMPHETAMINE ASPARTATE MONOHYDRATE, DEXTROAMPHETAMINE SULFATE, AMPHETAMINE SULFATE 9.375; 9.375; 9.375; 9.375 MG/1; MG/1; MG/1; MG/1
37.5 CAPSULE, EXTENDED RELEASE ORAL DAILY
Qty: 30 EACH | Refills: 0 | Status: SHIPPED | OUTPATIENT
Start: 2024-12-04

## 2024-12-04 RX ORDER — DEXTROAMPHETAMINE SACCHARATE, AMPHETAMINE ASPARTATE, DEXTROAMPHETAMINE SULFATE AND AMPHETAMINE SULFATE 3.75; 3.75; 3.75; 3.75 MG/1; MG/1; MG/1; MG/1
15 TABLET ORAL DAILY
Qty: 30 TABLET | Refills: 0 | Status: SHIPPED | OUTPATIENT
Start: 2024-12-04

## 2024-12-05 RX ORDER — DIPHENOXYLATE HYDROCHLORIDE AND ATROPINE SULFATE 2.5; .025 MG/1; MG/1
1 TABLET ORAL 4 TIMES DAILY PRN
Qty: 360 TABLET | Refills: 0 | Status: SHIPPED | OUTPATIENT
Start: 2024-12-05 | End: 2026-03-02

## 2024-12-23 ENCOUNTER — OFFICE VISIT (OUTPATIENT)
Dept: INTERNAL MEDICINE | Facility: CLINIC | Age: 39
End: 2024-12-23
Payer: COMMERCIAL

## 2024-12-23 VITALS
SYSTOLIC BLOOD PRESSURE: 145 MMHG | WEIGHT: 205 LBS | BODY MASS INDEX: 31.07 KG/M2 | DIASTOLIC BLOOD PRESSURE: 90 MMHG | HEIGHT: 68 IN

## 2024-12-23 DIAGNOSIS — F51.01 PRIMARY INSOMNIA: ICD-10-CM

## 2024-12-23 DIAGNOSIS — F90.2 ATTENTION DEFICIT HYPERACTIVITY DISORDER (ADHD), COMBINED TYPE: Primary | ICD-10-CM

## 2024-12-23 DIAGNOSIS — I10 ESSENTIAL (PRIMARY) HYPERTENSION: ICD-10-CM

## 2024-12-23 PROCEDURE — 3080F DIAST BP >= 90 MM HG: CPT | Mod: CPTII,95,, | Performed by: NURSE PRACTITIONER

## 2024-12-23 PROCEDURE — 1160F RVW MEDS BY RX/DR IN RCRD: CPT | Mod: CPTII,95,, | Performed by: NURSE PRACTITIONER

## 2024-12-23 PROCEDURE — 3008F BODY MASS INDEX DOCD: CPT | Mod: CPTII,95,, | Performed by: NURSE PRACTITIONER

## 2024-12-23 PROCEDURE — 1159F MED LIST DOCD IN RCRD: CPT | Mod: CPTII,95,, | Performed by: NURSE PRACTITIONER

## 2024-12-23 PROCEDURE — 3077F SYST BP >= 140 MM HG: CPT | Mod: CPTII,95,, | Performed by: NURSE PRACTITIONER

## 2024-12-23 PROCEDURE — 4010F ACE/ARB THERAPY RXD/TAKEN: CPT | Mod: CPTII,95,, | Performed by: NURSE PRACTITIONER

## 2024-12-23 PROCEDURE — 99214 OFFICE O/P EST MOD 30 MIN: CPT | Mod: 95,,, | Performed by: NURSE PRACTITIONER

## 2024-12-23 RX ORDER — DEXTROAMPHETAMINE SACCHARATE, AMPHETAMINE ASPARTATE, DEXTROAMPHETAMINE SULFATE AND AMPHETAMINE SULFATE 3.75; 3.75; 3.75; 3.75 MG/1; MG/1; MG/1; MG/1
15 TABLET ORAL DAILY
Qty: 30 TABLET | Refills: 0 | Status: SHIPPED | OUTPATIENT
Start: 2025-02-05

## 2024-12-23 RX ORDER — ESZOPICLONE 3 MG/1
3 TABLET, FILM COATED ORAL NIGHTLY PRN
Qty: 30 TABLET | Refills: 2 | Status: SHIPPED | OUTPATIENT
Start: 2024-12-23

## 2024-12-23 RX ORDER — DEXTROAMPHETAMINE SACCHARATE, AMPHETAMINE ASPARTATE MONOHYDRATE, DEXTROAMPHETAMINE SULFATE, AMPHETAMINE SULFATE 9.375; 9.375; 9.375; 9.375 MG/1; MG/1; MG/1; MG/1
37.5 CAPSULE, EXTENDED RELEASE ORAL DAILY
Qty: 30 EACH | Refills: 0 | Status: SHIPPED | OUTPATIENT
Start: 2025-01-04

## 2024-12-23 RX ORDER — DEXTROAMPHETAMINE SACCHARATE, AMPHETAMINE ASPARTATE MONOHYDRATE, DEXTROAMPHETAMINE SULFATE, AMPHETAMINE SULFATE 9.375; 9.375; 9.375; 9.375 MG/1; MG/1; MG/1; MG/1
37.5 CAPSULE, EXTENDED RELEASE ORAL DAILY
Qty: 30 EACH | Refills: 0 | Status: SHIPPED | OUTPATIENT
Start: 2025-03-04

## 2024-12-23 RX ORDER — DEXTROAMPHETAMINE SACCHARATE, AMPHETAMINE ASPARTATE, DEXTROAMPHETAMINE SULFATE AND AMPHETAMINE SULFATE 3.75; 3.75; 3.75; 3.75 MG/1; MG/1; MG/1; MG/1
15 TABLET ORAL DAILY
Qty: 30 TABLET | Refills: 0 | Status: SHIPPED | OUTPATIENT
Start: 2025-01-04

## 2024-12-23 RX ORDER — DEXTROAMPHETAMINE SACCHARATE, AMPHETAMINE ASPARTATE MONOHYDRATE, DEXTROAMPHETAMINE SULFATE, AMPHETAMINE SULFATE 9.375; 9.375; 9.375; 9.375 MG/1; MG/1; MG/1; MG/1
37.5 CAPSULE, EXTENDED RELEASE ORAL DAILY
Qty: 30 EACH | Refills: 0 | Status: SHIPPED | OUTPATIENT
Start: 2025-02-04

## 2024-12-23 RX ORDER — DEXTROAMPHETAMINE SACCHARATE, AMPHETAMINE ASPARTATE, DEXTROAMPHETAMINE SULFATE AND AMPHETAMINE SULFATE 3.75; 3.75; 3.75; 3.75 MG/1; MG/1; MG/1; MG/1
15 TABLET ORAL DAILY
Qty: 30 TABLET | Refills: 0 | Status: SHIPPED | OUTPATIENT
Start: 2025-03-05

## 2024-12-23 NOTE — PROGRESS NOTES
Subjective:       Patient ID: April Wendt Schamberger is a 39 y.o. female.    Chief Complaint: Follow-up (3 mo) and Medication Refill      Visit type: audiovisual    April Wendt Schamberger is a 39 y.o. female who presents today for medication refill.      Current medication(s): Adderall 15 mg in the afternoon and Mydayis 37.5 mg in the AM  Takes Medication: daily  Appetite: No change  Sleep:no new problems  Side effects: none     Patient additionally requesting refill on Lunesta.   She reports no acute concerns but does report intermittent elevation of BP in afternoon.  Takes her Diovan and Metoprolol in AM and then takes 2nd dose of Metoprolol around 1 pm.     The patient location is: [place]    Review of patient's allergies indicates:   Allergen Reactions    Lactose     Azithromycin Nausea And Vomiting       Medication List with Changes/Refills   Current Medications    ACETAMINOPHEN (TYLENOL) 500 MG TABLET    Take 2 tablets (1,000 mg total) by mouth 3 (three) times daily as needed for Pain.    AMITRIPTYLINE (ELAVIL) 25 MG TABLET    Take 1 tablet (25 mg total) by mouth every evening.    BUTALBITAL-ACETAMINOPHEN-CAFFEINE -40 MG (FIORICET, ESGIC) -40 MG PER TABLET    Take 1 tablet by mouth every 6 (six) hours as needed for Headaches.    CHLORHEXIDINE (PERIDEX) 0.12 % SOLUTION    Swish and spit 15mls by mouth three times daily as directed    CHOLESTYRAMINE (QUESTRAN) 4 GRAM PACKET    Take 1 packet (4 g total) by mouth 3 (three) times daily with meals.    DIPHENOXYLATE-ATROPINE 2.5-0.025 MG (LOMOTIL) 2.5-0.025 MG PER TABLET    Take 1 tablet by mouth 4 (four) times daily as needed for Diarrhea (diarrhea).    FLUCONAZOLE (DIFLUCAN) 150 MG TAB    Take 1 tablet by mouth as needed for yeast infection    LOPERAMIDE (IMODIUM) 1 MG/7.5 ML SOLUTION    Take 0.1 mg/kg by mouth every 12 (twelve) hours.    LOPERAMIDE (IMODIUM) 2 MG CAPSULE    Take 2 mg by mouth 4 (four) times daily as needed for Diarrhea.     MECLIZINE (ANTIVERT) 25 MG TABLET    Take 1 tablet (25 mg total) by mouth 3 (three) times daily as needed (motion sickness).    METOPROLOL SUCCINATE (TOPROL-XL) 50 MG 24 HR TABLET    TAKE 1 TABLET BY MOUTH TWICE A DAY    PANTOPRAZOLE (PROTONIX) 40 MG TABLET    TAKE 1 TABLET BY MOUTH ONCE DAILY    PREGABALIN (LYRICA) 50 MG CAPSULE    Take 1 capsule (50 mg total) by mouth 2 (two) times daily.    RIZATRIPTAN (MAXALT-MLT) 10 MG DISINTEGRATING TABLET    Take 1 tablet (10 mg total) by mouth as needed for Migraine (repeat dose once in 2 hours if headache return.).    TIZANIDINE (ZANAFLEX) 4 MG TABLET    Take 1 tablet (4 mg total) by mouth 3 (three) times daily.    TRETINOIN (RETIN-A) 0.025 % CREAM    Apply topically every evening. Pea sized amount to entire face. If irritation occurs, decrease use to every other night.    VALSARTAN (DIOVAN) 320 MG TABLET    Take 1 tablet (320 mg total) by mouth once daily.    VENLAFAXINE (EFFEXOR-XR) 37.5 MG 24 HR CAPSULE    Take 1 capsule (37.5 mg total) by mouth once daily.   Changed and/or Refilled Medications    Modified Medication Previous Medication    DEXTROAMPHETAMINE-AMPHETAMINE (ADDERALL) 15 MG TABLET dextroamphetamine-amphetamine (ADDERALL) 15 mg tablet       Take 1 tablet (15 mg total) by mouth once daily.    Take 1 tablet (15 mg total) by mouth once daily.    DEXTROAMPHETAMINE-AMPHETAMINE (ADDERALL) 15 MG TABLET dextroamphetamine-amphetamine (ADDERALL) 15 mg tablet       Take 1 tablet (15 mg total) by mouth once daily.    Take 1 tablet (15 mg total) by mouth once daily.    DEXTROAMPHETAMINE-AMPHETAMINE (ADDERALL) 15 MG TABLET dextroamphetamine-amphetamine (ADDERALL) 15 mg tablet       Take 1 tablet (15 mg total) by mouth once daily.    Take 1 tablet (15 mg total) by mouth once daily.    DEXTROAMPHETAMINE-AMPHETAMINE (MYDAYIS) 37.5 MG CT24 dextroamphetamine-amphetamine (MYDAYIS) 37.5 mg CT24       Take 37.5 mg by mouth once daily.    Take 37.5 mg by mouth once daily.     "DEXTROAMPHETAMINE-AMPHETAMINE (MYDAYIS) 37.5 MG CT24 dextroamphetamine-amphetamine (MYDAYIS) 37.5 mg CT24       Take 37.5 mg by mouth once daily.    Take 37.5 mg by mouth once daily.    DEXTROAMPHETAMINE-AMPHETAMINE (MYDAYIS) 37.5 MG CT24 dextroamphetamine-amphetamine (MYDAYIS) 37.5 mg CT24       Take 37.5 mg by mouth once daily.    Take 37.5 mg by mouth once daily.    ESZOPICLONE (LUNESTA) 3 MG TAB eszopiclone (LUNESTA) 3 mg Tab       Take 1 tablet (3 mg total) by mouth nightly as needed (insomnia).    Take 1 tablet (3 mg total) by mouth nightly as needed (insomnia).     Review of Systems   Psychiatric/Behavioral:  The patient has insomnia.      Objective:   BP (!) 145/90 (BP Location: Left arm, Patient Position: Sitting)   Ht 5' 8" (1.727 m)   Wt 93 kg (205 lb)   BMI 31.17 kg/m²     Physical Exam  Vitals reviewed: Exam Limited due to Video Consultation.   Constitutional:       General: She is not in acute distress.  HENT:      Head: Normocephalic.   Neurological:      Mental Status: She is alert and oriented to person, place, and time.       Assessment and Plan:       1. Attention deficit hyperactivity disorder (ADHD), combined type    Chronic, stable, continue current medications    - dextroamphetamine-amphetamine (MYDAYIS) 37.5 mg CT24; Take 37.5 mg by mouth once daily.  Dispense: 30 each; Refill: 0  - dextroamphetamine-amphetamine (MYDAYIS) 37.5 mg CT24; Take 37.5 mg by mouth once daily.  Dispense: 30 each; Refill: 0  - dextroamphetamine-amphetamine (ADDERALL) 15 mg tablet; Take 1 tablet (15 mg total) by mouth once daily.  Dispense: 30 tablet; Refill: 0  - dextroamphetamine-amphetamine (MYDAYIS) 37.5 mg CT24; Take 37.5 mg by mouth once daily.  Dispense: 30 each; Refill: 0  - dextroamphetamine-amphetamine (ADDERALL) 15 mg tablet; Take 1 tablet (15 mg total) by mouth once daily.  Dispense: 30 tablet; Refill: 0  - dextroamphetamine-amphetamine (ADDERALL) 15 mg tablet; Take 1 tablet (15 mg total) by mouth once " daily.  Dispense: 30 tablet; Refill: 0    2. Primary insomnia    Chronic, stable, continue current medication    - eszopiclone (LUNESTA) 3 mg Tab; Take 1 tablet (3 mg total) by mouth nightly as needed (insomnia).  Dispense: 30 tablet; Refill: 2    3. Essential (primary) hypertension    Chronic, stable, continue current medications. Patient advised to keep BP log twice per day and bring it and BP cuff to annual appointment with PCP.        Face to Face time with patient: 4  10 minutes of total time spent on the encounter, which includes face to face time and non-face to face time preparing to see the patient (eg, review of tests), Obtaining and/or reviewing separately obtained history, Documenting clinical information in the electronic or other health record, Independently interpreting results (not separately reported) and communicating results to the patient/family/caregiver, or Care coordination (not separately reported).     Each patient to whom he or she provides medical services by telemedicine is:  (1) informed of the relationship between the physician and patient and the respective role of any other health care provider with respect to management of the patient; and (2) notified that he or she may decline to receive medical services by telemedicine and may withdraw from such care at any time.

## 2024-12-28 DIAGNOSIS — K21.9 GASTROESOPHAGEAL REFLUX DISEASE, UNSPECIFIED WHETHER ESOPHAGITIS PRESENT: ICD-10-CM

## 2025-01-05 RX ORDER — PANTOPRAZOLE SODIUM 40 MG/1
40 TABLET, DELAYED RELEASE ORAL DAILY
Qty: 90 TABLET | Refills: 1 | Status: SHIPPED | OUTPATIENT
Start: 2025-01-05 | End: 2026-01-05

## 2025-01-16 DIAGNOSIS — F32.81 PMDD (PREMENSTRUAL DYSPHORIC DISORDER): ICD-10-CM

## 2025-01-16 RX ORDER — VENLAFAXINE HYDROCHLORIDE 37.5 MG/1
37.5 CAPSULE, EXTENDED RELEASE ORAL
Qty: 90 CAPSULE | Refills: 1 | Status: SHIPPED | OUTPATIENT
Start: 2025-01-16

## 2025-01-16 NOTE — TELEPHONE ENCOUNTER
Refill Routing Note   Medication(s) are not appropriate for processing by [facility] for the following reason(s):        No active prescription written by provider  Required labs outdated  Required vitals abnormal    [facility] action(s):  Defer             Appointments  past 12m or future 3m with PCP    Date Provider   Last Visit   7/5/2024 Mata Valencia MD   Next Visit   2/28/2025 Mata Valencia MD   ED visits in past 90 days: 0        Note composed:10:57 AM 01/16/2025

## 2025-01-16 NOTE — TELEPHONE ENCOUNTER
No care due was identified.  [facility] Embedded Care Due Messages. Reference number: [id number].   1/16/2025 7:31:35 AM CST

## 2025-01-18 DIAGNOSIS — I10 BENIGN ESSENTIAL HYPERTENSION: ICD-10-CM

## 2025-01-18 NOTE — TELEPHONE ENCOUNTER
No care due was identified.  [facility] Embedded Care Due Messages. Reference number: [id number].   1/18/2025 12:24:51 AM CST

## 2025-01-19 NOTE — TELEPHONE ENCOUNTER
Refill Routing Note   Medication(s) are not appropriate for processing by [facility] for the following reason(s):        Required vitals abnormal  Required labs outdated    [facility] action(s):  Defer               Appointments  past 12m or future 3m with PCP    Date Provider   Last Visit   7/5/2024 Mata Valencia MD   Next Visit   2/28/2025 Mata Valencia MD   ED visits in past 90 days: 0        Note composed:11:00 PM 01/18/2025

## 2025-01-21 RX ORDER — VALSARTAN 320 MG/1
320 TABLET ORAL
Qty: 90 TABLET | Refills: 1 | Status: SHIPPED | OUTPATIENT
Start: 2025-01-21

## 2025-01-22 DIAGNOSIS — F51.01 PRIMARY INSOMNIA: ICD-10-CM

## 2025-01-24 RX ORDER — ESZOPICLONE 3 MG/1
3 TABLET, FILM COATED ORAL NIGHTLY PRN
Qty: 30 TABLET | Refills: 2 | Status: SHIPPED | OUTPATIENT
Start: 2025-01-24

## 2025-02-03 DIAGNOSIS — F90.2 ATTENTION DEFICIT HYPERACTIVITY DISORDER (ADHD), COMBINED TYPE: ICD-10-CM

## 2025-02-03 RX ORDER — DEXTROAMPHETAMINE SACCHARATE, AMPHETAMINE ASPARTATE MONOHYDRATE, DEXTROAMPHETAMINE SULFATE, AMPHETAMINE SULFATE 9.375; 9.375; 9.375; 9.375 MG/1; MG/1; MG/1; MG/1
37.5 CAPSULE, EXTENDED RELEASE ORAL DAILY
Qty: 30 EACH | Refills: 0 | Status: SHIPPED | OUTPATIENT
Start: 2025-02-03 | End: 2025-02-28 | Stop reason: SDUPTHER

## 2025-02-03 RX ORDER — DEXTROAMPHETAMINE SACCHARATE, AMPHETAMINE ASPARTATE, DEXTROAMPHETAMINE SULFATE AND AMPHETAMINE SULFATE 3.75; 3.75; 3.75; 3.75 MG/1; MG/1; MG/1; MG/1
15 TABLET ORAL DAILY
Qty: 30 TABLET | Refills: 0 | Status: SHIPPED | OUTPATIENT
Start: 2025-02-03 | End: 2025-02-28 | Stop reason: SDUPTHER

## 2025-02-03 NOTE — TELEPHONE ENCOUNTER
Please see the attached refill request.  Lov 12/24 w/ UMAIR Cornell NP  Has appt w/ Dr Valencia 2/28/25

## 2025-02-23 DIAGNOSIS — F51.01 PRIMARY INSOMNIA: ICD-10-CM

## 2025-02-24 RX ORDER — ESZOPICLONE 3 MG/1
3 TABLET, FILM COATED ORAL NIGHTLY PRN
Qty: 30 TABLET | Refills: 2 | Status: SHIPPED | OUTPATIENT
Start: 2025-02-24 | End: 2025-02-28 | Stop reason: SDUPTHER

## 2025-02-24 NOTE — TELEPHONE ENCOUNTER
Care Due:                  Date            Visit Type   Department     Provider  --------------------------------------------------------------------------------                                ESTABLISHED                              PATIENT -    [facility] INTERNAL  Last Visit: 07-      VIRTUAL      MEDICINE       Mata Valencia                              MYCHART                              FOLLOWUP/OF  [facility] INTERNAL  Next Visit: 02-      FICE VISIT   MEDICINE       Mata Valencia                                                            Last  Test          Frequency    Reason                     Performed    Due Date  --------------------------------------------------------------------------------    CMP.........  12 months..  valsartan, venlafaxine...  08- 08-    Health [facility] Embedded Care Due Messages. Reference number: [id number].   2/23/2025 10:12:44 PM CST

## 2025-02-28 ENCOUNTER — OFFICE VISIT (OUTPATIENT)
Dept: INTERNAL MEDICINE | Facility: CLINIC | Age: 40
End: 2025-02-28
Payer: COMMERCIAL

## 2025-02-28 VITALS
WEIGHT: 200.81 LBS | RESPIRATION RATE: 18 BRPM | BODY MASS INDEX: 30.44 KG/M2 | HEIGHT: 68 IN | SYSTOLIC BLOOD PRESSURE: 130 MMHG | DIASTOLIC BLOOD PRESSURE: 80 MMHG | HEART RATE: 81 BPM | TEMPERATURE: 98 F | OXYGEN SATURATION: 97 %

## 2025-02-28 DIAGNOSIS — I10 ESSENTIAL (PRIMARY) HYPERTENSION: ICD-10-CM

## 2025-02-28 DIAGNOSIS — Z00.00 WELL ADULT EXAM: Primary | ICD-10-CM

## 2025-02-28 DIAGNOSIS — K58.0 IRRITABLE BOWEL SYNDROME WITH DIARRHEA: ICD-10-CM

## 2025-02-28 DIAGNOSIS — F90.2 ATTENTION DEFICIT HYPERACTIVITY DISORDER (ADHD), COMBINED TYPE: ICD-10-CM

## 2025-02-28 DIAGNOSIS — F51.01 PRIMARY INSOMNIA: ICD-10-CM

## 2025-02-28 DIAGNOSIS — F41.9 ANXIETY: ICD-10-CM

## 2025-02-28 DIAGNOSIS — G43.809 OTHER MIGRAINE WITHOUT STATUS MIGRAINOSUS, NOT INTRACTABLE: ICD-10-CM

## 2025-02-28 DIAGNOSIS — Z12.31 VISIT FOR SCREENING MAMMOGRAM: ICD-10-CM

## 2025-02-28 PROCEDURE — 99999 PR PBB SHADOW E&M-EST. PATIENT-LVL V: CPT | Mod: PBBFAC,,, | Performed by: FAMILY MEDICINE

## 2025-02-28 RX ORDER — DEXTROAMPHETAMINE SACCHARATE, AMPHETAMINE ASPARTATE, DEXTROAMPHETAMINE SULFATE AND AMPHETAMINE SULFATE 3.75; 3.75; 3.75; 3.75 MG/1; MG/1; MG/1; MG/1
15 TABLET ORAL DAILY
Qty: 30 TABLET | Refills: 0 | Status: SHIPPED | OUTPATIENT
Start: 2025-03-30

## 2025-02-28 RX ORDER — AMITRIPTYLINE HYDROCHLORIDE 25 MG/1
25 TABLET, FILM COATED ORAL NIGHTLY
Qty: 90 TABLET | Refills: 3 | Status: SHIPPED | OUTPATIENT
Start: 2025-02-28 | End: 2026-02-28

## 2025-02-28 RX ORDER — DEXTROAMPHETAMINE SACCHARATE, AMPHETAMINE ASPARTATE MONOHYDRATE, DEXTROAMPHETAMINE SULFATE, AMPHETAMINE SULFATE 9.375; 9.375; 9.375; 9.375 MG/1; MG/1; MG/1; MG/1
37.5 CAPSULE, EXTENDED RELEASE ORAL DAILY
Qty: 30 EACH | Refills: 0 | Status: SHIPPED | OUTPATIENT
Start: 2025-02-28

## 2025-02-28 RX ORDER — VENLAFAXINE HYDROCHLORIDE 37.5 MG/1
37.5 CAPSULE, EXTENDED RELEASE ORAL DAILY
Qty: 90 CAPSULE | Refills: 3 | Status: SHIPPED | OUTPATIENT
Start: 2025-02-28

## 2025-02-28 RX ORDER — METOPROLOL SUCCINATE 50 MG/1
50 TABLET, EXTENDED RELEASE ORAL 2 TIMES DAILY
Qty: 180 TABLET | Refills: 3 | Status: SHIPPED | OUTPATIENT
Start: 2025-02-28

## 2025-02-28 RX ORDER — DEXTROAMPHETAMINE SACCHARATE, AMPHETAMINE ASPARTATE MONOHYDRATE, DEXTROAMPHETAMINE SULFATE, AMPHETAMINE SULFATE 9.375; 9.375; 9.375; 9.375 MG/1; MG/1; MG/1; MG/1
37.5 CAPSULE, EXTENDED RELEASE ORAL DAILY
Qty: 30 EACH | Refills: 0 | Status: SHIPPED | OUTPATIENT
Start: 2025-04-29

## 2025-02-28 RX ORDER — DEXTROAMPHETAMINE SACCHARATE, AMPHETAMINE ASPARTATE, DEXTROAMPHETAMINE SULFATE AND AMPHETAMINE SULFATE 3.75; 3.75; 3.75; 3.75 MG/1; MG/1; MG/1; MG/1
15 TABLET ORAL DAILY
Qty: 30 TABLET | Refills: 0 | Status: SHIPPED | OUTPATIENT
Start: 2025-02-28

## 2025-02-28 RX ORDER — DEXTROAMPHETAMINE SACCHARATE, AMPHETAMINE ASPARTATE, DEXTROAMPHETAMINE SULFATE AND AMPHETAMINE SULFATE 3.75; 3.75; 3.75; 3.75 MG/1; MG/1; MG/1; MG/1
15 TABLET ORAL DAILY
Qty: 30 TABLET | Refills: 0 | Status: SHIPPED | OUTPATIENT
Start: 2025-04-29

## 2025-02-28 RX ORDER — ESZOPICLONE 3 MG/1
3 TABLET, FILM COATED ORAL NIGHTLY PRN
Qty: 30 TABLET | Refills: 2 | Status: SHIPPED | OUTPATIENT
Start: 2025-02-28

## 2025-02-28 RX ORDER — VALSARTAN 320 MG/1
320 TABLET ORAL DAILY
Qty: 90 TABLET | Refills: 3 | Status: SHIPPED | OUTPATIENT
Start: 2025-02-28

## 2025-02-28 RX ORDER — DEXTROAMPHETAMINE SACCHARATE, AMPHETAMINE ASPARTATE MONOHYDRATE, DEXTROAMPHETAMINE SULFATE, AMPHETAMINE SULFATE 9.375; 9.375; 9.375; 9.375 MG/1; MG/1; MG/1; MG/1
37.5 CAPSULE, EXTENDED RELEASE ORAL DAILY
Qty: 30 EACH | Refills: 0 | Status: SHIPPED | OUTPATIENT
Start: 2025-03-30

## 2025-02-28 NOTE — PROGRESS NOTES
Subjective     Patient ID: April Wendt Schamberger is a 39 y.o. female.    Chief Complaint: Annual Exam    HPI 39-year-old white female presents to clinic today for annual physical exam.  Hypertension remains well controlled on valsartan 320 mg daily and metoprolol 50 mg b.i.d..  ADHD remains stable on Mydayis 37.5 mg daily and Adderall 15 mg each afternoon as needed for concentration.  She denies any side effects such as headaches, dizziness, chest pain, shortness of breath, or palpitations.  She continues to use Lunesta as needed for occasional insomnia with improvement of symptoms.  She continues to be followed by GI secondary to irritable bowel syndrome and diarrhea which remains stable on amitriptyline 25 mg each evening.  Anxiety remains stable on venlafaxine 37.5 mg daily.  She reports occasional menstrual migraines for which she uses Fioricet and Maxalt as needed with improvement of symptoms.  She reports a past surgical history of tonsillectomy, adenoidectomy, EGD, colonoscopy, LASIK, and right ulnar release.  She reports a family history of diabetes, hypertension, breast cancer, and prostate cancer.  She is up-to-date with all vaccinations.  Mammogram has been ordered.  Review of Systems   Constitutional:  Negative for activity change, appetite change, chills, fatigue, fever and unexpected weight change.   HENT:  Negative for nasal congestion, ear pain, hearing loss, postnasal drip, rhinorrhea, sinus pressure/congestion, sore throat, tinnitus and trouble swallowing.    Eyes:  Negative for discharge, redness, itching and visual disturbance.   Respiratory:  Negative for cough, chest tightness, shortness of breath and wheezing.    Cardiovascular:  Negative for chest pain and palpitations.   Gastrointestinal:  Positive for blood in stool and diarrhea. Negative for abdominal pain, constipation, nausea and vomiting.   Endocrine: Negative for polydipsia and polyuria.   Genitourinary:  Negative for decreased urine  volume, difficulty urinating, dysuria, frequency, hematuria, menstrual problem and urgency.   Musculoskeletal:  Positive for neck pain. Negative for arthralgias, back pain, joint swelling, myalgias and neck stiffness.   Integumentary:  Negative for rash.   Neurological:  Negative for dizziness, weakness, light-headedness and headaches.   Psychiatric/Behavioral: Negative.  Negative for confusion and dysphoric mood.           Objective     Physical Exam  Vitals and nursing note reviewed.   Constitutional:       General: She is not in acute distress.     Appearance: She is well-developed. She is not diaphoretic.   HENT:      Head: Normocephalic and atraumatic.      Right Ear: External ear normal.      Left Ear: External ear normal.      Nose: Nose normal.      Mouth/Throat:      Pharynx: No oropharyngeal exudate.   Eyes:      General: No scleral icterus.        Right eye: No discharge.         Left eye: No discharge.      Conjunctiva/sclera: Conjunctivae normal.      Pupils: Pupils are equal, round, and reactive to light.   Neck:      Thyroid: No thyromegaly.      Vascular: No JVD.      Trachea: No tracheal deviation.   Cardiovascular:      Rate and Rhythm: Normal rate and regular rhythm.      Heart sounds: Normal heart sounds. No murmur heard.     No friction rub. No gallop.   Pulmonary:      Effort: Pulmonary effort is normal. No respiratory distress.      Breath sounds: Normal breath sounds. No stridor. No wheezing or rales.   Abdominal:      General: Bowel sounds are normal. There is no distension.      Palpations: Abdomen is soft. There is no mass.      Tenderness: There is no abdominal tenderness. There is no guarding or rebound.   Musculoskeletal:         General: No tenderness. Normal range of motion.      Cervical back: Normal range of motion and neck supple.   Lymphadenopathy:      Cervical: No cervical adenopathy.   Skin:     General: Skin is warm and dry.      Coloration: Skin is not pale.      Findings: No  erythema or rash.   Neurological:      Mental Status: She is alert and oriented to person, place, and time.   Psychiatric:         Behavior: Behavior normal.         Thought Content: Thought content normal.         Judgment: Judgment normal.            Assessment and Plan     1. Well adult exam  -     CBC Auto Differential; Future; Expected date: 02/28/2025  -     Comprehensive Metabolic Panel; Future; Expected date: 02/28/2025  -     Lipid Panel; Future; Expected date: 02/28/2025  -     T4, Free; Future; Expected date: 02/28/2025  -     TSH; Future; Expected date: 02/28/2025  -     Hemoglobin A1C; Future; Expected date: 02/28/2025  -     Urinalysis, Reflex to Urine Culture Urine, Clean Catch; Future; Expected date: 02/28/2025  -     Toxicology Screen, Urine; Future; Expected date: 02/28/2025    2. Essential (primary) hypertension  -     metoprolol succinate (TOPROL-XL) 50 MG 24 hr tablet; Take 1 tablet (50 mg total) by mouth 2 (two) times daily.  Dispense: 180 tablet; Refill: 3  -     valsartan (DIOVAN) 320 MG tablet; Take 1 tablet (320 mg total) by mouth once daily.  Dispense: 90 tablet; Refill: 3    3. Other migraine without status migrainosus, not intractable    4. Anxiety  -     amitriptyline (ELAVIL) 25 MG tablet; Take 1 tablet (25 mg total) by mouth every evening.  Dispense: 90 tablet; Refill: 3  -     venlafaxine (EFFEXOR-XR) 37.5 MG 24 hr capsule; Take 1 capsule (37.5 mg total) by mouth once daily.  Dispense: 90 capsule; Refill: 3    5. Primary insomnia  -     eszopiclone (LUNESTA) 3 mg Tab; Take 1 tablet (3 mg total) by mouth nightly as needed (insomnia).  Dispense: 30 tablet; Refill: 2    6. Attention deficit hyperactivity disorder (ADHD), combined type  -     Toxicology Screen, Urine; Future; Expected date: 02/28/2025  -     dextroamphetamine-amphetamine (ADDERALL) 15 mg tablet; Take 1 tablet (15 mg total) by mouth once daily.  Dispense: 30 tablet; Refill: 0  -     dextroamphetamine-amphetamine  (ADDERALL) 15 mg tablet; Take 1 tablet (15 mg total) by mouth once daily.  Dispense: 30 tablet; Refill: 0  -     dextroamphetamine-amphetamine (ADDERALL) 15 mg tablet; Take 1 tablet (15 mg total) by mouth once daily.  Dispense: 30 tablet; Refill: 0  -     dextroamphetamine-amphetamine (MYDAYIS) 37.5 mg CT24; Take 37.5 mg by mouth once daily.  Dispense: 30 each; Refill: 0  -     dextroamphetamine-amphetamine (MYDAYIS) 37.5 mg CT24; Take 37.5 mg by mouth once daily.  Dispense: 30 each; Refill: 0  -     dextroamphetamine-amphetamine (MYDAYIS) 37.5 mg CT24; Take 37.5 mg by mouth once daily.  Dispense: 30 each; Refill: 0    7. Irritable bowel syndrome with diarrhea  -     amitriptyline (ELAVIL) 25 MG tablet; Take 1 tablet (25 mg total) by mouth every evening.  Dispense: 90 tablet; Refill: 3    8. Nonintractable headache, unspecified chronicity pattern, unspecified headache type    9. Visit for screening mammogram  -     Mammo Digital Screening Bilat w/ Rian (XPD); Future; Expected date: 02/28/2025        1. CBC, CMP, UA, TSH, free T4, fasting lipids, hemoglobin A1c, and urine toxicology screen for medication compliance.  2. Continue valsartan 320 mg daily and metoprolol 50 mg b.i.d..  Hypertension is well controlled.    3. Continue use of Fioricet or Maxalt as needed.  Migraine headaches are stable.    4. Continue venlafaxine 37.5 mg daily.  Anxiety is stable.  5.  has been checked and the patient is compliant with medication.  Continue use of Lunesta 3 mg nightly as needed for insomnia.  Refill provided.  6.  has been checked and the patient is compliant with medication.  Three separate prescriptions of Mydayis 37.5 mg daily and Adderall 15 mg each afternoon as needed for concentration have been sent to the patient's pharmacy.    7. Continue amitriptyline 25 mg daily.  IBS is stable.  8. Screening mammogram.  9. Return to clinic as needed or in 3 months for ADHD follow-up.               Follow up in about 3 months  (around 5/28/2025), or if symptoms worsen or fail to improve, for Virtual Visit.

## 2025-04-03 ENCOUNTER — TELEPHONE (OUTPATIENT)
Dept: INTERNAL MEDICINE | Facility: CLINIC | Age: 40
End: 2025-04-03
Payer: COMMERCIAL

## 2025-04-03 NOTE — TELEPHONE ENCOUNTER
----- Message from Shazia sent at 4/3/2025 12:51 PM CDT -----  Pt needs external orders to take labs at [facility] [place]

## 2025-04-03 NOTE — TELEPHONE ENCOUNTER
Lvm informing [place] we [place] [facility] [place] can [place] orders to [facility], [facility] or [facility].

## 2025-04-07 ENCOUNTER — PATIENT MESSAGE (OUTPATIENT)
Dept: INTERNAL MEDICINE | Facility: CLINIC | Age: 40
End: 2025-04-07
Payer: COMMERCIAL

## 2025-04-13 DIAGNOSIS — K58.0 IRRITABLE BOWEL SYNDROME WITH DIARRHEA: ICD-10-CM

## 2025-04-14 RX ORDER — DIPHENOXYLATE HYDROCHLORIDE AND ATROPINE SULFATE 2.5; .025 MG/1; MG/1
1 TABLET ORAL 4 TIMES DAILY PRN
Qty: 180 TABLET | Refills: 2 | Status: SHIPPED | OUTPATIENT
Start: 2025-04-14 | End: 2026-07-10

## 2025-04-27 DIAGNOSIS — F51.01 PRIMARY INSOMNIA: ICD-10-CM

## 2025-04-27 NOTE — TELEPHONE ENCOUNTER
Care Due:                  Date            Visit Type   Department     Provider  --------------------------------------------------------------------------------                                MYCHART                              FOLLOWUP/OF  [facility] INTERNAL  Last Visit: 02-      FICE VISIT   MEDICINE       Mata Valencia                              ESTABLISHED                              PATIENT -    [facility] INTERNAL  Next Visit: 05-      VIRTUAL      MEDICINE       Mata Valencia                                                            Last  Test          Frequency    Reason                     Performed    Due Date  --------------------------------------------------------------------------------    CMP.........  12 months..  valsartan, venlafaxine...  08- 08-    Health [facility] Embedded Care Due Messages. Reference number: [id number].   4/27/2025 3:40:57 PM CDT

## 2025-04-28 ENCOUNTER — PATIENT MESSAGE (OUTPATIENT)
Dept: INTERNAL MEDICINE | Facility: CLINIC | Age: 40
End: 2025-04-28
Payer: COMMERCIAL

## 2025-04-28 RX ORDER — ESZOPICLONE 3 MG/1
3 TABLET, FILM COATED ORAL NIGHTLY PRN
Qty: 30 TABLET | Refills: 2 | Status: SHIPPED | OUTPATIENT
Start: 2025-04-28

## 2025-04-29 ENCOUNTER — TELEPHONE (OUTPATIENT)
Dept: INTERNAL MEDICINE | Facility: CLINIC | Age: 40
End: 2025-04-29
Payer: COMMERCIAL

## 2025-04-29 NOTE — TELEPHONE ENCOUNTER
----- Message from Samira sent at 4/29/2025 11:34 AM CDT -----  Contact: [facility] [phone]  [email or web address] Patient is calling for Medical Advice regarding:Ordered requested due to abnormal mammogram.      Right breast ultrasoundHow long has patient had these symptoms:Pharmacy name and phone#:[facility] wants a call back or [facility]:Comments:Please advise patient replies from provider may take up to 48 hours.

## 2025-04-29 NOTE — TELEPHONE ENCOUNTER
Who ordered the initial mammogram and rare was the initial mammogram performed as I do not have access to the mammogram results?  I need to see the original mammogram to be able to best know what imaging as necessary.  Please inform the patient.  Thank you.

## 2025-05-01 ENCOUNTER — PATIENT MESSAGE (OUTPATIENT)
Dept: INTERNAL MEDICINE | Facility: CLINIC | Age: 40
End: 2025-05-01
Payer: COMMERCIAL

## 2025-05-01 DIAGNOSIS — N63.10 MASS OF RIGHT BREAST, UNSPECIFIED QUADRANT: Primary | ICD-10-CM

## 2025-05-01 NOTE — TELEPHONE ENCOUNTER
Right breast ultrasound ordered for further evaluation of right breast mass noted on mammogram.  Thank you.

## 2025-05-03 DIAGNOSIS — F90.2 ATTENTION DEFICIT HYPERACTIVITY DISORDER (ADHD), COMBINED TYPE: ICD-10-CM

## 2025-05-03 RX ORDER — DEXTROAMPHETAMINE SACCHARATE, AMPHETAMINE ASPARTATE MONOHYDRATE, DEXTROAMPHETAMINE SULFATE, AMPHETAMINE SULFATE 9.375; 9.375; 9.375; 9.375 MG/1; MG/1; MG/1; MG/1
37.5 CAPSULE, EXTENDED RELEASE ORAL DAILY
Qty: 30 EACH | Refills: 0 | Status: CANCELLED | OUTPATIENT
Start: 2025-05-03

## 2025-05-03 NOTE — TELEPHONE ENCOUNTER
No care due was identified.  Health [facility] Embedded Care Due Messages. Reference number: [id number].   5/03/2025 9:25:06 AM CDT

## 2025-05-05 RX ORDER — DEXTROAMPHETAMINE SACCHARATE, AMPHETAMINE ASPARTATE, DEXTROAMPHETAMINE SULFATE AND AMPHETAMINE SULFATE 3.75; 3.75; 3.75; 3.75 MG/1; MG/1; MG/1; MG/1
15 TABLET ORAL DAILY
Qty: 30 TABLET | Refills: 0 | Status: SHIPPED | OUTPATIENT
Start: 2025-05-05

## 2025-05-06 ENCOUNTER — TELEPHONE (OUTPATIENT)
Dept: INTERNAL MEDICINE | Facility: CLINIC | Age: 40
End: 2025-05-06
Payer: COMMERCIAL

## 2025-05-06 NOTE — TELEPHONE ENCOUNTER
Received call from Joycelyn at [facility]    Re-faxed order for Right breast US to [phone]

## 2025-05-08 ENCOUNTER — PATIENT MESSAGE (OUTPATIENT)
Dept: INTERNAL MEDICINE | Facility: CLINIC | Age: 40
End: 2025-05-08
Payer: COMMERCIAL

## 2025-05-30 ENCOUNTER — OFFICE VISIT (OUTPATIENT)
Dept: INTERNAL MEDICINE | Facility: CLINIC | Age: 40
End: 2025-05-30
Payer: COMMERCIAL

## 2025-05-30 VITALS — WEIGHT: 200 LBS | BODY MASS INDEX: 30.31 KG/M2 | HEIGHT: 68 IN

## 2025-05-30 DIAGNOSIS — F90.2 ATTENTION DEFICIT HYPERACTIVITY DISORDER (ADHD), COMBINED TYPE: ICD-10-CM

## 2025-05-30 DIAGNOSIS — F51.01 PRIMARY INSOMNIA: ICD-10-CM

## 2025-05-30 DIAGNOSIS — Z09 FOLLOW-UP EXAM, 3-6 MONTHS SINCE PREVIOUS EXAM: Primary | ICD-10-CM

## 2025-05-30 RX ORDER — DEXTROAMPHETAMINE SACCHARATE, AMPHETAMINE ASPARTATE, DEXTROAMPHETAMINE SULFATE AND AMPHETAMINE SULFATE 3.75; 3.75; 3.75; 3.75 MG/1; MG/1; MG/1; MG/1
15 TABLET ORAL DAILY
Qty: 30 TABLET | Refills: 0 | Status: SHIPPED | OUTPATIENT
Start: 2025-05-30

## 2025-05-30 RX ORDER — DEXTROAMPHETAMINE SACCHARATE, AMPHETAMINE ASPARTATE, DEXTROAMPHETAMINE SULFATE AND AMPHETAMINE SULFATE 3.75; 3.75; 3.75; 3.75 MG/1; MG/1; MG/1; MG/1
15 TABLET ORAL DAILY
Qty: 30 TABLET | Refills: 0 | Status: SHIPPED | OUTPATIENT
Start: 2025-06-29

## 2025-05-30 RX ORDER — DEXTROAMPHETAMINE SACCHARATE, AMPHETAMINE ASPARTATE, DEXTROAMPHETAMINE SULFATE AND AMPHETAMINE SULFATE 3.75; 3.75; 3.75; 3.75 MG/1; MG/1; MG/1; MG/1
15 TABLET ORAL DAILY
Qty: 30 TABLET | Refills: 0 | Status: SHIPPED | OUTPATIENT
Start: 2025-07-29

## 2025-05-30 RX ORDER — DEXTROAMPHETAMINE SACCHARATE, AMPHETAMINE ASPARTATE MONOHYDRATE, DEXTROAMPHETAMINE SULFATE, AMPHETAMINE SULFATE 9.375; 9.375; 9.375; 9.375 MG/1; MG/1; MG/1; MG/1
37.5 CAPSULE, EXTENDED RELEASE ORAL DAILY
Qty: 30 EACH | Refills: 0 | Status: SHIPPED | OUTPATIENT
Start: 2025-05-30

## 2025-05-30 RX ORDER — DEXTROAMPHETAMINE SACCHARATE, AMPHETAMINE ASPARTATE MONOHYDRATE, DEXTROAMPHETAMINE SULFATE, AMPHETAMINE SULFATE 9.375; 9.375; 9.375; 9.375 MG/1; MG/1; MG/1; MG/1
37.5 CAPSULE, EXTENDED RELEASE ORAL DAILY
Qty: 30 EACH | Refills: 0 | Status: SHIPPED | OUTPATIENT
Start: 2025-07-29

## 2025-05-30 RX ORDER — DEXTROAMPHETAMINE SACCHARATE, AMPHETAMINE ASPARTATE MONOHYDRATE, DEXTROAMPHETAMINE SULFATE, AMPHETAMINE SULFATE 9.375; 9.375; 9.375; 9.375 MG/1; MG/1; MG/1; MG/1
37.5 CAPSULE, EXTENDED RELEASE ORAL DAILY
Qty: 30 EACH | Refills: 0 | Status: SHIPPED | OUTPATIENT
Start: 2025-06-29

## 2025-05-30 NOTE — PROGRESS NOTES
Subjective     Patient ID: April Wendt Schamberger is a 40 y.o. female.    Chief Complaint: ADHD (Medication refills)    The patient location is: [place]  The chief complaint leading to consultation is:  ADHD/medication refill    Visit type: audiovisual    Face to Face time with patient:  4 minutes  15 minutes of total time spent on the encounter, which includes face to face time and non-face to face time preparing to see the patient (eg, review of tests), Obtaining and/or reviewing separately obtained history, Documenting clinical information in the electronic or other health record, Independently interpreting results (not separately reported) and communicating results to the patient/family/caregiver, or Care coordination (not separately reported).         Each patient to whom he or she provides medical services by telemedicine is:  (1) informed of the relationship between the physician and patient and the respective role of any other health care provider with respect to management of the patient; and (2) notified that he or she may decline to receive medical services by telemedicine and may withdraw from such care at any time.    Notes:  40-year-old white female is evaluated through telemedicine visit for ADHD follow-up.  She continues to remain stable on Mydayis 37.5 mg daily and Adderall 15 mg each afternoon as needed for concentration.  She denies any side effects such as headaches, dizziness, chest pain, shortness of breath, or palpitations.  She continues to use Lunesta as needed for occasional insomnia with improvement of symptoms.                      Review of Systems   Constitutional:  Negative for appetite change, chills, fatigue and fever.   HENT:  Negative for nasal congestion, ear pain, hearing loss, postnasal drip, rhinorrhea, sinus pressure/congestion, sore throat and tinnitus.    Eyes:  Negative for redness, itching and visual disturbance.   Respiratory:  Negative for cough, chest tightness and  shortness of breath.    Cardiovascular:  Negative for chest pain and palpitations.   Gastrointestinal:  Negative for abdominal pain, constipation, diarrhea, nausea and vomiting.   Genitourinary:  Negative for decreased urine volume, difficulty urinating, dysuria, frequency, hematuria and urgency.   Musculoskeletal:  Negative for back pain, myalgias, neck pain and neck stiffness.   Integumentary:  Negative for rash.   Neurological:  Negative for dizziness, light-headedness and headaches.   Psychiatric/Behavioral: Negative.            Objective     Physical Exam  Constitutional:       Appearance: Normal appearance.   HENT:      Head: Normocephalic and atraumatic.   Pulmonary:      Effort: Pulmonary effort is normal.   Neurological:      Mental Status: She is alert and oriented to person, place, and time.   Psychiatric:         Mood and Affect: Mood normal.         Behavior: Behavior normal.         Thought Content: Thought content normal.         Judgment: Judgment normal.            Assessment and Plan     1. Follow-up exam, 3-6 months since previous exam    2. Attention deficit hyperactivity disorder (ADHD), combined type  -     dextroamphetamine-amphetamine (ADDERALL) 15 mg tablet; Take 1 tablet (15 mg total) by mouth once daily.  Dispense: 30 tablet; Refill: 0  -     dextroamphetamine-amphetamine (ADDERALL) 15 mg tablet; Take 1 tablet (15 mg total) by mouth once daily.  Dispense: 30 tablet; Refill: 0  -     dextroamphetamine-amphetamine (ADDERALL) 15 mg tablet; Take 1 tablet (15 mg total) by mouth once daily.  Dispense: 30 tablet; Refill: 0  -     dextroamphetamine-amphetamine (MYDAYIS) 37.5 mg CT24; Take 37.5 mg by mouth once daily.  Dispense: 30 each; Refill: 0  -     dextroamphetamine-amphetamine (MYDAYIS) 37.5 mg CT24; Take 37.5 mg by mouth once daily.  Dispense: 30 each; Refill: 0  -     dextroamphetamine-amphetamine (MYDAYIS) 37.5 mg CT24; Take 37.5 mg by mouth once daily.  Dispense: 30 each; Refill: 0    3.  Primary insomnia        1.  has been checked and the patient is compliant with medication.    2. 3 separate prescriptions of Mydayis 37.5 mg daily and Adderall 15 mg each afternoon as needed for concentration have been sent to the patient's pharmacy.  ADHD is stable.    3. Continue use of Lunesta as needed.  Insomnia stable.    4. Return to clinic as needed or in 3 months for ADHD follow-up.             Follow up in about 3 months (around 8/30/2025), or if symptoms worsen or fail to improve, for Virtual Visit.

## 2025-06-04 ENCOUNTER — TELEPHONE (OUTPATIENT)
Dept: INTERNAL MEDICINE | Facility: CLINIC | Age: 40
End: 2025-06-04
Payer: COMMERCIAL

## 2025-06-29 DIAGNOSIS — K21.9 GASTROESOPHAGEAL REFLUX DISEASE, UNSPECIFIED WHETHER ESOPHAGITIS PRESENT: ICD-10-CM

## 2025-07-01 RX ORDER — PANTOPRAZOLE SODIUM 40 MG/1
40 TABLET, DELAYED RELEASE ORAL
Qty: 90 TABLET | Refills: 1 | Status: SHIPPED | OUTPATIENT
Start: 2025-07-01

## 2025-09-02 DIAGNOSIS — K58.0 IRRITABLE BOWEL SYNDROME WITH DIARRHEA: ICD-10-CM

## 2025-09-02 DIAGNOSIS — F90.2 ATTENTION DEFICIT HYPERACTIVITY DISORDER (ADHD), COMBINED TYPE: ICD-10-CM

## 2025-09-02 RX ORDER — DEXTROAMPHETAMINE SACCHARATE, AMPHETAMINE ASPARTATE, DEXTROAMPHETAMINE SULFATE AND AMPHETAMINE SULFATE 3.75; 3.75; 3.75; 3.75 MG/1; MG/1; MG/1; MG/1
15 TABLET ORAL DAILY
Qty: 30 TABLET | Refills: 0 | Status: SHIPPED | OUTPATIENT
Start: 2025-09-02

## 2025-09-02 RX ORDER — DIPHENOXYLATE HYDROCHLORIDE AND ATROPINE SULFATE 2.5; .025 MG/1; MG/1
1 TABLET ORAL 4 TIMES DAILY PRN
Qty: 180 TABLET | Refills: 2 | Status: SHIPPED | OUTPATIENT
Start: 2025-09-02

## 2025-09-02 RX ORDER — DEXTROAMPHETAMINE SACCHARATE, AMPHETAMINE ASPARTATE MONOHYDRATE, DEXTROAMPHETAMINE SULFATE, AMPHETAMINE SULFATE 9.375; 9.375; 9.375; 9.375 MG/1; MG/1; MG/1; MG/1
37.5 CAPSULE, EXTENDED RELEASE ORAL DAILY
Qty: 30 EACH | Refills: 0 | Status: SHIPPED | OUTPATIENT
Start: 2025-09-02

## 2025-09-04 DIAGNOSIS — M62.838 MUSCLE SPASM: ICD-10-CM

## 2025-09-04 DIAGNOSIS — M79.18 MYOFASCIAL PAIN SYNDROME, CERVICAL: ICD-10-CM

## 2025-09-04 DIAGNOSIS — M54.2 CERVICAL PAIN (NECK): ICD-10-CM

## 2025-09-04 RX ORDER — TIZANIDINE 4 MG/1
4 TABLET ORAL 3 TIMES DAILY
Qty: 270 TABLET | Refills: 1 | Status: SHIPPED | OUTPATIENT
Start: 2025-09-04

## 2025-09-05 ENCOUNTER — OFFICE VISIT (OUTPATIENT)
Dept: INTERNAL MEDICINE | Facility: CLINIC | Age: 40
End: 2025-09-05
Payer: COMMERCIAL

## 2025-09-05 DIAGNOSIS — Z09 FOLLOW-UP EXAM, 3-6 MONTHS SINCE PREVIOUS EXAM: Primary | ICD-10-CM

## 2025-09-05 DIAGNOSIS — F90.2 ATTENTION DEFICIT HYPERACTIVITY DISORDER (ADHD), COMBINED TYPE: ICD-10-CM

## 2025-09-05 RX ORDER — DEXTROAMPHETAMINE SACCHARATE, AMPHETAMINE ASPARTATE, DEXTROAMPHETAMINE SULFATE AND AMPHETAMINE SULFATE 3.75; 3.75; 3.75; 3.75 MG/1; MG/1; MG/1; MG/1
15 TABLET ORAL DAILY
Qty: 30 TABLET | Refills: 0 | Status: SHIPPED | OUTPATIENT
Start: 2025-11-01

## 2025-09-05 RX ORDER — DEXTROAMPHETAMINE SACCHARATE, AMPHETAMINE ASPARTATE MONOHYDRATE, DEXTROAMPHETAMINE SULFATE, AMPHETAMINE SULFATE 9.375; 9.375; 9.375; 9.375 MG/1; MG/1; MG/1; MG/1
37.5 CAPSULE, EXTENDED RELEASE ORAL DAILY
Qty: 30 EACH | Refills: 0 | Status: SHIPPED | OUTPATIENT
Start: 2025-10-02

## 2025-09-05 RX ORDER — DEXTROAMPHETAMINE SACCHARATE, AMPHETAMINE ASPARTATE MONOHYDRATE, DEXTROAMPHETAMINE SULFATE, AMPHETAMINE SULFATE 9.375; 9.375; 9.375; 9.375 MG/1; MG/1; MG/1; MG/1
37.5 CAPSULE, EXTENDED RELEASE ORAL DAILY
Qty: 30 EACH | Refills: 0 | Status: SHIPPED | OUTPATIENT
Start: 2025-11-01

## 2025-09-05 RX ORDER — DEXTROAMPHETAMINE SACCHARATE, AMPHETAMINE ASPARTATE, DEXTROAMPHETAMINE SULFATE AND AMPHETAMINE SULFATE 3.75; 3.75; 3.75; 3.75 MG/1; MG/1; MG/1; MG/1
15 TABLET ORAL DAILY
Qty: 30 TABLET | Refills: 0 | Status: SHIPPED | OUTPATIENT
Start: 2025-10-02

## (undated) DEVICE — SEE MEDLINE ITEM 157116

## (undated) DEVICE — BLADE SCALP OPHTL BEVEL STR

## (undated) DEVICE — ALCOHOL 70% ISOP W/GREEN 16OZ

## (undated) DEVICE — PAD CAST SPECIALIST STRL 4

## (undated) DEVICE — COVER OVERHEAD SURG LT BLUE

## (undated) DEVICE — SEE L#120831

## (undated) DEVICE — SPLINT COMFORMABLE 5X30

## (undated) DEVICE — SEE MEDLINE ITEM 157117

## (undated) DEVICE — ELECTRODE REM PLYHSV RETURN 9

## (undated) DEVICE — MANIFOLD 4 PORT

## (undated) DEVICE — SEE MEDLINE ITEM 156955

## (undated) DEVICE — STOCKINET 4INX48

## (undated) DEVICE — BANDAGE ESMARK ELASTIC ST 4X9

## (undated) DEVICE — SLING ARM COMFT NAVY BLU MED

## (undated) DEVICE — GAUZE SPONGE 4X4 12PLY

## (undated) DEVICE — PADDING CAST 3 X 4YD

## (undated) DEVICE — DRAPE STERI INSTRUMENT 1018

## (undated) DEVICE — PAD CAST SPECIALIST STRL 3

## (undated) DEVICE — CORD BIPOLAR 12 FOOT

## (undated) DEVICE — SUT VICRYL CTD 2-0 GI 27 SH

## (undated) DEVICE — APPLICATOR CHLORAPREP ORN 26ML

## (undated) DEVICE — ADHESIVE MASTISOL VIAL 48/BX

## (undated) DEVICE — SPONGE GAUZE 16PLY 4X4

## (undated) DEVICE — SEE MEDLINE ITEM 157173

## (undated) DEVICE — PAD PREP 50/CA

## (undated) DEVICE — BANDAGE MATRIX HK LOOP 4IN 5YD

## (undated) DEVICE — DRESSING LEUKOPLAST FLEX 1X3IN

## (undated) DEVICE — DRESSING XEROFORM FOIL PK 1X8

## (undated) DEVICE — GLOVE SURG BIOGEL LATEX SZ 7.5

## (undated) DEVICE — BANDAGE MATRIX HK LOOP 3IN 5YD

## (undated) DEVICE — TOURNIQUET SB QC DP 18X4IN

## (undated) DEVICE — TOWEL OR DISP STRL BLUE 4/PK

## (undated) DEVICE — CLOSURE SKIN STERI STRIP 1/2X4